# Patient Record
Sex: FEMALE | Race: BLACK OR AFRICAN AMERICAN | NOT HISPANIC OR LATINO | Employment: UNEMPLOYED | URBAN - METROPOLITAN AREA
[De-identification: names, ages, dates, MRNs, and addresses within clinical notes are randomized per-mention and may not be internally consistent; named-entity substitution may affect disease eponyms.]

---

## 2017-01-12 ENCOUNTER — LAB VISIT (OUTPATIENT)
Dept: LAB | Facility: HOSPITAL | Age: 52
End: 2017-01-12
Attending: INTERNAL MEDICINE
Payer: MEDICAID

## 2017-01-12 DIAGNOSIS — E78.5 HYPERLIPIDEMIA, UNSPECIFIED HYPERLIPIDEMIA TYPE: ICD-10-CM

## 2017-01-12 LAB
CHOLEST/HDLC SERPL: 3.4 {RATIO}
HDL/CHOLESTEROL RATIO: 29 %
HDLC SERPL-MCNC: 300 MG/DL
HDLC SERPL-MCNC: 87 MG/DL
LDLC SERPL CALC-MCNC: 189.2 MG/DL
NONHDLC SERPL-MCNC: 213 MG/DL
TRIGL SERPL-MCNC: 119 MG/DL

## 2017-01-12 PROCEDURE — 80061 LIPID PANEL: CPT

## 2017-01-12 PROCEDURE — 36415 COLL VENOUS BLD VENIPUNCTURE: CPT | Mod: PO

## 2017-01-13 ENCOUNTER — OFFICE VISIT (OUTPATIENT)
Dept: CARDIOLOGY | Facility: CLINIC | Age: 52
End: 2017-01-13
Payer: MEDICAID

## 2017-01-13 VITALS
WEIGHT: 188.5 LBS | BODY MASS INDEX: 34.69 KG/M2 | DIASTOLIC BLOOD PRESSURE: 79 MMHG | HEART RATE: 93 BPM | SYSTOLIC BLOOD PRESSURE: 131 MMHG | HEIGHT: 62 IN | OXYGEN SATURATION: 96 %

## 2017-01-13 DIAGNOSIS — E11.9 DIABETES MELLITUS TYPE 2, NONINSULIN DEPENDENT: ICD-10-CM

## 2017-01-13 DIAGNOSIS — E78.5 HYPERLIPIDEMIA, UNSPECIFIED HYPERLIPIDEMIA TYPE: Primary | ICD-10-CM

## 2017-01-13 DIAGNOSIS — I10 ESSENTIAL HYPERTENSION: ICD-10-CM

## 2017-01-13 DIAGNOSIS — E66.9 NON MORBID OBESITY, UNSPECIFIED OBESITY TYPE: ICD-10-CM

## 2017-01-13 PROCEDURE — 93010 ELECTROCARDIOGRAM REPORT: CPT | Mod: ,,, | Performed by: INTERNAL MEDICINE

## 2017-01-13 PROCEDURE — 99214 OFFICE O/P EST MOD 30 MIN: CPT | Mod: S$PBB,,, | Performed by: INTERNAL MEDICINE

## 2017-01-13 PROCEDURE — 99213 OFFICE O/P EST LOW 20 MIN: CPT | Mod: PBBFAC | Performed by: INTERNAL MEDICINE

## 2017-01-13 PROCEDURE — 99999 PR PBB SHADOW E&M-EST. PATIENT-LVL III: CPT | Mod: PBBFAC,,, | Performed by: INTERNAL MEDICINE

## 2017-01-13 PROCEDURE — 93005 ELECTROCARDIOGRAM TRACING: CPT | Mod: PBBFAC,PO | Performed by: INTERNAL MEDICINE

## 2017-01-13 RX ORDER — ATORVASTATIN CALCIUM 40 MG/1
40 TABLET, FILM COATED ORAL NIGHTLY
Qty: 90 TABLET | Refills: 3 | Status: SHIPPED | OUTPATIENT
Start: 2017-01-13 | End: 2017-05-15 | Stop reason: SDUPTHER

## 2017-01-13 NOTE — PROGRESS NOTES
CARDIOVASCULAR PROGRESS NOTE    REASON FOR CONSULT:   Antoine Seals is a 51 y.o. female who presents for CV follow up.    PCP: Sheldon  HISTORY OF PRESENT ILLNESS:   The patient returns for cardiovascular follow-up.  She underwent cervical spine surgery, and unfortunately continues complaining of neck pain.  She plans to follow-up with her providers for this.  In regards to cardiovascular symptoms, she denies angina or dyspnea.  She's had no palpitations, lightheadedness, dizziness, or syncope.  She denies PND, orthopnea, or lower extremity edema.  There's been no melena, hematuria, or claudicant symptoms.    I reviewed the results of her repeat lipid panel which notes that despite dietary intervention, her LDL went up.  Her non-HDL cholesterol is 213.  At this time I suggested we initiate statin therapy given her history of hypertension and diabetes.    CARDIOVASCULAR HISTORY:   none    PAST MEDICAL HISTORY:     Past Medical History   Diagnosis Date    Diabetes mellitus type 2, noninsulin dependent 9/7/2016    Hx of essential hypertension     Hypertension, uncontrolled 7/22/2016       PAST SURGICAL HISTORY:     Past Surgical History   Procedure Laterality Date    Gastric bypass  1995     sandra en y    Btl  1992       ALLERGIES AND MEDICATION:   Review of patient's allergies indicates:  No Known Allergies  Previous Medications    CHOLECALCIFEROL, VITAMIN D3, (VITAMIN D3 ORAL)    Take by mouth.    CYANOCOBALAMIN 500 MCG TABLET    Take 500 mcg by mouth once daily.    FERROUS SULFATE 325 MG (65 MG IRON) TAB TABLET    Take 325 mg by mouth daily with breakfast. Taking 3 times weekly    FLUTICASONE (FLONASE) 50 MCG/ACTUATION NASAL SPRAY    1 spray by Each Nare route 2 (two) times daily.    HYDROCODONE-ACETAMINOPHEN 5-325MG (NORCO) 5-325 MG PER TABLET    TK 1 T PO QID PRN P    LISINOPRIL-HYDROCHLOROTHIAZIDE (PRINZIDE,ZESTORETIC) 20-12.5 MG PER TABLET    Take 1 tablet by mouth once daily.    METFORMIN  (GLUCOPHAGE) 500 MG TABLET    TK 1 T PO  BID WITH MEALS. BEGIN 3 DAYS AFTER SURGERY -  START WITH 1 T D FOR 1 WEEK THEN  1 T BID    POTASSIUM GLUCONATE ORAL    Take 99 mcg by mouth once daily. Monday, Wed, and Friday    VITAMIN A 8000 UNIT CAPSULE    Take 8,000 Units by mouth once daily.       SOCIAL HISTORY:     Social History     Social History    Marital status:      Spouse name: N/A    Number of children: N/A    Years of education: N/A     Occupational History    Not on file.     Social History Main Topics    Smoking status: Never Smoker    Smokeless tobacco: Never Used    Alcohol use No    Drug use: No    Sexual activity: Yes     Partners: Male     Other Topics Concern    Not on file     Social History Narrative       FAMILY HISTORY:     Family History   Problem Relation Age of Onset    Heart disease Mother     Diabetes type II Mother     Heart attack Father 50     Open heart surgery       REVIEW OF SYSTEMS:   Review of Systems   Constitutional: Negative for chills, diaphoresis and fever.   HENT: Negative for nosebleeds.    Eyes: Negative for blurred vision, double vision and photophobia.   Respiratory: Negative for hemoptysis, shortness of breath and wheezing.    Cardiovascular: Negative for chest pain, palpitations, orthopnea, claudication, leg swelling and PND.   Gastrointestinal: Negative for abdominal pain, blood in stool, heartburn, melena, nausea and vomiting.   Genitourinary: Negative for flank pain and hematuria.   Musculoskeletal: Negative for falls, myalgias and neck pain.   Skin: Negative for rash.   Neurological: Negative for dizziness, seizures, loss of consciousness, weakness and headaches.   Endo/Heme/Allergies: Negative for polydipsia. Does not bruise/bleed easily.   Psychiatric/Behavioral: Negative for depression and memory loss. The patient is not nervous/anxious.        PHYSICAL EXAM:     Vitals:    01/13/17 1337   BP: 131/79   Pulse: 93    Body mass index is 34.48  "kg/(m^2).  Weight: 85.5 kg (188 lb 7.9 oz)   Height: 5' 2" (157.5 cm)     Physical Exam   Constitutional: She is oriented to person, place, and time. She appears well-developed and well-nourished. She is cooperative.  Non-toxic appearance. No distress.   HENT:   Head: Normocephalic and atraumatic.   Eyes: Conjunctivae and EOM are normal. Pupils are equal, round, and reactive to light. No scleral icterus.   Neck: Trachea normal. Neck supple. Normal carotid pulses and no JVD present. Carotid bruit is not present. No rigidity. No edema present. No thyromegaly present.   Cardiovascular: Normal rate, regular rhythm, S1 normal and S2 normal.  PMI is not displaced.  Exam reveals no gallop and no friction rub.    No murmur heard.  Pulses:       Carotid pulses are 2+ on the right side, and 2+ on the left side.  Pulmonary/Chest: Effort normal and breath sounds normal. No respiratory distress. She has no wheezes. She has no rales. She exhibits no tenderness.   Abdominal: Soft. Bowel sounds are normal. She exhibits no distension and no mass. There is no hepatosplenomegaly. There is no tenderness.   Musculoskeletal: She exhibits no edema or tenderness.   Feet:   Right Foot:   Skin Integrity: Negative for ulcer.   Left Foot:   Skin Integrity: Negative for ulcer.   Neurological: She is alert and oriented to person, place, and time. No cranial nerve deficit.   Skin: Skin is warm and dry. No rash noted. No erythema.   Psychiatric: She has a normal mood and affect. Her speech is normal and behavior is normal.   Vitals reviewed.      DATA:   EKG: (personally reviewed tracing)  1/13/17 NSR 92, PRWP, no change vs prior tracing    Laboratory:  CBC:    Recent Labs  Lab 09/28/15  1130 07/22/16  0846   WHITE BLOOD CELL COUNT 8.15 6.56   HEMOGLOBIN 11.8 L 11.4 L   HEMATOCRIT 36.2 L 36.2 L   PLATELETS 313 316       CHEMISTRIES:    Recent Labs  Lab 09/28/15  1130 09/02/16  1155 12/28/16  1031   GLUCOSE 135 H 138 H 125 H   SODIUM 138 139 137 "   POTASSIUM 4.2 4.0 4.2   BUN BLD 14 11 16   CREATININE 0.8 0.8 0.7   EGFR IF AFRICAN AMERICAN >60.0 >60.0 >60.0   EGFR IF NON- >60.0 >60.0 >60.0   CALCIUM 9.4 8.8 8.8   MAGNESIUM  --   --  1.7       CARDIAC BIOMARKERS:        COAGS:        LIPIDS/LFTS:    Recent Labs  Lab 09/28/15  1130 09/02/16  1155 01/12/17  0805   CHOLESTEROL 254 H  --  300 H   TRIGLYCERIDES 139  --  119   HDL 80 H  --  87 H   LDL CHOLESTEROL 146.2  --  189.2 H   NON-HDL CHOLESTEROL 174  --  213   AST 15 19  --    ALT 13 17  --        Cardiovascular Testing:  none    ASSESSMENT:   # HTN, controlled  # DM  # HLP, LDL high  # BMI 35 s/p bariatric surgery    PLAN:   Cont med rx  Start atorva 40mg qd  Check lipid panel in 3 months (mid April 2017)  RTC 4 months      Joshua Beebe MD, FACC

## 2017-01-13 NOTE — MR AVS SNAPSHOT
[facility]  [address]  [place] [address]  Phone: [phone]                  Antoine Seals   2017 2:00 PM   Office Visit    Description:  Female : 1965   Provider:  Joshua Beebe MD   Department:  [facility]           Reason for Visit     Follow Up / Labs                To Do List           Future Appointments        Provider Department Dept Phone    2017 2:00 PM Joshua Beebe MD [facility] [phone]      Goals (5 Years of Data)     None      [facility]     [facility] Care [facility] -  Assistance  Registered nurses in the [facility] Call Center provide clinical advisement, health education, appointment booking, and other advisory services.  Call for this free service at [phone].             Medications           Message regarding Medications     Verify the changes and/or additions to your medication regime listed below are the same as discussed with your clinician today.  If any of these changes or additions are incorrect, please notify your healthcare provider.             Verify that the below list of medications is an accurate representation of the medications you are currently taking.  If none reported, the list may be blank. If incorrect, please contact your healthcare provider. Carry this list with you in case of emergency.           Current Medications     CHOLECALCIFEROL, VITAMIN D3, (VITAMIN D3 ORAL) Take by mouth.    cyanocobalamin 500 MCG tablet Take 500 mcg by mouth once daily.    ferrous sulfate 325 mg (65 mg iron) Tab tablet Take 325 mg by mouth daily with breakfast. Taking 3 times weekly    fluticasone (FLONASE) 50 mcg/actuation nasal spray 1 spray by Each Nare route 2 (two) times daily.    hydrocodone-acetaminophen 5-325mg (NORCO) 5-325 mg per tablet TK 1 T PO QID PRN P    lisinopril-hydrochlorothiazide (PRINZIDE,ZESTORETIC) 20-12.5 mg per tablet Take 1 tablet by mouth once daily.     "metformin (GLUCOPHAGE) 500 MG tablet TK 1 T PO  BID WITH MEALS. BEGIN 3 DAYS AFTER SURGERY -  START WITH 1 T D FOR 1 WEEK THEN  1 T BID    POTASSIUM GLUCONATE ORAL Take 99 mcg by mouth once daily. Monday, Wed, and Friday    vitamin A 8000 UNIT capsule Take 8,000 Units by mouth once daily.           Clinical Reference Information           Vital Signs - Last Recorded  Most recent update: 1/13/2017  1:41 PM by Richa Wright MA    BP Pulse Ht Wt LMP SpO2    131/79 (BP Location: Left arm, Patient Position: Sitting, BP Method: Automatic) 93 5' 2" (1.575 m) 85.5 kg (188 lb 7.9 oz) 12/15/2016 96%    BMI                34.48 kg/m2          Blood Pressure          Most Recent Value    Right Arm BP - Sitting  (P) 124/80    Left Arm BP - Sitting  (P) 131/79    BP  131/79      Allergies as of 1/13/2017     No Known Allergies      Immunizations Administered on Date of Encounter - 1/13/2017     None      [facility] Sign-Up     Activating your [facility] account is as easy as 1-2-3!     1) Visit [email or web address], select Sign Up Now, enter this activation code and your date of birth, then select Next.  [email or web address]  Expires: 2/11/2017 10:14 AM      2) Create a username and password to use when you visit [facility] in the future and select a security question in case you lose your password and select Next.    3) Enter your e-mail address and click Sign Up!    Additional Information  If you have questions, please e-mail [email or web address] or call [phone] to talk to our [facility] staff. Remember, [facility] is NOT to be used for urgent needs. For medical emergencies, dial 911.         "

## 2017-03-10 RX ORDER — METFORMIN HYDROCHLORIDE 500 MG/1
TABLET ORAL
Qty: 60 TABLET | Refills: 1 | Status: SHIPPED | OUTPATIENT
Start: 2017-03-10 | End: 2017-04-06

## 2017-04-05 ENCOUNTER — OFFICE VISIT (OUTPATIENT)
Dept: FAMILY MEDICINE | Facility: CLINIC | Age: 52
End: 2017-04-05
Payer: MEDICAID

## 2017-04-05 ENCOUNTER — LAB VISIT (OUTPATIENT)
Dept: LAB | Facility: HOSPITAL | Age: 52
End: 2017-04-05
Attending: FAMILY MEDICINE
Payer: MEDICAID

## 2017-04-05 ENCOUNTER — TELEPHONE (OUTPATIENT)
Dept: FAMILY MEDICINE | Facility: CLINIC | Age: 52
End: 2017-04-05

## 2017-04-05 VITALS
HEART RATE: 78 BPM | RESPIRATION RATE: 16 BRPM | WEIGHT: 179.69 LBS | OXYGEN SATURATION: 96 % | HEIGHT: 62 IN | TEMPERATURE: 98 F | SYSTOLIC BLOOD PRESSURE: 114 MMHG | DIASTOLIC BLOOD PRESSURE: 76 MMHG | BODY MASS INDEX: 33.07 KG/M2

## 2017-04-05 DIAGNOSIS — N95.1 PERI-MENOPAUSE: ICD-10-CM

## 2017-04-05 DIAGNOSIS — F11.90 CHRONIC NARCOTIC USE: ICD-10-CM

## 2017-04-05 DIAGNOSIS — K59.00 CONSTIPATION, UNSPECIFIED CONSTIPATION TYPE: Primary | ICD-10-CM

## 2017-04-05 DIAGNOSIS — E11.9 DIABETES MELLITUS TYPE 2, NONINSULIN DEPENDENT: ICD-10-CM

## 2017-04-05 PROCEDURE — 99999 PR PBB SHADOW E&M-EST. PATIENT-LVL IV: CPT | Mod: PBBFAC,,, | Performed by: FAMILY MEDICINE

## 2017-04-05 PROCEDURE — 83036 HEMOGLOBIN GLYCOSYLATED A1C: CPT

## 2017-04-05 PROCEDURE — 36415 COLL VENOUS BLD VENIPUNCTURE: CPT | Mod: PO

## 2017-04-05 PROCEDURE — 99214 OFFICE O/P EST MOD 30 MIN: CPT | Mod: S$PBB,,, | Performed by: FAMILY MEDICINE

## 2017-04-05 RX ORDER — OXYCODONE AND ACETAMINOPHEN 7.5; 325 MG/1; MG/1
TABLET ORAL
Refills: 0 | COMMUNITY
Start: 2017-03-17 | End: 2017-05-15

## 2017-04-05 NOTE — PROGRESS NOTES
Chief Complaint   Patient presents with    Hot Flashes    Night Sweats    Constipation       HPI  Antoine Seals is a 52 y.o. female with multiple medical diagnoses as listed in the medical history and problem list that presents for evaluation for constipation and hot flashes.     She has been having worsening constipation for the past 3 mos. She has to take magnesium citrate daily to have a bowel movement. She does not have nausea or vomiting. Her energy is low but she had to stop taking her iron pill due to constipation. She has a hx fo gastric bypass is concerned that this is causing her symptoms. She does take medication for pain but not daily. No blood in her stool.     She has not had a period since February and has been having hot flashes and night sweats. She is not sure of what her triggers are. She has not tried anything over the counter for this.     PAST MEDICAL HISTORY:  Past Medical History:   Diagnosis Date    Diabetes mellitus type 2, noninsulin dependent 9/7/2016    Hx of essential hypertension     Hypertension, uncontrolled 7/22/2016       PAST SURGICAL HISTORY:  Past Surgical History:   Procedure Laterality Date    BTL  1992    GASTRIC BYPASS  1995    sandra en y       SOCIAL HISTORY:  Social History     Social History    Marital status:      Spouse name: N/A    Number of children: N/A    Years of education: N/A     Occupational History    Not on file.     Social History Main Topics    Smoking status: Never Smoker    Smokeless tobacco: Never Used    Alcohol use No    Drug use: No    Sexual activity: Yes     Partners: Male     Other Topics Concern    Not on file     Social History Narrative       FAMILY HISTORY:  Family History   Problem Relation Age of Onset    Heart disease Mother     Diabetes type II Mother     Heart attack Father 50     Open heart surgery       ALLERGIES AND MEDICATIONS: updated and reviewed.  Review of patient's allergies indicates:  No Known  "Allergies  Current Outpatient Prescriptions   Medication Sig Dispense Refill    atorvastatin (LIPITOR) 40 MG tablet Take 1 tablet (40 mg total) by mouth every evening. 90 tablet 3    ferrous sulfate 325 mg (65 mg iron) Tab tablet Take 325 mg by mouth daily with breakfast. Taking 3 times weekly      fluticasone (FLONASE) 50 mcg/actuation nasal spray 1 spray by Each Nare route 2 (two) times daily. 1 Bottle 5    lisinopril-hydrochlorothiazide (PRINZIDE,ZESTORETIC) 20-12.5 mg per tablet Take 1 tablet by mouth once daily. 90 tablet 3    metformin (GLUCOPHAGE) 500 MG tablet TAKE 1 TABLET BY MOUTH TWICE DAILY WITH MEALS. BEGIN 3 DAYS AFTER SURGERY- START WITH 1 TABLET DAILY FOR 1 WEEK THEN 1 TABLET TWICE DAILY 60 tablet 1    oxycodone-acetaminophen (PERCOCET) 7.5-325 mg per tablet TK 1 T PO  QID PRN  0     No current facility-administered medications for this visit.        ROS  Review of Systems   Constitutional: Negative for chills, diaphoresis, fatigue, fever and unexpected weight change.   HENT: Negative for rhinorrhea, sinus pressure, sore throat and tinnitus.    Eyes: Negative for photophobia and visual disturbance.   Respiratory: Negative for cough, shortness of breath and wheezing.    Cardiovascular: Negative for chest pain and palpitations.   Gastrointestinal: Positive for abdominal distention and constipation. Negative for abdominal pain, blood in stool, diarrhea, nausea and vomiting.   Genitourinary: Negative for dysuria, flank pain, frequency and vaginal discharge.   Musculoskeletal: Negative for arthralgias and joint swelling.   Skin: Negative for rash.   Neurological: Negative for speech difficulty, weakness, light-headedness and headaches.   Psychiatric/Behavioral: Negative for behavioral problems and dysphoric mood.       Physical Exam  Vitals:    04/05/17 0958   BP: 114/76   Pulse: 78   Resp: 16   Temp: 98 °F (36.7 °C)   TempSrc: Oral   SpO2: 96%   Weight: 81.5 kg (179 lb 10.8 oz)   Height: 5' 2" " "(1.575 m)    Body mass index is 32.86 kg/(m^2).  Weight: 81.5 kg (179 lb 10.8 oz)   Height: 5' 2" (157.5 cm)     Physical Exam   Constitutional: She is oriented to person, place, and time. She appears well-developed and well-nourished.   HENT:   Head: Normocephalic and atraumatic.   Eyes: EOM are normal.   Abdominal: Soft. Bowel sounds are normal. She exhibits no distension and no mass. There is no tenderness. There is no rebound and no guarding. No hernia.   Neurological: She is alert and oriented to person, place, and time.   Skin: Skin is warm and dry. No rash noted. No erythema.   Psychiatric: She has a normal mood and affect. Her behavior is normal.   Nursing note and vitals reviewed.      Health Maintenance       Date Due Completion Date    Eye Exam 1/29/1977 ---    Hemoglobin A1c 6/28/2017 12/28/2016    Foot Exam 12/28/2017 12/28/2016 (Done)    Override on 12/28/2016: Done    Lipid Panel 1/12/2018 1/12/2017    Colonoscopy 5/26/2018 5/26/2008    Pap Smear 9/28/2018 9/28/2015    Mammogram 12/29/2018 12/29/2016    TETANUS VACCINE 9/7/2026 9/7/2016    Pneumococcal PPSV23 (Medium Risk) (2) 1/29/2032 9/7/2016            ASSESSMENT     1. Constipation, unspecified constipation type    2. Diabetes mellitus type 2, noninsulin dependent    3. Chronic narcotic use    4. Angela-menopause        PLAN:     Constipation, unspecified constipation type  -     Ambulatory consult to Gastroenterology    Diabetes mellitus type 2, noninsulin dependent  -     Ambulatory referral to Optometry  -     Hemoglobin A1c; Future; Expected date: 4/5/17    Chronic narcotic use    Angela-menopause    I recommend [place] menopausal complete supplement for hot flashes  If this does not work, we can try SSRI or gabapentin    We will consult GI for her constipation and hx of gastric bypass, she will get the name of her surgeon and we can place a referral to him, though I think this is most likely from her opioid use  Recommend adequate water " intake and an OTC fiber pill    Katharine Chung MD  04/05/2017 10:33 AM        Return in about 6 weeks (around 5/17/2017) for Follow up.

## 2017-04-05 NOTE — MR AVS SNAPSHOT
[facility]  [address] [place] [address]  Phone: [phone]  Fax: [phone]                  Antoine Seals   2017 9:40 AM   Office Visit    Description:  Female : 1965   Provider:  Katharine Chung MD   Department:  [facility]           Reason for Visit     Hot Flashes     Night Sweats     Constipation           Diagnoses this Visit        Comments    Constipation, unspecified constipation type    -  Primary     Diabetes mellitus type 2, noninsulin dependent         Chronic narcotic use         Angela-menopause                To Do List           Future Appointments        Provider Department Dept Phone    2017 8:00 AM LAB, [facility] [phone]    5/15/2017 1:00 PM Joshua Beebe MD [facility] [phone]      Goals (5 Years of Data)     None      Follow-Up and Disposition     Return in about 6 weeks (around 2017) for Follow up.      [facility]     [facility] Care [facility] -  Assistance  Unless otherwise directed by your provider, please contact [facility], our nurse care line that is available for  assistance.     Registered nurses in the [facility] provide: appointment scheduling, clinical advisement, health education, and other advisory services.  Call: [phone] (toll free)               Medications           Message regarding Medications     Verify the changes and/or additions to your medication regime listed below are the same as discussed with your clinician today.  If any of these changes or additions are incorrect, please notify your healthcare provider.        STOP taking these medications     CHOLECALCIFEROL, VITAMIN D3, (VITAMIN D3 ORAL) Take by mouth.    cyanocobalamin 500 MCG tablet Take 500 mcg by mouth once daily.    hydrocodone-acetaminophen 5-325mg (NORCO) 5-325 mg per tablet TK 1 T PO QID PRN P    POTASSIUM GLUCONATE ORAL Take 99 mcg  "by mouth once daily. Monday, Wed, and Friday    vitamin A 8000 UNIT capsule Take 8,000 Units by mouth once daily.           Verify that the below list of medications is an accurate representation of the medications you are currently taking.  If none reported, the list may be blank. If incorrect, please contact your healthcare provider. Carry this list with you in case of emergency.           Current Medications     atorvastatin (LIPITOR) 40 MG tablet Take 1 tablet (40 mg total) by mouth every evening.    ferrous sulfate 325 mg (65 mg iron) Tab tablet Take 325 mg by mouth daily with breakfast. Taking 3 times weekly    fluticasone (FLONASE) 50 mcg/actuation nasal spray 1 spray by Each Nare route 2 (two) times daily.    lisinopril-hydrochlorothiazide (PRINZIDE,ZESTORETIC) 20-12.5 mg per tablet Take 1 tablet by mouth once daily.    metformin (GLUCOPHAGE) 500 MG tablet TAKE 1 TABLET BY MOUTH TWICE DAILY WITH MEALS. BEGIN 3 DAYS AFTER SURGERY- START WITH 1 TABLET DAILY FOR 1 WEEK THEN 1 TABLET TWICE DAILY    oxycodone-acetaminophen (PERCOCET) 7.5-325 mg per tablet TK 1 T PO  QID PRN           Clinical Reference Information           Your Vitals Were     BP Pulse Temp Resp Height Weight    114/76 78 98 °F (36.7 °C) (Oral) 16 5' 2" (1.575 m) 81.5 kg (179 lb 10.8 oz)    Last Period SpO2 BMI          02/23/2017 96% 32.86 kg/m2        Blood Pressure          Most Recent Value    BP  114/76      Allergies as of 4/5/2017     No Known Allergies      Immunizations Administered on Date of Encounter - 4/5/2017     None      Orders Placed During Today's Visit      Normal Orders This Visit    Ambulatory consult to Gastroenterology     Ambulatory referral to Optometry     Future Labs/Procedures Expected by Expires    Hemoglobin A1c  4/5/2017 4/5/2018      [facility] Sign-Up     Activating your [facility] account is as easy as 1-2-3!     1) Visit [email or web address], select Sign Up Now, enter this activation code and your date of birth, then " select Next.  [email or web address]  Expires: 5/20/2017 10:41 AM      2) Create a username and password to use when you visit [facility] in the future and select a security question in case you lose your password and select Next.    3) Enter your e-mail address and click Sign Up!    Additional Information  If you have questions, please e-mail [email or web address] or call [phone] to talk to our [facility] staff. Remember, [facility] is NOT to be used for urgent needs. For medical emergencies, dial 911.         Instructions           Language Assistance Services     ATTENTION: Language assistance services are available, free of charge. Please call [phone].      ATENCIÓN: Si habla bhavincr, tiene a valadez disposición servicios gratuitos de asistencia lingüística. Llame al [phone].     CHÚ Ý: N?u b?n nói Ti?ng Vi?t, có các d?ch v? h? tr? ngôn ng? mi?n phí dành cho b?n. G?i s? [phone].         [facility] complies with applicable Federal civil rights laws and does not discriminate on the basis of race, color, national origin, age, disability, or sex.

## 2017-04-05 NOTE — TELEPHONE ENCOUNTER
Patient has an appointment 4/10/17 3:30pm with Dr. Walters at the [facility], I emailed Amie at [facility] and she will call the patient to schedule her Gastro referral, the patient was notified.

## 2017-04-06 ENCOUNTER — TELEPHONE (OUTPATIENT)
Dept: FAMILY MEDICINE | Facility: CLINIC | Age: 52
End: 2017-04-06

## 2017-04-06 DIAGNOSIS — E11.9 DIABETES MELLITUS TYPE 2, NONINSULIN DEPENDENT: Primary | ICD-10-CM

## 2017-04-06 LAB
ESTIMATED AVG GLUCOSE: 197 MG/DL
HBA1C MFR BLD HPLC: 8.5 %

## 2017-04-06 RX ORDER — METFORMIN HYDROCHLORIDE 1000 MG/1
1000 TABLET ORAL 2 TIMES DAILY WITH MEALS
Qty: 180 TABLET | Refills: 3 | Status: SHIPPED | OUTPATIENT
Start: 2017-04-06 | End: 2017-05-15

## 2017-04-06 NOTE — TELEPHONE ENCOUNTER
Please let her know her diabetes has worsened from 7.9 to 8.5. I recommend we increase her metformin from 500mg to 1000mg twice daily. This may help with her diarrhea. If she just got a prescription, she may double the amt of pills until it runs out but I have sent a new one    Katharine Chung MD

## 2017-04-06 NOTE — TELEPHONE ENCOUNTER
Pt notified to increase Metformin to 1000mg BID. Can use the 500mg tabs until finished, a new rx was sent to pharmacy. Instr to try to maintain a diabetic diet since her A1C has increased.

## 2017-04-10 ENCOUNTER — OFFICE VISIT (OUTPATIENT)
Dept: OPTOMETRY | Facility: CLINIC | Age: 52
End: 2017-04-10
Payer: MEDICAID

## 2017-04-10 DIAGNOSIS — E11.9 TYPE 2 DIABETES MELLITUS WITHOUT OPHTHALMIC MANIFESTATIONS: Primary | ICD-10-CM

## 2017-04-10 DIAGNOSIS — H25.13 NUCLEAR SCLEROSIS, BILATERAL: ICD-10-CM

## 2017-04-10 DIAGNOSIS — I10 ESSENTIAL HYPERTENSION: ICD-10-CM

## 2017-04-10 DIAGNOSIS — H52.7 REFRACTIVE ERROR: ICD-10-CM

## 2017-04-10 PROCEDURE — 92004 COMPRE OPH EXAM NEW PT 1/>: CPT | Mod: S$PBB,,, | Performed by: OPTOMETRIST

## 2017-04-10 PROCEDURE — 92015 DETERMINE REFRACTIVE STATE: CPT | Mod: ,,, | Performed by: OPTOMETRIST

## 2017-04-10 PROCEDURE — 99999 PR PBB SHADOW E&M-EST. PATIENT-LVL II: CPT | Mod: PBBFAC,,, | Performed by: OPTOMETRIST

## 2017-04-10 PROCEDURE — 99212 OFFICE O/P EST SF 10 MIN: CPT | Mod: PBBFAC,PO | Performed by: OPTOMETRIST

## 2017-04-10 RX ORDER — METFORMIN HYDROCHLORIDE 500 MG/1
TABLET ORAL
Refills: 1 | COMMUNITY
Start: 2017-03-10 | End: 2017-05-15

## 2017-04-10 NOTE — PROGRESS NOTES
"Subjective:       Patient ID: Antoine Seals is a 52 y.o. female      Chief Complaint   Patient presents with    Concerns About Ocular Health    Diabetic Eye Exam     NIIDM; LBS 95, A1c 8.5 (04/05/17)    Hypertensive Eye Exam     History of Present Illness  Dls: ? Yrs     Pt here for diabetic and hypertensive eye exam.   Pt c/o blurry vision at distance ou. Pt wears bifocal glasses. Pt states   no tearing no itching no burning no pain no ha's no floaters.     Eye meds:   None    Hemoglobin A1C       Date                     Value               Ref Range           Status                04/05/2017               8.5 (H)             4.5 - 6.2 %         Final                 12/28/2016               7.9 (H)             4.5 - 6.2 %         Final                  07/22/2016               8.3 (H)             4.5 - 6.2 %         Final             ----------    LBS 95 this AM      Assessment/Plan:     1. Type 2 diabetes mellitus without ophthalmic manifestations  No diabetic retinopathy. Educated patient on importance of good blood sugar control, compliance with meds, and follow up care with PCP. Return in 1 year for dilated eye exam, sooner PRN.    2. Nuclear sclerosis, bilateral  Educated patient on the presence of cataracts and effects on vision, including clouded, blurred or dim vision, increasing difficulty with vision at night, sensitivity to light and glare, need for brighter light for reading and other activities, and seeing "halos" around lights. No surgery at this time. Recheck in one year.    3. Essential hypertension  - No hypertensive retinopathy. Continue BP control. RTC 1 year for DFE.    4. Refractive error  Educated patient on refractive error and discussed lens options. Gave pt Rx for specs. RTC in 1 year.    Eyeglass Final Rx     Eyeglass Final Rx      Sphere Cylinder Axis Add   Right -1.25 Sphere  +1.75   Left -2.00 +1.25 035 +1.75       Type:  Bifocal    Expiration Date:  4/11/2018          "       Return in about 1 year (around 4/10/2018) for Diabetic Eye Exam.

## 2017-04-10 NOTE — LETTER
April 10, 2017      Katharine Chung MD  [address]  [place] [address]           [facility]  [address]  [place] [address]  Phone: [phone]  Fax: [phone]          Patient: Antoine Seals   MR Number: [id number]   YOB: 1965   Date of Visit: 4/10/2017       Dear Dr. Katharine Chung:    Thank you for referring Anotine Seals to me for evaluation. Attached you will find relevant portions of my assessment and plan of care.    If you have questions, please do not hesitate to call me. I look forward to following Antoine Seals along with you.    Sincerely,    Zakia Walters, OD    Enclosure  CC:  No Recipients    If you would like to receive this communication electronically, please contact [email or web address] or [phone] to request more information on [email or web address] Link access.    For providers and/or their staff who would like to refer a patient to [facility], please contact us through our one-stop-shop provider referral line, [facility], at [phone].    If you feel you have received this communication in error or would no longer like to receive these types of communications, please e-mail [email or web address]

## 2017-04-10 NOTE — MR AVS SNAPSHOT
[facility]  [address] [facility] [place] [address]  Phone: [phone]  Fax: [phone]                  Antoine Seals   4/10/2017 3:30 PM   Office Visit    Description:  Female : 1965   Provider:  Zakia Walters OD   Department:  [facility]           Reason for Visit     Concerns About Ocular Health     Diabetic Eye Exam     Hypertensive Eye Exam           Diagnoses this Visit        Comments    Type 2 diabetes mellitus without ophthalmic manifestations    -  Primary     Nuclear sclerosis, bilateral         Refractive error                To Do List           Future Appointments        Provider Department Dept Phone    2017 8:00 AM LAB, [facility] [phone]    5/15/2017 1:00 PM Joshua Beebe MD [facility] - Cardiology [phone]      Goals (5 Years of Data)     None      Follow-Up and Disposition     Return in about 1 year (around 4/10/2018) for Diabetic Eye Exam.      [facility]     [facility] Care [facility] -  Assistance  Unless otherwise directed by your provider, please contact [facility], our nurse care line that is available for  assistance.     Registered nurses in the [facility] provide: appointment scheduling, clinical advisement, health education, and other advisory services.  Call: [phone] (toll free)               Medications           Message regarding Medications     Verify the changes and/or additions to your medication regime listed below are the same as discussed with your clinician today.  If any of these changes or additions are incorrect, please notify your healthcare provider.             Verify that the below list of medications is an accurate representation of the medications you are currently taking.  If none reported, the list may be blank. If incorrect, please contact your healthcare provider. Carry this list with you in case of emergency.           Current Medications      atorvastatin (LIPITOR) 40 MG tablet Take 1 tablet (40 mg total) by mouth every evening.    ferrous sulfate 325 mg (65 mg iron) Tab tablet Take 325 mg by mouth daily with breakfast. Taking 3 times weekly    fluticasone (FLONASE) 50 mcg/actuation nasal spray 1 spray by Each Nare route 2 (two) times daily.    lisinopril-hydrochlorothiazide (PRINZIDE,ZESTORETIC) 20-12.5 mg per tablet Take 1 tablet by mouth once daily.    metformin (GLUCOPHAGE) 1000 MG tablet Take 1 tablet (1,000 mg total) by mouth 2 (two) times daily with meals.    metformin (GLUCOPHAGE) 500 MG tablet TK 1 T PO  BID WITH MEALS. BEGIN 3 DAYS AFTER SURGERY -  START WITH 1 T D FOR 1 WEEK THEN  1 T BID    oxycodone-acetaminophen (PERCOCET) 7.5-325 mg per tablet TK 1 T PO  QID PRN           Clinical Reference Information           Your Vitals Were     Last Period                   02/23/2017           Allergies as of 4/10/2017     No Known Allergies      Immunizations Administered on Date of Encounter - 4/10/2017     None      [facility] Sign-Up     Activating your [facility] account is as easy as 1-2-3!     1) Visit [email or web address], select Sign Up Now, enter this activation code and your date of birth, then select Next.  [email or web address]  Expires: 5/20/2017 10:41 AM      2) Create a username and password to use when you visit [facility] in the future and select a security question in case you lose your password and select Next.    3) Enter your e-mail address and click Sign Up!    Additional Information  If you have questions, please e-mail [email or web address] or call [phone] to talk to our [facility] staff. Remember, [facility] is NOT to be used for urgent needs. For medical emergencies, dial 911.         Language Assistance Services     ATTENTION: Language assistance services are available, free of charge. Please call [phone].      ATENCIÓN: Si habla español, tiene a valadez disposición servicios gratuitos de asistencia lingüística. Llame al  [phone].     ANAHI Ý: N?u b?n nói Ti?ng Vi?t, có các d?ch v? h? tr? ngôn ng? mi?n phí dành cho b?n. G?i s? [phone].         [facility] complies with applicable Federal civil rights laws and does not discriminate on the basis of race, color, national origin, age, disability, or sex.

## 2017-04-13 ENCOUNTER — TELEPHONE (OUTPATIENT)
Dept: FAMILY MEDICINE | Facility: CLINIC | Age: 52
End: 2017-04-13

## 2017-04-13 DIAGNOSIS — E11.9 DIABETES MELLITUS TYPE 2, NONINSULIN DEPENDENT: Primary | ICD-10-CM

## 2017-04-13 NOTE — TELEPHONE ENCOUNTER
Then we will have to add a different kind of medication called glipizide, but if she misses meals it can drop her blood sugar too low, she can start by taking it once daily and keep track of her sugars so that at her next visit we can review.    Is she ok with starting this medication? I can send it to her pharmacy    Katharine Chung MD

## 2017-04-13 NOTE — TELEPHONE ENCOUNTER
----- Message from Naida Joe sent at 4/12/2017  9:42 AM CDT -----  Contact: Self  Pt states metformin (GLUCOPHAGE) 1000 MG tablet is too large for her to take. Pt can be reached @ [phone].

## 2017-04-13 NOTE — TELEPHONE ENCOUNTER
Pt states cannot swallow Metformin in any form. Tablet too big even if broken in half and refuses to crush. Please advise

## 2017-04-17 RX ORDER — GLIPIZIDE 5 MG/1
5 TABLET ORAL
Qty: 30 TABLET | Refills: 1 | Status: SHIPPED | OUTPATIENT
Start: 2017-04-17 | End: 2017-08-03 | Stop reason: SDUPTHER

## 2017-05-01 ENCOUNTER — TELEPHONE (OUTPATIENT)
Dept: FAMILY MEDICINE | Facility: CLINIC | Age: 52
End: 2017-05-01

## 2017-05-01 DIAGNOSIS — I10 HYPERTENSION, UNCONTROLLED: ICD-10-CM

## 2017-05-01 RX ORDER — LISINOPRIL AND HYDROCHLOROTHIAZIDE 12.5; 2 MG/1; MG/1
1 TABLET ORAL DAILY
Qty: 90 TABLET | Refills: 3 | OUTPATIENT
Start: 2017-05-01 | End: 2018-05-01

## 2017-05-01 NOTE — TELEPHONE ENCOUNTER
----- Message from Therese Ghotra sent at 5/1/2017  9:31 AM CDT -----  Contact: self  Patient is requesting her blood pressure med to be changed .   [id number] MG

## 2017-05-01 NOTE — TELEPHONE ENCOUNTER
Patient states she had swelling to lip & tongue she thinks may be caused by her Lisinopril. Episode occurred on Friday which was the last day she took medication.I advised patient to come to   Patient states she wants to see Dr. Chung.  Advised her Dr. Chung doesn't have any appointments on Wednesday patient agreed to see Dr. Alexander. DILMA

## 2017-05-01 NOTE — TELEPHONE ENCOUNTER
----- Message from Mary Grace Hudson sent at 5/1/2017  9:29 AM CDT -----  Contact: self  lisinopril-hydrochlorothiazide (PRINZIDE,ZESTORETIC) 20-12.5 mg per tablet    Pt calling to request a refill of the above [facility]. Pt can be reached at [phone].

## 2017-05-03 ENCOUNTER — OFFICE VISIT (OUTPATIENT)
Dept: FAMILY MEDICINE | Facility: CLINIC | Age: 52
End: 2017-05-03
Payer: MEDICAID

## 2017-05-03 VITALS
OXYGEN SATURATION: 97 % | SYSTOLIC BLOOD PRESSURE: 142 MMHG | WEIGHT: 191.38 LBS | TEMPERATURE: 98 F | HEIGHT: 62 IN | HEART RATE: 82 BPM | BODY MASS INDEX: 35.22 KG/M2 | DIASTOLIC BLOOD PRESSURE: 96 MMHG

## 2017-05-03 DIAGNOSIS — I10 ESSENTIAL HYPERTENSION, BENIGN: Primary | ICD-10-CM

## 2017-05-03 PROCEDURE — 99999 PR PBB SHADOW E&M-EST. PATIENT-LVL III: CPT | Mod: PBBFAC,,, | Performed by: FAMILY MEDICINE

## 2017-05-03 PROCEDURE — 99213 OFFICE O/P EST LOW 20 MIN: CPT | Mod: PBBFAC,PO | Performed by: FAMILY MEDICINE

## 2017-05-03 PROCEDURE — 99214 OFFICE O/P EST MOD 30 MIN: CPT | Mod: S$PBB,,, | Performed by: FAMILY MEDICINE

## 2017-05-03 RX ORDER — MELOXICAM 15 MG/1
15 TABLET ORAL DAILY PRN
Refills: 0 | COMMUNITY
Start: 2017-04-10 | End: 2017-06-07 | Stop reason: ALTCHOICE

## 2017-05-03 RX ORDER — AMLODIPINE BESYLATE 10 MG/1
10 TABLET ORAL DAILY
Qty: 90 TABLET | Refills: 3 | Status: SHIPPED | OUTPATIENT
Start: 2017-05-03 | End: 2018-04-23 | Stop reason: SDUPTHER

## 2017-05-03 RX ORDER — OXYCODONE AND ACETAMINOPHEN 5; 325 MG/1; MG/1
1 TABLET ORAL 2 TIMES DAILY
Refills: 0 | COMMUNITY
Start: 2017-04-10 | End: 2017-06-07 | Stop reason: ALTCHOICE

## 2017-05-03 NOTE — PROGRESS NOTES
[facility]  Progress Note    SUBJECTIVE:     Chief Complaint   Patient presents with    Medication Problem     pt wants to change her blood pressure medication    Facial Swelling     possible reaction to BP medication        HPI   Antoine Seals  is a 52 y.o. female here for follow up of facial swelling. This is her 2nd episode of mouth/lip swelling, (first one approx 6 months ago?). She stopped the med and the swelling got better the next day. She did not have any difficulty breathing or swallowing. Also no ER visit.    Review of patient's allergies indicates:   Allergen Reactions    Lisinopril-hydrochlorothiazide Swelling     Facial swelling/ mouth swelling       Past Medical History:   Diagnosis Date    Diabetes mellitus type 2, noninsulin dependent 9/7/2016    Hx of essential hypertension     Hypertension, uncontrolled 7/22/2016    Nuclear sclerosis of both eyes 4/10/2017     Past Surgical History:   Procedure Laterality Date    BTL  1992    GASTRIC BYPASS  1995    sandra en y     Family History   Problem Relation Age of Onset    Heart disease Mother     Diabetes type II Mother     Heart attack Father 50     Open heart surgery    No Known Problems Sister     No Known Problems Brother     No Known Problems Maternal Aunt     No Known Problems Maternal Uncle     No Known Problems Paternal Aunt     No Known Problems Paternal Uncle     No Known Problems Maternal Grandmother     No Known Problems Maternal Grandfather     No Known Problems Paternal Grandmother     No Known Problems Paternal Grandfather     Amblyopia Neg Hx     Blindness Neg Hx     Cancer Neg Hx     Cataracts Neg Hx     Diabetes Neg Hx     Glaucoma Neg Hx     Hypertension Neg Hx     Macular degeneration Neg Hx     Retinal detachment Neg Hx     Strabismus Neg Hx     Stroke Neg Hx     Thyroid disease Neg Hx      Social History   Substance Use Topics    Smoking status: Never Smoker    Smokeless tobacco:  Never Used    Alcohol use No        Review of Systems   Constitutional: Negative for chills, fever and malaise/fatigue.   HENT:        + facial swelling   Respiratory: Negative.  Negative for cough and shortness of breath.    Cardiovascular: Negative.  Negative for chest pain.   Gastrointestinal: Negative.  Negative for abdominal pain, nausea and vomiting.   Genitourinary: Negative.    Neurological: Negative for weakness and headaches.   All other systems reviewed and are negative.    OBJECTIVE:     Vitals:    05/03/17 1031   BP: (!) 142/96   Pulse: 82   Temp: 97.9 °F (36.6 °C)     Body mass index is 35 kg/(m^2).    Physical Exam   Constitutional: She is oriented to person, place, and time and well-developed, well-nourished, and in no distress. No distress.   HENT:   Head: Normocephalic and atraumatic.   No oral, tongue, throat swelling   Eyes: Conjunctivae and EOM are normal.   Cardiovascular: Normal rate, regular rhythm and normal heart sounds.  Exam reveals no gallop and no friction rub.    No murmur heard.  Pulmonary/Chest: Effort normal and breath sounds normal. No respiratory distress. She has no wheezes. She has no rales.   Abdominal: Soft. Bowel sounds are normal. She exhibits no distension. There is no tenderness.   Neurological: She is alert and oriented to person, place, and time.   Skin: Skin is warm. She is not diaphoretic.       Old records were reviewed. Labs and/or images were independently reviewed.    ASSESSMENT     1. Essential hypertension, benign        PLAN:     Essential hypertension, benign  -     Start amlodipine (NORVASC) 10 MG tablet; Take 1 tablet (10 mg total) by mouth once daily.  Dispense: 90 tablet; Refill: 3  -     Educated patient to take medications as prescribed, and to record bp logs daily to bring along to next visit. Instructed patient to decrease salt intake. Also told patient to call if any new signs or symptoms develop.  -     Stop lisinopril/hctz as most likely source of  angioedema. Unsure which one specifically, as she used to be on lisinopril monotherapy, and she may have recalled a similar episode?        RTC PRN, or go to ER if signs or symptoms persist or worsen.       Pj Alexander MD  05/03/2017 11:15 AM

## 2017-05-04 DIAGNOSIS — E11.9 TYPE 2 DIABETES MELLITUS WITHOUT COMPLICATION: ICD-10-CM

## 2017-05-12 ENCOUNTER — OFFICE VISIT (OUTPATIENT)
Dept: OBSTETRICS AND GYNECOLOGY | Facility: CLINIC | Age: 52
End: 2017-05-12
Payer: MEDICAID

## 2017-05-12 ENCOUNTER — LAB VISIT (OUTPATIENT)
Dept: LAB | Facility: HOSPITAL | Age: 52
End: 2017-05-12
Attending: FAMILY MEDICINE
Payer: MEDICAID

## 2017-05-12 VITALS
BODY MASS INDEX: 35.11 KG/M2 | HEIGHT: 62 IN | SYSTOLIC BLOOD PRESSURE: 130 MMHG | WEIGHT: 190.81 LBS | DIASTOLIC BLOOD PRESSURE: 82 MMHG

## 2017-05-12 DIAGNOSIS — R33.9 URINARY RETENTION: Primary | ICD-10-CM

## 2017-05-12 DIAGNOSIS — E78.5 HYPERLIPIDEMIA, UNSPECIFIED HYPERLIPIDEMIA TYPE: ICD-10-CM

## 2017-05-12 LAB
ALBUMIN SERPL BCP-MCNC: 3 G/DL
ALP SERPL-CCNC: 73 U/L
ALT SERPL W/O P-5'-P-CCNC: 19 U/L
AST SERPL-CCNC: 17 U/L
BILIRUB DIRECT SERPL-MCNC: 0.1 MG/DL
BILIRUB SERPL-MCNC: 0.4 MG/DL
CHOLEST/HDLC SERPL: 3.2 {RATIO}
HDL/CHOLESTEROL RATIO: 31.5 %
HDLC SERPL-MCNC: 302 MG/DL
HDLC SERPL-MCNC: 95 MG/DL
LDLC SERPL CALC-MCNC: 168.8 MG/DL
NONHDLC SERPL-MCNC: 207 MG/DL
PROT SERPL-MCNC: 7.4 G/DL
TRIGL SERPL-MCNC: 191 MG/DL

## 2017-05-12 PROCEDURE — 80061 LIPID PANEL: CPT

## 2017-05-12 PROCEDURE — 36415 COLL VENOUS BLD VENIPUNCTURE: CPT

## 2017-05-12 PROCEDURE — 99999 PR PBB SHADOW E&M-EST. PATIENT-LVL II: CPT | Mod: PBBFAC,,, | Performed by: OBSTETRICS & GYNECOLOGY

## 2017-05-12 PROCEDURE — 80076 HEPATIC FUNCTION PANEL: CPT

## 2017-05-12 PROCEDURE — 99213 OFFICE O/P EST LOW 20 MIN: CPT | Mod: S$PBB,,, | Performed by: OBSTETRICS & GYNECOLOGY

## 2017-05-12 RX ORDER — BETHANECHOL CHLORIDE 5 MG/1
10 TABLET ORAL
Qty: 60 TABLET | Refills: 0 | Status: SHIPPED | OUTPATIENT
Start: 2017-05-12 | End: 2017-06-26

## 2017-05-12 NOTE — PROGRESS NOTES
"[facility]  Ambulatory Clinic  Obstetrics & Gynecology    Visit Date:  2017    Chief Complaint:  Incomplete voiding    History of Present Illness:      Antoine Seals is a 52 y.o.  here with c/o incomplete voiding.    Pt reports this problem for past 2-3 wks.    Pt denies any UTI sxs or renal stones.    Pt states she is able to void completely but just takes more time than usual.    Pt denies any vaginal discharge, dysmenorrhea, dyspareunia, pelvic pain, breast mass/skin changes, GI complaints.      Review of Systems:      GENERAL:  No fever, fatigue, excessive weight gain or loss  GENITOURINARY:  See HPI    Physical Exam:     /82 (BP Location: Left arm, Patient Position: Sitting, BP Method: Manual)  Ht 5' 2" (1.575 m)  Wt 86.5 kg (190 lb 12.9 oz)  LMP 2017  BMI 34.9 kg/m2   Pulse 60, Resp rate 16     GENERAL:  No acute distress, well-nourished  LUNGS:  Clear to auscultation  HEART:  Regular rate and rhythm, no murmurs, gallops, or rubs  ABDOMEN:  Soft, non-tender, non-distended, no CVA or suprapubic tenderness   GENITOURINARY:  NFEG no lesion. No vaginal or cervical lesion. No bleeding or discharge. No significant cystocele or rectocele. Uterus and ovaries small, NT.    Chaperone present for exam.    Urine dip negative for nitrite or LE 2017    Assessment:     52 y.o. :    1. Urinary retention, possible neurogenic bladder from diabetes    Plan:    Discussed urinary retention.  Trail of urecholine.  Behavioral modifications, voiding diary, no smoking and dietary advice.  If sxs worsen, will refer to urology.     Encourage healthy lifestyle modifications.    F/u with PCP for health maintenance.     F/u 6 wks or sooner as needed.      Voiced understanding.        Naveed Alexander MD    "

## 2017-05-12 NOTE — MR AVS SNAPSHOT
[facility] [place]  [address] [facility] [address]  [place] [address]  Phone: [phone]                  Antoine Seals   2017 3:00 PM   Office Visit    Description:  Female : 1965   Provider:  Naveed Alexander MD   Department:  [facility] [place] [facility]           Reason for Visit     Urinary Retention                To Do List           Future Appointments        Provider Department Dept Phone    5/15/2017 1:00 PM Joshua Beebe MD [facility] [phone]      Goals (5 Years of Data)     None      [facility]     [facility] On Call Nurse Care Line -  Assistance  Unless otherwise directed by your provider, please contact [facility], our nurse care line that is available for  assistance.     Registered nurses in the [facility] provide: appointment scheduling, clinical advisement, health education, and other advisory services.  Call: [phone] (toll free)               Medications           Message regarding Medications     Verify the changes and/or additions to your medication regime listed below are the same as discussed with your clinician today.  If any of these changes or additions are incorrect, please notify your healthcare provider.             Verify that the below list of medications is an accurate representation of the medications you are currently taking.  If none reported, the list may be blank. If incorrect, please contact your healthcare provider. Carry this list with you in case of emergency.           Current Medications     amlodipine (NORVASC) 10 MG tablet Take 1 tablet (10 mg total) by mouth once daily.    atorvastatin (LIPITOR) 40 MG tablet Take 1 tablet (40 mg total) by mouth every evening.    ferrous sulfate 325 mg (65 mg iron) Tab tablet Take 325 mg by mouth daily with breakfast. Taking 3 times weekly    fluticasone (FLONASE) 50 mcg/actuation nasal spray 1 spray by Each Nare route 2 (two) times daily.    glipiZIDE (GLUCOTROL) 5  "MG tablet Take 1 tablet (5 mg total) by mouth daily with breakfast.    meloxicam (MOBIC) 15 MG tablet Take 15 mg by mouth daily as needed.    metformin (GLUCOPHAGE) 1000 MG tablet Take 1 tablet (1,000 mg total) by mouth 2 (two) times daily with meals.    metformin (GLUCOPHAGE) 500 MG tablet TK 1 T PO  BID WITH MEALS. BEGIN 3 DAYS AFTER SURGERY -  START WITH 1 T D FOR 1 WEEK THEN  1 T BID    oxycodone-acetaminophen (PERCOCET) 5-325 mg per tablet Take 1 tablet by mouth 2 (two) times daily.    oxycodone-acetaminophen (PERCOCET) 7.5-325 mg per tablet TK 1 T PO  QID PRN           Clinical Reference Information           Your Vitals Were     BP Height Weight Last Period BMI    130/82 (BP Location: Left arm, Patient Position: Sitting, BP Method: Manual) 5' 2" (1.575 m) 86.5 kg (190 lb 12.9 oz) 04/23/2017 34.9 kg/m2      Blood Pressure          Most Recent Value    BP  130/82      Allergies as of 5/12/2017     Lisinopril-hydrochlorothiazide      Immunizations Administered on Date of Encounter - 5/12/2017     None      [facility] Sign-Up     Activating your [facility] account is as easy as 1-2-3!     1) Visit [email or web address], select Sign Up Now, enter this activation code and your date of birth, then select Next.  [email or web address]  Expires: 5/20/2017 10:41 AM      2) Create a username and password to use when you visit [facility] in the future and select a security question in case you lose your password and select Next.    3) Enter your e-mail address and click Sign Up!    Additional Information  If you have questions, please e-mail [email or web address] or call [phone] to talk to our [facility] staff. Remember, [facility] is NOT to be used for urgent needs. For medical emergencies, dial 911.         Language Assistance Services     ATTENTION: Language assistance services are available, free of charge. Please call [phone].      ATENCIÓN: Si habla español, tiene a valadez disposición servicios gratuitos de asistencia " lingüística. Driss al [phone].     ANAHI Ý: N?u b?n nói Ti?ng Vi?t, có các d?ch v? h? tr? ngôn ng? mi?n phí dành cho b?n. G?i s? [phone].         [facility] complies with applicable Federal civil rights laws and does not discriminate on the basis of race, color, national origin, age, disability, or sex.

## 2017-05-15 ENCOUNTER — OFFICE VISIT (OUTPATIENT)
Dept: CARDIOLOGY | Facility: CLINIC | Age: 52
End: 2017-05-15
Payer: MEDICAID

## 2017-05-15 VITALS
HEART RATE: 89 BPM | HEIGHT: 62 IN | DIASTOLIC BLOOD PRESSURE: 84 MMHG | OXYGEN SATURATION: 96 % | BODY MASS INDEX: 35.62 KG/M2 | WEIGHT: 193.56 LBS | RESPIRATION RATE: 15 BRPM | SYSTOLIC BLOOD PRESSURE: 130 MMHG

## 2017-05-15 DIAGNOSIS — I10 ESSENTIAL HYPERTENSION: ICD-10-CM

## 2017-05-15 DIAGNOSIS — E66.9 NON MORBID OBESITY, UNSPECIFIED OBESITY TYPE: ICD-10-CM

## 2017-05-15 DIAGNOSIS — R06.09 DOE (DYSPNEA ON EXERTION): Primary | ICD-10-CM

## 2017-05-15 DIAGNOSIS — E78.5 HYPERLIPIDEMIA, UNSPECIFIED HYPERLIPIDEMIA TYPE: ICD-10-CM

## 2017-05-15 DIAGNOSIS — E11.9 DIABETES MELLITUS TYPE 2, NONINSULIN DEPENDENT: ICD-10-CM

## 2017-05-15 PROCEDURE — 99214 OFFICE O/P EST MOD 30 MIN: CPT | Mod: S$PBB,,, | Performed by: INTERNAL MEDICINE

## 2017-05-15 PROCEDURE — 99213 OFFICE O/P EST LOW 20 MIN: CPT | Mod: PBBFAC,PO | Performed by: INTERNAL MEDICINE

## 2017-05-15 PROCEDURE — 99999 PR PBB SHADOW E&M-EST. PATIENT-LVL III: CPT | Mod: PBBFAC,,, | Performed by: INTERNAL MEDICINE

## 2017-05-15 RX ORDER — ATORVASTATIN CALCIUM 80 MG/1
80 TABLET, FILM COATED ORAL NIGHTLY
Qty: 90 TABLET | Refills: 3 | Status: SHIPPED | OUTPATIENT
Start: 2017-05-15 | End: 2017-11-08 | Stop reason: SDUPTHER
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
## 2017-05-15 NOTE — MR AVS SNAPSHOT
[facility]  [address]  [place] [address]  Phone: [phone]                  Antoine Seals   5/15/2017 1:00 PM   Office Visit    Description:  Female : 1965   Provider:  Joshua Beebe MD   Department:  [facility]           Reason for Visit     Hypertension     Follow-up                To Do List           Goals (5 Years of Data)     None      [facility]     [facility] -  Assistance  Unless otherwise directed by your provider, please contact [facility], our nurse care line that is available for  assistance.     Registered nurses in the [facility] provide: appointment scheduling, clinical advisement, health education, and other advisory services.  Call: [phone] (toll free)               Medications           Message regarding Medications     Verify the changes and/or additions to your medication regime listed below are the same as discussed with your clinician today.  If any of these changes or additions are incorrect, please notify your healthcare provider.        STOP taking these medications     oxycodone-acetaminophen (PERCOCET) 7.5-325 mg per tablet TK 1 T PO  QID PRN    metformin (GLUCOPHAGE) 1000 MG tablet Take 1 tablet (1,000 mg total) by mouth 2 (two) times daily with meals.    metformin (GLUCOPHAGE) 500 MG tablet TK 1 T PO  BID WITH MEALS. BEGIN 3 DAYS AFTER SURGERY -  START WITH 1 T D FOR 1 WEEK THEN  1 T BID           Verify that the below list of medications is an accurate representation of the medications you are currently taking.  If none reported, the list may be blank. If incorrect, please contact your healthcare provider. Carry this list with you in case of emergency.           Current Medications     amlodipine (NORVASC) 10 MG tablet Take 1 tablet (10 mg total) by mouth once daily.    atorvastatin (LIPITOR) 40 MG tablet Take 1 tablet (40 mg total) by mouth every evening.     "bethanechol (URECHOLINE) 5 MG Tab Take 2 tablets (10 mg total) by mouth before meals as needed.    ferrous sulfate 325 mg (65 mg iron) Tab tablet Take 325 mg by mouth daily with breakfast. Taking 3 times weekly    fluticasone (FLONASE) 50 mcg/actuation nasal spray 1 spray by Each Nare route 2 (two) times daily.    glipiZIDE (GLUCOTROL) 5 MG tablet Take 1 tablet (5 mg total) by mouth daily with breakfast.    meloxicam (MOBIC) 15 MG tablet Take 15 mg by mouth daily as needed.    oxycodone-acetaminophen (PERCOCET) 5-325 mg per tablet Take 1 tablet by mouth 2 (two) times daily.           Clinical Reference Information           Your Vitals Were     BP Pulse Resp Height Weight Last Period    130/84 89 15 5' 2" (1.575 m) 87.8 kg (193 lb 9 oz) 04/23/2017    SpO2 BMI             96% 35.4 kg/m2         Blood Pressure          Most Recent Value    BP  130/84      Allergies as of 5/15/2017     Lisinopril-hydrochlorothiazide      Immunizations Administered on Date of Encounter - 5/15/2017     None      [facility] Sign-Up     Activating your [facility] account is as easy as 1-2-3!     1) Visit [email or web address], select Sign Up Now, enter this activation code and your date of birth, then select Next.  [email or web address]  Expires: 5/20/2017 10:41 AM      2) Create a username and password to use when you visit [facility] in the future and select a security question in case you lose your password and select Next.    3) Enter your e-mail address and click Sign Up!    Additional Information  If you have questions, please e-mail [email or web address] or call [phone] to talk to our [facility] staff. Remember, [facility] is NOT to be used for urgent needs. For medical emergencies, dial 911.         Language Assistance Services     ATTENTION: Language assistance services are available, free of charge. Please call [phone].      ATENCIÓN: Si habla español, tiene a valadez disposición servicios gratuitos de asistencia lingüística. Llame al " [phone].     ANAHI Ý: N?u b?n nói Ti?ng Vi?t, có các d?ch v? h? tr? ngôn ng? mi?n phí dành cho b?n. G?i s? [phone].         [facility] complies with applicable Federal civil rights laws and does not discriminate on the basis of race, color, national origin, age, disability, or sex.

## 2017-05-24 ENCOUNTER — TELEPHONE (OUTPATIENT)
Dept: OBSTETRICS AND GYNECOLOGY | Facility: CLINIC | Age: 52
End: 2017-05-24

## 2017-05-24 DIAGNOSIS — N31.9 NEUROGENIC BLADDER: Primary | ICD-10-CM

## 2017-05-24 NOTE — TELEPHONE ENCOUNTER
----- Message from Courtney Allan sent at 5/24/2017  9:54 AM CDT -----  Contact: self  Please call pt in regards to referral for nephro. [phone]        Thanks

## 2017-05-24 NOTE — TELEPHONE ENCOUNTER
Spoke with pt. Pts symptoms have not improved and she would like to request a referral to urology. Pt informed that I would route her request to Dr. Alexander.

## 2017-06-07 ENCOUNTER — INITIAL CONSULT (OUTPATIENT)
Dept: BARIATRICS | Facility: CLINIC | Age: 52
End: 2017-06-07
Payer: MEDICAID

## 2017-06-07 ENCOUNTER — LAB VISIT (OUTPATIENT)
Dept: LAB | Facility: HOSPITAL | Age: 52
End: 2017-06-07
Attending: SURGERY
Payer: MEDICAID

## 2017-06-07 VITALS
SYSTOLIC BLOOD PRESSURE: 138 MMHG | HEART RATE: 81 BPM | BODY MASS INDEX: 35.86 KG/M2 | WEIGHT: 194.88 LBS | DIASTOLIC BLOOD PRESSURE: 69 MMHG | HEIGHT: 62 IN

## 2017-06-07 DIAGNOSIS — Z98.84 GASTRIC BYPASS STATUS FOR OBESITY: ICD-10-CM

## 2017-06-07 DIAGNOSIS — E11.69 DIABETES MELLITUS TYPE 2 IN OBESE: ICD-10-CM

## 2017-06-07 DIAGNOSIS — E66.9 DIABETES MELLITUS TYPE 2 IN OBESE: ICD-10-CM

## 2017-06-07 DIAGNOSIS — I10 BENIGN ESSENTIAL HTN: ICD-10-CM

## 2017-06-07 DIAGNOSIS — K59.00 CONSTIPATION, UNSPECIFIED CONSTIPATION TYPE: ICD-10-CM

## 2017-06-07 DIAGNOSIS — R10.84 GENERALIZED ABDOMINAL PAIN: ICD-10-CM

## 2017-06-07 DIAGNOSIS — K59.09 CONSTIPATION, CHRONIC: ICD-10-CM

## 2017-06-07 DIAGNOSIS — I10 ESSENTIAL HYPERTENSION: ICD-10-CM

## 2017-06-07 DIAGNOSIS — R10.84 GENERALIZED ABDOMINAL PAIN: Primary | ICD-10-CM

## 2017-06-07 DIAGNOSIS — E78.5 HYPERLIPIDEMIA, UNSPECIFIED HYPERLIPIDEMIA TYPE: ICD-10-CM

## 2017-06-07 DIAGNOSIS — I10 HTN, AGE 0-18: ICD-10-CM

## 2017-06-07 DIAGNOSIS — E11.9 DIABETES MELLITUS TYPE 2, NONINSULIN DEPENDENT: ICD-10-CM

## 2017-06-07 LAB
25(OH)D3+25(OH)D2 SERPL-MCNC: 21 NG/ML
ALBUMIN SERPL BCP-MCNC: 2.8 G/DL
ALP SERPL-CCNC: 82 U/L
ALT SERPL W/O P-5'-P-CCNC: 18 U/L
ANION GAP SERPL CALC-SCNC: 7 MMOL/L
AST SERPL-CCNC: 16 U/L
BASOPHILS # BLD AUTO: 0.02 K/UL
BASOPHILS NFR BLD: 0.3 %
BILIRUB SERPL-MCNC: 0.2 MG/DL
BUN SERPL-MCNC: 13 MG/DL
CALCIUM SERPL-MCNC: 9.1 MG/DL
CHLORIDE SERPL-SCNC: 100 MMOL/L
CHOLEST/HDLC SERPL: 3 {RATIO}
CO2 SERPL-SCNC: 29 MMOL/L
CREAT SERPL-MCNC: 0.7 MG/DL
DIFFERENTIAL METHOD: ABNORMAL
EOSINOPHIL # BLD AUTO: 0.1 K/UL
EOSINOPHIL NFR BLD: 2 %
ERYTHROCYTE [DISTWIDTH] IN BLOOD BY AUTOMATED COUNT: 15.8 %
EST. GFR  (AFRICAN AMERICAN): >60 ML/MIN/1.73 M^2
EST. GFR  (NON AFRICAN AMERICAN): >60 ML/MIN/1.73 M^2
GLUCOSE SERPL-MCNC: 137 MG/DL
HCT VFR BLD AUTO: 32.9 %
HDL/CHOLESTEROL RATIO: 33.6 %
HDLC SERPL-MCNC: 289 MG/DL
HDLC SERPL-MCNC: 97 MG/DL
HGB BLD-MCNC: 10.7 G/DL
IRON SERPL-MCNC: 49 UG/DL
LDLC SERPL CALC-MCNC: 163.6 MG/DL
LYMPHOCYTES # BLD AUTO: 2.8 K/UL
LYMPHOCYTES NFR BLD: 39.2 %
MCH RBC QN AUTO: 26.1 PG
MCHC RBC AUTO-ENTMCNC: 32.5 %
MCV RBC AUTO: 80 FL
MONOCYTES # BLD AUTO: 0.4 K/UL
MONOCYTES NFR BLD: 5.9 %
NEUTROPHILS # BLD AUTO: 3.8 K/UL
NEUTROPHILS NFR BLD: 52.3 %
NONHDLC SERPL-MCNC: 192 MG/DL
PLATELET # BLD AUTO: 320 K/UL
PMV BLD AUTO: 10.4 FL
POTASSIUM SERPL-SCNC: 4.1 MMOL/L
PROT SERPL-MCNC: 7 G/DL
RBC # BLD AUTO: 4.1 M/UL
SATURATED IRON: 12 %
SODIUM SERPL-SCNC: 136 MMOL/L
TOTAL IRON BINDING CAPACITY: 408 UG/DL
TRANSFERRIN SERPL-MCNC: 276 MG/DL
TRIGL SERPL-MCNC: 142 MG/DL
VIT B12 SERPL-MCNC: 556 PG/ML
WBC # BLD AUTO: 7.15 K/UL

## 2017-06-07 PROCEDURE — 80053 COMPREHEN METABOLIC PANEL: CPT

## 2017-06-07 PROCEDURE — 99213 OFFICE O/P EST LOW 20 MIN: CPT | Mod: PBBFAC | Performed by: PHYSICIAN ASSISTANT

## 2017-06-07 PROCEDURE — 99999 PR PBB SHADOW E&M-EST. PATIENT-LVL III: CPT | Mod: PBBFAC,,, | Performed by: PHYSICIAN ASSISTANT

## 2017-06-07 PROCEDURE — 82306 VITAMIN D 25 HYDROXY: CPT

## 2017-06-07 PROCEDURE — 3045F PR MOST RECENT HEMOGLOBIN A1C LEVEL 7.0-9.0%: CPT | Mod: ,,, | Performed by: PHYSICIAN ASSISTANT

## 2017-06-07 PROCEDURE — 84425 ASSAY OF VITAMIN B-1: CPT

## 2017-06-07 PROCEDURE — 80061 LIPID PANEL: CPT

## 2017-06-07 PROCEDURE — 82607 VITAMIN B-12: CPT

## 2017-06-07 PROCEDURE — 83540 ASSAY OF IRON: CPT

## 2017-06-07 PROCEDURE — 85025 COMPLETE CBC W/AUTO DIFF WBC: CPT

## 2017-06-07 PROCEDURE — 99215 OFFICE O/P EST HI 40 MIN: CPT | Mod: S$PBB,,, | Performed by: PHYSICIAN ASSISTANT

## 2017-06-07 RX ORDER — POLYETHYLENE GLYCOL 3350 17 G/17G
17 POWDER, FOR SOLUTION ORAL DAILY
Qty: 1 BOTTLE | Refills: 3 | Status: SHIPPED | OUTPATIENT
Start: 2017-06-07 | End: 2017-06-26

## 2017-06-07 RX ORDER — SYRING-NEEDL,DISP,INSUL,0.3 ML 29 G X1/2"
296 SYRINGE, EMPTY DISPOSABLE MISCELLANEOUS ONCE
Refills: 0 | COMMUNITY
Start: 2017-06-07 | End: 2017-06-07

## 2017-06-07 NOTE — PATIENT INSTRUCTIONS
- Take Magnesium Citrate once today for constipation.   - Start Glycolax powder daily.  If stools get too lose, then cut dose in half.  - Go to the 4th floor GI department to schedule your Upper GI Endoscopy and Colonoscopy.  - CT scan with contrast.  Stop glipizide 48 hours before procedure.  Fast 4 hours prior to procedure and arrive 2 hours before procedure to start drinking contrast.  - Food records daily until you come back to see Ifrah.  - Labs today.
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
## 2017-06-08 ENCOUNTER — HOSPITAL ENCOUNTER (OUTPATIENT)
Dept: CARDIOLOGY | Facility: HOSPITAL | Age: 52
Discharge: HOME | End: 2017-06-08
Attending: INTERNAL MEDICINE
Payer: MEDICAID

## 2017-06-08 ENCOUNTER — HOSPITAL ENCOUNTER (OUTPATIENT)
Dept: RADIOLOGY | Facility: HOSPITAL | Age: 52
Discharge: HOME | End: 2017-06-08
Attending: INTERNAL MEDICINE
Payer: MEDICAID

## 2017-06-08 DIAGNOSIS — R06.09 DOE (DYSPNEA ON EXERTION): ICD-10-CM

## 2017-06-08 DIAGNOSIS — I10 ESSENTIAL HYPERTENSION: Primary | ICD-10-CM

## 2017-06-08 LAB
DIASTOLIC DYSFUNCTION: NO
ESTIMATED PA SYSTOLIC PRESSURE: 13.89
RETIRED EF AND QEF - SEE NOTES: 55 (ref 55–65)
TRICUSPID VALVE REGURGITATION: NORMAL

## 2017-06-08 PROCEDURE — 78452 HT MUSCLE IMAGE SPECT MULT: CPT | Mod: TC

## 2017-06-08 PROCEDURE — 93017 CV STRESS TEST TRACING ONLY: CPT

## 2017-06-08 PROCEDURE — 93016 CV STRESS TEST SUPVJ ONLY: CPT | Mod: ,,, | Performed by: INTERNAL MEDICINE

## 2017-06-08 PROCEDURE — 78452 HT MUSCLE IMAGE SPECT MULT: CPT | Mod: 26,,, | Performed by: INTERNAL MEDICINE

## 2017-06-08 PROCEDURE — 93306 TTE W/DOPPLER COMPLETE: CPT | Mod: 26,,, | Performed by: INTERNAL MEDICINE

## 2017-06-08 PROCEDURE — 93018 CV STRESS TEST I&R ONLY: CPT | Mod: ,,, | Performed by: INTERNAL MEDICINE

## 2017-06-08 PROCEDURE — 93306 TTE W/DOPPLER COMPLETE: CPT

## 2017-06-09 DIAGNOSIS — Z12.11 SPECIAL SCREENING FOR MALIGNANT NEOPLASMS, COLON: Primary | ICD-10-CM

## 2017-06-09 RX ORDER — POLYETHYLENE GLYCOL 3350, SODIUM SULFATE ANHYDROUS, SODIUM BICARBONATE, SODIUM CHLORIDE, POTASSIUM CHLORIDE 236; 22.74; 6.74; 5.86; 2.97 G/4L; G/4L; G/4L; G/4L; G/4L
4 POWDER, FOR SOLUTION ORAL ONCE
Qty: 4000 ML | Refills: 0 | Status: SHIPPED | OUTPATIENT
Start: 2017-06-09 | End: 2017-06-09

## 2017-06-10 LAB — VIT B1 SERPL-MCNC: 36 UG/L (ref 38–122)

## 2017-06-14 ENCOUNTER — HOSPITAL ENCOUNTER (OUTPATIENT)
Facility: HOSPITAL | Age: 52
Discharge: HOME | End: 2017-06-14
Attending: INTERNAL MEDICINE | Admitting: ANESTHESIOLOGY
Payer: MEDICAID

## 2017-06-14 ENCOUNTER — SURGERY (OUTPATIENT)
Age: 52
End: 2017-06-14

## 2017-06-14 ENCOUNTER — ANESTHESIA (OUTPATIENT)
Dept: ENDOSCOPY | Facility: HOSPITAL | Age: 52
End: 2017-06-14
Payer: MEDICAID

## 2017-06-14 ENCOUNTER — ANESTHESIA EVENT (OUTPATIENT)
Dept: ENDOSCOPY | Facility: HOSPITAL | Age: 52
End: 2017-06-14
Payer: MEDICAID

## 2017-06-14 VITALS
OXYGEN SATURATION: 97 % | WEIGHT: 194 LBS | HEIGHT: 62 IN | SYSTOLIC BLOOD PRESSURE: 116 MMHG | RESPIRATION RATE: 24 BRPM | BODY MASS INDEX: 35.7 KG/M2 | HEART RATE: 87 BPM | TEMPERATURE: 98 F | RESPIRATION RATE: 16 BRPM | DIASTOLIC BLOOD PRESSURE: 78 MMHG

## 2017-06-14 DIAGNOSIS — R10.84 GENERALIZED ABDOMINAL PAIN: Primary | ICD-10-CM

## 2017-06-14 LAB — POCT GLUCOSE: 209 MG/DL (ref 70–110)

## 2017-06-14 PROCEDURE — 82962 GLUCOSE BLOOD TEST: CPT | Performed by: INTERNAL MEDICINE

## 2017-06-14 PROCEDURE — 37000009 HC ANESTHESIA EA ADD 15 MINS: Performed by: INTERNAL MEDICINE

## 2017-06-14 PROCEDURE — 25000003 PHARM REV CODE 250: Performed by: NURSE ANESTHETIST, CERTIFIED REGISTERED

## 2017-06-14 PROCEDURE — 43235 EGD DIAGNOSTIC BRUSH WASH: CPT | Performed by: INTERNAL MEDICINE

## 2017-06-14 PROCEDURE — 63600175 PHARM REV CODE 636 W HCPCS: Performed by: NURSE ANESTHETIST, CERTIFIED REGISTERED

## 2017-06-14 PROCEDURE — 45378 DIAGNOSTIC COLONOSCOPY: CPT | Mod: ,,, | Performed by: INTERNAL MEDICINE

## 2017-06-14 PROCEDURE — 37000008 HC ANESTHESIA 1ST 15 MINUTES: Performed by: INTERNAL MEDICINE

## 2017-06-14 PROCEDURE — D9220A PRA ANESTHESIA: Mod: 33,CRNA,, | Performed by: NURSE ANESTHETIST, CERTIFIED REGISTERED

## 2017-06-14 PROCEDURE — G0121 COLON CA SCRN NOT HI RSK IND: HCPCS | Performed by: INTERNAL MEDICINE

## 2017-06-14 PROCEDURE — 43235 EGD DIAGNOSTIC BRUSH WASH: CPT | Mod: 51,,, | Performed by: INTERNAL MEDICINE

## 2017-06-14 PROCEDURE — D9220A PRA ANESTHESIA: Mod: 33,ANES,, | Performed by: ANESTHESIOLOGY

## 2017-06-14 PROCEDURE — 25000003 PHARM REV CODE 250: Performed by: INTERNAL MEDICINE

## 2017-06-14 RX ORDER — PROPOFOL 10 MG/ML
VIAL (ML) INTRAVENOUS
Status: DISCONTINUED | OUTPATIENT
Start: 2017-06-14 | End: 2017-06-14

## 2017-06-14 RX ORDER — SODIUM CHLORIDE 9 MG/ML
INJECTION, SOLUTION INTRAVENOUS CONTINUOUS
Status: DISCONTINUED | OUTPATIENT
Start: 2017-06-14 | End: 2017-06-14 | Stop reason: HOSPADM

## 2017-06-14 RX ORDER — LIDOCAINE HCL/PF 100 MG/5ML
SYRINGE (ML) INTRAVENOUS
Status: DISCONTINUED | OUTPATIENT
Start: 2017-06-14 | End: 2017-06-14

## 2017-06-14 RX ORDER — PROPOFOL 10 MG/ML
VIAL (ML) INTRAVENOUS CONTINUOUS PRN
Status: DISCONTINUED | OUTPATIENT
Start: 2017-06-14 | End: 2017-06-14

## 2017-06-14 RX ADMIN — LIDOCAINE HYDROCHLORIDE 50 MG: 20 INJECTION, SOLUTION INTRAVENOUS at 02:06

## 2017-06-14 RX ADMIN — SODIUM CHLORIDE: 0.9 INJECTION, SOLUTION INTRAVENOUS at 02:06

## 2017-06-14 RX ADMIN — PROPOFOL 100 MG: 10 INJECTION, EMULSION INTRAVENOUS at 02:06

## 2017-06-14 RX ADMIN — PROPOFOL 250 MCG/KG/MIN: 10 INJECTION, EMULSION INTRAVENOUS at 02:06

## 2017-06-14 RX ADMIN — PROPOFOL 50 MG: 10 INJECTION, EMULSION INTRAVENOUS at 02:06

## 2017-06-14 NOTE — DISCHARGE INSTRUCTIONS
Upper GI Endoscopy     During endoscopy, a long, flexible tube is used to view the inside of your upper GI tract.      Upper GI endoscopy allows your healthcare provider to look directly into the beginning of your gastrointestinal (GI) tract. The esophagus, stomach, and duodenum (the first part of the small intestine) make up the upper GI tract.   Before the exam  Follow these and any other instructions you are given before your endoscopy. If you dont follow the healthcare providers instructions carefully, the test may need to be canceled or done over:  · Don't eat or drink anything after midnight the night before your exam. If your exam is in the afternoon, drink only clear liquids in the morning. Don't eat or drink anything for 8 hours before the exam. In some cases, you may be able to take medicines with sips of water until 2 hours before the procedure. Speak with your healthcare provider about this.   · Bring your X-rays and any other test results you have.  · Because you will be sedated, arrange for an adult to drive you home after the exam.  · Tell your healthcare provider before the exam if you are taking any medicines or have any medical problems.  The procedure  Here is what to expect:  · You will lie on the endoscopy table. Usually patients lie on the left side.  · You will be monitored and given oxygen.  · Your throat may be numbed with a spray or gargle. You are given medicine through an intravenous (IV) line that will help you relax and remain comfortable. You may be awake or asleep during the procedure.  · The healthcare provider will put the endoscope in your mouth and down your esophagus. It is thinner than most pieces of food that you swallow. It will not affect your breathing. The medicine helps keep you from gagging.  · Air is put into your GI tract to expand it. It can make you burp.  · During the procedure, the healthcare provider can take biopsies (tissue samples), remove abnormalities,  such as polyps, or treat abnormalities through a variety of devices placed through the endoscope. You will not feel this.   · The endoscope carries images of your upper GI tract to a video screen. If you are awake, you may be able to look at the images.  · After the procedure is done, you will rest for a time. An adult must drive you home.  When to call your healthcare provider  Contact your healthcare provider if you have:  · Black or tarry stools, or blood in your stool  · Fever  · Pain in your belly that does not go away  · Nausea and vomiting, or vomiting blood   Date Last Reviewed: 7/1/2016 © 2000-2016 [employer]. [address] [place] [address], [place] [address]. All rights reserved. This information is not intended as a substitute for professional medical care. Always follow your healthcare professional's instructions.        Colonoscopy     A camera attached to a flexible tube with a viewing lens is used to take video pictures.     Colonoscopy is a test to view the inside of your lower digestive tract (colon and rectum). Sometimes it can show the last part of the small intestine (ileum). During the test, small pieces of tissue may be removed for testing. This is called a biopsy. Small growths, such as polyps, may also be removed.   Why is colonoscopy done?  The test is done to help look for colon cancer. And it can help find the source of abdominal pain, bleeding, and changes in bowel habits. It may be needed once a year, depending on factors such as your:  · Age  · Health history  · Family health history  · Symptoms  · Results from any prior colonoscopy  Risks and possible complications  These include:  · Bleeding               · A puncture or tear in the colon   · Risks of anesthesia  · A cancer lesion not being seen  Getting ready   To prepare for the test:  · Talk with your healthcare provider about the risks of the test (see below). Also ask your healthcare provider about alternatives to the  test.  · Tell your healthcare provider about any medicines you take. Also tell him or her about any health conditions you may have.  · Make sure your rectum and colon are empty for the test. Follow the diet and bowel prep instructions exactly. If you dont, the test may need to be rescheduled.  · Plan for a friend or family member to drive you home after the test.     Colonoscopy provides an inside view of the entire colon.     You may discuss the results with your doctor right away or at a future visit.  During the test   The test is usually done in the hospital on an outpatient basis. This means you go home the same day. The procedure takes about 30 minutes. During that time:  · You are given relaxing (sedating) medicine through an IV line. You may be drowsy, or fully asleep.  · The healthcare provider will first give you a physical exam to check for anal and rectal problems.  · Then the anus is lubricated and the scope inserted.  · If you are awake, you may have a feeling similar to needing to have a bowel movement. You may also feel pressure as air is pumped into the colon. Its OK to pass gas during the procedure.  · Biopsy, polyp removal, or other treatments may be done during the test.  After the test   You may have gas right after the test. It can help to try to pass it to help prevent later bloating. Your healthcare provider may discuss the results with you right away. Or you may need to schedule a follow-up visit to talk about the results. After the test, you can go back to your normal eating and other activities. You may be tired from the sedation and need to rest for a few hours.  Date Last Reviewed: 11/1/2016 © 2000-2016 [employer]. [address] [place] [address]. All rights reserved. This information is not intended as a substitute for professional medical care. Always follow your healthcare professional's instructions.

## 2017-06-14 NOTE — ANESTHESIA PREPROCEDURE EVALUATION
06/14/2017  Antoine Seals is a 52 y.o., female.  Patient Active Problem List   Diagnosis    Essential hypertension    Diabetes mellitus type 2, noninsulin dependent    Hyperlipidemia    Non morbid obesity    Constipation, chronic    Chronic narcotic use    Nuclear sclerosis of both eyes    Type 2 diabetes mellitus without ophthalmic manifestations    Refractive error    Generalized abdominal pain         Anesthesia Evaluation         Review of Systems      Physical Exam  General:  Well nourished    Airway/Jaw/Neck:  Airway Findings: Mouth Opening: Normal Tongue: Normal  General Airway Assessment: Adult  Mallampati: II  Improves to II with phonation.  TM Distance: Normal, at least 6 cm      Dental:  Dental Findings: In tact   Chest/Lungs:  Chest/Lungs Findings: Clear to auscultation     Heart/Vascular:  Heart Findings: Rate: Normal  Rhythm: Regular Rhythm  Sounds: Normal        Mental Status:  Mental Status Findings:  Cooperative, Alert and Oriented         Anesthesia Plan  Type of Anesthesia, risks & benefits discussed:  Anesthesia Type:  general  Patient's Preference: General  Intra-op Monitoring Plan: standard ASA monitors  Intra-op Monitoring Plan Comments: Standard ASA monitors.   Post Op Pain Control Plan: per primary service following discharge from PACU  Post Op Pain Control Plan Comments: Per primary service.     Induction:   IV  Beta Blocker:  Patient is not currently on a Beta-Blocker (No further documentation required).       Informed Consent: Patient understands risks and agrees with Anesthesia plan.  Questions answered. Anesthesia consent signed with patient.  ASA Score: 2     Day of Surgery Review of History & Physical:    H&P update referred to the surgeon.     Anesthesia Plan Notes: Chart reviewed, patient interviewed and examined.  The plan for general anesthesia was  explained.  Questions were answered and the consent was signed.  Maciej BAHENA         Ready For Surgery From Anesthesia Perspective.

## 2017-06-14 NOTE — H&P
Short Stay Endoscopy History and Physical    PCP - Katharine Chugn MD    Procedure - EGD/Colonoscopy  ASA - per anesthesia  Mallampati - per anesthesia  History of Anesthesia problems - no  Family history Anesthesia problems -  no   Plan of anesthesia - MAC    HPI:  This is a 52 y.o. female here for evaluation of :     EGD - abdominal pain, gastric bypass  Colon - screening, some constipation noted    ROS:  Constitutional: No fevers, chills, No weight loss  CV: No chest pain  Pulm: No cough, No shortness of breath  Ophtho: No vision changes  GI: see HPI  Derm: No rash    Medical History:  has a past medical history of Diabetes mellitus type 2, noninsulin dependent (2016); essential hypertension; Hypertension, uncontrolled (2016); and Nuclear sclerosis of both eyes (4/10/2017).    Surgical History:  has a past surgical history that includes Gastric bypass (); BTL ();  section (); and Neck surgery (2016).    Family History: family history includes Diabetes type II in her mother; Heart attack (age of onset: 50) in her father; Heart disease in her mother; No Known Problems in her brother, maternal aunt, maternal grandfather, maternal grandmother, maternal uncle, paternal aunt, paternal grandfather, paternal grandmother, paternal uncle, and sister.. Otherwise no colon cancer, inflammatory bowel disease, or GI malignancies.    Social History:  reports that she has never smoked. She has never used smokeless tobacco. She reports that she does not drink alcohol or use drugs.    Review of patient's allergies indicates:   Allergen Reactions    Lisinopril-hydrochlorothiazide Swelling     Facial swelling/ mouth swelling       Medications:   Prescriptions Prior to Admission   Medication Sig Dispense Refill Last Dose    amlodipine (NORVASC) 10 MG tablet Take 1 tablet (10 mg total) by mouth once daily. 90 tablet 3 2017 at Unknown time    atorvastatin (LIPITOR) 80 MG tablet Take 1 tablet (80  mg total) by mouth every evening. 90 tablet 3 6/13/2017 at Unknown time    bethanechol (URECHOLINE) 5 MG Tab Take 2 tablets (10 mg total) by mouth before meals as needed. 60 tablet 0 6/13/2017 at Unknown time    ferrous sulfate 325 mg (65 mg iron) Tab tablet Take 325 mg by mouth daily with breakfast. Taking 3 times weekly   6/12/2017 at Unknown time    fluticasone (FLONASE) 50 mcg/actuation nasal spray 1 spray by Each Nare route 2 (two) times daily. 1 Bottle 5 6/13/2017 at Unknown time    glipiZIDE (GLUCOTROL) 5 MG tablet Take 1 tablet (5 mg total) by mouth daily with breakfast. 30 tablet 1 6/13/2017 at Unknown time    polyethylene glycol (GLYCOLAX) 17 gram/dose powder Take 17 g by mouth once daily. 1 Bottle 3        Physical Exam:    Vital Signs:   Vitals:    06/14/17 1425   BP: 128/69   Pulse: 85   Resp: 18   Temp: 98.4 °F (36.9 °C)       General Appearance: Well appearing in no acute distress  Eyes:    No scleral icterus  ENT: Neck supple, Lips, mucosa, and tongue normal; teeth and gums normal  Lungs: CTA anteriorly  Heart:  Regular rate, S1, S2 normal, no murmurs heard.  Abdomen: Soft, non tender, non distended with normal bowel sounds. No hepatosplenomegaly, ascites, or mass.  Extremities: No edema  Skin: No rash    Labs:  Lab Results   Component Value Date    WBC 7.15 06/07/2017    HGB 10.7 (L) 06/07/2017    HCT 32.9 (L) 06/07/2017     06/07/2017    CHOL 289 (H) 06/07/2017    TRIG 142 06/07/2017    HDL 97 (H) 06/07/2017    ALT 18 06/07/2017    AST 16 06/07/2017     06/07/2017    K 4.1 06/07/2017     06/07/2017    CREATININE 0.7 06/07/2017    BUN 13 06/07/2017    CO2 29 06/07/2017    TSH 1.232 09/28/2015    INR 1.0 08/16/2005    HGBA1C 8.5 (H) 04/05/2017       I have explained the risks and benefits of endoscopy procedures to the patient including but not limited to bleeding, perforation, infection, and death.      Oleg Enciso MD

## 2017-06-14 NOTE — PATIENT INSTRUCTIONS
Discharge Summary/Instructions after an Endoscopic Procedure  Patient Name: Antoine Seals  Patient MRN: [id number]  Patient YOB: 1965 Wednesday, June 14, 2017  Oleg Encios MD  RESTRICTIONS ON ACTIVITY:  - DO NOT drive a car, operate machinery, make legal/financial decisions, or   drink alcohol until the day after the procedure.    - The following day: return to full activity including work, except no heavy   lifting, straining or running for 3 days if polyps were removed.  - Diet: Eat and drink normally unless instructed otherwise.  TREATMENT FOR COMMON SIDE EFFECTS:  - Mild abdominal pain, bloating or excessive gas: rest, eat lightly and use   a heating pad.  - Sore Throat - treat with throat lozenges. Gargle with warm salt water.  SYMPTOMS TO WATCH FOR AND REPORT TO YOUR PHYSICIAN:  1. Severe abdominal pain or bloating.  2. Pain in chest.  3. Chills or fever occurring within 24 hours after a procedure.  4. A large amount of rectal bleeding, which would show as bright red,   maroon, or black stools. (A small amount of blood from the rectum is not   serious, especially if hemorrhoids are present.)  5. Because air was used during the procedure, expelling large amounts of air   from your rectum or belching is normal.  6. If a bowel prep was taken, you may not have a bowel movement for 1-3   days.  This is normal.  7. Go directly to the emergency room if you notice any of the following:   Chills and/or fever over 101 F   Persistent vomiting   Severe abdominal pain, other than gas cramps   Severe chest pain   Black, tarry stools   Any bleeding - exceeding one tablespoon  Your doctor recommends these additional instructions:  If any biopsies were performed, my office will call you in 5 to 6 business   days with any results.  You are being discharged to home.   Your physician has recommended a colonoscopy today.   The findings and recommendations were discussed with your designated   responsible adult.  For  questions, problems or results please call your physician - Oleg Enciso MD at Work:  [phone].  [facility], EMERGENCY ROOM PHONE NUMBER: [phone]  IF A COMPLICATION OR EMERGENCY SITUATION ARISES AND YOU ARE UNABLE TO REACH   YOUR PHYSICIAN - GO TO THE EMERGENCY ROOM.  Oleg Enciso MD  6/14/2017 2:53:12 PM  This report has been verified and signed electronically.

## 2017-06-14 NOTE — TRANSFER OF CARE
"Anesthesia Transfer of Care Note    Patient: Antoine Seals    Procedure(s) Performed: Procedure(s) (LRB):  ESOPHAGOGASTRODUODENOSCOPY (EGD) (N/A)  COLONOSCOPY (N/A)    Patient location: [facility]    Anesthesia Type: general    Transport from OR: Transported from OR on room air with adequate spontaneous ventilation    Post pain: adequate analgesia    Post assessment: no apparent anesthetic complications and tolerated procedure well    Post vital signs: stable    Level of consciousness: sedated    Complications: none    Transfer of care protocol was followed      Last vitals:   Visit Vitals  BP (!) 82/43 (BP Location: Left arm, Patient Position: Lying, BP Method: Automatic)   Pulse 88   Temp 36.6 °C (97.8 °F) (Axillary)   Resp 16   Ht 5' 2" (1.575 m)   Wt 88 kg (194 lb)   SpO2 96%   Breastfeeding? No   BMI 35.48 kg/m²     "

## 2017-06-14 NOTE — PATIENT INSTRUCTIONS
Discharge Summary/Instructions after an Endoscopic Procedure  Patient Name: Antoine Seals  Patient MRN: [id number]  Patient YOB: 1965 Wednesday, June 14, 2017  Oleg Enciso MD  RESTRICTIONS ON ACTIVITY:  - DO NOT drive a car, operate machinery, make legal/financial decisions, or   drink alcohol until the day after the procedure.    - The following day: return to full activity including work, except no heavy   lifting, straining or running for 3 days if polyps were removed.  - Diet: Eat and drink normally unless instructed otherwise.  TREATMENT FOR COMMON SIDE EFFECTS:  - Mild abdominal pain, bloating or excessive gas: rest, eat lightly and use   a heating pad.  - Sore Throat - treat with throat lozenges. Gargle with warm salt water.  SYMPTOMS TO WATCH FOR AND REPORT TO YOUR PHYSICIAN:  1. Severe abdominal pain or bloating.  2. Pain in chest.  3. Chills or fever occurring within 24 hours after a procedure.  4. A large amount of rectal bleeding, which would show as bright red,   maroon, or black stools. (A small amount of blood from the rectum is not   serious, especially if hemorrhoids are present.)  5. Because air was used during the procedure, expelling large amounts of air   from your rectum or belching is normal.  6. If a bowel prep was taken, you may not have a bowel movement for 1-3   days.  This is normal.  7. Go directly to the emergency room if you notice any of the following:   Chills and/or fever over 101 F   Persistent vomiting   Severe abdominal pain, other than gas cramps   Severe chest pain   Black, tarry stools   Any bleeding - exceeding one tablespoon  Your doctor recommends these additional instructions:  If any biopsies were performed, my office will call you in 5 to 6 business   days with any results.  You have a contact number available for emergencies.  The signs and symptoms   of potential delayed complications were discussed with you.  You may return   to normal activities tomorrow.   Written discharge instructions were   provided to you.   You are being discharged to home.   Your physician has recommended a repeat colonoscopy in 10 years for   screening purposes.   The findings and recommendations were discussed with your designated   responsible adult.  For questions, problems or results please call your physician - Oleg Enciso MD at Work:  [phone].  [facility], EMERGENCY ROOM PHONE NUMBER: [phone]  IF A COMPLICATION OR EMERGENCY SITUATION ARISES AND YOU ARE UNABLE TO REACH   YOUR PHYSICIAN - GO TO THE EMERGENCY ROOM.  Oleg Enciso MD  6/14/2017 3:13:19 PM  This report has been verified and signed electronically.

## 2017-06-15 ENCOUNTER — TELEPHONE (OUTPATIENT)
Dept: BARIATRICS | Facility: CLINIC | Age: 52
End: 2017-06-15

## 2017-06-15 PROBLEM — Z98.84 GASTRIC BYPASS STATUS FOR OBESITY: Status: ACTIVE | Noted: 2017-06-15

## 2017-06-15 NOTE — TELEPHONE ENCOUNTER
Did peer to peer and CT Abdomen pelvis is approved for with and without contrast with medical director.      # [id number] Good til 30 July         Good Afternoon,   Peer to Peer Discussion is required to process authorization request. Please contact [facility] at [phone], option#3 and reference case#[id number]. If MD is unable to complete discussion, any other MD, PA or NP can do it. As per [facility]'s Financial Clearance Policy, Can you tell me if the CT is medically urgent or not? [facility] is open Monday-Friday from 7am-7pm. Patient is  scheduled for Friday, June 16th at 4pm.      Thanks,   Bright Robertson   [id number]

## 2017-06-15 NOTE — ANESTHESIA POSTPROCEDURE EVALUATION
"Anesthesia Post Evaluation    Patient: Antoine Seals    Procedure(s) Performed: Procedure(s) (LRB):  ESOPHAGOGASTRODUODENOSCOPY (EGD) (N/A)  COLONOSCOPY (N/A)    Final Anesthesia Type: general  Patient location during evaluation: PACU  Patient participation: Yes- Able to Participate  Level of consciousness: awake and alert and oriented  Post-procedure vital signs: reviewed and stable  Pain management: adequate  Airway patency: patent  PONV status at discharge: No PONV  Anesthetic complications: no      Cardiovascular status: stable  Respiratory status: unassisted  Hydration status: euvolemic  Follow-up not needed.        Visit Vitals  /78 (BP Location: Left arm, Patient Position: Lying, BP Method: Automatic)   Pulse 87   Temp 36.6 °C (97.8 °F) (Axillary)   Resp 16   Ht 5' 2" (1.575 m)   Wt 88 kg (194 lb)   SpO2 97%   Breastfeeding? No   BMI 35.48 kg/m²       Pain/Dangelo Score: Pain Assessment Performed: Yes (6/14/2017  3:40 PM)  Presence of Pain: denies (6/14/2017  3:40 PM)  Dangelo Score: 10 (6/14/2017  3:28 PM)      "

## 2017-06-21 ENCOUNTER — TELEPHONE (OUTPATIENT)
Dept: ENDOSCOPY | Facility: HOSPITAL | Age: 52
End: 2017-06-21

## 2017-06-21 ENCOUNTER — TELEPHONE (OUTPATIENT)
Dept: BARIATRICS | Facility: CLINIC | Age: 52
End: 2017-06-21

## 2017-06-21 DIAGNOSIS — E55.9 VITAMIN D DEFICIENCY: ICD-10-CM

## 2017-06-21 DIAGNOSIS — E51.9 THIAMINE DEFICIENCY: Primary | ICD-10-CM

## 2017-06-21 RX ORDER — ERGOCALCIFEROL 1.25 MG/1
50000 CAPSULE ORAL
Qty: 24 CAPSULE | Refills: 0 | Status: SHIPPED | OUTPATIENT
Start: 2017-06-22 | End: 2018-01-11

## 2017-06-21 RX ORDER — FERROUS SULFATE 325(65) MG
325 TABLET ORAL DAILY
Qty: 90 TABLET | Refills: 0 | Status: SHIPPED | OUTPATIENT
Start: 2017-06-21 | End: 2017-06-26

## 2017-06-21 NOTE — TELEPHONE ENCOUNTER
----- Message from Ifrah Meza PA-C sent at 6/21/2017  2:31 PM CDT -----  Vit D 50,000 IU 2x/wk, retest in 12 weeks, Iron daily and 100 mg B1 DAILY

## 2017-06-21 NOTE — TELEPHONE ENCOUNTER
Called patient to review vitamin related labs. Patient notified that Vit D, Vit B-1 and Iron levels were low. Informed patient to  Vit D Rx and Super B Complex-100 OTC. Patient states she prefers not to take an OTC iron tablet secondary to constipation. Patient states she will discuss with VALENTINO at appointment on Monday June 26. Message sent to VALENTINO regarding iron supplementation.

## 2017-06-22 ENCOUNTER — OFFICE VISIT (OUTPATIENT)
Dept: CARDIOLOGY | Facility: CLINIC | Age: 52
End: 2017-06-22
Payer: MEDICAID

## 2017-06-22 ENCOUNTER — HOSPITAL ENCOUNTER (OUTPATIENT)
Dept: RADIOLOGY | Facility: HOSPITAL | Age: 52
Discharge: HOME | End: 2017-06-22
Attending: SURGERY
Payer: MEDICAID

## 2017-06-22 VITALS
RESPIRATION RATE: 15 BRPM | HEART RATE: 78 BPM | SYSTOLIC BLOOD PRESSURE: 137 MMHG | WEIGHT: 195.31 LBS | BODY MASS INDEX: 35.94 KG/M2 | HEIGHT: 62 IN | OXYGEN SATURATION: 99 % | DIASTOLIC BLOOD PRESSURE: 83 MMHG

## 2017-06-22 DIAGNOSIS — E11.9 DIABETES MELLITUS TYPE 2, NONINSULIN DEPENDENT: ICD-10-CM

## 2017-06-22 DIAGNOSIS — E78.5 HYPERLIPIDEMIA, UNSPECIFIED HYPERLIPIDEMIA TYPE: ICD-10-CM

## 2017-06-22 DIAGNOSIS — Z01.818 PRE-OP EXAM: Primary | ICD-10-CM

## 2017-06-22 DIAGNOSIS — E66.9 NON MORBID OBESITY, UNSPECIFIED OBESITY TYPE: ICD-10-CM

## 2017-06-22 DIAGNOSIS — Z98.84 GASTRIC BYPASS STATUS FOR OBESITY: ICD-10-CM

## 2017-06-22 DIAGNOSIS — I10 ESSENTIAL HYPERTENSION: Primary | ICD-10-CM

## 2017-06-22 PROCEDURE — 99213 OFFICE O/P EST LOW 20 MIN: CPT | Mod: PBBFAC,25,PO | Performed by: INTERNAL MEDICINE

## 2017-06-22 PROCEDURE — 99214 OFFICE O/P EST MOD 30 MIN: CPT | Mod: S$PBB,,, | Performed by: INTERNAL MEDICINE

## 2017-06-22 PROCEDURE — 74177 CT ABD & PELVIS W/CONTRAST: CPT | Mod: 26,,, | Performed by: RADIOLOGY

## 2017-06-22 PROCEDURE — 3045F PR MOST RECENT HEMOGLOBIN A1C LEVEL 7.0-9.0%: CPT | Mod: ,,, | Performed by: INTERNAL MEDICINE

## 2017-06-22 PROCEDURE — 99999 PR PBB SHADOW E&M-EST. PATIENT-LVL III: CPT | Mod: PBBFAC,,, | Performed by: INTERNAL MEDICINE

## 2017-06-22 RX ADMIN — IOHEXOL 75 ML: 350 INJECTION, SOLUTION INTRAVENOUS at 10:06

## 2017-06-22 RX ADMIN — IOHEXOL 30 ML: 300 INJECTION, SOLUTION INTRAVENOUS at 10:06

## 2017-06-22 NOTE — PROGRESS NOTES
CARDIOVASCULAR PROGRESS NOTE    REASON FOR CONSULT:   Antoine Seals is a 52 y.o. female who presents for CV follow up.    PCP: Sheldon  HISTORY OF PRESENT ILLNESS:   The patient returns for cardiovascular follow-up.  She denies angina or dyspnea.  She's had no palpitations, lightheadedness, dizziness, or syncope.  She denies PND, orthopnea, or lower extremity edema.  She's had no melena, hematuria, or claudicant symptoms.  She appears to be tolerating her statin therapy without significant side effects.     I reviewed the results of her echocardiogram and nuclear stress test which were both normal.    CARDIOVASCULAR HISTORY:   none    PAST MEDICAL HISTORY:     Past Medical History:   Diagnosis Date    Diabetes mellitus type 2, noninsulin dependent 2016    Hx of essential hypertension     Hypertension, uncontrolled 2016    Nuclear sclerosis of both eyes 4/10/2017       PAST SURGICAL HISTORY:     Past Surgical History:   Procedure Laterality Date    BTL       SECTION      COLONOSCOPY N/A 2017    Procedure: COLONOSCOPY;  Surgeon: Oleg Enciso MD;  Location: [address]);  Service: Endoscopy;  Laterality: N/A;  CHRONIC CONSTIPATION S/P LRNY    GASTRIC BYPASS      sandra en y    NECK SURGERY  2016       ALLERGIES AND MEDICATION:   Review of patient's allergies indicates:  No Known Allergies  Previous Medications    AMLODIPINE (NORVASC) 10 MG TABLET    Take 1 tablet (10 mg total) by mouth once daily.    ATORVASTATIN (LIPITOR) 80 MG TABLET    Take 1 tablet (80 mg total) by mouth every evening.    BETHANECHOL (URECHOLINE) 5 MG TAB    Take 2 tablets (10 mg total) by mouth before meals as needed.    ERGOCALCIFEROL (ERGOCALCIFEROL) 50,000 UNIT CAP    Take 1 capsule (50,000 Units total) by mouth twice a week.    FERROUS SULFATE 325 MG (65 MG IRON) TAB TABLET    Take 1 tablet (325 mg total) by mouth once daily.    FLUTICASONE (FLONASE) 50 MCG/ACTUATION NASAL SPRAY    1  spray by Each Nare route 2 (two) times daily.    GLIPIZIDE (GLUCOTROL) 5 MG TABLET    Take 1 tablet (5 mg total) by mouth daily with breakfast.    POLYETHYLENE GLYCOL (GLYCOLAX) 17 GRAM/DOSE POWDER    Take 17 g by mouth once daily.       SOCIAL HISTORY:     Social History     Social History    Marital status:      Spouse name: N/A    Number of children: N/A    Years of education: N/A     Occupational History    Not on file.     Social History Main Topics    Smoking status: Never Smoker    Smokeless tobacco: Never Used    Alcohol use No    Drug use: No    Sexual activity: Yes     Partners: Male     Other Topics Concern    Not on file     Social History Narrative    No narrative on file       FAMILY HISTORY:     Family History   Problem Relation Age of Onset    Heart disease Mother     Diabetes type II Mother     Heart attack Father 50     Open heart surgery    No Known Problems Sister     No Known Problems Brother     No Known Problems Maternal Aunt     No Known Problems Maternal Uncle     No Known Problems Paternal Aunt     No Known Problems Paternal Uncle     No Known Problems Maternal Grandmother     No Known Problems Maternal Grandfather     No Known Problems Paternal Grandmother     No Known Problems Paternal Grandfather     Amblyopia Neg Hx     Blindness Neg Hx     Cancer Neg Hx     Cataracts Neg Hx     Diabetes Neg Hx     Glaucoma Neg Hx     Hypertension Neg Hx     Macular degeneration Neg Hx     Retinal detachment Neg Hx     Strabismus Neg Hx     Stroke Neg Hx     Thyroid disease Neg Hx        REVIEW OF SYSTEMS:   Review of Systems   Constitutional: Negative for chills, diaphoresis and fever.   HENT: Negative for nosebleeds.    Eyes: Negative for blurred vision, double vision and photophobia.   Respiratory: Negative for hemoptysis, shortness of breath and wheezing.    Cardiovascular: Negative for chest pain, palpitations, orthopnea, claudication, leg swelling and  "PND.   Gastrointestinal: Negative for abdominal pain, blood in stool, heartburn, melena, nausea and vomiting.   Genitourinary: Negative for flank pain and hematuria.   Musculoskeletal: Negative for falls, joint pain, myalgias and neck pain.   Skin: Negative for rash.   Neurological: Negative for dizziness, seizures, loss of consciousness, weakness and headaches.   Endo/Heme/Allergies: Negative for polydipsia. Does not bruise/bleed easily.   Psychiatric/Behavioral: Negative for depression and memory loss. The patient is not nervous/anxious.        PHYSICAL EXAM:     Vitals:    06/22/17 1306   BP: 137/83   Pulse: 78   Resp: 15    Body mass index is 35.73 kg/m².  Weight: 88.6 kg (195 lb 5.2 oz)   Height: 5' 2" (157.5 cm)     Physical Exam   Constitutional: She is oriented to person, place, and time. She appears well-developed and well-nourished. She is cooperative.  Non-toxic appearance. No distress.   HENT:   Head: Normocephalic and atraumatic.   Eyes: Conjunctivae and EOM are normal. Pupils are equal, round, and reactive to light. No scleral icterus.   Neck: Trachea normal and normal range of motion. Neck supple. Normal carotid pulses and no JVD present. Carotid bruit is not present. No neck rigidity. No edema present. No thyromegaly present.   Cardiovascular: Normal rate, regular rhythm, S1 normal and S2 normal.  PMI is not displaced.  Exam reveals no gallop and no friction rub.    No murmur heard.  Pulses:       Carotid pulses are 2+ on the right side, and 2+ on the left side.  Pulmonary/Chest: Effort normal and breath sounds normal. No respiratory distress. She has no wheezes. She has no rales. She exhibits no tenderness.   Abdominal: Soft. Bowel sounds are normal. She exhibits no distension and no mass. There is no hepatosplenomegaly. There is no tenderness.   obese   Musculoskeletal: She exhibits no edema or tenderness.   Feet:   Right Foot:   Skin Integrity: Negative for ulcer.   Left Foot:   Skin Integrity: " Negative for ulcer.   Neurological: She is alert and oriented to person, place, and time. No cranial nerve deficit.   Skin: Skin is warm and dry. No rash noted. No erythema.   Psychiatric: She has a normal mood and affect. Her speech is normal and behavior is normal.   Vitals reviewed.      DATA:   EKG: (personally reviewed tracing)  1/13/17 NSR 92, PRWP, no change vs prior tracing    Laboratory:  CBC:    Recent Labs  Lab 09/28/15  1130 07/22/16  0846 06/07/17  1703   WHITE BLOOD CELL COUNT 8.15 6.56 7.15   HEMOGLOBIN 11.8 L 11.4 L 10.7 L   HEMATOCRIT 36.2 L 36.2 L 32.9 L   PLATELETS 313 316 320       CHEMISTRIES:    Recent Labs  Lab 09/02/16  1155 12/28/16  1031 06/07/17  1703   GLUCOSE 138 H 125 H 137 H   SODIUM 139 137 136   POTASSIUM 4.0 4.2 4.1   BUN BLD 11 16 13   CREATININE 0.8 0.7 0.7   EGFR IF AFRICAN AMERICAN >60.0 >60.0 >60.0   EGFR IF NON- >60.0 >60.0 >60.0   CALCIUM 8.8 8.8 9.1   MAGNESIUM  --  1.7  --        CARDIAC BIOMARKERS:        COAGS:        LIPIDS/LFTS:    Recent Labs  Lab 09/02/16  1155 01/12/17  0805 05/12/17  1542 06/07/17  1703   CHOLESTEROL  --  300 H 302 H 289 H   TRIGLYCERIDES  --  119 191 H 142   HDL  --  87 H 95 H 97 H   LDL CHOLESTEROL  --  189.2 H 168.8 H 163.6 H   NON-HDL CHOLESTEROL  --  213 207 192   AST 19  --  17 16   ALT 17  --  19 18       Cardiovascular Testing:  Ex MPI 6/8/17  The patient exercised for 8.06 minutes on a Osito protocol, corresponding to a functional capacity of 7 estimated METS, achieving a peak heart rate of 148 bpm, which is 91% of the age predicted maximum heart rate. -CP/EKG.  Nuclear Quantitative Functional Analysis:   LVEF: >= 70 %  Impression: NORMAL MYOCARDIAL PERFUSION  1. The perfusion scan is free of evidence for myocardial ischemia or injury.   2. There is mild apical thinning which is a normal variant.   3. Resting wall motion is physiologic.   4. Resting LV function is normal.   5. The ventricular volumes are normal at rest and  stress.   6. The extracardiac distribution of radioactivity is normal.     Echo 6/8/17    1 - Normal left ventricular systolic function (EF 55-60%).     2 - Concentric hypertrophy.     3 - Mild left atrial enlargement.     4 - Trivial tricuspid regurgitation.    ASSESSMENT:   # MARTIN, resolved.  ?anginal equivalent given RF profile.  Echo and MPI 6/2017 normal.  # HTN, controlled  # DM  # HLP, atorva recently inc to 80mg  # BMI 35 s/p bariatric surgery, stable vs last OV    PLAN:   Cont med rx  Check lipid panel in 2 months (mid Aug 2017)  RTC 3 months    Joshua Beebe MD, FACC

## 2017-06-23 ENCOUNTER — TELEPHONE (OUTPATIENT)
Dept: BARIATRICS | Facility: CLINIC | Age: 52
End: 2017-06-23

## 2017-06-23 NOTE — TELEPHONE ENCOUNTER
----- Message from Ifrah Meza PA-C sent at 6/22/2017  3:56 PM CDT -----  Please tell pt that bypass normal on CT and rest abdomen normal.  She needs to see OB and get US pelvis in 6 weeks

## 2017-06-26 ENCOUNTER — OFFICE VISIT (OUTPATIENT)
Dept: BARIATRICS | Facility: CLINIC | Age: 52
End: 2017-06-26
Payer: MEDICAID

## 2017-06-26 VITALS
SYSTOLIC BLOOD PRESSURE: 130 MMHG | HEIGHT: 62 IN | DIASTOLIC BLOOD PRESSURE: 86 MMHG | WEIGHT: 192.88 LBS | HEART RATE: 88 BPM | BODY MASS INDEX: 35.49 KG/M2

## 2017-06-26 DIAGNOSIS — E61.1 IRON DEFICIENCY: Primary | ICD-10-CM

## 2017-06-26 DIAGNOSIS — Z98.84 GASTRIC BYPASS STATUS FOR OBESITY: ICD-10-CM

## 2017-06-26 DIAGNOSIS — F11.90 CHRONIC NARCOTIC USE: ICD-10-CM

## 2017-06-26 DIAGNOSIS — E11.9 TYPE 2 DIABETES MELLITUS WITHOUT OPHTHALMIC MANIFESTATIONS: ICD-10-CM

## 2017-06-26 DIAGNOSIS — E78.5 HYPERLIPIDEMIA, UNSPECIFIED HYPERLIPIDEMIA TYPE: ICD-10-CM

## 2017-06-26 DIAGNOSIS — E11.9 DIABETES MELLITUS TYPE 2, NONINSULIN DEPENDENT: ICD-10-CM

## 2017-06-26 DIAGNOSIS — I10 ESSENTIAL HYPERTENSION: ICD-10-CM

## 2017-06-26 DIAGNOSIS — K59.09 CONSTIPATION, CHRONIC: ICD-10-CM

## 2017-06-26 PROBLEM — R10.84 GENERALIZED ABDOMINAL PAIN: Status: RESOLVED | Noted: 2017-06-07 | Resolved: 2017-06-26

## 2017-06-26 PROCEDURE — 3045F PR MOST RECENT HEMOGLOBIN A1C LEVEL 7.0-9.0%: CPT | Mod: ,,, | Performed by: PHYSICIAN ASSISTANT

## 2017-06-26 PROCEDURE — 99214 OFFICE O/P EST MOD 30 MIN: CPT | Mod: PBBFAC | Performed by: PHYSICIAN ASSISTANT

## 2017-06-26 PROCEDURE — 99999 PR PBB SHADOW E&M-EST. PATIENT-LVL IV: CPT | Mod: PBBFAC,,, | Performed by: PHYSICIAN ASSISTANT

## 2017-06-26 PROCEDURE — 99214 OFFICE O/P EST MOD 30 MIN: CPT | Mod: S$PBB,,, | Performed by: PHYSICIAN ASSISTANT

## 2017-06-26 RX ORDER — MULTIVIT WITH MINERALS/HERBS
1 TABLET ORAL EVERY MORNING
COMMUNITY
End: 2020-01-15 | Stop reason: CLARIF

## 2017-06-26 RX ORDER — BISACODYL 5 MG
5 TABLET, DELAYED RELEASE (ENTERIC COATED) ORAL DAILY PRN
Qty: 30 TABLET | Refills: 0 | Status: SHIPPED | OUTPATIENT
Start: 2017-06-26 | End: 2017-07-26
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
## 2017-06-26 NOTE — PATIENT INSTRUCTIONS
- 1- 2 shakes daily and 3-4 high protein mini-meals  - To lose weight you want to cut out all bread, rice, potatoes, pasta, grits, oatmeal.  - Goal is  grams of protein daily and 800-1000 calories daily.   - Track meals on an danni, example is [employer]    Menu Plan: 800-1000 Calories;  grams of Protein    DAY 1     Breakfast  ½ cup 2% cottage cheese  ¼ cup fruit (no sugar added)    Snack  2% mozzarella string cheese  10 grapes    Lunch  2oz Lean hamburger or turkey harjeet  1 slice low-fat cheese  ¼ cup green beans    Snack  200 calorie low-carb protein drink (4 grams sugar or less)    Dinner  2oz chicken thigh  ¼ cup cooked spinach     Snack  Atkins bar (15g protein)      DAY 2    Breakfast  1 egg with 1oz shredded cheddar cheese and 2T salsa    Snack  200 calorie low-carb protein drink (4 grams sugar or less)    Lunch  Lettuce Wraps: 2oz sliced turkey, 1 slice low-fat Swiss cheese, tomato, and mustard wrapped in a Lavelle lettuce leaf    Snack  ½ cup low-fat cottage cheese  Pear cup (no sugar added)    Dinner  2oz baked fish  ½ cup cooked broccoli    Snack  Sugar-free pudding cup      DAY 3    Breakfast   ½ cup low-fat ricotta cheese w/ Splenda to lexie  ½ scoop Vanilla protein powder   ¼ cup fresh fruit    Snack  2% string cheese  6 unsalted almonds    Lunch  Tuna/Chicken Salad: 2oz canned tuna/chicken, 1 egg white, and 1 tsp light garibay  Pineapple cup (no sugar added)    Snack  200 calorie low-carb protein drink (4 grams sugar or less)    Dinner  ½ baked pork chop   ¼ cup beans      DAY 4    Breakfast  200 calorie low-carb protein drink (4 grams sugar or less)    Snack  Boiled egg    Lunch  ½ cup grilled shrimp  Salad w/ 2 tbsp crumbled fat-free feta  1 tbsp light vinaigrette    Snack  200 calorie low-carb protein drink (4 grams sugar or less)    Dinner  ¾ cup red beans    Snack  Mini Babybell light      DAY 5    Breakfast  [place] pie: 3oz Greek yogurt, 1 tbsp Splenda, ½ individual pack Crystal  Light lemonade. Top with ¼ cup chopped walnuts     Snack  3-4 lean ham or turkey slices, ¼ - ½ cup fruit    Lunch  Fiesta Chicken: 2oz canned chicken, 1oz shredded cheddar cheese, ¼ cup black beans  Top with 2 tbsp salsa and a small dollop light sour cream    Snack  200 calorie low-carb protein drink (4 grams sugar or less)    Dinner  Omelette: ¼ cup Egg Beaters, 4 large (1oz) shrimp, 1oz shredded low-fat cheese. Add bell pepper, onion, mushrooms, green onions, or salsa, optional.      DAY 6    Breakfast  1 joe or 2 links turkey sausage  ½ cup fruit    Snack  200 calorie low-carb protein drink (4 grams sugar or less)    Lunch  Grilled tilapia  Salad of baby spinach leaves with light dressing    Snack  200 calorie low-carb protein drink (4 grams sugar or less)    Dinner  Chicken thigh simmered in 98% fat free cream of mushroom soup  ½ cup cooked green beans    Breakfast options:     - Egg white omelet: 2 egg whites or ½ cup Egg Beaters. (Optional proteins: cheese, shrimp, black beans, chicken, sliced turkey) (Optional veggies: tomatoes, salsa, spinach, mushrooms, onions, green peppers, or small slice avocado)     - Egg and sausage: 1 egg or ¼ cup Egg Beaters (any variety), with 1 joe or 2 links of Turkey sausage or Veggie breakfast sausage ([place] or [place])    - Crust-less breakfast quiche: To make a glass pie dish, mix 4oz part skim Ricotta, 1 cup skim milk, and 2 eggs as your base. Add protein: shredded cheese, sliced lean ham or turkey, turkey castaneda/sausage. Add veggies: tomato, onion, green onion, mushroom, green pepper, spinach, etc.    - Yogurt parfait: Mix 1 - 6oz container Dannon Light N Fit vanilla yogurt, with ¼ cup crushed unsalted nuts    - Cottage cheese and fruit: ½ cup part-skim cottage cheese or ricotta cheese topped with fresh fruit or sugar free preserves     - Gemma Dquue's Vanilla Egg custard* (add 2 Tbsp instant coffee granules to make Cappuccino Custard*)    - Hi-Protein café  latte (skim milk, decaf coffee, 1 scoop protein powder). Optional to add Sugar free syrup or extract flavoring.    - Breakfast Lox: spread fat free cream cheese on slices of smoked salmon. Serve over scrambled or egg over easy (sauteed with nonstick cookspray) OR on a cucumber slice    - Eggwhich: Scramble or cook 1 large egg over easy using nonstick cookspray. Place between 2 slices of [place] castaneda and low fat cheese.     Lunch: (20-30g protein)    - ½ cup Black bean soup (Homemade or Progresso), with ¼ cup shredded low-fat cheese. Top with chopped tomato or fresh salsa.     - Lean deli turkey breast and low-fat sliced cheese, mustard or light garibay to moisten, rolled up together, or wrapped in a Lavelle lettuce leaf    - Chicken salad made from dinner leftovers, moisten with low-fat salad dressing or light garibay. Also try leftover salmon, shrimp, tuna or boiled eggs. Serve ½ cup over dark green salad    - Fat-free canned refried beans, topped with ¼ cup shredded low-fat cheese. Top with chopped tomato or fresh salsa.     - Greek salad: Top mixed greens with 1-2oz grilled chicken, tomatoes, red onions, 2-3 kalamata olives, and sprinkle lightly with feta cheese. Spritz with Balsamic vinegar to taste.     - Crust-less lunch quiche: To make a glass pie dish, mix 4oz part skim Ricotta, 1 cup skim milk, and 2 eggs as your base. Add protein: shredded cheese, sliced lean ham or turkey, shrimp, chicken. Add veggies: tomato, onion, green onion, mushroom, green pepper, spinach, artichoke, broccoli, etc.    - Pizza bake: spread a nabil stephen mushroom with tomato sauce, low-fat shredded mozzarella and turkey pepperoni or [place] castaneda. Add any veggies. Roast for 10-15 minutes, until cheese melted.     - Cucumber crab bites: Spread ¼ cup crab dip (lump crabmeat + light cream cheese and green onions) over sliced cucumber.     - Chicken with light spinach and artichoke dip*: Puree in : 6oz cooked and drained  spinach, 2 cloves garlic, 1 can cannelloni beans, ½ cup chopped green onions, 1 can drained artichoke hearts (not marinated in oil), lemon juice and basil. Mix in 2oz chopped up chicken.    Supper: (20-30g protein)    - Serve grilled fish over dark green salad tossed with low-fat dressing, served with grilled asparagus suarez     - Rotisserie chicken salad: served with sliced strawberries, walnuts, fat-free feta cheese crumbles and 1 tbsp Huynhs Own Light Raspberry [place] Vinaigrette    - Shrimp cocktail: Dip cold boiled shrimp in homemade low-sugar cocktail sauce (1/2 cup Frederick One Carb ketchup, 2 tbsp horseradish, 1/4 tsp hot sauce, 1 tsp Worcestershire sauce, 1 tbsp freshly-squeezed lemon juice). Serve with dark green salad, walnuts, and crumbled blue cheese drizzled with olive oil and Balsamic vinegar    - Tuna Melt: Spread tuna salad onto 2 thick slices of tomato. Top with low-fat cheese and broil until cheese is melted. May also be made with chicken salad of shrimp salad. [place] with different types of cheeses.    - Chicken or beef fajitas (no tortilla, rice, beans, chips). Top meat and veggies w/ fresh salsa, fat free sour cream.     - Homemade low-fat Chili using extra lean ground beef or ground turkey. Top with shredded cheese and salsa as desired. May add dollop fat-free sour cream if desired    - Chicken parmesan: Top chicken breast w/ low sugar marinara sauce, mozzarella cheese and bake until chicken reaches 165*. Serve w/ spaghetti SQUASH or Belgian cut green beans    - Dinner Omelet with shrimp or chicken and onion, green peppers and chives.    - No noodle lasagna: Use sliced zucchini or eggplant in place of noodles. Layer with part skim ricotta cheese and low sugar meat sauce (use very lean ground beef or ground turkey).    - Mexican chicken bake: Bake chunks of chicken breast or thigh with taco seasoning, Pace brand enchilada sauce, green onions and low-fat cheese. Serve with ¼ cup black beans  or fat free refried beans topped with chopped tomatoes or salsa.    - Chucky frozen meatballs, simmered in Classico Marinara sauce. Different flavors of salsa or spaghetti sauce create different dishes! Sprinkle with parmesan cheese. Serve with grilled or steamed veggies, or a dark green salad.    - Simmer boneless skinless chicken thigh chunks in Classico Marinara sauce or roasted salsa until tender with chopped onion, bell pepper, garlic, mushrooms, spinach, etc.     - Hamburger or veggie burger, without the bun, dressed the way you like. Served with grilled or steamed veggies.    - Eggplant parmesan: Bake slices of eggplant at 350 degrees for 15 minutes. Layer tomato sauce, sliced eggplant and low-fat mozzarella cheese in a baking dish and cover with foil. Bake 30-40 more minutes or until bubbly. Uncover and bake at 400 degrees for about 15 more minutes, or until top is slightly crisp.    - Fish tacos: grilled/baked white fish, wrapped in Lavelle lettuce leaf, topped with salsa, shredded low-fat cheese, and light coleslaw.    Snacks: (100-200 calories; >5g protein)    - 1 low-fat cheese stick with 8 cherry tomatoes or 1 serving fresh fruit  - 4 thin slices fat-free turkey breast and 1 slice low-fat cheese  - 4 thin slices fat-free honey ham with wedge of melon  - 6-8 edamame pods (equivalent to about 1/4 cup edamame without pods).   - 1/4 cup unsalted nuts with ½ cup fruit  - 6-oz container Dannon Light n Fit vanilla yogurt, topped with 1oz unsalted nuts   - apple, celery or baby carrots spread with 2 Tbsp PB2  - apple slices with 1 oz slice low-fat cheese  - Apple slices dipped in 2 Tbsp of PB2  - celery, cucumber, bell pepper or baby carrots dipped in ¼ cup hummus bean spread or light spinach and artichoke dip (*recipe in lunch section)  - celery, cucumber, baby carrots dipped in high protein greek yogurt (Mix 16 oz plain greek yogurt + 1 packet of hidden valley ranch dip mix)  - Carlos Links Beef Steak -  14g protein! (similar to beef jerky)  - 2 wedges Laughing Cow - Light Herb & Garlic Cheese with sliced cucumber or green bell pepper  - 1/2 cup low-fat cottage cheese with ¼ cup fruit or ¼ cup salsa  - RTD Protein drinks: Atkins, Low Carb Slim Fast, EAS light, Muscle Milk Light, etc.  - Homemade Protein drinks: GNC Soy95, Isopure, Nectar, UNJURY, Whey Gourmet, etc. Mix 1 scoop powder with 8oz skim/1% milk or light soymilk.  - Protein bars: Atkins, EAS, Pure Protein, Think Thin, Detour, etc. Must have 0-4 grams sugar - Read the label.    Takeout Options: No more than twice/week  Deli - Salads (no pasta or rice), meats, cheeses. Roasted chicken. Lox (salmon)    Mexican - Platters which don't include tortillas, chips, or rice. Go easy on the beans. Example: Fajitas without the tortillas. Ask the  not to bring chips to the table if they are too tempting.    Greek - Meat or fish and vegetable, but no bread or rice. Including hummus, baba ganoush, etc, is OK. Most sit-down Greek restaurants can provide you with cucumber slices for dipping instead of jesus bread.    Fast Food (Avoid as much as possible) - Salads (no croutons and limit salad dressing to 2 tbsp), grilled chicken sandwich without the bun and ask for no garibay. Tarahs low fat chili or Taco Bell pintos and cheese.    BBQ - The meats are fine if you ask for sauces on the side, but most of the traditional side dishes are loaded with carbs. Rick slaw, baked beans and BBQ sauce are typically made with sugar.    Chinese - Nothing deep-fried, no rice or noodles. Many Chinese sauces have starch and sugar in them, so you'll have to use your judgement. If you find that these sauces trigger cravings, or cause Dumping, you can ask for the sauce to be made without sugar or just use soy sauce.

## 2017-07-06 ENCOUNTER — TELEPHONE (OUTPATIENT)
Dept: BARIATRICS | Facility: CLINIC | Age: 52
End: 2017-07-06

## 2017-07-06 ENCOUNTER — TELEPHONE (OUTPATIENT)
Dept: ADMINISTRATIVE | Facility: OTHER | Age: 52
End: 2017-07-06

## 2017-07-06 NOTE — TELEPHONE ENCOUNTER
----- Message from Antoine Shabazz sent at 7/6/2017  9:51 AM CDT -----  Regarding: RE: New Consult  Garcia Green this appointment was not schedule correctly.   ----- Message -----  From: Alda Durham RN  Sent: 7/6/2017   9:40 AM  To: Antoine Shabazz  Subject: RE: New Consult                                  I have no one to schedule him with unfortunately.       ----- Message -----  From: Antoine Shabazz  Sent: 7/6/2017   9:33 AM  To: Alda Durham RN  Subject: RE: New Consult                                  baldo how this was done!   ----- Message -----  From: Alda Durham RN  Sent: 7/6/2017   9:29 AM  To: Antoine Shabazz  Subject: RE: New Consult                                  Good morning Antoine,    I've never seen the fellows on the schedule like that. Is this something new?    Alda ESTEVES    ----- Message -----  From: Antoine Shabazz  Sent: 7/6/2017   8:41 AM  To: Garcia Sigala MA, Sage Rolon MD, #  Subject: RE: New Consult                                  Norma, please see Dr. Rolon note (he is a fellow doctor and will need the appt. To be schedule with a staff doctor ) . Thanks   ----- Message -----  From: Sage Rolon MD  Sent: 7/6/2017   8:31 AM  To: Antoine Shabazz  Subject: New Consult                                      Antoine High.    I am not sure who I am seeing this new consult with tomorrow.  Can you assign me with one of the hematology staff?      Thanks

## 2017-07-06 NOTE — TELEPHONE ENCOUNTER
----- Message from Alda Durham RN sent at 7/6/2017  9:29 AM CDT -----  Regarding: RE: New Consult  Good morning Antoine,    I've never seen the fellows on the schedule like that. Is this something new?    Alda ESTEVES    ----- Message -----  From: Antoine Shabazz  Sent: 7/6/2017   8:41 AM  To: Garcia Sigala MA, Sage Rolon MD, #  Subject: RE: New Consult                                  Norma, please see Dr. Rolon note (he is a fellow doctor and will need the appt. To be schedule with a staff doctor ) . Thanks   ----- Message -----  From: Sage Rolon MD  Sent: 7/6/2017   8:31 AM  To: Antoine Shabazz  Subject: New Consult                                      Antoine High.    I am not sure who I am seeing this new consult with tomorrow.  Can you assign me with one of the hematology staff?      Thanks

## 2017-07-18 ENCOUNTER — TELEPHONE (OUTPATIENT)
Dept: HEMATOLOGY/ONCOLOGY | Facility: CLINIC | Age: 52
End: 2017-07-18

## 2017-07-19 ENCOUNTER — TELEPHONE (OUTPATIENT)
Dept: BARIATRICS | Facility: CLINIC | Age: 52
End: 2017-07-19

## 2017-07-19 DIAGNOSIS — Z98.84 STATUS POST GASTRIC BYPASS FOR OBESITY: Primary | ICD-10-CM

## 2017-07-19 DIAGNOSIS — T81.30XA EXTRUDING SUTURE, INITIAL ENCOUNTER: ICD-10-CM

## 2017-07-19 DIAGNOSIS — R10.9 ABDOMINAL PAIN, UNSPECIFIED LOCATION: ICD-10-CM

## 2017-07-20 ENCOUNTER — LAB VISIT (OUTPATIENT)
Dept: LAB | Facility: HOSPITAL | Age: 52
End: 2017-07-20
Attending: FAMILY MEDICINE
Payer: MEDICAID

## 2017-07-20 ENCOUNTER — OFFICE VISIT (OUTPATIENT)
Dept: FAMILY MEDICINE | Facility: CLINIC | Age: 52
End: 2017-07-20
Payer: MEDICAID

## 2017-07-20 VITALS
DIASTOLIC BLOOD PRESSURE: 84 MMHG | OXYGEN SATURATION: 96 % | TEMPERATURE: 98 F | RESPIRATION RATE: 18 BRPM | WEIGHT: 191.13 LBS | BODY MASS INDEX: 35.17 KG/M2 | SYSTOLIC BLOOD PRESSURE: 128 MMHG | HEIGHT: 62 IN | HEART RATE: 78 BPM

## 2017-07-20 DIAGNOSIS — E11.9 DIABETES MELLITUS TYPE 2, NONINSULIN DEPENDENT: ICD-10-CM

## 2017-07-20 DIAGNOSIS — K59.09 CONSTIPATION, CHRONIC: ICD-10-CM

## 2017-07-20 DIAGNOSIS — I10 ESSENTIAL HYPERTENSION: Primary | ICD-10-CM

## 2017-07-20 DIAGNOSIS — B96.89 ACUTE BACTERIAL SINUSITIS: ICD-10-CM

## 2017-07-20 DIAGNOSIS — J01.90 ACUTE BACTERIAL SINUSITIS: ICD-10-CM

## 2017-07-20 LAB
CREAT UR-MCNC: 199 MG/DL
MICROALBUMIN UR DL<=1MG/L-MCNC: 477 UG/ML
MICROALBUMIN/CREATININE RATIO: 239.7 UG/MG

## 2017-07-20 PROCEDURE — 3045F PR MOST RECENT HEMOGLOBIN A1C LEVEL 7.0-9.0%: CPT | Mod: ,,, | Performed by: FAMILY MEDICINE

## 2017-07-20 PROCEDURE — 99214 OFFICE O/P EST MOD 30 MIN: CPT | Mod: S$PBB,,, | Performed by: FAMILY MEDICINE

## 2017-07-20 PROCEDURE — 82570 ASSAY OF URINE CREATININE: CPT

## 2017-07-20 PROCEDURE — 99999 PR PBB SHADOW E&M-EST. PATIENT-LVL IV: CPT | Mod: PBBFAC,,, | Performed by: FAMILY MEDICINE

## 2017-07-20 RX ORDER — HYDROCHLOROTHIAZIDE 12.5 MG/1
12.5 TABLET ORAL DAILY
Qty: 30 TABLET | Refills: 5 | Status: SHIPPED | OUTPATIENT
Start: 2017-07-20 | End: 2018-01-30 | Stop reason: SDUPTHER

## 2017-07-20 RX ORDER — AMOXICILLIN AND CLAVULANATE POTASSIUM 875; 125 MG/1; MG/1
1 TABLET, FILM COATED ORAL EVERY 12 HOURS
Qty: 20 TABLET | Refills: 0 | Status: SHIPPED | OUTPATIENT
Start: 2017-07-20 | End: 2017-07-30

## 2017-07-20 RX ORDER — LISINOPRIL AND HYDROCHLOROTHIAZIDE 12.5; 2 MG/1; MG/1
TABLET ORAL
Refills: 3 | COMMUNITY
Start: 2017-07-07 | End: 2017-07-20

## 2017-07-20 NOTE — TELEPHONE ENCOUNTER
Called patient to inform her that her procedure is moved to 8/18 and date ok with patient. Dr. GARETT SHARP on 8/11 as originally booked on that date.  Pt. Aware of pre op date to sign consents.  Mailed appt slips and pre op booklet with instructions to patient.

## 2017-07-20 NOTE — PROGRESS NOTES
Chief Complaint   Patient presents with    Hypertension    Headache    Cough       HPI  Antoine Seals is a 52 y.o. female with multiple medical diagnoses as listed in the medical history and problem list that presents for evaluation of a cough that has been present for two weeks. She has a nasal drip and some sinus headache. Her drainage is thick. She has not had fever or chills.     She also is concerned about an abnormal EGD. Per the report a suture was found from her prior gastric surgery 12 years ago. They are going to remove her suture at another EGD. She is concerned that this is the cause of her constipation.     Her blood pressure has also been normal. She has been having some leg swelling.    PAST MEDICAL HISTORY:  Past Medical History:   Diagnosis Date    Diabetes mellitus type 2, noninsulin dependent 2016    Hx of essential hypertension     Hypertension, uncontrolled 2016    Nuclear sclerosis of both eyes 4/10/2017       PAST SURGICAL HISTORY:  Past Surgical History:   Procedure Laterality Date    BTL       SECTION      COLONOSCOPY N/A 2017    Procedure: COLONOSCOPY;  Surgeon: Oleg Enciso MD;  Location: [address];  Service: Endoscopy;  Laterality: N/A;  CHRONIC CONSTIPATION S/P LRNY    GASTRIC BYPASS      sandra en y    NECK SURGERY  2016       SOCIAL HISTORY:  Social History     Social History    Marital status:      Spouse name: N/A    Number of children: N/A    Years of education: N/A     Occupational History    Not on file.     Social History Main Topics    Smoking status: Never Smoker    Smokeless tobacco: Never Used    Alcohol use No    Drug use: No    Sexual activity: Yes     Partners: Male     Other Topics Concern    Not on file     Social History Narrative    No narrative on file       FAMILY HISTORY:  Family History   Problem Relation Age of Onset    Heart disease Mother     Diabetes type II Mother     Heart  attack Father 50     Open heart surgery    No Known Problems Sister     No Known Problems Brother     No Known Problems Maternal Aunt     No Known Problems Maternal Uncle     No Known Problems Paternal Aunt     No Known Problems Paternal Uncle     No Known Problems Maternal Grandmother     No Known Problems Maternal Grandfather     No Known Problems Paternal Grandmother     No Known Problems Paternal Grandfather     Amblyopia Neg Hx     Blindness Neg Hx     Cancer Neg Hx     Cataracts Neg Hx     Diabetes Neg Hx     Glaucoma Neg Hx     Hypertension Neg Hx     Macular degeneration Neg Hx     Retinal detachment Neg Hx     Strabismus Neg Hx     Stroke Neg Hx     Thyroid disease Neg Hx        ALLERGIES AND MEDICATIONS: updated and reviewed.  Review of patient's allergies indicates:   Allergen Reactions    Lisinopril-hydrochlorothiazide Swelling     Facial swelling/ mouth swelling     Current Outpatient Prescriptions   Medication Sig Dispense Refill    amlodipine (NORVASC) 10 MG tablet Take 1 tablet (10 mg total) by mouth once daily. 90 tablet 3    atorvastatin (LIPITOR) 80 MG tablet Take 1 tablet (80 mg total) by mouth every evening. 90 tablet 3    b complex vitamins tablet Take 1 tablet by mouth once daily.      bisacodyl (DULCOLAX) 5 mg EC tablet Take 1 tablet (5 mg total) by mouth daily as needed for Constipation. 30 tablet 0    ergocalciferol (ERGOCALCIFEROL) 50,000 unit Cap Take 1 capsule (50,000 Units total) by mouth twice a week. 24 capsule 0    fluticasone (FLONASE) 50 mcg/actuation nasal spray 1 spray by Each Nare route 2 (two) times daily. 1 Bottle 5    glipiZIDE (GLUCOTROL) 5 MG tablet Take 1 tablet (5 mg total) by mouth daily with breakfast. 30 tablet 1    amoxicillin-clavulanate 875-125mg (AUGMENTIN) 875-125 mg per tablet Take 1 tablet by mouth every 12 (twelve) hours. 20 tablet 0    hydrochlorothiazide (HYDRODIURIL) 12.5 MG Tab Take 1 tablet (12.5 mg total) by mouth once  "daily. 30 tablet 5     No current facility-administered medications for this visit.        ROS  Review of Systems   Constitutional: Negative for chills, diaphoresis, fatigue, fever and unexpected weight change.   HENT: Positive for congestion, postnasal drip, rhinorrhea and sinus pressure. Negative for sore throat and tinnitus.    Eyes: Negative for photophobia and visual disturbance.   Respiratory: Positive for cough. Negative for shortness of breath and wheezing.    Cardiovascular: Negative for chest pain and palpitations.   Gastrointestinal: Positive for constipation. Negative for abdominal pain, blood in stool, diarrhea, nausea and vomiting.   Genitourinary: Negative for dysuria, flank pain, frequency and vaginal discharge.   Musculoskeletal: Negative for arthralgias and joint swelling.   Skin: Negative for rash.   Neurological: Negative for speech difficulty, weakness, light-headedness and headaches.   Psychiatric/Behavioral: Negative for behavioral problems and dysphoric mood.       Physical Exam  Vitals:    07/20/17 1315   BP: 128/84   Pulse: 78   Resp: 18   Temp: 98.4 °F (36.9 °C)   TempSrc: Oral   SpO2: 96%   Weight: 86.7 kg (191 lb 2.2 oz)   Height: 5' 2" (1.575 m)    Body mass index is 34.96 kg/m².  Weight: 86.7 kg (191 lb 2.2 oz)   Height: 5' 2" (157.5 cm)     Physical Exam   Constitutional: She is oriented to person, place, and time. She appears well-developed and well-nourished. No distress.   HENT:   Head: Normocephalic and atraumatic.   TMs WNL    Frontal sinus pain with palpation    Nasal swelling   Eyes: EOM are normal.   Neck: Neck supple.   Cardiovascular: Normal rate and regular rhythm.  Exam reveals no gallop and no friction rub.    No murmur heard.  Pulmonary/Chest: Effort normal and breath sounds normal. No respiratory distress. She has no wheezes. She has no rales.   Lymphadenopathy:     She has no cervical adenopathy.   Neurological: She is alert and oriented to person, place, and time. "   Skin: Skin is warm and dry. No rash noted.   Psychiatric: She has a normal mood and affect. Her behavior is normal.   Nursing note and vitals reviewed.      Health Maintenance       Date Due Completion Date    Urine Microalbumin 03/03/2010 3/3/2009    Influenza Vaccine 08/01/2017 9/2/2016    Override on 9/28/2015: Done    Hemoglobin A1c 10/05/2017 4/5/2017    Foot Exam 12/28/2017 12/28/2016    Override on 12/28/2016: Done    Eye Exam 04/10/2018 4/10/2017 (Done)    Override on 4/10/2017: Done    Lipid Panel 06/07/2018 6/7/2017    Pap Smear with HPV Cotest 09/28/2018 9/28/2015    Mammogram 12/29/2018 12/29/2016    TETANUS VACCINE 09/07/2026 9/7/2016    Colonoscopy 06/14/2027 6/14/2017    Pneumococcal PPSV23 (Medium Risk) (2) 01/29/2032 9/7/2016            ASSESSMENT     1. Essential hypertension    2. Diabetes mellitus type 2, noninsulin dependent    3. Constipation, chronic    4. Acute bacterial sinusitis        PLAN:     Essential hypertension  -     hydrochlorothiazide (HYDRODIURIL) 12.5 MG Tab; Take 1 tablet (12.5 mg total) by mouth once daily.  Dispense: 30 tablet; Refill: 5    Diabetes mellitus type 2, noninsulin dependent  -     Microalbumin/creatinine urine ratio; Future; Expected date: 07/20/2017  -     Hemoglobin A1c; Future; Expected date: 07/20/2017  -     Ambulatory consult to Podiatry    Constipation, chronic    Acute bacterial sinusitis  -     amoxicillin-clavulanate 875-125mg (AUGMENTIN) 875-125 mg per tablet; Take 1 tablet by mouth every 12 (twelve) hours.  Dispense: 20 tablet; Refill: 0      Update lab work, explained that the location of the suture is not likely to cause her symptoms, but she needs to discuss this with her gastro doctor  Begin fluid pill for leg swelling    Katharine Chung MD  07/21/2017 1:43 PM        Return if symptoms worsen or fail to improve.

## 2017-07-21 ENCOUNTER — TELEPHONE (OUTPATIENT)
Dept: FAMILY MEDICINE | Facility: CLINIC | Age: 52
End: 2017-07-21

## 2017-07-21 NOTE — TELEPHONE ENCOUNTER
Please let her know her A1C is 8.6 and this is unchanged from her last visit. Would she like to restart metformin? If not we can increase her glipizide    Katharine Chung MD

## 2017-07-21 NOTE — TELEPHONE ENCOUNTER
Message is a little unclear do you want patient to resume Metformin & increase her glipizide. Patient states she is willing to restart Metformin. Please advise

## 2017-07-24 RX ORDER — METFORMIN HYDROCHLORIDE 750 MG/1
750 TABLET, EXTENDED RELEASE ORAL
Qty: 30 TABLET | Refills: 2 | Status: SHIPPED | OUTPATIENT
Start: 2017-07-24 | End: 2017-08-01 | Stop reason: ALTCHOICE

## 2017-07-24 NOTE — TELEPHONE ENCOUNTER
She can do either, I was unsure if she had previous problems with metformin. If she would like to restart metformin I will send this to her pharmacy

## 2017-07-25 ENCOUNTER — INITIAL CONSULT (OUTPATIENT)
Dept: HEMATOLOGY/ONCOLOGY | Facility: CLINIC | Age: 52
End: 2017-07-25
Payer: MEDICAID

## 2017-07-25 VITALS
DIASTOLIC BLOOD PRESSURE: 81 MMHG | OXYGEN SATURATION: 95 % | WEIGHT: 188.69 LBS | TEMPERATURE: 98 F | BODY MASS INDEX: 34.72 KG/M2 | RESPIRATION RATE: 16 BRPM | SYSTOLIC BLOOD PRESSURE: 145 MMHG | HEART RATE: 87 BPM | HEIGHT: 62 IN

## 2017-07-25 DIAGNOSIS — D50.8 IRON DEFICIENCY ANEMIA FOLLOWING BARIATRIC SURGERY: ICD-10-CM

## 2017-07-25 DIAGNOSIS — K95.89 IRON DEFICIENCY ANEMIA FOLLOWING BARIATRIC SURGERY: ICD-10-CM

## 2017-07-25 DIAGNOSIS — Z87.898 HISTORY OF HEPATOMEGALY: ICD-10-CM

## 2017-07-25 LAB
BACTERIA #/AREA URNS AUTO: ABNORMAL /HPF
BILIRUB UR QL STRIP: NEGATIVE
CAOX CRY UR QL COMP ASSIST: ABNORMAL
CLARITY UR REFRACT.AUTO: ABNORMAL
COLOR UR AUTO: ABNORMAL
GLUCOSE UR QL STRIP: ABNORMAL
HGB UR QL STRIP: ABNORMAL
HYALINE CASTS UR QL AUTO: 4 /LPF
KETONES UR QL STRIP: NEGATIVE
LEUKOCYTE ESTERASE UR QL STRIP: NEGATIVE
MICROSCOPIC COMMENT: ABNORMAL
NITRITE UR QL STRIP: NEGATIVE
PH UR STRIP: 5 [PH] (ref 5–8)
PROT UR QL STRIP: ABNORMAL
RBC #/AREA URNS AUTO: 13 /HPF (ref 0–4)
SP GR UR STRIP: 1.02 (ref 1–1.03)
SQUAMOUS #/AREA URNS AUTO: 3 /HPF
URN SPEC COLLECT METH UR: ABNORMAL
UROBILINOGEN UR STRIP-ACNC: NEGATIVE EU/DL
WBC #/AREA URNS AUTO: 13 /HPF (ref 0–5)

## 2017-07-25 PROCEDURE — 99204 OFFICE O/P NEW MOD 45 MIN: CPT | Mod: S$PBB,,, | Performed by: INTERNAL MEDICINE

## 2017-07-25 PROCEDURE — 99214 OFFICE O/P EST MOD 30 MIN: CPT | Mod: PBBFAC | Performed by: INTERNAL MEDICINE

## 2017-07-25 PROCEDURE — 99999 PR PBB SHADOW E&M-EST. PATIENT-LVL IV: CPT | Mod: PBBFAC,,, | Performed by: INTERNAL MEDICINE

## 2017-07-25 PROCEDURE — 81001 URINALYSIS AUTO W/SCOPE: CPT

## 2017-07-25 RX ORDER — POLYETHYLENE GLYCOL 3350 17 G/17G
POWDER, FOR SOLUTION ORAL DAILY PRN
Refills: 3 | COMMUNITY
Start: 2017-07-20 | End: 2017-10-25 | Stop reason: SDUPTHER

## 2017-07-25 NOTE — Clinical Note
Antoine Fritz.    The patient will need insurance approval for injectafer.  She will need to be set up for an injection as soon as she is approved.  She will need a second injection 7 days following the first one.  She will need a follow up with me in the fellows clinic in 3 months with repeat CBC, iron studies, and ferritin.  Thanks.

## 2017-07-25 NOTE — PROGRESS NOTES
PATIENT: Antoine Seals  MRN: [id number]  DATE: 2017      Diagnosis:   1. Iron deficiency anemia following bariatric surgery    2. History of hepatomegaly        Chief Complaint: Anemia    Subjective:    Initial History: Ms. Seals is a 52 y.o. female with h/o DMII, HTN, gastric bypass surgery in  who presents as a referral from [facility] for anemia.  The patient states she has been anemic since her gastric bypass in .  She states that 6-7 years ago she was getting iron injections for iron deficiency.  She has not been receiving iron injections for some time and was recently placed on oral iron supplementation.  The patient states the oral iron supplements made her constipated.  The patient denies any hematuria, melena, BRBPR.  The patient recently had a colonoscopy and EGD on 17 which showed visible sutures in the stomach and no abnormalities in the colon.  The patient was evaluated by her gynecologist in 2016 with colposcopy for mild cervical dysplasia.  Colposcopy returned normal results.  The patient states some of her female relatives with anemia.  The patient currently complains of fatigue and constipation.  She denies fever, chills, CP, SOB, N/V, diarrhea.    Past Medical History:   Past Medical History:   Diagnosis Date    Diabetes mellitus type 2, noninsulin dependent 2016    Hx of essential hypertension     Hypertension, uncontrolled 2016    Nuclear sclerosis of both eyes 4/10/2017       Past Surgical HIstory:   Past Surgical History:   Procedure Laterality Date    BTL       SECTION      COLONOSCOPY N/A 2017    Procedure: COLONOSCOPY;  Surgeon: Oleg Enciso MD;  Location: [address]);  Service: Endoscopy;  Laterality: N/A;  CHRONIC CONSTIPATION S/P LRNY    GASTRIC BYPASS      sandra en y    NECK SURGERY  2016       Family History:   Family History   Problem Relation Age of Onset    Heart disease Mother     Diabetes  type II Mother     Heart attack Father 50     Open heart surgery    No Known Problems Sister     No Known Problems Brother     No Known Problems Maternal Aunt     No Known Problems Maternal Uncle     No Known Problems Paternal Aunt     No Known Problems Paternal Uncle     No Known Problems Maternal Grandmother     No Known Problems Maternal Grandfather     No Known Problems Paternal Grandmother     No Known Problems Paternal Grandfather     Amblyopia Neg Hx     Blindness Neg Hx     Cancer Neg Hx     Cataracts Neg Hx     Diabetes Neg Hx     Glaucoma Neg Hx     Hypertension Neg Hx     Macular degeneration Neg Hx     Retinal detachment Neg Hx     Strabismus Neg Hx     Stroke Neg Hx     Thyroid disease Neg Hx        Social History:  reports that she has never smoked. She has never used smokeless tobacco. She reports that she drinks alcohol. She reports that she does not use drugs.    Allergies:  Review of patient's allergies indicates:   Allergen Reactions    Lisinopril-hydrochlorothiazide Swelling     Facial swelling/ mouth swelling       Medications:  Current Outpatient Prescriptions   Medication Sig Dispense Refill    amlodipine (NORVASC) 10 MG tablet Take 1 tablet (10 mg total) by mouth once daily. 90 tablet 3    amoxicillin-clavulanate 875-125mg (AUGMENTIN) 875-125 mg per tablet Take 1 tablet by mouth every 12 (twelve) hours. 20 tablet 0    atorvastatin (LIPITOR) 80 MG tablet Take 1 tablet (80 mg total) by mouth every evening. 90 tablet 3    b complex vitamins tablet Take 1 tablet by mouth once daily.      bisacodyl (DULCOLAX) 5 mg EC tablet Take 1 tablet (5 mg total) by mouth daily as needed for Constipation. 30 tablet 0    ergocalciferol (ERGOCALCIFEROL) 50,000 unit Cap Take 1 capsule (50,000 Units total) by mouth twice a week. 24 capsule 0    fluticasone (FLONASE) 50 mcg/actuation nasal spray 1 spray by Each Nare route 2 (two) times daily. 1 Bottle 5    glipiZIDE (GLUCOTROL) 5 MG  "tablet Take 1 tablet (5 mg total) by mouth daily with breakfast. 30 tablet 1    hydrochlorothiazide (HYDRODIURIL) 12.5 MG Tab Take 1 tablet (12.5 mg total) by mouth once daily. 30 tablet 5    metformin (GLUCOPHAGE-XR) 750 MG 24 hr tablet Take 1 tablet (750 mg total) by mouth daily with breakfast. 30 tablet 2    polyethylene glycol (GLYCOLAX) 17 gram/dose powder   3     No current facility-administered medications for this visit.        Review of Systems   Constitutional: Positive for fatigue. Negative for chills and fever.   HENT: Negative for sore throat and trouble swallowing.    Eyes: Negative for photophobia, pain and visual disturbance.   Respiratory: Negative for chest tightness and shortness of breath.    Cardiovascular: Negative for chest pain, palpitations and leg swelling.   Gastrointestinal: Positive for constipation (Last BM was last week.). Negative for abdominal pain, diarrhea, nausea and vomiting.   Genitourinary: Negative for difficulty urinating and dysuria.   Musculoskeletal: Negative for arthralgias and back pain.   Skin: Negative for color change and rash.   Neurological: Negative for weakness, light-headedness, numbness and headaches.       ECOG Performance Status: 0   Objective:      Vitals:   Vitals:    07/25/17 1434   BP: (!) 145/81   BP Location: Right arm   Patient Position: Sitting   BP Method: Automatic   Pulse: 87   Resp: 16   Temp: 98.2 °F (36.8 °C)   TempSrc: Oral   SpO2: 95%   Weight: 85.6 kg (188 lb 11.4 oz)   Height: 5' 2" (1.575 m)     BMI: Body mass index is 34.52 kg/m².    Physical Exam   Constitutional: She is oriented to person, place, and time. She appears well-developed and well-nourished. No distress.   HENT:   Head: Normocephalic and atraumatic.   Mouth/Throat: No oropharyngeal exudate.   Eyes: EOM are normal. Right eye exhibits no discharge. Left eye exhibits no discharge. No scleral icterus.   Cardiovascular: Normal rate, regular rhythm, normal heart sounds and intact " distal pulses.  Exam reveals no gallop and no friction rub.    No murmur heard.  Pulmonary/Chest: Effort normal and breath sounds normal. No respiratory distress. She has no wheezes. She has no rales. She exhibits no tenderness.   Abdominal: Soft. Bowel sounds are normal. She exhibits no distension and no mass. There is no tenderness. There is no rebound and no guarding.   Musculoskeletal: Normal range of motion. She exhibits no edema or tenderness.   Lymphadenopathy:     She has no cervical adenopathy.     She has no axillary adenopathy.        Right: No supraclavicular adenopathy present.        Left: No supraclavicular adenopathy present.   Neurological: She is alert and oriented to person, place, and time.   Skin: No rash noted. She is not diaphoretic. No erythema.   Psychiatric: She has a normal mood and affect. Her behavior is normal.       Laboratory Data:  Lab Visit on 07/20/2017   Component Date Value Ref Range Status    Hemoglobin A1C 07/21/2017 8.6* 4.0 - 5.6 % Final    Comment: According to ADA guidelines, hemoglobin A1c <7.0% represents  optimal control in non-pregnant diabetic patients. Different  metrics may apply to specific patient populations.   Standards of Medical Care in Diabetes-2016.  For the purpose of screening for the presence of diabetes:  <5.7%     Consistent with the absence of diabetes  5.7-6.4%  Consistent with increasing risk for diabetes   (prediabetes)  >or=6.5%  Consistent with diabetes  Currently, no consensus exists for use of hemoglobin A1c  for diagnosis of diabetes for children.  This Hemoglobin A1c assay has significant interference with fetal   hemoglobin   (HbF). The results are invalid for patients with abnormal amounts of   HbF,   including those with known Hereditary Persistence   of Fetal Hemoglobin. Heterozygous hemoglobin variants (HbAS, HbAC,   HbAD, HbAE, HbA2) do not significantly interfere with this assay;   however, presence of multiple variants in a sample may  impact the %   interference.      Estimated Avg Glucose 07/21/2017 200* 68 - 131 mg/dL Final   Lab Visit on 07/20/2017   Component Date Value Ref Range Status    Microalbum.,U,Random 07/20/2017 477.0  ug/mL Final    Creatinine, Random Ur 07/20/2017 199.0  15.0 - 325.0 mg/dL Final    Comment: The random urine reference ranges provided were established   for 24 hour urine collections.  No reference ranges exist for  random urine specimens.  Correlate clinically.      Microalb Creat Ratio 07/20/2017 239.7* 0.0 - 30.0 ug/mg Final     Lab Results   Component Value Date    IRON 49 06/07/2017    TIBC 408 06/07/2017    FERRITIN 28 07/22/2016     Lab Results   Component Value Date    WBC 7.15 06/07/2017    HGB 10.7 (L) 06/07/2017    HCT 32.9 (L) 06/07/2017    MCV 80 (L) 06/07/2017     06/07/2017     Hemoglobin Electrophoresis: 5/16/11: normal    Imaging:Reviewed    Mammogram 12/2016 BIIRADS 1    Assessment:       1. Iron deficiency anemia following bariatric surgery    2. History of hepatomegaly           Plan:     Iron Deficiency Anemia - Pt with EGD and colonoscopy recently showing no evidence of active GI bleeding  -Path from GYN evaluation with colposcopy in August of 2016 showed no evidence of malignancy.  Pt perimenopausal.  - patient likely with iron deficiency anemia from bariatric surgery causing decreased PO absorption  -Will recheck iron studies and ferritin  -Will check Ua to assess for blood loss  -Will schedule the patient for IV iron injections  -Will have pt follow up in 3 months for repeat iron studies.    Sage Rolon MD PGY-IV  Hematology and Oncology  Pager:[phone]        Distress Screening Results: Psychosocial Distress screening score of Distress Score: 1 noted and reviewed. No intervention indicated.

## 2017-07-25 NOTE — LETTER
July 26, 2017      Corona Quijano MD  [address]  [facility] [address]           [facility]  [address]  [facility] [address]  Phone: [phone]          Patient: Antoine Seals   MR Number: [id number]   YOB: 1965   Date of Visit: 7/25/2017       Dear Dr. Corona Quijano:    Thank you for referring Antoine Seals to me for evaluation. Attached you will find relevant portions of my assessment and plan of care.    If you have questions, please do not hesitate to call me. I look forward to following Antoine Seals along with you.    Sincerely,    Rosemarie Bean MD    Enclosure  CC:  No Recipients    If you would like to receive this communication electronically, please contact [email or web address] or [phone] to request more information on [facility] access.    For providers and/or their staff who would like to refer a patient to [facility], please contact us through our one-stop-shop provider referral line, [facility] , at [phone].    If you feel you have received this communication in error or would no longer like to receive these types of communications, please e-mail [email or web address]

## 2017-07-25 NOTE — TELEPHONE ENCOUNTER
Pt advised of physician's recommendations.   Pt verbalized an understanding.  Pt will  metformin from pharmacy.

## 2017-07-27 ENCOUNTER — TELEPHONE (OUTPATIENT)
Dept: FAMILY MEDICINE | Facility: CLINIC | Age: 52
End: 2017-07-27

## 2017-07-27 NOTE — TELEPHONE ENCOUNTER
----- Message from Naida Joe sent at 7/26/2017  4:13 PM CDT -----  Contact: Self  Pt called regarding medication. Pt can be reached @ [phone].

## 2017-07-31 ENCOUNTER — TELEPHONE (OUTPATIENT)
Dept: HEMATOLOGY/ONCOLOGY | Facility: CLINIC | Age: 52
End: 2017-07-31

## 2017-07-31 NOTE — TELEPHONE ENCOUNTER
----- Message from Sage Rolon MD sent at 7/25/2017  4:16 PM CDT -----  Antoine Fritz.      The patient will need insurance approval for injectafer.  She will need to be set up for an injection as soon as she is approved.  She will need a second injection 7 days following the first one.  She will need a follow up with me in the fellows clinic in 3 months with repeat CBC, iron studies, and ferritin.    Thanks.

## 2017-08-01 ENCOUNTER — OFFICE VISIT (OUTPATIENT)
Dept: BARIATRICS | Facility: CLINIC | Age: 52
End: 2017-08-01
Payer: MEDICAID

## 2017-08-01 VITALS
DIASTOLIC BLOOD PRESSURE: 75 MMHG | SYSTOLIC BLOOD PRESSURE: 140 MMHG | BODY MASS INDEX: 35.09 KG/M2 | WEIGHT: 190.69 LBS | HEART RATE: 77 BPM | HEIGHT: 62 IN

## 2017-08-01 DIAGNOSIS — Z98.84 GASTRIC BYPASS STATUS FOR OBESITY: Primary | ICD-10-CM

## 2017-08-01 PROCEDURE — 99999 PR PBB SHADOW E&M-EST. PATIENT-LVL III: CPT | Mod: PBBFAC,,, | Performed by: SURGERY

## 2017-08-01 PROCEDURE — 99213 OFFICE O/P EST LOW 20 MIN: CPT | Mod: S$PBB,,, | Performed by: SURGERY

## 2017-08-01 PROCEDURE — 99213 OFFICE O/P EST LOW 20 MIN: CPT | Mod: PBBFAC | Performed by: SURGERY

## 2017-08-01 PROCEDURE — 3008F BODY MASS INDEX DOCD: CPT | Mod: ,,, | Performed by: SURGERY

## 2017-08-01 RX ORDER — SODIUM CHLORIDE 9 MG/ML
INJECTION, SOLUTION INTRAVENOUS CONTINUOUS
Status: CANCELLED | OUTPATIENT
Start: 2017-08-01

## 2017-08-01 NOTE — LETTER
[facility]  [address]  [facility] [address]  Phone: [phone]  Fax: [phone] August 1, 2017      Katharine Chung MD  [address] [facility] [address]    Patient: Antoine Seals   MR Number: [id number]   YOB: 1965   Date of Visit: 8/1/2017     Dear Dr. Chung:    Thank you for referring Antoine Seals to me for evaluation. Below are the relevant portions of my assessment and plan of care.    Assessment:       1. Gastric bypass status for obesity with PUD due to suture material.       Plan:       EGD with dual lumen scope and removal of intragastric suture material.   If this doesn't improve her symptoms she will need diagnostic laparoscopy.     If you have questions, please do not hesitate to call me. I look forward to following Antoine along with you.    Sincerely,      Corona Quijano MD   Section Head - General, Laparoscopic, Bariatric  Acute Care and Oncologic Surgery   - Surgical Weight Loss Program  [facility]    WSR/curt    CC  Oleg Enciso MD

## 2017-08-01 NOTE — PROGRESS NOTES
Subjective:       Patient ID: Antoine Seals is a 52 y.o. female.    Chief Complaint: Pre-op Exam    HPI S/p gastric bypass with epigastric pain due to sutures in stomach.  She has tightness in her abdomen for years, since the bypass.  Now she has continued abdominal pain, around the navel.  Review of Systems   Constitutional: Negative for fever.   Cardiovascular: Negative for chest pain.   Gastrointestinal: Positive for constipation and diarrhea. Negative for nausea and vomiting.   Genitourinary: Negative for difficulty urinating.   Hematological: Does not bruise/bleed easily.       Objective:      Physical Exam   Constitutional: She appears well-developed and well-nourished.   Cardiovascular: Normal rate, regular rhythm and normal heart sounds.    Pulmonary/Chest: Effort normal and breath sounds normal.   Abdominal: Soft. Bowel sounds are normal. There is no guarding.   Vitals reviewed.      Assessment:       1. Gastric bypass status for obesity      with pud due to suture material  Plan:       EGD with dual lumen scope and removal of intragastric suture material.   If this doesn't improve her symptoms she will need diagnostic laparoscopy.

## 2017-08-02 ENCOUNTER — DOCUMENTATION ONLY (OUTPATIENT)
Dept: BARIATRICS | Facility: CLINIC | Age: 52
End: 2017-08-02

## 2017-08-02 NOTE — PROGRESS NOTES
Pt. Approved for OP EGD in OR for stitch removal. See below:    Jennie Melendez [email or web address]>  Patient cleared.    From: Myra Dalal   Sent: Wednesday, August 02, 2017 9:37 AM  To: Jennie Melendez [email or web address]>  Subject: MRN: [id number]    Good Morning Jennie,  Would you know if the auth was completed for this patient.  I looked in the chart and it seems it may be pending but I cant tell for sure.   We did her pre op yesterday and in the master daily schedule it indicates no auth is listed.  Thanks,  Rain

## 2017-08-03 DIAGNOSIS — E11.9 DIABETES MELLITUS TYPE 2, NONINSULIN DEPENDENT: ICD-10-CM

## 2017-08-03 RX ORDER — GLIPIZIDE 5 MG/1
TABLET ORAL
Qty: 60 TABLET | Refills: 2 | Status: SHIPPED | OUTPATIENT
Start: 2017-08-03 | End: 2017-08-03 | Stop reason: SDUPTHER

## 2017-08-03 RX ORDER — GLIPIZIDE 5 MG/1
TABLET ORAL
Qty: 60 TABLET | Refills: 2 | Status: SHIPPED | OUTPATIENT
Start: 2017-08-03 | End: 2017-11-06 | Stop reason: SDUPTHER

## 2017-08-03 NOTE — TELEPHONE ENCOUNTER
----- Message from Tiffani Jose sent at 8/3/2017  9:15 AM CDT -----  Contact: Lala/[facility]/[phone]  Lala would like to speak to the staff to clarify the directions on patient's prescription:  glipiZIDE (GLUCOTROL) 5 MG tablet. Thank you.

## 2017-08-04 ENCOUNTER — INFUSION (OUTPATIENT)
Dept: INFUSION THERAPY | Facility: HOSPITAL | Age: 52
End: 2017-08-04
Attending: INTERNAL MEDICINE
Payer: MEDICAID

## 2017-08-04 VITALS
OXYGEN SATURATION: 97 % | DIASTOLIC BLOOD PRESSURE: 71 MMHG | SYSTOLIC BLOOD PRESSURE: 128 MMHG | TEMPERATURE: 98 F | RESPIRATION RATE: 18 BRPM | HEART RATE: 85 BPM

## 2017-08-04 DIAGNOSIS — D50.8 IRON DEFICIENCY ANEMIA FOLLOWING BARIATRIC SURGERY: Primary | ICD-10-CM

## 2017-08-04 DIAGNOSIS — K95.89 IRON DEFICIENCY ANEMIA FOLLOWING BARIATRIC SURGERY: Primary | ICD-10-CM

## 2017-08-04 PROCEDURE — 25000003 PHARM REV CODE 250: Performed by: INTERNAL MEDICINE

## 2017-08-04 PROCEDURE — 96365 THER/PROPH/DIAG IV INF INIT: CPT

## 2017-08-04 PROCEDURE — 63600175 PHARM REV CODE 636 W HCPCS: Performed by: INTERNAL MEDICINE

## 2017-08-04 RX ORDER — SODIUM CHLORIDE 0.9 % (FLUSH) 0.9 %
10 SYRINGE (ML) INJECTION
Status: CANCELLED | OUTPATIENT
Start: 2017-08-07

## 2017-08-04 RX ORDER — HEPARIN 100 UNIT/ML
500 SYRINGE INTRAVENOUS
Status: DISCONTINUED | OUTPATIENT
Start: 2017-08-04 | End: 2017-08-04 | Stop reason: HOSPADM

## 2017-08-04 RX ORDER — HEPARIN 100 UNIT/ML
500 SYRINGE INTRAVENOUS
Status: CANCELLED | OUTPATIENT
Start: 2017-08-04

## 2017-08-04 RX ORDER — SODIUM CHLORIDE 0.9 % (FLUSH) 0.9 %
10 SYRINGE (ML) INJECTION
Status: CANCELLED | OUTPATIENT
Start: 2017-08-04

## 2017-08-04 RX ORDER — HEPARIN 100 UNIT/ML
500 SYRINGE INTRAVENOUS
Status: CANCELLED | OUTPATIENT
Start: 2017-08-07

## 2017-08-04 RX ORDER — SODIUM CHLORIDE 0.9 % (FLUSH) 0.9 %
10 SYRINGE (ML) INJECTION
Status: DISCONTINUED | OUTPATIENT
Start: 2017-08-04 | End: 2017-08-04 | Stop reason: HOSPADM

## 2017-08-04 RX ADMIN — SODIUM CHLORIDE: 9 INJECTION, SOLUTION INTRAVENOUS at 04:08

## 2017-08-04 RX ADMIN — FERRIC CARBOXYMALTOSE INJECTION 750 MG: 50 INJECTION, SOLUTION INTRAVENOUS at 04:08

## 2017-08-04 NOTE — PLAN OF CARE
Problem: Patient Care Overview  Goal: Individualization & Mutuality  Outcome: Ongoing (interventions implemented as appropriate)  Patient present in clinic, no complaints. Pending first injectafer infusion.

## 2017-08-04 NOTE — PLAN OF CARE
Problem: Patient Care Overview  Goal: Plan of Care Review  Outcome: Ongoing (interventions implemented as appropriate)  Patient tolerated IV injectafer infusion well,no s/s reaction;because it has been 6 years since last dose of IV iron,patient was monitored for one hour post infusion.Patient given AVS.Patient instructed to call MD with any problems.

## 2017-08-11 ENCOUNTER — INFUSION (OUTPATIENT)
Dept: INFUSION THERAPY | Facility: HOSPITAL | Age: 52
End: 2017-08-11
Attending: INTERNAL MEDICINE
Payer: MEDICAID

## 2017-08-11 VITALS
RESPIRATION RATE: 18 BRPM | HEART RATE: 86 BPM | SYSTOLIC BLOOD PRESSURE: 129 MMHG | TEMPERATURE: 98 F | DIASTOLIC BLOOD PRESSURE: 70 MMHG

## 2017-08-11 DIAGNOSIS — K95.89 IRON DEFICIENCY ANEMIA FOLLOWING BARIATRIC SURGERY: Primary | ICD-10-CM

## 2017-08-11 DIAGNOSIS — D50.8 IRON DEFICIENCY ANEMIA FOLLOWING BARIATRIC SURGERY: Primary | ICD-10-CM

## 2017-08-11 PROCEDURE — 63600175 PHARM REV CODE 636 W HCPCS: Performed by: INTERNAL MEDICINE

## 2017-08-11 PROCEDURE — 96365 THER/PROPH/DIAG IV INF INIT: CPT

## 2017-08-11 RX ORDER — HEPARIN 100 UNIT/ML
500 SYRINGE INTRAVENOUS
Status: DISCONTINUED | OUTPATIENT
Start: 2017-08-11 | End: 2017-08-11 | Stop reason: HOSPADM

## 2017-08-11 RX ORDER — SODIUM CHLORIDE 0.9 % (FLUSH) 0.9 %
10 SYRINGE (ML) INJECTION
Status: DISCONTINUED | OUTPATIENT
Start: 2017-08-11 | End: 2017-08-11 | Stop reason: HOSPADM

## 2017-08-11 RX ADMIN — FERRIC CARBOXYMALTOSE INJECTION 750 MG: 50 INJECTION, SOLUTION INTRAVENOUS at 04:08

## 2017-08-11 NOTE — PLAN OF CARE
Problem: Patient Care Overview  Goal: Plan of Care Review  Outcome: Ongoing (interventions implemented as appropriate)  Pt. Tolerated infusion. VSS. Stayed for 30 minutes observation post infusion. No adverse effects.  Peripheral IV flushed and removed. No questions at this time.

## 2017-08-11 NOTE — PLAN OF CARE
Problem: Anemia (Adult)  Goal: Identify Related Risk Factors and Signs and Symptoms  Related risk factors and signs and symptoms are identified upon initiation of Human [employer] Clinical Practice Guideline (CPG)   Outcome: Ongoing (interventions implemented as appropriate)  Pt. Here today for infusion. VSS. Peripheral IV started, blood return present. Infusing without difficulty. This will be injectafer #2. Pt. Denies issues with 1st infusion. No questions at this time.

## 2017-08-16 ENCOUNTER — TELEPHONE (OUTPATIENT)
Dept: FAMILY MEDICINE | Facility: CLINIC | Age: 52
End: 2017-08-16

## 2017-08-16 ENCOUNTER — LAB VISIT (OUTPATIENT)
Dept: LAB | Facility: HOSPITAL | Age: 52
End: 2017-08-16
Attending: INTERNAL MEDICINE
Payer: MEDICAID

## 2017-08-16 DIAGNOSIS — E78.5 HYPERLIPIDEMIA, UNSPECIFIED HYPERLIPIDEMIA TYPE: ICD-10-CM

## 2017-08-16 LAB
ALBUMIN SERPL BCP-MCNC: 2.9 G/DL
ALP SERPL-CCNC: 75 U/L
ALT SERPL W/O P-5'-P-CCNC: 40 U/L
AST SERPL-CCNC: 33 U/L
BILIRUB DIRECT SERPL-MCNC: 0.1 MG/DL
BILIRUB SERPL-MCNC: 0.4 MG/DL
CHOLEST/HDLC SERPL: 3.1 {RATIO}
HDL/CHOLESTEROL RATIO: 32 %
HDLC SERPL-MCNC: 281 MG/DL
HDLC SERPL-MCNC: 90 MG/DL
LDLC SERPL CALC-MCNC: 167.4 MG/DL
NONHDLC SERPL-MCNC: 191 MG/DL
PROT SERPL-MCNC: 6.9 G/DL
TRIGL SERPL-MCNC: 118 MG/DL

## 2017-08-16 PROCEDURE — 80061 LIPID PANEL: CPT

## 2017-08-16 PROCEDURE — 80076 HEPATIC FUNCTION PANEL: CPT

## 2017-08-16 PROCEDURE — 36415 COLL VENOUS BLD VENIPUNCTURE: CPT | Mod: PO

## 2017-08-16 NOTE — TELEPHONE ENCOUNTER
Patient reports being told by the pharmacy that there was a problem with her glipizide prescription but was unable to explain exactly what the problem was.  Reshma, pharmacist, contacted and states that the patient had active prescriptions for both glipizide and metformin.  Dr. Chung consulted and advised the patient to take glipizide 5 mg BID as directed and not to take the metformin since the patient previously reported problems with being able to swallow the medication.  She states that the patient will be re-evaluated at her next OV.  Both patient and pharmacist notified of above and verbalized understanding.

## 2017-08-17 ENCOUNTER — ANESTHESIA EVENT (OUTPATIENT)
Dept: SURGERY | Facility: HOSPITAL | Age: 52
End: 2017-08-17
Payer: MEDICAID

## 2017-08-17 ENCOUNTER — TELEPHONE (OUTPATIENT)
Dept: SURGERY | Facility: CLINIC | Age: 52
End: 2017-08-17

## 2017-08-17 NOTE — PRE-PROCEDURE INSTRUCTIONS
Spoke with Patient.  NPO, medication, and pre-op instructions reviewed.  Denies previous problems with Anesthesia.  Stated that she is unable to take Metformin because it is too big for her to swallow.  Stated that her last morning glucose reading was 126.  Verbalized understanding of instructions.

## 2017-08-18 ENCOUNTER — ANESTHESIA (OUTPATIENT)
Dept: SURGERY | Facility: HOSPITAL | Age: 52
End: 2017-08-18
Payer: MEDICAID

## 2017-08-18 ENCOUNTER — SURGERY (OUTPATIENT)
Age: 52
End: 2017-08-18

## 2017-08-18 ENCOUNTER — HOSPITAL ENCOUNTER (OUTPATIENT)
Facility: HOSPITAL | Age: 52
Discharge: HOME | End: 2017-08-18
Attending: SURGERY | Admitting: SURGERY
Payer: MEDICAID

## 2017-08-18 VITALS
BODY MASS INDEX: 34.96 KG/M2 | HEART RATE: 85 BPM | RESPIRATION RATE: 17 BRPM | WEIGHT: 190 LBS | SYSTOLIC BLOOD PRESSURE: 127 MMHG | HEIGHT: 62 IN | OXYGEN SATURATION: 96 % | DIASTOLIC BLOOD PRESSURE: 74 MMHG | TEMPERATURE: 99 F

## 2017-08-18 DIAGNOSIS — Z98.84 GASTRIC BYPASS STATUS FOR OBESITY: ICD-10-CM

## 2017-08-18 LAB
B-HCG UR QL: NEGATIVE
CTP QC/QA: YES
POCT GLUCOSE: 186 MG/DL (ref 70–110)
POCT GLUCOSE: 188 MG/DL (ref 70–110)

## 2017-08-18 PROCEDURE — 37000009 HC ANESTHESIA EA ADD 15 MINS: Performed by: SURGERY

## 2017-08-18 PROCEDURE — 25000003 PHARM REV CODE 250: Performed by: SURGERY

## 2017-08-18 PROCEDURE — 88300 SURGICAL PATH GROSS: CPT | Performed by: PATHOLOGY

## 2017-08-18 PROCEDURE — 71000044 HC DOSC ROUTINE RECOVERY FIRST HOUR: Performed by: SURGERY

## 2017-08-18 PROCEDURE — 36000706: Performed by: SURGERY

## 2017-08-18 PROCEDURE — 63600175 PHARM REV CODE 636 W HCPCS: Performed by: STUDENT IN AN ORGANIZED HEALTH CARE EDUCATION/TRAINING PROGRAM

## 2017-08-18 PROCEDURE — 36000707: Performed by: SURGERY

## 2017-08-18 PROCEDURE — 81025 URINE PREGNANCY TEST: CPT | Performed by: SURGERY

## 2017-08-18 PROCEDURE — 71000045 HC DOSC ROUTINE RECOVERY EA ADD'L HR: Performed by: SURGERY

## 2017-08-18 PROCEDURE — 25000003 PHARM REV CODE 250: Performed by: STUDENT IN AN ORGANIZED HEALTH CARE EDUCATION/TRAINING PROGRAM

## 2017-08-18 PROCEDURE — 82962 GLUCOSE BLOOD TEST: CPT | Performed by: SURGERY

## 2017-08-18 PROCEDURE — 88300 SURGICAL PATH GROSS: CPT | Mod: 26,,, | Performed by: PATHOLOGY

## 2017-08-18 PROCEDURE — 37000008 HC ANESTHESIA 1ST 15 MINUTES: Performed by: SURGERY

## 2017-08-18 PROCEDURE — 43215 ESOPHAGOSCOPY FLEX REMOVE FB: CPT | Mod: ,,, | Performed by: SURGERY

## 2017-08-18 PROCEDURE — D9220A PRA ANESTHESIA: Mod: ,,, | Performed by: ANESTHESIOLOGY

## 2017-08-18 RX ORDER — SODIUM CHLORIDE 9 MG/ML
INJECTION, SOLUTION INTRAVENOUS CONTINUOUS PRN
Status: DISCONTINUED | OUTPATIENT
Start: 2017-08-18 | End: 2017-08-18

## 2017-08-18 RX ORDER — PHENYLEPHRINE HYDROCHLORIDE 10 MG/ML
INJECTION INTRAVENOUS
Status: DISCONTINUED | OUTPATIENT
Start: 2017-08-18 | End: 2017-08-18

## 2017-08-18 RX ORDER — SODIUM CHLORIDE 0.9 % (FLUSH) 0.9 %
3 SYRINGE (ML) INJECTION
Status: DISCONTINUED | OUTPATIENT
Start: 2017-08-18 | End: 2017-08-18 | Stop reason: HOSPADM

## 2017-08-18 RX ORDER — ONDANSETRON 2 MG/ML
INJECTION INTRAMUSCULAR; INTRAVENOUS
Status: DISCONTINUED | OUTPATIENT
Start: 2017-08-18 | End: 2017-08-18

## 2017-08-18 RX ORDER — SODIUM CHLORIDE 9 MG/ML
INJECTION, SOLUTION INTRAVENOUS CONTINUOUS
Status: DISCONTINUED | OUTPATIENT
Start: 2017-08-18 | End: 2017-08-18 | Stop reason: HOSPADM

## 2017-08-18 RX ORDER — KETOROLAC TROMETHAMINE 30 MG/ML
INJECTION, SOLUTION INTRAMUSCULAR; INTRAVENOUS
Status: DISCONTINUED | OUTPATIENT
Start: 2017-08-18 | End: 2017-08-18

## 2017-08-18 RX ORDER — MIDAZOLAM HYDROCHLORIDE 1 MG/ML
INJECTION, SOLUTION INTRAMUSCULAR; INTRAVENOUS
Status: DISCONTINUED | OUTPATIENT
Start: 2017-08-18 | End: 2017-08-18

## 2017-08-18 RX ORDER — HYDROMORPHONE HYDROCHLORIDE 1 MG/ML
0.2 INJECTION, SOLUTION INTRAMUSCULAR; INTRAVENOUS; SUBCUTANEOUS EVERY 5 MIN PRN
Status: DISCONTINUED | OUTPATIENT
Start: 2017-08-18 | End: 2017-08-18 | Stop reason: HOSPADM

## 2017-08-18 RX ORDER — ACETAMINOPHEN 10 MG/ML
INJECTION, SOLUTION INTRAVENOUS
Status: DISCONTINUED | OUTPATIENT
Start: 2017-08-18 | End: 2017-08-18

## 2017-08-18 RX ORDER — FENTANYL CITRATE 50 UG/ML
INJECTION, SOLUTION INTRAMUSCULAR; INTRAVENOUS
Status: DISCONTINUED | OUTPATIENT
Start: 2017-08-18 | End: 2017-08-18

## 2017-08-18 RX ORDER — CEFAZOLIN SODIUM 1 G/3ML
INJECTION, POWDER, FOR SOLUTION INTRAMUSCULAR; INTRAVENOUS
Status: DISCONTINUED | OUTPATIENT
Start: 2017-08-18 | End: 2017-08-18

## 2017-08-18 RX ORDER — SUCCINYLCHOLINE CHLORIDE 20 MG/ML
INJECTION INTRAMUSCULAR; INTRAVENOUS
Status: DISCONTINUED | OUTPATIENT
Start: 2017-08-18 | End: 2017-08-18

## 2017-08-18 RX ORDER — DEXAMETHASONE SODIUM PHOSPHATE 4 MG/ML
INJECTION, SOLUTION INTRA-ARTICULAR; INTRALESIONAL; INTRAMUSCULAR; INTRAVENOUS; SOFT TISSUE
Status: DISCONTINUED | OUTPATIENT
Start: 2017-08-18 | End: 2017-08-18

## 2017-08-18 RX ORDER — ROCURONIUM BROMIDE 10 MG/ML
INJECTION, SOLUTION INTRAVENOUS
Status: DISCONTINUED | OUTPATIENT
Start: 2017-08-18 | End: 2017-08-18

## 2017-08-18 RX ORDER — PROPOFOL 10 MG/ML
VIAL (ML) INTRAVENOUS
Status: DISCONTINUED | OUTPATIENT
Start: 2017-08-18 | End: 2017-08-18

## 2017-08-18 RX ORDER — LIDOCAINE HYDROCHLORIDE 10 MG/ML
1 INJECTION, SOLUTION EPIDURAL; INFILTRATION; INTRACAUDAL; PERINEURAL ONCE
Status: COMPLETED | OUTPATIENT
Start: 2017-08-18 | End: 2017-08-18

## 2017-08-18 RX ORDER — LIDOCAINE HCL/PF 100 MG/5ML
SYRINGE (ML) INTRAVENOUS
Status: DISCONTINUED | OUTPATIENT
Start: 2017-08-18 | End: 2017-08-18

## 2017-08-18 RX ADMIN — PHENYLEPHRINE HYDROCHLORIDE 200 MCG: 10 INJECTION INTRAVENOUS at 08:08

## 2017-08-18 RX ADMIN — FENTANYL CITRATE 50 MCG: 50 INJECTION, SOLUTION INTRAMUSCULAR; INTRAVENOUS at 07:08

## 2017-08-18 RX ADMIN — SUCCINYLCHOLINE CHLORIDE 140 MG: 20 INJECTION, SOLUTION INTRAMUSCULAR; INTRAVENOUS at 07:08

## 2017-08-18 RX ADMIN — PHENYLEPHRINE HYDROCHLORIDE 100 MCG: 10 INJECTION INTRAVENOUS at 08:08

## 2017-08-18 RX ADMIN — PROPOFOL 50 MG: 10 INJECTION, EMULSION INTRAVENOUS at 07:08

## 2017-08-18 RX ADMIN — DEXAMETHASONE SODIUM PHOSPHATE 4 MG: 4 INJECTION, SOLUTION INTRAMUSCULAR; INTRAVENOUS at 07:08

## 2017-08-18 RX ADMIN — PROPOFOL 70 MG: 10 INJECTION, EMULSION INTRAVENOUS at 07:08

## 2017-08-18 RX ADMIN — SODIUM CHLORIDE: 0.9 INJECTION, SOLUTION INTRAVENOUS at 07:08

## 2017-08-18 RX ADMIN — ONDANSETRON 4 MG: 2 INJECTION INTRAMUSCULAR; INTRAVENOUS at 08:08

## 2017-08-18 RX ADMIN — LIDOCAINE HYDROCHLORIDE 10 MG: 10 INJECTION, SOLUTION EPIDURAL; INFILTRATION; INTRACAUDAL; PERINEURAL at 06:08

## 2017-08-18 RX ADMIN — ROCURONIUM BROMIDE 5 MG: 10 INJECTION, SOLUTION INTRAVENOUS at 07:08

## 2017-08-18 RX ADMIN — SODIUM CHLORIDE: 0.9 INJECTION, SOLUTION INTRAVENOUS at 06:08

## 2017-08-18 RX ADMIN — CEFAZOLIN 2 G: 1 INJECTION, POWDER, FOR SOLUTION INTRAVENOUS at 07:08

## 2017-08-18 RX ADMIN — LIDOCAINE HYDROCHLORIDE 60 MG: 20 INJECTION, SOLUTION INTRAVENOUS at 07:08

## 2017-08-18 RX ADMIN — PHENYLEPHRINE HYDROCHLORIDE 100 MCG: 10 INJECTION INTRAVENOUS at 07:08

## 2017-08-18 RX ADMIN — SODIUM CHLORIDE, SODIUM GLUCONATE, SODIUM ACETATE, POTASSIUM CHLORIDE, MAGNESIUM CHLORIDE, SODIUM PHOSPHATE, DIBASIC, AND POTASSIUM PHOSPHATE: .53; .5; .37; .037; .03; .012; .00082 INJECTION, SOLUTION INTRAVENOUS at 08:08

## 2017-08-18 RX ADMIN — ACETAMINOPHEN 1000 MG: 10 INJECTION, SOLUTION INTRAVENOUS at 07:08

## 2017-08-18 RX ADMIN — KETOROLAC TROMETHAMINE 30 MG: 30 INJECTION, SOLUTION INTRAMUSCULAR; INTRAVENOUS at 07:08

## 2017-08-18 RX ADMIN — PROPOFOL 150 MG: 10 INJECTION, EMULSION INTRAVENOUS at 07:08

## 2017-08-18 RX ADMIN — MIDAZOLAM HYDROCHLORIDE 2 MG: 1 INJECTION, SOLUTION INTRAMUSCULAR; INTRAVENOUS at 07:08

## 2017-08-18 NOTE — BRIEF OP NOTE
[facility]  Brief Operative Note     SUMMARY     Surgery Date: 8/18/2017     Surgeon(s) and Role:     * Corona Quijano MD - Primary    Assisting Surgeon: None    Pre-op Diagnosis:  Status post gastric bypass for obesity [Z98.84]  Extruding suture, initial encounter [T81.30XA]  Abdominal pain, unspecified location [R10.9]    Post-op Diagnosis:  Post-Op Diagnosis Codes:     * Status post gastric bypass for obesity [Z98.84]     * Extruding suture, initial encounter [T81.30XA]     * Abdominal pain, unspecified location [R10.9]    Procedure(s) (LRB):  ESOPHAGOGASTRODUODENOSCOPY (EGD) - needs dual lumen EGD with graspers and scissors to remove suture at gastrojejunal anastomosis site-pt. 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 (N/A)    Anesthesia: General    Description of the findings of the procedure: EGD and extraction of an extruding suture    Findings/Key Components: Protruding suture    Estimated Blood Loss: * No values recorded between 8/18/2017  7:56 AM and 8/18/2017  8:16 AM *         Specimens:   Specimen (12h ago through future)    Start     Ordered    08/18/17 0809  Specimen to Pathology - Surgery  Once     Comments:  Specimen to Pathology: 1) Suture  - GROSS , no preservatives , to refrigerator      08/18/17 0809          Discharge Note    SUMMARY     Admit Date: 8/18/2017    Discharge Date and Time:  08/18/2017 8:17 AM    Hospital Course (synopsis of major diagnoses, care, treatment, and services provided during the course of the hospital stay): Pt presented to [facility] for an EGD and removal of an extruding suture.  Pt tolerated the procedure well in its entirety without issue.  For more details, please refer to op note.  Post-op, pt was observed in the recovery area.  Pt was started on a regular diet and tolerated it well.  Pain was controlled with PO analgesics.  Pt was able to ambulate and urinate without issue.  Pt stable for discharge.       Final Diagnosis: Post-Op Diagnosis Codes:     * Status post  gastric bypass for obesity [Z98.84]     * Extruding suture, initial encounter [T81.30XA]     * Abdominal pain, unspecified location [R10.9]    Disposition: Home or Self Care    Follow Up/Patient Instructions:     Medications:  Reconciled Home Medications:   Current Discharge Medication List      CONTINUE these medications which have NOT CHANGED    Details   amlodipine (NORVASC) 10 MG tablet Take 1 tablet (10 mg total) by mouth once daily.  Qty: 90 tablet, Refills: 3    Associated Diagnoses: Essential hypertension, benign      atorvastatin (LIPITOR) 80 MG tablet Take 1 tablet (80 mg total) by mouth every evening.  Qty: 90 tablet, Refills: 3      FOLIC ACID/MV,IRON,MIN (CENTRUM ORAL) Take 1 tablet by mouth every morning.      glipiZIDE (GLUCOTROL) 5 MG tablet TAKE 1 TABLET(5 MG) BY MOUTH TWICE A DAY WITH BREAKFAST  Qty: 60 tablet, Refills: 2    Associated Diagnoses: Diabetes mellitus type 2, noninsulin dependent      hydrochlorothiazide (HYDRODIURIL) 12.5 MG Tab Take 1 tablet (12.5 mg total) by mouth once daily.  Qty: 30 tablet, Refills: 5    Associated Diagnoses: Essential hypertension      polyethylene glycol (GLYCOLAX) 17 gram/dose powder Take by mouth daily as needed.   Refills: 3      b complex vitamins tablet Take 1 tablet by mouth every morning.       ergocalciferol (ERGOCALCIFEROL) 50,000 unit Cap Take 1 capsule (50,000 Units total) by mouth twice a week.  Qty: 24 capsule, Refills: 0      fluticasone (FLONASE) 50 mcg/actuation nasal spray 1 spray by Each Nare route 2 (two) times daily.  Qty: 1 Bottle, Refills: 5    Associated Diagnoses: Perennial allergic rhinitis, unspecified allergic rhinitis trigger             Discharge Procedure Orders  Diet general     Activity as tolerated     Call MD for:  temperature >100.4     Call MD for:  persistent nausea and vomiting     Call MD for:  severe uncontrolled pain       Follow-up Information     Corona Quijano MD In 2 weeks.    Specialties:  General Surgery,  Bariatrics  Contact information:  [address]  [facility] [address]  [phone]

## 2017-08-18 NOTE — INTERVAL H&P NOTE
The patient has been examined and the H&P has been reviewed:    I concur with the findings and no changes have occurred since H&P was written.     Past Medical History:   Diagnosis Date    Diabetes mellitus type 2, noninsulin dependent 2016    Hx of essential hypertension     Hyperlipidemia     Hypertension, uncontrolled 2016    Nuclear sclerosis of both eyes 4/10/2017       Past Surgical History:   Procedure Laterality Date    BTL       SECTION      COLONOSCOPY N/A 2017    Procedure: COLONOSCOPY;  Surgeon: Oleg Enciso MD;  Location: [facility] ([address] [place] [address]);  Service: Endoscopy;  Laterality: N/A;  CHRONIC CONSTIPATION S/P LRNY    GASTRIC BYPASS      sandra en y    NECK SURGERY  2016       Family History   Problem Relation Age of Onset    Heart disease Mother     Diabetes type II Mother     Heart attack Father 50     Open heart surgery    No Known Problems Sister     No Known Problems Brother     No Known Problems Maternal Aunt     No Known Problems Maternal Uncle     No Known Problems Paternal Aunt     No Known Problems Paternal Uncle     No Known Problems Maternal Grandmother     No Known Problems Maternal Grandfather     No Known Problems Paternal Grandmother     No Known Problems Paternal Grandfather     Amblyopia Neg Hx     Blindness Neg Hx     Cancer Neg Hx     Cataracts Neg Hx     Diabetes Neg Hx     Glaucoma Neg Hx     Hypertension Neg Hx     Macular degeneration Neg Hx     Retinal detachment Neg Hx     Strabismus Neg Hx     Stroke Neg Hx     Thyroid disease Neg Hx        Social History     Social History    Marital status:      Spouse name: N/A    Number of children: N/A    Years of education: N/A     Social History Main Topics    Smoking status: Never Smoker    Smokeless tobacco: Never Used    Alcohol use 0.0 oz/week      Comment: Socially    Drug use: No    Sexual activity: Yes     Partners: Male     Other Topics Concern     None     Social History Narrative    None       Current Facility-Administered Medications   Medication Dose Route Frequency Provider Last Rate Last Dose    0.9%  NaCl infusion   Intravenous Continuous Corona Quijano MD 70 mL/hr at 08/18/17 0620      CEFAZOLIN 2G/20 ML SYRINGE (PYXIS) IV syringe 2 g 20 mL  2 g Intravenous On Call Procedure Corona Quijano MD           Review of patient's allergies indicates:   Allergen Reactions    Lisinopril-hydrochlorothiazide Swelling     Facial swelling/ mouth swelling         Anesthesia/Surgery risks, benefits and alternative options discussed and understood by patient/family.          Active Hospital Problems    Diagnosis  POA    Gastric bypass status for obesity [Z98.84]  Not Applicable      Resolved Hospital Problems    Diagnosis Date Resolved POA   No resolved problems to display.

## 2017-08-18 NOTE — TRANSFER OF CARE
"Anesthesia Transfer of Care Note    Patient: Antoine Seals    Procedure(s) Performed: Procedure(s) (LRB):  ESOPHAGOGASTRODUODENOSCOPY (EGD) - needs dual lumen EGD with graspers and scissors to remove suture at gastrojejunal anastomosis site-pt. 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 (N/A)    Patient location: PACU    Anesthesia Type: general    Transport from OR: Transported from OR on 6-10 L/min O2 by face mask with adequate spontaneous ventilation    Post pain: adequate analgesia    Post assessment: no apparent anesthetic complications    Post vital signs: stable    Level of consciousness: awake    Nausea/Vomiting: no nausea/vomiting    Complications: none    Transfer of care protocol was followed      Last vitals:   Visit Vitals  /67   Pulse 78   Temp (P) 36.6 °C (97.9 °F) (Temporal)   Resp 17   Ht 5' 2" (1.575 m)   Wt 86.2 kg (190 lb)   LMP 04/23/2017   SpO2 97%   Breastfeeding? No   BMI 34.75 kg/m²     "

## 2017-08-18 NOTE — ANESTHESIA POSTPROCEDURE EVALUATION
"Anesthesia Post Evaluation    Patient: Antoine Seals    Procedure(s) Performed: Procedure(s) (LRB):  ESOPHAGOGASTRODUODENOSCOPY (EGD) - needs dual lumen EGD with graspers and scissors to remove suture at gastrojejunal anastomosis site-pt. 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 (N/A)    Final Anesthesia Type: general  Patient location during evaluation: PACU  Patient participation: Yes- Able to Participate  Level of consciousness: awake and alert and oriented  Post-procedure vital signs: reviewed and stable  Pain management: adequate  Airway patency: patent  PONV status at discharge: No PONV  Anesthetic complications: no      Cardiovascular status: blood pressure returned to baseline and hemodynamically stable  Respiratory status: unassisted, spontaneous ventilation and room air  Hydration status: euvolemic  Follow-up not needed.        Visit Vitals  /77   Pulse 87   Temp 36.6 °C (97.9 °F) (Temporal)   Resp 18   Ht 5' 2" (1.575 m)   Wt 86.2 kg (190 lb)   LMP 04/23/2017   SpO2 97%   Breastfeeding? No   BMI 34.75 kg/m²       Pain/Dangelo Score: Pain Assessment Performed: Yes (8/18/2017  8:35 AM)  Presence of Pain: denies (8/18/2017  6:00 AM)      "

## 2017-08-18 NOTE — BRIEF OP NOTE
Operative Note       Surgery Date: 8/18/2017     Surgeon(s) and Role:     * Corona Quijano MD - Primary    Pre-op Diagnosis:  Status post gastric bypass for obesity [Z98.84]  Extruding suture, initial encounter [T81.30XA]  Abdominal pain, unspecified location [R10.9]    Post-op Diagnosis:  Status post gastric bypass for obesity [Z98.84]  Extruding suture, initial encounter [T81.30XA]  Abdominal pain, unspecified location [R10.9]    Procedure(s) (LRB):  ESOPHAGOGASTRODUODENOSCOPY (EGD) - needs dual lumen EGD with graspers and scissors to remove suture at gastrojejunal anastomosis site-pt. 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 (N/A)    Anesthesia: General    Procedure in Detail/Findings:  Anastomotic suture removed without apparent complication    Estimated Blood Loss: Minimal           Specimens     Start     Ordered    08/18/17 0809  Specimen to Pathology - Surgery  Once      08/18/17 0809        Implants: * No implants in log *           Disposition: PACU - hemodynamically stable.           Condition: Good    Attestation:  I was present and scrubbed for the entire procedure.

## 2017-08-18 NOTE — ANESTHESIA PREPROCEDURE EVALUATION
[facility]  Anesthesia Pre-Operative Evaluation         Patient Name: Antoine Seals  YOB: 1965  MRN: [id number]    SUBJECTIVE:     Pre-operative evaluation for Procedure(s) (LRB):  ESOPHAGOGASTRODUODENOSCOPY (EGD) - needs dual lumen EGD with graspers and scissors to remove suture at gastrojejunal anastomosis site-pt. 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 (N/A)     08/18/2017    Antoine Seals is a 52 y.o. female w/ a significant PMHx of HTN, HLD, T2DM, and laparoscopic gastric RnY who presents for the above procedure due to extruding suture.       LDA:       Peripheral IV - Single Lumen 08/18/17 0619 Right Hand (Active)   Site Assessment Clean;Dry;Intact 8/18/2017  6:19 AM   Line Status Blood return noted;Infusing 8/18/2017  6:19 AM   Dressing Status Clean;Dry;Intact 8/18/2017  6:19 AM   Dressing Intervention New dressing 8/18/2017  6:19 AM   Reason Not Rotated Not due 8/18/2017  6:19 AM   Number of days: 0       Prev airway: None documented.    Drips:   sodium chloride 0.9% 70 mL/hr at 08/18/17 0620       Patient Active Problem List   Diagnosis    Essential hypertension    Diabetes mellitus type 2, noninsulin dependent    Hyperlipidemia    Constipation, chronic    Chronic narcotic use    Nuclear sclerosis of both eyes    Type 2 diabetes mellitus without ophthalmic manifestations    Refractive error    Gastric bypass status for obesity    Obesity, Class II, BMI 35-39.9, with comorbidity    Iron deficiency anemia secondary to inadequate dietary iron intake    Iron deficiency anemia following bariatric surgery       Review of patient's allergies indicates:   Allergen Reactions    Lisinopril-hydrochlorothiazide Swelling     Facial swelling/ mouth swelling     Current Inpatient Medications:       No current facility-administered medications on file prior to encounter.      Current Outpatient Prescriptions on File Prior to Encounter   Medication Sig Dispense Refill     amlodipine (NORVASC) 10 MG tablet Take 1 tablet (10 mg total) by mouth once daily. (Patient taking differently: Take 10 mg by mouth every morning. ) 90 tablet 3    atorvastatin (LIPITOR) 80 MG tablet Take 1 tablet (80 mg total) by mouth every evening. (Patient taking differently: Take 80 mg by mouth every morning. ) 90 tablet 3    b complex vitamins tablet Take 1 tablet by mouth every morning.       ergocalciferol (ERGOCALCIFEROL) 50,000 unit Cap Take 1 capsule (50,000 Units total) by mouth twice a week. 24 capsule 0    fluticasone (FLONASE) 50 mcg/actuation nasal spray 1 spray by Each Nare route 2 (two) times daily. (Patient taking differently: 1 spray by Each Nare route 2 (two) times daily as needed. ) 1 Bottle 5       Past Surgical History:   Procedure Laterality Date    BTL       SECTION      COLONOSCOPY N/A 2017    Procedure: COLONOSCOPY;  Surgeon: Oleg Enciso MD;  Location: [address];  Service: Endoscopy;  Laterality: N/A;  CHRONIC CONSTIPATION S/P LRNY    GASTRIC BYPASS      sandra en y    NECK SURGERY  2016       Social History     Social History    Marital status:      Spouse name: N/A    Number of children: N/A    Years of education: N/A     Occupational History    Not on file.     Social History Main Topics    Smoking status: Never Smoker    Smokeless tobacco: Never Used    Alcohol use 0.0 oz/week      Comment: Socially    Drug use: No    Sexual activity: Yes     Partners: Male     Other Topics Concern    Not on file     Social History Narrative    No narrative on file       OBJECTIVE:     Vital Signs Range (Last 24H):  Temp:  [36.7 °C (98.1 °F)]   Pulse:  [83]   Resp:  [18]   BP: (140)/(91)   SpO2:  [100 %]       CBC:   No results for input(s): WBC, RBC, HGB, HCT, PLT, MCV, MCH, MCHC in the last 72 hours.    CMP:   Recent Labs      17   0938   ALBUMIN  2.9*   PROT  6.9   ALKPHOS  75   ALT  40   AST  33   BILITOT  0.4       INR:  No  results for input(s): INR, PROTIME, APTT in the last 72 hours.    Invalid input(s): PT    Diagnostic Studies: No relevant studies.    EKG: No recent studies available.    2D ECHO:  Results for orders placed or performed during the hospital encounter of 06/08/17   2D Echo w/ Color Flow Doppler   Result Value Ref Range    EF 55 55 - 65    Est. PA Systolic Pressure 13.89     Tricuspid Valve Regurgitation TRIVIAL        ASSESSMENT/PLAN:     Anesthesia Evaluation    I have reviewed the Patient Summary Reports.     I have reviewed the Medications.     Review of Systems  Anesthesia Hx:  No problems with previous Anesthesia  History of prior surgery of interest to airway management or planning: gastric bypass. Previous anesthesia: General Denies Family Hx of Anesthesia complications.   Denies Personal Hx of Anesthesia complications.   Social:  Non-Smoker, Social Alcohol Use    Hematology/Oncology:        Denies Current/Recent Cancer   EENT/Dental:   denies chronic allergic rhinitis   Cardiovascular:   Exercise tolerance: good Hypertension Denies MI.  Denies CAD.    Denies CABG/stent.  Denies Dysrhythmias.  hyperlipidemia    Pulmonary:   Denies Pneumonia Denies Asthma.  Denies Shortness of breath.  Denies Recent URI.    Renal/:   Denies Chronic Renal Disease.     Hepatic/GI:   Denies PUD. Denies Hiatal Hernia.  Denies GERD.    Neurological:   Denies TIA. Denies CVA. Denies Seizures.    Endocrine:   Diabetes, type 2    Psych:   Denies Psychiatric History.          Physical Exam  General:  Well nourished    Airway/Jaw/Neck:  Airway Findings: Mouth Opening: Normal Tongue: Normal  General Airway Assessment: Adult  Mallampati: III  Improves to II with phonation.  TM Distance: Normal, at least 6 cm  Jaw/Neck Findings:  Neck ROM: Normal ROM  Neck Findings:  Girth Increased      Dental:  Dental Findings:    Chest/Lungs:  Chest/Lungs Findings: Clear to auscultation, Normal Respiratory Rate     Heart/Vascular:  Heart Findings: Rate:  Normal  Rhythm: Regular Rhythm  Sounds: Normal     Abdomen:  Abdomen Findings:  Normal, Soft, Nontender     Musculoskeletal:  Musculoskeletal Findings: Normal   Skin:  Skin Findings: Normal    Mental Status:  Mental Status Findings:  Cooperative, Alert and Oriented         Anesthesia Plan  Type of Anesthesia, risks & benefits discussed:  Anesthesia Type:  general, MAC  Patient's Preference:   Intra-op Monitoring Plan: standard ASA monitors  Intra-op Monitoring Plan Comments:   Post Op Pain Control Plan: multimodal analgesia and IV/PO Opioids PRN  Post Op Pain Control Plan Comments:   Induction:   IV  Beta Blocker:  Patient is not currently on a Beta-Blocker (No further documentation required).       Informed Consent: Patient understands risks and agrees with Anesthesia plan.  Questions answered. Anesthesia consent signed with patient.  ASA Score: 2     Day of Surgery Review of History & Physical:    H&P update referred to the surgeon.         Ready For Surgery From Anesthesia Perspective.

## 2017-08-18 NOTE — OP NOTE
DATE OF PROCEDURE:  08/18/2017    PREOPERATIVE DIAGNOSIS:  Epigastric pain.    POSTOPERATIVE DIAGNOSIS:  Epigastric pain.    PROCEDURE:  Endoscopic removal of anastomotic suture material.    SURGEON:  Corona Quijano M.D.    ANESTHESIA:  General.    PROCEDURE:  The patient was placed under general anesthesia.  The dual lumen   endoscope was used.  It was placed gently through the oropharynx and through the   esophagus and into the proximal stomach pouch and into the proximal jejunum   through the anastomosis.  Excess suture material could be seen at the   gastrojejunal anastomosis.  This was grabbed with a grasper and pulled out   through the mouth, it was approximately 8 inches of suture actually removed.  We   then placed the endoscope back down.  There was no significant bleeding at the   anastomosis and it appeared that this went without apparent complications.  The   anastomosis itself was approximately 18 mm.  No ulcers were seen outside of   where the suture material had been before and no other abnormalities were seen.    Air was aspirated through the endoscope and the endoscope was withdrawn.  The   patient tolerated the procedure well and was brought to Recovery Room in stable   condition.  Sponge and needle counts were correct at the end of the case,   although there were no needles or sponges opened.    COMPLICATIONS:  None.    BLOOD LOSS:  Minimal.    PATHOLOGY:  Suture material for gross only.      JOSELIN/TRINI  dd: 08/18/2017 08:22:50 (CDT)  td: 08/18/2017 09:40:09 (CDT)  Doc ID   #[id number]  Job ID #[id number]    CC:

## 2017-08-18 NOTE — PLAN OF CARE
Problem: Patient Care Overview  Goal: Plan of Care Review  Outcome: Outcome(s) achieved Date Met: 08/18/17  Awake and alert. VSS. Denies pain or nausea. Tolerating liquids well.  DC instructions given to patient and family and they verbalize understanding.

## 2017-08-19 PROCEDURE — 93005 ELECTROCARDIOGRAM TRACING: CPT

## 2017-08-19 PROCEDURE — 96374 THER/PROPH/DIAG INJ IV PUSH: CPT

## 2017-08-19 PROCEDURE — 99284 EMERGENCY DEPT VISIT MOD MDM: CPT | Mod: 25

## 2017-08-19 PROCEDURE — 99285 EMERGENCY DEPT VISIT HI MDM: CPT | Mod: ,,, | Performed by: EMERGENCY MEDICINE

## 2017-08-20 ENCOUNTER — HOSPITAL ENCOUNTER (EMERGENCY)
Facility: HOSPITAL | Age: 52
Discharge: HOME | End: 2017-08-20
Attending: EMERGENCY MEDICINE
Payer: MEDICAID

## 2017-08-20 VITALS
OXYGEN SATURATION: 97 % | WEIGHT: 190 LBS | HEIGHT: 62 IN | DIASTOLIC BLOOD PRESSURE: 86 MMHG | HEART RATE: 77 BPM | RESPIRATION RATE: 16 BRPM | BODY MASS INDEX: 34.96 KG/M2 | TEMPERATURE: 99 F | SYSTOLIC BLOOD PRESSURE: 167 MMHG

## 2017-08-20 DIAGNOSIS — R52 PAIN: ICD-10-CM

## 2017-08-20 DIAGNOSIS — R10.13 EPIGASTRIC PAIN: Primary | ICD-10-CM

## 2017-08-20 DIAGNOSIS — I10 ESSENTIAL HYPERTENSION: ICD-10-CM

## 2017-08-20 DIAGNOSIS — Z98.84 GASTRIC BYPASS STATUS FOR OBESITY: ICD-10-CM

## 2017-08-20 DIAGNOSIS — E11.9 TYPE 2 DIABETES MELLITUS WITHOUT OPHTHALMIC MANIFESTATIONS: ICD-10-CM

## 2017-08-20 PROBLEM — R10.9 ABDOMINAL PAIN: Status: ACTIVE | Noted: 2017-08-20

## 2017-08-20 LAB
ALBUMIN SERPL BCP-MCNC: 3.1 G/DL
ALP SERPL-CCNC: 78 U/L
ALT SERPL W/O P-5'-P-CCNC: 66 U/L
ANION GAP SERPL CALC-SCNC: 11 MMOL/L
AST SERPL-CCNC: 48 U/L
BACTERIA #/AREA URNS AUTO: NORMAL /HPF
BASOPHILS # BLD AUTO: 0.02 K/UL
BASOPHILS NFR BLD: 0.2 %
BILIRUB SERPL-MCNC: 0.3 MG/DL
BILIRUB UR QL STRIP: NEGATIVE
BUN SERPL-MCNC: 14 MG/DL
CALCIUM SERPL-MCNC: 9.1 MG/DL
CHLORIDE SERPL-SCNC: 101 MMOL/L
CLARITY UR REFRACT.AUTO: ABNORMAL
CO2 SERPL-SCNC: 28 MMOL/L
COLOR UR AUTO: YELLOW
CREAT SERPL-MCNC: 0.8 MG/DL
DIFFERENTIAL METHOD: ABNORMAL
EOSINOPHIL # BLD AUTO: 0.1 K/UL
EOSINOPHIL NFR BLD: 1.4 %
ERYTHROCYTE [DISTWIDTH] IN BLOOD BY AUTOMATED COUNT: 17.5 %
EST. GFR  (AFRICAN AMERICAN): >60 ML/MIN/1.73 M^2
EST. GFR  (NON AFRICAN AMERICAN): >60 ML/MIN/1.73 M^2
GLUCOSE SERPL-MCNC: 157 MG/DL
GLUCOSE UR QL STRIP: NEGATIVE
HCT VFR BLD AUTO: 36.3 %
HGB BLD-MCNC: 12.2 G/DL
HGB UR QL STRIP: ABNORMAL
HYALINE CASTS UR QL AUTO: 0 /LPF
KETONES UR QL STRIP: NEGATIVE
LEUKOCYTE ESTERASE UR QL STRIP: NEGATIVE
LIPASE SERPL-CCNC: 9 U/L
LYMPHOCYTES # BLD AUTO: 3.8 K/UL
LYMPHOCYTES NFR BLD: 39.1 %
MAGNESIUM SERPL-MCNC: 1.8 MG/DL
MCH RBC QN AUTO: 26.5 PG
MCHC RBC AUTO-ENTMCNC: 33.6 G/DL
MCV RBC AUTO: 79 FL
MICROSCOPIC COMMENT: NORMAL
MONOCYTES # BLD AUTO: 0.5 K/UL
MONOCYTES NFR BLD: 4.9 %
NEUTROPHILS # BLD AUTO: 5.3 K/UL
NEUTROPHILS NFR BLD: 53.9 %
NITRITE UR QL STRIP: NEGATIVE
PH UR STRIP: 5 [PH] (ref 5–8)
PLATELET # BLD AUTO: 317 K/UL
PMV BLD AUTO: 10.9 FL
POTASSIUM SERPL-SCNC: 3.7 MMOL/L
PROT SERPL-MCNC: 7.4 G/DL
PROT UR QL STRIP: ABNORMAL
RBC # BLD AUTO: 4.61 M/UL
RBC #/AREA URNS AUTO: 4 /HPF (ref 0–4)
SODIUM SERPL-SCNC: 140 MMOL/L
SP GR UR STRIP: 1.02 (ref 1–1.03)
SQUAMOUS #/AREA URNS AUTO: 1 /HPF
URN SPEC COLLECT METH UR: ABNORMAL
UROBILINOGEN UR STRIP-ACNC: NEGATIVE EU/DL
WBC # BLD AUTO: 9.76 K/UL
WBC #/AREA URNS AUTO: 3 /HPF (ref 0–5)

## 2017-08-20 PROCEDURE — 83735 ASSAY OF MAGNESIUM: CPT

## 2017-08-20 PROCEDURE — 25000003 PHARM REV CODE 250: Performed by: EMERGENCY MEDICINE

## 2017-08-20 PROCEDURE — 93010 ELECTROCARDIOGRAM REPORT: CPT | Mod: ,,, | Performed by: INTERNAL MEDICINE

## 2017-08-20 PROCEDURE — 81001 URINALYSIS AUTO W/SCOPE: CPT

## 2017-08-20 PROCEDURE — 63600175 PHARM REV CODE 636 W HCPCS: Performed by: EMERGENCY MEDICINE

## 2017-08-20 PROCEDURE — 83690 ASSAY OF LIPASE: CPT

## 2017-08-20 PROCEDURE — 80053 COMPREHEN METABOLIC PANEL: CPT

## 2017-08-20 PROCEDURE — 85025 COMPLETE CBC W/AUTO DIFF WBC: CPT

## 2017-08-20 RX ORDER — MORPHINE SULFATE 2 MG/ML
2 INJECTION, SOLUTION INTRAMUSCULAR; INTRAVENOUS
Status: COMPLETED | OUTPATIENT
Start: 2017-08-20 | End: 2017-08-20

## 2017-08-20 RX ADMIN — MORPHINE SULFATE 2 MG: 2 INJECTION, SOLUTION INTRAMUSCULAR; INTRAVENOUS at 01:08

## 2017-08-20 RX ADMIN — SODIUM CHLORIDE 1000 ML: 0.9 INJECTION, SOLUTION INTRAVENOUS at 01:08

## 2017-08-20 NOTE — ED PROVIDER NOTES
"Encounter Date: 2017       History     Chief Complaint   Patient presents with    Medication Refill     pt c/o abdominal pain after egd . reports not being given an rx when d/c . she reports taking tylenol at home with no relief.      51yo female w/ abd pain.  Pain started last night.  The pain is constant.  Pain is in the epigastrium, nonradiating.  It is "driving" pain.  No cp/sob.  She is not vomiting and has seen any blood int he bm's.  No fevers.  She has a cough w/ scant sputum that is slightly bloody.  No bloating.  Called on-call nurse and referred to ED for further eval.  She has taken aleve and tylenol for pain but it did not help.            Review of patient's allergies indicates:   Allergen Reactions    Lisinopril-hydrochlorothiazide Swelling     Facial swelling/ mouth swelling     Past Medical History:   Diagnosis Date    Diabetes mellitus type 2, noninsulin dependent 2016    Hx of essential hypertension     Hyperlipidemia     Hypertension, uncontrolled 2016    Nuclear sclerosis of both eyes 4/10/2017     Past Surgical History:   Procedure Laterality Date    BTL       SECTION      COLONOSCOPY N/A 2017    Procedure: COLONOSCOPY;  Surgeon: Oleg Enciso MD;  Location: [facility] ([address] [place] [address]);  Service: Endoscopy;  Laterality: N/A;  CHRONIC CONSTIPATION S/P LRNY    GASTRIC BYPASS      sandra en y    NECK SURGERY  2016     Family History   Problem Relation Age of Onset    Heart disease Mother     Diabetes type II Mother     Heart attack Father 50     Open heart surgery    No Known Problems Sister     No Known Problems Brother     No Known Problems Maternal Aunt     No Known Problems Maternal Uncle     No Known Problems Paternal Aunt     No Known Problems Paternal Uncle     No Known Problems Maternal Grandmother     No Known Problems Maternal Grandfather     No Known Problems Paternal Grandmother     No Known Problems Paternal Grandfather     " Amblyopia Neg Hx     Blindness Neg Hx     Cancer Neg Hx     Cataracts Neg Hx     Diabetes Neg Hx     Glaucoma Neg Hx     Hypertension Neg Hx     Macular degeneration Neg Hx     Retinal detachment Neg Hx     Strabismus Neg Hx     Stroke Neg Hx     Thyroid disease Neg Hx      Social History   Substance Use Topics    Smoking status: Never Smoker    Smokeless tobacco: Never Used    Alcohol use 0.0 oz/week      Comment: Socially     Review of Systems   Constitutional: Negative for fever.   HENT: Negative for sore throat.    Respiratory: Positive for cough.    Cardiovascular: Negative for chest pain.   Gastrointestinal: Positive for abdominal pain. Negative for diarrhea and vomiting.   Genitourinary: Negative for dysuria.   Musculoskeletal: Negative for back pain.   Skin: Negative for rash.   Neurological: Negative for syncope.   Hematological: Does not bruise/bleed easily.       Physical Exam     Initial Vitals [08/19/17 2123]   BP Pulse Resp Temp SpO2   (!) 150/76 75 18 98.5 °F (36.9 °C) 97 %      MAP       100.67         Physical Exam    Constitutional: She appears well-developed and well-nourished. She is not diaphoretic. No distress.   HENT:   Head: Normocephalic and atraumatic.   Right Ear: External ear normal.   Left Ear: External ear normal.   Mouth/Throat: Oropharynx is clear and moist.   Eyes: Conjunctivae are normal. Pupils are equal, round, and reactive to light. Right eye exhibits no discharge. Left eye exhibits no discharge. No scleral icterus.   Neck: Normal range of motion. Neck supple.   Cardiovascular: Normal rate, regular rhythm and intact distal pulses.   No murmur heard.  Pulmonary/Chest: Breath sounds normal. No respiratory distress. She has no wheezes. She has no rhonchi. She has no rales.   Abdominal: Soft. Bowel sounds are normal. She exhibits no distension. There is tenderness (mild epigastric). There is no rebound and no guarding.   Musculoskeletal: Normal range of motion. She  exhibits no edema or tenderness.   Neurological: She is alert and oriented to person, place, and time.   Skin: Skin is warm and dry. No rash noted. No erythema.   Psychiatric: She has a normal mood and affect.         ED Course   Procedures  Labs Reviewed   COMPREHENSIVE METABOLIC PANEL - Abnormal; Notable for the following:        Result Value    Glucose 157 (*)     Albumin 3.1 (*)     AST 48 (*)     ALT 66 (*)     All other components within normal limits   CBC W/ AUTO DIFFERENTIAL - Abnormal; Notable for the following:     Hematocrit 36.3 (*)     MCV 79 (*)     MCH 26.5 (*)     RDW 17.5 (*)     All other components within normal limits   URINALYSIS, REFLEX TO URINE CULTURE - Abnormal; Notable for the following:     Appearance, UA Hazy (*)     Protein, UA 2+ (*)     Occult Blood UA 2+ (*)     All other components within normal limits    Narrative:     Preferred Collection Type->Urine, Clean Catch   LIPASE   MAGNESIUM   URINALYSIS MICROSCOPIC    Narrative:     Preferred Collection Type->Urine, Clean Catch     EKG Readings: (Independently Interpreted)   Nsr, nl axis, no acute st elevation, nl intervals.            Medical Decision Making:   Initial Assessment:   Post-procedural pain, egd yesterday w/ anastamotic suture removal, prior gastric bypass.  Well-appearing and w/o peritoneal signs on exam.  Will start w/ screening labs and plain film to assess for free air, consult surgery.    1:15 AM case d/w surgery resident, will eval.      No acute process on screening studies.  sxs resolved w/ meds.  Benign abd exam.  Seen by surg - they recommend d/c w/ escalated therapy for possible gastritis/ulcer as cause of pain and pt to f/u with her surgeon asap.  Pt agreeable to plan.                    ED Course     Clinical Impression:   The primary encounter diagnosis was Epigastric pain. Diagnoses of Pain, Essential hypertension, and Type 2 diabetes mellitus without ophthalmic manifestations were also pertinent to this  visit.                           Joshua Reid MD  08/22/17 1922

## 2017-08-20 NOTE — ED NOTES
Adult Physical Assessment  LOC: Antoine Seals, 52 y.o. female verified via two identifiers.  The patient is awake, alert, oriented and speaking appropriately at this time.  APPEARANCE: Patient resting comfortably and appears to be in no acute distress at this time. Patient is clean and well groomed, patient's clothing is properly fastened.  SKIN:The skin is warm and dry, color consistent with ethnicity, patient has normal skin turgor and moist mucus membranes, skin intact, no breakdown or brusing noted.  MUSCULOSKELETAL: Patient moving all extremities well, no obvious swelling or deformities noted.  RESPIRATORY: Airway is open and patent, respirations are spontaneous, patient has a normal effort and rate, no accessory muscle use noted.  CARDIAC: Patient has a normal rate and rhythm, no periphreal edema noted in any extremity, capillary refill < 3 seconds in all extremities  ABDOMEN: Soft and non tender to palpation, no abdominal distention noted. Bowel sounds present in all four quadrants.  NEUROLOGIC: Eyes open spontaneously, behavior appropriate to situation, follows commands, facial expression symmetrical, bilateral hand grasp equal and even, purposeful motor response noted, normal sensation in all extremities when touched with a finger.

## 2017-08-20 NOTE — HPI
This is a 52 year old female who is s/p gastric bypass about 12 years ago, who was noted to have a retained suture on recent EGD, on Friday she underwent an EGD and suture removal. She comes to Er with abdominal pain. This developed on Friday evening, epigastric and burning in nature, no nausea or emesis, she didn't have much of an appetite but eating did seem to worsen the pain slightly. She tried tylenol for this and it was not successful.

## 2017-08-20 NOTE — ED TRIAGE NOTES
"Pt reports she had a procedure to remove a suture from 12 years ago. The pt reports that she has abd pain following that procedure. Pt reports " the dr didn't send me home with any pain meds"   "

## 2017-08-20 NOTE — SUBJECTIVE & OBJECTIVE
No current facility-administered medications on file prior to encounter.      Current Outpatient Prescriptions on File Prior to Encounter   Medication Sig    amlodipine (NORVASC) 10 MG tablet Take 1 tablet (10 mg total) by mouth once daily. (Patient taking differently: Take 10 mg by mouth every morning. )    atorvastatin (LIPITOR) 80 MG tablet Take 1 tablet (80 mg total) by mouth every evening. (Patient taking differently: Take 80 mg by mouth every morning. )    b complex vitamins tablet Take 1 tablet by mouth every morning.     ergocalciferol (ERGOCALCIFEROL) 50,000 unit Cap Take 1 capsule (50,000 Units total) by mouth twice a week.    fluticasone (FLONASE) 50 mcg/actuation nasal spray 1 spray by Each Nare route 2 (two) times daily. (Patient taking differently: 1 spray by Each Nare route 2 (two) times daily as needed. )    FOLIC ACID/MV,IRON,MIN (CENTRUM ORAL) Take 1 tablet by mouth every morning.    glipiZIDE (GLUCOTROL) 5 MG tablet TAKE 1 TABLET(5 MG) BY MOUTH TWICE A DAY WITH BREAKFAST    hydrochlorothiazide (HYDRODIURIL) 12.5 MG Tab Take 1 tablet (12.5 mg total) by mouth once daily. (Patient taking differently: Take 12.5 mg by mouth every morning. )    polyethylene glycol (GLYCOLAX) 17 gram/dose powder Take by mouth daily as needed.        Review of patient's allergies indicates:   Allergen Reactions    Lisinopril-hydrochlorothiazide Swelling     Facial swelling/ mouth swelling       Past Medical History:   Diagnosis Date    Diabetes mellitus type 2, noninsulin dependent 2016    Hx of essential hypertension     Hyperlipidemia     Hypertension, uncontrolled 2016    Nuclear sclerosis of both eyes 4/10/2017     Past Surgical History:   Procedure Laterality Date    BTL       SECTION      COLONOSCOPY N/A 2017    Procedure: COLONOSCOPY;  Surgeon: Oleg Enciso MD;  Location: [facility] ([address] [place] [address]);  Service: Endoscopy;  Laterality: N/A;  CHRONIC CONSTIPATION S/P LRNY     GASTRIC BYPASS  1995    sandra en y    NECK SURGERY  09/30/2016     Family History     Problem Relation (Age of Onset)    Diabetes type II Mother    Heart attack Father (50)    Heart disease Mother    No Known Problems Sister, Brother, Maternal Aunt, Maternal Uncle, Paternal Aunt, Paternal Uncle, Maternal Grandmother, Maternal Grandfather, Paternal Grandmother, Paternal Grandfather        Social History Main Topics    Smoking status: Never Smoker    Smokeless tobacco: Never Used    Alcohol use 0.0 oz/week      Comment: Socially    Drug use: No    Sexual activity: Yes     Partners: Male     Review of Systems   Constitutional: Negative for activity change, appetite change and fever.   Respiratory: Negative for cough.    Cardiovascular: Negative for chest pain.   Gastrointestinal: Positive for abdominal pain. Negative for constipation, nausea and vomiting.   Genitourinary: Negative for dysuria.   Musculoskeletal: Negative for arthralgias and back pain.     Objective:     Vital Signs (Most Recent):  Temp: 98.5 °F (36.9 °C) (08/19/17 2123)  Pulse: 77 (08/20/17 0107)  Resp: 16 (08/20/17 0107)  BP: (!) 167/86 (08/20/17 0107)  SpO2: 97 % (08/20/17 0107) Vital Signs (24h Range):  Temp:  [98.5 °F (36.9 °C)] 98.5 °F (36.9 °C)  Pulse:  [75-77] 77  Resp:  [16-18] 16  SpO2:  [97 %] 97 %  BP: (150-167)/(76-86) 167/86     Weight: 86.2 kg (190 lb)  Body mass index is 34.75 kg/m².    Physical Exam   Constitutional: She is oriented to person, place, and time. She appears well-developed and well-nourished. No distress.   HENT:   Head: Normocephalic and atraumatic.   Eyes: Conjunctivae are normal. Right eye exhibits no discharge. Left eye exhibits no discharge. No scleral icterus.   Neck: Normal range of motion. Neck supple. No JVD present. No tracheal deviation present.   Cardiovascular: Normal rate and regular rhythm.    Pulmonary/Chest: Effort normal. No respiratory distress.   Abdominal: Soft. She exhibits no distension.    Neurological: She is alert and oriented to person, place, and time.   Skin: Skin is warm and dry. No rash noted. She is not diaphoretic. No erythema.       Significant Labs:  CBC:   Recent Labs  Lab 08/20/17 0107   WBC 9.76   RBC 4.61   HGB 12.2   HCT 36.3*      MCV 79*   MCH 26.5*   MCHC 33.6     BMP:   Recent Labs  Lab 08/20/17 0107   *      K 3.7      CO2 28   BUN 14   CREATININE 0.8   CALCIUM 9.1   MG 1.8       Significant Diagnostics:  I have reviewed all pertinent imaging results/findings within the past 24 hours.

## 2017-08-20 NOTE — CONSULTS
[facility]  General Surgery  Consult Note    Patient Name: Antoine Seals  MRN: [id number]  Code Status: No Order  Admission Date: 8/20/2017  Hospital Length of Stay: 0 days  Attending Physician: Joshua Church MD  Primary Care Provider: Katharine Chung MD    Patient information was obtained from patient, relative(s), past medical records and ER records.     Inpatient consult to General surgery  Consult performed by: JULIA PATE  Consult ordered by: JOSHUA CHURCH        Subjective:     Principal Problem: <principal problem not specified>    History of Present Illness: This is a 52 year old female who is s/p gastric bypass about 12 years ago, who was noted to have a retained suture on recent EGD, on Friday she underwent an EGD and suture removal. She comes to Er with abdominal pain. This developed on Friday evening, epigastric and burning in nature, no nausea or emesis, she didn't have much of an appetite but eating did seem to worsen the pain slightly. She tried tylenol for this and it was not successful.       No current facility-administered medications on file prior to encounter.      Current Outpatient Prescriptions on File Prior to Encounter   Medication Sig    amlodipine (NORVASC) 10 MG tablet Take 1 tablet (10 mg total) by mouth once daily. (Patient taking differently: Take 10 mg by mouth every morning. )    atorvastatin (LIPITOR) 80 MG tablet Take 1 tablet (80 mg total) by mouth every evening. (Patient taking differently: Take 80 mg by mouth every morning. )    b complex vitamins tablet Take 1 tablet by mouth every morning.     ergocalciferol (ERGOCALCIFEROL) 50,000 unit Cap Take 1 capsule (50,000 Units total) by mouth twice a week.    fluticasone (FLONASE) 50 mcg/actuation nasal spray 1 spray by Each Nare route 2 (two) times daily. (Patient taking differently: 1 spray by Each Nare route 2 (two) times daily as needed. )    FOLIC ACID/MV,IRON,MIN (CENTRUM ORAL) Take 1  tablet by mouth every morning.    glipiZIDE (GLUCOTROL) 5 MG tablet TAKE 1 TABLET(5 MG) BY MOUTH TWICE A DAY WITH BREAKFAST    hydrochlorothiazide (HYDRODIURIL) 12.5 MG Tab Take 1 tablet (12.5 mg total) by mouth once daily. (Patient taking differently: Take 12.5 mg by mouth every morning. )    polyethylene glycol (GLYCOLAX) 17 gram/dose powder Take by mouth daily as needed.        Review of patient's allergies indicates:   Allergen Reactions    Lisinopril-hydrochlorothiazide Swelling     Facial swelling/ mouth swelling       Past Medical History:   Diagnosis Date    Diabetes mellitus type 2, noninsulin dependent 2016    Hx of essential hypertension     Hyperlipidemia     Hypertension, uncontrolled 2016    Nuclear sclerosis of both eyes 4/10/2017     Past Surgical History:   Procedure Laterality Date    BTL       SECTION      COLONOSCOPY N/A 2017    Procedure: COLONOSCOPY;  Surgeon: Oleg Enciso MD;  Location: [facility] ([address] [place] [address]);  Service: Endoscopy;  Laterality: N/A;  CHRONIC CONSTIPATION S/P LRNY    GASTRIC BYPASS      sandra en y    NECK SURGERY  2016     Family History     Problem Relation (Age of Onset)    Diabetes type II Mother    Heart attack Father (50)    Heart disease Mother    No Known Problems Sister, Brother, Maternal Aunt, Maternal Uncle, Paternal Aunt, Paternal Uncle, Maternal Grandmother, Maternal Grandfather, Paternal Grandmother, Paternal Grandfather        Social History Main Topics    Smoking status: Never Smoker    Smokeless tobacco: Never Used    Alcohol use 0.0 oz/week      Comment: Socially    Drug use: No    Sexual activity: Yes     Partners: Male     Review of Systems   Constitutional: Negative for activity change, appetite change and fever.   Respiratory: Negative for cough.    Cardiovascular: Negative for chest pain.   Gastrointestinal: Positive for abdominal pain. Negative for constipation, nausea and vomiting.   Genitourinary:  Negative for dysuria.   Musculoskeletal: Negative for arthralgias and back pain.     Objective:     Vital Signs (Most Recent):  Temp: 98.5 °F (36.9 °C) (08/19/17 2123)  Pulse: 77 (08/20/17 0107)  Resp: 16 (08/20/17 0107)  BP: (!) 167/86 (08/20/17 0107)  SpO2: 97 % (08/20/17 0107) Vital Signs (24h Range):  Temp:  [98.5 °F (36.9 °C)] 98.5 °F (36.9 °C)  Pulse:  [75-77] 77  Resp:  [16-18] 16  SpO2:  [97 %] 97 %  BP: (150-167)/(76-86) 167/86     Weight: 86.2 kg (190 lb)  Body mass index is 34.75 kg/m².    Physical Exam   Constitutional: She is oriented to person, place, and time. She appears well-developed and well-nourished. No distress.   HENT:   Head: Normocephalic and atraumatic.   Eyes: Conjunctivae are normal. Right eye exhibits no discharge. Left eye exhibits no discharge. No scleral icterus.   Neck: Normal range of motion. Neck supple. No JVD present. No tracheal deviation present.   Cardiovascular: Normal rate and regular rhythm.    Pulmonary/Chest: Effort normal. No respiratory distress.   Abdominal: Soft. She exhibits no distension.   Neurological: She is alert and oriented to person, place, and time.   Skin: Skin is warm and dry. No rash noted. She is not diaphoretic. No erythema.       Significant Labs:  CBC:   Recent Labs  Lab 08/20/17 0107   WBC 9.76   RBC 4.61   HGB 12.2   HCT 36.3*      MCV 79*   MCH 26.5*   MCHC 33.6     BMP:   Recent Labs  Lab 08/20/17 0107   *      K 3.7      CO2 28   BUN 14   CREATININE 0.8   CALCIUM 9.1   MG 1.8       Significant Diagnostics:  I have reviewed all pertinent imaging results/findings within the past 24 hours.    Assessment/Plan:     Abdominal pain    Pain sounds similar to ulcer pain  This may be related to suture removal creating a mucousal defect  KUB without abnormality, and labs all with in normal limit  Abdominal exam no concerning, stable vital signs  Will attempt trail with acid sup[ression  recommend triple therapy, BID PPI, BID H2  blocker and QID carafate  She will call the clinic again if she is not having any improvement          VTE Risk Mitigation     None          Thank you for your consult. I will sign off. Please contact us if you have any additional questions.    Jefferson Louis MD  General Surgery  [facility]

## 2017-08-20 NOTE — ASSESSMENT & PLAN NOTE
Pain sounds similar to ulcer pain  This may be related to suture removal creating a mucousal defect  KUB without abnormality, and labs all with in normal limit  Abdominal exam no concerning, stable vital signs  Will attempt trail with acid sup[ression  recommend triple therapy, BID PPI, BID H2 blocker and QID carafate  She will call the clinic again if she is not having any improvement

## 2017-08-30 ENCOUNTER — OFFICE VISIT (OUTPATIENT)
Dept: PODIATRY | Facility: CLINIC | Age: 52
End: 2017-08-30
Payer: MEDICAID

## 2017-08-30 VITALS
WEIGHT: 190 LBS | SYSTOLIC BLOOD PRESSURE: 106 MMHG | HEIGHT: 62 IN | BODY MASS INDEX: 34.96 KG/M2 | DIASTOLIC BLOOD PRESSURE: 72 MMHG

## 2017-08-30 DIAGNOSIS — M20.11 HALLUX ABDUCTO VALGUS, RIGHT: ICD-10-CM

## 2017-08-30 DIAGNOSIS — M20.12 HALLUX ABDUCTOVALGUS, LEFT: ICD-10-CM

## 2017-08-30 DIAGNOSIS — M21.41 PES PLANUS OF BOTH FEET: ICD-10-CM

## 2017-08-30 DIAGNOSIS — R20.2 PARESTHESIA OF BOTH FEET: ICD-10-CM

## 2017-08-30 DIAGNOSIS — M20.41 HAMMER TOES OF BOTH FEET: ICD-10-CM

## 2017-08-30 DIAGNOSIS — E11.49 TYPE II DIABETES MELLITUS WITH NEUROLOGICAL MANIFESTATIONS: Primary | ICD-10-CM

## 2017-08-30 DIAGNOSIS — M21.42 PES PLANUS OF BOTH FEET: ICD-10-CM

## 2017-08-30 DIAGNOSIS — M20.42 HAMMER TOES OF BOTH FEET: ICD-10-CM

## 2017-08-30 PROCEDURE — 99203 OFFICE O/P NEW LOW 30 MIN: CPT | Mod: S$PBB,,, | Performed by: PODIATRIST

## 2017-08-30 PROCEDURE — 3078F DIAST BP <80 MM HG: CPT | Mod: ,,, | Performed by: PODIATRIST

## 2017-08-30 PROCEDURE — 3008F BODY MASS INDEX DOCD: CPT | Mod: ,,, | Performed by: PODIATRIST

## 2017-08-30 PROCEDURE — 99999 PR PBB SHADOW E&M-EST. PATIENT-LVL III: CPT | Mod: PBBFAC,,, | Performed by: PODIATRIST

## 2017-08-30 PROCEDURE — 3045F PR MOST RECENT HEMOGLOBIN A1C LEVEL 7.0-9.0%: CPT | Mod: ,,, | Performed by: PODIATRIST

## 2017-08-30 PROCEDURE — 99213 OFFICE O/P EST LOW 20 MIN: CPT | Mod: PBBFAC,PO | Performed by: PODIATRIST

## 2017-08-30 PROCEDURE — 3074F SYST BP LT 130 MM HG: CPT | Mod: ,,, | Performed by: PODIATRIST

## 2017-08-30 NOTE — PATIENT INSTRUCTIONS
Recommend over the counter medicine for nerve stevo:  -  Absorbine Veterinary Liniment Gel Topical Analgesic Sore Muscle and Joint Pain Relief (found at pet store)  - Alpha lipoic acid 600 mg Cap; Take 1 capsule by mouth once daily for neuropathy or a B complex vitamin daily    Recommend lotions: eucerin, aquaphor, A&D ointment, gold bond for diabetics, sween; urea 40 with aloe (found on amazon.com)    Shoe recommendations: (try 6pm.com, zappos.com , nordstromrack[employer], or shoes.com for discounted prices) you can visit [employer] shoes in [place] as well    Asics (GT 2000 or gel foundations), new balance, saucony (stabil c3),  Carbajal (GTS or Beast or transcend), vionic, propet (tennis shoe)    sofft brand, clarks, crocs, aerosoles, naturalizers, SAS, ecco, analisa, marlene real, rockports (dress shoes)    Vionic, burkenstocks, fitflops, propet (sandals)    Nike comfort thong sandals, crocs, propet (house shoes)    Nail Home remedy:  Vicks Vapor rub to nails for easier managability    Occasional soaks for 15-20 mins in luke warm water with 1 cup of listerine and 1 cup of apple cider vinegar are ok You may add several drops of oil of oregano or tea tree oil as well        Diabetes: Inspecting Your Feet  Diabetes increases your chances of developing foot problems. So inspect your feet every day. This helps you find small skin irritations before they become serious infections. If you have trouble seeing the bottoms of your feet, use a mirror or ask a family member or friend to help.     Pressure spots on the bottom of the foot are common areas where problems develop.   How to check your feet  Below are tips to help you look for foot problems. Try to check your feet at the same time each day, such as when you get out of bed in the morning:  · Check the top of each foot. The tops of toes, back of the heel, and outer edge of the foot can get a lot of rubbing from poor-fitting shoes.  · Check the bottom of each foot. Daily wear and  tear often leads to problems at pressure spots.  · Check the toes and nails. Fungal infections often occur between toes. Toenail problems can also be a sign of fungal infections or lead to breaks in the skin.  · Check your shoes, too. Loose objects inside a shoe can injure the foot. Use your hand to feel inside your shoes for things like katalina, loose stitching, or rough areas that could irritate your skin.  Warning signs  Look for any color changes in the foot. Redness with streaks can signal a severe infection, which needs immediate medical attention. Tell your doctor right away if you have any of these problems:  · Swelling, sometimes with color changes, may be a sign of poor blood flow or infection. Symptoms include tenderness and an increase in the size of your foot.  · Warm or hot areas on your feet may be signs of infection. A foot that is cold may not be getting enough blood.  · Sensations such as burning, tingling, or pins and needles can be signs of a problem. Also check for areas that may be numb.  · Hot spots are caused by friction or pressure. Look for hot spots in areas that get a lot of rubbing. Hot spots can turn into blisters, calluses, or sores.  · Cracks and sores are caused by dry or irritated skin. They are a sign that the skin is breaking down, which can lead to infection.  · Toenail problems to watch for include nails growing into the skin (ingrown toenail) and causing redness or pain. Thick, yellow, or discolored nails can signal a fungal infection.  · Drainage and odor can develop from untreated sores and ulcers. Call your doctor right away if you notice white or yellow drainage, bleeding, or unpleasant odor.   © 6438-0635 [employer]. [address] [place] [address], [place] [address]. All rights reserved. This information is not intended as a substitute for professional medical care. Always follow your healthcare professional's instructions.        What is Peripheral  Neuropathy?    Peripheral neuropathy is a disease of the nerves. It most often starts in your feet and may also eventually affect the arms.Sensory, motor, or both functions may be affected.  It may cause pain or make you unable to sense pain. Sometimes, weakness occurs as well. Lack of pain and weakness makes you more likely to injure yourself without knowing it. But you can learn ways to protect your feet from injury.  When nerves are diseased  Nerves in your feet carry signals to your brain. Your brain reads those signals and interprets them as sensations. When nerves in your feet are diseased, signals may be disrupted or changed. The result may be a lack of feeling (numbness) in your feet or other symptoms (tingling or pain) of peripheral neuropathy.    Symptoms mask pain  Symptoms of peripheral neuropathy usually begin in your toes. The symptoms slowly spread up your feet and legs as more nerve is affected. These symptoms may decrease sensation in your feet or mask pain. Without pain, you may not notice a cut or even a bone fracture. Cuts may become infected. Fractures may heal poorly and lead to foot deformity.    Common causes of peripheral neuropathy  Some common causes of peripheral neuropathy include:  · Diabetes or other endocrine disorders  · Toxins (such as alcohol)  · Nutritional deficiencies (such as Vitamin B-12)  · Kidney disease  · Injury  · Repetitive stress (such as carpal tunnel syndrome)  · Autoimmune disease  · Cancer and tumors  · Infection  Diagnosis and treatment  Diagnosis of peripheral neuropathy includes a complete history and physical exam.  Lab tests including blood work and imaging often help determine the cause.  Special nerve tests are often helpful including nerve conduction velocity studies (NCV), and electromyelography (EMG).  Treatment focuses on teating the underlying disorder and treating the symptoms using medications, injections, TENS (transcutaneous electrical nerve  stimulation), acupuncture, massage, and others.  Date Last Reviewed: 10/19/2015  © 7218-0734 [employer]. [address] [place] [address], [place] [address]. All rights reserved. This information is not intended as a substitute for professional medical care. Always follow your healthcare professional's instructions.        Treating Peripheral Neuropathy  Peripheral neuropathy is a disease of the nerves. It most often starts in your feet and may also eventually affect the arms. It may cause pain or may make you unable to sense pain. Sometimes, weakness occurs as well. Lack of pain and weakness makes you more likely to injure yourself without knowing it.  Learn ways to protect your feet. Check your feet daily for wounds you may not have felt. Avoid burns by testing bath water with your elbow before stepping in. Also, always wear shoes to prevent injury.    Regular foot care  If you have foot numbness, you may not notice cutting yourself while trimming your nails. To prevent problems, your doctor may ask you to visit for nail and callus trimming. See your doctor for foot care as often as suggested.  Check your feet daily  Catch problems early by checking your feet every day for changes. Look at the top and bottom of your feet, your heels, and between your toes. It may help to use a mirror. If this is hard, ask someone to check for you. Call your doctor if you notice a wound, ulceration, ingrown nail, or any changes in your feet. This includes increased heat, swelling, and redness.  Wear proper footwear  Always wear shoes and socks, even indoors. Ask your doctor how to choose the right shoe. After buying shoes, bring them to your doctor to be checked for fit. Take new shoes off every hour or so to check for red pressure areas on your feet. Each time you put on your shoes, use your fingers first to feel inside for foreign objects.  Common causes of peripheral neuropathy  Some common causes of peripheral neuropathy  "include:  · Diabetes or other endocrine disorders  · Toxins (such as alcohol)  · Nutritional deficiencies (such as Vitamin B-12)  · Kidney disease  · Injury  · Repetitive stress (such as carpal tunnel syndrome)  · Autoimmune disease  · Cancer and tumors  · Infection  Diagnosis and treatment  Diagnosis of peripheral neuropathy includes a complete history and physical exam.  Lab tests including blood work and imaging often help determine the cause.  Special nerve tests are often helpful including nerve conduction velocity studies (NCV), and electromyelography (EMG).  Treatment focuses on treating the underlying disorder and treating symptoms through the use of medicines, injections, TENS (transcutaneous electrical nerve stimulation), acupuncture, massage, and other methods.  Date Last Reviewed: 10/19/2015  © 3636-2670 [employer]. [address] [place] [address], [place] [address]. All rights reserved. This information is not intended as a substitute for professional medical care. Always follow your healthcare professional's instructions.        Paraesthesias  Paraesthesia is a burning or prickling sensation that is sometimes felt in the hands, arms, legs or feet. It can also occur in other parts of the body. It can also feel like tingling or numbness, skin crawling, or itching. The feeling is not comfortable, but it is not painful. (The "pins and needles" feeling that happens when a foot or hand "falls asleep" is a temporary paraesthesia.)  Paraesthesias that last or come and go may be caused by medical issues that need to be treated. These include stroke, a bulging disk pressing on a nerve, a trapped nerve, vitamin deficiencies, or even certain medicines.  Tests are often done. These tests may include blood tests, X-ray, CT (computerized tomography) scan, or a muscle test (electromyography). Depending on the cause, treatment may include physical therapy.  Home care  · Tell the healthcare provider about all " medicines you take. This includes prescription and over-the-counter medicines, vitamins, and herbs. Ask if any of the medicines may be causing your problems. Do not make any changes to prescription medicines without talking to your healthcare provider first.  · You may be prescribed medicines to help relieve the tingling feeling or for pain. Take all medicines as directed.  · A numb hand or foot may be more prone to injury. To help protect it:  ¨ Always use oven mitts.  ¨ Test water with an unaffected hand or foot.  ¨ Use caution when trimming nails. File sharp areas.  ¨ Wear shoes that fit well to avoid pressure points, blisters, and ulcers.  ¨ Inspect your hands and feet carefully (including the soles of your feet and between your toes) at least once a week. If you see red areas, sores, or other problems, tell your healthcare provider.  Follow-up care  Follow up with your doctor or as advised by our staff. You may need further testing or evaluation.  When to seek medical advice  Call your healthcare provider right away if any of the following occur:  · Numbness or weakness of the face, one arm, or one leg  · Slurred speech, confusion, trouble speaking, walking, or seeing  · Severe headache, fainting spell, dizziness, or seizure  · Chest, arm, neck, or upper back pain  · Loss of bladder or bowel control  · Open wound with redness, swelling, or pus  Date Last Reviewed: 9/25/2015  © 0967-0336 [employer]. [address] [place] [address], [place] [address]. All rights reserved. This information is not intended as a substitute for professional medical care. Always follow your healthcare professional's instructions.

## 2017-08-30 NOTE — LETTER
August 31, 2017      Katharine Chung MD  [address]  [place] [address]           [facility]  [address] [facility]  [place] [address]  Phone: [phone]          Patient: Antoine Seals   MR Number: [id number]   YOB: 1965   Date of Visit: 8/30/2017       Dear Dr. Katharine Chung:    Thank you for referring Antoine Seals to me for evaluation. Attached you will find relevant portions of my assessment and plan of care.    If you have questions, please do not hesitate to call me. I look forward to following Antoine Seals along with you.    Sincerely,    Christine Mann DPM    Enclosure  CC:  No Recipients    If you would like to receive this communication electronically, please contact [email or web address] or [phone] to request more information on [employer] access.    For providers and/or their staff who would like to refer a patient to [facility], please contact us through our one-stop-shop provider referral line, [facility], at [phone].    If you feel you have received this communication in error or would no longer like to receive these types of communications, please e-mail [email or web address]
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
## 2017-08-31 ENCOUNTER — OFFICE VISIT (OUTPATIENT)
Dept: BARIATRICS | Facility: CLINIC | Age: 52
End: 2017-08-31
Payer: MEDICAID

## 2017-08-31 VITALS
BODY MASS INDEX: 34.77 KG/M2 | SYSTOLIC BLOOD PRESSURE: 120 MMHG | DIASTOLIC BLOOD PRESSURE: 80 MMHG | HEIGHT: 62 IN | WEIGHT: 188.94 LBS | HEART RATE: 87 BPM

## 2017-08-31 DIAGNOSIS — K59.00 CONSTIPATION, UNSPECIFIED CONSTIPATION TYPE: ICD-10-CM

## 2017-08-31 DIAGNOSIS — E11.9 DIABETES MELLITUS TYPE 2, NONINSULIN DEPENDENT: ICD-10-CM

## 2017-08-31 DIAGNOSIS — K59.09 CONSTIPATION, CHRONIC: ICD-10-CM

## 2017-08-31 DIAGNOSIS — Z98.84 GASTRIC BYPASS STATUS FOR OBESITY: ICD-10-CM

## 2017-08-31 DIAGNOSIS — E11.9 TYPE 2 DIABETES MELLITUS WITHOUT OPHTHALMIC MANIFESTATIONS: ICD-10-CM

## 2017-08-31 DIAGNOSIS — I10 ESSENTIAL HYPERTENSION: ICD-10-CM

## 2017-08-31 DIAGNOSIS — R10.9 ABDOMINAL PAIN, UNSPECIFIED LOCATION: ICD-10-CM

## 2017-08-31 DIAGNOSIS — E78.5 HYPERLIPIDEMIA, UNSPECIFIED HYPERLIPIDEMIA TYPE: ICD-10-CM

## 2017-08-31 DIAGNOSIS — N83.209 OVARIAN CYST: Primary | ICD-10-CM

## 2017-08-31 PROBLEM — F11.90 CHRONIC NARCOTIC USE: Status: RESOLVED | Noted: 2017-04-05 | Resolved: 2017-08-31

## 2017-08-31 PROCEDURE — 99999 PR PBB SHADOW E&M-EST. PATIENT-LVL V: CPT | Mod: PBBFAC,,, | Performed by: PHYSICIAN ASSISTANT

## 2017-08-31 PROCEDURE — 99214 OFFICE O/P EST MOD 30 MIN: CPT | Mod: S$PBB,,, | Performed by: PHYSICIAN ASSISTANT

## 2017-08-31 PROCEDURE — 3008F BODY MASS INDEX DOCD: CPT | Mod: ,,, | Performed by: PHYSICIAN ASSISTANT

## 2017-08-31 PROCEDURE — 3079F DIAST BP 80-89 MM HG: CPT | Mod: ,,, | Performed by: PHYSICIAN ASSISTANT

## 2017-08-31 PROCEDURE — 99215 OFFICE O/P EST HI 40 MIN: CPT | Mod: PBBFAC | Performed by: PHYSICIAN ASSISTANT

## 2017-08-31 PROCEDURE — 3074F SYST BP LT 130 MM HG: CPT | Mod: ,,, | Performed by: PHYSICIAN ASSISTANT

## 2017-08-31 PROCEDURE — 3045F PR MOST RECENT HEMOGLOBIN A1C LEVEL 7.0-9.0%: CPT | Mod: ,,, | Performed by: PHYSICIAN ASSISTANT

## 2017-08-31 RX ORDER — OMEPRAZOLE 20 MG/1
CAPSULE, DELAYED RELEASE ORAL
Qty: 30 CAPSULE | Refills: 2 | Status: SHIPPED | OUTPATIENT
Start: 2017-08-31 | End: 2018-01-11
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
## 2017-08-31 NOTE — PATIENT INSTRUCTIONS
- GI Consult for constipation.  - Can adjust the daily dose of Miralax/Polyethylene glycol.  - OB for the cysts found on CT scan to reevaluate.  - Follow up with Dr. Quijano for possible Diagnostic Laparoscopy.  - Prilosec daily for the epigastric pain.

## 2017-09-01 DIAGNOSIS — E55.9 VITAMIN D DEFICIENCY: ICD-10-CM

## 2017-09-01 RX ORDER — ERGOCALCIFEROL 1.25 MG/1
CAPSULE ORAL
Qty: 6 CAPSULE | Refills: 0 | OUTPATIENT
Start: 2017-09-01

## 2017-09-01 RX ORDER — METFORMIN HYDROCHLORIDE 500 MG/1
TABLET ORAL
Qty: 60 TABLET | Refills: 0 | OUTPATIENT
Start: 2017-09-01

## 2017-09-01 NOTE — TELEPHONE ENCOUNTER
Patient notified of message below and verbalized understanding.  She states she thought that this office had already notified the pharmacy that she is no longer taking metformin.  Patient also states that she will contact Dr. Quijano's office about obtaining a refill of vitamin D since they ordered it initially.   Mery @ [facility] contacted and the patient's metformin prescription was cancelled.

## 2017-09-01 NOTE — TELEPHONE ENCOUNTER
She needs to contact her pharmacy and let them know she does not need refills of metformin, also needs vitamin D test to determine if this needs to be refilled

## 2017-09-05 ENCOUNTER — OFFICE VISIT (OUTPATIENT)
Dept: OBSTETRICS AND GYNECOLOGY | Facility: CLINIC | Age: 52
End: 2017-09-05
Payer: MEDICAID

## 2017-09-05 VITALS — WEIGHT: 190.25 LBS | BODY MASS INDEX: 34.8 KG/M2 | DIASTOLIC BLOOD PRESSURE: 82 MMHG | SYSTOLIC BLOOD PRESSURE: 144 MMHG

## 2017-09-05 DIAGNOSIS — Z12.4 CERVICAL CANCER SCREENING: ICD-10-CM

## 2017-09-05 DIAGNOSIS — N95.1 PERIMENOPAUSE: ICD-10-CM

## 2017-09-05 DIAGNOSIS — N83.202 BILATERAL OVARIAN CYSTS: Primary | ICD-10-CM

## 2017-09-05 DIAGNOSIS — N83.201 BILATERAL OVARIAN CYSTS: Primary | ICD-10-CM

## 2017-09-05 PROCEDURE — 3008F BODY MASS INDEX DOCD: CPT | Mod: ,,, | Performed by: OBSTETRICS & GYNECOLOGY

## 2017-09-05 PROCEDURE — 99999 PR PBB SHADOW E&M-EST. PATIENT-LVL III: CPT | Mod: PBBFAC,,, | Performed by: OBSTETRICS & GYNECOLOGY

## 2017-09-05 PROCEDURE — 88175 CYTOPATH C/V AUTO FLUID REDO: CPT

## 2017-09-05 PROCEDURE — 99213 OFFICE O/P EST LOW 20 MIN: CPT | Mod: S$PBB,,, | Performed by: OBSTETRICS & GYNECOLOGY

## 2017-09-05 PROCEDURE — 3077F SYST BP >= 140 MM HG: CPT | Mod: ,,, | Performed by: OBSTETRICS & GYNECOLOGY

## 2017-09-05 PROCEDURE — 87624 HPV HI-RISK TYP POOLED RSLT: CPT

## 2017-09-05 PROCEDURE — 3079F DIAST BP 80-89 MM HG: CPT | Mod: ,,, | Performed by: OBSTETRICS & GYNECOLOGY

## 2017-09-05 PROCEDURE — 99213 OFFICE O/P EST LOW 20 MIN: CPT | Mod: PBBFAC | Performed by: OBSTETRICS & GYNECOLOGY

## 2017-09-05 NOTE — LETTER
September 5, 2017      Ifrah Meza PA-C  [facility] [facility] [address]           [facility] - OB/ GYN  [address] [facility] [address]  [facility] [address]  Phone: [phone]          Patient: Antoine Seals   MR Number: [id number]   YOB: 1965   Date of Visit: 9/5/2017       Dear Ifrah Meza:    Thank you for referring Antoine Seals to me for evaluation. Attached you will find relevant portions of my assessment and plan of care.    If you have questions, please do not hesitate to call me. I look forward to following Antoine Seals along with you.    Sincerely,    Naveed Alexander MD    Enclosure  CC:  No Recipients    If you would like to receive this communication electronically, please contact [email or web address] or [phone] to request more information on [employer] access.    For providers and/or their staff who would like to refer a patient to [facility], please contact us through our one-stop-shop provider referral line, [facility], at [phone].    If you feel you have received this communication in error or would no longer like to receive these types of communications, please e-mail [email or web address]

## 2017-09-05 NOTE — PROGRESS NOTES
[facility]  Ambulatory Clinic  Obstetrics & Gynecology    Visit Date:  2017    Chief Complaint:  Ovarian cyst, repeat pap smear    History of Present Illness:      Antoine Seals is a 52 y.o.  with h/o BTL here for repeat pap smear and ovarian cyst.    Pt has h/o abnormal pap s/p negative colpo 2016.    Pt has recent CT scan done for abdominal pain which showed bilateral ovarian cyst.    Pt is perimenopausal, LMP 17, menses are spacing out and getting lighter with mild vasomotor sxs.    Recent mammogram  normal.    Pt denies any abnormal vaginal bleeding, vaginal discharge, dysmenorrhea, dyspareunia, pelvic pain, breast mass/skin changes, GI or urinary complaints.      Review of Systems:      GENERAL:  No fever, fatigue, excessive weight gain or loss  HEENT:  No headaches, hearing changes, visual disturbance  RESPIRATORY:  No cough, shortness of breath  CARDIOVASCULAR:  No chest pain, heart palpitations, leg swelling  BREAST:  No lump, pain, nipple discharge, skin changes  GASTROINTESTINAL:  No nausea, vomiting, constipation, diarrhea, abd pain, rectal bleeding   GENITOURINARY:  See HPI    Physical Exam:     BP (!) 144/82   Wt 86.3 kg (190 lb 4.1 oz)   LMP 2017 (LMP Unknown)   BMI 34.80 kg/m²     GENERAL:  No acute distress, well-nourished  HEENT:  Atraumatic, anicteric, moist mucus membranes, neck supple.  BREAST:  Symmetric, nontender, no obvious masses, adenopathy, skin changes or nipple discharge.  Old breast reduction scar.  LUNGS:  Clear to auscultation  HEART:  Regular rate and rhythm, no murmurs, gallops, or rubs  ABDOMEN:  Soft, non-tender, non-distended, normoactive bowel sounds, no obvious organomegaly  EXT:  Symmetric w/o cramping, claudication, or edema. +2 distal pulses.  SKIN:  No rashes or bruising  PSYCH:  Mood and affect appropriate    GENITOURINARY:    VULVAR:  Female external genitalia w/o obvious lesions. Normal urethral meatus. No gross  lymphadenopathy.   VAGINA:  Pink, moist, well-rugated. Good support. No obvious lesion. No discharge.  CERVIX:  No cervical motion tenderness, discharge, or lesion.  UTERUS:  Small, non-tender, normal contour  ADNEXA:  ~2-3 cm smooth, mobile cysts noted on bilateral ovaries, non-tender    RECTAL:  Declined. No obvious external lesions  WET PREP:  Negative     Chaperone present for exam.    Assessment:     52 y.o.  with BTL:    1. H/o abnormal pap smear  2. Bilateral ovarian cysts  3. Perimenopause    Plan:    Repeat pap smear today due to h.o abnormal pap.  Safe sex.  Pt declined STI testing.     Discussed ovarian cysts.  Order pelvic ultrasound.  Physiology of ovarian cysts discussed.  Pelvic/adnexal precautions.  Importance of follow up advised.     Discussed the physiologic changes associated with menopause including various treatment options.  Pt is poor a candidate for hormonal therapy.  We discussed non-estrogen, alternative therapies, and lifestyle modifications for menopausal symptoms.      Encourage healthy lifestyle modifications, monthly self breast exams, f/u with PCP for health maintenance.    Will schedule follow up pending pelvic ultrasound results, or sooner as needed.      All questions answered, pt voiced understanding.        Naveed Alexander MD

## 2017-09-11 ENCOUNTER — HOSPITAL ENCOUNTER (OUTPATIENT)
Dept: RADIOLOGY | Facility: HOSPITAL | Age: 52
Discharge: HOME | End: 2017-09-11
Attending: OBSTETRICS & GYNECOLOGY
Payer: MEDICAID

## 2017-09-11 DIAGNOSIS — N83.202 BILATERAL OVARIAN CYSTS: ICD-10-CM

## 2017-09-11 DIAGNOSIS — N83.201 BILATERAL OVARIAN CYSTS: ICD-10-CM

## 2017-09-11 LAB
HPV HR 12 DNA CVX QL NAA+PROBE: NEGATIVE
HPV16 DNA SPEC QL NAA+PROBE: NEGATIVE
HPV18 DNA SPEC QL NAA+PROBE: NEGATIVE

## 2017-09-11 PROCEDURE — 76856 US EXAM PELVIC COMPLETE: CPT | Mod: 26,,, | Performed by: RADIOLOGY

## 2017-09-11 PROCEDURE — 76830 TRANSVAGINAL US NON-OB: CPT | Mod: 26,,, | Performed by: RADIOLOGY

## 2017-09-11 PROCEDURE — 76856 US EXAM PELVIC COMPLETE: CPT | Mod: TC

## 2017-09-13 ENCOUNTER — HOSPITAL ENCOUNTER (EMERGENCY)
Facility: HOSPITAL | Age: 52
Discharge: HOME | End: 2017-09-14
Attending: EMERGENCY MEDICINE
Payer: MEDICAID

## 2017-09-13 ENCOUNTER — TELEPHONE (OUTPATIENT)
Dept: BARIATRICS | Facility: CLINIC | Age: 52
End: 2017-09-13

## 2017-09-13 DIAGNOSIS — E11.9 DIABETES MELLITUS TYPE 2, NONINSULIN DEPENDENT: ICD-10-CM

## 2017-09-13 DIAGNOSIS — Z98.84 GASTRIC BYPASS STATUS FOR OBESITY: ICD-10-CM

## 2017-09-13 DIAGNOSIS — R10.9 ABDOMINAL PAIN: Primary | ICD-10-CM

## 2017-09-13 DIAGNOSIS — K59.00 CONSTIPATION, UNSPECIFIED CONSTIPATION TYPE: ICD-10-CM

## 2017-09-13 LAB
ALBUMIN SERPL BCP-MCNC: 2.8 G/DL
ALP SERPL-CCNC: 92 U/L
ALT SERPL W/O P-5'-P-CCNC: 52 U/L
ANION GAP SERPL CALC-SCNC: 8 MMOL/L
AST SERPL-CCNC: 23 U/L
B-HCG UR QL: NEGATIVE
BACTERIA #/AREA URNS AUTO: ABNORMAL /HPF
BASOPHILS # BLD AUTO: 0.02 K/UL
BASOPHILS NFR BLD: 0.3 %
BILIRUB SERPL-MCNC: 0.2 MG/DL
BILIRUB UR QL STRIP: NEGATIVE
BUN SERPL-MCNC: 15 MG/DL
CALCIUM SERPL-MCNC: 9.1 MG/DL
CHLORIDE SERPL-SCNC: 103 MMOL/L
CLARITY UR REFRACT.AUTO: CLEAR
CO2 SERPL-SCNC: 28 MMOL/L
COLOR UR AUTO: YELLOW
CREAT SERPL-MCNC: 0.8 MG/DL
CTP QC/QA: YES
DIFFERENTIAL METHOD: ABNORMAL
EOSINOPHIL # BLD AUTO: 0.1 K/UL
EOSINOPHIL NFR BLD: 1.7 %
ERYTHROCYTE [DISTWIDTH] IN BLOOD BY AUTOMATED COUNT: 17 %
EST. GFR  (AFRICAN AMERICAN): >60 ML/MIN/1.73 M^2
EST. GFR  (NON AFRICAN AMERICAN): >60 ML/MIN/1.73 M^2
GLUCOSE SERPL-MCNC: 333 MG/DL
GLUCOSE UR QL STRIP: ABNORMAL
HCT VFR BLD AUTO: 34.5 %
HGB BLD-MCNC: 11.8 G/DL
HGB UR QL STRIP: ABNORMAL
HYALINE CASTS UR QL AUTO: 0 /LPF
KETONES UR QL STRIP: NEGATIVE
LEUKOCYTE ESTERASE UR QL STRIP: NEGATIVE
LIPASE SERPL-CCNC: 12 U/L
LYMPHOCYTES # BLD AUTO: 2.6 K/UL
LYMPHOCYTES NFR BLD: 36.7 %
MCH RBC QN AUTO: 26.5 PG
MCHC RBC AUTO-ENTMCNC: 34.2 G/DL
MCV RBC AUTO: 77 FL
MICROSCOPIC COMMENT: ABNORMAL
MONOCYTES # BLD AUTO: 0.5 K/UL
MONOCYTES NFR BLD: 6.5 %
NEUTROPHILS # BLD AUTO: 3.9 K/UL
NEUTROPHILS NFR BLD: 54.5 %
NITRITE UR QL STRIP: NEGATIVE
PH UR STRIP: 5 [PH] (ref 5–8)
PLATELET # BLD AUTO: 268 K/UL
PMV BLD AUTO: 11.1 FL
POTASSIUM SERPL-SCNC: 3.6 MMOL/L
PROT SERPL-MCNC: 7 G/DL
PROT UR QL STRIP: ABNORMAL
RBC # BLD AUTO: 4.46 M/UL
RBC #/AREA URNS AUTO: 7 /HPF (ref 0–4)
SODIUM SERPL-SCNC: 139 MMOL/L
SP GR UR STRIP: 1.03 (ref 1–1.03)
SQUAMOUS #/AREA URNS AUTO: 1 /HPF
URN SPEC COLLECT METH UR: ABNORMAL
UROBILINOGEN UR STRIP-ACNC: NEGATIVE EU/DL
WBC # BLD AUTO: 7.08 K/UL
WBC #/AREA URNS AUTO: 2 /HPF (ref 0–5)
YEAST UR QL AUTO: ABNORMAL

## 2017-09-13 PROCEDURE — 80053 COMPREHEN METABOLIC PANEL: CPT

## 2017-09-13 PROCEDURE — 99284 EMERGENCY DEPT VISIT MOD MDM: CPT | Mod: ,,, | Performed by: EMERGENCY MEDICINE

## 2017-09-13 PROCEDURE — 25000003 PHARM REV CODE 250: Performed by: PHYSICIAN ASSISTANT

## 2017-09-13 PROCEDURE — 85025 COMPLETE CBC W/AUTO DIFF WBC: CPT

## 2017-09-13 PROCEDURE — 99284 EMERGENCY DEPT VISIT MOD MDM: CPT | Mod: 25

## 2017-09-13 PROCEDURE — 83690 ASSAY OF LIPASE: CPT

## 2017-09-13 PROCEDURE — 96360 HYDRATION IV INFUSION INIT: CPT

## 2017-09-13 PROCEDURE — 81025 URINE PREGNANCY TEST: CPT | Performed by: PHYSICIAN ASSISTANT

## 2017-09-13 PROCEDURE — 81001 URINALYSIS AUTO W/SCOPE: CPT

## 2017-09-13 PROCEDURE — 25000003 PHARM REV CODE 250: Performed by: EMERGENCY MEDICINE

## 2017-09-13 RX ORDER — POLYETHYLENE GLYCOL 3350 17 G/17G
17 POWDER, FOR SOLUTION ORAL DAILY
Status: DISCONTINUED | OUTPATIENT
Start: 2017-09-14 | End: 2017-09-13

## 2017-09-13 RX ORDER — SYRING-NEEDL,DISP,INSUL,0.3 ML 29 G X1/2"
296 SYRINGE, EMPTY DISPOSABLE MISCELLANEOUS
Status: DISCONTINUED | OUTPATIENT
Start: 2017-09-13 | End: 2017-09-13

## 2017-09-13 RX ORDER — HYOSCYAMINE SULFATE 0.12 MG/1
0.12 TABLET SUBLINGUAL
Status: COMPLETED | OUTPATIENT
Start: 2017-09-13 | End: 2017-09-13

## 2017-09-13 RX ORDER — POLYETHYLENE GLYCOL 3350 17 G/17G
17 POWDER, FOR SOLUTION ORAL DAILY
Status: DISCONTINUED | OUTPATIENT
Start: 2017-09-13 | End: 2017-09-14 | Stop reason: HOSPADM

## 2017-09-13 RX ADMIN — SODIUM CHLORIDE 1000 ML: 0.9 INJECTION, SOLUTION INTRAVENOUS at 07:09

## 2017-09-13 RX ADMIN — IOHEXOL 100 ML: 350 INJECTION, SOLUTION INTRAVENOUS at 11:09

## 2017-09-13 RX ADMIN — POLYETHYLENE GLYCOL 3350 17 G: 17 POWDER, FOR SOLUTION ORAL at 09:09

## 2017-09-13 RX ADMIN — HYOSCYAMINE SULFATE 0.12 MG: 0.12 TABLET ORAL; SUBLINGUAL at 07:09

## 2017-09-13 NOTE — TELEPHONE ENCOUNTER
Pain in abdomen around umbilicus.  Pain 10/10-heaviness in stomach- stomach distended.  last BM watery this morning.  Been constipated but started on Glycolax in July and having loose stools.   Patient reports getting full quicker but denies N/V.  Patient denies feeling of constipation.  Discussed with BRICE Miller, and instructed patient to do 2 fleets enemas 45 minutes apart and call for further assessment and treatment.  Direct callback number given to patient.

## 2017-09-13 NOTE — ED PROVIDER NOTES
"Encounter Date: 2017    SCRIBE #1 NOTE: I, Rachele Arthur, am scribing for, and in the presence of,  Dr. Joe. I have scribed the following portions of the note - the APC attestation.       History     Chief Complaint   Patient presents with    Abdominal Pain     abd pain x years but worse last night. no nausea, vomiting, or diarrhea    Constipation     Patient is a 52-year-old female with a past medical history of gastric bypass in , HTN, DM 2, HLD who presents the ED with generalized abdominal pain.  Patient states this she has been having abdominal pain for "years".  She states last night's episode was the worse that she has ever had.  She states that her naval "is so heavy and my stomach is so hard".  Currently her pain is a 7/10 and generalized.  She states that she feels better than when she had the severe episode last night.  She is unsure what makes the pain worse.  Aleve or Tylenol relieves her pain.  Patient called general surgery office today and was instructed to try enemas.  Dr. Price did her bypass. She had a large BM after her BM.  She ate oatmeal and toast this morning which she was able to tolerate.  She follows up with Dr. Quijano.  She denies any fever, chest pain, cough, SOB, urinary problems.          Review of patient's allergies indicates:   Allergen Reactions    Lisinopril-hydrochlorothiazide Swelling     Facial swelling/ mouth swelling     Past Medical History:   Diagnosis Date    Diabetes mellitus type 2, noninsulin dependent 2016    Hx of essential hypertension     Hyperlipidemia     Hypertension, uncontrolled 2016    Nuclear sclerosis of both eyes 4/10/2017     Past Surgical History:   Procedure Laterality Date    BTL       SECTION      COLONOSCOPY N/A 2017    Procedure: COLONOSCOPY;  Surgeon: Oleg Enciso MD;  Location: [address]);  Service: Endoscopy;  Laterality: N/A;  CHRONIC CONSTIPATION S/P LRNY    GASTRIC BYPASS  " 1995    sandra en y    NECK SURGERY  09/30/2016     Family History   Problem Relation Age of Onset    Heart disease Mother     Diabetes type II Mother     Heart attack Father 50     Open heart surgery    No Known Problems Sister     No Known Problems Brother     No Known Problems Maternal Aunt     No Known Problems Maternal Uncle     No Known Problems Paternal Aunt     No Known Problems Paternal Uncle     No Known Problems Maternal Grandmother     No Known Problems Maternal Grandfather     No Known Problems Paternal Grandmother     No Known Problems Paternal Grandfather     Amblyopia Neg Hx     Blindness Neg Hx     Cancer Neg Hx     Cataracts Neg Hx     Diabetes Neg Hx     Glaucoma Neg Hx     Hypertension Neg Hx     Macular degeneration Neg Hx     Retinal detachment Neg Hx     Strabismus Neg Hx     Stroke Neg Hx     Thyroid disease Neg Hx      Social History   Substance Use Topics    Smoking status: Never Smoker    Smokeless tobacco: Never Used    Alcohol use 0.0 oz/week      Comment: Socially     Review of Systems   Constitutional: Negative for fever.   HENT: Negative for sore throat.    Respiratory: Negative for shortness of breath.    Cardiovascular: Negative for chest pain.   Gastrointestinal: Positive for abdominal pain. Negative for nausea.   Genitourinary: Negative for dysuria, hematuria and urgency.   Musculoskeletal: Negative for back pain.   Skin: Negative for rash.   Neurological: Negative for weakness and numbness.   Hematological: Does not bruise/bleed easily.       Physical Exam     Initial Vitals [09/13/17 1628]   BP Pulse Resp Temp SpO2   (!) 154/78 95 18 98.5 °F (36.9 °C) 98 %      MAP       103.33         Physical Exam    Vitals reviewed.  Constitutional: She appears well-developed and well-nourished. She is not diaphoretic. No distress.   HENT:   Head: Normocephalic and atraumatic.   Nose: Nose normal.   Eyes: Conjunctivae and EOM are normal.   Neck: Normal range of  motion.   Cardiovascular: Normal rate, regular rhythm and normal heart sounds. Exam reveals no friction rub.    No murmur heard.  Pulmonary/Chest: Breath sounds normal. No respiratory distress. She has no wheezes. She has no rales.   Abdominal: Soft. Bowel sounds are normal. She exhibits no distension. There is tenderness in the epigastric area, periumbilical area and suprapubic area. There is no rebound.       Musculoskeletal: Normal range of motion.   Neurological: She is alert and oriented to person, place, and time. She has normal strength. No sensory deficit.   Skin: Skin is warm and dry. No erythema. No pallor.   Psychiatric: She has a normal mood and affect. Her behavior is normal. Judgment and thought content normal.         ED Course   Procedures  Labs Reviewed   CBC W/ AUTO DIFFERENTIAL - Abnormal; Notable for the following:        Result Value    Hemoglobin 11.8 (*)     Hematocrit 34.5 (*)     MCV 77 (*)     MCH 26.5 (*)     RDW 17.0 (*)     All other components within normal limits   COMPREHENSIVE METABOLIC PANEL - Abnormal; Notable for the following:     Glucose 333 (*)     Albumin 2.8 (*)     ALT 52 (*)     All other components within normal limits   URINALYSIS, REFLEX TO URINE CULTURE - Abnormal; Notable for the following:     Protein, UA 2+ (*)     Glucose, UA 3+ (*)     Occult Blood UA 2+ (*)     All other components within normal limits    Narrative:     Preferred Collection Type->Urine, Clean Catch   URINALYSIS MICROSCOPIC - Abnormal; Notable for the following:     RBC, UA 7 (*)     All other components within normal limits    Narrative:     Preferred Collection Type->Urine, Clean Catch   LIPASE   POCT URINE PREGNANCY             Medical Decision Making:   History:   Old Medical Records: I decided to obtain old medical records.  Clinical Tests:   Lab Tests: Ordered and Reviewed  Radiological Study: Ordered and Reviewed    Imaging Results          CT Abdomen Pelvis With Contrast (Final result)   Result time 09/14/17 00:08:45    Final result by Flaca Sargent MD (09/14/17 00:08:45)                 Impression:        Postop changes consistent with prior gastric bypass and Jordyn-en-Y anastomosis without significant change from prior study.     No significant change from prior study June 22, 2017.      Electronically signed by: FLACA SARGENT MD  Date:     09/14/17  Time:    00:08              Narrative:    CT of the abdomen and pelvis with 100 cc of Omnipaque 350 IV and oral contrast:    Comparison:June 22, 2017    Results:    The lung bases are clear.  The visualized heart and pericardium are unremarkable.    The liver, gallbladder, pancreas, spleen, and adrenal glands are without significant abnormality. Postop changes consistent with prior gastric bypass and Jordyn-en-Y anastomosis without significant change from prior study.    The kidneys concentrate and excrete contrast appropriately.  No hydronephrosis is present.    The urinary bladder is unremarkable.  Uterus and bilateral necks appear unchanged from prior..    No ascites or free air.  No lymphadenopathy.    The appendix and terminal ileum appear normal.  The small and large loops of bowel are normal in caliber without focal wall thickening or adjacent fat stranding.      The aorta tapers appropriately in its descent through the abdomen.    The osseous structures are unremarkable.                             X-Ray Abdomen Flat And Erect (Final result)  Result time 09/13/17 19:06:31    Final result by Flor Blackmon MD (09/13/17 19:06:31)                 Impression:         Nonobstructive bowel gas pattern.      Electronically signed by: FLOR BLACKMON MD, MD  Date:     09/13/17  Time:    19:06              Narrative:    Comparison: Abdominal series 8/20/17 and a CT abdomen and pelvis 6/22/17.    Findings: Upright and supine views of the abdomen.  Nonspecific bowel gas pattern without evidence of bowel obstruction.  No small bowel air-fluid levels.  No  free air.   No organomegaly or mass effect.  Pelvic phleboliths noted  Osseous structures appear stable without acute process seen.  Visualized lung bases are clear.                                 APC / Resident Notes:   UA with no signs of infection. Lab work benign.  CT with postop changes consistent with prior gastric bypass and Jordyn-en-Y anastomosis without significant change from prior study.     Patient updated with results. Her symptoms are most likely chronic in nature. Will have her f/u closely with her general surgeon (Farzaneh) for further evaluation and management. Return precaution given. I have reviewed patient's chart and discuss this case with my supervising MD.             Scribe Attestation:   Scribe #1: I performed the above scribed service and the documentation accurately describes the services I performed. I attest to the accuracy of the note.    Attending Attestation:     Physician Attestation Statement for NP/PA:   I have conducted a face to face encounter with this patient in addition to the NP/PA, due to Medical Complexity    Other NP/PA Attestation Additions:      Medical Decision Making: Patient is status post gastric bypass, presents with mid abdominal pain. Will do CT and consult gastric surgery.        Physician Attestation for Scribe:  Physician Attestation Statement for Scribe #1: I, Dr. Joe, reviewed documentation, as scribed by Rachele Gibson in my presence, and it is both accurate and complete.                 ED Course      Clinical Impression:   The primary encounter diagnosis was Abdominal pain. A diagnosis of Constipation, unspecified constipation type was also pertinent to this visit.    Disposition:   Disposition: Discharged  Condition: Stable                        Sharmin Costa PA-C  09/17/17 1346

## 2017-09-13 NOTE — ED TRIAGE NOTES
Presents to ER with mid abdominal pain and constipation.  Took a fleets enema earlier today with good results, however, continues to have the abdominal pain.    GENERAL: The patient is well-developed and well-nourished in no apparent distress. Alert and oriented x4.                                                HEENT: Head is normocephalic and atraumatic. Extraocular muscles are intact. Pupils are equal, round, and reactive to light and accommodation. Nares appeared normal. Mouth is well hydrated and without lesions. Mucous membranes are moist. Posterior pharynx clear of any exudate or lesions.    NECK: Supple. No carotid bruits. No lymphadenopathy or thyromegaly.    LUNGS: Clear to auscultation.    HEART: Regular rate and rhythm without murmur.     ABDOMEN: Soft, nontender, and nondistended. Positive bowel sounds. No hepatosplenomegaly was noted.     EXTREMITIES: Without any cyanosis, clubbing, rash, lesions or edema.     NEUROLOGIC: Cranial nerves II through XII are grossly intact.     PSYCHIATRIC: Flat affect, but denies suicidal or homicidal ideations.    SKIN: No ulceration or induration present.

## 2017-09-13 NOTE — TELEPHONE ENCOUNTER
Patient called back after completing her 2 fleets enemas.  She stated that she had some stool from the 1st enema but the 2 nd enema was clear return.  Patient still complains of pain of 10/10.  Consulted with BRICE Miller, and instructed patient to proceed to ER.  Notified Dr. Quijano and report given to Laureen in ER.

## 2017-09-14 ENCOUNTER — TELEPHONE (OUTPATIENT)
Dept: BARIATRICS | Facility: CLINIC | Age: 52
End: 2017-09-14

## 2017-09-14 VITALS
RESPIRATION RATE: 16 BRPM | SYSTOLIC BLOOD PRESSURE: 145 MMHG | HEIGHT: 61 IN | HEART RATE: 67 BPM | BODY MASS INDEX: 34.93 KG/M2 | TEMPERATURE: 98 F | WEIGHT: 185 LBS | DIASTOLIC BLOOD PRESSURE: 88 MMHG | OXYGEN SATURATION: 97 %

## 2017-09-14 PROCEDURE — 25500020 PHARM REV CODE 255: Performed by: EMERGENCY MEDICINE

## 2017-09-14 NOTE — DISCHARGE INSTRUCTIONS
1. High fiber diet (eat fruits and vegetables, including the skin, and wheat products (bread, rice, pasta)).  2. Consider stool softeners such as Colace.  3. Use Miralax (or other laxatives of your choice) 2 times a day to achieve daily bowel movements.  4. Follow up with Bariatric surgery for further evaluation and management.  5. Return to the emergency department for new or worsening symptoms.    Future Appointments  Date Time Provider Department Center   10/25/2017 2:00 PM Salvador Shipley MD [facility] GASTRO [address]   10/31/2017 1:30 PM Corona Quijano MD [facility] BARIAT [address]   12/11/2017 8:40 AM Hadley Baumann MD [facility] NEURO [place]       Our goal in the emergency department is to always give you outstanding care and exceptional service. You may receive a survey by mail or e-mail in the next week regarding your experience in our ED. We would greatly appreciate your completing and returning the survey. Your feedback provides us with a way to recognize our staff who give very good care and it helps us learn how to improve when your experience was below our aspiration of excellence.

## 2017-09-22 DIAGNOSIS — D50.8 IRON DEFICIENCY ANEMIA FOLLOWING BARIATRIC SURGERY: Primary | ICD-10-CM

## 2017-09-22 DIAGNOSIS — K95.89 IRON DEFICIENCY ANEMIA FOLLOWING BARIATRIC SURGERY: Primary | ICD-10-CM

## 2017-10-03 ENCOUNTER — NURSE TRIAGE (OUTPATIENT)
Dept: ADMINISTRATIVE | Facility: CLINIC | Age: 52
End: 2017-10-03

## 2017-10-25 ENCOUNTER — OFFICE VISIT (OUTPATIENT)
Dept: GASTROENTEROLOGY | Facility: CLINIC | Age: 52
End: 2017-10-25
Payer: MEDICAID

## 2017-10-25 VITALS
BODY MASS INDEX: 34.69 KG/M2 | WEIGHT: 188.5 LBS | HEART RATE: 94 BPM | HEIGHT: 62 IN | SYSTOLIC BLOOD PRESSURE: 117 MMHG | DIASTOLIC BLOOD PRESSURE: 75 MMHG

## 2017-10-25 DIAGNOSIS — K59.09 CONSTIPATION, CHRONIC: Primary | ICD-10-CM

## 2017-10-25 DIAGNOSIS — R10.33 PERIUMBILICAL ABDOMINAL PAIN: ICD-10-CM

## 2017-10-25 PROCEDURE — 99999 PR PBB SHADOW E&M-EST. PATIENT-LVL III: CPT | Mod: PBBFAC,,, | Performed by: INTERNAL MEDICINE

## 2017-10-25 PROCEDURE — 99204 OFFICE O/P NEW MOD 45 MIN: CPT | Mod: S$PBB,,, | Performed by: INTERNAL MEDICINE

## 2017-10-25 PROCEDURE — 99213 OFFICE O/P EST LOW 20 MIN: CPT | Mod: PBBFAC | Performed by: INTERNAL MEDICINE

## 2017-10-25 RX ORDER — POLYETHYLENE GLYCOL 3350 17 G/17G
17 POWDER, FOR SOLUTION ORAL DAILY PRN
Qty: 1700 G | Refills: 3 | Status: SHIPPED | OUTPATIENT
Start: 2017-10-25 | End: 2018-07-12 | Stop reason: SDUPTHER

## 2017-10-25 NOTE — PROGRESS NOTES
Gastroenterology Clinic    Reason for visit: The primary encounter diagnosis was Constipation, chronic. A diagnosis of Periumbilical abdominal pain was also pertinent to this visit.  Referring provider/PCP: Katharine Chung MD    HPI:  Antoine Seals is a 52 y.o. female hx of LRNY with Dr. Quijano 8/16/2005, internal hernia repair and NEW 12/2009 who presents to GI clinic for evaluation of abdominal pain and constipation.    # periumbilical abd pain + constipation  She also recently underwent EGD with Dr. Quijano for suture removal endoscopically. This had no effect on her pain.  Pt has heaviness and fullness with some bloating near the umbilicus. Worse after heavy meals. Does not radiate. No N/V. Previously was only going to bathrooms 1 x /week and pain would get worse until she had BM. Now she is taking daily miralax, 1 cap full and has 1 -2 BM /day and her pain is improved. Still at times gets some fullness but not as severe.  Recently went to ER for constipation and severe abd pain. Took fleets enema before going to ER and had very large BM and great relief of her pain.   She tells me multiple times that her pain improves after each BM. If she does not have a bM, her pain worsens.    Prior hx of constipation while taking iron. Iron used to give her severe constipation.     She recently was taken off lisinopril / hctz and started amlodipine and hctz.   Still taking MVI, which is not new. Recently received IV iron but has not had trouble with this in the past.          Past Endo Hx:  8/18/17 EGD with dr. Quijano with suture removal.    6/2017 EGD  Impression:           - Normal esophagus.                        - Jordyn-en-Y gastrojejunostomy with gastrojejunal                         anastomosis characterized by visible sutures and                         healthy appearing mucosa.    6/2017 colon  Normal to cecum. Repeat 10 years.    2009 EGD  Erosive gastrophathy ; biopsy negative    2008 colon  - normal , repeat 10 years.    Review of Systems:  Constitutional: no fever, chills or change in weight   Eyes: no visual changes; no dry eyes   ENT: no sore throat or dysphagia  Respiratory: no cough or shortness of breath   Cardiovascular: no chest pain or palpitations   Gastrointestinal: as per HPI  Hematologic/Lymphatic: no easy bruising or lymphadenopathy   Musculoskeletal: no arthralgias or myalgias   Neurological: no change in mental status  Behavioral/Psych: no change in mood      Past Medical History:   Diagnosis Date    Diabetes mellitus type 2, noninsulin dependent 2016    Hx of essential hypertension     Hyperlipidemia     Hypertension, uncontrolled 2016    Nuclear sclerosis of both eyes 4/10/2017       Past Surgical History:   Procedure Laterality Date    BTL       SECTION      COLONOSCOPY N/A 2017    Procedure: COLONOSCOPY;  Surgeon: Oleg Enciso MD;  [facility] ([address] [place] [address]);  Service: Endoscopy;  Laterality: N/A;  CHRONIC CONSTIPATION S/P LRNY    GASTRIC BYPASS      sandra en y    NECK SURGERY  2016       Family History   Problem Relation Age of Onset    Heart disease Mother     Diabetes type II Mother     Heart attack Father 50     Open heart surgery    No Known Problems Sister     No Known Problems Brother     No Known Problems Maternal Aunt     No Known Problems Maternal Uncle     No Known Problems Paternal Aunt     No Known Problems Paternal Uncle     No Known Problems Maternal Grandmother     No Known Problems Maternal Grandfather     No Known Problems Paternal Grandmother     No Known Problems Paternal Grandfather     Amblyopia Neg Hx     Blindness Neg Hx     Cancer Neg Hx     Cataracts Neg Hx     Diabetes Neg Hx     Glaucoma Neg Hx     Hypertension Neg Hx     Macular degeneration Neg Hx     Retinal detachment Neg Hx     Strabismus Neg Hx     Stroke Neg Hx     Thyroid disease Neg Hx        Social History     Social  History    Marital status:      Spouse name: N/A    Number of children: N/A    Years of education: N/A     Occupational History    Not on file.     Social History Main Topics    Smoking status: Never Smoker    Smokeless tobacco: Never Used    Alcohol use 0.0 oz/week      Comment: Socially    Drug use: No    Sexual activity: Yes     Partners: Male     Other Topics Concern    Not on file     Social History Narrative    No narrative on file       Current Outpatient Prescriptions on File Prior to Visit   Medication Sig Dispense Refill    amlodipine (NORVASC) 10 MG tablet Take 1 tablet (10 mg total) by mouth once daily. (Patient taking differently: Take 10 mg by mouth every morning. ) 90 tablet 3    atorvastatin (LIPITOR) 80 MG tablet Take 1 tablet (80 mg total) by mouth every evening. (Patient taking differently: Take 80 mg by mouth every morning. ) 90 tablet 3    b complex vitamins tablet Take 1 tablet by mouth every morning.       ergocalciferol (ERGOCALCIFEROL) 50,000 unit Cap Take 1 capsule (50,000 Units total) by mouth twice a week. 24 capsule 0    fluticasone (FLONASE) 50 mcg/actuation nasal spray 1 spray by Each Nare route 2 (two) times daily. (Patient taking differently: 1 spray by Each Nare route 2 (two) times daily as needed. ) 1 Bottle 5    FOLIC ACID/MV,IRON,MIN (CENTRUM ORAL) Take 1 tablet by mouth every morning.      glipiZIDE (GLUCOTROL) 5 MG tablet TAKE 1 TABLET(5 MG) BY MOUTH TWICE A DAY WITH BREAKFAST 60 tablet 2    hydrochlorothiazide (HYDRODIURIL) 12.5 MG Tab Take 1 tablet (12.5 mg total) by mouth once daily. (Patient taking differently: Take 12.5 mg by mouth every morning. ) 30 tablet 5    omeprazole (PRILOSEC) 20 MG capsule Open one capsule and add to yogurt.  Take 30 min before breakfast in am 30 capsule 2    [DISCONTINUED] polyethylene glycol (GLYCOLAX) 17 gram/dose powder Take by mouth daily as needed.   3     No current facility-administered medications on file prior  "to visit.        Review of patient's allergies indicates:   Allergen Reactions    Lisinopril-hydrochlorothiazide Swelling     Facial swelling/ mouth swelling       Physical Exam:  /75   Pulse 94   Ht 5' 2" (1.575 m)   Wt 85.5 kg (188 lb 7.9 oz)   LMP 04/23/2017 (LMP Unknown)   BMI 34.48 kg/m²     Gen: pleasant , nad  HEENT: sclera non-icteric ; normocephalic  Chest: non-labored breathing ; symmetrical expansion  CV: RRR ; pulses intact  Abd: soft,  nontender ; no rebound or guarding ; no masses  Ext: no LE edema   Skin: no rashes,  or lesions   Pscyh: appropriate mood and affect  Neuro: alert, oriented ; no focal deficits noted      Laboratory:  Lab Results   Component Value Date    WBC 7.08 09/13/2017    HGB 11.8 (L) 09/13/2017    HCT 34.5 (L) 09/13/2017    MCV 77 (L) 09/13/2017     09/13/2017     CMP  Sodium   Date Value Ref Range Status   09/13/2017 139 136 - 145 mmol/L Final     Potassium   Date Value Ref Range Status   09/13/2017 3.6 3.5 - 5.1 mmol/L Final     Chloride   Date Value Ref Range Status   09/13/2017 103 95 - 110 mmol/L Final     CO2   Date Value Ref Range Status   09/13/2017 28 23 - 29 mmol/L Final     Glucose   Date Value Ref Range Status   09/13/2017 333 (H) 70 - 110 mg/dL Final     BUN, Bld   Date Value Ref Range Status   09/13/2017 15 6 - 20 mg/dL Final     Creatinine   Date Value Ref Range Status   09/13/2017 0.8 0.5 - 1.4 mg/dL Final     Calcium   Date Value Ref Range Status   09/13/2017 9.1 8.7 - 10.5 mg/dL Final     Total Protein   Date Value Ref Range Status   09/13/2017 7.0 6.0 - 8.4 g/dL Final     Albumin   Date Value Ref Range Status   09/13/2017 2.8 (L) 3.5 - 5.2 g/dL Final     Total Bilirubin   Date Value Ref Range Status   09/13/2017 0.2 0.1 - 1.0 mg/dL Final     Comment:     For infants and newborns, interpretation of results should be based  on gestational age, weight and in agreement with clinical  observations.  Premature Infant recommended reference ranges:  Up " to 24 hours.............<8.0 mg/dL  Up to 48 hours............<12.0 mg/dL  3-5 days..................<15.0 mg/dL  6-29 days.................<15.0 mg/dL       Alkaline Phosphatase   Date Value Ref Range Status   09/13/2017 92 55 - 135 U/L Final     AST   Date Value Ref Range Status   09/13/2017 23 10 - 40 U/L Final     ALT   Date Value Ref Range Status   09/13/2017 52 (H) 10 - 44 U/L Final     Anion Gap   Date Value Ref Range Status   09/13/2017 8 8 - 16 mmol/L Final     eGFR if    Date Value Ref Range Status   09/13/2017 >60.0 >60 mL/min/1.73 m^2 Final     eGFR if non    Date Value Ref Range Status   09/13/2017 >60.0 >60 mL/min/1.73 m^2 Final     Comment:     Calculation used to obtain the estimated glomerular filtration  rate (eGFR) is the CKD-EPI equation. Since race is unknown   in our information system, the eGFR values for   -American and Non--American patients are given   for each creatinine result.           Imaging:  CT with iv contrast 9/13/17:  Postop changes consistent with prior gastric bypass and Jordyn-en-Y anastomosis without significant change from prior study.     Assessment:  Antoine Seals is a 52 y.o. female w hx  LRNY with Dr. Quijano 8/16/2005, internal hernia repair and NEW 12/2009 who presents to GI clinic for evaluation of abdominal pain and constipation.    Her symptoms of  Abdominal pain certainly seem very strongly related to her constipation. Enemas and miralax producing bowel movements greatly improve her symptoms of pain, fullness, and bloating.  Recent CT IV / PO contrast as well as recent EGD + colonoscopy all normal.    Given that she just started amlodipine, I do wonder if this medication could be contributing to her constipation (not as typical side effect as the non-dihydropyridine such as verapamil etc, but more rarely could potentially contribute to constipation). I have asked her to discuss possibly changing amlodipine to  monitor her symptoms and I will message her PCP.    Plan:  # abdominal pain / constipation  Continue miralax daily, titrate to at least 1 - 2 good bowel movement daily  Discuss with PCP possibly switching from amlodopine  Can consider linzess or amitiza if patient worsens on miralax or does not tolerate it.    She will return to Dr. Quijano as she still wishes to discuss further with him, although I believe continuing to have good control over her constipation will significantly improve her abdominal pain.    # iron def anemia  Continue IV iron per hematology team    RTC in 2 months with me.      Salvador Shipley MD  Gastroenterology Fellow (PGY-V)  Pager: [phone]      No orders of the defined types were placed in this encounter.

## 2017-10-26 ENCOUNTER — TELEPHONE (OUTPATIENT)
Dept: FAMILY MEDICINE | Facility: CLINIC | Age: 52
End: 2017-10-26

## 2017-10-26 NOTE — TELEPHONE ENCOUNTER
Spoke with patient. States she is going out of town until next week. She will call back then to schedule appointment.

## 2017-10-30 ENCOUNTER — OFFICE VISIT (OUTPATIENT)
Dept: HEMATOLOGY/ONCOLOGY | Facility: CLINIC | Age: 52
End: 2017-10-30
Payer: MEDICAID

## 2017-10-30 ENCOUNTER — TELEPHONE (OUTPATIENT)
Dept: BARIATRICS | Facility: CLINIC | Age: 52
End: 2017-10-30

## 2017-10-30 ENCOUNTER — LAB VISIT (OUTPATIENT)
Dept: LAB | Facility: HOSPITAL | Age: 52
End: 2017-10-30
Attending: INTERNAL MEDICINE
Payer: MEDICAID

## 2017-10-30 VITALS
BODY MASS INDEX: 35.08 KG/M2 | DIASTOLIC BLOOD PRESSURE: 90 MMHG | RESPIRATION RATE: 16 BRPM | SYSTOLIC BLOOD PRESSURE: 160 MMHG | TEMPERATURE: 99 F | WEIGHT: 191.81 LBS | HEART RATE: 84 BPM

## 2017-10-30 DIAGNOSIS — K95.89 IRON DEFICIENCY ANEMIA FOLLOWING BARIATRIC SURGERY: ICD-10-CM

## 2017-10-30 DIAGNOSIS — D50.8 IRON DEFICIENCY ANEMIA FOLLOWING BARIATRIC SURGERY: ICD-10-CM

## 2017-10-30 DIAGNOSIS — D64.9 ANEMIA, UNSPECIFIED TYPE: ICD-10-CM

## 2017-10-30 LAB
BASOPHILS # BLD AUTO: 0.02 K/UL
BASOPHILS NFR BLD: 0.3 %
DIFFERENTIAL METHOD: ABNORMAL
EOSINOPHIL # BLD AUTO: 0.1 K/UL
EOSINOPHIL NFR BLD: 2 %
ERYTHROCYTE [DISTWIDTH] IN BLOOD BY AUTOMATED COUNT: 14.8 %
FERRITIN SERPL-MCNC: 466 NG/ML
HCT VFR BLD AUTO: 35.6 %
HGB BLD-MCNC: 11.3 G/DL
IMM GRANULOCYTES # BLD AUTO: 0.02 K/UL
IMM GRANULOCYTES NFR BLD AUTO: 0.3 %
IRON SERPL-MCNC: 88 UG/DL
LYMPHOCYTES # BLD AUTO: 2.6 K/UL
LYMPHOCYTES NFR BLD: 37 %
MCH RBC QN AUTO: 25.9 PG
MCHC RBC AUTO-ENTMCNC: 31.7 G/DL
MCV RBC AUTO: 82 FL
MONOCYTES # BLD AUTO: 0.5 K/UL
MONOCYTES NFR BLD: 6.6 %
NEUTROPHILS # BLD AUTO: 3.8 K/UL
NEUTROPHILS NFR BLD: 53.8 %
NRBC BLD-RTO: 0 /100 WBC
PLATELET # BLD AUTO: 295 K/UL
PMV BLD AUTO: 11.1 FL
RBC # BLD AUTO: 4.36 M/UL
SATURATED IRON: 31 %
TOTAL IRON BINDING CAPACITY: 280 UG/DL
TRANSFERRIN SERPL-MCNC: 189 MG/DL
WBC # BLD AUTO: 7.02 K/UL

## 2017-10-30 PROCEDURE — 83540 ASSAY OF IRON: CPT

## 2017-10-30 PROCEDURE — 99213 OFFICE O/P EST LOW 20 MIN: CPT | Mod: PBBFAC | Performed by: INTERNAL MEDICINE

## 2017-10-30 PROCEDURE — 82728 ASSAY OF FERRITIN: CPT

## 2017-10-30 PROCEDURE — 99999 PR PBB SHADOW E&M-EST. PATIENT-LVL III: CPT | Mod: PBBFAC,,,

## 2017-10-30 PROCEDURE — 36415 COLL VENOUS BLD VENIPUNCTURE: CPT

## 2017-10-30 PROCEDURE — 99214 OFFICE O/P EST MOD 30 MIN: CPT | Mod: S$PBB,,, | Performed by: INTERNAL MEDICINE

## 2017-10-30 PROCEDURE — 85025 COMPLETE CBC W/AUTO DIFF WBC: CPT

## 2017-10-30 NOTE — Clinical Note
Antoine Fritz.  The patient needs an appointment with Urology for possible cystoscopy.  The patient will need a follow up with me in a month with repeat CBC, Iron TIBC, ferritin, Soluble transferrin at that time.  Thanks

## 2017-10-30 NOTE — PROGRESS NOTES
PATIENT: Antoine Seals  MRN: [id number]  DATE: 10/31/2017      Diagnosis:   1. Anemia, unspecified type        Chief Complaint: No chief complaint on file.    Subjective:    Initial History: Ms. Seals is a 52 y.o. female with h/o DMII, HTN, gastric bypass surgery in  who presents for follow up after treatment of iron deficiency anemia.  Since the last clinic visit the patient has received two doses of IV injectafer 750mg each on 17 and 17.  The patient states she has been having pain in her stomach recently and has been following up with the GI physicians concerning the problem.  The patient denies N/V, constipation, diarrhea.  She states she is not currently having any pain in the stomach. The patient denies any recent evidence of bleeding inculding hematemesis, hemoptysis, hematuria, melena, BRBPR.  The patient denies any weight loss and has a good appetite.  She is uptodate on her mamogram due this December, pap smear, and colonoscopy.      Past Medical History:   Past Medical History:   Diagnosis Date    Diabetes mellitus type 2, noninsulin dependent 2016    Hx of essential hypertension     Hyperlipidemia     Hypertension, uncontrolled 2016    Nuclear sclerosis of both eyes 4/10/2017       Past Surgical HIstory:   Past Surgical History:   Procedure Laterality Date    BTL       SECTION      COLONOSCOPY N/A 2017    Procedure: COLONOSCOPY;  Surgeon: Oleg Enciso MD;  Location: [address];  Service: Endoscopy;  Laterality: N/A;  CHRONIC CONSTIPATION S/P LRNY    GASTRIC BYPASS      sandra en y    NECK SURGERY  2016       Family History:   Family History   Problem Relation Age of Onset    Heart disease Mother     Diabetes type II Mother     Heart attack Father 50     Open heart surgery    No Known Problems Sister     No Known Problems Brother     No Known Problems Maternal Aunt     No Known Problems Maternal Uncle     No Known Problems  Paternal Aunt     No Known Problems Paternal Uncle     No Known Problems Maternal Grandmother     No Known Problems Maternal Grandfather     No Known Problems Paternal Grandmother     No Known Problems Paternal Grandfather     Amblyopia Neg Hx     Blindness Neg Hx     Cancer Neg Hx     Cataracts Neg Hx     Diabetes Neg Hx     Glaucoma Neg Hx     Hypertension Neg Hx     Macular degeneration Neg Hx     Retinal detachment Neg Hx     Strabismus Neg Hx     Stroke Neg Hx     Thyroid disease Neg Hx        Social History:  reports that she has never smoked. She has never used smokeless tobacco. She reports that she drinks alcohol. She reports that she does not use drugs.    Allergies:  Review of patient's allergies indicates:   Allergen Reactions    Lisinopril-hydrochlorothiazide Swelling     Facial swelling/ mouth swelling       Medications:  Current Outpatient Prescriptions   Medication Sig Dispense Refill    amlodipine (NORVASC) 10 MG tablet Take 1 tablet (10 mg total) by mouth once daily. (Patient taking differently: Take 10 mg by mouth every morning. ) 90 tablet 3    atorvastatin (LIPITOR) 80 MG tablet Take 1 tablet (80 mg total) by mouth every evening. (Patient taking differently: Take 80 mg by mouth every morning. ) 90 tablet 3    b complex vitamins tablet Take 1 tablet by mouth every morning.       ergocalciferol (ERGOCALCIFEROL) 50,000 unit Cap Take 1 capsule (50,000 Units total) by mouth twice a week. 24 capsule 0    fluticasone (FLONASE) 50 mcg/actuation nasal spray 1 spray by Each Nare route 2 (two) times daily. (Patient taking differently: 1 spray by Each Nare route 2 (two) times daily as needed. ) 1 Bottle 5    FOLIC ACID/MV,IRON,MIN (CENTRUM ORAL) Take 1 tablet by mouth every morning.      glipiZIDE (GLUCOTROL) 5 MG tablet TAKE 1 TABLET(5 MG) BY MOUTH TWICE A DAY WITH BREAKFAST 60 tablet 2    hydrochlorothiazide (HYDRODIURIL) 12.5 MG Tab Take 1 tablet (12.5 mg total) by mouth once  daily. (Patient taking differently: Take 12.5 mg by mouth every morning. ) 30 tablet 5    omeprazole (PRILOSEC) 20 MG capsule Open one capsule and add to yogurt.  Take 30 min before breakfast in am 30 capsule 2    polyethylene glycol (GLYCOLAX) 17 gram/dose powder Take 17 g by mouth daily as needed. 1700 g 3     No current facility-administered medications for this visit.        Review of Systems   Constitutional: Negative for chills and fever.   Respiratory: Negative for cough and shortness of breath.    Cardiovascular: Negative for chest pain and palpitations.   Gastrointestinal: Positive for abdominal pain (occasional). Negative for constipation, diarrhea, nausea and vomiting.   Skin: Negative for rash.   Neurological: Negative for headaches.       ECOG Performance Status: 0   Objective:      Vitals:   Vitals:    10/30/17 1556   BP: (!) 160/90   Pulse: 84   Resp: 16   Temp: 99.1 °F (37.3 °C)   Weight: 87 kg (191 lb 12.8 oz)     BMI: Body mass index is 35.08 kg/m².    Physical Exam   Constitutional: She is oriented to person, place, and time. She appears well-developed and well-nourished. No distress.   HENT:   Head: Normocephalic and atraumatic.   Cardiovascular: Normal rate, regular rhythm, normal heart sounds and intact distal pulses.  Exam reveals no gallop and no friction rub.    No murmur heard.  Pulmonary/Chest: Effort normal and breath sounds normal. No respiratory distress. She has no wheezes. She has no rales. She exhibits no tenderness.   Abdominal: Soft. Bowel sounds are normal. She exhibits no distension and no mass. There is no tenderness. There is no rebound and no guarding.   Lymphadenopathy:        Head (right side): No submental and no submandibular adenopathy present.        Head (left side): No submental and no submandibular adenopathy present.     She has no cervical adenopathy.     She has no axillary adenopathy.        Right: No supraclavicular adenopathy present.        Left: No  supraclavicular adenopathy present.   Neurological: She is alert and oriented to person, place, and time.   Skin: She is not diaphoretic.   Psychiatric: She has a normal mood and affect. Her behavior is normal.       Laboratory Data:  Lab Visit on 10/30/2017   Component Date Value Ref Range Status    WBC 10/30/2017 7.02  3.90 - 12.70 K/uL Final    RBC 10/30/2017 4.36  4.00 - 5.40 M/uL Final    Hemoglobin 10/30/2017 11.3* 12.0 - 16.0 g/dL Final    Hematocrit 10/30/2017 35.6* 37.0 - 48.5 % Final    MCV 10/30/2017 82  82 - 98 fL Final    MCH 10/30/2017 25.9* 27.0 - 31.0 pg Final    MCHC 10/30/2017 31.7* 32.0 - 36.0 g/dL Final    RDW 10/30/2017 14.8* 11.5 - 14.5 % Final    Platelets 10/30/2017 295  150 - 350 K/uL Final    MPV 10/30/2017 11.1  9.2 - 12.9 fL Final    Immature Granulocytes 10/30/2017 0.3  0.0 - 0.5 % Final    Gran # 10/30/2017 3.8  1.8 - 7.7 K/uL Final    Immature Grans (Abs) 10/30/2017 0.02  0.00 - 0.04 K/uL Final    Lymph # 10/30/2017 2.6  1.0 - 4.8 K/uL Final    Mono # 10/30/2017 0.5  0.3 - 1.0 K/uL Final    Eos # 10/30/2017 0.1  0.0 - 0.5 K/uL Final    Baso # 10/30/2017 0.02  0.00 - 0.20 K/uL Final    nRBC 10/30/2017 0  0 /100 WBC Final    Gran% 10/30/2017 53.8  38.0 - 73.0 % Final    Lymph% 10/30/2017 37.0  18.0 - 48.0 % Final    Mono% 10/30/2017 6.6  4.0 - 15.0 % Final    Eosinophil% 10/30/2017 2.0  0.0 - 8.0 % Final    Basophil% 10/30/2017 0.3  0.0 - 1.9 % Final    Differential Method 10/30/2017 Automated   Final    Iron 10/30/2017 88  30 - 160 ug/dL Final    Transferrin 10/30/2017 189* 200 - 375 mg/dL Final    TIBC 10/30/2017 280  250 - 450 ug/dL Final    Saturated Iron 10/30/2017 31  20 - 50 % Final    Ferritin 10/30/2017 466* 20.0 - 300.0 ng/mL Final   Lab Visit on 10/30/2017   Component Date Value Ref Range Status    Vitamin B-12 10/30/2017 1199* 210 - 950 pg/mL Final    Folate 10/30/2017 11.2  4.0 - 24.0 ng/mL Final    Homocysteine 10/30/2017 7.3  4.0 - 15.5  umol/L Final     Lab Results   Component Value Date    IRON 88 10/30/2017    TIBC 280 10/30/2017    FERRITIN 466 (H) 10/30/2017     Lab Results   Component Value Date    WBC 7.02 10/30/2017    HGB 11.3 (L) 10/30/2017    HCT 35.6 (L) 10/30/2017    MCV 82 10/30/2017     10/30/2017     Hemoglobin Electrophoresis: 5/16/11: normal    Imaging:Reviewed    Mammogram 12/2016 BIIRADS 1    Assessment:       1. Anemia, unspecified type           Plan:     Anemia - The patient's ferritin on repeat lab work today is elevated.  This may be a sign of inflammation  -On review of the patient's past urinalyses, there appears to be RBC's present in several urine specimens.  -Will have the patient seen by Urology to assess for potential cystoscopy.  -The patient was given three stool cards to complete at home and was instructed on the method for collection.  She was instructed to return them after completing the smears.  -Will have the patient return in one month with repeat labs at that time.  -Will also check Vitamin B12, MMA, folate, and homocysteine to assess for other nutritional deficiencies which can cause anemia.    Discussed with staff.    Sage Rolon MD PGY-IV  Hematology and Oncology  Pager:[phone]      I have reviewed the notes, assessments, and/or procedures performed by the housestaff, as above.  I have personally interviewed and examined the patient at the beside, and rounded with the housestaff. I concur with her/his assessment and plan and the documentation of Antoine Seals.  More than 25 mins were spent during this encounter, greater than 50% was spent in direct counseling and/or coordination of care.     Devin Hernandez M.D., M.S., F.A.C.P.  Hematology/Oncology Attending  [facility]        Distress Screening Results: Psychosocial Distress screening score of Distress Score: 5 noted and reviewed. A referral to   Oncology Social Worker initiated.

## 2017-10-31 ENCOUNTER — OFFICE VISIT (OUTPATIENT)
Dept: BARIATRICS | Facility: CLINIC | Age: 52
End: 2017-10-31
Payer: MEDICAID

## 2017-10-31 VITALS
DIASTOLIC BLOOD PRESSURE: 64 MMHG | WEIGHT: 188.5 LBS | HEIGHT: 62 IN | HEART RATE: 94 BPM | BODY MASS INDEX: 34.69 KG/M2 | SYSTOLIC BLOOD PRESSURE: 119 MMHG

## 2017-10-31 DIAGNOSIS — R10.33 PERIUMBILICAL ABDOMINAL PAIN: ICD-10-CM

## 2017-10-31 PROCEDURE — 99999 PR PBB SHADOW E&M-EST. PATIENT-LVL III: CPT | Mod: PBBFAC,,, | Performed by: SURGERY

## 2017-10-31 PROCEDURE — 99213 OFFICE O/P EST LOW 20 MIN: CPT | Mod: PBBFAC | Performed by: SURGERY

## 2017-10-31 PROCEDURE — 99024 POSTOP FOLLOW-UP VISIT: CPT | Mod: ,,, | Performed by: SURGERY

## 2017-10-31 RX ORDER — SODIUM CHLORIDE 9 MG/ML
INJECTION, SOLUTION INTRAVENOUS CONTINUOUS
Status: CANCELLED | OUTPATIENT
Start: 2017-10-31

## 2017-10-31 RX ORDER — BLOOD SUGAR DIAGNOSTIC
STRIP MISCELLANEOUS
Refills: 0 | COMMUNITY
Start: 2017-10-03 | End: 2018-01-10 | Stop reason: SDUPTHER

## 2017-10-31 RX ORDER — LANCETS 30 GAUGE
EACH MISCELLANEOUS
Refills: 0 | COMMUNITY
Start: 2017-10-03 | End: 2020-01-15 | Stop reason: CLARIF

## 2017-10-31 RX ORDER — METFORMIN HYDROCHLORIDE 500 MG/1
1 TABLET ORAL 2 TIMES DAILY
Refills: 5 | COMMUNITY
Start: 2017-10-03 | End: 2018-04-23 | Stop reason: SDUPTHER

## 2017-10-31 NOTE — LETTER
[facility] - B[facility]ric Surgery  [address]  [facility] [address]  Phone: [phone]  Fax: [phone] October 31, 2017      Katharine Chung MD  [address] [facility] [address]    Patient: Antoine Seals   MR Number: [id number]   YOB: 1965   Date of Visit: 10/31/2017     Dear Dr. Chung:    Thank you for referring Antoine Seals to me for evaluation. Below are the relevant portions of my assessment and plan of care.    ASSESSMENT/PLAN:   Patient presents with abdominal pain of unknown etiolology.     PLAN:  Diagnostic laparoscopy for possible internal hernia.    If you have questions, please do not hesitate to call me. I look forward to following Antoine along with you.    Sincerely,      Corona Quijano MD   Section Head - General, Laparoscopic, Bariatric  Acute Care and Oncologic Surgery   - Surgical Weight Loss Program  [facility]    WSR/curt    CC  Oleg Enciso MD

## 2017-10-31 NOTE — PROGRESS NOTES
"History & Physical    SUBJECTIVE:     History of Present Illness:  Patient is a 52 y.o. female presents with abdominal pain.  LRNY 8/16/2005, internal hernia repair and NEW 12/2009 and endoscopic removal of G-J suture 8/18/17.  She is s/p cscope earlier this year.  Her epigastric pain is resolved but she is having pain around her navel.  It is worsened with constipation and improved with glycolax.  Overall she is improved but she feels like she "is dragging something along" in her stomach when she rolls over.      Chief Complaint   Patient presents with    Post-op Problem    Abdominal Pain     sharp pain in stomach       Review of patient's allergies indicates:   Allergen Reactions    Lisinopril-hydrochlorothiazide Swelling     Facial swelling/ mouth swelling       Current Outpatient Prescriptions   Medication Sig Dispense Refill    amlodipine (NORVASC) 10 MG tablet Take 1 tablet (10 mg total) by mouth once daily. (Patient taking differently: Take 10 mg by mouth every morning. ) 90 tablet 3    atorvastatin (LIPITOR) 80 MG tablet Take 1 tablet (80 mg total) by mouth every evening. (Patient taking differently: Take 80 mg by mouth every morning. ) 90 tablet 3    b complex vitamins tablet Take 1 tablet by mouth every morning.       ergocalciferol (ERGOCALCIFEROL) 50,000 unit Cap Take 1 capsule (50,000 Units total) by mouth twice a week. 24 capsule 0    fluticasone (FLONASE) 50 mcg/actuation nasal spray 1 spray by Each Nare route 2 (two) times daily. (Patient taking differently: 1 spray by Each Nare route 2 (two) times daily as needed. ) 1 Bottle 5    FOLIC ACID/MV,IRON,MIN (CENTRUM ORAL) Take 1 tablet by mouth every morning.      glipiZIDE (GLUCOTROL) 5 MG tablet TAKE 1 TABLET(5 MG) BY MOUTH TWICE A DAY WITH BREAKFAST 60 tablet 2    hydrochlorothiazide (HYDRODIURIL) 12.5 MG Tab Take 1 tablet (12.5 mg total) by mouth once daily. (Patient taking differently: Take 12.5 mg by mouth every morning. ) 30 tablet 5 "    metFORMIN (GLUCOPHAGE) 500 MG tablet Take 1 tablet by mouth 2 (two) times daily.  5    omeprazole (PRILOSEC) 20 MG capsule Open one capsule and add to yogurt.  Take 30 min before breakfast in am 30 capsule 2    ONETOUCH DELICA LANCETS 33 gauge Misc TEST BID  0    ONETOUCH ULTRA TEST Strp TEST BID  0    ONETOUCH ULTRA2 kit TEST BID UTD  0    polyethylene glycol (GLYCOLAX) 17 gram/dose powder Take 17 g by mouth daily as needed. 1700 g 3     No current facility-administered medications for this visit.        Past Medical History:   Diagnosis Date    Diabetes mellitus type 2, noninsulin dependent 2016    Hx of essential hypertension     Hyperlipidemia     Hypertension, uncontrolled 2016    Nuclear sclerosis of both eyes 4/10/2017     Past Surgical History:   Procedure Laterality Date    BTL       SECTION      COLONOSCOPY N/A 2017    Procedure: COLONOSCOPY;  Surgeon: Oleg Enciso MD;  Location: [facility] ([address] [place] [address]);  Service: Endoscopy;  Laterality: N/A;  CHRONIC CONSTIPATION S/P LRNY    GASTRIC BYPASS      sandra en y    NECK SURGERY  2016     Family History   Problem Relation Age of Onset    Heart disease Mother     Diabetes type II Mother     Heart attack Father 50     Open heart surgery    No Known Problems Sister     No Known Problems Brother     No Known Problems Maternal Aunt     No Known Problems Maternal Uncle     No Known Problems Paternal Aunt     No Known Problems Paternal Uncle     No Known Problems Maternal Grandmother     No Known Problems Maternal Grandfather     No Known Problems Paternal Grandmother     No Known Problems Paternal Grandfather     Amblyopia Neg Hx     Blindness Neg Hx     Cancer Neg Hx     Cataracts Neg Hx     Diabetes Neg Hx     Glaucoma Neg Hx     Hypertension Neg Hx     Macular degeneration Neg Hx     Retinal detachment Neg Hx     Strabismus Neg Hx     Stroke Neg Hx     Thyroid disease Neg Hx      Social  "History   Substance Use Topics    Smoking status: Never Smoker    Smokeless tobacco: Never Used    Alcohol use 0.0 oz/week      Comment: Socially        Review of Systems:  Review of Systems   Constitutional: Negative for fever and unexpected weight change.   Respiratory: Negative for chest tightness and shortness of breath.    Cardiovascular: Negative for chest pain.   Gastrointestinal: Negative for diarrhea, nausea and vomiting.   Genitourinary: Negative for difficulty urinating and dysuria.        Has hematuria   Hematological: Does not bruise/bleed easily.       OBJECTIVE:     Vital Signs (Most Recent)  Pulse: 94 (10/31/17 1310)  BP: 119/64 (10/31/17 1310)  5' 2" (1.575 m)  85.5 kg (188 lb 7.9 oz)     Physical Exam:  Physical Exam   Constitutional: She is oriented to person, place, and time. She appears well-developed and well-nourished.   Cardiovascular: Normal rate, regular rhythm and normal heart sounds.    Pulmonary/Chest: Effort normal and breath sounds normal.   Abdominal: Soft. Normal appearance and bowel sounds are normal. There is tenderness in the periumbilical area. There is no rigidity and no guarding. No hernia.       Neurological: She is alert and oriented to person, place, and time.   Skin: Skin is warm and dry.   Psychiatric: She has a normal mood and affect. Her behavior is normal. Judgment and thought content normal.   Vitals reviewed.      Laboratory  CBC: Reviewed  CMP: Reviewed  Vitamins: Reviewed    Diagnostic Results:  CT: Reviewed    ASSESSMENT/PLAN:     Abdominal pain of unknown etiolology    PLAN:Plan     Diagnostic laparoscopy for possible internal hernia       "

## 2017-11-01 ENCOUNTER — TELEPHONE (OUTPATIENT)
Dept: BARIATRICS | Facility: CLINIC | Age: 52
End: 2017-11-01

## 2017-11-01 DIAGNOSIS — R10.9 ABDOMINAL PAIN OF UNKNOWN ETIOLOGY: Primary | ICD-10-CM

## 2017-11-01 DIAGNOSIS — Z98.84 STATUS POST BARIATRIC SURGERY: ICD-10-CM

## 2017-11-03 NOTE — TELEPHONE ENCOUNTER
Asked patient if she'd called Dr. Beebe's office as she needed a clearance. She states she did but was told someone would have to call her to fit  Her in.  I told her that she should call today and if she doesn't get anywhere she should go to the office on Monday and see about getting help setting this up.  She understands.

## 2017-11-06 DIAGNOSIS — E11.9 DIABETES MELLITUS TYPE 2, NONINSULIN DEPENDENT: ICD-10-CM

## 2017-11-06 RX ORDER — GLIPIZIDE 5 MG/1
TABLET ORAL
Qty: 60 TABLET | Refills: 5 | Status: SHIPPED | OUTPATIENT
Start: 2017-11-06 | End: 2018-04-23 | Stop reason: SDUPTHER

## 2017-11-08 ENCOUNTER — OFFICE VISIT (OUTPATIENT)
Dept: CARDIOLOGY | Facility: CLINIC | Age: 52
End: 2017-11-08
Payer: MEDICAID

## 2017-11-08 VITALS
BODY MASS INDEX: 36.39 KG/M2 | SYSTOLIC BLOOD PRESSURE: 130 MMHG | WEIGHT: 197.75 LBS | HEIGHT: 62 IN | OXYGEN SATURATION: 98 % | HEART RATE: 91 BPM | RESPIRATION RATE: 15 BRPM | DIASTOLIC BLOOD PRESSURE: 68 MMHG

## 2017-11-08 DIAGNOSIS — I10 ESSENTIAL HYPERTENSION: ICD-10-CM

## 2017-11-08 DIAGNOSIS — Z01.810 PREOP CARDIOVASCULAR EXAM: Primary | ICD-10-CM

## 2017-11-08 DIAGNOSIS — Z98.84 GASTRIC BYPASS STATUS FOR OBESITY: ICD-10-CM

## 2017-11-08 DIAGNOSIS — E11.9 DIABETES MELLITUS TYPE 2, NONINSULIN DEPENDENT: ICD-10-CM

## 2017-11-08 DIAGNOSIS — E78.5 HYPERLIPIDEMIA, UNSPECIFIED HYPERLIPIDEMIA TYPE: ICD-10-CM

## 2017-11-08 PROCEDURE — 99999 PR PBB SHADOW E&M-EST. PATIENT-LVL III: CPT | Mod: PBBFAC,,, | Performed by: INTERNAL MEDICINE

## 2017-11-08 PROCEDURE — 99214 OFFICE O/P EST MOD 30 MIN: CPT | Mod: S$PBB,,, | Performed by: INTERNAL MEDICINE

## 2017-11-08 PROCEDURE — 99213 OFFICE O/P EST LOW 20 MIN: CPT | Mod: PBBFAC | Performed by: INTERNAL MEDICINE

## 2017-11-08 RX ORDER — ATORVASTATIN CALCIUM 80 MG/1
80 TABLET, FILM COATED ORAL EVERY MORNING
Qty: 90 TABLET | Refills: 3 | Status: SHIPPED | OUTPATIENT
Start: 2017-11-08 | End: 2018-04-23 | Stop reason: SDUPTHER

## 2017-11-08 NOTE — PROGRESS NOTES
CARDIOVASCULAR PROGRESS NOTE    REASON FOR CONSULT:   Antoine Seals is a 52 y.o. female who presents for preop CV eval.    PCP: Sheldon  Surg: Samm  HISTORY OF PRESENT ILLNESS:   The patient comes in today for preoperative cardiovascular evaluation.  She plans to undergo laparoscopy for possible hernia repair by Dr. Quijano.  There is been no chest pain or shortness of breath.  She denies palpitations, lightheadedness, dizziness, or syncope.  She's had no PND, orthopnea, or lower extremity edema.  There's been no melena, hematuria, or claudicant symptoms.  The patient tells me she can climb several flights of stairs without any limitation.    CARDIOVASCULAR HISTORY:   none    PAST MEDICAL HISTORY:     Past Medical History:   Diagnosis Date    Diabetes mellitus type 2, noninsulin dependent 2016    Hx of essential hypertension     Hyperlipidemia     Hypertension, uncontrolled 2016    Nuclear sclerosis of both eyes 4/10/2017       PAST SURGICAL HISTORY:     Past Surgical History:   Procedure Laterality Date    BTL       SECTION      COLONOSCOPY N/A 2017    Procedure: COLONOSCOPY;  Surgeon: Oleg Enciso MD;  Location: [facility] ([address] [place] [address]);  Service: Endoscopy;  Laterality: N/A;  CHRONIC CONSTIPATION S/P LRNY    GASTRIC BYPASS      sandra en y    NECK SURGERY  2016       ALLERGIES AND MEDICATION:   Review of patient's allergies indicates:  No Known Allergies  Previous Medications    AMLODIPINE (NORVASC) 10 MG TABLET    Take 1 tablet (10 mg total) by mouth once daily.    ATORVASTATIN (LIPITOR) 80 MG TABLET    Take 1 tablet (80 mg total) by mouth every evening.    B COMPLEX VITAMINS TABLET    Take 1 tablet by mouth every morning.     ERGOCALCIFEROL (ERGOCALCIFEROL) 50,000 UNIT CAP    Take 1 capsule (50,000 Units total) by mouth twice a week.    FLUTICASONE (FLONASE) 50 MCG/ACTUATION NASAL SPRAY    1 spray by Each Nare route 2 (two) times daily.    FOLIC  ACID/MV,IRON,MIN (CENTRUM ORAL)    Take 1 tablet by mouth every morning.    GLIPIZIDE (GLUCOTROL) 5 MG TABLET    TAKE 1 TABLET(5 MG) BY MOUTH TWICE DAILY WITH BREAKFAST    HYDROCHLOROTHIAZIDE (HYDRODIURIL) 12.5 MG TAB    Take 1 tablet (12.5 mg total) by mouth once daily.    METFORMIN (GLUCOPHAGE) 500 MG TABLET    Take 1 tablet by mouth 2 (two) times daily.    OMEPRAZOLE (PRILOSEC) 20 MG CAPSULE    Open one capsule and add to yogurt.  Take 30 min before breakfast in am    ONETOUCH DELICA LANCETS 33 GAUGE MISC    TEST BID    ONETOUCH ULTRA TEST STRP    TEST BID    ONETOUCH ULTRA2 KIT    TEST BID UTD    POLYETHYLENE GLYCOL (GLYCOLAX) 17 GRAM/DOSE POWDER    Take 17 g by mouth daily as needed.       SOCIAL HISTORY:     Social History     Social History    Marital status:      Spouse name: N/A    Number of children: N/A    Years of education: N/A     Occupational History    Not on file.     Social History Main Topics    Smoking status: Never Smoker    Smokeless tobacco: Never Used    Alcohol use 0.0 oz/week      Comment: Socially    Drug use: No    Sexual activity: Yes     Partners: Male     Other Topics Concern    Not on file     Social History Narrative    No narrative on file       FAMILY HISTORY:     Family History   Problem Relation Age of Onset    Heart disease Mother     Diabetes type II Mother     Heart attack Father 50     Open heart surgery    No Known Problems Sister     No Known Problems Brother     No Known Problems Maternal Aunt     No Known Problems Maternal Uncle     No Known Problems Paternal Aunt     No Known Problems Paternal Uncle     No Known Problems Maternal Grandmother     No Known Problems Maternal Grandfather     No Known Problems Paternal Grandmother     No Known Problems Paternal Grandfather     Amblyopia Neg Hx     Blindness Neg Hx     Cancer Neg Hx     Cataracts Neg Hx     Diabetes Neg Hx     Glaucoma Neg Hx     Hypertension Neg Hx     Macular  "degeneration Neg Hx     Retinal detachment Neg Hx     Strabismus Neg Hx     Stroke Neg Hx     Thyroid disease Neg Hx        REVIEW OF SYSTEMS:   Review of Systems   Constitutional: Negative for chills, diaphoresis and fever.   HENT: Negative for nosebleeds.    Eyes: Negative for blurred vision, double vision and photophobia.   Respiratory: Negative for hemoptysis, shortness of breath and wheezing.    Cardiovascular: Negative for chest pain, palpitations, orthopnea, claudication, leg swelling and PND.   Gastrointestinal: Negative for abdominal pain, blood in stool, heartburn, melena, nausea and vomiting.   Genitourinary: Negative for flank pain and hematuria.   Musculoskeletal: Negative for falls, joint pain, myalgias and neck pain.   Skin: Negative for rash.   Neurological: Negative for dizziness, seizures, loss of consciousness, weakness and headaches.   Endo/Heme/Allergies: Negative for polydipsia. Does not bruise/bleed easily.   Psychiatric/Behavioral: Negative for depression and memory loss. The patient is not nervous/anxious.        PHYSICAL EXAM:     Vitals:    11/08/17 1410   BP: 130/68   Pulse: 91   Resp: 15    Body mass index is 36.17 kg/m².  Weight: 89.7 kg (197 lb 12 oz)   Height: 5' 2" (157.5 cm)     Physical Exam   Constitutional: She is oriented to person, place, and time. She appears well-developed and well-nourished. She is cooperative.  Non-toxic appearance. No distress.   HENT:   Head: Normocephalic and atraumatic.   Eyes: Conjunctivae and EOM are normal. Pupils are equal, round, and reactive to light. No scleral icterus.   Neck: Trachea normal and normal range of motion. Neck supple. Normal carotid pulses and no JVD present. Carotid bruit is not present. No neck rigidity. No edema present. No thyromegaly present.   Cardiovascular: Normal rate, regular rhythm, S1 normal and S2 normal.  PMI is not displaced.  Exam reveals no gallop and no friction rub.    No murmur heard.  Pulses:       Carotid " pulses are 2+ on the right side, and 2+ on the left side.  Pulmonary/Chest: Effort normal and breath sounds normal. No respiratory distress. She has no wheezes. She has no rales. She exhibits no tenderness.   Abdominal: Soft. Bowel sounds are normal. She exhibits no distension and no mass. There is no hepatosplenomegaly. There is no tenderness.   obese   Musculoskeletal: She exhibits no edema or tenderness.   Feet:   Right Foot:   Skin Integrity: Negative for ulcer.   Left Foot:   Skin Integrity: Negative for ulcer.   Neurological: She is alert and oriented to person, place, and time. No cranial nerve deficit.   Skin: Skin is warm and dry. No rash noted. No erythema.   Psychiatric: She has a normal mood and affect. Her speech is normal and behavior is normal.   Vitals reviewed.      DATA:   EKG: (personally reviewed tracing)  11/8/17 SR 92    Laboratory:  CBC:    Recent Labs  Lab 08/20/17  0107 09/13/17  1737 10/30/17  1505   WHITE BLOOD CELL COUNT 9.76 7.08 7.02   HEMOGLOBIN 12.2 11.8 L 11.3 L   HEMATOCRIT 36.3 L 34.5 L 35.6 L   PLATELETS 317 268 295       CHEMISTRIES:    Recent Labs  Lab 12/28/16  1031 06/07/17  1703 08/20/17  0107 09/13/17  1737   GLUCOSE 125 H 137 H 157 H 333 H   SODIUM 137 136 140 139   POTASSIUM 4.2 4.1 3.7 3.6   BUN BLD 16 13 14 15   CREATININE 0.7 0.7 0.8 0.8   EGFR IF AFRICAN AMERICAN >60.0 >60.0 >60.0 >60.0   EGFR IF NON- >60.0 >60.0 >60.0 >60.0   CALCIUM 8.8 9.1 9.1 9.1   MAGNESIUM 1.7  --  1.8  --        CARDIAC BIOMARKERS:        COAGS:        LIPIDS/LFTS:    Recent Labs  Lab 05/12/17  1542 06/07/17  1703 08/16/17  0938 08/20/17  0107 09/13/17  1737   CHOLESTEROL 302 H 289 H 281 H  --   --    TRIGLYCERIDES 191 H 142 118  --   --    HDL 95 H 97 H 90 H  --   --    LDL CHOLESTEROL 168.8 H 163.6 H 167.4 H  --   --    NON-HDL CHOLESTEROL 207 192 191  --   --    AST 17 16 33 48 H 23   ALT 19 18 40 66 H 52 H       Cardiovascular Testing:  Ex MPI 6/8/17  The patient exercised  for 8.06 minutes on a Osito protocol, corresponding to a functional capacity of 7 estimated METS, achieving a peak heart rate of 148 bpm, which is 91% of the age predicted maximum heart rate. -CP/EKG.  Nuclear Quantitative Functional Analysis:   LVEF: >= 70 %  Impression: NORMAL MYOCARDIAL PERFUSION  1. The perfusion scan is free of evidence for myocardial ischemia or injury.   2. There is mild apical thinning which is a normal variant.   3. Resting wall motion is physiologic.   4. Resting LV function is normal.   5. The ventricular volumes are normal at rest and stress.   6. The extracardiac distribution of radioactivity is normal.     Echo 6/8/17    1 - Normal left ventricular systolic function (EF 55-60%).     2 - Concentric hypertrophy.     3 - Mild left atrial enlargement.     4 - Trivial tricuspid regurgitation.    ASSESSMENT:   # Preop CV eval prior to laparoscopy.  The pt has good exercise capacity. Echo and MPI 6/2017 normal.  # HTN, controlled  # DM  # HLP, on atorva 80mg  # BMI 36 s/p bariatric surgery, up 1 unit vs last OV    PLAN:   Cont med rx  Diet/exercise/weight loss  The pt is at low cardiac risk for the planned surgery.  No further Cv testing needed.  RTC 6 months    Joshua Beebe MD, FACC

## 2017-11-10 ENCOUNTER — DOCUMENTATION ONLY (OUTPATIENT)
Dept: BARIATRICS | Facility: CLINIC | Age: 52
End: 2017-11-10

## 2017-11-10 ENCOUNTER — TELEPHONE (OUTPATIENT)
Dept: BARIATRICS | Facility: CLINIC | Age: 52
End: 2017-11-10

## 2017-11-27 ENCOUNTER — TELEPHONE (OUTPATIENT)
Dept: SURGERY | Facility: CLINIC | Age: 52
End: 2017-11-27

## 2017-11-27 ENCOUNTER — TELEPHONE (OUTPATIENT)
Dept: BARIATRICS | Facility: CLINIC | Age: 52
End: 2017-11-27

## 2017-11-27 NOTE — TELEPHONE ENCOUNTER
Called patient after speaking with Fernie. She is good with moving surgery to 1/15/17 and she feels this is enough time for her daughter to get back on her feet to help her. She's having a c section tomorrow and she'll need her help when she has her GS procedure.  Michelle notified and date moved to 1/15.

## 2017-11-27 NOTE — TELEPHONE ENCOUNTER
----- Message from Esme Lopez sent at 11/27/2017 11:00 AM CST -----  Contact: Pt  Pt is calling to r/s procedure and can be reached at [phone].    Thank you

## 2017-11-29 ENCOUNTER — TELEPHONE (OUTPATIENT)
Dept: FAMILY MEDICINE | Facility: CLINIC | Age: 52
End: 2017-11-29

## 2017-11-29 NOTE — TELEPHONE ENCOUNTER
----- Message from Myra Dalal RN sent at 11/29/2017  7:25 AM CST -----  Regarding: PCP Clearance Needed for Surgery  Hi Antoine Schaefer will need a PCP clearance for surgery that she's having with Dr. Quijano.  She's moved her surgery back to 1/15/17 as her daughter recently was having a baby.    Her clearance will need to be within 30 days of her surgery date of 1/15/18 please.      Thanks and the patient is aware,  Rain

## 2017-12-04 ENCOUNTER — OFFICE VISIT (OUTPATIENT)
Dept: HEMATOLOGY/ONCOLOGY | Facility: CLINIC | Age: 52
End: 2017-12-04
Payer: MEDICAID

## 2017-12-04 ENCOUNTER — LAB VISIT (OUTPATIENT)
Dept: LAB | Facility: HOSPITAL | Age: 52
End: 2017-12-04
Attending: INTERNAL MEDICINE
Payer: MEDICAID

## 2017-12-04 VITALS
WEIGHT: 191.56 LBS | BODY MASS INDEX: 35.04 KG/M2 | RESPIRATION RATE: 20 BRPM | SYSTOLIC BLOOD PRESSURE: 158 MMHG | TEMPERATURE: 99 F | DIASTOLIC BLOOD PRESSURE: 81 MMHG | OXYGEN SATURATION: 96 % | HEART RATE: 106 BPM

## 2017-12-04 DIAGNOSIS — D64.9 ANEMIA, UNSPECIFIED TYPE: ICD-10-CM

## 2017-12-04 DIAGNOSIS — D50.8 IRON DEFICIENCY ANEMIA SECONDARY TO INADEQUATE DIETARY IRON INTAKE: Primary | ICD-10-CM

## 2017-12-04 LAB
BASOPHILS # BLD AUTO: 0.03 K/UL
BASOPHILS NFR BLD: 0.4 %
DIFFERENTIAL METHOD: ABNORMAL
EOSINOPHIL # BLD AUTO: 0.1 K/UL
EOSINOPHIL NFR BLD: 1.1 %
ERYTHROCYTE [DISTWIDTH] IN BLOOD BY AUTOMATED COUNT: 14.3 %
FERRITIN SERPL-MCNC: 536 NG/ML
HCT VFR BLD AUTO: 38.5 %
HGB BLD-MCNC: 12.5 G/DL
IMM GRANULOCYTES # BLD AUTO: 0.04 K/UL
IMM GRANULOCYTES NFR BLD AUTO: 0.5 %
IRON SERPL-MCNC: 81 UG/DL
LYMPHOCYTES # BLD AUTO: 2.5 K/UL
LYMPHOCYTES NFR BLD: 33.5 %
MCH RBC QN AUTO: 26.1 PG
MCHC RBC AUTO-ENTMCNC: 32.5 G/DL
MCV RBC AUTO: 80 FL
MONOCYTES # BLD AUTO: 0.5 K/UL
MONOCYTES NFR BLD: 6.8 %
NEUTROPHILS # BLD AUTO: 4.3 K/UL
NEUTROPHILS NFR BLD: 57.7 %
NRBC BLD-RTO: 0 /100 WBC
PLATELET # BLD AUTO: 311 K/UL
PMV BLD AUTO: 10.7 FL
RBC # BLD AUTO: 4.79 M/UL
SATURATED IRON: 24 %
TOTAL IRON BINDING CAPACITY: 334 UG/DL
TRANSFERRIN SERPL-MCNC: 226 MG/DL
WBC # BLD AUTO: 7.4 K/UL

## 2017-12-04 PROCEDURE — 99213 OFFICE O/P EST LOW 20 MIN: CPT | Mod: S$PBB,,, | Performed by: INTERNAL MEDICINE

## 2017-12-04 PROCEDURE — 36415 COLL VENOUS BLD VENIPUNCTURE: CPT

## 2017-12-04 PROCEDURE — 83540 ASSAY OF IRON: CPT

## 2017-12-04 PROCEDURE — 85025 COMPLETE CBC W/AUTO DIFF WBC: CPT

## 2017-12-04 PROCEDURE — 82728 ASSAY OF FERRITIN: CPT

## 2017-12-04 PROCEDURE — 99213 OFFICE O/P EST LOW 20 MIN: CPT | Mod: PBBFAC | Performed by: INTERNAL MEDICINE

## 2017-12-04 PROCEDURE — 84238 ASSAY NONENDOCRINE RECEPTOR: CPT

## 2017-12-04 PROCEDURE — 99999 PR PBB SHADOW E&M-EST. PATIENT-LVL III: CPT | Mod: PBBFAC,,,

## 2017-12-04 NOTE — Clinical Note
Roberta Fritz.  I saw this patient today in the medical oncology clinic for iron deficiency anemia.  The patient will need follow up with me in the hematology clinic in 3 months with Dr Dillon.  The patient will need CBC, ferritin, and TIBC at that clinic visit.  Thanks.

## 2017-12-04 NOTE — PROGRESS NOTES
PATIENT: Antoine Seals  MRN: [id number]  DATE: 2017      Diagnosis:   1. Iron deficiency anemia secondary to inadequate dietary iron intake        Chief Complaint: Follow-up    Subjective:    Initial History: Ms. Seals is a 52 y.o. female with h/o DMII, HTN, gastric bypass surgery in  who presents for follow up for treatment of iron deficiency anemia.  The patient received IV injectafer on 17 and 17.  The patient states she is scheduled for an open laparoscopy on 1/15/18 for evaluation for a possible internal abdominal hernia.  The patient states she feels well.  She denies melena, BRBPR.  She states she has good energy.    Past Medical History:   Past Medical History:   Diagnosis Date    Diabetes mellitus type 2, noninsulin dependent 2016    Hx of essential hypertension     Hyperlipidemia     Hypertension, uncontrolled 2016    Nuclear sclerosis of both eyes 4/10/2017       Past Surgical HIstory:   Past Surgical History:   Procedure Laterality Date    BTL       SECTION      COLONOSCOPY N/A 2017    Procedure: COLONOSCOPY;  Surgeon: Oleg Enciso MD;  Location: [facility] ([address] [place] [address]);  Service: Endoscopy;  Laterality: N/A;  CHRONIC CONSTIPATION S/P LRNY    GASTRIC BYPASS      sandra en y    NECK SURGERY  2016       Family History:   Family History   Problem Relation Age of Onset    Heart disease Mother     Diabetes type II Mother     Heart attack Father 50     Open heart surgery    No Known Problems Sister     No Known Problems Brother     No Known Problems Maternal Aunt     No Known Problems Maternal Uncle     No Known Problems Paternal Aunt     No Known Problems Paternal Uncle     No Known Problems Maternal Grandmother     No Known Problems Maternal Grandfather     No Known Problems Paternal Grandmother     No Known Problems Paternal Grandfather     Amblyopia Neg Hx     Blindness Neg Hx     Cancer Neg Hx     Cataracts Neg Hx      Diabetes Neg Hx     Glaucoma Neg Hx     Hypertension Neg Hx     Macular degeneration Neg Hx     Retinal detachment Neg Hx     Strabismus Neg Hx     Stroke Neg Hx     Thyroid disease Neg Hx        Social History:  reports that she has never smoked. She has never used smokeless tobacco. She reports that she drinks alcohol. She reports that she does not use drugs.    Allergies:  Review of patient's allergies indicates:   Allergen Reactions    Lisinopril-hydrochlorothiazide Swelling     Facial swelling/ mouth swelling       Medications:  Current Outpatient Prescriptions   Medication Sig Dispense Refill    amlodipine (NORVASC) 10 MG tablet Take 1 tablet (10 mg total) by mouth once daily. (Patient taking differently: Take 10 mg by mouth every morning. ) 90 tablet 3    atorvastatin (LIPITOR) 80 MG tablet Take 1 tablet (80 mg total) by mouth every morning. 90 tablet 3    b complex vitamins tablet Take 1 tablet by mouth every morning.       ergocalciferol (ERGOCALCIFEROL) 50,000 unit Cap Take 1 capsule (50,000 Units total) by mouth twice a week. 24 capsule 0    fluticasone (FLONASE) 50 mcg/actuation nasal spray 1 spray by Each Nare route 2 (two) times daily. (Patient taking differently: 1 spray by Each Nare route 2 (two) times daily as needed. ) 1 Bottle 5    FOLIC ACID/MV,IRON,MIN (CENTRUM ORAL) Take 1 tablet by mouth every morning.      glipiZIDE (GLUCOTROL) 5 MG tablet TAKE 1 TABLET(5 MG) BY MOUTH TWICE DAILY WITH BREAKFAST 60 tablet 5    hydrochlorothiazide (HYDRODIURIL) 12.5 MG Tab Take 1 tablet (12.5 mg total) by mouth once daily. (Patient taking differently: Take 12.5 mg by mouth every morning. ) 30 tablet 5    metFORMIN (GLUCOPHAGE) 500 MG tablet Take 1 tablet by mouth 2 (two) times daily.  5    omeprazole (PRILOSEC) 20 MG capsule Open one capsule and add to yogurt.  Take 30 min before breakfast in am 30 capsule 2    ONETOUCH DELICA LANCETS 33 gauge Misc TEST BID  0    ONETOUCH ULTRA TEST Strp  TEST BID  0    ONETOUCH ULTRA2 kit TEST BID UTD  0    polyethylene glycol (GLYCOLAX) 17 gram/dose powder Take 17 g by mouth daily as needed. 1700 g 3     No current facility-administered medications for this visit.        Review of Systems   Constitutional: Negative for chills and fever.   Respiratory: Negative for cough and shortness of breath.    Cardiovascular: Negative for chest pain and palpitations.   Gastrointestinal: Negative for abdominal pain, constipation, diarrhea, nausea and vomiting.   Skin: Negative for rash.   Neurological: Negative for headaches.       ECOG Performance Status: 0   Objective:      Vitals:   Vitals:    12/04/17 1315   BP: (!) 158/81   BP Location: Right arm   Patient Position: Sitting   Pulse: 106   Resp: 20   Temp: 98.5 °F (36.9 °C)   TempSrc: Oral   SpO2: 96%   Weight: 86.9 kg (191 lb 9.3 oz)     BMI: Body mass index is 35.04 kg/m².    Physical Exam   Constitutional: She is oriented to person, place, and time. She appears well-developed and well-nourished. No distress.   HENT:   Head: Normocephalic and atraumatic.   Cardiovascular: Normal rate, regular rhythm, normal heart sounds and intact distal pulses.  Exam reveals no gallop and no friction rub.    No murmur heard.  Pulmonary/Chest: Effort normal and breath sounds normal. No respiratory distress. She has no wheezes. She has no rales. She exhibits no tenderness.   Abdominal: Soft. Bowel sounds are normal. She exhibits no distension and no mass. There is tenderness (mid lower abdomen). There is no rebound and no guarding.   Lymphadenopathy:        Head (right side): No submental and no submandibular adenopathy present.        Head (left side): No submental and no submandibular adenopathy present.     She has no cervical adenopathy.     She has no axillary adenopathy.        Right: No supraclavicular adenopathy present.        Left: No supraclavicular adenopathy present.   Neurological: She is alert and oriented to person, place,  and time.   Skin: She is not diaphoretic.   Psychiatric: She has a normal mood and affect. Her behavior is normal.       Laboratory Data:  Lab Visit on 12/04/2017   Component Date Value Ref Range Status    WBC 12/04/2017 7.40  3.90 - 12.70 K/uL Final    RBC 12/04/2017 4.79  4.00 - 5.40 M/uL Final    Hemoglobin 12/04/2017 12.5  12.0 - 16.0 g/dL Final    Hematocrit 12/04/2017 38.5  37.0 - 48.5 % Final    MCV 12/04/2017 80* 82 - 98 fL Final    MCH 12/04/2017 26.1* 27.0 - 31.0 pg Final    MCHC 12/04/2017 32.5  32.0 - 36.0 g/dL Final    RDW 12/04/2017 14.3  11.5 - 14.5 % Final    Platelets 12/04/2017 311  150 - 350 K/uL Final    MPV 12/04/2017 10.7  9.2 - 12.9 fL Final    Immature Granulocytes 12/04/2017 0.5  0.0 - 0.5 % Final    Gran # 12/04/2017 4.3  1.8 - 7.7 K/uL Final    Immature Grans (Abs) 12/04/2017 0.04  0.00 - 0.04 K/uL Final    Lymph # 12/04/2017 2.5  1.0 - 4.8 K/uL Final    Mono # 12/04/2017 0.5  0.3 - 1.0 K/uL Final    Eos # 12/04/2017 0.1  0.0 - 0.5 K/uL Final    Baso # 12/04/2017 0.03  0.00 - 0.20 K/uL Final    nRBC 12/04/2017 0  0 /100 WBC Final    Gran% 12/04/2017 57.7  38.0 - 73.0 % Final    Lymph% 12/04/2017 33.5  18.0 - 48.0 % Final    Mono% 12/04/2017 6.8  4.0 - 15.0 % Final    Eosinophil% 12/04/2017 1.1  0.0 - 8.0 % Final    Basophil% 12/04/2017 0.4  0.0 - 1.9 % Final    Differential Method 12/04/2017 Automated   Final    Iron 12/04/2017 81  30 - 160 ug/dL Final    Transferrin 12/04/2017 226  200 - 375 mg/dL Final    TIBC 12/04/2017 334  250 - 450 ug/dL Final    Saturated Iron 12/04/2017 24  20 - 50 % Final    Ferritin 12/04/2017 536* 20.0 - 300.0 ng/mL Final    Sol. Transferrin Receptor 12/05/2017 2.7  1.8 - 4.6 mg/L Final    Comment: -------------------ADDITIONAL INFORMATION-------------------  It is reported that    Americans may   have slightly   higher values.  Test Performed by:  [facility]  [address],  [place] [address]       Lab Results   Component Value Date    IRON 81 12/04/2017    TIBC 334 12/04/2017    FERRITIN 536 (H) 12/04/2017     Lab Results   Component Value Date    WBC 7.40 12/04/2017    HGB 12.5 12/04/2017    HCT 38.5 12/04/2017    MCV 80 (L) 12/04/2017     12/04/2017     Hemoglobin Electrophoresis: 5/16/11: normal    Imaging:Reviewed    Mammogram 12/2016 BIIRADS 1    Assessment:       1. Iron deficiency anemia secondary to inadequate dietary iron intake         Plan:     Iron Deficiency Anemia - The patient's hemoglobin has improved with administration of IV iron back in August.  The patient's anemia is likely exacerbated by her h/o gastric bypass and malabsorption of iron  -Will have the patient follow up in 3 months with CBC, ferritin, and iron studies at that time.    Discussed with staff.    Sage Rolon MD PGY-IV  Hematology and Oncology  Pager:[phone]      Distress Screening Results: Psychosocial Distress screening score of Distress Score: 0 noted and reviewed. No intervention indicated.     I have personally taken the history and examined this patient and agree with the fellow's note as stated above.

## 2017-12-05 LAB — STFR SERPL-MCNC: 2.7 MG/L

## 2017-12-11 ENCOUNTER — HOSPITAL ENCOUNTER (OUTPATIENT)
Dept: RADIOLOGY | Facility: HOSPITAL | Age: 52
Discharge: HOME | End: 2017-12-11
Attending: NEUROLOGICAL SURGERY
Payer: MEDICAID

## 2017-12-11 ENCOUNTER — OFFICE VISIT (OUTPATIENT)
Dept: NEUROLOGY | Facility: CLINIC | Age: 52
End: 2017-12-11
Payer: MEDICAID

## 2017-12-11 VITALS
HEART RATE: 79 BPM | SYSTOLIC BLOOD PRESSURE: 148 MMHG | DIASTOLIC BLOOD PRESSURE: 93 MMHG | BODY MASS INDEX: 36.02 KG/M2 | HEIGHT: 62 IN | WEIGHT: 195.75 LBS

## 2017-12-11 DIAGNOSIS — M54.50 LOW BACK PAIN WITHOUT SCIATICA, UNSPECIFIED BACK PAIN LATERALITY, UNSPECIFIED CHRONICITY: Primary | ICD-10-CM

## 2017-12-11 DIAGNOSIS — M54.50 LOW BACK PAIN WITHOUT SCIATICA, UNSPECIFIED BACK PAIN LATERALITY, UNSPECIFIED CHRONICITY: ICD-10-CM

## 2017-12-11 PROCEDURE — 72100 X-RAY EXAM L-S SPINE 2/3 VWS: CPT | Mod: TC,PO

## 2017-12-11 PROCEDURE — 99213 OFFICE O/P EST LOW 20 MIN: CPT | Mod: PBBFAC,25,PO | Performed by: NEUROLOGICAL SURGERY

## 2017-12-11 PROCEDURE — 99204 OFFICE O/P NEW MOD 45 MIN: CPT | Mod: S$PBB,,, | Performed by: NEUROLOGICAL SURGERY

## 2017-12-11 PROCEDURE — 72100 X-RAY EXAM L-S SPINE 2/3 VWS: CPT | Mod: 26,,, | Performed by: RADIOLOGY

## 2017-12-11 PROCEDURE — 99999 PR PBB SHADOW E&M-EST. PATIENT-LVL III: CPT | Mod: PBBFAC,,, | Performed by: NEUROLOGICAL SURGERY

## 2017-12-11 NOTE — LETTER
December 12, 2017      Christine Mann, DPM  [address]  [place] [address]           [facility]  [address]  [place] [address]  Phone: [phone]  Fax: [phone]          Patient: Antoine Seals   MR Number: [id number]   YOB: 1965   Date of Visit: 12/11/2017       Dear Dr. Christine Mann:    Thank you for referring Antoine Seals to me for evaluation. Attached you will find relevant portions of my assessment and plan of care.    If you have questions, please do not hesitate to call me. I look forward to following Antoine Seals along with you.    Sincerely,    Hadley Baumann MD    Enclosure  CC:  No Recipients    If you would like to receive this communication electronically, please contact [email or web address] or [phone] to request more information on [employer] Link access.    For providers and/or their staff who would like to refer a patient to [facility], please contact us through our one-stop-shop provider referral line, [facility], at [phone].    If you feel you have received this communication in error or would no longer like to receive these types of communications, please e-mail [email or web address]

## 2017-12-11 NOTE — PROGRESS NOTES
Chief Complaint   Patient presents with    Numbness        Antoine Seals is a 52 y.o. female with a history of multiple medical diagnoses as listed below that presents for evaluation of pain in the back.  She says that she was initially referred to clinic due to numbness in the lower feet, but says that in the several weeks since that appointment was made she has noticed interval improvement or interval masking in the pain in the feet as she has been having more prominent pain in her lower back.  She says the pain in the lower back appears to radiate to the thighs.  She says that there is no pain in the hips and on the buttock.  The pain feels like an aching sensation that is prominent from the knee up to the level of the upper thigh.  She has not had any burning, numbness, tingling, or weakness.  She denies any bowel or bladder problems.  She denies any aching with the muscles palpated.  She denies any trauma that preceded the pain..     PAST MEDICAL HISTORY:  Past Medical History:   Diagnosis Date    Diabetes mellitus type 2, noninsulin dependent 2016    Hx of essential hypertension     Hyperlipidemia     Hypertension, uncontrolled 2016    Nuclear sclerosis of both eyes 4/10/2017       PAST SURGICAL HISTORY:  Past Surgical History:   Procedure Laterality Date    BTL       SECTION      COLONOSCOPY N/A 2017    Procedure: COLONOSCOPY;  Surgeon: Oleg Enciso MD;  Location: [facility] ([address] [place] [address]);  Service: Endoscopy;  Laterality: N/A;  CHRONIC CONSTIPATION S/P LRNY    GASTRIC BYPASS      sandra en y    NECK SURGERY  2016       SOCIAL HISTORY:  Social History     Social History    Marital status:      Spouse name: N/A    Number of children: N/A    Years of education: N/A     Occupational History    Not on file.     Social History Main Topics    Smoking status: Never Smoker    Smokeless tobacco: Never Used    Alcohol use 0.0 oz/week      Comment:  Socially    Drug use: No    Sexual activity: Yes     Partners: Male     Other Topics Concern    Not on file     Social History Narrative    No narrative on file       FAMILY HISTORY:  Family History   Problem Relation Age of Onset    Heart disease Mother     Diabetes type II Mother     Heart attack Father 50     Open heart surgery    No Known Problems Sister     No Known Problems Brother     No Known Problems Maternal Aunt     No Known Problems Maternal Uncle     No Known Problems Paternal Aunt     No Known Problems Paternal Uncle     No Known Problems Maternal Grandmother     No Known Problems Maternal Grandfather     No Known Problems Paternal Grandmother     No Known Problems Paternal Grandfather     Amblyopia Neg Hx     Blindness Neg Hx     Cancer Neg Hx     Cataracts Neg Hx     Diabetes Neg Hx     Glaucoma Neg Hx     Hypertension Neg Hx     Macular degeneration Neg Hx     Retinal detachment Neg Hx     Strabismus Neg Hx     Stroke Neg Hx     Thyroid disease Neg Hx        ALLERGIES AND MEDICATIONS: updated and reviewed.  Review of patient's allergies indicates:   Allergen Reactions    Lisinopril-hydrochlorothiazide Swelling     Facial swelling/ mouth swelling     Current Outpatient Prescriptions   Medication Sig Dispense Refill    amlodipine (NORVASC) 10 MG tablet Take 1 tablet (10 mg total) by mouth once daily. (Patient taking differently: Take 10 mg by mouth every morning. ) 90 tablet 3    atorvastatin (LIPITOR) 80 MG tablet Take 1 tablet (80 mg total) by mouth every morning. 90 tablet 3    b complex vitamins tablet Take 1 tablet by mouth every morning.       ergocalciferol (ERGOCALCIFEROL) 50,000 unit Cap Take 1 capsule (50,000 Units total) by mouth twice a week. 24 capsule 0    fluticasone (FLONASE) 50 mcg/actuation nasal spray 1 spray by Each Nare route 2 (two) times daily. (Patient taking differently: 1 spray by Each Nare route 2 (two) times daily as needed. ) 1 Bottle 5     FOLIC ACID/MV,IRON,MIN (CENTRUM ORAL) Take 1 tablet by mouth every morning.      glipiZIDE (GLUCOTROL) 5 MG tablet TAKE 1 TABLET(5 MG) BY MOUTH TWICE DAILY WITH BREAKFAST 60 tablet 5    hydrochlorothiazide (HYDRODIURIL) 12.5 MG Tab Take 1 tablet (12.5 mg total) by mouth once daily. (Patient taking differently: Take 12.5 mg by mouth every morning. ) 30 tablet 5    metFORMIN (GLUCOPHAGE) 500 MG tablet Take 1 tablet by mouth 2 (two) times daily.  5    omeprazole (PRILOSEC) 20 MG capsule Open one capsule and add to yogurt.  Take 30 min before breakfast in am 30 capsule 2    ONETOUCH DELICA LANCETS 33 gauge Misc TEST BID  0    ONETOUCH ULTRA TEST Strp TEST BID  0    ONETOUCH ULTRA2 kit TEST BID UTD  0    polyethylene glycol (GLYCOLAX) 17 gram/dose powder Take 17 g by mouth daily as needed. 1700 g 3     No current facility-administered medications for this visit.        Review of Systems   Constitutional: Negative for activity change, appetite change, fever and unexpected weight change.   HENT: Negative for trouble swallowing and voice change.    Eyes: Negative for photophobia and visual disturbance.   Respiratory: Negative for apnea and shortness of breath.    Cardiovascular: Negative for chest pain and leg swelling.   Gastrointestinal: Negative for constipation and nausea.   Genitourinary: Negative for difficulty urinating.   Musculoskeletal: Positive for back pain. Negative for gait problem and neck pain.   Skin: Negative for color change and pallor.   Neurological: Negative for dizziness, seizures, syncope, weakness and numbness.   Hematological: Negative for adenopathy.   Psychiatric/Behavioral: Negative for agitation, confusion and decreased concentration.       Neurologic Exam     Mental Status   Oriented to person, place, and time.   Registration: recalls 3 of 3 objects.   Attention: normal. Concentration: normal.   Speech: speech is normal   Level of consciousness: alert  Knowledge: good.     Cranial  Nerves     CN II   Visual fields full to confrontation.   Right visual field deficit: none  Left visual field deficit: none     CN III, IV, VI   Pupils are equal, round, and reactive to light.  Extraocular motions are normal.   Right pupil: Size: 3 mm. Shape: regular. Accommodation: intact.   Left pupil: Size: 3 mm. Shape: regular. Accommodation: intact.   CN III: no CN III palsy  CN VI: no CN VI palsy  Nystagmus: none   Diplopia: none  Ophthalmoparesis: none  Upgaze: normal  Downgaze: normal  Conjugate gaze: present    CN V   Facial sensation intact.   Right facial sensation deficit: none  Left facial sensation deficit: none    CN VII   Facial expression full, symmetric.   Right facial weakness: none  Left facial weakness: none    CN VIII   CN VIII normal.     CN IX, X   CN IX normal.   CN X normal.   Palate: symmetric    CN XI   CN XI normal.   Right sternocleidomastoid strength: normal  Left sternocleidomastoid strength: normal  Right trapezius strength: normal  Left trapezius strength: normal    CN XII   CN XII normal.   Tongue deviation: none    Motor Exam   Muscle bulk: normal  Overall muscle tone: normal  Right arm tone: normal  Left arm tone: normal  Right leg tone: normal  Left leg tone: normal    Strength   Strength 5/5 throughout.     Sensory Exam   Right arm light touch: normal  Left arm light touch: normal  Right leg light touch: normal  Left leg light touch: normal  Right arm vibration: normal  Left arm vibration: normal  Right leg vibration: normal  Left leg vibration: normal  Right arm proprioception: normal  Left arm proprioception: normal  Right leg proprioception: normal  Left leg proprioception: normal  Right arm pinprick: normal  Left arm pinprick: normal  Right leg pinprick: normal  Left leg pinprick: normal    Gait, Coordination, and Reflexes     Gait  Gait: normal    Coordination   Romberg: negative  Finger to nose coordination: normal  Heel to shin coordination: normal  Tandem walking  "coordination: normal    Tremor   Resting tremor: absent    Reflexes   Right brachioradialis: 2+  Left brachioradialis: 2+  Right biceps: 2+  Left biceps: 2+  Right triceps: 2+  Left triceps: 2+  Right patellar: 2+  Left patellar: 2+  Right achilles: 2+  Left achilles: 2+  Right plantar: normal  Left plantar: normal      Physical Exam   Constitutional: She is oriented to person, place, and time. She appears well-developed and well-nourished.   HENT:   Head: Normocephalic and atraumatic.   Eyes: EOM are normal. Pupils are equal, round, and reactive to light.   Neck: Normal range of motion.   Cardiovascular: Normal rate and intact distal pulses.    Pulmonary/Chest: Effort normal. No apnea. No respiratory distress.   Musculoskeletal: Normal range of motion.   Neurological: She is alert and oriented to person, place, and time. She has normal strength. She has a normal Finger-Nose-Finger Test, a normal Heel to Shin Test, a normal Romberg Test and a normal Tandem Gait Test. Gait normal.   Reflex Scores:       Tricep reflexes are 2+ on the right side and 2+ on the left side.       Bicep reflexes are 2+ on the right side and 2+ on the left side.       Brachioradialis reflexes are 2+ on the right side and 2+ on the left side.       Patellar reflexes are 2+ on the right side and 2+ on the left side.       Achilles reflexes are 2+ on the right side and 2+ on the left side.  Skin: Skin is warm and dry.   Psychiatric: She has a normal mood and affect. Her speech is normal and behavior is normal. Thought content normal.   Vitals reviewed.      Vitals:    12/11/17 0850   BP: (!) 148/93   BP Location: Left arm   Patient Position: Sitting   BP Method: Large (Automatic)   Pulse: 79   Weight: 88.8 kg (195 lb 12.3 oz)   Height: 5' 2" (1.575 m)       Assessment & Plan:    Problem List Items Addressed This Visit     None      Visit Diagnoses     Low back pain without sciatica, unspecified back pain laterality, unspecified chronicity    -  " Primary    Relevant Orders    X-Ray Lumbar Spine Ap And Lateral (Completed)    Ambulatory Referral to Physical/Occupational Therapy          Follow-up: Return in about 2 months (around 2/11/2018).  More than 50% of this 45 minute encounter was spent in counseling and coordinating care.

## 2017-12-20 ENCOUNTER — CLINICAL SUPPORT (OUTPATIENT)
Dept: REHABILITATION | Facility: HOSPITAL | Age: 52
End: 2017-12-20
Attending: NEUROLOGICAL SURGERY
Payer: MEDICAID

## 2017-12-20 DIAGNOSIS — Z74.09 DECREASED MOBILITY AND ENDURANCE: ICD-10-CM

## 2017-12-20 DIAGNOSIS — M25.669 DECREASED ROM OF LOWER EXTREMITY: ICD-10-CM

## 2017-12-20 DIAGNOSIS — M62.81 MUSCLE WEAKNESS: ICD-10-CM

## 2017-12-20 PROCEDURE — 97161 PT EVAL LOW COMPLEX 20 MIN: CPT | Mod: PN

## 2017-12-20 PROCEDURE — G8979 MOBILITY GOAL STATUS: HCPCS | Mod: CJ,PN

## 2017-12-20 PROCEDURE — G8978 MOBILITY CURRENT STATUS: HCPCS | Mod: CK,PN

## 2017-12-20 PROCEDURE — 97110 THERAPEUTIC EXERCISES: CPT | Mod: PN

## 2017-12-20 NOTE — PROGRESS NOTES
See full Physical Therapy Evaluation in POC     Precautions: Standard     Evaluation Date: 12/20/2017  Visit # authorized: 20  Authorization period: 12/31/2017     TREATMENT:  Antoine received therapeutic exercises to develop strength and endurance, flexibility for 15 minutes including:  LTR 2 minutes   Pelvic tilts 20 reps 5 second holds  SLR 2 sets of 10 reps   SL hip abduction 2 sets of 10 reps   HL hip abduction 20 reps   Seated sciatic nerve glides 10 reps ea     HEP provided: Patient was given the above exercises to complete as part of her HEP once a day on the days that she does not come into therapy session.     Prognosis: Good     Anticipated barriers to physical therapy: Previous experience with therapy     Medical necessity is demonstrated by the following IMPAIRMENTS/PROBLEM LIST:   1) Increase in pain level limiting function   2) Decreased hip ROM    3) Muscle weakness    4) Decreased flexibility    5) Lack of HEP    GOALS: Short Term Goals:  4 weeks  1. Patient will be able to report decreased lumbar resting pain  <   / =  3 /10  to increase tolerance for walking and leisure activities.   2. Patient will be able to report an 40% improvement in walking tolerance.   3. Patient will demonstrate an increased MMT in hip IR/ER to 4+/5  to increase tolerance for walking activities.   4. Patient will be able to tolerate HEP to improve ROM and independence with ADL's  5. Patient will be dyan to demonstrate lumbar flexion to extension without the presence of a annie's sign.     Long Term Goals: 8 weeks  1. Patient will be able to report decreased lumbar resting pain  <   / =  3 /10  to increase tolerance for walking and leisure activities.   2. Patient will be able to demonstrate an increase in 5 degrees of hamstring flexibility on L LE to improve mobility.   3. Patient will demonstrate an increased MMT for LE to  5/5  to increase tolerance for ADL and work activities.  4. Patient will report at CJ level (20-40%  impaired) on LEFS  to demonstrate increase in LE function with every day tasks.   5. Patient will be able to demonstrate an increase in hip ROM by 5 degrees in order to improve mobility and function.   6. Patient to be Independent with HEP to improve ROM and independence with ADL's
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
## 2018-01-09 ENCOUNTER — OFFICE VISIT (OUTPATIENT)
Dept: FAMILY MEDICINE | Facility: CLINIC | Age: 53
End: 2018-01-09
Payer: MEDICAID

## 2018-01-09 ENCOUNTER — LAB VISIT (OUTPATIENT)
Dept: LAB | Facility: HOSPITAL | Age: 53
End: 2018-01-09
Attending: FAMILY MEDICINE
Payer: MEDICAID

## 2018-01-09 VITALS
RESPIRATION RATE: 18 BRPM | WEIGHT: 191.81 LBS | TEMPERATURE: 98 F | BODY MASS INDEX: 35.3 KG/M2 | DIASTOLIC BLOOD PRESSURE: 84 MMHG | OXYGEN SATURATION: 97 % | SYSTOLIC BLOOD PRESSURE: 118 MMHG | HEART RATE: 88 BPM | HEIGHT: 62 IN

## 2018-01-09 DIAGNOSIS — Z23 NEED FOR IMMUNIZATION AGAINST INFLUENZA: ICD-10-CM

## 2018-01-09 DIAGNOSIS — Z01.818 PRE-OP EVALUATION: Primary | ICD-10-CM

## 2018-01-09 DIAGNOSIS — E11.9 DIABETES MELLITUS TYPE 2, NONINSULIN DEPENDENT: ICD-10-CM

## 2018-01-09 DIAGNOSIS — Z12.31 ENCOUNTER FOR SCREENING MAMMOGRAM FOR BREAST CANCER: ICD-10-CM

## 2018-01-09 DIAGNOSIS — Z01.818 PRE-OP EVALUATION: ICD-10-CM

## 2018-01-09 DIAGNOSIS — R10.33 PERIUMBILICAL ABDOMINAL PAIN: ICD-10-CM

## 2018-01-09 LAB
ALBUMIN SERPL BCP-MCNC: 2.9 G/DL
ALP SERPL-CCNC: 78 U/L
ALT SERPL W/O P-5'-P-CCNC: 23 U/L
ANION GAP SERPL CALC-SCNC: 8 MMOL/L
AST SERPL-CCNC: 18 U/L
BACTERIA #/AREA URNS AUTO: ABNORMAL /HPF
BASOPHILS # BLD AUTO: 0.04 K/UL
BASOPHILS NFR BLD: 0.5 %
BILIRUB SERPL-MCNC: 0.4 MG/DL
BILIRUB UR QL STRIP: NEGATIVE
BUN SERPL-MCNC: 16 MG/DL
CALCIUM SERPL-MCNC: 9.5 MG/DL
CHLORIDE SERPL-SCNC: 99 MMOL/L
CLARITY UR REFRACT.AUTO: ABNORMAL
CO2 SERPL-SCNC: 30 MMOL/L
COLOR UR AUTO: ABNORMAL
CREAT SERPL-MCNC: 0.8 MG/DL
DIFFERENTIAL METHOD: ABNORMAL
EOSINOPHIL # BLD AUTO: 0.1 K/UL
EOSINOPHIL NFR BLD: 1.6 %
ERYTHROCYTE [DISTWIDTH] IN BLOOD BY AUTOMATED COUNT: 15.1 %
EST. GFR  (AFRICAN AMERICAN): >60 ML/MIN/1.73 M^2
EST. GFR  (NON AFRICAN AMERICAN): >60 ML/MIN/1.73 M^2
ESTIMATED AVG GLUCOSE: 177 MG/DL
GLUCOSE SERPL-MCNC: 211 MG/DL
GLUCOSE UR QL STRIP: NEGATIVE
HBA1C MFR BLD HPLC: 7.8 %
HCT VFR BLD AUTO: 38.4 %
HGB BLD-MCNC: 12.4 G/DL
HGB UR QL STRIP: ABNORMAL
HYALINE CASTS UR QL AUTO: 0 /LPF
IMM GRANULOCYTES # BLD AUTO: 0.03 K/UL
IMM GRANULOCYTES NFR BLD AUTO: 0.4 %
KETONES UR QL STRIP: NEGATIVE
LEUKOCYTE ESTERASE UR QL STRIP: NEGATIVE
LYMPHOCYTES # BLD AUTO: 2.6 K/UL
LYMPHOCYTES NFR BLD: 33.8 %
MCH RBC QN AUTO: 25.8 PG
MCHC RBC AUTO-ENTMCNC: 32.3 G/DL
MCV RBC AUTO: 80 FL
MICROSCOPIC COMMENT: ABNORMAL
MONOCYTES # BLD AUTO: 0.5 K/UL
MONOCYTES NFR BLD: 6.3 %
NEUTROPHILS # BLD AUTO: 4.4 K/UL
NEUTROPHILS NFR BLD: 57.4 %
NITRITE UR QL STRIP: NEGATIVE
NRBC BLD-RTO: 0 /100 WBC
PH UR STRIP: 7 [PH] (ref 5–8)
PLATELET # BLD AUTO: 344 K/UL
PMV BLD AUTO: 11.1 FL
POTASSIUM SERPL-SCNC: 3.7 MMOL/L
PROT SERPL-MCNC: 7.3 G/DL
PROT UR QL STRIP: ABNORMAL
RBC # BLD AUTO: 4.81 M/UL
RBC #/AREA URNS AUTO: 17 /HPF (ref 0–4)
SODIUM SERPL-SCNC: 137 MMOL/L
SP GR UR STRIP: 1.02 (ref 1–1.03)
SQUAMOUS #/AREA URNS AUTO: 1 /HPF
URN SPEC COLLECT METH UR: ABNORMAL
UROBILINOGEN UR STRIP-ACNC: NEGATIVE EU/DL
WBC # BLD AUTO: 7.72 K/UL
WBC #/AREA URNS AUTO: 3 /HPF (ref 0–5)

## 2018-01-09 PROCEDURE — 99213 OFFICE O/P EST LOW 20 MIN: CPT | Mod: PBBFAC,PO,25 | Performed by: FAMILY MEDICINE

## 2018-01-09 PROCEDURE — 81001 URINALYSIS AUTO W/SCOPE: CPT

## 2018-01-09 PROCEDURE — 36415 COLL VENOUS BLD VENIPUNCTURE: CPT | Mod: PO

## 2018-01-09 PROCEDURE — 99214 OFFICE O/P EST MOD 30 MIN: CPT | Mod: S$PBB,,, | Performed by: FAMILY MEDICINE

## 2018-01-09 PROCEDURE — 83036 HEMOGLOBIN GLYCOSYLATED A1C: CPT

## 2018-01-09 PROCEDURE — 99999 PR PBB SHADOW E&M-EST. PATIENT-LVL III: CPT | Mod: PBBFAC,,, | Performed by: FAMILY MEDICINE

## 2018-01-09 PROCEDURE — 90686 IIV4 VACC NO PRSV 0.5 ML IM: CPT | Mod: PBBFAC,PO

## 2018-01-09 PROCEDURE — 85025 COMPLETE CBC W/AUTO DIFF WBC: CPT

## 2018-01-09 PROCEDURE — 80053 COMPREHEN METABOLIC PANEL: CPT

## 2018-01-09 NOTE — PROGRESS NOTES
Chief Complaint   Patient presents with    Pre-op Exam       HPI  Antoine Seals is a 52 y.o. female with multiple medical diagnoses as listed in the medical history and problem list that presents for evaluation for pre op visit for ex lap with possible hernia repair. She has been having some bloating and discomfort along with constipation. She had a stitch removed from a prior gastric bypass and has stopped having pain but is still having bloating and constipation. She has no hx of recent illness. She has no chest pain or shortness of breath, no wheezing, fever, chills or recent illness. She can climb two flights of stairs without difficulty, she did have a cardiac workup last year that was negative.     Her sugars have been elevated as she was not able to tolerate her medication dose increase. She has sugars as high as 270 and as low as 115. She has stopped drinking sodas but is now drinking sweetened tea. She does not follow a diet plan. She eats small meals due to her gastric bypass but often misses meals and then eats unhealthy foods.     PAST MEDICAL HISTORY:  Past Medical History:   Diagnosis Date    Diabetes mellitus type 2, noninsulin dependent 2016    Hx of essential hypertension     Hyperlipidemia     Hypertension, uncontrolled 2016    Nuclear sclerosis of both eyes 4/10/2017       PAST SURGICAL HISTORY:  Past Surgical History:   Procedure Laterality Date    BTL       SECTION      COLONOSCOPY N/A 2017    Procedure: COLONOSCOPY;  Surgeon: Oleg Enciso MD;  Location: [address];  Service: Endoscopy;  Laterality: N/A;  CHRONIC CONSTIPATION S/P LRNY    GASTRIC BYPASS      sandra en y    NECK SURGERY  2016       SOCIAL HISTORY:  Social History     Social History    Marital status:      Spouse name: N/A    Number of children: N/A    Years of education: N/A     Occupational History    Not on file.     Social History Main Topics     Smoking status: Never Smoker    Smokeless tobacco: Never Used    Alcohol use 0.0 oz/week      Comment: Socially    Drug use: No    Sexual activity: Yes     Partners: Male     Other Topics Concern    Not on file     Social History Narrative    No narrative on file       FAMILY HISTORY:  Family History   Problem Relation Age of Onset    Heart disease Mother     Diabetes type II Mother     Heart attack Father 50     Open heart surgery    No Known Problems Sister     No Known Problems Brother     No Known Problems Maternal Aunt     No Known Problems Maternal Uncle     No Known Problems Paternal Aunt     No Known Problems Paternal Uncle     No Known Problems Maternal Grandmother     No Known Problems Maternal Grandfather     No Known Problems Paternal Grandmother     No Known Problems Paternal Grandfather     Amblyopia Neg Hx     Blindness Neg Hx     Cancer Neg Hx     Cataracts Neg Hx     Diabetes Neg Hx     Glaucoma Neg Hx     Hypertension Neg Hx     Macular degeneration Neg Hx     Retinal detachment Neg Hx     Strabismus Neg Hx     Stroke Neg Hx     Thyroid disease Neg Hx        ALLERGIES AND MEDICATIONS: updated and reviewed.  Review of patient's allergies indicates:   Allergen Reactions    Lisinopril-hydrochlorothiazide Swelling     Facial swelling/ mouth swelling     Current Outpatient Prescriptions   Medication Sig Dispense Refill    amlodipine (NORVASC) 10 MG tablet Take 1 tablet (10 mg total) by mouth once daily. (Patient taking differently: Take 10 mg by mouth every morning. ) 90 tablet 3    atorvastatin (LIPITOR) 80 MG tablet Take 1 tablet (80 mg total) by mouth every morning. 90 tablet 3    b complex vitamins tablet Take 1 tablet by mouth every morning.       ergocalciferol (ERGOCALCIFEROL) 50,000 unit Cap Take 1 capsule (50,000 Units total) by mouth twice a week. 24 capsule 0    fluticasone (FLONASE) 50 mcg/actuation nasal spray 1 spray by Each Nare route 2 (two) times  daily. (Patient taking differently: 1 spray by Each Nare route 2 (two) times daily as needed. ) 1 Bottle 5    FOLIC ACID/MV,IRON,MIN (CENTRUM ORAL) Take 1 tablet by mouth every morning.      glipiZIDE (GLUCOTROL) 5 MG tablet TAKE 1 TABLET(5 MG) BY MOUTH TWICE DAILY WITH BREAKFAST 60 tablet 5    hydrochlorothiazide (HYDRODIURIL) 12.5 MG Tab Take 1 tablet (12.5 mg total) by mouth once daily. (Patient taking differently: Take 12.5 mg by mouth every morning. ) 30 tablet 5    metFORMIN (GLUCOPHAGE) 500 MG tablet Take 1 tablet by mouth 2 (two) times daily.  5    omeprazole (PRILOSEC) 20 MG capsule Open one capsule and add to yogurt.  Take 30 min before breakfast in am 30 capsule 2    ONETOUCH DELICA LANCETS 33 gauge Misc TEST BID  0    ONETOUCH ULTRA TEST Strp TEST BID  0    ONETOUCH ULTRA2 kit TEST BID UTD  0    polyethylene glycol (GLYCOLAX) 17 gram/dose powder Take 17 g by mouth daily as needed. 1700 g 3     No current facility-administered medications for this visit.        ROS  Review of Systems   Constitutional: Negative for chills, diaphoresis, fatigue, fever and unexpected weight change.   HENT: Negative for rhinorrhea, sinus pressure, sore throat and tinnitus.    Eyes: Negative for photophobia and visual disturbance.   Respiratory: Negative for cough, shortness of breath and wheezing.    Cardiovascular: Negative for chest pain and palpitations.   Gastrointestinal: Positive for abdominal distention and constipation. Negative for abdominal pain, blood in stool, diarrhea, nausea and vomiting.   Genitourinary: Negative for dysuria, flank pain, frequency and vaginal discharge.   Musculoskeletal: Negative for arthralgias and joint swelling.   Skin: Negative for rash.   Neurological: Negative for speech difficulty, weakness, light-headedness and headaches.   Psychiatric/Behavioral: Negative for behavioral problems and dysphoric mood.       Physical Exam  Vitals:    01/09/18 1005   BP: 118/84   Pulse: 88   Resp:  "18   Temp: 98 °F (36.7 °C)   TempSrc: Oral   SpO2: 97%   Weight: 87 kg (191 lb 12.8 oz)   Height: 5' 2" (1.575 m)    Body mass index is 35.08 kg/m².  Weight: 87 kg (191 lb 12.8 oz)   Height: 5' 2" (157.5 cm)     Physical Exam   Constitutional: She is oriented to person, place, and time. She appears well-developed and well-nourished. No distress.   HENT:   Head: Normocephalic and atraumatic.   Right Ear: Tympanic membrane normal.   Left Ear: Tympanic membrane normal.   Nose: Nose normal.   Mouth/Throat: No oropharyngeal exudate.   Eyes: EOM are normal.   Neck: Neck supple. No thyromegaly present.   Cardiovascular: Normal rate and regular rhythm.  Exam reveals no gallop and no friction rub.    No murmur heard.  Pulmonary/Chest: Effort normal and breath sounds normal. No respiratory distress. She has no wheezes. She has no rales.   Abdominal: Soft. Bowel sounds are normal. She exhibits no distension and no mass. There is no tenderness. There is no rebound and no guarding.   Lymphadenopathy:     She has no cervical adenopathy.   Neurological: She is alert and oriented to person, place, and time.   Skin: Skin is warm and dry. No rash noted.   Psychiatric: She has a normal mood and affect. Her behavior is normal.   Nursing note and vitals reviewed.      Health Maintenance       Date Due Completion Date    Influenza Vaccine 08/01/2017 9/2/2016    Override on 9/28/2015: Done    Hemoglobin A1c 01/20/2018 7/20/2017    Eye Exam 04/10/2018 4/10/2017 (Done)    Override on 4/10/2017: Done    Urine Microalbumin 07/20/2018 7/20/2017    Lipid Panel 08/16/2018 8/16/2017    Foot Exam 08/30/2018 8/30/2017 (Done)    Override on 8/30/2017: Done    Override on 12/28/2016: Done    Mammogram 12/29/2018 12/29/2016    Pap Smear with HPV Cotest 09/05/2020 9/5/2017    TETANUS VACCINE 09/07/2026 9/7/2016    Colonoscopy 06/14/2027 6/14/2017    Pneumococcal PPSV23 (Medium Risk) (2) 01/29/2032 9/7/2016            ASSESSMENT     1. Pre-op evaluation "    2. Diabetes mellitus type 2, noninsulin dependent    3. Periumbilical abdominal pain    4. Need for immunization against influenza    5. Encounter for screening mammogram for breast cancer        PLAN:     Problem List Items Addressed This Visit        Endocrine    Diabetes mellitus type 2, noninsulin dependent  -check A1C, will need to hold medications when NPO   -may consult endocrine pending A1C  -needs to switch to artificial sweetener, avoid sodas, meals every four to six hours, decrease starches    Relevant Orders    Hemoglobin A1c       GI    Abdominal pain  -hopefully this will improve post op if hernia is present      Other Visit Diagnoses     Pre-op evaluation    -  Primary  -she is low risk for non cardiac procedure, stable and optimal at this time, would like better glycemic control as this can affect healing    Relevant Orders    CBC auto differential    Comprehensive metabolic panel    Urinalysis    Need for immunization against influenza      -as ordered       Relevant Orders    Influenza - Quadrivalent (3 years & older) (PF) (Completed)    Encounter for screening mammogram for breast cancer      -as ordered       Relevant Orders    Mammo Digital Screening Bilat with CAD            Katharine Chung MD  01/09/2018 10:55 AM        Return in about 1 month (around 2/9/2018) for Follow up.

## 2018-01-09 NOTE — PROGRESS NOTES
Patient tolerated vaccination well. VIS given to patient. Patient instructed to wait 15 min before leaving. Patient verbalized understanding.

## 2018-01-10 ENCOUNTER — TELEPHONE (OUTPATIENT)
Dept: FAMILY MEDICINE | Facility: CLINIC | Age: 53
End: 2018-01-10

## 2018-01-10 DIAGNOSIS — R31.9 HEMATURIA, UNSPECIFIED TYPE: Primary | ICD-10-CM

## 2018-01-10 DIAGNOSIS — E11.9 TYPE 2 DIABETES MELLITUS WITHOUT OPHTHALMIC MANIFESTATIONS: ICD-10-CM

## 2018-01-10 NOTE — TELEPHONE ENCOUNTER
----- Message from Mary Grace Hudson sent at 1/10/2018  3:52 PM CST -----  Contact: self  Pt returned call. Please call [phone].

## 2018-01-10 NOTE — TELEPHONE ENCOUNTER
She has blood in her urine and this has been present for several urine tests, has she been evaluated for this by a urologist, if not then we will refer her to one

## 2018-01-11 NOTE — PRE-PROCEDURE INSTRUCTIONS
Preop instructions: NPO after midnight or 8 hours prior to procedure time, shower instructions, directions, leave all valuables at home, medication instructions for PM prior & am of procedure explained. Patient stated an understanding.  Patient denies any side effects or issues with anesthesia or sedation.  Patient does not know arrival time. Explained that this information comes from the surgeons office and if they have not heard from them by 3pm tomorrow, to call office. Patient stated an understanding.

## 2018-01-12 ENCOUNTER — TELEPHONE (OUTPATIENT)
Dept: BARIATRICS | Facility: CLINIC | Age: 53
End: 2018-01-12

## 2018-01-12 RX ORDER — BLOOD SUGAR DIAGNOSTIC
STRIP MISCELLANEOUS
Qty: 100 EACH | Refills: 5 | Status: SHIPPED | OUTPATIENT
Start: 2018-01-12 | End: 2019-02-13 | Stop reason: SDUPTHER

## 2018-01-12 NOTE — TELEPHONE ENCOUNTER
Notified patient of arrival time to the [facility] at 0650 with expected surgery start time 0850 on 1/15/18.   Instructed patient regarding pre-op instructions including npo status, showering and preop medications to hold/take per anesthesia/preop.  Instructed patient on the s/s of dehydration and for patient to call at the first sign of dehydration.  Informed patient that someone from bariatrics will be call them 1 week post op to review diet/fluid intake and to ensure adequate hydration.   Pt verbalized understanding. Pt given office phone number for any additional questions/concerns.

## 2018-01-15 ENCOUNTER — TELEPHONE (OUTPATIENT)
Dept: FAMILY MEDICINE | Facility: CLINIC | Age: 53
End: 2018-01-15

## 2018-01-15 ENCOUNTER — ANESTHESIA (OUTPATIENT)
Dept: SURGERY | Facility: HOSPITAL | Age: 53
End: 2018-01-15
Payer: MEDICAID

## 2018-01-15 ENCOUNTER — HOSPITAL ENCOUNTER (OUTPATIENT)
Facility: HOSPITAL | Age: 53
LOS: 1 days | Discharge: HOME | End: 2018-01-15
Attending: SURGERY | Admitting: SURGERY
Payer: MEDICAID

## 2018-01-15 ENCOUNTER — ANESTHESIA EVENT (OUTPATIENT)
Dept: SURGERY | Facility: HOSPITAL | Age: 53
End: 2018-01-15
Payer: MEDICAID

## 2018-01-15 VITALS
RESPIRATION RATE: 18 BRPM | DIASTOLIC BLOOD PRESSURE: 67 MMHG | HEART RATE: 88 BPM | BODY MASS INDEX: 34.96 KG/M2 | WEIGHT: 190 LBS | SYSTOLIC BLOOD PRESSURE: 117 MMHG | HEIGHT: 62 IN | OXYGEN SATURATION: 100 % | TEMPERATURE: 98 F

## 2018-01-15 DIAGNOSIS — R10.33 PERIUMBILICAL ABDOMINAL PAIN: Primary | ICD-10-CM

## 2018-01-15 DIAGNOSIS — E11.9 DIABETES MELLITUS TYPE 2, NONINSULIN DEPENDENT: Primary | ICD-10-CM

## 2018-01-15 DIAGNOSIS — R10.84 GENERALIZED ABDOMINAL PAIN: ICD-10-CM

## 2018-01-15 DIAGNOSIS — Z98.84 GASTRIC BYPASS STATUS FOR OBESITY: ICD-10-CM

## 2018-01-15 LAB
POCT GLUCOSE: 213 MG/DL (ref 70–110)
POCT GLUCOSE: 233 MG/DL (ref 70–110)
POCT GLUCOSE: 234 MG/DL (ref 70–110)

## 2018-01-15 PROCEDURE — 82962 GLUCOSE BLOOD TEST: CPT | Performed by: SURGERY

## 2018-01-15 PROCEDURE — 25000003 PHARM REV CODE 250: Performed by: SURGERY

## 2018-01-15 PROCEDURE — 44238 UNLISTED LAPS PX INTESTINE: CPT | Mod: ,,, | Performed by: SURGERY

## 2018-01-15 PROCEDURE — 63600175 PHARM REV CODE 636 W HCPCS: Performed by: NURSE ANESTHETIST, CERTIFIED REGISTERED

## 2018-01-15 PROCEDURE — 94761 N-INVAS EAR/PLS OXIMETRY MLT: CPT | Mod: 59

## 2018-01-15 PROCEDURE — 63600175 PHARM REV CODE 636 W HCPCS: Performed by: SURGERY

## 2018-01-15 PROCEDURE — 71000016 HC POSTOP RECOV ADDL HR: Performed by: SURGERY

## 2018-01-15 PROCEDURE — 71000039 HC RECOVERY, EACH ADD'L HOUR: Performed by: SURGERY

## 2018-01-15 PROCEDURE — 27000221 HC OXYGEN, UP TO 24 HOURS

## 2018-01-15 PROCEDURE — 71000015 HC POSTOP RECOV 1ST HR: Performed by: SURGERY

## 2018-01-15 PROCEDURE — 63600175 PHARM REV CODE 636 W HCPCS: Performed by: ANESTHESIOLOGY

## 2018-01-15 PROCEDURE — D9220A PRA ANESTHESIA: Mod: CRNA,,, | Performed by: NURSE ANESTHETIST, CERTIFIED REGISTERED

## 2018-01-15 PROCEDURE — 36000708 HC OR TIME LEV III 1ST 15 MIN: Performed by: SURGERY

## 2018-01-15 PROCEDURE — 37000008 HC ANESTHESIA 1ST 15 MINUTES: Performed by: SURGERY

## 2018-01-15 PROCEDURE — 36000709 HC OR TIME LEV III EA ADD 15 MIN: Performed by: SURGERY

## 2018-01-15 PROCEDURE — 71000033 HC RECOVERY, INTIAL HOUR: Performed by: SURGERY

## 2018-01-15 PROCEDURE — 25000003 PHARM REV CODE 250: Performed by: NURSE ANESTHETIST, CERTIFIED REGISTERED

## 2018-01-15 PROCEDURE — 37000009 HC ANESTHESIA EA ADD 15 MINS: Performed by: SURGERY

## 2018-01-15 PROCEDURE — 27201423 OPTIME MED/SURG SUP & DEVICES STERILE SUPPLY: Performed by: SURGERY

## 2018-01-15 PROCEDURE — D9220A PRA ANESTHESIA: Mod: ANES,,, | Performed by: ANESTHESIOLOGY

## 2018-01-15 RX ORDER — SUCCINYLCHOLINE CHLORIDE 20 MG/ML
INJECTION INTRAMUSCULAR; INTRAVENOUS
Status: DISCONTINUED | OUTPATIENT
Start: 2018-01-15 | End: 2018-01-15

## 2018-01-15 RX ORDER — ONDANSETRON 2 MG/ML
INJECTION INTRAMUSCULAR; INTRAVENOUS
Status: DISCONTINUED | OUTPATIENT
Start: 2018-01-15 | End: 2018-01-15

## 2018-01-15 RX ORDER — ACETAMINOPHEN 10 MG/ML
INJECTION, SOLUTION INTRAVENOUS
Status: DISCONTINUED | OUTPATIENT
Start: 2018-01-15 | End: 2018-01-15

## 2018-01-15 RX ORDER — PROPOFOL 10 MG/ML
VIAL (ML) INTRAVENOUS
Status: DISCONTINUED | OUTPATIENT
Start: 2018-01-15 | End: 2018-01-15

## 2018-01-15 RX ORDER — OXYCODONE AND ACETAMINOPHEN 5; 325 MG/1; MG/1
1 TABLET ORAL EVERY 4 HOURS PRN
Status: DISCONTINUED | OUTPATIENT
Start: 2018-01-15 | End: 2018-01-15 | Stop reason: HOSPADM

## 2018-01-15 RX ORDER — NEOSTIGMINE METHYLSULFATE 1 MG/ML
INJECTION, SOLUTION INTRAVENOUS
Status: DISCONTINUED | OUTPATIENT
Start: 2018-01-15 | End: 2018-01-15

## 2018-01-15 RX ORDER — SODIUM CHLORIDE 9 MG/ML
INJECTION, SOLUTION INTRAVENOUS CONTINUOUS
Status: DISCONTINUED | OUTPATIENT
Start: 2018-01-15 | End: 2018-01-15 | Stop reason: HOSPADM

## 2018-01-15 RX ORDER — ROCURONIUM BROMIDE 10 MG/ML
INJECTION, SOLUTION INTRAVENOUS
Status: DISCONTINUED | OUTPATIENT
Start: 2018-01-15 | End: 2018-01-15

## 2018-01-15 RX ORDER — ONDANSETRON 2 MG/ML
4 INJECTION INTRAMUSCULAR; INTRAVENOUS DAILY PRN
Status: DISCONTINUED | OUTPATIENT
Start: 2018-01-15 | End: 2018-01-15 | Stop reason: HOSPADM

## 2018-01-15 RX ORDER — LIDOCAINE HCL/PF 100 MG/5ML
SYRINGE (ML) INTRAVENOUS
Status: DISCONTINUED | OUTPATIENT
Start: 2018-01-15 | End: 2018-01-15

## 2018-01-15 RX ORDER — MIDAZOLAM HYDROCHLORIDE 1 MG/ML
INJECTION, SOLUTION INTRAMUSCULAR; INTRAVENOUS
Status: DISCONTINUED | OUTPATIENT
Start: 2018-01-15 | End: 2018-01-15

## 2018-01-15 RX ORDER — GLYCOPYRROLATE 0.2 MG/ML
INJECTION INTRAMUSCULAR; INTRAVENOUS
Status: DISCONTINUED | OUTPATIENT
Start: 2018-01-15 | End: 2018-01-15

## 2018-01-15 RX ORDER — OXYCODONE AND ACETAMINOPHEN 5; 325 MG/1; MG/1
TABLET ORAL
Status: DISCONTINUED
Start: 2018-01-15 | End: 2018-01-15 | Stop reason: HOSPADM

## 2018-01-15 RX ORDER — CEFAZOLIN SODIUM 1 G/3ML
2 INJECTION, POWDER, FOR SOLUTION INTRAMUSCULAR; INTRAVENOUS
Status: DISCONTINUED | OUTPATIENT
Start: 2018-01-15 | End: 2018-01-15 | Stop reason: HOSPADM

## 2018-01-15 RX ORDER — OXYCODONE AND ACETAMINOPHEN 5; 325 MG/1; MG/1
1 TABLET ORAL EVERY 4 HOURS PRN
Qty: 31 TABLET | Refills: 0 | Status: SHIPPED | OUTPATIENT
Start: 2018-01-15 | End: 2018-03-09 | Stop reason: ALTCHOICE

## 2018-01-15 RX ORDER — LIDOCAINE HYDROCHLORIDE 10 MG/ML
1 INJECTION, SOLUTION EPIDURAL; INFILTRATION; INTRACAUDAL; PERINEURAL ONCE
Status: COMPLETED | OUTPATIENT
Start: 2018-01-15 | End: 2018-01-15

## 2018-01-15 RX ORDER — CHOLECALCIFEROL (VITAMIN D3) 25 MCG
1000 TABLET ORAL DAILY
COMMUNITY
End: 2018-03-14 | Stop reason: CLARIF

## 2018-01-15 RX ORDER — SODIUM CHLORIDE 0.9 % (FLUSH) 0.9 %
3 SYRINGE (ML) INJECTION
Status: DISCONTINUED | OUTPATIENT
Start: 2018-01-15 | End: 2018-01-15 | Stop reason: HOSPADM

## 2018-01-15 RX ORDER — FENTANYL CITRATE 50 UG/ML
INJECTION, SOLUTION INTRAMUSCULAR; INTRAVENOUS
Status: DISCONTINUED | OUTPATIENT
Start: 2018-01-15 | End: 2018-01-15

## 2018-01-15 RX ORDER — FENTANYL CITRATE 50 UG/ML
25 INJECTION, SOLUTION INTRAMUSCULAR; INTRAVENOUS EVERY 5 MIN PRN
Status: DISCONTINUED | OUTPATIENT
Start: 2018-01-15 | End: 2018-01-15 | Stop reason: HOSPADM

## 2018-01-15 RX ORDER — BUPIVACAINE HYDROCHLORIDE 2.5 MG/ML
INJECTION, SOLUTION EPIDURAL; INFILTRATION; INTRACAUDAL
Status: DISCONTINUED | OUTPATIENT
Start: 2018-01-15 | End: 2018-01-15 | Stop reason: HOSPADM

## 2018-01-15 RX ORDER — AMOXICILLIN 250 MG
1 CAPSULE ORAL DAILY
COMMUNITY
Start: 2018-01-15 | End: 2018-03-09 | Stop reason: ALTCHOICE

## 2018-01-15 RX ADMIN — PROPOFOL 150 MG: 10 INJECTION, EMULSION INTRAVENOUS at 09:01

## 2018-01-15 RX ADMIN — FENTANYL CITRATE 25 MCG: 50 INJECTION, SOLUTION INTRAMUSCULAR; INTRAVENOUS at 10:01

## 2018-01-15 RX ADMIN — GLYCOPYRROLATE 0.6 MG: 0.2 INJECTION, SOLUTION INTRAMUSCULAR; INTRAVENOUS at 10:01

## 2018-01-15 RX ADMIN — SODIUM CHLORIDE, SODIUM GLUCONATE, SODIUM ACETATE, POTASSIUM CHLORIDE, MAGNESIUM CHLORIDE, SODIUM PHOSPHATE, DIBASIC, AND POTASSIUM PHOSPHATE: .53; .5; .37; .037; .03; .012; .00082 INJECTION, SOLUTION INTRAVENOUS at 10:01

## 2018-01-15 RX ADMIN — MIDAZOLAM HYDROCHLORIDE 2 MG: 1 INJECTION, SOLUTION INTRAMUSCULAR; INTRAVENOUS at 09:01

## 2018-01-15 RX ADMIN — OXYCODONE HYDROCHLORIDE AND ACETAMINOPHEN 1 TABLET: 5; 325 TABLET ORAL at 11:01

## 2018-01-15 RX ADMIN — ONDANSETRON 4 MG: 2 INJECTION INTRAMUSCULAR; INTRAVENOUS at 10:01

## 2018-01-15 RX ADMIN — FENTANYL CITRATE 50 MCG: 50 INJECTION, SOLUTION INTRAMUSCULAR; INTRAVENOUS at 09:01

## 2018-01-15 RX ADMIN — FENTANYL CITRATE 100 MCG: 50 INJECTION, SOLUTION INTRAMUSCULAR; INTRAVENOUS at 09:01

## 2018-01-15 RX ADMIN — SODIUM CHLORIDE: 0.9 INJECTION, SOLUTION INTRAVENOUS at 08:01

## 2018-01-15 RX ADMIN — ROCURONIUM BROMIDE 35 MG: 10 INJECTION, SOLUTION INTRAVENOUS at 09:01

## 2018-01-15 RX ADMIN — DEXTROSE 2 G: 50 INJECTION, SOLUTION INTRAVENOUS at 09:01

## 2018-01-15 RX ADMIN — SUCCINYLCHOLINE CHLORIDE 120 MG: 20 INJECTION, SOLUTION INTRAMUSCULAR; INTRAVENOUS at 09:01

## 2018-01-15 RX ADMIN — ACETAMINOPHEN 1000 MG: 10 INJECTION, SOLUTION INTRAVENOUS at 09:01

## 2018-01-15 RX ADMIN — NEOSTIGMINE METHYLSULFATE 5 MG: 1 INJECTION INTRAVENOUS at 10:01

## 2018-01-15 RX ADMIN — ROCURONIUM BROMIDE 5 MG: 10 INJECTION, SOLUTION INTRAVENOUS at 09:01

## 2018-01-15 RX ADMIN — LIDOCAINE HYDROCHLORIDE 0.2 MG: 10 INJECTION, SOLUTION EPIDURAL; INFILTRATION; INTRACAUDAL; PERINEURAL at 08:01

## 2018-01-15 RX ADMIN — FENTANYL CITRATE 25 MCG: 50 INJECTION, SOLUTION INTRAMUSCULAR; INTRAVENOUS at 12:01

## 2018-01-15 RX ADMIN — LIDOCAINE HYDROCHLORIDE 100 MG: 20 INJECTION, SOLUTION INTRAVENOUS at 09:01

## 2018-01-15 NOTE — TELEPHONE ENCOUNTER
Patient was advised. Patient was asking about her A1C and also was an referral placed for her eating habits?

## 2018-01-15 NOTE — DISCHARGE INSTRUCTIONS
Remove dressing in 48 hours         When to call your healthcare provider  Call your healthcare provider immediately if you have any of the following:  · Yellowing of your eyes or skin (jaundice)  · Chills  · Fever of 100.4°F (38.0°C) or higher, or as directed by your healthcare provider   · Redness, swelling, increasing pain, pus, or a foul smell at the incision site  · Dark or rust-colored urine  · Stool that is melissa-colored or light in color instead of brown  · Increasing belly pain  · Rectal bleeding  · Leg swelling or shortness of breath

## 2018-01-15 NOTE — ANESTHESIA PREPROCEDURE EVALUATION
01/15/2018  Antoine Seals is a 52 y.o., female.    Anesthesia Evaluation    I have reviewed the Patient Summary Reports.    I have reviewed the Nursing Notes.   I have reviewed the Medications.     Review of Systems  Anesthesia Hx:  No problems with previous Anesthesia  History of prior surgery of interest to airway management or planning: gastric bypass. Previous anesthesia: General Denies Family Hx of Anesthesia complications.   Denies Personal Hx of Anesthesia complications.   Cardiovascular:   Exercise tolerance: good Hypertension    Endocrine:   Diabetes        Physical Exam  General:  Well nourished    Airway/Jaw/Neck:  Airway Findings: Mouth Opening: Normal Tongue: Normal  General Airway Assessment: Adult  Mallampati: II  Improves to II with phonation.  TM Distance: Normal, at least 6 cm  Jaw/Neck Findings:  Neck ROM: Normal ROM      Dental:  Dental Findings: In tact   Chest/Lungs:  Chest/Lungs Findings: Clear to auscultation, Normal Respiratory Rate     Heart/Vascular:  Heart Findings: Rate: Normal  Rhythm: Regular Rhythm  Sounds: Normal        Mental Status:  Mental Status Findings:  Cooperative, Alert and Oriented         Anesthesia Plan  Type of Anesthesia, risks & benefits discussed:  Anesthesia Type:  general  Patient's Preference:   Intra-op Monitoring Plan: standard ASA monitors  Intra-op Monitoring Plan Comments:   Post Op Pain Control Plan: multimodal analgesia  Post Op Pain Control Plan Comments:   Induction:   IV  Beta Blocker:  Patient is not currently on a Beta-Blocker (No further documentation required).       Informed Consent: Patient understands risks and agrees with Anesthesia plan.  Questions answered. Anesthesia consent signed with patient.  ASA Score: 3     Day of Surgery Review of History & Physical:    H&P update referred to the surgeon.         Ready For Surgery From  Anesthesia Perspective.

## 2018-01-15 NOTE — PROGRESS NOTES
Patient states that she feels better after eating crackers and drinking soda, states she feels she's ready to get dressed and discharge. Patient ambulated to restroom with no problems. PIV removed. Patient getting dressed.

## 2018-01-15 NOTE — BRIEF OP NOTE
[facility]  Brief Operative Note     SUMMARY     Surgery Date: 1/15/2018     Surgeon(s) and Role:     * Corona Mcghee MD - Resident - Assisting     * Corona Quijano MD - Primary        Pre-op Diagnosis:  Status post bariatric surgery [Z98.84]  Abdominal pain of unknown etiology [R10.9]    Post-op Diagnosis:  Post-Op Diagnosis Codes:     * Status post bariatric surgery [Z98.84]     * Abdominal pain of unknown etiology [R10.9]    Procedure(s) (LRB):  LAPAROSCOPY-DIAGNOSTIC (N/A)    Anesthesia: General        Findings/Key Components: Small internal hernia at JJ repaired with figure of eight permanent 2-0 suture    Estimated Blood Loss: * No values recorded between 1/15/2018  9:45 AM and 1/15/2018 10:31 AM *         Specimens:   Specimen (12h ago through future)    None          Discharge Note    SUMMARY     Admit Date: 1/15/2018    Discharge Date and Time:  01/15/2018 11:35 AM    Hospital Course (synopsis of major diagnoses, care, treatment, and services provided during the course of the hospital stay): Pt admitted for outpatient procedure, tolerated procedure well, no complications.  Pt discharged home in stable condition.       Final Diagnosis: Post-Op Diagnosis Codes:     * Status post bariatric surgery [Z98.84]     * Abdominal pain of unknown etiology [R10.9]    Disposition: Home or Self Care    Follow Up/Patient Instructions:     Medications:  Reconciled Home Medications:   Current Discharge Medication List      START taking these medications    Details   oxyCODONE-acetaminophen (PERCOCET) 5-325 mg per tablet Take 1 tablet by mouth every 4 (four) hours as needed.  Qty: 31 tablet, Refills: 0      senna-docusate 8.6-50 mg (SENNA WITH DOCUSATE SODIUM) 8.6-50 mg per tablet Take 1 tablet by mouth once daily.         CONTINUE these medications which have NOT CHANGED    Details   amlodipine (NORVASC) 10 MG tablet Take 1 tablet (10 mg total) by mouth once daily.  Qty: 90 tablet, Refills: 3     Associated Diagnoses: Essential hypertension, benign      atorvastatin (LIPITOR) 80 MG tablet Take 1 tablet (80 mg total) by mouth every morning.  Qty: 90 tablet, Refills: 3      b complex vitamins tablet Take 1 tablet by mouth every morning.       FOLIC ACID/MV,IRON,MIN (CENTRUM ORAL) Take 1 tablet by mouth every morning.      glipiZIDE (GLUCOTROL) 5 MG tablet TAKE 1 TABLET(5 MG) BY MOUTH TWICE DAILY WITH BREAKFAST  Qty: 60 tablet, Refills: 5    Associated Diagnoses: Diabetes mellitus type 2, noninsulin dependent      hydrochlorothiazide (HYDRODIURIL) 12.5 MG Tab Take 1 tablet (12.5 mg total) by mouth once daily.  Qty: 30 tablet, Refills: 5    Associated Diagnoses: Essential hypertension      metFORMIN (GLUCOPHAGE) 500 MG tablet Take 1 tablet by mouth 2 (two) times daily.  Refills: 5      polyethylene glycol (GLYCOLAX) 17 gram/dose powder Take 17 g by mouth daily as needed.  Qty: 1700 g, Refills: 3    Associated Diagnoses: Constipation, chronic; Periumbilical abdominal pain      vitamin D 1000 units Tab Take 1,000 Units by mouth once daily.      ONETOUCH DELICA LANCETS 33 gauge Misc TEST BID  Refills: 0      ONETOUCH ULTRA TEST Strp TEST BID  Qty: 100 each, Refills: 5    Associated Diagnoses: Type 2 diabetes mellitus without ophthalmic manifestations      ONETOUCH ULTRA2 kit TEST BID UTD  Refills: 0             Discharge Procedure Orders  Diet Adult Regular     Lifting restrictions   Order Comments: No lifting more than 10 pounds for 2 weeks     Shower on day dressing removed (No bath)     Notify your health care provider if you experience any of the following:  redness, tenderness, or signs of infection (pain, swelling, redness, odor or green/yellow discharge around incision site)     Notify your health care provider if you experience any of the following:  severe uncontrolled pain     Notify your health care provider if you experience any of the following:  persistent nausea and vomiting or diarrhea     Notify  your health care provider if you experience any of the following:  temperature >100.4     Remove dressing in 48 hours       Follow-up Information     Corona Quijano MD In 2 weeks.    Specialties:  General Surgery, Bariatrics  Contact information:  [address]  [facility] [address] [phone]

## 2018-01-15 NOTE — ANESTHESIA POSTPROCEDURE EVALUATION
"Anesthesia Post Evaluation    Patient: Antoine Seals    Procedure(s) Performed: Procedure(s) (LRB):  LAPAROSCOPY-DIAGNOSTIC (N/A)    Final Anesthesia Type: general  Patient location during evaluation: PACU  Patient participation: Yes- Able to Participate  Level of consciousness: awake and alert  Post-procedure vital signs: reviewed and stable  Pain management: adequate  Airway patency: patent  PONV status at discharge: No PONV  Anesthetic complications: no      Cardiovascular status: blood pressure returned to baseline  Respiratory status: unassisted  Hydration status: euvolemic  Follow-up not needed.        Visit Vitals  BP (!) 100/56   Pulse 75   Temp 36.4 °C (97.5 °F) (Axillary)   Resp 16   Ht 5' 2" (1.575 m)   Wt 86.2 kg (190 lb)   LMP 12/20/2017   SpO2 100%   Breastfeeding? No   BMI 34.75 kg/m²       Pain/Dangelo Score: Pain Assessment Performed: Yes (1/15/2018 10:33 AM)  Presence of Pain: non-verbal indicators absent (1/15/2018 10:33 AM)  Dangelo Score: 8 (1/15/2018 10:33 AM)      "

## 2018-01-15 NOTE — PLAN OF CARE
Patient tolerated procedure/anesthesia well, vss, no distress noted or reported, pain managed will with meds, denies nausea, tolerates PO without difficulties. Discharge instructions and scripts reviewed with patient and family, verbalize understanding. Consents with chart.

## 2018-01-15 NOTE — TRANSFER OF CARE
"Anesthesia Transfer of Care Note    Patient: Antoine Seals    Procedure(s) Performed: Procedure(s) (LRB):  LAPAROSCOPY-DIAGNOSTIC (N/A)    Patient location: PACU    Anesthesia Type: general    Transport from OR: Transported from OR on 6-10 L/min O2 by face mask with adequate spontaneous ventilation    Post pain: adequate analgesia    Post assessment: no apparent anesthetic complications and tolerated procedure well    Post vital signs: stable    Level of consciousness: sedated and responds to stimulation    Nausea/Vomiting: no nausea/vomiting    Complications: none    Transfer of care protocol was followed      Last vitals:   Visit Vitals  BP (!) 123/91 (BP Location: Right arm, Patient Position: Lying)   Pulse 101   Temp 36.4 °C (97.5 °F) (Axillary)   Resp 16   Ht 5' 2" (1.575 m)   Wt 86.2 kg (190 lb)   LMP 12/20/2017   SpO2 97%   Breastfeeding? No   BMI 34.75 kg/m²     "

## 2018-01-15 NOTE — BRIEF OP NOTE
Operative Note       Surgery Date: 1/15/2018     Surgeon(s) and Role:     * Corona Mcghee MD - Resident - Assisting     * Corona Quijano MD - Primary    Pre-op Diagnosis:  Status post bariatric surgery [Z98.84]  Abdominal pain of unknown etiology [R10.9]    Post-op Diagnosis:  Status post bariatric surgery [Z98.84]  Abdominal pain of unknown etiology [R10.9]    Procedure(s) (LRB):  LAPAROSCOPY-DIAGNOSTIC (N/A)    Anesthesia: General    Procedure in Detail/Findings:  Large amount of debris in very deep navel removed and cleaned.  Small jj defect repaired.    Estimated Blood Loss: Minimal           Specimens     None        Implants: * No implants in log *           Disposition: PACU - hemodynamically stable.           Condition: Good    Attestation:  I was present and scrubbed for the entire procedure.

## 2018-01-15 NOTE — TELEPHONE ENCOUNTER
----- Message from Deana Brambila sent at 1/15/2018  4:24 PM CST -----  Contact: Self  Pt returning call. [phone]

## 2018-01-15 NOTE — OP NOTE
[facility]  Surgery Department  Operative Note    SUMMARY     Date of Procedure: 1/15/2018     Procedure: Procedure(s) (LRB):  LAPAROSCOPY-DIAGNOSTIC (N/A)     Surgeon(s) and Role:     * Corona Mcghee MD - Resident - Assisting     * Corona Quijano MD - Primary        Pre-Operative Diagnosis: Status post bariatric surgery [Z98.84]  Abdominal pain of unknown etiology [R10.9]    Post-Operative Diagnosis: Post-Op Diagnosis Codes:     * Status post bariatric surgery [Z98.84]     * Abdominal pain of unknown etiology [R10.9]    Anesthesia: General    Technical Procedures Used:   1.  Diagnostic laparoscopic  2.  Repair of internal hernia at jejunojejunostomy    Description of the Findings of the Procedure:   Prior to proceeding to the operating room, the procedure was described in detail to the patient, all questions were answered and appropriate consent was obtained.  The patient was then taken to the operating room and placed in the supine position.  General anesthesia was induced.  The patient was prepped and draped in standard fashion.  A timeout was performed.  An incision was made through the umbilicus with an 11 blade scalpel.  The fascia was grasped with kocher clamps and was incised with straight garibay scissors.  The abdomen was then entered under direct vision with an optiview trocar.  The abdomen was insufflated to 15mmHg.  Two ports were placed in the left abdomen under direct vision, one 12mm and one 5mm.  The gastrojejunostomy was inspected and found to be normal.  The transverse colon was then elevated and the sandra limb was inspected without any abnormalities noted.  The jejunojunostomy was then inspected and found to be patent.  A very small mesenteric defect was noted at the jejunojejunostomy was noted and this was repaired with one 2-0 neurolon suture in figure of eight fashion using the EndoStitch device.  Ports were then removed under direct vision.  The umbilical port site  was closed with one 0 Vicryl suture in figure of eight fashion.  The skin was closed with 4-0 plain gut.  Steri strips and bandaids were applied.  All counts were correct.  The patient tolerated the procedure well. No complications occurred during or immediately after the procedure. The patient was taken to the PACU satisfactory condition.     Complications: No    Estimated Blood Loss (EBL): 10 cc           Implants: * No implants in log *    Specimens:   Specimen (12h ago through future)    None                  Condition: Good    Disposition: PACU - hemodynamically stable.

## 2018-01-15 NOTE — INTERVAL H&P NOTE
The patient has been examined and the H&P has been reviewed:    I concur with the findings and no changes have occurred since H&P was written.    Anesthesia/Surgery risks, benefits and alternative options discussed and understood by patient/family.          Active Hospital Problems    Diagnosis  POA    Generalized abdominal pain [R10.84]  Yes      Resolved Hospital Problems    Diagnosis Date Resolved POA   No resolved problems to display.

## 2018-01-15 NOTE — H&P (VIEW-ONLY)
Chief Complaint   Patient presents with    Pre-op Exam       HPI  Antoine Seals is a 52 y.o. female with multiple medical diagnoses as listed in the medical history and problem list that presents for evaluation for pre op visit for ex lap with possible hernia repair. She has been having some bloating and discomfort along with constipation. She had a stitch removed from a prior gastric bypass and has stopped having pain but is still having bloating and constipation. She has no hx of recent illness. She has no chest pain or shortness of breath, no wheezing, fever, chills or recent illness. She can climb two flights of stairs without difficulty, she did have a cardiac workup last year that was negative.     Her sugars have been elevated as she was not able to tolerate her medication dose increase. She has sugars as high as 270 and as low as 115. She has stopped drinking sodas but is now drinking sweetened tea. She does not follow a diet plan. She eats small meals due to her gastric bypass but often misses meals and then eats unhealthy foods.     PAST MEDICAL HISTORY:  Past Medical History:   Diagnosis Date    Diabetes mellitus type 2, noninsulin dependent 2016    Hx of essential hypertension     Hyperlipidemia     Hypertension, uncontrolled 2016    Nuclear sclerosis of both eyes 4/10/2017       PAST SURGICAL HISTORY:  Past Surgical History:   Procedure Laterality Date    BTL       SECTION      COLONOSCOPY N/A 2017    Procedure: COLONOSCOPY;  Surgeon: Oleg Enciso MD;  Location: [address];  Service: Endoscopy;  Laterality: N/A;  CHRONIC CONSTIPATION S/P LRNY    GASTRIC BYPASS      sandra en y    NECK SURGERY  2016       SOCIAL HISTORY:  Social History     Social History    Marital status:      Spouse name: N/A    Number of children: N/A    Years of education: N/A     Occupational History    Not on file.     Social History Main Topics     Smoking status: Never Smoker    Smokeless tobacco: Never Used    Alcohol use 0.0 oz/week      Comment: Socially    Drug use: No    Sexual activity: Yes     Partners: Male     Other Topics Concern    Not on file     Social History Narrative    No narrative on file       FAMILY HISTORY:  Family History   Problem Relation Age of Onset    Heart disease Mother     Diabetes type II Mother     Heart attack Father 50     Open heart surgery    No Known Problems Sister     No Known Problems Brother     No Known Problems Maternal Aunt     No Known Problems Maternal Uncle     No Known Problems Paternal Aunt     No Known Problems Paternal Uncle     No Known Problems Maternal Grandmother     No Known Problems Maternal Grandfather     No Known Problems Paternal Grandmother     No Known Problems Paternal Grandfather     Amblyopia Neg Hx     Blindness Neg Hx     Cancer Neg Hx     Cataracts Neg Hx     Diabetes Neg Hx     Glaucoma Neg Hx     Hypertension Neg Hx     Macular degeneration Neg Hx     Retinal detachment Neg Hx     Strabismus Neg Hx     Stroke Neg Hx     Thyroid disease Neg Hx        ALLERGIES AND MEDICATIONS: updated and reviewed.  Review of patient's allergies indicates:   Allergen Reactions    Lisinopril-hydrochlorothiazide Swelling     Facial swelling/ mouth swelling     Current Outpatient Prescriptions   Medication Sig Dispense Refill    amlodipine (NORVASC) 10 MG tablet Take 1 tablet (10 mg total) by mouth once daily. (Patient taking differently: Take 10 mg by mouth every morning. ) 90 tablet 3    atorvastatin (LIPITOR) 80 MG tablet Take 1 tablet (80 mg total) by mouth every morning. 90 tablet 3    b complex vitamins tablet Take 1 tablet by mouth every morning.       ergocalciferol (ERGOCALCIFEROL) 50,000 unit Cap Take 1 capsule (50,000 Units total) by mouth twice a week. 24 capsule 0    fluticasone (FLONASE) 50 mcg/actuation nasal spray 1 spray by Each Nare route 2 (two) times  daily. (Patient taking differently: 1 spray by Each Nare route 2 (two) times daily as needed. ) 1 Bottle 5    FOLIC ACID/MV,IRON,MIN (CENTRUM ORAL) Take 1 tablet by mouth every morning.      glipiZIDE (GLUCOTROL) 5 MG tablet TAKE 1 TABLET(5 MG) BY MOUTH TWICE DAILY WITH BREAKFAST 60 tablet 5    hydrochlorothiazide (HYDRODIURIL) 12.5 MG Tab Take 1 tablet (12.5 mg total) by mouth once daily. (Patient taking differently: Take 12.5 mg by mouth every morning. ) 30 tablet 5    metFORMIN (GLUCOPHAGE) 500 MG tablet Take 1 tablet by mouth 2 (two) times daily.  5    omeprazole (PRILOSEC) 20 MG capsule Open one capsule and add to yogurt.  Take 30 min before breakfast in am 30 capsule 2    ONETOUCH DELICA LANCETS 33 gauge Misc TEST BID  0    ONETOUCH ULTRA TEST Strp TEST BID  0    ONETOUCH ULTRA2 kit TEST BID UTD  0    polyethylene glycol (GLYCOLAX) 17 gram/dose powder Take 17 g by mouth daily as needed. 1700 g 3     No current facility-administered medications for this visit.        ROS  Review of Systems   Constitutional: Negative for chills, diaphoresis, fatigue, fever and unexpected weight change.   HENT: Negative for rhinorrhea, sinus pressure, sore throat and tinnitus.    Eyes: Negative for photophobia and visual disturbance.   Respiratory: Negative for cough, shortness of breath and wheezing.    Cardiovascular: Negative for chest pain and palpitations.   Gastrointestinal: Positive for abdominal distention and constipation. Negative for abdominal pain, blood in stool, diarrhea, nausea and vomiting.   Genitourinary: Negative for dysuria, flank pain, frequency and vaginal discharge.   Musculoskeletal: Negative for arthralgias and joint swelling.   Skin: Negative for rash.   Neurological: Negative for speech difficulty, weakness, light-headedness and headaches.   Psychiatric/Behavioral: Negative for behavioral problems and dysphoric mood.       Physical Exam  Vitals:    01/09/18 1005   BP: 118/84   Pulse: 88   Resp:  "18   Temp: 98 °F (36.7 °C)   TempSrc: Oral   SpO2: 97%   Weight: 87 kg (191 lb 12.8 oz)   Height: 5' 2" (1.575 m)    Body mass index is 35.08 kg/m².  Weight: 87 kg (191 lb 12.8 oz)   Height: 5' 2" (157.5 cm)     Physical Exam   Constitutional: She is oriented to person, place, and time. She appears well-developed and well-nourished. No distress.   HENT:   Head: Normocephalic and atraumatic.   Right Ear: Tympanic membrane normal.   Left Ear: Tympanic membrane normal.   Nose: Nose normal.   Mouth/Throat: No oropharyngeal exudate.   Eyes: EOM are normal.   Neck: Neck supple. No thyromegaly present.   Cardiovascular: Normal rate and regular rhythm.  Exam reveals no gallop and no friction rub.    No murmur heard.  Pulmonary/Chest: Effort normal and breath sounds normal. No respiratory distress. She has no wheezes. She has no rales.   Abdominal: Soft. Bowel sounds are normal. She exhibits no distension and no mass. There is no tenderness. There is no rebound and no guarding.   Lymphadenopathy:     She has no cervical adenopathy.   Neurological: She is alert and oriented to person, place, and time.   Skin: Skin is warm and dry. No rash noted.   Psychiatric: She has a normal mood and affect. Her behavior is normal.   Nursing note and vitals reviewed.      Health Maintenance       Date Due Completion Date    Influenza Vaccine 08/01/2017 9/2/2016    Override on 9/28/2015: Done    Hemoglobin A1c 01/20/2018 7/20/2017    Eye Exam 04/10/2018 4/10/2017 (Done)    Override on 4/10/2017: Done    Urine Microalbumin 07/20/2018 7/20/2017    Lipid Panel 08/16/2018 8/16/2017    Foot Exam 08/30/2018 8/30/2017 (Done)    Override on 8/30/2017: Done    Override on 12/28/2016: Done    Mammogram 12/29/2018 12/29/2016    Pap Smear with HPV Cotest 09/05/2020 9/5/2017    TETANUS VACCINE 09/07/2026 9/7/2016    Colonoscopy 06/14/2027 6/14/2017    Pneumococcal PPSV23 (Medium Risk) (2) 01/29/2032 9/7/2016            ASSESSMENT     1. Pre-op evaluation "    2. Diabetes mellitus type 2, noninsulin dependent    3. Periumbilical abdominal pain    4. Need for immunization against influenza    5. Encounter for screening mammogram for breast cancer        PLAN:     Problem List Items Addressed This Visit        Endocrine    Diabetes mellitus type 2, noninsulin dependent  -check A1C, will need to hold medications when NPO   -may consult endocrine pending A1C  -needs to switch to artificial sweetener, avoid sodas, meals every four to six hours, decrease starches    Relevant Orders    Hemoglobin A1c       GI    Abdominal pain  -hopefully this will improve post op if hernia is present      Other Visit Diagnoses     Pre-op evaluation    -  Primary  -she is low risk for non cardiac procedure, stable and optimal at this time, would like better glycemic control as this can affect healing    Relevant Orders    CBC auto differential    Comprehensive metabolic panel    Urinalysis    Need for immunization against influenza      -as ordered       Relevant Orders    Influenza - Quadrivalent (3 years & older) (PF) (Completed)    Encounter for screening mammogram for breast cancer      -as ordered       Relevant Orders    Mammo Digital Screening Bilat with CAD            Katharine Chung MD  01/09/2018 10:55 AM        Return in about 1 month (around 2/9/2018) for Follow up.

## 2018-01-16 NOTE — TELEPHONE ENCOUNTER
Her A1C is 7.8, and regarding her eating habits, is she asking about a referral to diabetic education? Or something to do with the bariatrics department?

## 2018-01-16 NOTE — TELEPHONE ENCOUNTER
Called and spoken to patient. Patient was advised. Patient wasn't for sure which one her and Dr. Chung discussed at last OV but patient states that she is already in bariatrics.

## 2018-01-24 ENCOUNTER — TELEPHONE (OUTPATIENT)
Dept: FAMILY MEDICINE | Facility: CLINIC | Age: 53
End: 2018-01-24

## 2018-01-24 ENCOUNTER — TELEPHONE (OUTPATIENT)
Dept: BARIATRICS | Facility: CLINIC | Age: 53
End: 2018-01-24

## 2018-01-24 NOTE — TELEPHONE ENCOUNTER
Left message for pt to call back regarding her Urology and Endocrinology referral.Letter mailed out.

## 2018-01-24 NOTE — LETTER
January 24, 2018    Antoine Seals  [address] [place] [address]             [facility]  [address]  [place] [address]  Phone: [phone]  Fax: [phone] Dear  Arnel:    Jennifer we were unable to contact you to schedule your Urology and Endocrinology appointment. Please give the referral department a call at [phone].      If you have any questions or concerns, please don't hesitate to call.    Sincerely,        Rl Neil MA

## 2018-01-29 ENCOUNTER — CLINICAL SUPPORT (OUTPATIENT)
Dept: DIABETES | Facility: CLINIC | Age: 53
End: 2018-01-29
Payer: MEDICAID

## 2018-01-29 ENCOUNTER — TELEPHONE (OUTPATIENT)
Dept: FAMILY MEDICINE | Facility: CLINIC | Age: 53
End: 2018-01-29

## 2018-01-29 VITALS — BODY MASS INDEX: 35.28 KG/M2 | WEIGHT: 192.88 LBS

## 2018-01-29 DIAGNOSIS — E11.9 TYPE 2 DIABETES MELLITUS WITHOUT OPHTHALMIC MANIFESTATIONS: Primary | ICD-10-CM

## 2018-01-29 PROCEDURE — G0108 DIAB MANAGE TRN  PER INDIV: HCPCS | Mod: PBBFAC,PN | Performed by: DIETITIAN, REGISTERED

## 2018-01-29 NOTE — TELEPHONE ENCOUNTER
----- Message from Naida Joe sent at 1/23/2018  3:29 PM CST -----  Contact: Self  Pt called to f/u on Urology referral. Pt can be reached @ [phone].
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
## 2018-01-30 ENCOUNTER — OFFICE VISIT (OUTPATIENT)
Dept: SURGERY | Facility: CLINIC | Age: 53
End: 2018-01-30
Payer: MEDICAID

## 2018-01-30 VITALS
TEMPERATURE: 98 F | HEART RATE: 80 BPM | HEIGHT: 62 IN | WEIGHT: 189 LBS | DIASTOLIC BLOOD PRESSURE: 80 MMHG | BODY MASS INDEX: 34.78 KG/M2 | SYSTOLIC BLOOD PRESSURE: 144 MMHG

## 2018-01-30 DIAGNOSIS — Z09 POSTOP CHECK: ICD-10-CM

## 2018-01-30 DIAGNOSIS — I10 ESSENTIAL HYPERTENSION: ICD-10-CM

## 2018-01-30 PROBLEM — Z48.812 POSTOP CAROTID ENDARTERECTOMY SURVEILLANCE, ENCOUNTER FOR: Status: ACTIVE | Noted: 2018-01-30

## 2018-01-30 PROBLEM — R10.9 ABDOMINAL PAIN: Status: RESOLVED | Noted: 2017-08-20 | Resolved: 2018-01-30

## 2018-01-30 PROBLEM — R10.84 GENERALIZED ABDOMINAL PAIN: Status: RESOLVED | Noted: 2018-01-15 | Resolved: 2018-01-30

## 2018-01-30 PROBLEM — R10.33 PERIUMBILICAL ABDOMINAL PAIN: Status: RESOLVED | Noted: 2017-10-31 | Resolved: 2018-01-30

## 2018-01-30 PROCEDURE — 99024 POSTOP FOLLOW-UP VISIT: CPT | Mod: ,,, | Performed by: SURGERY

## 2018-01-30 PROCEDURE — 99213 OFFICE O/P EST LOW 20 MIN: CPT | Mod: PBBFAC | Performed by: SURGERY

## 2018-01-30 PROCEDURE — 99999 PR PBB SHADOW E&M-EST. PATIENT-LVL III: CPT | Mod: PBBFAC,,, | Performed by: SURGERY

## 2018-01-30 RX ORDER — HYDROCHLOROTHIAZIDE 12.5 MG/1
TABLET ORAL
Qty: 30 TABLET | Refills: 0 | Status: SHIPPED | OUTPATIENT
Start: 2018-01-30 | End: 2018-04-23 | Stop reason: SDUPTHER

## 2018-01-30 NOTE — PROGRESS NOTES
"Antoine Seals is a 53 y.o. female patient.   1. Postop check      Past Medical History:   Diagnosis Date    Diabetes mellitus type 2, noninsulin dependent 9/7/2016    Hx of essential hypertension     Hyperlipidemia     Hypertension, uncontrolled 7/22/2016    Nuclear sclerosis of both eyes 4/10/2017       PRN Meds:    Review of patient's allergies indicates:   Allergen Reactions    Lisinopril-hydrochlorothiazide Swelling     Facial swelling/ mouth swelling     There are no hospital problems to display for this patient.    Blood pressure (!) 144/80, pulse 80, temperature 98.3 °F (36.8 °C), height 5' 2" (1.575 m), weight 85.7 kg (189 lb), last menstrual period 12/20/2017.    Subjective   Doing well.  No pain.  Tolerating diet.    Objective   NAD  Abdomen S/NT/ND  Incisions well healed with no evidence of infection     Assessment & Plan   52 yo F 2 weeks s/p diagnostic laparoscopy and repair of internal hernia at jejunojejunostomy    Regular diet and activity  F/u PRN    Corona Mcghee MD  1/30/2018  "

## 2018-01-30 NOTE — LETTER
[facility] - General Surgery  [address]  [facility] [address]  Phone: [phone] January 30, 2018      Katharine Chung MD  [address] [facility] [address]    Patient: Antoine Seals   MR Number: [id number]   YOB: 1965   Date of Visit: 1/30/2018     Dear Dr. Chung:    Thank you for referring Antoine Seals to me for evaluation. Below are the relevant portions of my assessment and plan of care.    Antoine Seals is a 53-year-old female patient two weeks status post diagnostic laparoscopy and repair of internal hernia at jejunojejunostomy  1. Postop check      PLAN:  - Regular diet and activity  - Follow up PRN    If you have questions, please do not hesitate to call me. I look forward to following Antoine along with you.    Sincerely,      Corona Quijano MD   Section Head - General, Laparoscopic, Bariatric  Acute Care and Oncologic Surgery   - Surgical [facility]  [facility]    JOSELIN/curt

## 2018-01-30 NOTE — PROGRESS NOTES
I have seen the patient, reviewed the Resident's history and physical, assessment and plan. I have personally interviewed and examined the patient at bedside and: agree with the findings.     S/p laparoscopic repair internal hernia with resolution of symptoms.  Regular duty and follow up prn.

## 2018-03-09 ENCOUNTER — HOSPITAL ENCOUNTER (OUTPATIENT)
Dept: RADIOLOGY | Facility: HOSPITAL | Age: 53
Discharge: HOME | End: 2018-03-09
Attending: FAMILY MEDICINE
Payer: MEDICAID

## 2018-03-09 ENCOUNTER — OFFICE VISIT (OUTPATIENT)
Dept: UROLOGY | Facility: CLINIC | Age: 53
End: 2018-03-09
Payer: MEDICAID

## 2018-03-09 VITALS
BODY MASS INDEX: 34.78 KG/M2 | HEIGHT: 62 IN | SYSTOLIC BLOOD PRESSURE: 118 MMHG | WEIGHT: 189 LBS | DIASTOLIC BLOOD PRESSURE: 90 MMHG | RESPIRATION RATE: 14 BRPM | HEART RATE: 68 BPM

## 2018-03-09 DIAGNOSIS — R35.1 NOCTURIA MORE THAN TWICE PER NIGHT: ICD-10-CM

## 2018-03-09 DIAGNOSIS — R35.0 URINARY FREQUENCY: ICD-10-CM

## 2018-03-09 DIAGNOSIS — R31.29 MICROSCOPIC HEMATURIA: Primary | ICD-10-CM

## 2018-03-09 DIAGNOSIS — Z12.31 ENCOUNTER FOR SCREENING MAMMOGRAM FOR BREAST CANCER: ICD-10-CM

## 2018-03-09 PROCEDURE — 87186 SC STD MICRODIL/AGAR DIL: CPT | Mod: 59

## 2018-03-09 PROCEDURE — 99204 OFFICE O/P NEW MOD 45 MIN: CPT | Mod: S$PBB,,, | Performed by: UROLOGY

## 2018-03-09 PROCEDURE — 87077 CULTURE AEROBIC IDENTIFY: CPT

## 2018-03-09 PROCEDURE — 81001 URINALYSIS AUTO W/SCOPE: CPT | Mod: PBBFAC | Performed by: UROLOGY

## 2018-03-09 PROCEDURE — 77067 SCR MAMMO BI INCL CAD: CPT | Mod: 26,,, | Performed by: RADIOLOGY

## 2018-03-09 PROCEDURE — 87088 URINE BACTERIA CULTURE: CPT

## 2018-03-09 PROCEDURE — 77063 BREAST TOMOSYNTHESIS BI: CPT | Mod: 26,,, | Performed by: RADIOLOGY

## 2018-03-09 PROCEDURE — 87086 URINE CULTURE/COLONY COUNT: CPT

## 2018-03-09 PROCEDURE — 99999 PR PBB SHADOW E&M-EST. PATIENT-LVL V: CPT | Mod: PBBFAC,,, | Performed by: UROLOGY

## 2018-03-09 PROCEDURE — 99215 OFFICE O/P EST HI 40 MIN: CPT | Mod: PBBFAC | Performed by: UROLOGY

## 2018-03-09 PROCEDURE — 77067 SCR MAMMO BI INCL CAD: CPT | Mod: TC,PO

## 2018-03-09 RX ORDER — CIPROFLOXACIN 2 MG/ML
400 INJECTION, SOLUTION INTRAVENOUS
Status: CANCELLED | OUTPATIENT
Start: 2018-03-09

## 2018-03-09 NOTE — LETTER
March 9, 2018      Katharine Chung MD  [address] [facility] [address]           [facility]  [address] [facility] [address]  [place] [address]  Phone: [phone]  Fax: [phone]          Patient: Antoine Seals   MR Number: [id number]   YOB: 1965   Date of Visit: 3/9/2018       Dear Dr. Katharine Chung:    Thank you for referring Antoine Seals to me for evaluation. Attached you will find relevant portions of my assessment and plan of care.    If you have questions, please do not hesitate to call me. I look forward to following Antoine Seals along with you.    Sincerely,    ELIZABETH Mendoza MD    Enclosure  CC:  No Recipients    If you would like to receive this communication electronically, please contact [email or web address] or [phone] to request more information on [facility] [facility] [facility] access.    For providers and/or their staff who would like to refer a patient to [facility], please contact us through our one-stop-shop provider referral line, [facility], at [phone].    If you feel you have received this communication in error or would no longer like to receive these types of communications, please e-mail [email or web address]

## 2018-03-09 NOTE — PROGRESS NOTES
Subjective:       Patient ID: Antoine Seals is a 53 y.o. female who was referred by Katharine Chung MD    Chief Complaint:   Chief Complaint   Patient presents with    Hematuria     Patient referred for microhematuria       Hematuria  Patient complains of microscopic hematuria. Onset of hematuria was a few weeks ago and was gradual in onset. There is not a history of nephrolithiasis. There is not a history of urologic trauma. Other urologic symptoms include hesitancy, nocturia. Patient admits to history of no risk factors for cancer. Patient denies history of Agent Orange exposure, chronic Gonzales catheter,  surgeries, occupational exposure, sexually transmitted diseases, tobacco use, trauma and urolithiasis. Prior workup has been UA'S.      ACTIVE MEDICAL ISSUES:  Patient Active Problem List   Diagnosis    Essential hypertension    Diabetes mellitus type 2, noninsulin dependent    Hyperlipidemia    Constipation, chronic    Nuclear sclerosis of both eyes    Type 2 diabetes mellitus without ophthalmic manifestations    Refractive error    Gastric bypass status for obesity    Iron deficiency anemia secondary to inadequate dietary iron intake    Iron deficiency anemia following bariatric surgery    Ovarian cyst    Anemia    Muscle weakness    Decreased ROM of lower extremity    Decreased mobility and endurance    Postop check       PAST MEDICAL HISTORY  Past Medical History:   Diagnosis Date    Diabetes mellitus type 2, noninsulin dependent 2016    Hx of essential hypertension     Hyperlipidemia     Hypertension, uncontrolled 2016    Nuclear sclerosis of both eyes 4/10/2017       PAST SURGICAL HISTORY:  Past Surgical History:   Procedure Laterality Date    BTL       SECTION      COLONOSCOPY N/A 2017    Procedure: COLONOSCOPY;  Surgeon: Oleg Enciso MD;  Location: [address]);  Service: Endoscopy;  Laterality: N/A;  CHRONIC CONSTIPATION S/P LRNY     GASTRIC BYPASS  1995    sandra en y    NECK SURGERY  09/30/2016    TOTAL REDUCTION MAMMOPLASTY         SOCIAL HISTORY:  Social History   Substance Use Topics    Smoking status: Never Smoker    Smokeless tobacco: Never Used    Alcohol use No      Comment: Socially       FAMILY HISTORY:  Family History   Problem Relation Age of Onset    Heart disease Mother     Diabetes type II Mother     Heart attack Father 50     Open heart surgery    No Known Problems Sister     No Known Problems Brother     No Known Problems Maternal Aunt     No Known Problems Maternal Uncle     No Known Problems Paternal Aunt     No Known Problems Paternal Uncle     No Known Problems Maternal Grandmother     No Known Problems Maternal Grandfather     No Known Problems Paternal Grandmother     No Known Problems Paternal Grandfather     Amblyopia Neg Hx     Blindness Neg Hx     Cancer Neg Hx     Cataracts Neg Hx     Diabetes Neg Hx     Glaucoma Neg Hx     Hypertension Neg Hx     Macular degeneration Neg Hx     Retinal detachment Neg Hx     Strabismus Neg Hx     Stroke Neg Hx     Thyroid disease Neg Hx        ALLERGIES AND MEDICATIONS: updated and reviewed.  Review of patient's allergies indicates:   Allergen Reactions    Lisinopril-hydrochlorothiazide Swelling     Facial swelling/ mouth swelling     Current Outpatient Prescriptions   Medication Sig    amlodipine (NORVASC) 10 MG tablet Take 1 tablet (10 mg total) by mouth once daily. (Patient taking differently: Take 10 mg by mouth every morning. )    atorvastatin (LIPITOR) 80 MG tablet Take 1 tablet (80 mg total) by mouth every morning.    b complex vitamins tablet Take 1 tablet by mouth every morning.     FOLIC ACID/MV,IRON,MIN (CENTRUM ORAL) Take 1 tablet by mouth every morning.    glipiZIDE (GLUCOTROL) 5 MG tablet TAKE 1 TABLET(5 MG) BY MOUTH TWICE DAILY WITH BREAKFAST    hydroCHLOROthiazide (HYDRODIURIL) 12.5 MG Tab TAKE 1 TABLET(12.5 MG) BY MOUTH EVERY DAY  "   metFORMIN (GLUCOPHAGE) 500 MG tablet Take 1 tablet by mouth 2 (two) times daily.    ONETOUCH DELICA LANCETS 33 gauge Misc TEST BID    ONETOUCH ULTRA TEST Strp TEST BID    ONETOUCH ULTRA2 kit TEST BID UTD    polyethylene glycol (GLYCOLAX) 17 gram/dose powder Take 17 g by mouth daily as needed.    vitamin D 1000 units Tab Take 1,000 Units by mouth once daily.     No current facility-administered medications for this visit.        Review of Systems   Constitutional: Negative for activity change, fatigue, fever and unexpected weight change.   Eyes: Negative for redness and visual disturbance.   Respiratory: Negative for chest tightness and shortness of breath.    Cardiovascular: Negative for chest pain and leg swelling.   Gastrointestinal: Negative for abdominal distention, abdominal pain, constipation, diarrhea, nausea and vomiting.   Genitourinary: Negative for difficulty urinating, dysuria, flank pain, frequency, hematuria, pelvic pain, urgency and vaginal bleeding.   Musculoskeletal: Negative for arthralgias and joint swelling.   Neurological: Negative for dizziness, weakness and headaches.   Psychiatric/Behavioral: Negative for confusion. The patient is not nervous/anxious.    All other systems reviewed and are negative.      Objective:      Vitals:    03/09/18 1535   BP: (!) 118/90   Pulse: 68   Resp: 14   Weight: 85.7 kg (189 lb)   Height: 5' 2" (1.575 m)     Physical Exam   Nursing note and vitals reviewed.  Constitutional: She is oriented to person, place, and time. She appears well-developed.   HENT:   Head: Normocephalic.   Eyes: Conjunctivae are normal.   Neck: Normal range of motion. No tracheal deviation present. No thyromegaly present.   Cardiovascular: Normal rate, normal heart sounds and normal pulses.    Pulmonary/Chest: Effort normal and breath sounds normal. No respiratory distress. She has no wheezes.   Abdominal: Soft. She exhibits no distension and no mass. There is no hepatosplenomegaly. " There is no tenderness. There is no rebound, no guarding and no CVA tenderness. No hernia.   Musculoskeletal: Normal range of motion. She exhibits no edema or tenderness.   Lymphadenopathy:     She has no cervical adenopathy.   Neurological: She is alert and oriented to person, place, and time.   Skin: Skin is warm and dry. No rash noted. No erythema.     Psychiatric: She has a normal mood and affect. Her behavior is normal. Judgment and thought content normal.       Urine dipstick shows positive for RBC's.  Micro exam: 50 RBC's per HPF.    Assessment:       1. Microscopic hematuria    2. Nocturia more than twice per night    3. Urinary frequency          Plan:       1. Microscopic hematuria  Cystoscopy, bilateral retrograde pyelogram on Monday 3/19/2018    - US Retroperitoneal Complete (Kidney and; Future  - POCT urinalysis, dipstick or tablet reag  - Urine culture    2. Nocturia more than twice per night  Limit evening fluids    3. Urinary frequency  cystoscopy            Follow-up in about 6 weeks (around 4/20/2018) for Follow up.

## 2018-03-09 NOTE — H&P
Subjective:       Patient ID: Antoine Seals is a 53 y.o. female who was referred by Katharine Chung MD    Chief Complaint:   Chief Complaint   Patient presents with    Hematuria     Patient referred for microhematuria       Hematuria  Patient complains of microscopic hematuria. Onset of hematuria was a few weeks ago and was gradual in onset. There is not a history of nephrolithiasis. There is not a history of urologic trauma. Other urologic symptoms include hesitancy, nocturia. Patient admits to history of no risk factors for cancer. Patient denies history of Agent Orange exposure, chronic Gonzales catheter,  surgeries, occupational exposure, sexually transmitted diseases, tobacco use, trauma and urolithiasis. Prior workup has been UA'S.      ACTIVE MEDICAL ISSUES:  Patient Active Problem List   Diagnosis    Essential hypertension    Diabetes mellitus type 2, noninsulin dependent    Hyperlipidemia    Constipation, chronic    Nuclear sclerosis of both eyes    Type 2 diabetes mellitus without ophthalmic manifestations    Refractive error    Gastric bypass status for obesity    Iron deficiency anemia secondary to inadequate dietary iron intake    Iron deficiency anemia following bariatric surgery    Ovarian cyst    Anemia    Muscle weakness    Decreased ROM of lower extremity    Decreased mobility and endurance    Postop check       PAST MEDICAL HISTORY  Past Medical History:   Diagnosis Date    Diabetes mellitus type 2, noninsulin dependent 2016    Hx of essential hypertension     Hyperlipidemia     Hypertension, uncontrolled 2016    Nuclear sclerosis of both eyes 4/10/2017       PAST SURGICAL HISTORY:  Past Surgical History:   Procedure Laterality Date    BTL       SECTION      COLONOSCOPY N/A 2017    Procedure: COLONOSCOPY;  Surgeon: Oleg Enciso MD;  Location: [address]);  Service: Endoscopy;  Laterality: N/A;  CHRONIC CONSTIPATION S/P LRNY     GASTRIC BYPASS  1995    sandra en y    NECK SURGERY  09/30/2016    TOTAL REDUCTION MAMMOPLASTY         SOCIAL HISTORY:  Social History   Substance Use Topics    Smoking status: Never Smoker    Smokeless tobacco: Never Used    Alcohol use No      Comment: Socially       FAMILY HISTORY:  Family History   Problem Relation Age of Onset    Heart disease Mother     Diabetes type II Mother     Heart attack Father 50     Open heart surgery    No Known Problems Sister     No Known Problems Brother     No Known Problems Maternal Aunt     No Known Problems Maternal Uncle     No Known Problems Paternal Aunt     No Known Problems Paternal Uncle     No Known Problems Maternal Grandmother     No Known Problems Maternal Grandfather     No Known Problems Paternal Grandmother     No Known Problems Paternal Grandfather     Amblyopia Neg Hx     Blindness Neg Hx     Cancer Neg Hx     Cataracts Neg Hx     Diabetes Neg Hx     Glaucoma Neg Hx     Hypertension Neg Hx     Macular degeneration Neg Hx     Retinal detachment Neg Hx     Strabismus Neg Hx     Stroke Neg Hx     Thyroid disease Neg Hx        ALLERGIES AND MEDICATIONS: updated and reviewed.  Review of patient's allergies indicates:   Allergen Reactions    Lisinopril-hydrochlorothiazide Swelling     Facial swelling/ mouth swelling     Current Outpatient Prescriptions   Medication Sig    amlodipine (NORVASC) 10 MG tablet Take 1 tablet (10 mg total) by mouth once daily. (Patient taking differently: Take 10 mg by mouth every morning. )    atorvastatin (LIPITOR) 80 MG tablet Take 1 tablet (80 mg total) by mouth every morning.    b complex vitamins tablet Take 1 tablet by mouth every morning.     FOLIC ACID/MV,IRON,MIN (CENTRUM ORAL) Take 1 tablet by mouth every morning.    glipiZIDE (GLUCOTROL) 5 MG tablet TAKE 1 TABLET(5 MG) BY MOUTH TWICE DAILY WITH BREAKFAST    hydroCHLOROthiazide (HYDRODIURIL) 12.5 MG Tab TAKE 1 TABLET(12.5 MG) BY MOUTH EVERY DAY  "   metFORMIN (GLUCOPHAGE) 500 MG tablet Take 1 tablet by mouth 2 (two) times daily.    ONETOUCH DELICA LANCETS 33 gauge Misc TEST BID    ONETOUCH ULTRA TEST Strp TEST BID    ONETOUCH ULTRA2 kit TEST BID UTD    polyethylene glycol (GLYCOLAX) 17 gram/dose powder Take 17 g by mouth daily as needed.    vitamin D 1000 units Tab Take 1,000 Units by mouth once daily.     No current facility-administered medications for this visit.        Review of Systems   Constitutional: Negative for activity change, fatigue, fever and unexpected weight change.   Eyes: Negative for redness and visual disturbance.   Respiratory: Negative for chest tightness and shortness of breath.    Cardiovascular: Negative for chest pain and leg swelling.   Gastrointestinal: Negative for abdominal distention, abdominal pain, constipation, diarrhea, nausea and vomiting.   Genitourinary: Negative for difficulty urinating, dysuria, flank pain, frequency, hematuria, pelvic pain, urgency and vaginal bleeding.   Musculoskeletal: Negative for arthralgias and joint swelling.   Neurological: Negative for dizziness, weakness and headaches.   Psychiatric/Behavioral: Negative for confusion. The patient is not nervous/anxious.    All other systems reviewed and are negative.      Objective:      Vitals:    03/09/18 1535   BP: (!) 118/90   Pulse: 68   Resp: 14   Weight: 85.7 kg (189 lb)   Height: 5' 2" (1.575 m)     Physical Exam   Nursing note and vitals reviewed.  Constitutional: She is oriented to person, place, and time. She appears well-developed.   HENT:   Head: Normocephalic.   Eyes: Conjunctivae are normal.   Neck: Normal range of motion. No tracheal deviation present. No thyromegaly present.   Cardiovascular: Normal rate, normal heart sounds and normal pulses.    Pulmonary/Chest: Effort normal and breath sounds normal. No respiratory distress. She has no wheezes.   Abdominal: Soft. She exhibits no distension and no mass. There is no hepatosplenomegaly. " There is no tenderness. There is no rebound, no guarding and no CVA tenderness. No hernia.   Musculoskeletal: Normal range of motion. She exhibits no edema or tenderness.   Lymphadenopathy:     She has no cervical adenopathy.   Neurological: She is alert and oriented to person, place, and time.   Skin: Skin is warm and dry. No rash noted. No erythema.     Psychiatric: She has a normal mood and affect. Her behavior is normal. Judgment and thought content normal.       Urine dipstick shows positive for RBC's.  Micro exam: 50 RBC's per HPF.    Assessment:       1. Microscopic hematuria    2. Nocturia more than twice per night    3. Urinary frequency          Plan:       1. Microscopic hematuria  Cystoscopy, bilateral retrograde pyelogram on Monday 3/19/2018    - US Retroperitoneal Complete (Kidney and; Future  - POCT urinalysis, dipstick or tablet reag  - Urine culture    2. Nocturia more than twice per night  Limit evening fluids    3. Urinary frequency  cystoscopy            Follow-up in about 6 weeks (around 4/20/2018) for Follow up.

## 2018-03-12 ENCOUNTER — TELEPHONE (OUTPATIENT)
Dept: UROLOGY | Facility: CLINIC | Age: 53
End: 2018-03-12

## 2018-03-12 DIAGNOSIS — R30.0 DYSURIA: Primary | ICD-10-CM

## 2018-03-12 LAB
BACTERIA UR CULT: NORMAL

## 2018-03-12 RX ORDER — SULFAMETHOXAZOLE AND TRIMETHOPRIM 800; 160 MG/1; MG/1
1 TABLET ORAL 2 TIMES DAILY
Qty: 14 TABLET | Refills: 0 | Status: SHIPPED | OUTPATIENT
Start: 2018-03-12 | End: 2018-03-15

## 2018-03-14 ENCOUNTER — HOSPITAL ENCOUNTER (OUTPATIENT)
Dept: PREADMISSION TESTING | Facility: HOSPITAL | Age: 53
Discharge: HOME | End: 2018-03-14
Attending: UROLOGY
Payer: MEDICAID

## 2018-03-14 VITALS
TEMPERATURE: 97 F | BODY MASS INDEX: 35.76 KG/M2 | OXYGEN SATURATION: 96 % | HEIGHT: 62 IN | DIASTOLIC BLOOD PRESSURE: 80 MMHG | WEIGHT: 194.31 LBS | RESPIRATION RATE: 20 BRPM | SYSTOLIC BLOOD PRESSURE: 117 MMHG | HEART RATE: 89 BPM

## 2018-03-14 DIAGNOSIS — R31.29 MICROSCOPIC HEMATURIA: ICD-10-CM

## 2018-03-14 LAB
ANION GAP SERPL CALC-SCNC: 9 MMOL/L
BASOPHILS # BLD AUTO: 0.01 K/UL
BASOPHILS NFR BLD: 0.1 %
BUN SERPL-MCNC: 16 MG/DL
CALCIUM SERPL-MCNC: 9.2 MG/DL
CHLORIDE SERPL-SCNC: 102 MMOL/L
CO2 SERPL-SCNC: 26 MMOL/L
CREAT SERPL-MCNC: 0.9 MG/DL
DIFFERENTIAL METHOD: ABNORMAL
EOSINOPHIL # BLD AUTO: 0.1 K/UL
EOSINOPHIL NFR BLD: 1.9 %
ERYTHROCYTE [DISTWIDTH] IN BLOOD BY AUTOMATED COUNT: 15.1 %
EST. GFR  (AFRICAN AMERICAN): >60 ML/MIN/1.73 M^2
EST. GFR  (NON AFRICAN AMERICAN): >60 ML/MIN/1.73 M^2
GLUCOSE SERPL-MCNC: 228 MG/DL
HCT VFR BLD AUTO: 37.5 %
HGB BLD-MCNC: 12.6 G/DL
LYMPHOCYTES # BLD AUTO: 2.4 K/UL
LYMPHOCYTES NFR BLD: 34.1 %
MCH RBC QN AUTO: 26.3 PG
MCHC RBC AUTO-ENTMCNC: 33.6 G/DL
MCV RBC AUTO: 78 FL
MONOCYTES # BLD AUTO: 0.5 K/UL
MONOCYTES NFR BLD: 6.7 %
NEUTROPHILS # BLD AUTO: 4 K/UL
NEUTROPHILS NFR BLD: 56.9 %
PLATELET # BLD AUTO: 320 K/UL
PMV BLD AUTO: 11.1 FL
POTASSIUM SERPL-SCNC: 3.9 MMOL/L
RBC # BLD AUTO: 4.79 M/UL
SODIUM SERPL-SCNC: 137 MMOL/L
WBC # BLD AUTO: 7 K/UL

## 2018-03-14 PROCEDURE — 80048 BASIC METABOLIC PNL TOTAL CA: CPT

## 2018-03-14 PROCEDURE — 85025 COMPLETE CBC W/AUTO DIFF WBC: CPT

## 2018-03-14 PROCEDURE — 36415 COLL VENOUS BLD VENIPUNCTURE: CPT

## 2018-03-14 NOTE — PLAN OF CARE
Pre-operative instructions, medication directives and pain scales reviewed with Ms Seals. All questions the patient had  were answered. Re-assurance about surgical procedure and day of surgery routine given as needed. Ms Seals verbalized understanding of the pre-op instructions.

## 2018-03-14 NOTE — DISCHARGE INSTRUCTIONS
Your surgery is scheduled for__MONDAY 3/19_______________.    Call [phone] between 2 pm and 5 pm __FRIDAY 3/16__________ to find out your arrival time for the day of surgery.    Report to SAME DAY SURGERY UNIT at _______am on the 2nd floor of the hospital.  Use the front entrance of the hospital before 530 AM    Important instructions:   Do not eat or drink after 12 midnight, including water.  It is okay to brush your teeth.  Do not have gum, candy or mints.     Take only these medications with a small swallow of water on the morning of your surgery.__AMLODIPINE___________    Do not take any diabetic medication on the morning of surgery    Stop taking Aspirin, Ibuprofen, Motrin and Aleve , Fish oil, and Vitamin E for at least 7 days before your surgery. You may use Tylenol unless otherwise instructed by your doctor.                 Prep instructions:    SHOWER   OTHER_____________     Please shower the night before and the morning of your surger.     No shaving of procedural area at least 4-5 days before surgery due to increased risk of skin irritation and/or possible infection.                Do not wear make- up, including mascara.     You may wear deodorant only.      Do not wear powder, body lotion or cologne.     Do not wear any jewelry or have any metal on your body.          If you are going home on the same day of surgery, you must have arrangements for a ride home.  You will not be able to drive home if you were given anesthesia or sedation.          Wear loose fitting clothes     Please leave money and valuables home.       You may bring your cell phone.     Call the doctor if fever or illness should occur before your surgery.    Call [phone] to contact us here at [facility] if needed.

## 2018-03-19 ENCOUNTER — HOSPITAL ENCOUNTER (OUTPATIENT)
Facility: HOSPITAL | Age: 53
Discharge: HOME | End: 2018-03-19
Attending: UROLOGY | Admitting: UROLOGY
Payer: MEDICAID

## 2018-03-19 ENCOUNTER — ANESTHESIA EVENT (OUTPATIENT)
Dept: SURGERY | Facility: HOSPITAL | Age: 53
End: 2018-03-19
Payer: MEDICAID

## 2018-03-19 ENCOUNTER — ANESTHESIA (OUTPATIENT)
Dept: SURGERY | Facility: HOSPITAL | Age: 53
End: 2018-03-19
Payer: MEDICAID

## 2018-03-19 ENCOUNTER — SURGERY (OUTPATIENT)
Age: 53
End: 2018-03-19

## 2018-03-19 VITALS
HEART RATE: 81 BPM | RESPIRATION RATE: 18 BRPM | TEMPERATURE: 98 F | OXYGEN SATURATION: 96 % | BODY MASS INDEX: 35.76 KG/M2 | HEIGHT: 62 IN | DIASTOLIC BLOOD PRESSURE: 88 MMHG | SYSTOLIC BLOOD PRESSURE: 147 MMHG | WEIGHT: 194.31 LBS

## 2018-03-19 DIAGNOSIS — R31.9 HEMATURIA, UNSPECIFIED TYPE: ICD-10-CM

## 2018-03-19 DIAGNOSIS — R31.29 MICROSCOPIC HEMATURIA: Primary | ICD-10-CM

## 2018-03-19 LAB
POCT GLUCOSE: 184 MG/DL (ref 70–110)
POCT GLUCOSE: 188 MG/DL (ref 70–110)

## 2018-03-19 PROCEDURE — 37000008 HC ANESTHESIA 1ST 15 MINUTES: Performed by: UROLOGY

## 2018-03-19 PROCEDURE — 74420 UROGRAPHY RTRGR +-KUB: CPT | Mod: 26,,, | Performed by: UROLOGY

## 2018-03-19 PROCEDURE — 52005 CYSTO W/URTRL CATHJ: CPT | Mod: ,,, | Performed by: UROLOGY

## 2018-03-19 PROCEDURE — 71000016 HC POSTOP RECOV ADDL HR: Performed by: UROLOGY

## 2018-03-19 PROCEDURE — C1758 CATHETER, URETERAL: HCPCS | Performed by: UROLOGY

## 2018-03-19 PROCEDURE — 36000706: Performed by: UROLOGY

## 2018-03-19 PROCEDURE — 25500020 PHARM REV CODE 255: Performed by: UROLOGY

## 2018-03-19 PROCEDURE — D9220A PRA ANESTHESIA: Mod: ,,, | Performed by: ANESTHESIOLOGY

## 2018-03-19 PROCEDURE — 00910 ANES TRANSURETHRAL PX NOS: CPT | Performed by: UROLOGY

## 2018-03-19 PROCEDURE — 63600175 PHARM REV CODE 636 W HCPCS: Performed by: NURSE ANESTHETIST, CERTIFIED REGISTERED

## 2018-03-19 PROCEDURE — 25000003 PHARM REV CODE 250: Performed by: ANESTHESIOLOGY

## 2018-03-19 PROCEDURE — 27200651 HC AIRWAY, LMA: Performed by: NURSE ANESTHETIST, CERTIFIED REGISTERED

## 2018-03-19 PROCEDURE — 63600175 PHARM REV CODE 636 W HCPCS: Performed by: ANESTHESIOLOGY

## 2018-03-19 PROCEDURE — 36000707: Performed by: UROLOGY

## 2018-03-19 PROCEDURE — 82962 GLUCOSE BLOOD TEST: CPT | Performed by: UROLOGY

## 2018-03-19 PROCEDURE — 37000009 HC ANESTHESIA EA ADD 15 MINS: Performed by: UROLOGY

## 2018-03-19 PROCEDURE — 63600175 PHARM REV CODE 636 W HCPCS: Performed by: UROLOGY

## 2018-03-19 PROCEDURE — 71000033 HC RECOVERY, INTIAL HOUR: Performed by: UROLOGY

## 2018-03-19 PROCEDURE — 71000015 HC POSTOP RECOV 1ST HR: Performed by: UROLOGY

## 2018-03-19 PROCEDURE — 25000003 PHARM REV CODE 250: Performed by: UROLOGY

## 2018-03-19 RX ORDER — SODIUM CHLORIDE, SODIUM LACTATE, POTASSIUM CHLORIDE, CALCIUM CHLORIDE 600; 310; 30; 20 MG/100ML; MG/100ML; MG/100ML; MG/100ML
INJECTION, SOLUTION INTRAVENOUS CONTINUOUS
Status: DISCONTINUED | OUTPATIENT
Start: 2018-03-19 | End: 2018-03-19 | Stop reason: HOSPADM

## 2018-03-19 RX ORDER — HYDROCODONE BITARTRATE AND ACETAMINOPHEN 5; 325 MG/1; MG/1
1 TABLET ORAL EVERY 6 HOURS PRN
Status: DISCONTINUED | OUTPATIENT
Start: 2018-03-19 | End: 2018-03-19 | Stop reason: HOSPADM

## 2018-03-19 RX ORDER — OXYCODONE HYDROCHLORIDE 5 MG/1
5 TABLET ORAL ONCE
Status: DISCONTINUED | OUTPATIENT
Start: 2018-03-19 | End: 2018-03-19 | Stop reason: HOSPADM

## 2018-03-19 RX ORDER — LIDOCAINE HCL/PF 100 MG/5ML
SYRINGE (ML) INTRAVENOUS
Status: DISCONTINUED | OUTPATIENT
Start: 2018-03-19 | End: 2018-03-19

## 2018-03-19 RX ORDER — PHENAZOPYRIDINE HYDROCHLORIDE 100 MG/1
200 TABLET, FILM COATED ORAL ONCE
Status: COMPLETED | OUTPATIENT
Start: 2018-03-19 | End: 2018-03-19

## 2018-03-19 RX ORDER — HYDROMORPHONE HYDROCHLORIDE 2 MG/ML
0.2 INJECTION, SOLUTION INTRAMUSCULAR; INTRAVENOUS; SUBCUTANEOUS EVERY 5 MIN PRN
Status: DISCONTINUED | OUTPATIENT
Start: 2018-03-19 | End: 2018-03-19

## 2018-03-19 RX ORDER — PHENAZOPYRIDINE HYDROCHLORIDE 200 MG/1
200 TABLET, FILM COATED ORAL 3 TIMES DAILY PRN
Qty: 21 TABLET | Refills: 0 | Status: SHIPPED | OUTPATIENT
Start: 2018-03-19 | End: 2018-04-23

## 2018-03-19 RX ORDER — ONDANSETRON 2 MG/ML
INJECTION INTRAMUSCULAR; INTRAVENOUS
Status: DISCONTINUED | OUTPATIENT
Start: 2018-03-19 | End: 2018-03-19

## 2018-03-19 RX ORDER — FENTANYL CITRATE 50 UG/ML
INJECTION, SOLUTION INTRAMUSCULAR; INTRAVENOUS
Status: DISCONTINUED | OUTPATIENT
Start: 2018-03-19 | End: 2018-03-19

## 2018-03-19 RX ORDER — ACETAMINOPHEN 10 MG/ML
1000 INJECTION, SOLUTION INTRAVENOUS ONCE
Status: COMPLETED | OUTPATIENT
Start: 2018-03-19 | End: 2018-03-19

## 2018-03-19 RX ORDER — MIDAZOLAM HYDROCHLORIDE 1 MG/ML
INJECTION, SOLUTION INTRAMUSCULAR; INTRAVENOUS
Status: DISCONTINUED | OUTPATIENT
Start: 2018-03-19 | End: 2018-03-19

## 2018-03-19 RX ORDER — LIDOCAINE HYDROCHLORIDE 10 MG/ML
1 INJECTION, SOLUTION EPIDURAL; INFILTRATION; INTRACAUDAL; PERINEURAL ONCE
Status: DISCONTINUED | OUTPATIENT
Start: 2018-03-19 | End: 2018-03-19 | Stop reason: HOSPADM

## 2018-03-19 RX ORDER — SODIUM CHLORIDE 0.9 % (FLUSH) 0.9 %
3 SYRINGE (ML) INJECTION
Status: DISCONTINUED | OUTPATIENT
Start: 2018-03-19 | End: 2018-03-19 | Stop reason: HOSPADM

## 2018-03-19 RX ORDER — CIPROFLOXACIN 2 MG/ML
400 INJECTION, SOLUTION INTRAVENOUS
Status: COMPLETED | OUTPATIENT
Start: 2018-03-19 | End: 2018-03-19

## 2018-03-19 RX ORDER — FENTANYL CITRATE 50 UG/ML
25 INJECTION, SOLUTION INTRAMUSCULAR; INTRAVENOUS EVERY 5 MIN PRN
Status: DISCONTINUED | OUTPATIENT
Start: 2018-03-19 | End: 2018-03-19 | Stop reason: HOSPADM

## 2018-03-19 RX ORDER — PROPOFOL 10 MG/ML
VIAL (ML) INTRAVENOUS
Status: DISCONTINUED | OUTPATIENT
Start: 2018-03-19 | End: 2018-03-19

## 2018-03-19 RX ADMIN — IOHEXOL 100 ML: 300 INJECTION, SOLUTION INTRAVENOUS at 08:03

## 2018-03-19 RX ADMIN — PHENAZOPYRIDINE HYDROCHLORIDE 200 MG: 100 TABLET ORAL at 09:03

## 2018-03-19 RX ADMIN — SODIUM CHLORIDE, SODIUM LACTATE, POTASSIUM CHLORIDE, AND CALCIUM CHLORIDE 10 ML/HR: .6; .31; .03; .02 INJECTION, SOLUTION INTRAVENOUS at 07:03

## 2018-03-19 RX ADMIN — ONDANSETRON 4 MG: 2 INJECTION, SOLUTION INTRAMUSCULAR; INTRAVENOUS at 08:03

## 2018-03-19 RX ADMIN — FENTANYL CITRATE 25 MCG: 50 INJECTION INTRAMUSCULAR; INTRAVENOUS at 08:03

## 2018-03-19 RX ADMIN — FENTANYL CITRATE 50 MCG: 50 INJECTION INTRAMUSCULAR; INTRAVENOUS at 08:03

## 2018-03-19 RX ADMIN — ACETAMINOPHEN 1000 MG: 10 INJECTION, SOLUTION INTRAVENOUS at 09:03

## 2018-03-19 RX ADMIN — PROPOFOL 150 MG: 10 INJECTION, EMULSION INTRAVENOUS at 08:03

## 2018-03-19 RX ADMIN — MIDAZOLAM HYDROCHLORIDE 2 MG: 1 INJECTION, SOLUTION INTRAMUSCULAR; INTRAVENOUS at 08:03

## 2018-03-19 RX ADMIN — LIDOCAINE HYDROCHLORIDE 100 MG: 20 INJECTION, SOLUTION INTRAVENOUS at 08:03

## 2018-03-19 RX ADMIN — CIPROFLOXACIN 400 MG: 2 INJECTION, SOLUTION INTRAVENOUS at 08:03

## 2018-03-19 NOTE — ANESTHESIA PREPROCEDURE EVALUATION
Pre-operative evaluation for Procedure(s) (LRB):  CYSTOSCOPY WITH RETROGRADE PYELOGRAM (Bilateral)    Antoine Seals is a 53 y.o. female     LDA:     Prev airway:     Drips:     Patient Active Problem List   Diagnosis    Essential hypertension    Diabetes mellitus type 2, noninsulin dependent    Hyperlipidemia    Constipation, chronic    Nuclear sclerosis of both eyes    Type 2 diabetes mellitus without ophthalmic manifestations    Refractive error    Gastric bypass status for obesity    Iron deficiency anemia secondary to inadequate dietary iron intake    Iron deficiency anemia following bariatric surgery    Ovarian cyst    Anemia    Muscle weakness    Decreased ROM of lower extremity    Decreased mobility and endurance    Postop check    Microscopic hematuria       Review of patient's allergies indicates:   Allergen Reactions    Lisinopril-hydrochlorothiazide Swelling     Facial swelling/ mouth swelling        No current facility-administered medications on file prior to encounter.      Current Outpatient Prescriptions on File Prior to Encounter   Medication Sig Dispense Refill    amlodipine (NORVASC) 10 MG tablet Take 1 tablet (10 mg total) by mouth once daily. (Patient taking differently: Take 10 mg by mouth every morning. ) 90 tablet 3    atorvastatin (LIPITOR) 80 MG tablet Take 1 tablet (80 mg total) by mouth every morning. 90 tablet 3    b complex vitamins tablet Take 1 tablet by mouth every morning.       glipiZIDE (GLUCOTROL) 5 MG tablet TAKE 1 TABLET(5 MG) BY MOUTH TWICE DAILY WITH BREAKFAST 60 tablet 5    hydroCHLOROthiazide (HYDRODIURIL) 12.5 MG Tab TAKE 1 TABLET(12.5 MG) BY MOUTH EVERY DAY 30 tablet 0    metFORMIN (GLUCOPHAGE) 500 MG tablet Take 1 tablet by mouth 2 (two) times daily.  5    ONETOUCH DELICA LANCETS 33 gauge Misc TEST BID  0    ONETOUCH ULTRA TEST Strp TEST  each 5    ONETOUCH ULTRA2 kit TEST BID UTD  0    polyethylene glycol (GLYCOLAX) 17  gram/dose powder Take 17 g by mouth daily as needed. 1700 g 3       Past Surgical History:   Procedure Laterality Date    BTL       SECTION      COLONOSCOPY N/A 2017    Procedure: COLONOSCOPY;  Surgeon: Oleg Enciso MD;  Location: [facility] ([address] [place] [address];  Service: Endoscopy;  Laterality: N/A;  CHRONIC CONSTIPATION S/P LRNY    GASTRIC BYPASS      sandra en y    NECK SURGERY  2016    TOTAL REDUCTION MAMMOPLASTY         Social History     Social History    Marital status:      Spouse name: N/A    Number of children: N/A    Years of education: N/A     Occupational History    Not on file.     Social History Main Topics    Smoking status: Never Smoker    Smokeless tobacco: Never Used    Alcohol use No      Comment: Socially    Drug use: No    Sexual activity: Yes     Partners: Male     Other Topics Concern    Not on file     Social History Narrative    No narrative on file         Vital Signs Range (Last 24H):  Temp:  [36.9 °C (98.5 °F)]   Pulse:  [80]   Resp:  [16]   BP: (137)/(87)   SpO2:  [97 %]       CBC:  Lab Results   Component Value Date    WBC 7.00 2018    HGB 12.6 2018    HCT 37.5 2018    MCV 78 (L) 2018     2018       CMP:  CMP  Sodium   Date Value Ref Range Status   2018 137 136 - 145 mmol/L Final     Potassium   Date Value Ref Range Status   2018 3.9 3.5 - 5.1 mmol/L Final     Chloride   Date Value Ref Range Status   2018 102 95 - 110 mmol/L Final     CO2   Date Value Ref Range Status   2018 26 23 - 29 mmol/L Final     Glucose   Date Value Ref Range Status   2018 228 (H) 70 - 110 mg/dL Final     BUN, Bld   Date Value Ref Range Status   2018 16 6 - 20 mg/dL Final     Creatinine   Date Value Ref Range Status   2018 0.9 0.5 - 1.4 mg/dL Final     Calcium   Date Value Ref Range Status   2018 9.2 8.7 - 10.5 mg/dL Final     Total Protein   Date Value Ref Range Status   2018 7.3  6.0 - 8.4 g/dL Final     Albumin   Date Value Ref Range Status   01/09/2018 2.9 (L) 3.5 - 5.2 g/dL Final     Total Bilirubin   Date Value Ref Range Status   01/09/2018 0.4 0.1 - 1.0 mg/dL Final     Comment:     For infants and newborns, interpretation of results should be based  on gestational age, weight and in agreement with clinical  observations.  Premature Infant recommended reference ranges:  Up to 24 hours.............<8.0 mg/dL  Up to 48 hours............<12.0 mg/dL  3-5 days..................<15.0 mg/dL  6-29 days.................<15.0 mg/dL       Alkaline Phosphatase   Date Value Ref Range Status   01/09/2018 78 55 - 135 U/L Final     AST   Date Value Ref Range Status   01/09/2018 18 10 - 40 U/L Final     ALT   Date Value Ref Range Status   01/09/2018 23 10 - 44 U/L Final     Anion Gap   Date Value Ref Range Status   03/14/2018 9 8 - 16 mmol/L Final     eGFR if    Date Value Ref Range Status   03/14/2018 >60 >60 mL/min/1.73 m^2 Final     eGFR if non    Date Value Ref Range Status   03/14/2018 >60 >60 mL/min/1.73 m^2 Final     Comment:     Calculation used to obtain the estimated glomerular filtration  rate (eGFR) is the CKD-EPI equation.          INR  No results for input(s): PT, INR, PROTIME, APTT in the last 72 hours.          Anesthesia Evaluation    I have reviewed the Patient Summary Reports.    I have reviewed the Nursing Notes.   I have reviewed the Medications.     Review of Systems  Anesthesia Hx:  History of prior surgery of interest to airway management or planning: Denies Family Hx of Anesthesia complications.   Denies Personal Hx of Anesthesia complications.   Cardiovascular:   Hypertension    Pulmonary:  Pulmonary Normal    Renal/:   Hematuria    Hepatic/GI:   Hx of gastric bypass surgery   Neurological:  Neurology Normal    Endocrine:   Diabetes        Physical Exam  General:  Well nourished    Airway/Jaw/Neck:  Airway Findings: Mouth Opening: Normal Tongue:  Normal  General Airway Assessment: Adult  Mallampati: II  TM Distance: Normal, at least 6 cm  Jaw/Neck Findings:  Neck ROM: Normal ROM      Dental:  Dental Findings:         Mental Status:  Mental Status Findings:  Cooperative, Alert and Oriented         Anesthesia Plan  Type of Anesthesia, risks & benefits discussed:  Anesthesia Type:  general  Patient's Preference:   Intra-op Monitoring Plan:   Intra-op Monitoring Plan Comments:   Post Op Pain Control Plan:   Post Op Pain Control Plan Comments:   Induction:   IV  Beta Blocker:  Patient is not currently on a Beta-Blocker (No further documentation required).       Informed Consent: Patient understands risks and agrees with Anesthesia plan.  Questions answered. Anesthesia consent signed with patient.  ASA Score: 2     Day of Surgery Review of History & Physical:            Ready For Surgery From Anesthesia Perspective.

## 2018-03-19 NOTE — DISCHARGE SUMMARY
[facility]  Discharge Note  Short Stay    Admit Date: 3/19/2018    Discharge Date and Time: 03/19/2018 8:58 AM      Attending Physician: ELIZABETH Mendoza MD     Discharge Provider: ISABELL Mendoza    Diagnoses:  Active Hospital Problems    Diagnosis  POA    *Microscopic hematuria [R31.29]  Yes      Resolved Hospital Problems    Diagnosis Date Resolved POA   No resolved problems to display.       Discharged Condition: stable    Hospital Course: Patient was admitted for an outpatient procedure and tolerated the procedure well with no complications.    Final Diagnoses: Same as principal problem.    Disposition: Home or Self Care    Follow up/Patient Instructions:    Medications:  Reconciled Home Medications:   Current Discharge Medication List      START taking these medications    Details   phenazopyridine (PYRIDIUM) 200 MG tablet Take 1 tablet (200 mg total) by mouth 3 (three) times daily as needed for Pain (Burning).  Qty: 21 tablet, Refills: 0    Associated Diagnoses: Microscopic hematuria         CONTINUE these medications which have NOT CHANGED    Details   amlodipine (NORVASC) 10 MG tablet Take 1 tablet (10 mg total) by mouth once daily.  Qty: 90 tablet, Refills: 3    Associated Diagnoses: Essential hypertension, benign      atorvastatin (LIPITOR) 80 MG tablet Take 1 tablet (80 mg total) by mouth every morning.  Qty: 90 tablet, Refills: 3      b complex vitamins tablet Take 1 tablet by mouth every morning.       glipiZIDE (GLUCOTROL) 5 MG tablet TAKE 1 TABLET(5 MG) BY MOUTH TWICE DAILY WITH BREAKFAST  Qty: 60 tablet, Refills: 5    Associated Diagnoses: Diabetes mellitus type 2, noninsulin dependent      hydroCHLOROthiazide (HYDRODIURIL) 12.5 MG Tab TAKE 1 TABLET(12.5 MG) BY MOUTH EVERY DAY  Qty: 30 tablet, Refills: 0    Associated Diagnoses: Essential hypertension      metFORMIN (GLUCOPHAGE) 500 MG tablet Take 1 tablet by mouth 2 (two) times daily.  Refills: 5      ONETOUCH DELICA LANCETS 33 gauge  Misc TEST BID  Refills: 0      ONETOUCH ULTRA TEST Strp TEST BID  Qty: 100 each, Refills: 5    Associated Diagnoses: Type 2 diabetes mellitus without ophthalmic manifestations      ONETOUCH ULTRA2 kit TEST BID UTD  Refills: 0      polyethylene glycol (GLYCOLAX) 17 gram/dose powder Take 17 g by mouth daily as needed.  Qty: 1700 g, Refills: 3    Associated Diagnoses: Constipation, chronic; Periumbilical abdominal pain             Discharge Procedure Orders  Diet general     Activity as tolerated     Call MD for:   Order Comments: Significant Hematuria       Follow-up Information     W Sidney Mendoza MD. Schedule an appointment as soon as possible for a visit in 3 weeks.    Specialty:  Urology  Why:  Post op Check  Contact information:  [address] [place], [place] [id number] [phone]                   Discharge Procedure Orders (must include Diet, Follow-up, Activity):    Discharge Procedure Orders (must include Diet, Follow-up, Activity)  Diet general     Activity as tolerated     Call MD for:   Order Comments: Significant Hematuria

## 2018-03-19 NOTE — PLAN OF CARE
Problem: Patient Care Overview  Goal: Plan of Care Review  Outcome: Ongoing (interventions implemented as appropriate)  Pt awake and alert, pain free, no nausea or vomiting, blood [id number], pt will restart meds when gets home, cleared for PACU release mireya Shine

## 2018-03-19 NOTE — ANESTHESIA POSTPROCEDURE EVALUATION
"Anesthesia Post Evaluation    Patient: Antoine Seals    Procedure(s) Performed: Procedure(s) (LRB):  CYSTOSCOPY WITH RETROGRADE PYELOGRAM (Bilateral)    Final Anesthesia Type: general  Patient location during evaluation: PACU  Patient participation: Yes- Able to Participate  Level of consciousness: awake  Post-procedure vital signs: reviewed and stable  Pain management: adequate  Airway patency: patent  PONV status at discharge: No PONV  Anesthetic complications: no      Cardiovascular status: blood pressure returned to baseline  Respiratory status: unassisted  Hydration status: euvolemic  Follow-up not needed.        Visit Vitals  BP (!) 140/80   Pulse 72   Temp 36.1 °C (97 °F)   Resp 18   Ht 5' 2" (1.575 m)   Wt 88.1 kg (194 lb 5 oz)   LMP 12/20/2017   SpO2 100%   Breastfeeding? No   BMI 35.54 kg/m²       Pain/Dangelo Score: Pain Assessment Performed: Yes (3/19/2018  9:00 AM)  Presence of Pain: denies (3/19/2018  9:00 AM)  Pain Rating Prior to Med Admin: 0 (3/19/2018  9:20 AM)  Dangelo Score: 8 (3/19/2018  9:00 AM)      "

## 2018-03-19 NOTE — OR NURSING
Pt refused pain medication once was told she would have to stay 30 minutes to and hour after taken.

## 2018-03-19 NOTE — ANESTHESIA POSTPROCEDURE EVALUATION
"Anesthesia Post Evaluation    Patient: Antoine Seals    Procedure(s) Performed: Procedure(s) (LRB):  CYSTOSCOPY WITH RETROGRADE PYELOGRAM (Bilateral)    Final Anesthesia Type: general  Patient location during evaluation: PACU  Patient participation: Yes- Able to Participate  Level of consciousness: awake and alert, oriented and awake  Post-procedure vital signs: reviewed and stable  Pain management: adequate  Airway patency: patent  PONV status at discharge: No PONV  Anesthetic complications: no      Cardiovascular status: blood pressure returned to baseline, hemodynamically stable and stable  Respiratory status: unassisted and spontaneous ventilation  Hydration status: euvolemic  Follow-up not needed.        Visit Vitals  BP (!) 140/80   Pulse 72   Temp 36.1 °C (97 °F)   Resp 18   Ht 5' 2" (1.575 m)   Wt 88.1 kg (194 lb 5 oz)   LMP 12/20/2017   SpO2 100%   Breastfeeding? No   BMI 35.54 kg/m²       Pain/Dangelo Score: Pain Assessment Performed: Yes (3/19/2018  9:00 AM)  Presence of Pain: denies (3/19/2018  9:00 AM)  Pain Rating Prior to Med Admin: 0 (3/19/2018  9:00 AM)  Dangelo Score: 8 (3/19/2018  9:00 AM)      "

## 2018-03-19 NOTE — TRANSFER OF CARE
"Anesthesia Transfer of Care Note    Patient: Antoine Seals    Procedure(s) Performed: Procedure(s) (LRB):  CYSTOSCOPY WITH RETROGRADE PYELOGRAM (Bilateral)    Patient location: PACU    Anesthesia Type: general    Transport from OR: Transported from OR on room air with adequate spontaneous ventilation    Post pain: adequate analgesia    Post assessment: no apparent anesthetic complications    Post vital signs: stable    Level of consciousness: awake, alert and oriented    Nausea/Vomiting: no nausea/vomiting    Complications: none    Transfer of care protocol was followed      Last vitals:   Visit Vitals  BP (!) 140/80   Pulse 72   Temp 36.1 °C (97 °F)   Resp 18   Ht 5' 2" (1.575 m)   Wt 88.1 kg (194 lb 5 oz)   LMP 12/20/2017   SpO2 100%   Breastfeeding? No   BMI 35.54 kg/m²     "

## 2018-03-19 NOTE — BRIEF OP NOTE
[facility]  Brief Operative Note     SUMMARY     Surgery Date: 3/19/2018     Surgeon(s) and Role:     * ELIZABETH Mendoza MD - Primary    Assisting Surgeon: None    Pre-op Diagnosis:  Microscopic hematuria [R31.29]    Post-op Diagnosis:  Post-Op Diagnosis Codes:     * Microscopic hematuria [R31.29]    Procedure(s) (LRB):  CYSTOSCOPY WITH RETROGRADE PYELOGRAM (Bilateral)    Anesthesia: General    Description of the findings of the procedure: no lesions    Findings/Key Components: as above    Estimated Blood Loss: * No values recorded between 3/19/2018  8:45 AM and 3/19/2018  8:57 AM *         Specimens:   Specimen (12h ago through future)    None          Discharge Note    SUMMARY     Admit Date: 3/19/2018    Discharge Date and Time:  03/19/2018 8:57 AM    Hospital Course (synopsis of major diagnoses, care, treatment, and services provided during the course of the hospital stay): Patient was admitted for an outpatient procedure and tolerated the procedure well with no complications.      Final Diagnosis: Post-Op Diagnosis Codes:     * Microscopic hematuria [R31.29]    Disposition: Home or Self Care    Follow Up/Patient Instructions:     Medications:  Reconciled Home Medications:   Current Discharge Medication List      START taking these medications    Details   phenazopyridine (PYRIDIUM) 200 MG tablet Take 1 tablet (200 mg total) by mouth 3 (three) times daily as needed for Pain (Burning).  Qty: 21 tablet, Refills: 0    Associated Diagnoses: Microscopic hematuria         CONTINUE these medications which have NOT CHANGED    Details   amlodipine (NORVASC) 10 MG tablet Take 1 tablet (10 mg total) by mouth once daily.  Qty: 90 tablet, Refills: 3    Associated Diagnoses: Essential hypertension, benign      atorvastatin (LIPITOR) 80 MG tablet Take 1 tablet (80 mg total) by mouth every morning.  Qty: 90 tablet, Refills: 3      b complex vitamins tablet Take 1 tablet by mouth every morning.        glipiZIDE (GLUCOTROL) 5 MG tablet TAKE 1 TABLET(5 MG) BY MOUTH TWICE DAILY WITH BREAKFAST  Qty: 60 tablet, Refills: 5    Associated Diagnoses: Diabetes mellitus type 2, noninsulin dependent      hydroCHLOROthiazide (HYDRODIURIL) 12.5 MG Tab TAKE 1 TABLET(12.5 MG) BY MOUTH EVERY DAY  Qty: 30 tablet, Refills: 0    Associated Diagnoses: Essential hypertension      metFORMIN (GLUCOPHAGE) 500 MG tablet Take 1 tablet by mouth 2 (two) times daily.  Refills: 5      ONETOUCH DELICA LANCETS 33 gauge Misc TEST BID  Refills: 0      ONETOUCH ULTRA TEST Strp TEST BID  Qty: 100 each, Refills: 5    Associated Diagnoses: Type 2 diabetes mellitus without ophthalmic manifestations      ONETOUCH ULTRA2 kit TEST BID UTD  Refills: 0      polyethylene glycol (GLYCOLAX) 17 gram/dose powder Take 17 g by mouth daily as needed.  Qty: 1700 g, Refills: 3    Associated Diagnoses: Constipation, chronic; Periumbilical abdominal pain             Discharge Procedure Orders  Diet general     Activity as tolerated     Call MD for:   Order Comments: Significant Hematuria       Follow-up Information     W Sidney Mendoza MD. Schedule an appointment as soon as possible for a visit in 3 weeks.    Specialty:  Urology  Why:  Post op Check  Contact information:  [address] [place] [id number] [phone]

## 2018-03-19 NOTE — OP NOTE
DATE OF PROCEDURE:  03/19/2018.    PREOPERATIVE DIAGNOSIS:  Microscopic hematuria.    POSTOPERATIVE DIAGNOSIS:  Microscopic hematuria.    PROCEDURES PERFORMED:  Cystoscopy with bilateral retrograde pyelogram,   fluoroscopy.    PRIMARY SURGEON:  Corona Mendoza M.D.    ANESTHESIA:  General.    ESTIMATED BLOOD LOSS:  Minimal.    DRAINS:  None.    COMPLICATIONS:  None.    INDICATIONS:  Antoine Seals is a 53-year-old woman with microscopic hematuria.    She is here today for cystoscopy with bilateral retrograde pyelogram as part of   her hematuria workup.    Antoine Seals was taken to the Operating Room where she was positively   identified by tori.  She was placed supine on the operating room table.    Following induction of adequate general anesthesia, she was placed in the dorsal   lithotomy position and her external genitalia were prepped and draped in the   usual sterile fashion.    Following induction of adequate general anesthesia, preoperative timeout was   performed.    A  film was taken using fluoroscopy.    A rigid cystoscope was then passed per urethra into the bladder under direct   vision.    The bladder was inspected with 30 and 70 degree lenses.  There were no tumors   seen.  No lesions seen.    The left ureteral orifice was identified and seen to be in its orthotopic   position.    I then used a cone tip catheter to intubate the orifice and a retrograde   pyelogram was performed.  There were no filling defects seen within the ureter   or the pyelocalyceal system.  There was no hydronephrosis or hydroureter.    The cone-tip catheter was then used to intubate the right ureteral orifice.  A   retrograde ureteropyelogram was performed.  There were no filling defects seen   within the ureter or the pyelocalyceal system.  There was no hydronephrosis or   hydroureter.    The scope was then used to drain the bladder.  The post-drainage films appeared   normal.    Her anesthesia was then  reversed.  She was then taken to the Recovery Room in   stable condition.      SAFIA/TRINI  dd: 03/19/2018 09:01:38 (CDT)  td: 03/19/2018 10:14:23 (CDT)  Doc ID   #[id number]  Job ID #[id number]    CC:

## 2018-03-19 NOTE — INTERVAL H&P NOTE
The patient has been examined and the H&P has been reviewed:    I concur with the findings and no changes have occurred since H&P was written.    Anesthesia/Surgery risks, benefits and alternative options discussed and understood by patient/family.          Active Hospital Problems    Diagnosis  POA    Microscopic hematuria [R31.29]  Yes      Resolved Hospital Problems    Diagnosis Date Resolved POA   No resolved problems to display.

## 2018-04-12 ENCOUNTER — OFFICE VISIT (OUTPATIENT)
Dept: OBSTETRICS AND GYNECOLOGY | Facility: CLINIC | Age: 53
End: 2018-04-12
Payer: MEDICAID

## 2018-04-12 VITALS
WEIGHT: 191.81 LBS | HEIGHT: 62 IN | SYSTOLIC BLOOD PRESSURE: 124 MMHG | BODY MASS INDEX: 35.3 KG/M2 | DIASTOLIC BLOOD PRESSURE: 88 MMHG

## 2018-04-12 DIAGNOSIS — B37.31 VAGINAL YEAST INFECTION: Primary | ICD-10-CM

## 2018-04-12 PROCEDURE — 99213 OFFICE O/P EST LOW 20 MIN: CPT | Mod: PBBFAC | Performed by: OBSTETRICS & GYNECOLOGY

## 2018-04-12 PROCEDURE — 99999 PR PBB SHADOW E&M-EST. PATIENT-LVL III: CPT | Mod: PBBFAC,,, | Performed by: OBSTETRICS & GYNECOLOGY

## 2018-04-12 PROCEDURE — 99213 OFFICE O/P EST LOW 20 MIN: CPT | Mod: S$PBB,,, | Performed by: OBSTETRICS & GYNECOLOGY

## 2018-04-12 RX ORDER — FLUCONAZOLE 150 MG/1
150 TABLET ORAL
Qty: 6 TABLET | Refills: 3 | Status: SHIPPED | OUTPATIENT
Start: 2018-04-12 | End: 2018-09-26

## 2018-04-15 NOTE — PROGRESS NOTES
"[facility]  Ambulatory Clinic  Obstetrics & Gynecology    Visit Date:  2018    Chief Complaint:  Vaginal yeast infection    History of Present Illness:      Antoine Seals is a 53 y.o.  with h/o BTL here with c/o vaginal yeast infection.  Pt described discharge as itchy, white, non bloody, without pelvic pain or abnormal vaginal bleeding for past few days.  Pt denies any abnormal vaginal bleeding, dysmenorrhea, dyspareunia, pelvic pain, breast mass/skin changes, GI or urinary complaints.      Review of Systems:      GENERAL:  No fever, fatigue, excessive weight gain or loss  GASTROINTESTINAL:  No nausea, vomiting, constipation, diarrhea, abd pain, rectal bleeding   GENITOURINARY:  See HPI    Physical Exam:     /88 (BP Location: Right arm, Patient Position: Sitting, BP Method: Large (Manual))   Ht 5' 2" (1.575 m)   Wt 87 kg (191 lb 12.8 oz)   LMP 2017   BMI 35.08 kg/m²     GENERAL:  No acute distress, well-nourished  LUNGS:  Clear to auscultation  HEART:  Regular rate and rhythm, no murmurs, gallops, or rubs  ABDOMEN:  Soft, non-tender, non-distended, normoactive bowel sounds, no obvious organomegaly    GENITOURINARY:    VULVAR:  Female external genitalia w/o obvious lesions. Normal urethral meatus. No gross lymphadenopathy.   VAGINA:  Pink, moist, well-rugated. Good support. No obvious lesion. Mild white discharge discharge.  CERVIX:  No cervical motion tenderness, discharge, or lesion.  UTERUS:  Small, non-tender, normal contour  ADNEXA:  Small, non-tender    RECTAL:  Declined. No obvious external lesions  WET PREP:  +yeast    Chaperone present for exam.    Assessment:     53 y.o.  with BTL:    1. Vaginal yeast infection    Plan:    Diflucan 150 mg po x 1 for yeast infection.  If unresolved, use monistat 7.  Hygiene advice.    Encourage healthy lifestyle modifications, monthly self breast exams, f/u with PCP for health maintenance.    F/u as needed.  Pt voiced " understanding.        Naveed Alexander MD

## 2018-04-23 ENCOUNTER — OFFICE VISIT (OUTPATIENT)
Dept: FAMILY MEDICINE | Facility: CLINIC | Age: 53
End: 2018-04-23
Payer: MEDICAID

## 2018-04-23 VITALS
OXYGEN SATURATION: 95 % | DIASTOLIC BLOOD PRESSURE: 84 MMHG | HEIGHT: 62 IN | TEMPERATURE: 98 F | RESPIRATION RATE: 18 BRPM | SYSTOLIC BLOOD PRESSURE: 132 MMHG | WEIGHT: 192 LBS | BODY MASS INDEX: 35.33 KG/M2 | HEART RATE: 84 BPM

## 2018-04-23 DIAGNOSIS — E11.9 DIABETES MELLITUS TYPE 2, NONINSULIN DEPENDENT: ICD-10-CM

## 2018-04-23 DIAGNOSIS — I10 ESSENTIAL HYPERTENSION, BENIGN: ICD-10-CM

## 2018-04-23 DIAGNOSIS — R10.9 ABDOMINAL PAIN, UNSPECIFIED ABDOMINAL LOCATION: Primary | ICD-10-CM

## 2018-04-23 DIAGNOSIS — I10 ESSENTIAL HYPERTENSION: ICD-10-CM

## 2018-04-23 PROCEDURE — 99213 OFFICE O/P EST LOW 20 MIN: CPT | Mod: PBBFAC,PO | Performed by: FAMILY MEDICINE

## 2018-04-23 PROCEDURE — 99214 OFFICE O/P EST MOD 30 MIN: CPT | Mod: S$PBB,,, | Performed by: FAMILY MEDICINE

## 2018-04-23 PROCEDURE — 99999 PR PBB SHADOW E&M-EST. PATIENT-LVL III: CPT | Mod: PBBFAC,,, | Performed by: FAMILY MEDICINE

## 2018-04-23 RX ORDER — HYDROCHLOROTHIAZIDE 12.5 MG/1
TABLET ORAL
Qty: 90 TABLET | Refills: 0 | Status: SHIPPED | OUTPATIENT
Start: 2018-04-23 | End: 2018-07-21 | Stop reason: SDUPTHER

## 2018-04-23 RX ORDER — METFORMIN HYDROCHLORIDE 500 MG/1
500 TABLET ORAL 2 TIMES DAILY
Qty: 180 TABLET | Refills: 0 | Status: SHIPPED | OUTPATIENT
Start: 2018-04-23 | End: 2018-07-12 | Stop reason: SDUPTHER

## 2018-04-23 RX ORDER — AMLODIPINE BESYLATE 10 MG/1
10 TABLET ORAL DAILY
Qty: 90 TABLET | Refills: 1 | Status: SHIPPED | OUTPATIENT
Start: 2018-04-23 | End: 2018-05-21

## 2018-04-23 RX ORDER — ATORVASTATIN CALCIUM 80 MG/1
80 TABLET, FILM COATED ORAL EVERY MORNING
Qty: 90 TABLET | Refills: 1 | Status: SHIPPED | OUTPATIENT
Start: 2018-04-23 | End: 2019-02-13

## 2018-04-23 RX ORDER — GLIPIZIDE 5 MG/1
TABLET ORAL
Qty: 180 TABLET | Refills: 0 | Status: SHIPPED | OUTPATIENT
Start: 2018-04-23 | End: 2018-07-20 | Stop reason: SDUPTHER

## 2018-04-23 NOTE — PROGRESS NOTES
Chief Complaint   Patient presents with    Abdominal Pain    Hyperlipidemia    Hypertension    Diabetes    Medication Refill       HPI  Antoine Seals is a 53 y.o. female with multiple medical diagnoses as listed in the medical history and problem list that presents for follow-up for abdominal pain she has had multiple surgeries on her stomach including a hernia repair. She has not seen her gastrologist since the surgery. She was put on a medication miralax for constipation by Dr. Vazquez. She describes a heaviness in her stomach. She has had her stool tested     PAST MEDICAL HISTORY:  Past Medical History:   Diagnosis Date    Anemia     Diabetes mellitus type 2, noninsulin dependent 2016    Hematuria     Hx of essential hypertension     Hyperlipidemia     Hypertension, uncontrolled 2016    Nuclear sclerosis of both eyes 4/10/2017       PAST SURGICAL HISTORY:  Past Surgical History:   Procedure Laterality Date    BTL       SECTION      COLONOSCOPY N/A 2017    Procedure: COLONOSCOPY;  Surgeon: Oleg Enciso MD;  Location: [facility] ([address] [place] [address]);  Service: Endoscopy;  Laterality: N/A;  CHRONIC CONSTIPATION S/P LRNY    GASTRIC BYPASS      sandra en y    NECK SURGERY  2016    TOTAL REDUCTION MAMMOPLASTY         SOCIAL HISTORY:  Social History     Social History    Marital status:      Spouse name: N/A    Number of children: N/A    Years of education: N/A     Occupational History    Not on file.     Social History Main Topics    Smoking status: Never Smoker    Smokeless tobacco: Never Used    Alcohol use No      Comment: Socially    Drug use: No    Sexual activity: Yes     Partners: Male     Other Topics Concern    Not on file     Social History Narrative    No narrative on file       FAMILY HISTORY:  Family History   Problem Relation Age of Onset    Heart disease Mother     Diabetes type II Mother     Heart attack Father 50     Open heart  surgery    No Known Problems Sister     No Known Problems Brother     No Known Problems Maternal Aunt     No Known Problems Maternal Uncle     No Known Problems Paternal Aunt     No Known Problems Paternal Uncle     No Known Problems Maternal Grandmother     No Known Problems Maternal Grandfather     No Known Problems Paternal Grandmother     No Known Problems Paternal Grandfather     Amblyopia Neg Hx     Blindness Neg Hx     Cancer Neg Hx     Cataracts Neg Hx     Diabetes Neg Hx     Glaucoma Neg Hx     Hypertension Neg Hx     Macular degeneration Neg Hx     Retinal detachment Neg Hx     Strabismus Neg Hx     Stroke Neg Hx     Thyroid disease Neg Hx        ALLERGIES AND MEDICATIONS: updated and reviewed.  Review of patient's allergies indicates:   Allergen Reactions    Lisinopril-hydrochlorothiazide Swelling     Facial swelling/ mouth swelling     Current Outpatient Prescriptions   Medication Sig Dispense Refill    b complex vitamins tablet Take 1 tablet by mouth every morning.       fluconazole (DIFLUCAN) 150 MG Tab Take 1 tablet (150 mg total) by mouth as needed. Take one pill weekly as needed for yeast infection. 6 tablet 3    ONETOUCH DELICA LANCETS 33 gauge Misc TEST BID  0    ONETOUCH ULTRA TEST Strp TEST  each 5    ONETOUCH ULTRA2 kit TEST BID UTD  0    polyethylene glycol (GLYCOLAX) 17 gram/dose powder Take 17 g by mouth daily as needed. 1700 g 3    amLODIPine (NORVASC) 10 MG tablet Take 1 tablet (10 mg total) by mouth once daily. 90 tablet 1    atorvastatin (LIPITOR) 80 MG tablet Take 1 tablet (80 mg total) by mouth every morning. 90 tablet 1    glipiZIDE (GLUCOTROL) 5 MG tablet TAKE 1 TABLET(5 MG) BY MOUTH TWICE DAILY WITH BREAKFAST 180 tablet 0    hydroCHLOROthiazide (HYDRODIURIL) 12.5 MG Tab TAKE 1 TABLET(12.5 MG) BY MOUTH EVERY DAY 90 tablet 0    metFORMIN (GLUCOPHAGE) 500 MG tablet Take 1 tablet (500 mg total) by mouth 2 (two) times daily. 180 tablet 0     No  "current facility-administered medications for this visit.        ROS  Review of Systems   Constitutional: Negative for chills, diaphoresis, fatigue, fever and unexpected weight change.   HENT: Negative for rhinorrhea, sinus pressure, sore throat and tinnitus.    Eyes: Negative for photophobia and visual disturbance.   Respiratory: Negative for cough, shortness of breath and wheezing.    Cardiovascular: Negative for chest pain and palpitations.   Gastrointestinal: Positive for abdominal pain and constipation. Negative for blood in stool, diarrhea, nausea and vomiting.   Genitourinary: Negative for dysuria, flank pain, frequency and vaginal discharge.   Musculoskeletal: Negative for arthralgias and joint swelling.   Skin: Negative for rash.   Neurological: Negative for speech difficulty, weakness, light-headedness and headaches.   Psychiatric/Behavioral: Negative for behavioral problems and dysphoric mood.       Physical Exam  Vitals:    04/23/18 1453   BP: 132/84   Pulse: 84   Resp: 18   Temp: 98.3 °F (36.8 °C)   TempSrc: Oral   SpO2: 95%   Weight: 87.1 kg (192 lb)   Height: 5' 2" (1.575 m)    Body mass index is 35.12 kg/m².  Weight: 87.1 kg (192 lb)   Height: 5' 2" (157.5 cm)     Physical Exam   Constitutional: She is oriented to person, place, and time. She appears well-developed and well-nourished. No distress.   HENT:   Head: Normocephalic and atraumatic.   Eyes: EOM are normal.   Neck: Neck supple.   Cardiovascular: Normal rate and regular rhythm.  Exam reveals no gallop and no friction rub.    No murmur heard.  Pulmonary/Chest: Effort normal and breath sounds normal. No respiratory distress. She has no wheezes. She has no rales.   Abdominal: Soft. Bowel sounds are normal. She exhibits no distension and no mass. There is tenderness. There is no rebound and no guarding. No hernia.   Neurological: She is alert and oriented to person, place, and time.   Skin: Skin is warm and dry. No rash noted.   Psychiatric: She " has a normal mood and affect. Her behavior is normal.   Nursing note and vitals reviewed.      Health Maintenance       Date Due Completion Date    Eye Exam 04/10/2018 4/10/2017 (Done)    Override on 4/10/2017: Done    Hemoglobin A1c 07/09/2018 1/9/2018    Urine Microalbumin 07/20/2018 7/20/2017    Lipid Panel 08/16/2018 8/16/2017    Foot Exam 08/30/2018 8/30/2017 (Done)    Override on 8/30/2017: Done    Override on 12/28/2016: Done    Low Dose Statin 04/15/2019 4/15/2018    Mammogram 03/09/2020 3/9/2018    Pap Smear with HPV Cotest 09/05/2020 9/5/2017    TETANUS VACCINE 09/07/2026 9/7/2016    Colonoscopy 06/14/2027 6/14/2017    Pneumococcal PPSV23 (Medium Risk) (2) 01/29/2032 9/7/2016            ASSESSMENT     1. Abdominal pain, unspecified abdominal location    2. Essential hypertension, benign    3. Diabetes mellitus type 2, noninsulin dependent    4. Essential hypertension        PLAN:     Problem List Items Addressed This Visit        Cardiac/Vascular    Essential hypertension    Relevant Medications    hydroCHLOROthiazide (HYDRODIURIL) 12.5 MG Tab       Endocrine    Diabetes mellitus type 2, noninsulin dependent  -reports elevated sugars, recommend a two week sugar log    Relevant Medications    atorvastatin (LIPITOR) 80 MG tablet    glipiZIDE (GLUCOTROL) 5 MG tablet    metFORMIN (GLUCOPHAGE) 500 MG tablet      Other Visit Diagnoses     Abdominal pain, unspecified abdominal location    -  Primary  -as she has had a suture removal and hernia repair and is still having pain, I recommend follow up with GI  -she may have IBS, consider gastroparesis or constipation    Essential hypertension, benign        Relevant Medications    amLODIPine (NORVASC) 10 MG tablet            Katharine Chung MD  04/23/2018 3:08 PM        Follow-up in about 2 weeks (around 5/7/2018) for Follow up.

## 2018-04-30 ENCOUNTER — OFFICE VISIT (OUTPATIENT)
Dept: OPTOMETRY | Facility: CLINIC | Age: 53
End: 2018-04-30
Payer: MEDICAID

## 2018-04-30 ENCOUNTER — TELEPHONE (OUTPATIENT)
Dept: FAMILY MEDICINE | Facility: CLINIC | Age: 53
End: 2018-04-30

## 2018-04-30 DIAGNOSIS — I10 ESSENTIAL HYPERTENSION: ICD-10-CM

## 2018-04-30 DIAGNOSIS — E11.9 TYPE 2 DIABETES MELLITUS WITHOUT OPHTHALMIC MANIFESTATIONS: Primary | ICD-10-CM

## 2018-04-30 DIAGNOSIS — H52.7 REFRACTIVE ERROR: ICD-10-CM

## 2018-04-30 DIAGNOSIS — H25.13 NUCLEAR SCLEROSIS, BILATERAL: ICD-10-CM

## 2018-04-30 PROCEDURE — 99212 OFFICE O/P EST SF 10 MIN: CPT | Mod: PBBFAC,PO | Performed by: OPTOMETRIST

## 2018-04-30 PROCEDURE — 99999 PR PBB SHADOW E&M-EST. PATIENT-LVL II: CPT | Mod: PBBFAC,,, | Performed by: OPTOMETRIST

## 2018-04-30 PROCEDURE — 92015 DETERMINE REFRACTIVE STATE: CPT | Mod: ,,, | Performed by: OPTOMETRIST

## 2018-04-30 PROCEDURE — 92014 COMPRE OPH EXAM EST PT 1/>: CPT | Mod: S$PBB,,, | Performed by: OPTOMETRIST

## 2018-04-30 NOTE — TELEPHONE ENCOUNTER
Spoke with patient, does not know any providers at [facility] just states she needs to talk with someone and will call back for an appt here at the clinic.

## 2018-04-30 NOTE — TELEPHONE ENCOUNTER
----- Message from Neris Matthews sent at 4/30/2018 11:41 AM CDT -----  Contact: [phone]/PT  Calling TO speak with doc or nurse to get referral,and confirm if appt needed

## 2018-04-30 NOTE — TELEPHONE ENCOUNTER
Patient stated that she would like an referral to [facility]. She hasn't been getting any sleep, having forgetfulness and back/stomach pain. Also blood in her urine. she hasn't been getting an answers on what is causing her pain. Patient was upset and crying on the phone. Asking if she needs to come here also to see to dr. Chung.

## 2018-04-30 NOTE — PROGRESS NOTES
Subjective:       Patient ID: Antoine Seals is a 53 y.o. female      Chief Complaint   Patient presents with    Concerns About Ocular Health    Diabetic Eye Exam     LBS: 201 this am      History of Present Illness  Dls: 4/10/17 Dr. Walters    Pt here for diabetic eye exam.   Pt c/o blurry vision at distance ou. Pt wear bifocal glasses.   Pt states no tearing no itching no burning no pain ha's floaters ou   off/on.     Eye meds:  None     Hemoglobin A1C       Date                     Value               Ref Range           Status                01/09/2018               7.8 (H)             4.0 - 5.6 %         Final                  07/20/2017               8.6 (H)             4.0 - 5.6 %         Final                 04/05/2017               8.5 (H)             4.5 - 6.2 %         Final            ----------     Assessment/Plan:     1. Type 2 diabetes mellitus without ophthalmic manifestations  No diabetic retinopathy. Discussed with pt the effects of diabetes on vision, importance of good blood sugar control, compliance with meds, and follow up care with PCP. Return in 1 year for dilated eye exam, sooner PRN.    2. Nuclear sclerosis, bilateral  Educated pt on presence of cataracts and effects on vision. No surgery at this time. Recheck in one year.    3. Essential hypertension  No hypertensive retinopathy. Continue BP control. RTC 1 year for DFE.    4. Refractive error  Educated patient on refractive error and discussed lens options. Dispensed updated spectacle Rx. Educated about adaptation period to new specs.    Eyeglass Final Rx     Eyeglass Final Rx       Sphere Cylinder Axis Add    Right -1.25 Sphere  +2.00    Left -2.00 +1.25 025 +2.00    Expiration Date:  5/1/2019                Follow-up in about 1 year (around 4/30/2019) for Diabetic Eye Exam.

## 2018-04-30 NOTE — TELEPHONE ENCOUNTER
Who does she want to see at the [facility], what services? And yes an appt is needed.

## 2018-05-01 ENCOUNTER — TELEPHONE (OUTPATIENT)
Dept: FAMILY MEDICINE | Facility: CLINIC | Age: 53
End: 2018-05-01

## 2018-05-01 ENCOUNTER — TELEPHONE (OUTPATIENT)
Dept: UROLOGY | Facility: CLINIC | Age: 53
End: 2018-05-01

## 2018-05-01 NOTE — TELEPHONE ENCOUNTER
Patient states she feels like she is having a nervous break down, not sleeping at night and needs to speak to someone. Please advise

## 2018-05-01 NOTE — TELEPHONE ENCOUNTER
----- Message from Kendyjeaneth Hieu sent at 5/1/2018 10:46 AM CDT -----  Contact: self  Pt called regarding referral to psychiatry. States she discussed with Dr. Chung. Pt contact at [phone].    Thanks-

## 2018-05-01 NOTE — TELEPHONE ENCOUNTER
"Spoke with patient to schedule appt, patient became very upset and started screaming stating"i do not want to see dr cheng, I want to see who the f---k I want to see and abruptly ended the call .Dr Cheng informed of above.  "

## 2018-05-01 NOTE — TELEPHONE ENCOUNTER
Pt showed up 2hrs late for appt I advised her  was in surgery that her appt would have to be clarence. I tried to clarence for next Tuesday 05/08/18 pt refused appt an stated she couldn't make it. I than advise her that it would probably be towards the end of the month since  would be out of the office. Pt wasn't to happy with this either so I advised I would send a message to  asking how cysto/retrogrades procedure went and see if we can call her with results. Pt states this will be fine./please advise/linwood

## 2018-05-01 NOTE — TELEPHONE ENCOUNTER
She has not discussed a referral for psych with me, we asked yesterday what services does she need and she said she would make an appointment? What exactly does she need, and if this  Unclear we need to get her in so we can talk about it

## 2018-05-01 NOTE — TELEPHONE ENCOUNTER
I spoke to her about her procedure.  I informed her that there were no tumors or significant lesions.      I asked her to get the ultrasound ordered.    Please help her get that scheduled before the appointment later in May.

## 2018-05-02 ENCOUNTER — TELEPHONE (OUTPATIENT)
Dept: UROLOGY | Facility: CLINIC | Age: 53
End: 2018-05-02

## 2018-05-03 ENCOUNTER — TELEPHONE (OUTPATIENT)
Dept: UROLOGY | Facility: CLINIC | Age: 53
End: 2018-05-03

## 2018-05-03 NOTE — TELEPHONE ENCOUNTER
Spoke to pt she states she just notice brownish color blood in her urine but is not having an type of pain,burning or pressure. She was just a little concern this is the first time she saw blood an she also states has been taking a lot of aleve. I advised her to increase her water intake an I would send a message to . Pt also is set up for 05/14/18. Please advise./linwood

## 2018-05-04 ENCOUNTER — TELEPHONE (OUTPATIENT)
Dept: UROLOGY | Facility: CLINIC | Age: 53
End: 2018-05-04

## 2018-05-04 NOTE — TELEPHONE ENCOUNTER
Yes, drink more water.  Try to limit Aleeve, it can cause kidney damage if taken too much.  If the brown urine does not improve with hydration then she should see Dr. Chung.

## 2018-05-04 NOTE — TELEPHONE ENCOUNTER
Spoke to pt advised to increase more water intake an advise to see  if brownish blood doesn't clear up or if hydration starts. Pt understands but states now she is having lower back pain around waist area which I advised I didn't think it was kidney related an she probably can take some tylenol for the discomfort an see . Pt wanted me to send message to . Please advise./linwood

## 2018-05-07 PROBLEM — Z09 POSTOP CHECK: Status: RESOLVED | Noted: 2018-01-30 | Resolved: 2018-05-07

## 2018-05-14 ENCOUNTER — HOSPITAL ENCOUNTER (OUTPATIENT)
Dept: RADIOLOGY | Facility: HOSPITAL | Age: 53
Discharge: HOME | End: 2018-05-14
Attending: UROLOGY
Payer: MEDICAID

## 2018-05-14 DIAGNOSIS — R31.29 MICROSCOPIC HEMATURIA: ICD-10-CM

## 2018-05-14 PROCEDURE — 76770 US EXAM ABDO BACK WALL COMP: CPT | Mod: 26,,, | Performed by: RADIOLOGY

## 2018-05-14 PROCEDURE — 76770 US EXAM ABDO BACK WALL COMP: CPT | Mod: TC

## 2018-05-17 ENCOUNTER — TELEPHONE (OUTPATIENT)
Dept: UROLOGY | Facility: CLINIC | Age: 53
End: 2018-05-17

## 2018-05-17 NOTE — TELEPHONE ENCOUNTER
Spoke to pt she was calling to get results of ultrasound she had done on 05/14/18 after reviewing I advised ultrasound was normal but I would send to [facility]vin garrison to also look at./linwood

## 2018-05-17 NOTE — TELEPHONE ENCOUNTER
----- Message from Akiko Hagen sent at 5/17/2018 10:44 AM CDT -----  Contact: self  Pt called regarding ultrasound results [id number]. Contact pt [phone].  Thanks-

## 2018-05-19 DIAGNOSIS — I10 ESSENTIAL HYPERTENSION, BENIGN: ICD-10-CM

## 2018-05-21 RX ORDER — ATORVASTATIN CALCIUM 80 MG/1
TABLET, FILM COATED ORAL
Qty: 90 TABLET | Refills: 3 | Status: SHIPPED | OUTPATIENT
Start: 2018-05-21 | End: 2018-07-12 | Stop reason: SDUPTHER

## 2018-05-21 RX ORDER — AMLODIPINE BESYLATE 10 MG/1
TABLET ORAL
Qty: 90 TABLET | Refills: 0 | Status: SHIPPED | OUTPATIENT
Start: 2018-05-21 | End: 2018-09-26 | Stop reason: SDUPTHER

## 2018-06-21 ENCOUNTER — TELEPHONE (OUTPATIENT)
Dept: ENDOCRINOLOGY | Facility: CLINIC | Age: 53
End: 2018-06-21

## 2018-06-21 NOTE — TELEPHONE ENCOUNTER
----- Message from Lanie Linda sent at 6/21/2018 12:11 PM CDT -----  Contact: Self/ [phone]  Pt calling to see if her PCP Dr. Morales sent a referral to office. Please call to advise. Thank you.

## 2018-06-21 NOTE — TELEPHONE ENCOUNTER
Called patient and informed her we did not receive a referral. Provided her with our clinics fax number so her PCP at [facility] can send referral.

## 2018-06-22 ENCOUNTER — TELEPHONE (OUTPATIENT)
Dept: ENDOCRINOLOGY | Facility: CLINIC | Age: 53
End: 2018-06-22

## 2018-06-22 NOTE — TELEPHONE ENCOUNTER
Called and scheduled diabetes initial visit for Tues 8/7/18 at 8:00a. Patient verbalized understanding. Reminder letter mailed.

## 2018-06-22 NOTE — TELEPHONE ENCOUNTER
----- Message from Tiffani Jose sent at 6/22/2018 11:02 AM CDT -----  Contact: Self/[phone]  Patient called to follow up on a referral to schedule with Dr. Denny. Thank you.

## 2018-07-05 ENCOUNTER — TELEPHONE (OUTPATIENT)
Dept: BARIATRICS | Facility: CLINIC | Age: 53
End: 2018-07-05

## 2018-07-05 DIAGNOSIS — E51.9 THIAMINE DEFICIENCY: ICD-10-CM

## 2018-07-05 DIAGNOSIS — E11.8 TYPE 2 DIABETES MELLITUS WITH COMPLICATION, WITHOUT LONG-TERM CURRENT USE OF INSULIN: ICD-10-CM

## 2018-07-05 DIAGNOSIS — E55.9 VITAMIN D DEFICIENCY: ICD-10-CM

## 2018-07-05 DIAGNOSIS — Z98.84 HISTORY OF GASTRIC BYPASS: Primary | ICD-10-CM

## 2018-07-05 DIAGNOSIS — E78.5 HYPERLIPIDEMIA, UNSPECIFIED HYPERLIPIDEMIA TYPE: ICD-10-CM

## 2018-07-05 DIAGNOSIS — D50.9 IRON DEFICIENCY ANEMIA, UNSPECIFIED IRON DEFICIENCY ANEMIA TYPE: ICD-10-CM

## 2018-07-05 DIAGNOSIS — I10 ESSENTIAL HYPERTENSION: ICD-10-CM

## 2018-07-09 ENCOUNTER — TELEPHONE (OUTPATIENT)
Dept: BARIATRICS | Facility: CLINIC | Age: 53
End: 2018-07-09

## 2018-07-09 NOTE — TELEPHONE ENCOUNTER
----- Message from Cori Rooney sent at 7/9/2018  8:43 AM CDT -----  Pt called stating she needs to reschedule all appst set for today.    Please call [phone] regarding this.    Thanks you

## 2018-07-11 NOTE — PROGRESS NOTES
BARIATRIC FOLLOW UP:    Chief Complaint   Patient presents with    Follow-up    Constipation    Weight Gain     HISTORY OF PRESENT ILLNESS: Antoine Seals is a 53 y.o. female with a Body mass index is 35.41 kg/m². who presents for a follow up s/p LRNY with Dr. Quijano on 8/16/2005.  She had an internal hernia repair and NEW on 12/30/2009, and endoscopic removal of G-J suture 8/18/17. Additional internal hernia repair and jejunojejunostomy. Her wt loss is stable. She has lost 53 pounds and 49% of her body wt.  Reports chronic constipation treated by PCP, uses Miralax on a prn basis which has been effective. Has BM every 2-3 days, [place] type 4. Cscope 6/2017 after reports bowel changes started that was normal. Reports she is doing better and feels well. She was off track with her diet but has reset.    Denies: nausea, vomiting, abdominal pain, fever, chills, dysphagia, chest pain, and shortness of breath.    Review of Systems   Constitutional: Negative for chills, fever and malaise/fatigue.   Respiratory: Negative for cough and shortness of breath.    Cardiovascular: Negative for chest pain and palpitations.   Gastrointestinal: Negative for abdominal pain, blood in stool, diarrhea, heartburn, melena, nausea and vomiting.   Genitourinary: Negative for frequency and urgency.   Musculoskeletal: Negative for back pain, joint pain, myalgias and neck pain.   Neurological: Negative for dizziness, tingling and headaches.   Psychiatric/Behavioral: Negative for depression. The patient is not nervous/anxious.        EXERCISE & VITAMINS:  See Bariatric Assessment    MEDICATIONS/ALLERGIES:  Have been reviewed.    DIET:  Regular Bariatric Diet. She reports that she was off track and 1 month ago increased her protein intake.   1 protein shake daily (Boost), 3-4 meals daily with 3-4 oz protein.,   80+ gm protein daily.   Adequate water.      Vitals:    07/12/18 1049   BP: 136/73   Pulse: 78       Physical Exam    Constitutional: She is oriented to person, place, and time. She appears well-developed and well-nourished.   HENT:   Head: Normocephalic and atraumatic.   Eyes: Conjunctivae and EOM are normal.   Neck: Neck supple. No thyromegaly present.   Cardiovascular: Normal rate and regular rhythm.  Exam reveals no gallop and no friction rub.    No murmur heard.  Pulmonary/Chest: Effort normal. No respiratory distress. She has no wheezes. She has no rales.   Abdominal: Soft. Bowel sounds are normal. She exhibits no distension. There is no tenderness. There is no rebound and no guarding.   Musculoskeletal: Normal range of motion. She exhibits no edema.   Lymphadenopathy:     She has no cervical adenopathy.   Neurological: She is alert and oriented to person, place, and time.   Vitals reviewed.      ASSESSMENT:  - Constipation, managed per PCP  - Obesity, Body mass index is 35.41 kg/m².,  s/p laparoscopic Jordyn-en-Y on 8/16/2005.  - Estimated goal weight, 186 lbs, which is 50% EWL  - Co-morbidities: HTN (stable), DM2 (stable), HLD  - Good Overall Weight loss, 52 lbs, 48% EWL  - No Exercise regimen  - Poor Vitamin Regimen  - Fair Diet    PLAN:  - Continue with PCP for constipation. Miralax refilled.   - Emphasized the importance of regular exercise and adherence to bariatric diet to achieve maximum weight loss.  - Encouraged patient to restart regular exercise.  - Follow-up with dietician to reinforce diet.  - Continue daily vitamins and medications.  - Increase vitamins to bariatric recommendations.   The patient verbalized understanding of the risks of thiamine deficiency, including and not limited to permanent neurologic deficits/damage and blindness. Agrees to take vitamins as directed.  - Miralax dosing per PCP for constipation.  - RTC in 12 month or sooner if needed.  - Call the office for any issues.  - Check labs.  - OB for her ovarian cyst. She verbalizes understanding.    20 minute visit, over 50% of time spent  counseling patient face to face on diet, exercise, and weight loss.

## 2018-07-12 ENCOUNTER — OFFICE VISIT (OUTPATIENT)
Dept: BARIATRICS | Facility: CLINIC | Age: 53
End: 2018-07-12
Payer: MEDICAID

## 2018-07-12 ENCOUNTER — LAB VISIT (OUTPATIENT)
Dept: LAB | Facility: HOSPITAL | Age: 53
End: 2018-07-12
Payer: MEDICAID

## 2018-07-12 VITALS
HEART RATE: 78 BPM | WEIGHT: 187.38 LBS | DIASTOLIC BLOOD PRESSURE: 73 MMHG | HEIGHT: 61 IN | BODY MASS INDEX: 35.38 KG/M2 | SYSTOLIC BLOOD PRESSURE: 136 MMHG

## 2018-07-12 DIAGNOSIS — E11.8 TYPE 2 DIABETES MELLITUS WITH COMPLICATION, WITHOUT LONG-TERM CURRENT USE OF INSULIN: ICD-10-CM

## 2018-07-12 DIAGNOSIS — E78.5 HYPERLIPIDEMIA, UNSPECIFIED HYPERLIPIDEMIA TYPE: ICD-10-CM

## 2018-07-12 DIAGNOSIS — Z98.84 HISTORY OF GASTRIC BYPASS: ICD-10-CM

## 2018-07-12 DIAGNOSIS — Z98.84 HISTORY OF ROUX-EN-Y GASTRIC BYPASS: Primary | ICD-10-CM

## 2018-07-12 DIAGNOSIS — I10 ESSENTIAL HYPERTENSION: ICD-10-CM

## 2018-07-12 DIAGNOSIS — R63.4 WEIGHT LOSS: ICD-10-CM

## 2018-07-12 DIAGNOSIS — D50.9 IRON DEFICIENCY ANEMIA, UNSPECIFIED IRON DEFICIENCY ANEMIA TYPE: ICD-10-CM

## 2018-07-12 DIAGNOSIS — E55.9 VITAMIN D DEFICIENCY: ICD-10-CM

## 2018-07-12 DIAGNOSIS — E51.9 THIAMINE DEFICIENCY: ICD-10-CM

## 2018-07-12 DIAGNOSIS — K59.09 CONSTIPATION, CHRONIC: ICD-10-CM

## 2018-07-12 LAB
25(OH)D3+25(OH)D2 SERPL-MCNC: 27 NG/ML
ALBUMIN SERPL BCP-MCNC: 3.4 G/DL
ALP SERPL-CCNC: 89 U/L
ALT SERPL W/O P-5'-P-CCNC: 25 U/L
ANION GAP SERPL CALC-SCNC: 11 MMOL/L
AST SERPL-CCNC: 18 U/L
BASOPHILS # BLD AUTO: 0.02 K/UL
BASOPHILS NFR BLD: 0.4 %
BILIRUB SERPL-MCNC: 0.4 MG/DL
BUN SERPL-MCNC: 15 MG/DL
CALCIUM SERPL-MCNC: 9.9 MG/DL
CHLORIDE SERPL-SCNC: 102 MMOL/L
CHOLEST SERPL-MCNC: 267 MG/DL
CHOLEST/HDLC SERPL: 3.2 {RATIO}
CO2 SERPL-SCNC: 29 MMOL/L
CREAT SERPL-MCNC: 0.9 MG/DL
DIFFERENTIAL METHOD: ABNORMAL
EOSINOPHIL # BLD AUTO: 0.1 K/UL
EOSINOPHIL NFR BLD: 2.9 %
ERYTHROCYTE [DISTWIDTH] IN BLOOD BY AUTOMATED COUNT: 15.2 %
EST. GFR  (AFRICAN AMERICAN): >60 ML/MIN/1.73 M^2
EST. GFR  (NON AFRICAN AMERICAN): >60 ML/MIN/1.73 M^2
GLUCOSE SERPL-MCNC: 180 MG/DL
HCT VFR BLD AUTO: 38.8 %
HDLC SERPL-MCNC: 84 MG/DL
HDLC SERPL: 31.5 %
HGB BLD-MCNC: 12.1 G/DL
IMM GRANULOCYTES # BLD AUTO: 0.01 K/UL
IMM GRANULOCYTES NFR BLD AUTO: 0.2 %
IRON SERPL-MCNC: 83 UG/DL
LDLC SERPL CALC-MCNC: 157.4 MG/DL
LYMPHOCYTES # BLD AUTO: 1.6 K/UL
LYMPHOCYTES NFR BLD: 35.8 %
MCH RBC QN AUTO: 25.4 PG
MCHC RBC AUTO-ENTMCNC: 31.2 G/DL
MCV RBC AUTO: 81 FL
MONOCYTES # BLD AUTO: 0.4 K/UL
MONOCYTES NFR BLD: 8.1 %
NEUTROPHILS # BLD AUTO: 2.4 K/UL
NEUTROPHILS NFR BLD: 52.6 %
NONHDLC SERPL-MCNC: 183 MG/DL
NRBC BLD-RTO: 0 /100 WBC
PLATELET # BLD AUTO: 300 K/UL
PMV BLD AUTO: 10.8 FL
POTASSIUM SERPL-SCNC: 4 MMOL/L
PROT SERPL-MCNC: 7.3 G/DL
RBC # BLD AUTO: 4.77 M/UL
SATURATED IRON: 24 %
SODIUM SERPL-SCNC: 142 MMOL/L
TOTAL IRON BINDING CAPACITY: 342 UG/DL
TRANSFERRIN SERPL-MCNC: 231 MG/DL
TRIGL SERPL-MCNC: 128 MG/DL
VIT B12 SERPL-MCNC: 557 PG/ML
WBC # BLD AUTO: 4.55 K/UL

## 2018-07-12 PROCEDURE — 80053 COMPREHEN METABOLIC PANEL: CPT

## 2018-07-12 PROCEDURE — 99999 PR PBB SHADOW E&M-EST. PATIENT-LVL IV: CPT | Mod: PBBFAC,,, | Performed by: NURSE PRACTITIONER

## 2018-07-12 PROCEDURE — 82607 VITAMIN B-12: CPT

## 2018-07-12 PROCEDURE — 83540 ASSAY OF IRON: CPT

## 2018-07-12 PROCEDURE — 80061 LIPID PANEL: CPT

## 2018-07-12 PROCEDURE — 36415 COLL VENOUS BLD VENIPUNCTURE: CPT

## 2018-07-12 PROCEDURE — 85025 COMPLETE CBC W/AUTO DIFF WBC: CPT

## 2018-07-12 PROCEDURE — 82306 VITAMIN D 25 HYDROXY: CPT

## 2018-07-12 PROCEDURE — 99214 OFFICE O/P EST MOD 30 MIN: CPT | Mod: PBBFAC | Performed by: NURSE PRACTITIONER

## 2018-07-12 PROCEDURE — 84425 ASSAY OF VITAMIN B-1: CPT

## 2018-07-12 PROCEDURE — 99213 OFFICE O/P EST LOW 20 MIN: CPT | Mod: S$PBB,,, | Performed by: NURSE PRACTITIONER

## 2018-07-12 RX ORDER — LANOLIN ALCOHOL/MO/W.PET/CERES
1 CREAM (GRAM) TOPICAL DAILY
COMMUNITY
End: 2020-01-15 | Stop reason: CLARIF

## 2018-07-12 RX ORDER — BUPROPION HYDROCHLORIDE 300 MG/1
300 TABLET ORAL DAILY
Refills: 1 | COMMUNITY
Start: 2018-06-15 | End: 2022-08-29 | Stop reason: CLARIF

## 2018-07-12 RX ORDER — POLYETHYLENE GLYCOL 3350 17 G/17G
17 POWDER, FOR SOLUTION ORAL DAILY PRN
Qty: 1700 G | Refills: 3 | Status: SHIPPED | OUTPATIENT
Start: 2018-07-12 | End: 2020-01-15 | Stop reason: CLARIF

## 2018-07-12 RX ORDER — BUPROPION HYDROCHLORIDE 150 MG/1
TABLET ORAL
Refills: 0 | COMMUNITY
Start: 2018-06-15 | End: 2018-07-12 | Stop reason: SDUPTHER

## 2018-07-12 RX ORDER — TRAZODONE HYDROCHLORIDE 50 MG/1
TABLET ORAL
Refills: 1 | COMMUNITY
Start: 2018-06-15 | End: 2018-11-13

## 2018-07-12 RX ORDER — METFORMIN HYDROCHLORIDE 500 MG/1
1000 TABLET, EXTENDED RELEASE ORAL 2 TIMES DAILY
Refills: 3 | COMMUNITY
Start: 2018-06-09 | End: 2018-09-12 | Stop reason: ALTCHOICE

## 2018-07-12 NOTE — PATIENT INSTRUCTIONS
Meal and Vitamin Regimen: set timers or use phone danni to remind you to eat every 3-4 hours.  Each meal should contain 10-25 gm protein    - BF: MVI (with iron ) and SL Vitamin B12 500 mcg and B complex with at least 25mg thiamine  - snack: 2 calcium citrate tablets  - CHESTER: 2 calcium citrate tablets  - snack: 2 calcium citrate tablets  - DI: MVI (with iron)    Menu Plan: 800-1000 Calories;  grams of Protein    DAY 1     Breakfast  ½ cup 2% cottage cheese  ¼ cup fruit (no sugar added)    Snack  2% mozzarella string cheese  10 grapes    Lunch  2oz Lean hamburger or turkey harjeet  1 slice low-fat cheese  ¼ cup green beans    Snack  200 calorie low-carb protein drink (4 grams sugar or less)    Dinner  2oz chicken thigh  ¼ cup cooked spinach     Snack  Atkins bar (15g protein)      DAY 2    Breakfast  1 egg with 1oz shredded cheddar cheese and 2T salsa    Snack  200 calorie low-carb protein drink (4 grams sugar or less)    Lunch  Lettuce Wraps: 2oz sliced turkey, 1 slice low-fat Swiss cheese, tomato, and mustard wrapped in a Lavelle lettuce leaf    Snack  ½ cup low-fat cottage cheese  Pear cup (no sugar added)    Dinner  2oz baked fish  ½ cup cooked broccoli    Snack  Sugar-free pudding cup      DAY 3    Breakfast   ½ cup low-fat ricotta cheese w/ Splenda to lexie  ½ scoop Vanilla protein powder   ¼ cup fresh fruit    Snack  2% string cheese  6 unsalted almonds    Lunch  Tuna/Chicken Salad: 2oz canned tuna/chicken, 1 egg white, and 1 tsp light garibay  Pineapple cup (no sugar added)    Snack  200 calorie low-carb protein drink (4 grams sugar or less)    Dinner  ½ baked pork chop   ¼ cup beans      DAY 4    Breakfast  200 calorie low-carb protein drink (4 grams sugar or less)    Snack  Boiled egg    Lunch  ½ cup grilled shrimp  Salad w/ 2 tbsp crumbled fat-free feta  1 tbsp light vinaigrette    Snack  200 calorie low-carb protein drink (4 grams sugar or less)    Dinner  ¾ cup red beans    Snack  Mini Babybell  light      DAY 5    Breakfast  [place] pie: 3oz Greek yogurt, 1 tbsp Splenda, ½ individual pack Crystal Light lemonade. Top with ¼ cup chopped walnuts     Snack  3-4 lean ham or turkey slices, ¼ - ½ cup fruit    Lunch  Fiesta Chicken: 2oz canned chicken, 1oz shredded cheddar cheese, ¼ cup black beans  Top with 2 tbsp salsa and a small dollop light sour cream    Snack  200 calorie low-carb protein drink (4 grams sugar or less)    Dinner  Omelette: ¼ cup Egg Beaters, 4 large (1oz) shrimp, 1oz shredded low-fat cheese. Add bell pepper, onion, mushrooms, green onions, or salsa, optional.      DAY 6    Breakfast  1 joe or 2 links turkey sausage  ½ cup fruit    Snack  200 calorie low-carb protein drink (4 grams sugar or less)    Lunch  Grilled tilapia  Salad of baby spinach leaves with light dressing    Snack  200 calorie low-carb protein drink (4 grams sugar or less)    Dinner  Chicken thigh simmered in 98% fat free cream of mushroom soup  ½ cup cooked green beans    Meal Ideas for Regular Bariatric Diet  *Recipes and products available at [email or web address].com      Breakfast: (15-20g protein)    - Egg white omelet: 2 egg whites or ½ cup Egg Beaters. (Optional proteins: cheese, shrimp, black beans, chicken, sliced turkey) (Optional veggies: tomatoes, salsa, spinach, mushrooms, onions, green peppers, or small slice avocado)     - Egg and sausage: 1 egg or ¼ cup Egg Beaters (any variety), with 1 joe or 2 links of Turkey sausage or Veggie breakfast sausage ([employer] or [employer]X Micro)    - Crust-less breakfast quiche: To make a glass pie dish, mix 4oz part skim Ricotta, 1 cup skim milk, and 2 eggs as your base. Add protein: shredded cheese, sliced lean ham or turkey, turkey castaneda/sausage. Add veggies: tomato, onion, green onion, mushroom, green pepper, spinach, etc.    - Yogurt parfait: Mix 1 - 6oz container Dannon Light N Fit vanilla yogurt, with ¼ cup crushed unsalted nuts    - Cottage cheese and fruit: ½ cup  part-skim cottage cheese or ricotta cheese topped with fresh fruit or sugar free preserves     - Gemma Duque's Vanilla Egg custard* (add 2 Tbsp instant coffee granules to make Cappuccino Custard*)    - Hi-Protein café latte (skim milk, decaf coffee, 1 scoop protein powder). Optional to add Sugar free syrup or extract flavoring.    - Breakfast Lox: spread fat free cream cheese on slices of smoked salmon. Serve over scrambled or egg over easy (sauteed with nonstick cookspray) OR on a cucumber slice    - Eggwhich: Scramble or cook 1 large egg over easy using nonstick cookspray. Place between 2 slices of Salvadorean castaneda and low fat cheese.     Lunch: (20-30g protein)    - ½ cup Black bean soup (Homemade or Progresso), with ¼ cup shredded low-fat cheese. Top with chopped tomato or fresh salsa.     - Lean deli turkey breast and low-fat sliced cheese, mustard or light garibay to moisten, rolled up together, or wrapped in a Lavelle lettuce leaf    - Chicken salad made from dinner leftovers, moisten with low-fat salad dressing or light garibay. Also try leftover salmon, shrimp, tuna or boiled eggs. Serve ½ cup over dark green salad    - Fat-free canned refried beans, topped with ¼ cup shredded low-fat cheese. Top with chopped tomato or fresh salsa.     - Greek salad: Top mixed greens with 1-2oz grilled chicken, tomatoes, red onions, 2-3 kalamata olives, and sprinkle lightly with feta cheese. Spritz with Balsamic vinegar to taste.     - Crust-less lunch quiche: To make a glass pie dish, mix 4oz part skim Ricotta, 1 cup skim milk, and 2 eggs as your base. Add protein: shredded cheese, sliced lean ham or turkey, shrimp, chicken. Add veggies: tomato, onion, green onion, mushroom, green pepper, spinach, artichoke, broccoli, etc.    - Pizza bake: spread a  nabil stephen mushroom with tomato sauce, low-fat shredded mozzarella and turkey pepperoni or Homer castaneda. Add any veggies. Roast for 10-15 minutes, until cheese melted.     -  Cucumber crab bites: Spread ¼ cup crab dip (lump crabmeat + light cream cheese and green onions) over sliced cucumber.     - Chicken with light spinach and artichoke dip*: Puree in : 6oz cooked and drained spinach, 2 cloves garlic, 1 can cannelloni beans, ½ cup chopped green onions, 1 can drained artichoke hearts (not marinated in oil), lemon juice and basil. Mix in 2oz chopped up chicken.    Supper: (20-30g protein)    - Serve grilled fish over dark green salad tossed with low-fat dressing, served with grilled asparagus suarez     - Rotisserie chicken salad: served with sliced strawberries, walnuts, fat-free feta cheese crumbles and 1 tbsp Huynhs Own Light Raspberry Enterprise Vinaigrette    - Shrimp cocktail: Dip cold boiled shrimp in homemade low-sugar cocktail sauce (1/2 cup Frederick One Carb ketchup, 2 tbsp horseradish, 1/4 tsp hot sauce, 1 tsp Worcestershire sauce, 1 tbsp freshly-squeezed lemon juice). Serve with dark green salad, walnuts, and crumbled blue cheese drizzled with olive oil and Balsamic vinegar    - Tuna Melt: Spread tuna salad onto 2 thick slices of tomato. Top with low-fat cheese and broil until cheese is melted. May also be made with chicken salad of shrimp salad. [place] with different types of cheeses.    - Chicken or beef fajitas (no tortilla, rice, beans, chips). Top meat and veggies w/ fresh salsa, fat free sour cream.     - Homemade low-fat Chili using extra lean ground beef or ground turkey. Top with shredded cheese and salsa as desired. May add dollop fat-free sour cream if desired    - Chicken parmesan: Top chicken breast w/ low sugar marinara sauce, mozzarella cheese and bake until chicken reaches 165*.  Serve w/ spaghetti SQUASH or Tajik cut green beans    - Dinner Omelet with shrimp or chicken and onion, green peppers and chives.    - No noodle lasagna: Use sliced zucchini or eggplant in place of noodles.  Layer with part skim ricotta cheese and low sugar meat sauce  (use very lean ground beef or ground turkey).    - Mexican chicken bake: Bake chunks of chicken breast or thigh with taco seasoning, Pace brand enchilada sauce, green onions and low-fat cheese. Serve with ¼ cup black beans or fat free refried beans topped with chopped tomatoes or salsa.    - Chucky frozen meatballs, simmered in Classico Marinara sauce. Different flavors of salsa or spaghetti sauce create different dishes! Sprinkle with parmesan cheese. Serve with grilled or steamed veggies, or a dark green salad.    - Simmer boneless skinless chicken thigh chunks in Classico Marinara sauce or roasted salsa until tender with chopped onion, bell pepper, garlic, mushrooms, spinach, etc.     - Hamburger or veggie burger, without the bun, dressed the way you like. Served with grilled or steamed veggies.    - Eggplant parmesan: Bake slices of eggplant at 350 degrees for 15 minutes. Layer tomato sauce, sliced eggplant and low-fat mozzarella cheese in a baking dish and cover with foil. Bake 30-40 more minutes or until bubbly. Uncover and bake at 400 degrees for about 15 more minutes, or until top is slightly crisp.    - Fish tacos: grilled/baked white fish, wrapped in Lavelel lettuce leaf, topped with salsa, shredded low-fat cheese, and light coleslaw.    - Chicken david: Sprinkle chicken w/ 1 tsp of hidden valley ranch dip mix. Then grill chicken and top with black beans, salsa and 1 tsp fat free sour cream.     - Cauliflower pizza crust: Use cauliflower as crust (see recipe on taye, no flour!). Top w/ low fat cheese, turkey pepperoni and veggies and bake again    - chicken or turkey crust pizza: use ground chicken or turkey instead of cauliflower, spread in [place] and bake at 350 for about 20-30 minutes(may want to add garlic, black pepper, oregano and other herbs to ground meat mixture).  Remove and top w/ low fat cheese, turkey pepperoni and veggies and bake again for another 10 minutes or until cheese is  browned.     Snacks: (100-200 calories; >5g protein)    - 1 low-fat cheese stick with 8 cherry tomatoes or 1 serving fresh fruit  - 4 thin slices fat-free turkey breast and 1 slice low-fat cheese  - 4 thin slices fat-free honey ham with wedge of melon  - 6-8 edamame pods (equivalent to about 1/4 cup edamame without pods).   - 1/4 cup unsalted nuts with ½ cup fruit  - 6-oz container Dannon Light n Fit vanilla yogurt, topped with 1oz unsalted nuts         - apple, celery or baby carrots spread with 2 Tbsp PB2  - apple slices with 1 oz slice low-fat cheese  - Apple slices dipped in 2 Tbsp of PB2  - celery, cucumber, bell pepper or baby carrots dipped in ¼ cup hummus bean spread or light spinach and artichoke dip (*recipe in lunch section)  - celery, cucumber, baby carrots dipped in high protein greek yogurt (Mix 16 oz plain greek yogurt + 1 packet of hidden valley ranch dip mix)  - Carlos Links Beef Steak - 14g protein! (similar to beef jerky)  - 2 wedges Laughing Cow - Light Herb & Garlic Cheese with sliced cucumber or green bell pepper  - 1/2 cup low-fat cottage cheese with ¼ cup fruit or ¼ cup salsa  - RTD Protein drinks: Atkins, Low Carb Slim Fast, EAS light, Muscle Milk Light, etc.  - Homemade Protein drinks: GNC Soy95, Isopure, Nectar, UNJURY, Whey Gourmet, etc. Mix 1 scoop powder with 8oz skim/1% milk or light soymilk.  - Protein bars: Atkins, EAS, Pure Protein, Think Thin, Detour, etc. Must have 0-4 grams sugar - Read the label.    Takeout Options: No more than twice/week  Deli - Salads (no pasta or rice), meats, cheeses. Roasted chicken. Lox (salmon)    Mexican - Platters which don't include tortillas, chips, or rice. Go easy on the beans. Example: Fajitas without the tortillas. Ask the  not to bring chips to the table if they are too tempting.    Greek - Meat or fish and vegetable, but no bread or rice. Including hummus, baba ganoush, etc, is OK. Most sit-down Greek restaurants can provide you with  cucumber slices for dipping instead of jesus bread.    Fast Food (Avoid as much as possible) - Salads (no croutons and limit salad dressing to 2 tbsp), grilled chicken sandwich without the bun and ask for no garibay. Tarahs low fat chili or Taco Bell pintos and cheese.    BBQ - The meats are fine if you ask for sauces on the side, but most of the traditional side dishes are loaded with carbs. Rick slaw, baked beans and BBQ sauce are typically made with sugar.    Chinese - Nothing deep-fried, no rice or noodles. Many Chinese sauces have starch and sugar in them, so you'll have to use your judgement. If you find that these sauces trigger cravings, or cause Dumping, you can ask for the sauce to be made without sugar or just use soy sauce.

## 2018-07-16 ENCOUNTER — HOSPITAL ENCOUNTER (EMERGENCY)
Facility: HOSPITAL | Age: 53
Discharge: HOME | End: 2018-07-16
Attending: EMERGENCY MEDICINE
Payer: MEDICAID

## 2018-07-16 VITALS
TEMPERATURE: 98 F | BODY MASS INDEX: 34.96 KG/M2 | DIASTOLIC BLOOD PRESSURE: 70 MMHG | WEIGHT: 185 LBS | OXYGEN SATURATION: 99 % | SYSTOLIC BLOOD PRESSURE: 142 MMHG | RESPIRATION RATE: 16 BRPM | HEART RATE: 84 BPM

## 2018-07-16 DIAGNOSIS — L50.9 URTICARIA: Primary | ICD-10-CM

## 2018-07-16 LAB — VIT B1 SERPL-MCNC: 37 UG/L (ref 38–122)

## 2018-07-16 PROCEDURE — 99283 EMERGENCY DEPT VISIT LOW MDM: CPT

## 2018-07-16 RX ORDER — PREDNISONE 10 MG/1
10 TABLET ORAL DAILY
Qty: 5 TABLET | Refills: 0 | Status: SHIPPED | OUTPATIENT
Start: 2018-07-16 | End: 2018-07-21

## 2018-07-17 ENCOUNTER — TELEPHONE (OUTPATIENT)
Dept: BARIATRICS | Facility: CLINIC | Age: 53
End: 2018-07-17

## 2018-07-17 DIAGNOSIS — E51.9 THIAMINE DEFICIENCY: Primary | ICD-10-CM

## 2018-07-17 NOTE — ED PROVIDER NOTES
"Encounter Date: 2018    SCRIBE #1 NOTE: I, Destin Sanchez, am scribing for, and in the presence of,  Dr. Alcocer . I have scribed the entire note.       History     Chief Complaint   Patient presents with    Itching     Pt states," I have been itching since Saturday."     Time patient was seen by the provider: 7:16 PM      The patient is a 53 y.o. female with co-morbidities including: HTN, HLD, and DM who presents to the ED with a complaint of a rash to her face and hands that began 3 days ago. Patient notes that she has recently started a new medication and that it may be causing the rash.         The history is provided by the patient.     Review of patient's allergies indicates:   Allergen Reactions    Lisinopril-hydrochlorothiazide Swelling     Facial swelling/ mouth swelling     Past Medical History:   Diagnosis Date    Anemia     Diabetes mellitus type 2, noninsulin dependent 2016    Hematuria     Hx of essential hypertension     Hyperlipidemia     Hypertension, uncontrolled 2016    Nuclear sclerosis of both eyes 4/10/2017     Past Surgical History:   Procedure Laterality Date    BTL       SECTION      COLONOSCOPY N/A 2017    Procedure: COLONOSCOPY;  Surgeon: Oleg Enciso MD;  Location: [facility] ([address] [place] [address]);  Service: Endoscopy;  Laterality: N/A;  CHRONIC CONSTIPATION S/P LRNY    GASTRIC BYPASS      sandra en y    NECK SURGERY  2016    TOTAL REDUCTION MAMMOPLASTY       Family History   Problem Relation Age of Onset    Heart disease Mother     Diabetes type II Mother     Heart attack Father 50        Open heart surgery    No Known Problems Sister     No Known Problems Brother     No Known Problems Maternal Aunt     No Known Problems Maternal Uncle     No Known Problems Paternal Aunt     No Known Problems Paternal Uncle     No Known Problems Maternal Grandmother     No Known Problems Maternal Grandfather     No Known Problems Paternal " Grandmother     No Known Problems Paternal Grandfather     Amblyopia Neg Hx     Blindness Neg Hx     Cancer Neg Hx     Cataracts Neg Hx     Diabetes Neg Hx     Glaucoma Neg Hx     Hypertension Neg Hx     Macular degeneration Neg Hx     Retinal detachment Neg Hx     Strabismus Neg Hx     Stroke Neg Hx     Thyroid disease Neg Hx      Social History   Substance Use Topics    Smoking status: Never Smoker    Smokeless tobacco: Never Used    Alcohol use No      Comment: Socially     Review of Systems   Constitutional: Negative.  Negative for fever.   HENT: Negative.  Negative for sore throat.    Eyes: Negative.  Negative for visual disturbance.   Respiratory: Negative.    Cardiovascular: Negative.  Negative for chest pain.   Gastrointestinal: Negative.  Negative for abdominal pain and vomiting.   Endocrine: Negative.    Genitourinary: Negative.  Negative for dysuria.   Musculoskeletal: Negative.  Negative for myalgias.   Skin: Negative.  Negative for rash.   Allergic/Immunologic: Negative.    Neurological: Negative.  Negative for headaches.   Hematological: Negative.  Negative for adenopathy.   Psychiatric/Behavioral: Negative.  Negative for behavioral problems.   All other systems reviewed and are negative.      Physical Exam     Initial Vitals [07/16/18 1828]   BP Pulse Resp Temp SpO2   (!) 142/70 84 16 97.9 °F (36.6 °C) 99 %      MAP       --         Physical Exam    Nursing note and vitals reviewed.  Constitutional: Vital signs are normal. She appears well-developed and well-nourished.   HENT:   Head: Normocephalic and atraumatic.   Nose: Nose normal.   Eyes: Conjunctivae and EOM are normal. Pupils are equal, round, and reactive to light.   Neck: Trachea normal and normal range of motion. Neck supple. No JVD present.   Cardiovascular: Normal rate, regular rhythm, normal heart sounds and intact distal pulses. Exam reveals no gallop and no friction rub.    No murmur heard.  Pulmonary/Chest: Breath sounds  normal. No respiratory distress. She has no wheezes. She has no rhonchi. She has no rales. She exhibits no tenderness.   Abdominal: Soft. Bowel sounds are normal. She exhibits no distension.   Musculoskeletal: Normal range of motion.   Neurological: She is alert and oriented to person, place, and time. She has normal strength.   Skin: Skin is warm and dry. Capillary refill takes less than 2 seconds. Rash noted.        Psychiatric: She has a normal mood and affect. Her speech is normal and behavior is normal.         ED Course   Procedures  Labs Reviewed - No data to display       Imaging Results    None         This document was produced by a scribe under my direction and in my presence. I agree with the content of the note and have made any necessary edits.     Joshua Alcocer MD    07/16/2018 7:40 PM             Scribe Attestation:   Scribe #1: I performed the above scribed service and the documentation accurately describes the services I performed. I attest to the accuracy of the note.        This document was produced by a scribe under my direction and in my presence. I agree with the content of the note and have made any necessary edits.     Joshua Alcocer MD    07/16/2018 7:40 PM        Clinical Impression:     1. Urticaria                                   Joshua Alcocer MD  07/16/18 1940

## 2018-07-17 NOTE — TELEPHONE ENCOUNTER
Please call pt.   I have reviewed her labs, which reflect a lapse in her vitamins as we discussed at her last visit. Please ensure that she has started her bariatric regimen.   Her thiamine is 37. Please ensure that she has started taking her B complex vitamin daily, preferably a super B, and we will recheck her level in 2 weeks. Please follow thiamine protocol if she is symptomatic

## 2018-07-18 ENCOUNTER — OFFICE VISIT (OUTPATIENT)
Dept: OBSTETRICS AND GYNECOLOGY | Facility: CLINIC | Age: 53
End: 2018-07-18
Payer: MEDICAID

## 2018-07-18 VITALS — DIASTOLIC BLOOD PRESSURE: 72 MMHG | WEIGHT: 187 LBS | BODY MASS INDEX: 35.33 KG/M2 | SYSTOLIC BLOOD PRESSURE: 128 MMHG

## 2018-07-18 DIAGNOSIS — N95.0 PMB (POSTMENOPAUSAL BLEEDING): ICD-10-CM

## 2018-07-18 DIAGNOSIS — Z01.419 WELL WOMAN EXAM WITH ROUTINE GYNECOLOGICAL EXAM: Primary | ICD-10-CM

## 2018-07-18 PROCEDURE — 99396 PREV VISIT EST AGE 40-64: CPT | Mod: S$PBB,,, | Performed by: OBSTETRICS & GYNECOLOGY

## 2018-07-18 PROCEDURE — 99999 PR PBB SHADOW E&M-EST. PATIENT-LVL III: CPT | Mod: PBBFAC,,, | Performed by: OBSTETRICS & GYNECOLOGY

## 2018-07-18 PROCEDURE — 99213 OFFICE O/P EST LOW 20 MIN: CPT | Mod: PBBFAC | Performed by: OBSTETRICS & GYNECOLOGY

## 2018-07-18 NOTE — PROGRESS NOTES
[facility]  Ambulatory Clinic  Obstetrics & Gynecology    Visit Date:  2018    Chief Complaint:  Annual GYN exam, vaginal bleeding    History of Present Illness:      Antoine Seals is a 53 y.o. , h/o BTL, here for annual GYN exam with c/o vaginal bleeding.  Pt reports menopause 2017.  Pt reports reports irregular light spotting/bleeding for past month.  Pt reports an uneventful transition into menopause with mild vasomotor sxs and not on hormone replacement therapy.  Pt denies h/o abnormal pap, last pap 2017 negative.  Pt has remote h/o syphilis that is treated.  Recent mammogram 3/2018 normal.  Pt performs monthly self breast examination, non-smoker, uses seat belts, and denies abuse.   Pt denies any vaginal discharge, dysmenorrhea, dyspareunia, pelvic pain, breast mass/skin changes, GI or urinary complaints.    Otherwise, the pt is in her usual state of health and has good follow-up with her PCP.    Review of Systems:      GENERAL:  No fever, fatigue, excessive weight gain or loss  HEENT:  No headaches, hearing changes, visual disturbance  RESPIRATORY:  No cough, shortness of breath  CARDIOVASCULAR:  No chest pain, heart palpitations, leg swelling  BREAST:  No lump, pain, nipple discharge, skin changes  GASTROINTESTINAL:  No nausea, vomiting, constipation, diarrhea, abd pain, rectal bleeding   GENITOURINARY:  See HPI    Physical Exam:     /72   Wt 84.8 kg (187 lb)   LMP 2017   BMI 35.33 kg/m²      GENERAL:  No acute distress, well-nourished  HEENT:  Atraumatic, anicteric, moist mucus membranes, neck supple.  BREAST:  Symmetric, nontender, no obvious masses, adenopathy, skin changes or nipple discharge.  Old breast reduction scar.  LUNGS:  Clear to auscultation  HEART:  Regular rate and rhythm, no murmurs, gallops, or rubs  ABDOMEN:  Soft, non-tender, non-distended, normoactive bowel sounds, no obvious organomegaly  EXT:  Symmetric w/o cramping, claudication, or  edema. +2 distal pulses.  SKIN:  No rashes or bruising  PSYCH:  Mood and affect appropriate  GENITOURINARY:  NFEG no lesion. No vaginal or cervical lesion. No bleeding or discharge. No CMT. Uterus and ovaries small, NT. Wet prep negative. Declined rectal exam. No obvious external lesions.    Chaperone present for exam.    Assessment:     53 y.o.  with h/o BTL:    1. Well woman gynecologic exam  2. Postmenopausal bleeding    Plan:    A gynecologic health assessment was performed with age appropriate counseling.    Pap up to date.    STI screening - pt requested declined.      Mammogram up to date.    We discussed her PMB.  Order pelvic u/s to evaluate endometrial stripe.  Endometrial biopsy next visit.  Pt was advised that PMB may be an early sign of endometrial hyperplasia and/or uterine cancer.  Importance of f/u stressed.  Bleeding, pelvic precautions.      Encourage healthy lifestyle modifications, monthly self breast exams, Ca/Vit D.    F/u with PCP for health maintenance.    F/u in 1 wk for EMB, or sooner as needed.      All questions answered, pt voiced understanding.        Naveed Alexander MD

## 2018-07-20 DIAGNOSIS — E11.9 DIABETES MELLITUS TYPE 2, NONINSULIN DEPENDENT: ICD-10-CM

## 2018-07-20 RX ORDER — GLIPIZIDE 5 MG/1
TABLET ORAL
Qty: 180 TABLET | Refills: 0 | Status: SHIPPED | OUTPATIENT
Start: 2018-07-20 | End: 2018-11-13 | Stop reason: ALTCHOICE

## 2018-07-21 DIAGNOSIS — I10 ESSENTIAL HYPERTENSION: ICD-10-CM

## 2018-07-23 RX ORDER — HYDROCHLOROTHIAZIDE 12.5 MG/1
TABLET ORAL
Qty: 90 TABLET | Refills: 0 | Status: SHIPPED | OUTPATIENT
Start: 2018-07-23 | End: 2018-09-26

## 2018-07-23 NOTE — TELEPHONE ENCOUNTER
S/w pt asymptomatic, pt has started super b complex and we wll recheck 8/1/18. Also a little low on vit d advised to take a 2,000 vit d daily. Also elevated cholesterol, f/up with pcp.

## 2018-07-30 ENCOUNTER — HOSPITAL ENCOUNTER (OUTPATIENT)
Dept: RADIOLOGY | Facility: HOSPITAL | Age: 53
Discharge: HOME | End: 2018-07-30
Attending: OBSTETRICS & GYNECOLOGY
Payer: MEDICAID

## 2018-07-30 DIAGNOSIS — N95.0 PMB (POSTMENOPAUSAL BLEEDING): ICD-10-CM

## 2018-07-30 PROCEDURE — 76830 TRANSVAGINAL US NON-OB: CPT | Mod: 26,,, | Performed by: RADIOLOGY

## 2018-07-30 PROCEDURE — 76856 US EXAM PELVIC COMPLETE: CPT | Mod: TC

## 2018-07-30 PROCEDURE — 76830 TRANSVAGINAL US NON-OB: CPT | Mod: TC

## 2018-07-30 PROCEDURE — 76856 US EXAM PELVIC COMPLETE: CPT | Mod: 26,,, | Performed by: RADIOLOGY

## 2018-08-01 ENCOUNTER — LAB VISIT (OUTPATIENT)
Dept: LAB | Facility: HOSPITAL | Age: 53
End: 2018-08-01
Attending: NURSE PRACTITIONER
Payer: MEDICAID

## 2018-08-01 DIAGNOSIS — E55.9 VITAMIN D DEFICIENCY: ICD-10-CM

## 2018-08-01 DIAGNOSIS — E51.9 THIAMINE DEFICIENCY: ICD-10-CM

## 2018-08-01 DIAGNOSIS — Z98.84 HISTORY OF GASTRIC BYPASS: ICD-10-CM

## 2018-08-01 DIAGNOSIS — D50.9 IRON DEFICIENCY ANEMIA, UNSPECIFIED IRON DEFICIENCY ANEMIA TYPE: ICD-10-CM

## 2018-08-01 DIAGNOSIS — I10 ESSENTIAL HYPERTENSION: ICD-10-CM

## 2018-08-01 DIAGNOSIS — E11.8 TYPE 2 DIABETES MELLITUS WITH COMPLICATION, WITHOUT LONG-TERM CURRENT USE OF INSULIN: ICD-10-CM

## 2018-08-01 DIAGNOSIS — E78.5 HYPERLIPIDEMIA, UNSPECIFIED HYPERLIPIDEMIA TYPE: ICD-10-CM

## 2018-08-01 PROCEDURE — 36415 COLL VENOUS BLD VENIPUNCTURE: CPT | Mod: PO

## 2018-08-01 PROCEDURE — 84425 ASSAY OF VITAMIN B-1: CPT

## 2018-08-03 ENCOUNTER — PROCEDURE VISIT (OUTPATIENT)
Dept: OBSTETRICS AND GYNECOLOGY | Facility: CLINIC | Age: 53
End: 2018-08-03
Payer: MEDICAID

## 2018-08-03 VITALS — DIASTOLIC BLOOD PRESSURE: 78 MMHG | SYSTOLIC BLOOD PRESSURE: 140 MMHG

## 2018-08-03 DIAGNOSIS — N95.0 PMB (POSTMENOPAUSAL BLEEDING): Primary | ICD-10-CM

## 2018-08-03 PROCEDURE — 88305 TISSUE EXAM BY PATHOLOGIST: CPT | Mod: 26,,, | Performed by: PATHOLOGY

## 2018-08-03 PROCEDURE — 58301 REMOVE INTRAUTERINE DEVICE: CPT | Mod: S$PBB,,, | Performed by: OBSTETRICS & GYNECOLOGY

## 2018-08-03 PROCEDURE — 58301 REMOVE INTRAUTERINE DEVICE: CPT | Mod: PBBFAC | Performed by: OBSTETRICS & GYNECOLOGY

## 2018-08-03 PROCEDURE — 88305 TISSUE EXAM BY PATHOLOGIST: CPT | Performed by: PATHOLOGY

## 2018-08-03 NOTE — PROCEDURES
[facility]  Ambulatory Clinic   Obstetrics & Gynecology    Date:  8/3/2018    Procedure:  Endometrial Biopsy (CPT 07572)    UPT:  Negative    Indication:  Metrorrhagia    History:  Antoine Seals is a 53 y.o.  here for EMB for further evaluation of postmenopausal bleeding.  Pt reports menopause 2017.  Pt reports reports irregular light spotting/bleeding for past month.  A had recent pelvic ultrasound which showed endometrial stripe of 6 mm.    Consent:  We discussed the risks, benefits, alternatives, and possible complications to endometrial biopsy and all indicated procedures in detail.  All of her questions were answered to her satisfaction.  She voiced understanding and informed consent was obtained/signed in chart.    Vitals:  BP (!) 140/78     Physical Exam:  Abdomen soft, non-tender, no masses. Normal female external genitalia. No gross vaginal lesion. No bleeding or discharge. Bimanual exam reveals a small, non-tender, mid-plain uterus, without adnexal mass or tenderness.     Procedure Details:  A time out was performed to confirmed the correct patient and procedure.  A speculum was placed and the cervix was prepped with betadine.  A single tooth tenaculum was not applied to the cervix for stabilization.   Uterus sounded to 8 cm.  A pipelle was used to sample the endometrium.  A small/scant amount of tissue was obtained as pt is unable to tolerate much of procedure.  Sample was sent for pathologic examination.  Sterile technique maintained.  Hemostasis noted.  VSSAF, pain scale 2/10 at end of procedure.    Pelvic U/S:  2018  FINDINGS:  Uterus:    Size: 7.5 x 3.5 x 5.2 cm    Masses: No masses.  Small calcifications identified level the cervix, nonspecific.    Endometrium: Borderline abnormal in this post menopausal patient, measuring 6 mm.    Right ovary:    Size: 4.9 x 2.0 x 3.7 cm    Appearance: 1.3 x 0.9 x 1.4 cm follicle.  Small calcification.    Vascular flow:  Normal.    Left ovary:    Size: 3.0 x 2.2 x 4.1 cm    Appearance: Normal    Vascular Flow: Normal.    Free Fluid:    None.      Impression       Nonspecific prominence of the endometrium in postmenopausal patient up to 6 mm.  It is not well visualized and therefore, this measurement cannot be stated with certainty.  Correlation advised.     Impression:  Postmenopausal bleeding    Plan:      Endometrial sample sent for pathology.      Clinical findings and expectations discussed.      Usual post procedure warnings and aftercare instructions reviewed.  Pt was advised to call for any fever or for prolonged or severe pain or bleeding.  She was advised to use OTC analgesics as needed for mild to moderate pain.      Due to borderline abnormal endometrial stripe of 6 mm, continued postmenopausal bleeding/spotting and scant EMB sample, I d/w pt hysteroscopy dilation and curettage for further evaluation, pt is agreeable.  Risks, benefits, and alternatives to hysteroscopy dilation and curettage reviewed.    Due to pt chronic medical problems, pt will need medical clearance for surgery with her PCP at [facility] before we can schedule her surgery.  Timely follow up advised.    Pt was instructed to call office in 1 to 2 weeks for results and schedule follow-up as needed.  All of her questions were answered to her satisfaction, pt voiced understanding.       Naveed Alexander MD

## 2018-08-06 ENCOUNTER — TELEPHONE (OUTPATIENT)
Dept: OBSTETRICS AND GYNECOLOGY | Facility: CLINIC | Age: 53
End: 2018-08-06

## 2018-08-06 LAB — VIT B1 SERPL-MCNC: 54 UG/L (ref 38–122)

## 2018-08-06 NOTE — TELEPHONE ENCOUNTER
Received call from Laurie @ [facility]. Said that lab received epic order form for a specimen, but no specimen. Informed her that specimen was not here. Said that she was going to inform her supervisor. CW

## 2018-08-06 NOTE — TELEPHONE ENCOUNTER
----- Message from Chantell Pisano sent at 8/6/2018 12:17 PM CDT -----  Contact: Lab Supervisor- Abby Mora is calling to speak with staff regarding a specimen for the pt. She states she needs some clarification. Please call Abby at [phone]

## 2018-08-06 NOTE — TELEPHONE ENCOUNTER
Received incoming call from Jessica -[facility]. Asked if we kept a log of specimens collected.Informed her that we do not have any logs. Asked her if there were 2 signatures on the form. Said that their lab did not receive a form or specimen. It's just an epic order. CW

## 2018-08-07 ENCOUNTER — LAB VISIT (OUTPATIENT)
Dept: LAB | Facility: HOSPITAL | Age: 53
End: 2018-08-07
Attending: NURSE PRACTITIONER
Payer: MEDICAID

## 2018-08-07 ENCOUNTER — OFFICE VISIT (OUTPATIENT)
Dept: ENDOCRINOLOGY | Facility: CLINIC | Age: 53
End: 2018-08-07
Payer: MEDICAID

## 2018-08-07 VITALS
WEIGHT: 187.38 LBS | HEIGHT: 61 IN | HEART RATE: 75 BPM | SYSTOLIC BLOOD PRESSURE: 134 MMHG | BODY MASS INDEX: 35.38 KG/M2 | DIASTOLIC BLOOD PRESSURE: 90 MMHG | RESPIRATION RATE: 18 BRPM

## 2018-08-07 DIAGNOSIS — E66.01 SEVERE OBESITY (BMI 35.0-39.9) WITH COMORBIDITY: ICD-10-CM

## 2018-08-07 DIAGNOSIS — E78.5 HYPERLIPIDEMIA LDL GOAL <70: ICD-10-CM

## 2018-08-07 DIAGNOSIS — I10 ESSENTIAL HYPERTENSION: ICD-10-CM

## 2018-08-07 LAB
ESTIMATED AVG GLUCOSE: 220 MG/DL
HBA1C MFR BLD HPLC: 9.3 %

## 2018-08-07 PROCEDURE — 83036 HEMOGLOBIN GLYCOSYLATED A1C: CPT

## 2018-08-07 PROCEDURE — 99999 PR PBB SHADOW E&M-EST. PATIENT-LVL IV: CPT | Mod: PBBFAC,,, | Performed by: NURSE PRACTITIONER

## 2018-08-07 PROCEDURE — 99214 OFFICE O/P EST MOD 30 MIN: CPT | Mod: S$PBB,,, | Performed by: NURSE PRACTITIONER

## 2018-08-07 PROCEDURE — 36415 COLL VENOUS BLD VENIPUNCTURE: CPT | Mod: PN

## 2018-08-07 PROCEDURE — 99214 OFFICE O/P EST MOD 30 MIN: CPT | Mod: PBBFAC,PN | Performed by: NURSE PRACTITIONER

## 2018-08-07 RX ORDER — EZETIMIBE 10 MG/1
10 TABLET ORAL DAILY
Qty: 30 TABLET | Refills: 3 | Status: SHIPPED | OUTPATIENT
Start: 2018-08-07 | End: 2019-02-13 | Stop reason: SDUPTHER

## 2018-08-07 RX ORDER — ASPIRIN 81 MG/1
81 TABLET ORAL DAILY
Qty: 90 TABLET | Refills: 3 | Status: SHIPPED | OUTPATIENT
Start: 2018-08-07 | End: 2021-02-19

## 2018-08-07 NOTE — PATIENT INSTRUCTIONS
A1C goal: <7%  Fasting/premeal blood glucose goal:   2 hour post-meal blood glucose goal: less than 180      Avoid beverages with sugar.   Increase metformin to 1000 mg twice daily with meals.   Continue glipizide 5 mg twice daily.   Labs today. Will call you with results.   Test blood sugar 2x/day - fasting and again either 2 hours after a meal or before dinner.   Return to clinic in a 4-6 weeks with written glucose logs.
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
## 2018-08-08 ENCOUNTER — TELEPHONE (OUTPATIENT)
Dept: ENDOCRINOLOGY | Facility: CLINIC | Age: 53
End: 2018-08-08

## 2018-08-08 NOTE — TELEPHONE ENCOUNTER
Results given.      ----- Message from Skyla Alexander NP sent at 8/8/2018  7:15 AM CDT -----  a1c is high as expected at 9.3%. This should improve with continuing healthy eating habits and higher dose of metformin as we discussed.

## 2018-08-10 ENCOUNTER — TELEPHONE (OUTPATIENT)
Dept: ENDOCRINOLOGY | Facility: CLINIC | Age: 53
End: 2018-08-10

## 2018-08-10 NOTE — TELEPHONE ENCOUNTER
Called and spoke with pt to inform her that her rx for ezetimibe has been approved by her insurance company and to contact her pharmacy to see if it's been approved.

## 2018-08-29 ENCOUNTER — TELEPHONE (OUTPATIENT)
Dept: OBSTETRICS AND GYNECOLOGY | Facility: CLINIC | Age: 53
End: 2018-08-29

## 2018-08-29 NOTE — TELEPHONE ENCOUNTER
----- Message from Chantelltrevon Pisano sent at 8/29/2018  8:56 AM CDT -----  Contact: Self  Pt is calling to speak with staff regarding a clearance for surgery. Please call pt at [phone].     Patient is aware that she has future appt with Dr Naveed Alexander for surgery consult. [place]

## 2018-09-04 ENCOUNTER — OFFICE VISIT (OUTPATIENT)
Dept: CARDIOLOGY | Facility: CLINIC | Age: 53
End: 2018-09-04
Payer: MEDICAID

## 2018-09-04 VITALS
DIASTOLIC BLOOD PRESSURE: 72 MMHG | SYSTOLIC BLOOD PRESSURE: 108 MMHG | HEART RATE: 82 BPM | WEIGHT: 184.5 LBS | HEIGHT: 61 IN | BODY MASS INDEX: 34.83 KG/M2 | RESPIRATION RATE: 15 BRPM | OXYGEN SATURATION: 98 %

## 2018-09-04 DIAGNOSIS — E11.9 DIABETES MELLITUS TYPE 2, NONINSULIN DEPENDENT: ICD-10-CM

## 2018-09-04 DIAGNOSIS — I10 ESSENTIAL HYPERTENSION: ICD-10-CM

## 2018-09-04 DIAGNOSIS — Z01.810 PREOP CARDIOVASCULAR EXAM: Primary | ICD-10-CM

## 2018-09-04 DIAGNOSIS — Z98.84 GASTRIC BYPASS STATUS FOR OBESITY: ICD-10-CM

## 2018-09-04 DIAGNOSIS — E78.2 MIXED HYPERLIPIDEMIA: ICD-10-CM

## 2018-09-04 PROCEDURE — 93010 ELECTROCARDIOGRAM REPORT: CPT | Mod: ,,, | Performed by: INTERNAL MEDICINE

## 2018-09-04 PROCEDURE — 99214 OFFICE O/P EST MOD 30 MIN: CPT | Mod: S$PBB,,, | Performed by: INTERNAL MEDICINE

## 2018-09-04 PROCEDURE — 99999 PR PBB SHADOW E&M-EST. PATIENT-LVL III: CPT | Mod: PBBFAC,,, | Performed by: INTERNAL MEDICINE

## 2018-09-04 PROCEDURE — 99213 OFFICE O/P EST LOW 20 MIN: CPT | Mod: PBBFAC | Performed by: INTERNAL MEDICINE

## 2018-09-04 NOTE — PROGRESS NOTES
CARDIOVASCULAR PROGRESS NOTE    REASON FOR CONSULT:   Antoine Seals is a 53 y.o. female who presents for preop CV eval.    PCP: Andrew  Gyn: Catherine  Endo: Catherine  HISTORY OF PRESENT ILLNESS:   The patient comes in today for preoperative cardiovascular evaluation.  She plans to undergo hysteroscopy/D&C by Dr. Alexander for DUB.  She denies chest pain, shortness of breath, palpitations, lightheadedness, dizziness, or syncope.  She's had no PND, orthopnea, or lower extremity edema.  There's been no melena, hematuria, or claudicant symptoms.     I took the liberty of exercise in the patient in the office today.  She was able to climb up a flight of stairs without any symptoms or limitation.    CARDIOVASCULAR HISTORY:   none    PAST MEDICAL HISTORY:     Past Medical History:   Diagnosis Date    Anemia     Diabetes mellitus type 2, noninsulin dependent 2016    Hematuria     Hx of essential hypertension     Hyperlipidemia     Hypertension, uncontrolled 2016    Nuclear sclerosis of both eyes 4/10/2017       PAST SURGICAL HISTORY:     Past Surgical History:   Procedure Laterality Date    BTL       SECTION      GASTRIC BYPASS      sandra en y    NECK SURGERY  2016    TOTAL REDUCTION MAMMOPLASTY         ALLERGIES AND MEDICATION:   Review of patient's allergies indicates:  No Known Allergies  Previous Medications    AMLODIPINE (NORVASC) 10 MG TABLET    TAKE 1 TABLET(10 MG) BY MOUTH EVERY DAY    ASPIRIN (ECOTRIN) 81 MG EC TABLET    Take 1 tablet (81 mg total) by mouth once daily.    ATORVASTATIN (LIPITOR) 80 MG TABLET    Take 1 tablet (80 mg total) by mouth every morning.    B COMPLEX VITAMINS TABLET    Take 1 tablet by mouth every morning.     BUPROPION (WELLBUTRIN XL) 300 MG 24 HR TABLET        BUPROPION HCL (WELLBUTRIN XL ORAL)    Take 1 tablet by mouth once daily.    CALCIUM CITRATE-VITAMIN D3 315-200 MG (CITRACAL+D) 315-200 MG-UNIT PER TABLET    Take 1 tablet by mouth  once daily.    EZETIMIBE (ZETIA) 10 MG TABLET    Take 1 tablet (10 mg total) by mouth once daily.    FLUCONAZOLE (DIFLUCAN) 150 MG TAB    Take 1 tablet (150 mg total) by mouth as needed. Take one pill weekly as needed for yeast infection.    GLIPIZIDE (GLUCOTROL) 5 MG TABLET    TAKE 1 TABLET(5 MG) BY MOUTH TWICE DAILY WITH BREAKFAST    HYDROCHLOROTHIAZIDE (HYDRODIURIL) 12.5 MG TAB    TAKE 1 TABLET(12.5 MG) BY MOUTH EVERY DAY    METFORMIN (GLUCOPHAGE-XR) 500 MG 24 HR TABLET    Take 1,000 mg by mouth 2 (two) times daily.    MULTIVITAMIN CAPSULE    Take 1 capsule by mouth once daily.    ONETOUCH DELICA LANCETS 33 GAUGE MISC    TEST BID    ONETOUCH ULTRA TEST STRP    TEST BID    ONETOUCH ULTRA2 KIT    TEST BID UTD    POLYETHYLENE GLYCOL (GLYCOLAX) 17 GRAM/DOSE POWDER    Take 17 g by mouth daily as needed.    TRAZODONE (DESYREL) 50 MG TABLET    TK 1 TO 2 TS PO QHS PRF INSOMNIA       SOCIAL HISTORY:     Social History     Socioeconomic History    Marital status:      Spouse name: Not on file    Number of children: Not on file    Years of education: Not on file    Highest education level: Not on file   Social Needs    Financial resource strain: Not on file    Food insecurity - worry: Not on file    Food insecurity - inability: Not on file    Transportation needs - medical: Not on file    Transportation needs - non-medical: Not on file   Occupational History    Not on file   Tobacco Use    Smoking status: Never Smoker    Smokeless tobacco: Never Used   Substance and Sexual Activity    Alcohol use: No     Alcohol/week: 0.0 oz     Comment: Socially    Drug use: No    Sexual activity: Yes     Partners: Male   Other Topics Concern    Not on file   Social History Narrative    Not on file       FAMILY HISTORY:     Family History   Problem Relation Age of Onset    Heart disease Mother     Diabetes type II Mother     Heart attack Father 50        Open heart surgery    No Known Problems Sister     No  "Known Problems Brother     No Known Problems Maternal Aunt     No Known Problems Maternal Uncle     No Known Problems Paternal Aunt     No Known Problems Paternal Uncle     No Known Problems Maternal Grandmother     No Known Problems Maternal Grandfather     No Known Problems Paternal Grandmother     No Known Problems Paternal Grandfather     Amblyopia Neg Hx     Blindness Neg Hx     Cancer Neg Hx     Cataracts Neg Hx     Diabetes Neg Hx     Glaucoma Neg Hx     Hypertension Neg Hx     Macular degeneration Neg Hx     Retinal detachment Neg Hx     Strabismus Neg Hx     Stroke Neg Hx     Thyroid disease Neg Hx        REVIEW OF SYSTEMS:   Review of Systems   Constitutional: Negative for chills, diaphoresis and fever.   HENT: Negative for nosebleeds.    Eyes: Negative for blurred vision, double vision and photophobia.   Respiratory: Negative for hemoptysis, shortness of breath and wheezing.    Cardiovascular: Negative for chest pain, palpitations, orthopnea, claudication, leg swelling and PND.   Gastrointestinal: Negative for abdominal pain, blood in stool, heartburn, melena, nausea and vomiting.   Genitourinary: Negative for flank pain and hematuria.   Musculoskeletal: Negative for falls, joint pain, myalgias and neck pain.   Skin: Negative for rash.   Neurological: Negative for dizziness, seizures, loss of consciousness, weakness and headaches.   Endo/Heme/Allergies: Negative for polydipsia. Does not bruise/bleed easily.   Psychiatric/Behavioral: Negative for depression and memory loss. The patient is not nervous/anxious.        PHYSICAL EXAM:     Vitals:    09/04/18 1026   BP: 108/72   Pulse: 82   Resp: 15    Body mass index is 34.87 kg/m².  Weight: 83.7 kg (184 lb 8.4 oz)   Height: 5' 1" (154.9 cm)     Physical Exam   Constitutional: She is oriented to person, place, and time. She appears well-developed and well-nourished. She is cooperative.  Non-toxic appearance. No distress.   HENT:   Head: " Normocephalic and atraumatic.   Eyes: Conjunctivae and EOM are normal. Pupils are equal, round, and reactive to light. No scleral icterus.   Neck: Trachea normal and normal range of motion. Neck supple. Normal carotid pulses and no JVD present. Carotid bruit is not present. No neck rigidity. No tracheal deviation and no edema present. No thyromegaly present.   Cardiovascular: Normal rate, regular rhythm, S1 normal and S2 normal. PMI is not displaced. Exam reveals no gallop and no friction rub.   No murmur heard.  Pulses:       Carotid pulses are 2+ on the right side, and 2+ on the left side.  Pulmonary/Chest: Effort normal and breath sounds normal. No stridor. No respiratory distress. She has no wheezes. She has no rales. She exhibits no tenderness.   Abdominal: Soft. She exhibits no distension. There is no hepatosplenomegaly.   obese   Musculoskeletal: She exhibits no edema or tenderness.   Feet:   Right Foot:   Skin Integrity: Negative for ulcer.   Left Foot:   Skin Integrity: Negative for ulcer.   Neurological: She is alert and oriented to person, place, and time. No cranial nerve deficit.   Skin: Skin is warm and dry. No rash noted. No erythema.   Psychiatric: She has a normal mood and affect. Her speech is normal and behavior is normal.   Vitals reviewed.      DATA:   EKG: (personally reviewed tracing)  9/4/18 SR 82, no change vs 11/8/17    Laboratory:  CBC:  Recent Labs   Lab  01/09/18   1132  03/14/18   1045  07/12/18   1014   WHITE BLOOD CELL COUNT  7.72  7.00  4.55   HEMOGLOBIN  12.4  12.6  12.1   HEMATOCRIT  38.4  37.5  38.8   PLATELETS  344  320  300       CHEMISTRIES:  Recent Labs   Lab  12/28/16   1031   08/20/17   0107   01/09/18   1132  03/14/18   1045  07/12/18   1014   GLUCOSE  125 H   < >  157 H   < >  211 H  228 H  180 H   SODIUM  137   < >  140   < >  137  137  142   POTASSIUM  4.2   < >  3.7   < >  3.7  3.9  4.0   BUN BLD  16   < >  14   < >  16  16  15   CREATININE  0.7   < >  0.8   < >  0.8   0.9  0.9   EGFR IF AFRICAN AMERICAN  >60.0   < >  >60.0   < >  >60.0  >60  >60.0   EGFR IF NON-  >60.0   < >  >60.0   < >  >60.0  >60  >60.0   CALCIUM  8.8   < >  9.1   < >  9.5  9.2  9.9   MAGNESIUM  1.7   --   1.8   --    --    --    --     < > = values in this interval not displayed.       CARDIAC BIOMARKERS:        COAGS:        LIPIDS/LFTS:  Recent Labs   Lab  06/07/17   1703  08/16/17   0938   09/13/17   1737  01/09/18   1132  07/12/18   1014   CHOLESTEROL  289 H  281 H   --    --    --   267 H   TRIGLYCERIDES  142  118   --    --    --   128   HDL  97 H  90 H   --    --    --   84 H   LDL CHOLESTEROL  163.6 H  167.4 H   --    --    --   157.4   NON-HDL CHOLESTEROL  192  191   --    --    --   183   AST  16  33   < >  23  18  18   ALT  18  40   < >  52 H  23  25    < > = values in this interval not displayed.       Cardiovascular Testing:  Ex MPI 6/8/17  The patient exercised for 8.06 minutes on a Osito protocol, corresponding to a functional capacity of 7 estimated METS, achieving a peak heart rate of 148 bpm, which is 91% of the age predicted maximum heart rate. -CP/EKG.  Nuclear Quantitative Functional Analysis:   LVEF: >= 70 %  Impression: NORMAL MYOCARDIAL PERFUSION  1. The perfusion scan is free of evidence for myocardial ischemia or injury.   2. There is mild apical thinning which is a normal variant.   3. Resting wall motion is physiologic.   4. Resting LV function is normal.   5. The ventricular volumes are normal at rest and stress.   6. The extracardiac distribution of radioactivity is normal.     Echo 6/8/17    1 - Normal left ventricular systolic function (EF 55-60%).     2 - Concentric hypertrophy.     3 - Mild left atrial enlargement.     4 - Trivial tricuspid regurgitation.    ASSESSMENT:   # Preop CV eval prior to hysteroscopy/D&C.  The pt has good exercise capacity. Echo and MPI 6/2017 normal.  # HTN, controlled  # DM  # HLP, on atorva 80mg/Zetia 10mg  # BMI 35 s/p bariatric  surgery, down 1 unit vs last OV    PLAN:   Cont med rx  Diet/exercise/weight loss  The pt is at low cardiac risk for the planned surgery.  No further CV testing needed.  RTC prn    Joshua Beebe MD, FACC

## 2018-09-04 NOTE — LETTER
September 4, 2018    Antoine Caloskevin Seals  [address] [place] [address]             [facility] [place] Cardiology  [address]  [place] [address]  Phone: [phone]   Antoine Seals was seen by me on 9/4/2018 and is cleared atlow cardiac risk for the planned surgery and anesthesia as deemed necessary.     If you have any questions or concerns, please don't hesitate to call.    Sincerely,      Joshua Beebe M.D., FACC

## 2018-09-10 ENCOUNTER — OFFICE VISIT (OUTPATIENT)
Dept: OBSTETRICS AND GYNECOLOGY | Facility: CLINIC | Age: 53
End: 2018-09-10
Payer: MEDICAID

## 2018-09-10 VITALS
BODY MASS INDEX: 35.21 KG/M2 | WEIGHT: 186.5 LBS | SYSTOLIC BLOOD PRESSURE: 142 MMHG | DIASTOLIC BLOOD PRESSURE: 90 MMHG | HEIGHT: 61 IN

## 2018-09-10 DIAGNOSIS — N95.0 PMB (POSTMENOPAUSAL BLEEDING): Primary | ICD-10-CM

## 2018-09-10 PROCEDURE — 99999 PR PBB SHADOW E&M-EST. PATIENT-LVL IV: CPT | Mod: PBBFAC,,, | Performed by: OBSTETRICS & GYNECOLOGY

## 2018-09-10 PROCEDURE — 99499 UNLISTED E&M SERVICE: CPT | Mod: S$PBB,,, | Performed by: OBSTETRICS & GYNECOLOGY

## 2018-09-10 PROCEDURE — 99214 OFFICE O/P EST MOD 30 MIN: CPT | Mod: PBBFAC | Performed by: OBSTETRICS & GYNECOLOGY

## 2018-09-12 ENCOUNTER — OFFICE VISIT (OUTPATIENT)
Dept: ENDOCRINOLOGY | Facility: CLINIC | Age: 53
End: 2018-09-12
Payer: MEDICAID

## 2018-09-12 VITALS
HEIGHT: 61 IN | WEIGHT: 184.94 LBS | SYSTOLIC BLOOD PRESSURE: 156 MMHG | RESPIRATION RATE: 18 BRPM | BODY MASS INDEX: 34.92 KG/M2 | DIASTOLIC BLOOD PRESSURE: 92 MMHG | HEART RATE: 92 BPM

## 2018-09-12 DIAGNOSIS — E66.01 SEVERE OBESITY (BMI 35.0-39.9) WITH COMORBIDITY: ICD-10-CM

## 2018-09-12 DIAGNOSIS — I10 ESSENTIAL HYPERTENSION: ICD-10-CM

## 2018-09-12 DIAGNOSIS — E78.5 HYPERLIPIDEMIA LDL GOAL <70: ICD-10-CM

## 2018-09-12 PROCEDURE — 99999 PR PBB SHADOW E&M-EST. PATIENT-LVL IV: CPT | Mod: PBBFAC,,, | Performed by: NURSE PRACTITIONER

## 2018-09-12 PROCEDURE — 99214 OFFICE O/P EST MOD 30 MIN: CPT | Mod: PBBFAC,PN | Performed by: NURSE PRACTITIONER

## 2018-09-12 PROCEDURE — 99214 OFFICE O/P EST MOD 30 MIN: CPT | Mod: S$PBB,,, | Performed by: NURSE PRACTITIONER

## 2018-09-12 RX ORDER — METFORMIN HYDROCHLORIDE 500 MG/1
TABLET ORAL
Qty: 270 TABLET | Refills: 1 | Status: SHIPPED | OUTPATIENT
Start: 2018-09-12 | End: 2018-11-13 | Stop reason: SDUPTHER
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
## 2018-09-12 NOTE — PROGRESS NOTES
A user error has taken place: encounter opened in error, closed for administrative reasons.  Pt not here today for visit.  Pt has been cleared by cardiology for surgery and is requesting a surgery date for hysteroscopy dilation and curettage for postmenopausal bleeding.  Will contact pt with surgery date pending OR scheduling for possibly later this month.

## 2018-09-25 ENCOUNTER — OFFICE VISIT (OUTPATIENT)
Dept: OBSTETRICS AND GYNECOLOGY | Facility: CLINIC | Age: 53
End: 2018-09-25
Payer: MEDICAID

## 2018-09-25 VITALS
DIASTOLIC BLOOD PRESSURE: 86 MMHG | BODY MASS INDEX: 34.53 KG/M2 | HEIGHT: 61 IN | SYSTOLIC BLOOD PRESSURE: 130 MMHG | WEIGHT: 182.88 LBS

## 2018-09-25 DIAGNOSIS — N95.0 PMB (POSTMENOPAUSAL BLEEDING): Primary | ICD-10-CM

## 2018-09-25 PROCEDURE — 99999 PR PBB SHADOW E&M-EST. PATIENT-LVL II: CPT | Mod: PBBFAC,,, | Performed by: OBSTETRICS & GYNECOLOGY

## 2018-09-25 PROCEDURE — 99212 OFFICE O/P EST SF 10 MIN: CPT | Mod: PBBFAC | Performed by: OBSTETRICS & GYNECOLOGY

## 2018-09-25 PROCEDURE — 99499 UNLISTED E&M SERVICE: CPT | Mod: S$PBB,,, | Performed by: OBSTETRICS & GYNECOLOGY

## 2018-09-25 NOTE — H&P
[facility]  History & Physical  Obstetrics & Gynecology    Visit Date:  2018    Chief Complaint:  Pre-op for hysteroscopy dilation and curettage    History of Present Illness:     Antoine Seals is a 53 y.o.  here for pre-op for a hysteroscopy dilation and curettage secondary to postmenopausal bleeding.  Pt reports menopause 2017.  Pt reports reports irregular light spotting/bleeding for past month.  Office EMB on 8/3/18 was benign, however it was a limited specimen.  Pt is not on hormone replacement therapy.   Due to continued vaginal bleeding/spotting and scanty sample on endometrial biopsy, pt is requesting further evaluation with hysteroscopy D&C.     Past Medical History:      Diagnosis Date    Anemia     Diabetes mellitus type 2, noninsulin dependent 2016    Hematuria     Hx of essential hypertension     Hyperlipidemia     Hypertension, uncontrolled 2016    Nuclear sclerosis of both eyes 4/10/2017     Past Surgical History:      Procedure Laterality Date    BTL       SECTION      COLONOSCOPY N/A 2017    Procedure: COLONOSCOPY;  Surgeon: Oleg Enciso MD;  [facility]  Service: Endoscopy;  Laterality: N/A;  CHRONIC CONSTIPATION S/P LRNY    COLONOSCOPY N/A 2017    [facility] at [facility] ([facility])    CYSTOSCOPY WITH RETROGRADE PYELOGRAM Bilateral 3/19/2018    Performed by [facility] at [facility] OR    ESOPHAGOGASTRODUODENOSCOPY (EGD) N/A 2017    Performed by [facility] at [facility] ([facility])    ESOPHAGOGASTRODUODENOSCOPY (EGD) - needs dual lumen EGD with graspers and scissors to remove suture at gastrojejunal anastomosis site-pt. 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 N/A 2017    Performed by Corona Quijano MD at [facility] OR 2ND FLR    GASTRIC BYPASS      sandra en y    LAPAROSCOPY-DIAGNOSTIC N/A 1/15/2018    Performed by Corona [facility]ano MD at [facility] OR 2ND FLR    NECK SURGERY   09/30/2016    REPAIR-HERNIA-LAPAROSCOPIC  1/15/2018    Performed by [facility] at [facility] OR [address] [place] [address]    TOTAL REDUCTION MAMMOPLASTY       Medications:     Current Outpatient Medications:     amLODIPine (NORVASC) 10 MG tablet, TAKE 1 TABLET(10 MG) BY MOUTH EVERY DAY, Disp: 90 tablet, Rfl: 0    aspirin (ECOTRIN) 81 MG EC tablet, Take 1 tablet (81 mg total) by mouth once daily., Disp: 90 tablet, Rfl: 3    atorvastatin (LIPITOR) 80 MG tablet, Take 1 tablet (80 mg total) by mouth every morning., Disp: 90 tablet, Rfl: 1    b complex vitamins tablet, Take 1 tablet by mouth every morning. , Disp: , Rfl:     buPROPion (WELLBUTRIN XL) 300 MG 24 hr tablet, , Disp: , Rfl: 1    bupropion HCl (WELLBUTRIN XL ORAL), Take 1 tablet by mouth once daily., Disp: , Rfl:     calcium citrate-vitamin D3 315-200 mg (CITRACAL+D) 315-200 mg-unit per tablet, Take 1 tablet by mouth once daily., Disp: , Rfl:     ertugliflozin (STEGLATRO) 5 mg Tab, Take 5 mg by mouth once daily., Disp: 30 tablet, Rfl: 3    ezetimibe (ZETIA) 10 mg tablet, Take 1 tablet (10 mg total) by mouth once daily., Disp: 30 tablet, Rfl: 3    fluconazole (DIFLUCAN) 150 MG Tab, Take 1 tablet (150 mg total) by mouth as needed. Take one pill weekly as needed for yeast infection., Disp: 6 tablet, Rfl: 3    glipiZIDE (GLUCOTROL) 5 MG tablet, TAKE 1 TABLET(5 MG) BY MOUTH TWICE DAILY WITH BREAKFAST, Disp: 180 tablet, Rfl: 0    hydroCHLOROthiazide (HYDRODIURIL) 12.5 MG Tab, TAKE 1 TABLET(12.5 MG) BY MOUTH EVERY DAY, Disp: 90 tablet, Rfl: 0    metFORMIN (GLUCOPHAGE) 500 MG tablet, Take 2 tablets with breakfast and 1 tablet with dinner, Disp: 270 tablet, Rfl: 1    multivitamin capsule, Take 1 capsule by mouth once daily., Disp: , Rfl:     ONETOUCH DELICA LANCETS 33 gauge Misc, TEST BID, Disp: , Rfl: 0    ONETOUCH ULTRA TEST Strp, TEST BID, Disp: 100 each, Rfl: 5    ONETOUCH ULTRA2 kit, TEST BID UTD, Disp: , Rfl: 0    polyethylene glycol (GLYCOLAX) 17  "gram/dose powder, Take 17 g by mouth daily as needed., Disp: 1700 g, Rfl: 3    traZODone (DESYREL) 50 MG tablet, TK 1 TO 2 TS PO QHS PRF INSOMNIA, Disp: , Rfl: 1    Allergies:     Allergen Reactions    Lisinopril-hydrochlorothiazide Swelling     Facial swelling/ mouth swelling     Social History:      Denies tobacco, alcohol abuse or illicit drug use  Denies domestic abuse     Family History:       Problem Relation Age of Onset    Heart disease Mother     Diabetes type II Mother     Heart attack Father 50        Open heart surgery    No Known Problems Sister     No Known Problems Brother     No Known Problems Maternal Aunt     No Known Problems Maternal Uncle     No Known Problems Paternal Aunt     No Known Problems Paternal Uncle     No Known Problems Maternal Grandmother     No Known Problems Maternal Grandfather     No Known Problems Paternal Grandmother     No Known Problems Paternal Grandfather     Amblyopia Neg Hx     Blindness Neg Hx     Cancer Neg Hx     Cataracts Neg Hx     Diabetes Neg Hx     Glaucoma Neg Hx     Hypertension Neg Hx     Macular degeneration Neg Hx     Retinal detachment Neg Hx     Strabismus Neg Hx     Stroke Neg Hx     Thyroid disease Neg Hx      Review of Systems:      GENERAL:  No fever, fatigue, excessive weight gain or loss  HEENT:  No head injury, headaches, hearing changes, visual disturbance  RESPIRATORY:  No cough, shortness of breath  CARDIOVASCULAR:  No chest pain, heart palpitations, leg swelling  GASTROINTESTINAL:  No nausea, vomiting, constipation, diarrhea, abd pain, rectal bleeding   GENITOURINARY:  See HPI.  HEMATOLOGIC:  No easy bruisability or bleeding   LYMPHATICS:  No enlarged nodes  SKIN:  No rash, lesions, jaundice  NEUROLOGIC:  No dizziness, weakness, syncope     Physical Exam:     /86   Ht 5' 1" (1.549 m)   Wt 83 kg (182 lb 14 oz)   LMP 12/20/2017   BMI 34.55 kg/m²    Pulse 64, Resp rate 18     GENERAL: NAD, " well-nourished  HEENT: NCAT, moist mucus membranes, anicteric, neck supple  LUNGS: Clear  HEART: RRR with no m/g/r  ABDOMEN: Soft, non-tender. Normoactive BS. No obvious organomegaly.  EXT: Symmetric w/o cramping, claudication, or edema. +2 distal pulses.  SKIN: No rashes or bruising  NEURO: Grossly intact bilaterally  PSYCH: Mood and affect appropriate  GENITOURINARY:  NFEG no lesion. No vaginal or cervical lesion. No bleeding or discharge. No CMT. Uterus and ovaries small, NT. Declined rectal exam. No obvious external lesions.    Labs/studies:    Lab Results   Component Value Date    WBC 4.55 2018    HGB 12.1 2018    HCT 38.8 2018    MCV 81 (L) 2018     2018     Pelvic U/S:  2018    FINDINGS:  Uterus:    Size: 7.5 x 3.5 x 5.2 cm    Masses: No masses.  Small calcifications identified level the cervix, nonspecific.    Endometrium: Borderline abnormal in this post menopausal patient, measuring 6 mm.    Right ovary:    Size: 4.9 x 2.0 x 3.7 cm    Appearance: 1.3 x 0.9 x 1.4 cm follicle.  Small calcification.    Vascular flow: Normal.    Left ovary:    Size: 3.0 x 2.2 x 4.1 cm    Appearance: Normal    Vascular Flow: Normal.    Free Fluid:    None.      Impression       Nonspecific prominence of the endometrium in postmenopausal patient up to 6 mm.  It is not well visualized and therefore, this measurement cannot be stated with certainty.  Correlation advised.     Assessment:     53 y.o.  with:    1. Postmenopausal bleeding    Plan:    We discussed her condition in great detail. The proposed procedure will be a hysteroscopy dilation and curettage.    The patient was informed of the risks, benefits, alternatives and possible complications to hysteroscopy D&C and blood transfusion including pre-admission, admission, and post-admission procedures and expectations. Risks of hysteroscopy D&C included but were not limited to bleeding, infection, injury to the vulva, vagina, or  cervix, and uterine perforation. The patient was counseled on the potential need for laparoscopy or laparotomy and possible hysterectomy +/- bilateral salpingo-oophorectomy (BS&O) if uterine perforation/injury or uncontrollable hemorrhage were to occur. The patient was counseled extensively on the inability to become pregnant following a hysterectomy and in the event of a BS&O will result in surgical menopause. All of her questions were answered to her satisfaction. She expressed understanding of the risks involved and informed consent was obtained for the procedure and blood transfusion.     Surgery tentatively scheduled for 9/28/2018.    Pre-op instructions reviewed.      Pt cleared by cardiology for surgery.  All questions answered, pt voiced understanding.        Naveed Alexander MD

## 2018-09-25 NOTE — PROGRESS NOTES
Pt here for pre-op for hysteroscopy dilation and curettage for postmenopausal bleeding.  Please see pre-op H&P for complete details.    Naveed Alexander MD

## 2018-09-25 NOTE — H&P (VIEW-ONLY)
[facility]  History & Physical  Obstetrics & Gynecology    Visit Date:  2018    Chief Complaint:  Pre-op for hysteroscopy dilation and curettage    History of Present Illness:     Antoine Seals is a 53 y.o.  here for pre-op for a hysteroscopy dilation and curettage secondary to postmenopausal bleeding.  Pt reports menopause 2017.  Pt reports reports irregular light spotting/bleeding for past month.  Office EMB on 8/3/18 was benign, however it was a limited specimen.  Pt is not on hormone replacement therapy.   Due to continued vaginal bleeding/spotting and scanty sample on endometrial biopsy, pt is requesting further evaluation with hysteroscopy D&C.     Past Medical History:      Diagnosis Date    Anemia     Diabetes mellitus type 2, noninsulin dependent 2016    Hematuria     Hx of essential hypertension     Hyperlipidemia     Hypertension, uncontrolled 2016    Nuclear sclerosis of both eyes 4/10/2017     Past Surgical History:      Procedure Laterality Date    BTL       SECTION      COLONOSCOPY N/A 2017    Procedure: COLONOSCOPY;  Surgeon: Oleg Enciso MD;  [facility]  Service: Endoscopy;  Laterality: N/A;  CHRONIC CONSTIPATION S/P LRNY    COLONOSCOPY N/A 2017    Performed by Oleg Enciso MD at [facility] (4TH FLR)    CYSTOSCOPY WITH RETROGRADE PYELOGRAM Bilateral 3/19/2018    Performed by ELIZABETH Mendoza MD at [facility] OR    ESOPHAGOGASTRODUODENOSCOPY (EGD) N/A 2017    Performed by Oleg Enciso MD at [facility] (4TH FLR)    ESOPHAGOGASTRODUODENOSCOPY (EGD) - needs dual lumen EGD with graspers and scissors to remove suture at gastrojejunal anastomosis site-pt. 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 N/A 2017    Performed by Corona Quijano MD at [facility] OR 2ND FLR    GASTRIC BYPASS      sandra en y    LAPAROSCOPY-DIAGNOSTIC N/A 1/15/2018    Performed by Corona Quijano MD at [facility] OR 2ND FLR    NECK SURGERY   09/30/2016    REPAIR-HERNIA-LAPAROSCOPIC  1/15/2018    Performed by Corona Quijano MD at [facility] OR [address] [place] [address]    TOTAL REDUCTION MAMMOPLASTY       Medications:     Current Outpatient Medications:     amLODIPine (NORVASC) 10 MG tablet, TAKE 1 TABLET(10 MG) BY MOUTH EVERY DAY, Disp: 90 tablet, Rfl: 0    aspirin (ECOTRIN) 81 MG EC tablet, Take 1 tablet (81 mg total) by mouth once daily., Disp: 90 tablet, Rfl: 3    atorvastatin (LIPITOR) 80 MG tablet, Take 1 tablet (80 mg total) by mouth every morning., Disp: 90 tablet, Rfl: 1    b complex vitamins tablet, Take 1 tablet by mouth every morning. , Disp: , Rfl:     buPROPion (WELLBUTRIN XL) 300 MG 24 hr tablet, , Disp: , Rfl: 1    bupropion HCl (WELLBUTRIN XL ORAL), Take 1 tablet by mouth once daily., Disp: , Rfl:     calcium citrate-vitamin D3 315-200 mg (CITRACAL+D) 315-200 mg-unit per tablet, Take 1 tablet by mouth once daily., Disp: , Rfl:     ertugliflozin (STEGLATRO) 5 mg Tab, Take 5 mg by mouth once daily., Disp: 30 tablet, Rfl: 3    ezetimibe (ZETIA) 10 mg tablet, Take 1 tablet (10 mg total) by mouth once daily., Disp: 30 tablet, Rfl: 3    fluconazole (DIFLUCAN) 150 MG Tab, Take 1 tablet (150 mg total) by mouth as needed. Take one pill weekly as needed for yeast infection., Disp: 6 tablet, Rfl: 3    glipiZIDE (GLUCOTROL) 5 MG tablet, TAKE 1 TABLET(5 MG) BY MOUTH TWICE DAILY WITH BREAKFAST, Disp: 180 tablet, Rfl: 0    hydroCHLOROthiazide (HYDRODIURIL) 12.5 MG Tab, TAKE 1 TABLET(12.5 MG) BY MOUTH EVERY DAY, Disp: 90 tablet, Rfl: 0    metFORMIN (GLUCOPHAGE) 500 MG tablet, Take 2 tablets with breakfast and 1 tablet with dinner, Disp: 270 tablet, Rfl: 1    multivitamin capsule, Take 1 capsule by mouth once daily., Disp: , Rfl:     ONETOUCH DELICA LANCETS 33 gauge Misc, TEST BID, Disp: , Rfl: 0    ONETOUCH ULTRA TEST Strp, TEST BID, Disp: 100 each, Rfl: 5    ONETOUCH ULTRA2 kit, TEST BID UTD, Disp: , Rfl: 0    polyethylene glycol (GLYCOLAX) 17  "gram/dose powder, Take 17 g by mouth daily as needed., Disp: 1700 g, Rfl: 3    traZODone (DESYREL) 50 MG tablet, TK 1 TO 2 TS PO QHS PRF INSOMNIA, Disp: , Rfl: 1    Allergies:     Allergen Reactions    Lisinopril-hydrochlorothiazide Swelling     Facial swelling/ mouth swelling     Social History:      Denies tobacco, alcohol abuse or illicit drug use  Denies domestic abuse     Family History:       Problem Relation Age of Onset    Heart disease Mother     Diabetes type II Mother     Heart attack Father 50        Open heart surgery    No Known Problems Sister     No Known Problems Brother     No Known Problems Maternal Aunt     No Known Problems Maternal Uncle     No Known Problems Paternal Aunt     No Known Problems Paternal Uncle     No Known Problems Maternal Grandmother     No Known Problems Maternal Grandfather     No Known Problems Paternal Grandmother     No Known Problems Paternal Grandfather     Amblyopia Neg Hx     Blindness Neg Hx     Cancer Neg Hx     Cataracts Neg Hx     Diabetes Neg Hx     Glaucoma Neg Hx     Hypertension Neg Hx     Macular degeneration Neg Hx     Retinal detachment Neg Hx     Strabismus Neg Hx     Stroke Neg Hx     Thyroid disease Neg Hx      Review of Systems:      GENERAL:  No fever, fatigue, excessive weight gain or loss  HEENT:  No head injury, headaches, hearing changes, visual disturbance  RESPIRATORY:  No cough, shortness of breath  CARDIOVASCULAR:  No chest pain, heart palpitations, leg swelling  GASTROINTESTINAL:  No nausea, vomiting, constipation, diarrhea, abd pain, rectal bleeding   GENITOURINARY:  See HPI.  HEMATOLOGIC:  No easy bruisability or bleeding   LYMPHATICS:  No enlarged nodes  SKIN:  No rash, lesions, jaundice  NEUROLOGIC:  No dizziness, weakness, syncope     Physical Exam:     /86   Ht 5' 1" (1.549 m)   Wt 83 kg (182 lb 14 oz)   LMP 12/20/2017   BMI 34.55 kg/m²    Pulse 64, Resp rate 18     GENERAL: NAD, " well-nourished  HEENT: NCAT, moist mucus membranes, anicteric, neck supple  LUNGS: Clear  HEART: RRR with no m/g/r  ABDOMEN: Soft, non-tender. Normoactive BS. No obvious organomegaly.  EXT: Symmetric w/o cramping, claudication, or edema. +2 distal pulses.  SKIN: No rashes or bruising  NEURO: Grossly intact bilaterally  PSYCH: Mood and affect appropriate  GENITOURINARY:  NFEG no lesion. No vaginal or cervical lesion. No bleeding or discharge. No CMT. Uterus and ovaries small, NT. Declined rectal exam. No obvious external lesions.    Labs/studies:    Lab Results   Component Value Date    WBC 4.55 2018    HGB 12.1 2018    HCT 38.8 2018    MCV 81 (L) 2018     2018     Pelvic U/S:  2018    FINDINGS:  Uterus:    Size: 7.5 x 3.5 x 5.2 cm    Masses: No masses.  Small calcifications identified level the cervix, nonspecific.    Endometrium: Borderline abnormal in this post menopausal patient, measuring 6 mm.    Right ovary:    Size: 4.9 x 2.0 x 3.7 cm    Appearance: 1.3 x 0.9 x 1.4 cm follicle.  Small calcification.    Vascular flow: Normal.    Left ovary:    Size: 3.0 x 2.2 x 4.1 cm    Appearance: Normal    Vascular Flow: Normal.    Free Fluid:    None.      Impression       Nonspecific prominence of the endometrium in postmenopausal patient up to 6 mm.  It is not well visualized and therefore, this measurement cannot be stated with certainty.  Correlation advised.     Assessment:     53 y.o.  with:    1. Postmenopausal bleeding    Plan:    We discussed her condition in great detail. The proposed procedure will be a hysteroscopy dilation and curettage.    The patient was informed of the risks, benefits, alternatives and possible complications to hysteroscopy D&C and blood transfusion including pre-admission, admission, and post-admission procedures and expectations. Risks of hysteroscopy D&C included but were not limited to bleeding, infection, injury to the vulva, vagina, or  cervix, and uterine perforation. The patient was counseled on the potential need for laparoscopy or laparotomy and possible hysterectomy +/- bilateral salpingo-oophorectomy (BS&O) if uterine perforation/injury or uncontrollable hemorrhage were to occur. The patient was counseled extensively on the inability to become pregnant following a hysterectomy and in the event of a BS&O will result in surgical menopause. All of her questions were answered to her satisfaction. She expressed understanding of the risks involved and informed consent was obtained for the procedure and blood transfusion.     Surgery tentatively scheduled for 9/28/2018.    Pre-op instructions reviewed.      Pt cleared by cardiology for surgery.  All questions answered, pt voiced understanding.        Naveed Alxeander MD

## 2018-09-26 ENCOUNTER — TELEPHONE (OUTPATIENT)
Dept: BARIATRICS | Facility: CLINIC | Age: 53
End: 2018-09-26

## 2018-09-26 ENCOUNTER — HOSPITAL ENCOUNTER (OUTPATIENT)
Dept: PREADMISSION TESTING | Facility: HOSPITAL | Age: 53
Discharge: HOME | End: 2018-09-26
Attending: OBSTETRICS & GYNECOLOGY
Payer: MEDICAID

## 2018-09-26 VITALS
WEIGHT: 182 LBS | DIASTOLIC BLOOD PRESSURE: 81 MMHG | HEIGHT: 61 IN | RESPIRATION RATE: 18 BRPM | BODY MASS INDEX: 34.36 KG/M2 | SYSTOLIC BLOOD PRESSURE: 131 MMHG | TEMPERATURE: 97 F | OXYGEN SATURATION: 98 % | HEART RATE: 79 BPM

## 2018-09-26 DIAGNOSIS — N95.0 PMB (POSTMENOPAUSAL BLEEDING): ICD-10-CM

## 2018-09-26 DIAGNOSIS — Z01.818 PRE-OP TESTING: Primary | ICD-10-CM

## 2018-09-26 DIAGNOSIS — I10 ESSENTIAL HYPERTENSION, BENIGN: ICD-10-CM

## 2018-09-26 LAB
ANION GAP SERPL CALC-SCNC: 9 MMOL/L
BASOPHILS # BLD AUTO: 0.01 K/UL
BASOPHILS NFR BLD: 0.2 %
BUN SERPL-MCNC: 13 MG/DL
CALCIUM SERPL-MCNC: 9.8 MG/DL
CHLORIDE SERPL-SCNC: 104 MMOL/L
CO2 SERPL-SCNC: 25 MMOL/L
CREAT SERPL-MCNC: 0.9 MG/DL
DIFFERENTIAL METHOD: ABNORMAL
EOSINOPHIL # BLD AUTO: 0.1 K/UL
EOSINOPHIL NFR BLD: 2.5 %
ERYTHROCYTE [DISTWIDTH] IN BLOOD BY AUTOMATED COUNT: 15.9 %
EST. GFR  (AFRICAN AMERICAN): >60 ML/MIN/1.73 M^2
EST. GFR  (NON AFRICAN AMERICAN): >60 ML/MIN/1.73 M^2
GLUCOSE SERPL-MCNC: 183 MG/DL
HCT VFR BLD AUTO: 37.8 %
HGB BLD-MCNC: 12.4 G/DL
LYMPHOCYTES # BLD AUTO: 1.8 K/UL
LYMPHOCYTES NFR BLD: 33.3 %
MCH RBC QN AUTO: 25.4 PG
MCHC RBC AUTO-ENTMCNC: 32.8 G/DL
MCV RBC AUTO: 78 FL
MONOCYTES # BLD AUTO: 0.4 K/UL
MONOCYTES NFR BLD: 7.6 %
NEUTROPHILS # BLD AUTO: 3.1 K/UL
NEUTROPHILS NFR BLD: 56.4 %
PLATELET # BLD AUTO: 312 K/UL
PMV BLD AUTO: 10.4 FL
POTASSIUM SERPL-SCNC: 3.7 MMOL/L
RBC # BLD AUTO: 4.88 M/UL
SODIUM SERPL-SCNC: 138 MMOL/L
WBC # BLD AUTO: 5.5 K/UL

## 2018-09-26 PROCEDURE — 80048 BASIC METABOLIC PNL TOTAL CA: CPT

## 2018-09-26 PROCEDURE — 85025 COMPLETE CBC W/AUTO DIFF WBC: CPT

## 2018-09-26 PROCEDURE — 36415 COLL VENOUS BLD VENIPUNCTURE: CPT

## 2018-09-26 NOTE — DISCHARGE INSTRUCTIONS
Your surgery is scheduled for___9/28/2018______________.    Call [phone] between 2 pm and 5 pm ___9/27/2018_________ to find out your arrival time for the day of surgery.    Report to SAME DAY SURGERY UNIT at _______am on the 2nd floor of the hospital.  Use the front entrance of the hospital.  The front doors of the hospital open promptly at 5:30 am.    If you need wheelchair assistance, call [phone] from your cell phone,  or call 0 from the courtesy phone in the hospital lobby.    Important instructions:   Do not eat or drink after 12 midnight, including water.  It is okay to brush your teeth.  Do not have gum, candy or mints.     Take only these medications with a small swallow of water on the morning of your surgery.__amlodipine, bupropion___________    Do not take any diabetic medication on the morning of surgery unless instructed to do so by your doctor or pre op nurse.    Stop taking Aspirin, Ibuprofen, Motrin and Aleve , Fish oil, and Vitamin E for at least 7 days before your surgery. You may use Tylenol unless otherwise instructed by your doctor.          Return to the hospital lab on ___9/27/2018_______for additional blood test.         Please shower the night before and the morning of your surgery.       No shaving of procedural area at least 4-5 days before surgery due to increased risk of skin irritation and/or possible infection.      Do not wear make- up, including mascara.     You may wear deodorant only.      Do not wear powder, body lotion or cologne.     Do not wear any jewelry or have any metal on your body.     Please bring any documents given to you by your doctor.     If you are going home on the same day of surgery, you must arrange for a family member or a friend to drive you home.  Public transportation is prohibited.    You will not be able to drive home if you were given anesthesia or sedation.     Wear loose fitting clothes allowing for bandages.     Please leave money  and valuables home.       You may bring your cell phone.     Call the doctor if fever or illness should occur before your surgery.    Call [phone] to contact us here at [facility] if needed.

## 2018-09-26 NOTE — TELEPHONE ENCOUNTER
Called patient to inquired about her refill request for Omeprazole. Patient states it is not needed.

## 2018-09-27 ENCOUNTER — LAB VISIT (OUTPATIENT)
Dept: LAB | Facility: HOSPITAL | Age: 53
End: 2018-09-27
Attending: OBSTETRICS & GYNECOLOGY
Payer: MEDICAID

## 2018-09-27 DIAGNOSIS — Z01.818 PRE-OP TESTING: ICD-10-CM

## 2018-09-27 LAB
ABO + RH BLD: NORMAL
BLD GP AB SCN CELLS X3 SERPL QL: NORMAL

## 2018-09-27 PROCEDURE — 36415 COLL VENOUS BLD VENIPUNCTURE: CPT

## 2018-09-27 PROCEDURE — 86901 BLOOD TYPING SEROLOGIC RH(D): CPT

## 2018-09-27 RX ORDER — AMLODIPINE BESYLATE 10 MG/1
TABLET ORAL
Qty: 90 TABLET | Refills: 0 | Status: SHIPPED | OUTPATIENT
Start: 2018-09-27 | End: 2019-02-13 | Stop reason: SDUPTHER

## 2018-09-28 ENCOUNTER — ANESTHESIA EVENT (OUTPATIENT)
Dept: SURGERY | Facility: HOSPITAL | Age: 53
End: 2018-09-28
Payer: MEDICAID

## 2018-09-28 ENCOUNTER — HOSPITAL ENCOUNTER (OUTPATIENT)
Facility: HOSPITAL | Age: 53
Discharge: HOME | End: 2018-09-28
Attending: OBSTETRICS & GYNECOLOGY | Admitting: OBSTETRICS & GYNECOLOGY
Payer: MEDICAID

## 2018-09-28 ENCOUNTER — ANESTHESIA (OUTPATIENT)
Dept: SURGERY | Facility: HOSPITAL | Age: 53
End: 2018-09-28
Payer: MEDICAID

## 2018-09-28 VITALS
OXYGEN SATURATION: 98 % | HEART RATE: 85 BPM | DIASTOLIC BLOOD PRESSURE: 66 MMHG | HEIGHT: 61 IN | TEMPERATURE: 98 F | RESPIRATION RATE: 18 BRPM | BODY MASS INDEX: 34.36 KG/M2 | WEIGHT: 182 LBS | SYSTOLIC BLOOD PRESSURE: 121 MMHG

## 2018-09-28 DIAGNOSIS — Z98.890 STATUS POST HYSTEROSCOPY: Primary | ICD-10-CM

## 2018-09-28 DIAGNOSIS — N95.0 PMB (POSTMENOPAUSAL BLEEDING): ICD-10-CM

## 2018-09-28 PROCEDURE — 71000015 HC POSTOP RECOV 1ST HR: Performed by: OBSTETRICS & GYNECOLOGY

## 2018-09-28 PROCEDURE — 63600175 PHARM REV CODE 636 W HCPCS: Performed by: NURSE ANESTHETIST, CERTIFIED REGISTERED

## 2018-09-28 PROCEDURE — 88305 TISSUE EXAM BY PATHOLOGIST: CPT | Mod: 26,,, | Performed by: PATHOLOGY

## 2018-09-28 PROCEDURE — D9220A PRA ANESTHESIA: Mod: CRNA,,, | Performed by: NURSE ANESTHETIST, CERTIFIED REGISTERED

## 2018-09-28 PROCEDURE — 63600175 PHARM REV CODE 636 W HCPCS: Performed by: ANESTHESIOLOGY

## 2018-09-28 PROCEDURE — 58558 HYSTEROSCOPY BIOPSY: CPT | Mod: ,,, | Performed by: OBSTETRICS & GYNECOLOGY

## 2018-09-28 PROCEDURE — 71000033 HC RECOVERY, INTIAL HOUR: Performed by: OBSTETRICS & GYNECOLOGY

## 2018-09-28 PROCEDURE — 00952 ANES VAG PX HYSTSC&/HSG: CPT | Performed by: OBSTETRICS & GYNECOLOGY

## 2018-09-28 PROCEDURE — 25000003 PHARM REV CODE 250: Performed by: ANESTHESIOLOGY

## 2018-09-28 PROCEDURE — 37000009 HC ANESTHESIA EA ADD 15 MINS: Performed by: OBSTETRICS & GYNECOLOGY

## 2018-09-28 PROCEDURE — 36000707: Performed by: OBSTETRICS & GYNECOLOGY

## 2018-09-28 PROCEDURE — 25000003 PHARM REV CODE 250: Performed by: NURSE ANESTHETIST, CERTIFIED REGISTERED

## 2018-09-28 PROCEDURE — 37000008 HC ANESTHESIA 1ST 15 MINUTES: Performed by: OBSTETRICS & GYNECOLOGY

## 2018-09-28 PROCEDURE — 36000706: Performed by: OBSTETRICS & GYNECOLOGY

## 2018-09-28 PROCEDURE — D9220A PRA ANESTHESIA: Mod: ANES,,, | Performed by: ANESTHESIOLOGY

## 2018-09-28 PROCEDURE — 88305 TISSUE EXAM BY PATHOLOGIST: CPT | Performed by: PATHOLOGY

## 2018-09-28 RX ORDER — ACETAMINOPHEN 10 MG/ML
1000 INJECTION, SOLUTION INTRAVENOUS ONCE
Status: COMPLETED | OUTPATIENT
Start: 2018-09-28 | End: 2018-09-28

## 2018-09-28 RX ORDER — IBUPROFEN 600 MG/1
600 TABLET ORAL EVERY 6 HOURS PRN
Qty: 30 TABLET | Refills: 0 | Status: SHIPPED | OUTPATIENT
Start: 2018-09-28 | End: 2019-09-28

## 2018-09-28 RX ORDER — ONDANSETRON 2 MG/ML
4 INJECTION INTRAMUSCULAR; INTRAVENOUS ONCE AS NEEDED
Status: DISCONTINUED | OUTPATIENT
Start: 2018-09-28 | End: 2018-09-28 | Stop reason: HOSPADM

## 2018-09-28 RX ORDER — ONDANSETRON 4 MG/1
8 TABLET, ORALLY DISINTEGRATING ORAL EVERY 8 HOURS PRN
Status: DISCONTINUED | OUTPATIENT
Start: 2018-09-28 | End: 2018-09-28 | Stop reason: HOSPADM

## 2018-09-28 RX ORDER — DIPHENHYDRAMINE HYDROCHLORIDE 50 MG/ML
25 INJECTION INTRAMUSCULAR; INTRAVENOUS EVERY 4 HOURS PRN
Status: DISCONTINUED | OUTPATIENT
Start: 2018-09-28 | End: 2018-09-28 | Stop reason: HOSPADM

## 2018-09-28 RX ORDER — KETOROLAC TROMETHAMINE 30 MG/ML
30 INJECTION, SOLUTION INTRAMUSCULAR; INTRAVENOUS ONCE
Status: COMPLETED | OUTPATIENT
Start: 2018-09-28 | End: 2018-09-28

## 2018-09-28 RX ORDER — ONDANSETRON 2 MG/ML
INJECTION INTRAMUSCULAR; INTRAVENOUS
Status: DISCONTINUED | OUTPATIENT
Start: 2018-09-28 | End: 2018-09-28

## 2018-09-28 RX ORDER — HYDROCODONE BITARTRATE AND ACETAMINOPHEN 5; 325 MG/1; MG/1
1 TABLET ORAL EVERY 4 HOURS PRN
Status: DISCONTINUED | OUTPATIENT
Start: 2018-09-28 | End: 2018-09-28 | Stop reason: HOSPADM

## 2018-09-28 RX ORDER — FENTANYL CITRATE 50 UG/ML
INJECTION, SOLUTION INTRAMUSCULAR; INTRAVENOUS
Status: DISCONTINUED | OUTPATIENT
Start: 2018-09-28 | End: 2018-09-28

## 2018-09-28 RX ORDER — DIPHENHYDRAMINE HCL 25 MG
25 CAPSULE ORAL EVERY 4 HOURS PRN
Status: DISCONTINUED | OUTPATIENT
Start: 2018-09-28 | End: 2018-09-28 | Stop reason: HOSPADM

## 2018-09-28 RX ORDER — HYDROMORPHONE HYDROCHLORIDE 2 MG/ML
0.2 INJECTION, SOLUTION INTRAMUSCULAR; INTRAVENOUS; SUBCUTANEOUS EVERY 5 MIN PRN
Status: DISCONTINUED | OUTPATIENT
Start: 2018-09-28 | End: 2018-09-28 | Stop reason: HOSPADM

## 2018-09-28 RX ORDER — GLYCOPYRROLATE 0.2 MG/ML
INJECTION INTRAMUSCULAR; INTRAVENOUS
Status: DISCONTINUED | OUTPATIENT
Start: 2018-09-28 | End: 2018-09-28

## 2018-09-28 RX ORDER — LIDOCAINE HCL/PF 100 MG/5ML
SYRINGE (ML) INTRAVENOUS
Status: DISCONTINUED | OUTPATIENT
Start: 2018-09-28 | End: 2018-09-28

## 2018-09-28 RX ORDER — SODIUM CHLORIDE 0.9 % (FLUSH) 0.9 %
3 SYRINGE (ML) INJECTION
Status: DISCONTINUED | OUTPATIENT
Start: 2018-09-28 | End: 2018-09-28 | Stop reason: HOSPADM

## 2018-09-28 RX ORDER — MIDAZOLAM HYDROCHLORIDE 1 MG/ML
INJECTION, SOLUTION INTRAMUSCULAR; INTRAVENOUS
Status: DISCONTINUED | OUTPATIENT
Start: 2018-09-28 | End: 2018-09-28

## 2018-09-28 RX ORDER — PROPOFOL 10 MG/ML
VIAL (ML) INTRAVENOUS
Status: DISCONTINUED | OUTPATIENT
Start: 2018-09-28 | End: 2018-09-28

## 2018-09-28 RX ORDER — SODIUM CHLORIDE, SODIUM LACTATE, POTASSIUM CHLORIDE, CALCIUM CHLORIDE 600; 310; 30; 20 MG/100ML; MG/100ML; MG/100ML; MG/100ML
INJECTION, SOLUTION INTRAVENOUS CONTINUOUS
Status: DISCONTINUED | OUTPATIENT
Start: 2018-09-28 | End: 2018-09-28 | Stop reason: HOSPADM

## 2018-09-28 RX ADMIN — SODIUM CHLORIDE, SODIUM LACTATE, POTASSIUM CHLORIDE, AND CALCIUM CHLORIDE: .6; .31; .03; .02 INJECTION, SOLUTION INTRAVENOUS at 11:09

## 2018-09-28 RX ADMIN — ACETAMINOPHEN 1000 MG: 10 INJECTION, SOLUTION INTRAVENOUS at 01:09

## 2018-09-28 RX ADMIN — GLYCOPYRROLATE 0.2 MG: 0.2 INJECTION, SOLUTION INTRAMUSCULAR; INTRAVENOUS at 12:09

## 2018-09-28 RX ADMIN — KETOROLAC TROMETHAMINE 30 MG: 30 INJECTION, SOLUTION INTRAMUSCULAR; INTRAVENOUS at 01:09

## 2018-09-28 RX ADMIN — MIDAZOLAM HYDROCHLORIDE 2 MG: 1 INJECTION, SOLUTION INTRAMUSCULAR; INTRAVENOUS at 12:09

## 2018-09-28 RX ADMIN — FENTANYL CITRATE 50 MCG: 50 INJECTION INTRAMUSCULAR; INTRAVENOUS at 12:09

## 2018-09-28 RX ADMIN — PROPOFOL 140 MG: 10 INJECTION, EMULSION INTRAVENOUS at 12:09

## 2018-09-28 RX ADMIN — LIDOCAINE HYDROCHLORIDE 100 MG: 20 INJECTION, SOLUTION INTRAVENOUS at 12:09

## 2018-09-28 RX ADMIN — PROPOFOL 60 MG: 10 INJECTION, EMULSION INTRAVENOUS at 12:09

## 2018-09-28 RX ADMIN — ONDANSETRON 4 MG: 2 INJECTION, SOLUTION INTRAMUSCULAR; INTRAVENOUS at 12:09

## 2018-09-28 NOTE — INTERVAL H&P NOTE
The patient has been examined and the H&P has been reviewed:    I concur with the findings and no changes have occurred since H&P was written.    Anesthesia/Surgery risks, benefits and alternative options discussed and understood by patient/family.          Active Hospital Problems    Diagnosis  POA    PMB (postmenopausal bleeding) [N95.0]  Yes      Resolved Hospital Problems   No resolved problems to display.

## 2018-09-28 NOTE — DISCHARGE INSTRUCTIONS
Hysteroscopy    Hysteroscopy is a procedure that is done to see inside your uterus. It can help find the cause of problems in the uterus. This helps your health care provider decide on the best treatment. In some cases, it can be used to perform treatment. Hysteroscopy may be done in your health care provider's office or in the hospital.  Why might I need hysteroscopy?  Hysteroscopy may be done based on the results of other tests. It can help find the cause of problems. These can include:  · Unusually heavy or long menstrual periods  · Bleeding between periods  · Postmenopausal bleeding  · Trouble becoming pregnant (infertility) or carrying a pregnancy to term  · To locate an intrauterine device (IUD)  · To perform sterilization  What are the risks and complications of hysteroscopy?  Problems with the procedure are rare. But all procedures have risks. Risks of hysteroscopy include:  · Infection  · Bleeding  · Tearing of the uterine wall  · Damage to internal organs  · Scarring of the uterus  · Fluid overload  · Problems with anesthesia (the medication that prevents pain during the procedure)  How do I get ready for hysteroscopy?  · Tell your health care provider if you have any health problems. These include diabetes, heart disease, or bleeding problems.  · Tell your health care provider about all the medicines you take. This includes any over-the-counter medications, herbs, or supplements.  · You may be told not to use vaginal creams or medication. And you may be told not to have sex or douche.  · You may be told not to eat or drink the night before the procedure.  · You may be tested for pregnancy and infection.  · You may be asked to sign a consent form.  · You may be given a pain reliever to take an hour before the procedure. This helps relieve cramping that may occur.  What happens during a hysteroscopy?  · Youll lie on an exam table with your feet in stirrups.  · You may be given general anesthesia or  medicines to help you relax or sleep. In some cases, an IV line will be put into a vein in your arm or hand. This line is then used to give fluids and medicines.  · A tool called a speculum is inserted into the vagina to hold it open. A tool called a dilator may be used to widen the cervix.  · Numbing medicine may be applied to the cervix.  · The hysteroscope (a long, thin lighted tube) is inserted through the vagina and into the uterus. It is used to see inside the uterus. Images of the uterus are viewed on a monitor.  · A gas or fluid may be injected into the uterus to expand it.  · Other tools may be put through the hysteroscope. These are used to take tissue samples, remove growths, or place implants for the purpose of sterilization.  What happens after hysteroscopy?  · You may have cramps and bleeding for 24 hours after the procedure. This is normal. Use pads instead of tampons.  · Do not douche or use tampons until your health care provider says its OK.  · Do not use any vaginal medicines until you are told its OK.  · Ask your health care provider when its OK to have sex again.  When should I call my health care provider?  Call your health care provider if you have:  · Heavy bleeding (more than 1 pad an hour for 2 or more hours)  · A fever over 100.4°F (38.0°C)  · Increasing abdominal pain or tenderness  · Foul-smelling discharge   Follow-up care  Schedule a follow-up visit with your health care provider. Based on the results of your test, you may need more treatment. Be sure to follow instructions and keep your appointments.  Date Last Reviewed: 5/12/2015 © 2000-2017 [employer]. [address] [place] [address]. All rights reserved. This information is not intended as a substitute for professional medical care. Always follow your healthcare professional's instructions.          ACTIVITY LEVEL:  If you have received sedation or an anesthetic, you may feel sleepy for several hours.  Rest until you are more awake. Gradually resume your normal activities. No driving or alcoholic beverages for 24 hours.  ? Pelvic rest- no sex, tampons or douching until follow up or instructed by doctor.  ? No driving, alcoholic beverages or signing legal documents for next 24 hours or while taking pain medication  DIET:  You may resume your home diet. If nausea is present, increase your diet gradually with fluids and bland foods.  Medications:  Pain medication should be taken only if needed and as directed. If antibiotics are prescribed, the medication should be taken until completed. You will be given an updated list of you medications.    CALL THE DOCTOR:          Shortness of breath, Coughing up Bloody Sputum or Pains or Swelling in your Calves.  Persistent pain or nausea not relieved by medication.  If vaginal bleeding is in excess of a normal period.  Problems urinating  Fever over 101.    If any unusual problems or difficulties occur contact your doctor. If you cannot contact your doctor but feel your signs and symptoms warrant a physicians attention return to the emergency room.      Fall Prevention  Millions of people fall every year and injure themselves. You may have had anesthesia or sedation which may increase your risk of falling. You may have health issues that put you at an increased risk of falling.     Here are ways to reduce your risk of falling.  ·   · Make your home safe by keeping walkways clear of objects you may trip over.  · Use non-slip pads under rugs. Do not use area rugs or small throw rugs.  · Use non-slip mats in bathtubs and showers.  · Install handrails and lights on staircases.  · Do not walk in poorly lit areas.  · Do not stand on chairs or wobbly ladders.  · Use caution when reaching overhead or looking upward. This position can cause a loss of balance.  · Be sure your shoes fit properly, have non-slip bottoms and are in good condition.   · Wear shoes both inside and out. Avoid  going barefoot or wearing slippers.  · Be cautious when going up and down stairs, curbs, and when walking on uneven sidewalks.  · If your balance is poor, consider using a cane or walker.  · If your fall was related to alcohol use, stop or limit alcohol intake.   · If your fall was related to use of sleeping medicines, talk to your doctor about this. You may need to reduce your dosage at bedtime if you awaken during the night to go to the bathroom.    · To reduce the need for nighttime bathroom trips:  ¨ Avoid drinking fluids for several hours before going to bed  ¨ Empty your bladder before going to bed  ¨ Men can keep a urinal at the bedside  · Stay as active as you can. Balance, flexibility, strength, and endurance all come from exercise. They all play a role in preventing falls. Ask your healthcare provider which types of activity are right for you.  · Get your vision checked on a regular basis.  · If you have pets, know where they are before you stand up or walk so you don't trip over them.  · Use night lights.

## 2018-09-28 NOTE — TRANSFER OF CARE
"Anesthesia Transfer of Care Note    Patient: Antoine Seals    Procedure(s) Performed: Procedure(s) (LRB):  HYSTEROSCOPY, WITH DILATION AND CURETTAGE OF UTERUS (N/A)    Patient location: PACU    Anesthesia Type: general    Transport from OR: Transported from OR on room air with adequate spontaneous ventilation    Post pain: adequate analgesia    Post assessment: no apparent anesthetic complications    Post vital signs: stable    Level of consciousness: awake and responds to stimulation    Nausea/Vomiting: no nausea/vomiting    Complications: none    Transfer of care protocol was followed      Last vitals:   Visit Vitals  /60 (BP Location: Right arm, Patient Position: Lying)   Pulse 99   Temp 36.6 °C (97.9 °F) (Oral)   Resp 12   Ht 5' 1" (1.549 m)   Wt 82.6 kg (182 lb)   LMP 12/20/2017   SpO2 97%   BMI 34.39 kg/m²     "

## 2018-09-28 NOTE — OP NOTE
[facility]   Operative Report   Obstetrics & Gynecology     Date of Surgery:  2018     Surgeon:  Naveed Alexander M.D.     Preoperative diagnosis:  Post-menopausal bleeding     Postoperative diagnosis:  Post-menopausal bleeding    Procedure performed:  Hysteroscopy dilation and curettage, CPT 80148     Anesthesia:  General       IV Fluids:  850 mL of LR     Hysteroscopy fluids:  200 mL of normal saline, majority of fluid drained through exit port    Urine Output:  250 mL of clear urine prior to procedure      Estimated Blood Loss:  <5 mL     Specimens Removed:  Endometrial curetting    Complications:  No immediate complications    Findings:  Inspection of the uterine cavity reveals a homogeneous, smooth, atrophic appearing endometrium without increased or disorganized vascularization, hemorrhagic areas, friability, cystic changes, or necrotic zones.  The ostia were visualized.  Images of uterine cavity were taken before the procedure and sent with the patient's chart.     Indications for the Procedure:      See History & Physical for complete details.  In brief, Antoine Seals is a 53 y.o.  with post menopausal bleeding for hysteroscopy D&C.      The patient was informed of the risks, benefits, alternatives and possible complications to hysteroscopy D&C and blood transfusion including pre-admission, admission, and post-admission procedures and expectations.  Risks of hysteroscopy D&C included but were not limited to bleeding, infection, injury to the vulva, vagina, or cervix, and uterine perforation.  The patient was counseled on the potential need for laparoscopy or laparotomy and possible hysterectomy +/- bilateral salpingo-oophorectomy (BS&O) if uterine perforation/injury or uncontrollable hemorrhage were to occur.  The patient was counseled extensively on the inability to become pregnant following a hysterectomy and in the event of a BS&O will result in surgical menopause.   All of her questions were answered to her satisfaction.  She expressed understanding of the risks involved and informed consent was obtained for the procedure and blood transfusion.    Description of the Procedure:      The patient was taken to the operating room where a time out was performed to confirm the correct patient and correct procedure.  General anesthesia was obtained without difficulty.  The patient was placed in the dorsolithotomy position and prepped and draped in the normal sterile fashion.  All pressure points were padded and legs where supported using Will stirrups with careful attention to proper positioning.  A bimanual exam was performed and the uterus was found to be approximately 6 weeks in size, anteverted.  No cervical lesions or gross palpable adnexal masses noted.  A straight catheter was inserted into the bladder and clear yellow urine was obtained.  A weighted spectrum was placed in a posterior vagina and a right angle retractor was used to retract the anterior vagina.  The cervix was visualized and grasped with a single tooth tenaculum for retraction.  The cevix was then gently, serially dilated using Ayala dilators for the introduction of the hysteroscope.  A 5 mm hysteroscope was introduced under direct visualization using normal saline as distending media.  The hysteroscope was advanced to the fundus and the entire uterine cavity was inspected as described above. The hysteroscope was then removed and a sharp curetting was performed.  This was done in a systematic fashion by starting at the 12 o'clock position and rotating a total of 360 degrees in order to cover all surfaces.  Endometrial tissue was obtained and was sent to pathology.  Following the curetting, good hemostasis was noted.  The single-tooth tenaculum was removed from the anterior lip of the cervix and hemostasis was also noted at the tenaculum puncture sites.  The weighted speculum was then removed from the vagina.  At  the end of the procedure, all needle, sponge, and instrument counts were noted to be correct x 2.  The patient tolerated procedure well and transferred to the recovery room in stable condition.  Surgical findings, expectations, and post-op care were discussed with the patient.  All of her questions were answered to her satisfaction and she voiced understanding.      Naveed Alexander MD

## 2018-09-28 NOTE — ANESTHESIA POSTPROCEDURE EVALUATION
"Anesthesia Post Evaluation    Patient: Antoine Seals    Procedure(s) Performed: Procedure(s) (LRB):  HYSTEROSCOPY, WITH DILATION AND CURETTAGE OF UTERUS (N/A)    Final Anesthesia Type: general  Patient location during evaluation: PACU  Patient participation: Yes- Able to Participate  Level of consciousness: awake and alert and oriented  Post-procedure vital signs: reviewed and stable  Pain management: adequate  Airway patency: patent  PONV status at discharge: No PONV  Anesthetic complications: no      Cardiovascular status: blood pressure returned to baseline and hemodynamically stable  Respiratory status: unassisted, spontaneous ventilation and room air  Hydration status: euvolemic  Follow-up not needed.        Visit Vitals  /67   Pulse 94   Temp 36.6 °C (97.9 °F) (Oral)   Resp 16   Ht 5' 1" (1.549 m)   Wt 82.6 kg (182 lb)   LMP 12/20/2017   SpO2 (!) 92%   BMI 34.39 kg/m²       Pain/Dangelo Score: Pain Assessment Performed: Yes (9/28/2018  1:19 PM)  Presence of Pain: complains of pain/discomfort (9/28/2018  1:25 PM)  Pain Rating Prior to Med Admin: 4 (9/28/2018  1:25 PM)  Dangelo Score: 8 (9/28/2018  1:19 PM)        "

## 2018-09-28 NOTE — ANESTHESIA PREPROCEDURE EVALUATION
Pre-operative evaluation for Procedure(s) (LRB):  CYSTOSCOPY WITH RETROGRADE PYELOGRAM (Bilateral)    Antoine Seals is a 53 y.o. female     Patient Active Problem List   Diagnosis    Essential hypertension    Diabetes mellitus type 2, noninsulin dependent    Hyperlipidemia    Constipation, chronic    Nuclear sclerosis of both eyes    Type 2 diabetes mellitus without ophthalmic manifestations    Refractive error    Gastric bypass status for obesity    Iron deficiency anemia secondary to inadequate dietary iron intake    Iron deficiency anemia following bariatric surgery    Ovarian cyst    Anemia    Muscle weakness    Decreased ROM of lower extremity    Decreased mobility and endurance    Microscopic hematuria    PMB (postmenopausal bleeding)       Review of patient's allergies indicates:   Allergen Reactions    Lisinopril-hydrochlorothiazide Swelling     Facial swelling/ mouth swelling        Current Facility-Administered Medications on File Prior to Visit   Medication Dose Route Frequency Provider Last Rate Last Dose    lactated ringers infusion   Intravenous Continuous Fidel Currie  mL/hr at 09/28/18 1111       Current Outpatient Medications on File Prior to Visit   Medication Sig Dispense Refill    amLODIPine (NORVASC) 10 MG tablet TAKE 1 TABLET(10 MG) BY MOUTH EVERY DAY 90 tablet 0    aspirin (ECOTRIN) 81 MG EC tablet Take 1 tablet (81 mg total) by mouth once daily. 90 tablet 3    atorvastatin (LIPITOR) 80 MG tablet Take 1 tablet (80 mg total) by mouth every morning. 90 tablet 1    b complex vitamins tablet Take 1 tablet by mouth every morning.       buPROPion (WELLBUTRIN XL) 300 MG 24 hr tablet   1    calcium citrate-vitamin D3 315-200 mg (CITRACAL+D) 315-200 mg-unit per tablet Take 1 tablet by mouth once daily.      ertugliflozin (STEGLATRO) 5 mg Tab Take 5 mg by mouth once daily. 30 tablet 3    ezetimibe (ZETIA) 10 mg tablet Take 1 tablet (10 mg total) by  mouth once daily. 30 tablet 3    glipiZIDE (GLUCOTROL) 5 MG tablet TAKE 1 TABLET(5 MG) BY MOUTH TWICE DAILY WITH BREAKFAST 180 tablet 0    metFORMIN (GLUCOPHAGE) 500 MG tablet Take 2 tablets with breakfast and 1 tablet with dinner 270 tablet 1    multivitamin capsule Take 1 capsule by mouth once daily.      ONETOUCH DELICA LANCETS 33 gauge Misc TEST BID  0    ONETOUCH ULTRA TEST Strp TEST  each 5    ONETOUCH ULTRA2 kit TEST BID UTD  0    polyethylene glycol (GLYCOLAX) 17 gram/dose powder Take 17 g by mouth daily as needed. 1700 g 3    traZODone (DESYREL) 50 MG tablet TK 1 TO 2 TS PO QHS PRF INSOMNIA  1       Past Surgical History:   Procedure Laterality Date    BTL       SECTION      COLONOSCOPY N/A 2017    Procedure: COLONOSCOPY;  Surgeon: Oleg Enciso MD;  [facility]  Service: Endoscopy;  Laterality: N/A;  CHRONIC CONSTIPATION S/P LRNY    COLONOSCOPY N/A 2017    Performed by Oleg Enciso MD at [facility] (4TH FLR)    CYSTOSCOPY WITH RETROGRADE PYELOGRAM Bilateral 3/19/2018    Performed by ELIZABETH Mendoza MD at [facility] OR    ESOPHAGOGASTRODUODENOSCOPY (EGD) N/A 2017    Performed by Oleg Enciso MD at [facility] ([facility])    ESOPHAGOGASTRODUODENOSCOPY (EGD) - needs dual lumen EGD with graspers and scissors to remove suture at gastrojejunal anastomosis site-pt. 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 N/A 2017    Performed by Corona Quijano MD at [facility] OR 2ND FLR    GASTRIC BYPASS      sandra en y    LAPAROSCOPY-DIAGNOSTIC N/A 1/15/2018    Performed by Corona Quijano MD at [facility] OR 2ND FLR    NECK SURGERY  2016    REPAIR-HERNIA-LAPAROSCOPIC  1/15/2018    [facility]    TOTAL REDUCTION MAMMOPLASTY         Social History     Socioeconomic History    Marital status:      Spouse name: Not on file    Number of children: Not on file    Years of education: Not on file    Highest education level:  Not on file   Social Needs    Financial resource strain: Not on file    Food insecurity - worry: Not on file    Food insecurity - inability: Not on file    Transportation needs - medical: Not on file    Transportation needs - non-medical: Not on file   Occupational History    Not on file   Tobacco Use    Smoking status: Never Smoker    Smokeless tobacco: Never Used   Substance and Sexual Activity    Alcohol use: No     Alcohol/week: 0.0 oz     Comment: Socially    Drug use: No    Sexual activity: Yes     Partners: Male   Other Topics Concern    Not on file   Social History Narrative    Not on file         Vital Signs Range (Last 24H):  Temp:  [37 °C (98.6 °F)]   Pulse:  [82]   Resp:  [17]   BP: (129)/(77)   SpO2:  [96 %]       CBC:  Lab Results   Component Value Date    WBC 5.50 09/26/2018    HGB 12.4 09/26/2018    HCT 37.8 09/26/2018    MCV 78 (L) 09/26/2018     09/26/2018       CMP:  CMP  Sodium   Date Value Ref Range Status   09/26/2018 138 136 - 145 mmol/L Final     Potassium   Date Value Ref Range Status   09/26/2018 3.7 3.5 - 5.1 mmol/L Final     Chloride   Date Value Ref Range Status   09/26/2018 104 95 - 110 mmol/L Final     CO2   Date Value Ref Range Status   09/26/2018 25 23 - 29 mmol/L Final     Glucose   Date Value Ref Range Status   09/26/2018 183 (H) 70 - 110 mg/dL Final     BUN, Bld   Date Value Ref Range Status   09/26/2018 13 6 - 20 mg/dL Final     Creatinine   Date Value Ref Range Status   09/26/2018 0.9 0.5 - 1.4 mg/dL Final     Calcium   Date Value Ref Range Status   09/26/2018 9.8 8.7 - 10.5 mg/dL Final     Total Protein   Date Value Ref Range Status   07/12/2018 7.3 6.0 - 8.4 g/dL Final     Albumin   Date Value Ref Range Status   07/12/2018 3.4 (L) 3.5 - 5.2 g/dL Final     Total Bilirubin   Date Value Ref Range Status   07/12/2018 0.4 0.1 - 1.0 mg/dL Final     Comment:     For infants and newborns, interpretation of results should be based  on gestational age, weight and in  agreement with clinical  observations.  Premature Infant recommended reference ranges:  Up to 24 hours.............<8.0 mg/dL  Up to 48 hours............<12.0 mg/dL  3-5 days..................<15.0 mg/dL  6-29 days.................<15.0 mg/dL       Alkaline Phosphatase   Date Value Ref Range Status   07/12/2018 89 55 - 135 U/L Final     AST   Date Value Ref Range Status   07/12/2018 18 10 - 40 U/L Final     ALT   Date Value Ref Range Status   07/12/2018 25 10 - 44 U/L Final     Anion Gap   Date Value Ref Range Status   09/26/2018 9 8 - 16 mmol/L Final     eGFR if    Date Value Ref Range Status   09/26/2018 >60 >60 mL/min/1.73 m^2 Final     eGFR if non    Date Value Ref Range Status   09/26/2018 >60 >60 mL/min/1.73 m^2 Final     Comment:     Calculation used to obtain the estimated glomerular filtration  rate (eGFR) is the CKD-EPI equation.        Pre-op Assessment    I have reviewed the Patient Summary Reports.      I have reviewed the Medications.     Review of Systems  Anesthesia Hx:  History of prior surgery of interest to airway management or planning: Denies Family Hx of Anesthesia complications.   Denies Personal Hx of Anesthesia complications.   Cardiovascular:   Hypertension    Pulmonary:  Pulmonary Normal    Renal/:   Hematuria    Hepatic/GI:   Hx of gastric bypass surgery   Neurological:  Neurology Normal    Endocrine:   Diabetes, type 2    Psych:   Psychiatric History depression          Physical Exam  General:  Well nourished    Airway/Jaw/Neck:  Airway Findings: Mouth Opening: Normal Tongue: Normal  General Airway Assessment: Adult  Mallampati: II  TM Distance: Normal, at least 6 cm  Jaw/Neck Findings:  Neck ROM: Normal ROM      Dental:  Dental Findings:         Mental Status:  Mental Status Findings:  Cooperative, Alert and Oriented         Anesthesia Plan  Type of Anesthesia, risks & benefits discussed:  Anesthesia Type:  general  Patient's Preference:   Intra-op  Monitoring Plan: standard ASA monitors  Intra-op Monitoring Plan Comments:   Post Op Pain Control Plan: multimodal analgesia and IV/PO Opioids PRN  Post Op Pain Control Plan Comments:   Induction:   IV  Beta Blocker:  Patient is not currently on a Beta-Blocker (No further documentation required).       Informed Consent: Patient understands risks and agrees with Anesthesia plan.  Questions answered. Anesthesia consent signed with patient.  ASA Score: 2     Day of Surgery Review of History & Physical:            Ready For Surgery From Anesthesia Perspective.

## 2018-09-29 NOTE — DISCHARGE SUMMARY
"[facility]    Obstetrics & Gynecology  Discharge Summary      Patient Name:  Antoine Seals  MRN:  [id number]  Admission Date:  2018  Discharge Date:  2018  Hospital Length of Stay:  0  Attending Physician:  Naveed Alexander MD  Discharging Physician:  Naveed Alexander MD  Primary Care Provider:  Arnel Morales MD  Date of Surgery:  2018    Admitting Diagnosis:  Postmenopausal bleeding     Discharge Diagnosis:  Postmenopausal bleeding    Procedure performed:  S/p hysteroscopy dilation and curettage    Brief Hospital Course:      See History & Physical for complete details.  In brief, Antoine Seals is a 53 y.o.  with post menopausal bleeding for hysteroscopy D&C.  Pt tolerated the surgery well without complication.  Please see operative report for further details.  Following the surgery, pt was transferred to the PACU in stable condition.  Pt had an uncomplicated post-operative course.  Prior to discharge, pt was without major complaints.  Her pain was well-controlled.  Her vital signs where physiologic and stable.  Physical exam showed no acute findings.  Pt had no active vaginal bleeding.  Surgical incisions was hemostatic.  Pt was ambulating, tolerating oral intake, and voiding without difficulty.  Pt had satisfied routine post-op discharge criteria and expressed the desire to go home.        Pertinent Labs/Studies:      Recent Results (from the past 336 hour(s))   CBC auto differential    Collection Time: 18  9:45 AM   Result Value Ref Range    WBC 5.50 3.90 - 12.70 K/uL    Hemoglobin 12.4 12.0 - 16.0 g/dL    Hematocrit 37.8 37.0 - 48.5 %    Platelets 312 150 - 350 K/uL     Discharge Exam:      Temp:  [97.9 °F (36.6 °C)-98.6 °F (37 °C)] 98 °F (36.7 °C)  Pulse:  [] 85  Resp:  [12-18] 18  SpO2:  [92 %-99 %] 98 %  BP: (108-129)/(60-77) 121/66    /66   Pulse 85   Temp 98 °F (36.7 °C)   Resp 18   Ht 5' 1" (1.549 m)   Wt 82.6 kg (182 lb)  "  LMP 12/20/2017   SpO2 98%   BMI 34.39 kg/m²     GENERAL:  No acute distress. Alert and oriented x 3.   NECK:  Supple, FROM.  LUNGS:  Clear to auscultation bilaterally.   HEART:  Regular rate and rhythm with physiologic heart sounds.   ABDOMEN:  Soft, non-tender, no guarding, rigidity, or rebound.   EXT:  No cramping, claudication or edema.  Good skin turgor.  +2 distal pulses, symmetric.  Full range of motion.   NEURO:  Grossly intact bilaterally.   PSYCH:  Mood and affect appropriate.   PERINEUM:  Intact with no bleeding    Discharge Condition:  Stable      Discharge Disposition:  Home       Discharge Medications:     amLODIPine (NORVASC) 10 MG tablet  TAKE 1 TABLET(10 MG) BY MOUTH EVERY DAY     aspirin (ECOTRIN) 81 MG EC tablet  Take 1 tablet (81 mg total) by mouth once daily.     atorvastatin (LIPITOR) 80 MG tablet  Take 1 tablet (80 mg total) by mouth every morning.     b complex vitamins tablet  Take 1 tablet by mouth every morning.      buPROPion (WELLBUTRIN XL) 300 MG 24 hr tablet     calcium citrate-vitamin D3 315-200 mg (CITRACAL+D) 315-200 mg-unit per tablet  Take 1 tablet by mouth once daily.     ertugliflozin (STEGLATRO) 5 mg Tab  Take 5 mg by mouth once daily.     ezetimibe (ZETIA) 10 mg tablet  Take 1 tablet (10 mg total) by mouth once daily.     glipiZIDE (GLUCOTROL) 5 MG tablet  TAKE 1 TABLET(5 MG) BY MOUTH TWICE DAILY WITH BREAKFAST     ibuprofen (ADVIL,MOTRIN) 600 MG tablet  Take 1 tablet (600 mg total) by mouth every 6 (six) hours as needed for Pain.     metFORMIN (GLUCOPHAGE) 500 MG tablet  Take 2 tablets with breakfast and 1 tablet with dinner     multivitamin capsule  Take 1 capsule by mouth once daily.     ONETOUCH DELICA LANCETS 33 gauge Misc  TEST BID     ONETOUCH ULTRA TEST Strp  TEST BID     ONETOUCH ULTRA2 kit  TEST BID UTD     polyethylene glycol (GLYCOLAX) 17 gram/dose powder  Take 17 g by mouth daily as needed.     traZODone (DESYREL) 50 MG tablet  TK 1 TO 2 TS PO QHS PRF  INSOMNIA       Discharge Activites:      Activities as tolerated with adequate rest  Pelvic rest for 4 weeks   No heavy lifting greater 10 lbs or vigorous activities   Showers only  Avoid driving and operating machinery while taking narcotics or other sedative medications.  Patient voiced understanding.    Discharge Diet:  Regular diet    Discharge Instructions:      Post-op care instructions and precautions, clinical/sugical findings, and hospital course reviewed with patient prior to discharge.  All of her questions were answered to her satisfaction.  Pt voiced understanding.  Pt was instructed to contact the clinic or emergency department for any concerns including but not limited to an increase in her vaginal bleeding, abdominal pain, foul vaginal discharge, weakness, decrease activity level, decrease appetite, difficulty with voiding or having bowel movements, wound breakdown, perineal pain or breakdown, fever >100.4, shortness of breath, chest pain, dizziness or feeling faint, pain or swelling in her extremities, headaches not relieved with rest and Tylenol, abnormal bleeding/bruising, or any other health concerns.      Follow-up Visit:  4 weeks for post-op visit, or sooner if any problems.       Naveed Alexander MD

## 2018-10-01 LAB — POCT GLUCOSE: 137 MG/DL (ref 70–110)

## 2018-10-05 ENCOUNTER — TELEPHONE (OUTPATIENT)
Dept: OBSTETRICS AND GYNECOLOGY | Facility: CLINIC | Age: 53
End: 2018-10-05

## 2018-10-05 NOTE — TELEPHONE ENCOUNTER
10/5/18 @ 0903 (FREDY)  SPOKE WITH MS DEL TORO SHE IS REQUESTING THE RESULTS FROM HER D&C PROCEDURE ON 9/28.  MESSAGE SENT TO DR LADD TO READ AND RELEASE SO I CAN GIVE PT HER RESULTS FOR HIM.      ---- Message from Eliot Gupta sent at 10/5/2018  8:16 AM CDT -----  Contact: Self/[phone]  The patient requesting to speak to the staff in regards to her test results.      Thank you

## 2018-11-07 ENCOUNTER — LAB VISIT (OUTPATIENT)
Dept: LAB | Facility: HOSPITAL | Age: 53
End: 2018-11-07
Attending: NURSE PRACTITIONER
Payer: MEDICAID

## 2018-11-07 DIAGNOSIS — E78.5 HYPERLIPIDEMIA LDL GOAL <70: ICD-10-CM

## 2018-11-07 LAB
CHOLEST SERPL-MCNC: 240 MG/DL
CHOLEST/HDLC SERPL: 3 {RATIO}
ESTIMATED AVG GLUCOSE: 206 MG/DL
HBA1C MFR BLD HPLC: 8.8 %
HDLC SERPL-MCNC: 79 MG/DL
HDLC SERPL: 32.9 %
LDLC SERPL CALC-MCNC: 137.2 MG/DL
NONHDLC SERPL-MCNC: 161 MG/DL
TRIGL SERPL-MCNC: 119 MG/DL

## 2018-11-07 PROCEDURE — 83036 HEMOGLOBIN GLYCOSYLATED A1C: CPT

## 2018-11-07 PROCEDURE — 80061 LIPID PANEL: CPT

## 2018-11-07 PROCEDURE — 36415 COLL VENOUS BLD VENIPUNCTURE: CPT | Mod: PN

## 2018-11-13 ENCOUNTER — OFFICE VISIT (OUTPATIENT)
Dept: ENDOCRINOLOGY | Facility: CLINIC | Age: 53
End: 2018-11-13
Payer: MEDICAID

## 2018-11-13 VITALS
BODY MASS INDEX: 34.04 KG/M2 | HEART RATE: 83 BPM | WEIGHT: 180.31 LBS | HEIGHT: 61 IN | DIASTOLIC BLOOD PRESSURE: 86 MMHG | SYSTOLIC BLOOD PRESSURE: 137 MMHG

## 2018-11-13 DIAGNOSIS — E66.9 NON MORBID OBESITY, UNSPECIFIED OBESITY TYPE: ICD-10-CM

## 2018-11-13 DIAGNOSIS — E78.5 HYPERLIPIDEMIA LDL GOAL <100: ICD-10-CM

## 2018-11-13 PROCEDURE — 99999 PR PBB SHADOW E&M-EST. PATIENT-LVL IV: CPT | Mod: PBBFAC,,, | Performed by: NURSE PRACTITIONER

## 2018-11-13 PROCEDURE — 90471 IMMUNIZATION ADMIN: CPT | Mod: PBBFAC,PN

## 2018-11-13 PROCEDURE — 99214 OFFICE O/P EST MOD 30 MIN: CPT | Mod: PBBFAC,PN | Performed by: NURSE PRACTITIONER

## 2018-11-13 PROCEDURE — 99214 OFFICE O/P EST MOD 30 MIN: CPT | Mod: S$PBB,,, | Performed by: NURSE PRACTITIONER

## 2018-11-13 RX ORDER — METFORMIN HYDROCHLORIDE 500 MG/1
TABLET ORAL
Qty: 360 TABLET | Refills: 1 | Status: SHIPPED | OUTPATIENT
Start: 2018-11-13 | End: 2019-02-13 | Stop reason: SDUPTHER

## 2018-11-13 RX ORDER — ROSUVASTATIN CALCIUM 40 MG/1
40 TABLET, COATED ORAL NIGHTLY
Qty: 90 TABLET | Refills: 0 | Status: SHIPPED | OUTPATIENT
Start: 2018-11-13 | End: 2019-02-13 | Stop reason: SDUPTHER
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
## 2018-11-13 NOTE — PATIENT INSTRUCTIONS
Start Trulicity 0.75 mg once weekly for 4 weeks. Then increase to Trulicity 1.5 mg once weekly.   Stop glipizide.     Continue to take metformin and Steglatro.    Test blood sugar 2x/day.     Return to clinic in 3 months with labs prior.     Please continue to take Zetia for cholesterol. Stop atorvastatin and switch to rosuvastatin 40 mg once daily.       trulicity - This medication works by lowering glucoses whenever you eat carbohydrates. Therefore, it has a very low chance of lowering your glucose too much (i.e. Low risk of hypoglycemia). It also reduces appetite and decreases the rate of digestion, which may lead to some weight loss. The side effects include nausea and upset stomach. Risks include pancreatitis. Go to ER if having severe abdominal pain, nausea or vomiting.    In rodent studies, there was an increased risk for thyroid c-cell tumors; Risk in humans for medullary thyroid cancer. Do not take if you have personal or family history of thyroid medullary cancer or MEN2.

## 2018-11-30 ENCOUNTER — TELEPHONE (OUTPATIENT)
Dept: FAMILY MEDICINE | Facility: CLINIC | Age: 53
End: 2018-11-30

## 2018-12-05 RX ORDER — DULAGLUTIDE 0.75 MG/.5ML
INJECTION, SOLUTION SUBCUTANEOUS
Qty: 2 ML | Refills: 0 | OUTPATIENT
Start: 2018-12-05

## 2018-12-06 NOTE — TELEPHONE ENCOUNTER
----- Message from Lauryn Arce sent at 12/6/2018  1:54 PM CST -----  Contact: Self/  [phone]  Refill:  dulaglutide (TRULICITY) 1.5 mg/0.5 mL PnIj  Patient asked is it still only 4 syringes?  She was told she would be getting 3 month supply.  [facility] [id number] [place] [address], [place] - [address] AT [facility]  Patient also need pin needles as well    Thank you.

## 2019-02-06 ENCOUNTER — LAB VISIT (OUTPATIENT)
Dept: LAB | Facility: HOSPITAL | Age: 54
End: 2019-02-06
Payer: MEDICAID

## 2019-02-06 DIAGNOSIS — E78.5 HYPERLIPIDEMIA LDL GOAL <100: ICD-10-CM

## 2019-02-06 LAB
ALBUMIN SERPL BCP-MCNC: 3.3 G/DL
ALP SERPL-CCNC: 66 U/L
ALT SERPL W/O P-5'-P-CCNC: 26 U/L
AST SERPL-CCNC: 19 U/L
BILIRUB DIRECT SERPL-MCNC: 0.2 MG/DL
BILIRUB SERPL-MCNC: 0.4 MG/DL
CHOLEST SERPL-MCNC: 209 MG/DL
CHOLEST/HDLC SERPL: 2.5 {RATIO}
ESTIMATED AVG GLUCOSE: 183 MG/DL
HBA1C MFR BLD HPLC: 8 %
HDLC SERPL-MCNC: 83 MG/DL
HDLC SERPL: 39.7 %
LDLC SERPL CALC-MCNC: 106 MG/DL
NONHDLC SERPL-MCNC: 126 MG/DL
PROT SERPL-MCNC: 6.9 G/DL
TRIGL SERPL-MCNC: 100 MG/DL

## 2019-02-06 PROCEDURE — 80076 HEPATIC FUNCTION PANEL: CPT

## 2019-02-06 PROCEDURE — 83036 HEMOGLOBIN GLYCOSYLATED A1C: CPT

## 2019-02-06 PROCEDURE — 80061 LIPID PANEL: CPT

## 2019-02-06 PROCEDURE — 36415 COLL VENOUS BLD VENIPUNCTURE: CPT | Mod: PN

## 2019-02-11 ENCOUNTER — OFFICE VISIT (OUTPATIENT)
Dept: OBSTETRICS AND GYNECOLOGY | Facility: CLINIC | Age: 54
End: 2019-02-11
Payer: MEDICAID

## 2019-02-11 VITALS
DIASTOLIC BLOOD PRESSURE: 90 MMHG | BODY MASS INDEX: 33.38 KG/M2 | HEIGHT: 61 IN | WEIGHT: 176.81 LBS | SYSTOLIC BLOOD PRESSURE: 140 MMHG

## 2019-02-11 DIAGNOSIS — N30.90 CYSTITIS: Primary | ICD-10-CM

## 2019-02-11 DIAGNOSIS — Z12.39 BREAST CANCER SCREENING: ICD-10-CM

## 2019-02-11 PROCEDURE — 99213 OFFICE O/P EST LOW 20 MIN: CPT | Mod: PBBFAC | Performed by: OBSTETRICS & GYNECOLOGY

## 2019-02-11 PROCEDURE — 99213 OFFICE O/P EST LOW 20 MIN: CPT | Mod: S$PBB,,, | Performed by: OBSTETRICS & GYNECOLOGY

## 2019-02-11 PROCEDURE — 99999 PR PBB SHADOW E&M-EST. PATIENT-LVL III: ICD-10-PCS | Mod: PBBFAC,,, | Performed by: OBSTETRICS & GYNECOLOGY

## 2019-02-11 PROCEDURE — 99213 PR OFFICE/OUTPT VISIT, EST, LEVL III, 20-29 MIN: ICD-10-PCS | Mod: S$PBB,,, | Performed by: OBSTETRICS & GYNECOLOGY

## 2019-02-11 PROCEDURE — 99999 PR PBB SHADOW E&M-EST. PATIENT-LVL III: CPT | Mod: PBBFAC,,, | Performed by: OBSTETRICS & GYNECOLOGY

## 2019-02-11 RX ORDER — SULFAMETHOXAZOLE AND TRIMETHOPRIM 800; 160 MG/1; MG/1
1 TABLET ORAL 2 TIMES DAILY
Qty: 6 TABLET | Refills: 0 | Status: SHIPPED | OUTPATIENT
Start: 2019-02-11 | End: 2019-02-14

## 2019-02-11 RX ORDER — NAPROXEN 500 MG/1
500 TABLET ORAL 2 TIMES DAILY PRN
Refills: 0 | COMMUNITY
Start: 2019-01-24 | End: 2020-01-15 | Stop reason: CLARIF

## 2019-02-11 RX ORDER — OXYCODONE AND ACETAMINOPHEN 10; 325 MG/1; MG/1
1 TABLET ORAL 3 TIMES DAILY
Refills: 0 | COMMUNITY
Start: 2019-01-24 | End: 2022-08-29 | Stop reason: CLARIF

## 2019-02-11 NOTE — PROGRESS NOTES
"[facility]  Ambulatory Clinic  Obstetrics & Gynecology    Visit Date:  2019    Chief Complaint:  Bladder infection    History of Present Illness:      Antoine Seals is a 54 y.o.  with h/o BTL here with c/o bladder infection for past few days.    Pt reports mild dysuria and "pinkish" urine.  Denies fever or pyuria.  Pt has seen urology, , in the past for microscopic hematuria 3/2018.      Pt had hysteroscopy dilation and curettage 2018 for postmenopausal bleeding. Pt did not follow up since her D&C.  Pathology showed questionable glandular atypia versus reactive findings.  Hysteroscopy showed atrophic endometrial lining.  Pt reports no bleeding since her surgery.      Pt denies any abnormal vaginal bleeding, dysmenorrhea, dyspareunia, pelvic pain, breast mass/skin changes, GI or urinary complaints.      Review of Systems:      GENERAL:  No fever, fatigue, excessive weight gain or loss  GASTROINTESTINAL:  No nausea, vomiting, constipation, diarrhea, abd pain, rectal bleeding   GENITOURINARY:  See HPI    Physical Exam:     BP (!) 140/90 (BP Location: Right arm, Patient Position: Sitting)   Ht 5' 1" (1.549 m)   Wt 80.2 kg (176 lb 12.9 oz)   LMP 2017   BMI 33.41 kg/m²     GENERAL:  No acute distress, well-nourished  LUNGS:  Clear to auscultation  HEART:  Regular rate and rhythm, no murmurs, gallops, or rubs  ABDOMEN:  Soft, non-tender, non-distended, normoactive bowel sounds, no obvious organomegaly  GENITOURINARY:  NFEG no lesion. No vaginal or cervical lesion. No bleeding or discharge. No CMT. Uterus and ovaries small, NT. Declined rectal exam. No obvious external lesions.    Chaperone present for exam.    Hysteroscopy dilation and curettage 18 for postmenopausal bleeding:    FINAL PATHOLOGIC DIAGNOSIS  Specimen submitted as endometrial curettage:  -Fragments of distorted, degenerated stromal tissue within which are occasional glands exhibiting " focal  cytologic atypia. Due to distortion and scant volume of glandular tissue, further evaluation is precluded.  -Fragments of detached stratified squamous epithelium exhibiting slight nuclear atypia that may possibly be  reactive  -Detached strips of histologically benign endocervical epithelium  -Clinical correlation is necessary for full interpretation. If clinically indicated, consider submission of  additional specimens.    Urine dip:  2019  Trace LE, trace nitrite, trace RBC    Assessment:     54 y.o.  with BTL:    1. Cystitis, uncomplicated  2. H/o microscopic hematuria  3. H/o postmenopausal bleeding    Plan:    Due to h/o postmenopausal bleeding and questionable atypia on D&C sample, will repeat EMB.  Importance of follow up advised.    Empiric antibx therapy with Bactrim DS bid x 3-5 days for UTI.  Increase fluids.  Phenazopyridine (Azo OTC) for bladder discomfort.  UTI precautions and hygiene advice.  If hematuria persists, pt advised to follow up with urology for further evaluation.      Encourage healthy lifestyle modifications.    F/u with PCP for health maintenance.    F/u 1 wk for EMB, or sooner prn.  Pt voiced understanding.        Naveed Alexander MD

## 2019-02-13 ENCOUNTER — OFFICE VISIT (OUTPATIENT)
Dept: ENDOCRINOLOGY | Facility: CLINIC | Age: 54
End: 2019-02-13
Payer: MEDICAID

## 2019-02-13 VITALS
BODY MASS INDEX: 33.34 KG/M2 | HEART RATE: 82 BPM | WEIGHT: 176.56 LBS | HEIGHT: 61 IN | SYSTOLIC BLOOD PRESSURE: 118 MMHG | DIASTOLIC BLOOD PRESSURE: 74 MMHG

## 2019-02-13 DIAGNOSIS — E78.5 HYPERLIPIDEMIA LDL GOAL <100: ICD-10-CM

## 2019-02-13 DIAGNOSIS — I10 ESSENTIAL HYPERTENSION, BENIGN: ICD-10-CM

## 2019-02-13 PROCEDURE — 99999 PR PBB SHADOW E&M-EST. PATIENT-LVL IV: CPT | Mod: PBBFAC,,, | Performed by: NURSE PRACTITIONER

## 2019-02-13 PROCEDURE — 99999 PR PBB SHADOW E&M-EST. PATIENT-LVL IV: ICD-10-PCS | Mod: PBBFAC,,, | Performed by: NURSE PRACTITIONER

## 2019-02-13 PROCEDURE — 99214 OFFICE O/P EST MOD 30 MIN: CPT | Mod: S$PBB,,, | Performed by: NURSE PRACTITIONER

## 2019-02-13 PROCEDURE — 99214 PR OFFICE/OUTPT VISIT, EST, LEVL IV, 30-39 MIN: ICD-10-PCS | Mod: S$PBB,,, | Performed by: NURSE PRACTITIONER

## 2019-02-13 PROCEDURE — 99214 OFFICE O/P EST MOD 30 MIN: CPT | Mod: PBBFAC,PN | Performed by: NURSE PRACTITIONER

## 2019-02-13 RX ORDER — EZETIMIBE 10 MG/1
10 TABLET ORAL DAILY
Qty: 90 TABLET | Refills: 2 | Status: SHIPPED | OUTPATIENT
Start: 2019-02-13 | End: 2021-02-19

## 2019-02-13 RX ORDER — METFORMIN HYDROCHLORIDE 500 MG/1
TABLET ORAL
Qty: 270 TABLET | Refills: 1 | Status: SHIPPED | OUTPATIENT
Start: 2019-02-13 | End: 2022-08-29 | Stop reason: CLARIF

## 2019-02-13 RX ORDER — LANCETS 33 GAUGE
EACH MISCELLANEOUS
Qty: 200 EACH | Refills: 3 | Status: SHIPPED | OUTPATIENT
Start: 2019-02-13 | End: 2020-01-15 | Stop reason: CLARIF

## 2019-02-13 RX ORDER — AMLODIPINE BESYLATE 10 MG/1
TABLET ORAL
Qty: 90 TABLET | Refills: 2 | Status: SHIPPED | OUTPATIENT
Start: 2019-02-13

## 2019-02-13 RX ORDER — ROSUVASTATIN CALCIUM 40 MG/1
40 TABLET, COATED ORAL NIGHTLY
Qty: 90 TABLET | Refills: 2 | Status: SHIPPED | OUTPATIENT
Start: 2019-02-13 | End: 2023-06-14

## 2019-02-13 NOTE — PATIENT INSTRUCTIONS
Continue Trulicity 1.5 mg once weekly and ertugliflozin.   Increase metformin to 1 tablet with breakfast and 2 tablets with supper. Will try to get you slowly adjusted to lower blood sugars in order to attain A1c goal of less than 7%.     Test blood sugar 2x/day.     Maintain healthy diet.     Return to clinic in 5 months with labs prior.
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
## 2019-02-15 ENCOUNTER — PROCEDURE VISIT (OUTPATIENT)
Dept: OBSTETRICS AND GYNECOLOGY | Facility: CLINIC | Age: 54
End: 2019-02-15
Payer: MEDICAID

## 2019-02-15 VITALS
WEIGHT: 176.94 LBS | SYSTOLIC BLOOD PRESSURE: 128 MMHG | HEIGHT: 61 IN | BODY MASS INDEX: 33.41 KG/M2 | DIASTOLIC BLOOD PRESSURE: 86 MMHG

## 2019-02-15 DIAGNOSIS — Z87.42 HISTORY OF POSTMENOPAUSAL BLEEDING: Primary | ICD-10-CM

## 2019-02-15 PROCEDURE — 58100 BIOPSY OF UTERUS LINING: CPT | Mod: S$PBB,,, | Performed by: OBSTETRICS & GYNECOLOGY

## 2019-02-15 PROCEDURE — 58100 BIOPSY OF UTERUS LINING: CPT | Mod: PBBFAC | Performed by: OBSTETRICS & GYNECOLOGY

## 2019-02-15 PROCEDURE — 58100 PR BIOPSY OF UTERUS LINING: ICD-10-PCS | Mod: S$PBB,,, | Performed by: OBSTETRICS & GYNECOLOGY

## 2019-02-15 NOTE — PROCEDURES
[facility]  Ambulatory Clinic   Obstetrics & Gynecology    Date:  2/15/2019    Procedure:  Endometrial Biopsy (CPT 95608)    UPT:  Negative    Indication:  H/o postmenopausal bleeding    History:      Antoine Seals is a 54 y.o.  here for EMB for f/u h/o postmenopausal bleeding.  Pt reports menopause 2017.    Pt had hysteroscopy dilation and curettage 2018 for postmenopausal bleeding.   Pt has not follow up since her D&C.    Pathology showed questionable glandular atypia versus reactive findings.    Hysteroscopy showed atrophic endometrial lining.    Pt reports no bleeding since her surgery.      Consent:  We discussed the risks, benefits, alternatives, and possible complications to endometrial biopsy and all indicated procedures in detail.  All of her questions were answered to her satisfaction.  She voiced understanding and informed consent was obtained/signed in chart.    Vitals:  BP (!) 140/78 Pulse 60, Resp rate 16    Physical Exam:  Abdomen soft, non-tender, no masses. Normal female external genitalia. No gross vaginal lesion. No bleeding or discharge. Bimanual exam reveals a small, non-tender, mid-plain uterus, without adnexal mass or tenderness.     Procedure Details:      A time out was performed to confirmed the correct patient and procedure.    A speculum was placed and the cervix was prepped with betadine.    A single tooth tenaculum was not applied to the cervix for stabilization.     Pt could not tolerated attempts at introducing the EMB pipelle into cervix.  EMB was cancelled per pt request due to discomfort.    Plan:      Discussed her hysteroscopy dilation and curettage pathology from 2018.  Order pelvic ultrasound to characterized endometrial stripe.  Pt does not want to re-attempt office EMB or hysteroscopy dilation and curettage at this time.  Order pelvic ultrasound.  Pt desires expectant mgt at this time pending pelvic u/s results.  Pt was instructed to call  office after pelvic US and schedule follow-up as needed.    All of her questions answered, pt voiced understanding.       Naveed Alexander MD

## 2019-02-28 ENCOUNTER — HOSPITAL ENCOUNTER (OUTPATIENT)
Dept: RADIOLOGY | Facility: HOSPITAL | Age: 54
Discharge: HOME | End: 2019-02-28
Attending: OBSTETRICS & GYNECOLOGY
Payer: MEDICAID

## 2019-02-28 DIAGNOSIS — Z87.42 HISTORY OF POSTMENOPAUSAL BLEEDING: ICD-10-CM

## 2019-02-28 PROCEDURE — 76830 TRANSVAGINAL US NON-OB: CPT | Mod: 26,,, | Performed by: RADIOLOGY

## 2019-02-28 PROCEDURE — 76856 US PELVIS COMP WITH TRANSVAG NON-OB (XPD): ICD-10-PCS | Mod: 26,,, | Performed by: RADIOLOGY

## 2019-02-28 PROCEDURE — 76856 US EXAM PELVIC COMPLETE: CPT | Mod: 26,,, | Performed by: RADIOLOGY

## 2019-02-28 PROCEDURE — 76830 US PELVIS COMP WITH TRANSVAG NON-OB (XPD): ICD-10-PCS | Mod: 26,,, | Performed by: RADIOLOGY

## 2019-02-28 PROCEDURE — 76830 TRANSVAGINAL US NON-OB: CPT | Mod: TC

## 2019-03-22 ENCOUNTER — OFFICE VISIT (OUTPATIENT)
Dept: UROLOGY | Facility: CLINIC | Age: 54
End: 2019-03-22
Payer: MEDICARE

## 2019-03-22 VITALS — BODY MASS INDEX: 32.82 KG/M2 | HEIGHT: 62 IN | WEIGHT: 178.38 LBS

## 2019-03-22 DIAGNOSIS — R31.0 GROSS HEMATURIA: Primary | ICD-10-CM

## 2019-03-22 DIAGNOSIS — R35.1 NOCTURIA MORE THAN TWICE PER NIGHT: ICD-10-CM

## 2019-03-22 DIAGNOSIS — R33.9 INCOMPLETE EMPTYING OF BLADDER: ICD-10-CM

## 2019-03-22 PROCEDURE — 99999 PR PBB SHADOW E&M-EST. PATIENT-LVL III: ICD-10-PCS | Mod: PBBFAC,,, | Performed by: UROLOGY

## 2019-03-22 PROCEDURE — 87147 CULTURE TYPE IMMUNOLOGIC: CPT

## 2019-03-22 PROCEDURE — 99214 OFFICE O/P EST MOD 30 MIN: CPT | Mod: S$PBB,,, | Performed by: UROLOGY

## 2019-03-22 PROCEDURE — 99999 PR PBB SHADOW E&M-EST. PATIENT-LVL III: CPT | Mod: PBBFAC,,, | Performed by: UROLOGY

## 2019-03-22 PROCEDURE — 88112 CYTOPATH CELL ENHANCE TECH: CPT | Performed by: PATHOLOGY

## 2019-03-22 PROCEDURE — 99214 PR OFFICE/OUTPT VISIT, EST, LEVL IV, 30-39 MIN: ICD-10-PCS | Mod: S$PBB,,, | Performed by: UROLOGY

## 2019-03-22 PROCEDURE — 99213 OFFICE O/P EST LOW 20 MIN: CPT | Mod: PBBFAC | Performed by: UROLOGY

## 2019-03-22 PROCEDURE — 87086 URINE CULTURE/COLONY COUNT: CPT

## 2019-03-22 PROCEDURE — 87088 URINE BACTERIA CULTURE: CPT

## 2019-03-22 PROCEDURE — 88112 CYTOPATH CELL ENHANCE TECH: CPT | Mod: 26,,, | Performed by: PATHOLOGY

## 2019-03-22 PROCEDURE — 88112 CYTOLOGY SPECIMEN-URINE: ICD-10-PCS | Mod: 26,,, | Performed by: PATHOLOGY

## 2019-03-22 RX ORDER — CIPROFLOXACIN 2 MG/ML
400 INJECTION, SOLUTION INTRAVENOUS
Status: CANCELLED | OUTPATIENT
Start: 2019-03-22

## 2019-03-22 NOTE — H&P (VIEW-ONLY)
Subjective:       Patient ID: Antoine Seals is a 54 y.o. female who was referred by No ref. provider found    Chief Complaint:   Chief Complaint   Patient presents with    Hematuria     pt states coming in for hematuria        Hematuria  Patient complains of gross hematuria. Onset of hematuria was 1 month ago and was gradual in onset. There is not a history of nephrolithiasis. There is not a history of urologic trauma. Other urologic symptoms include nocturia. Patient admits to history of no risk factors for cancer. Patient denies history of Agent Orange exposure, chronic Gonzales catheter, occupational exposure, sexually transmitted diseases, tobacco use, trauma and urolithiasis. Prior workup has been UA'S.    She had a negative hematuria work up in 2018 for microscopic hematuria.  It is now gross painless without clots.      ACTIVE MEDICAL ISSUES:  Patient Active Problem List   Diagnosis    Essential hypertension    Diabetes mellitus type 2, noninsulin dependent    Hyperlipidemia    Constipation, chronic    Nuclear sclerosis of both eyes    Type 2 diabetes mellitus without ophthalmic manifestations    Refractive error    Gastric bypass status for obesity    Iron deficiency anemia secondary to inadequate dietary iron intake    Iron deficiency anemia following bariatric surgery    Ovarian cyst    Anemia    Muscle weakness    Decreased ROM of lower extremity    Decreased mobility and endurance    Microscopic hematuria    PMB (postmenopausal bleeding)    Status post hysteroscopy       PAST MEDICAL HISTORY  Past Medical History:   Diagnosis Date    Anemia     Depression     Diabetes mellitus type 2, noninsulin dependent 2016    Hematuria     Hx of essential hypertension     Hyperlipidemia     Hypertension, uncontrolled 2016    Nuclear sclerosis of both eyes 4/10/2017       PAST SURGICAL HISTORY:  Past Surgical History:   Procedure Laterality Date    BTL        SECTION  1985    COLONOSCOPY N/A 6/14/2017    Performed by [facility]iso [facility] at [facility] (4TH FLR)    CYSTOSCOPY WITH RETROGRADE PYELOGRAM Bilateral 3/19/2018    Per[facility] at [facility] OR    ESOPHAGOGASTRODUODENOSCOPY (EGD) N/A 6/14/2017    Performed by Oleg [facility]iso MD at [facility] ENDO (4TH FLR)    ESOPHAGOGASTRODUODENOSCOPY (EGD) - needs dual lumen EGD with graspers and scissors to remove suture at gastrojejunal anastomosis site-pt. 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 N/A 8/18/2017    Performed by Corona Quijano MD at [facility] OR 2ND FLR    GASTRIC BYPASS  1995    sandra en y    HYSTEROSCOPY, WITH DILATION AND CURETTAGE OF UTERUS N/A 9/28/2018    Per[facility] by [facility] at [facility] OR    LAPAROSCOPY-DIAGNOSTIC N/A 1/15/2018    Performed by [facility] at [facility] OR 2ND FLR    NECK SURGERY  09/30/2016    REPAIR-HERNIA-LAPAROSCOPIC  1/15/2018    Per[facility] at [facility] OR 2ND FLR    TOTAL REDUCTION MAMMOPLASTY         SOCIAL HISTORY:  Social History     Tobacco Use    Smoking status: Never Smoker    Smokeless tobacco: Never Used   Substance Use Topics    Alcohol use: No     Alcohol/week: 0.0 oz     Comment: Socially    Drug use: No       FAMILY HISTORY:  Family History   Problem Relation Age of Onset    Heart disease Mother     Diabetes type II Mother     Heart attack Father 50        Open heart surgery    No Known Problems Sister     No Known Problems Brother     No Known Problems Maternal Aunt     No Known Problems Maternal Uncle     No Known Problems Paternal Aunt     No Known Problems Paternal Uncle     No Known Problems Maternal Grandmother     No Known Problems Maternal Grandfather     No Known Problems Paternal Grandmother     No Known Problems Paternal Grandfather     Amblyopia Neg Hx     Blindness Neg Hx     Cancer Neg Hx     Cataracts Neg Hx     Diabetes Neg Hx     Glaucoma Neg Hx     Hypertension Neg Hx     Macular degeneration Neg Hx      Retinal detachment Neg Hx     Strabismus Neg Hx     Stroke Neg Hx     Thyroid disease Neg Hx        ALLERGIES AND MEDICATIONS: updated and reviewed.  Review of patient's allergies indicates:   Allergen Reactions    Lisinopril-hydrochlorothiazide Swelling     Facial swelling/ mouth swelling     Current Outpatient Medications   Medication Sig    amLODIPine (NORVASC) 10 MG tablet TAKE 1 TABLET(10 MG) BY MOUTH EVERY DAY    aspirin (ECOTRIN) 81 MG EC tablet Take 1 tablet (81 mg total) by mouth once daily.    b complex vitamins tablet Take 1 tablet by mouth every morning.     blood sugar diagnostic (ONETOUCH ULTRA TEST) Strp TEST 2x/day    buPROPion (WELLBUTRIN XL) 300 MG 24 hr tablet     calcium citrate-vitamin D3 315-200 mg (CITRACAL+D) 315-200 mg-unit per tablet Take 1 tablet by mouth once daily.    dulaglutide (TRULICITY) 1.5 mg/0.5 mL PnIj Inject 1.5 mg into the skin every 7 days.    ENDOCET  mg per tablet Take 1 tablet by mouth 3 (three) times daily.    ertugliflozin (STEGLATRO) 5 mg Tab Take 5 mg by mouth once daily.    ezetimibe (ZETIA) 10 mg tablet Take 1 tablet (10 mg total) by mouth once daily.    ibuprofen (ADVIL,MOTRIN) 600 MG tablet Take 1 tablet (600 mg total) by mouth every 6 (six) hours as needed for Pain.    lancets (ONETOUCH DELICA LANCETS) 33 gauge Misc TEST TWICE DAILY    metFORMIN (GLUCOPHAGE) 500 MG tablet Take 2 tablets with breakfast and 1 tablets with dinner    multivitamin capsule Take 1 capsule by mouth once daily.    naproxen (NAPROSYN) 500 MG tablet Take 500 mg by mouth 2 (two) times daily as needed.    ONETOUCH ULTRA2 kit TEST BID UTD    polyethylene glycol (GLYCOLAX) 17 gram/dose powder Take 17 g by mouth daily as needed.    rosuvastatin (CRESTOR) 40 MG Tab Take 1 tablet (40 mg total) by mouth every evening.     No current facility-administered medications for this visit.        Review of Systems   Constitutional: Negative for activity change, fatigue, fever and  "unexpected weight change.   Eyes: Negative for redness and visual disturbance.   Respiratory: Negative for chest tightness and shortness of breath.    Cardiovascular: Negative for chest pain and leg swelling.   Gastrointestinal: Negative for abdominal distention, abdominal pain, constipation, diarrhea, nausea and vomiting.   Genitourinary: Negative for difficulty urinating, dysuria, flank pain, frequency, hematuria, pelvic pain, urgency and vaginal bleeding.   Musculoskeletal: Negative for arthralgias and joint swelling.   Neurological: Negative for dizziness, weakness and headaches.   Psychiatric/Behavioral: Negative for confusion. The patient is not nervous/anxious.    All other systems reviewed and are negative.      Objective:      Vitals:    03/22/19 1437   Weight: 80.9 kg (178 lb 5.6 oz)   Height: 5' 2" (1.575 m)     Physical Exam   Nursing note and vitals reviewed.  Constitutional: She is oriented to person, place, and time. She appears well-developed.   HENT:   Head: Normocephalic.   Eyes: Conjunctivae are normal.   Neck: Normal range of motion. No tracheal deviation present. No thyromegaly present.   Cardiovascular: Normal rate, normal heart sounds and normal pulses.    Pulmonary/Chest: Effort normal and breath sounds normal. No respiratory distress. She has no wheezes.   Abdominal: Soft. She exhibits no distension and no mass. There is no hepatosplenomegaly. There is no tenderness. There is no rebound, no guarding and no CVA tenderness. No hernia.   Musculoskeletal: Normal range of motion. She exhibits no edema or tenderness.   Lymphadenopathy:     She has no cervical adenopathy.   Neurological: She is alert and oriented to person, place, and time.   Skin: Skin is warm and dry. No rash noted. No erythema.     Psychiatric: She has a normal mood and affect. Her behavior is normal. Judgment and thought content normal.         Assessment:       1. Gross hematuria    2. Nocturia more than twice per night    3. " Incomplete emptying of bladder          Plan:       1. Gross hematuria  Cystoscopy with possible biopsy on Monday 4/8/2019  Stop ASA one week before    - Urine culture  - CT Urogram Abd Pelvis W WO; Future  - Cytology, urine  - Basic metabolic panel; Future    2. Nocturia more than twice per night  Limit evening fluids    3. Incomplete emptying of bladder  stable            Follow-up in about 6 weeks (around 5/3/2019) for Follow up.

## 2019-03-22 NOTE — H&P
Subjective:       Patient ID: Antoine Seals is a 54 y.o. female who was referred by No ref. provider found    Chief Complaint:   Chief Complaint   Patient presents with    Hematuria     pt states coming in for hematuria        Hematuria  Patient complains of gross hematuria. Onset of hematuria was 1 month ago and was gradual in onset. There is not a history of nephrolithiasis. There is not a history of urologic trauma. Other urologic symptoms include nocturia. Patient admits to history of no risk factors for cancer. Patient denies history of Agent Orange exposure, chronic Gonzales catheter, occupational exposure, sexually transmitted diseases, tobacco use, trauma and urolithiasis. Prior workup has been UA'S.    She had a negative hematuria work up in 2018 for microscopic hematuria.  It is now gross painless without clots.      ACTIVE MEDICAL ISSUES:  Patient Active Problem List   Diagnosis    Essential hypertension    Diabetes mellitus type 2, noninsulin dependent    Hyperlipidemia    Constipation, chronic    Nuclear sclerosis of both eyes    Type 2 diabetes mellitus without ophthalmic manifestations    Refractive error    Gastric bypass status for obesity    Iron deficiency anemia secondary to inadequate dietary iron intake    Iron deficiency anemia following bariatric surgery    Ovarian cyst    Anemia    Muscle weakness    Decreased ROM of lower extremity    Decreased mobility and endurance    Microscopic hematuria    PMB (postmenopausal bleeding)    Status post hysteroscopy       PAST MEDICAL HISTORY  Past Medical History:   Diagnosis Date    Anemia     Depression     Diabetes mellitus type 2, noninsulin dependent 2016    Hematuria     Hx of essential hypertension     Hyperlipidemia     Hypertension, uncontrolled 2016    Nuclear sclerosis of both eyes 4/10/2017       PAST SURGICAL HISTORY:  Past Surgical History:   Procedure Laterality Date    BTL        SECTION  1985    COLONOSCOPY N/A 6/14/2017    Performed by [facility]leg [facility]iso [facility] at [facility] ENDO (4TH FLR)    CYSTOSCOPY WITH RETROGRADE PYELOGRAM Bilateral 3/19/2018    Per[facility] by [facility] at [facility] OR    ESOPHAGOGASTRODUODENOSCOPY (EGD) N/A 6/14/2017    Performed by Oleg [facility]iso MD at [facility] ENDO (4TH FLR)    ESOPHAGOGASTRODUODENOSCOPY (EGD) - needs dual lumen EGD with graspers and scissors to remove suture at gastrojejunal anastomosis site-pt. 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 N/A 8/18/2017    Performed by Corona Quijano MD at [facility] OR 2ND FLR    GASTRIC BYPASS  1995    sandra en y    HYSTEROSCOPY, WITH DILATION AND CURETTAGE OF UTERUS N/A 9/28/2018    Performed by [facility]ed Alexander [facility] at [facility] OR    LAPAROSCOPY-DIAGNOSTIC N/A 1/15/2018    Performed by Cor[facility]ano [facility] at [facility] OR 2ND FLR    NECK SURGERY  09/30/2016    REPAIR-HERNIA-LAPAROSCOPIC  1/15/2018    Performed by [facility] at [facility] OR 2ND FLR    TOTAL REDUCTION MAMMOPLASTY         SOCIAL HISTORY:  Social History     Tobacco Use    Smoking status: Never Smoker    Smokeless tobacco: Never Used   Substance Use Topics    Alcohol use: No     Alcohol/week: 0.0 oz     Comment: Socially    Drug use: No       FAMILY HISTORY:  Family History   Problem Relation Age of Onset    Heart disease Mother     Diabetes type II Mother     Heart attack Father 50        Open heart surgery    No Known Problems Sister     No Known Problems Brother     No Known Problems Maternal Aunt     No Known Problems Maternal Uncle     No Known Problems Paternal Aunt     No Known Problems Paternal Uncle     No Known Problems Maternal Grandmother     No Known Problems Maternal Grandfather     No Known Problems Paternal Grandmother     No Known Problems Paternal Grandfather     Amblyopia Neg Hx     Blindness Neg Hx     Cancer Neg Hx     Cataracts Neg Hx     Diabetes Neg Hx     Glaucoma Neg Hx     Hypertension Neg Hx     Macular degeneration Neg Hx      Retinal detachment Neg Hx     Strabismus Neg Hx     Stroke Neg Hx     Thyroid disease Neg Hx        ALLERGIES AND MEDICATIONS: updated and reviewed.  Review of patient's allergies indicates:   Allergen Reactions    Lisinopril-hydrochlorothiazide Swelling     Facial swelling/ mouth swelling     Current Outpatient Medications   Medication Sig    amLODIPine (NORVASC) 10 MG tablet TAKE 1 TABLET(10 MG) BY MOUTH EVERY DAY    aspirin (ECOTRIN) 81 MG EC tablet Take 1 tablet (81 mg total) by mouth once daily.    b complex vitamins tablet Take 1 tablet by mouth every morning.     blood sugar diagnostic (ONETOUCH ULTRA TEST) Strp TEST 2x/day    buPROPion (WELLBUTRIN XL) 300 MG 24 hr tablet     calcium citrate-vitamin D3 315-200 mg (CITRACAL+D) 315-200 mg-unit per tablet Take 1 tablet by mouth once daily.    dulaglutide (TRULICITY) 1.5 mg/0.5 mL PnIj Inject 1.5 mg into the skin every 7 days.    ENDOCET  mg per tablet Take 1 tablet by mouth 3 (three) times daily.    ertugliflozin (STEGLATRO) 5 mg Tab Take 5 mg by mouth once daily.    ezetimibe (ZETIA) 10 mg tablet Take 1 tablet (10 mg total) by mouth once daily.    ibuprofen (ADVIL,MOTRIN) 600 MG tablet Take 1 tablet (600 mg total) by mouth every 6 (six) hours as needed for Pain.    lancets (ONETOUCH DELICA LANCETS) 33 gauge Misc TEST TWICE DAILY    metFORMIN (GLUCOPHAGE) 500 MG tablet Take 2 tablets with breakfast and 1 tablets with dinner    multivitamin capsule Take 1 capsule by mouth once daily.    naproxen (NAPROSYN) 500 MG tablet Take 500 mg by mouth 2 (two) times daily as needed.    ONETOUCH ULTRA2 kit TEST BID UTD    polyethylene glycol (GLYCOLAX) 17 gram/dose powder Take 17 g by mouth daily as needed.    rosuvastatin (CRESTOR) 40 MG Tab Take 1 tablet (40 mg total) by mouth every evening.     No current facility-administered medications for this visit.        Review of Systems   Constitutional: Negative for activity change, fatigue, fever and  "unexpected weight change.   Eyes: Negative for redness and visual disturbance.   Respiratory: Negative for chest tightness and shortness of breath.    Cardiovascular: Negative for chest pain and leg swelling.   Gastrointestinal: Negative for abdominal distention, abdominal pain, constipation, diarrhea, nausea and vomiting.   Genitourinary: Negative for difficulty urinating, dysuria, flank pain, frequency, hematuria, pelvic pain, urgency and vaginal bleeding.   Musculoskeletal: Negative for arthralgias and joint swelling.   Neurological: Negative for dizziness, weakness and headaches.   Psychiatric/Behavioral: Negative for confusion. The patient is not nervous/anxious.    All other systems reviewed and are negative.      Objective:      Vitals:    03/22/19 1437   Weight: 80.9 kg (178 lb 5.6 oz)   Height: 5' 2" (1.575 m)     Physical Exam   Nursing note and vitals reviewed.  Constitutional: She is oriented to person, place, and time. She appears well-developed.   HENT:   Head: Normocephalic.   Eyes: Conjunctivae are normal.   Neck: Normal range of motion. No tracheal deviation present. No thyromegaly present.   Cardiovascular: Normal rate, normal heart sounds and normal pulses.    Pulmonary/Chest: Effort normal and breath sounds normal. No respiratory distress. She has no wheezes.   Abdominal: Soft. She exhibits no distension and no mass. There is no hepatosplenomegaly. There is no tenderness. There is no rebound, no guarding and no CVA tenderness. No hernia.   Musculoskeletal: Normal range of motion. She exhibits no edema or tenderness.   Lymphadenopathy:     She has no cervical adenopathy.   Neurological: She is alert and oriented to person, place, and time.   Skin: Skin is warm and dry. No rash noted. No erythema.     Psychiatric: She has a normal mood and affect. Her behavior is normal. Judgment and thought content normal.         Assessment:       1. Gross hematuria    2. Nocturia more than twice per night    3. " Incomplete emptying of bladder          Plan:       1. Gross hematuria  Cystoscopy with possible biopsy on Monday 4/8/2019  Stop ASA one week before    - Urine culture  - CT Urogram Abd Pelvis W WO; Future  - Cytology, urine  - Basic metabolic panel; Future    2. Nocturia more than twice per night  Limit evening fluids    3. Incomplete emptying of bladder  stable            Follow-up in about 6 weeks (around 5/3/2019) for Follow up.

## 2019-03-22 NOTE — PROGRESS NOTES
Subjective:       Patient ID: Antoine Seals is a 54 y.o. female who was referred by No ref. provider found    Chief Complaint:   Chief Complaint   Patient presents with    Hematuria     pt states coming in for hematuria        Hematuria  Patient complains of gross hematuria. Onset of hematuria was 1 month ago and was gradual in onset. There is not a history of nephrolithiasis. There is not a history of urologic trauma. Other urologic symptoms include nocturia. Patient admits to history of no risk factors for cancer. Patient denies history of Agent Orange exposure, chronic Gonzales catheter, occupational exposure, sexually transmitted diseases, tobacco use, trauma and urolithiasis. Prior workup has been UA'S.    She had a negative hematuria work up in 2018 for microscopic hematuria.  It is now gross painless without clots.      ACTIVE MEDICAL ISSUES:  Patient Active Problem List   Diagnosis    Essential hypertension    Diabetes mellitus type 2, noninsulin dependent    Hyperlipidemia    Constipation, chronic    Nuclear sclerosis of both eyes    Type 2 diabetes mellitus without ophthalmic manifestations    Refractive error    Gastric bypass status for obesity    Iron deficiency anemia secondary to inadequate dietary iron intake    Iron deficiency anemia following bariatric surgery    Ovarian cyst    Anemia    Muscle weakness    Decreased ROM of lower extremity    Decreased mobility and endurance    Microscopic hematuria    PMB (postmenopausal bleeding)    Status post hysteroscopy       PAST MEDICAL HISTORY  Past Medical History:   Diagnosis Date    Anemia     Depression     Diabetes mellitus type 2, noninsulin dependent 2016    Hematuria     Hx of essential hypertension     Hyperlipidemia     Hypertension, uncontrolled 2016    Nuclear sclerosis of both eyes 4/10/2017       PAST SURGICAL HISTORY:  Past Surgical History:   Procedure Laterality Date    BTL        SECTION  1985    COLONOSCOPY N/A 6/14/2017    Performed by [facility] at [facility] ENDO (4TH FLR)    CYSTOSCOPY WITH RETROGRADE PYELOGRAM Bilateral 3/19/2018    Performed [facility] at [facility] OR    ESOPHAGOGASTRODUODENOSCOPY (EGD) N/A 6/14/2017    Performed by [facility] at [facility] ENDO (4TH FLR)    ESOPHAGOGASTRODUODENOSCOPY (EGD) - needs dual lumen EGD with graspers and scissors to remove suture at gastrojejunal anastomosis site-pt. 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 N/A 8/18/2017    Performed by Corona [facility]ijano MD at [facility] OR 2ND FLR    GASTRIC BYPASS  1995    sandra en y    HYSTEROSCOPY, WITH DILATION AND CURETTAGE OF UTERUS N/A 9/28/2018    Performed [facility]ed Alexander [facility] at [facility] OR    LAPAROSCOPY-DIAGNOSTIC N/A 1/15/2018    Performed by [facility] at [facility] OR 2ND FLR    NECK SURGERY  09/30/2016    REPAIR-HERNIA-LAPAROSCOPIC  1/15/2018    Performed by [facility] at [facility] OR 2ND FLR    TOTAL REDUCTION MAMMOPLASTY         SOCIAL HISTORY:  Social History     Tobacco Use    Smoking status: Never Smoker    Smokeless tobacco: Never Used   Substance Use Topics    Alcohol use: No     Alcohol/week: 0.0 oz     Comment: Socially    Drug use: No       FAMILY HISTORY:  Family History   Problem Relation Age of Onset    Heart disease Mother     Diabetes type II Mother     Heart attack Father 50        Open heart surgery    No Known Problems Sister     No Known Problems Brother     No Known Problems Maternal Aunt     No Known Problems Maternal Uncle     No Known Problems Paternal Aunt     No Known Problems Paternal Uncle     No Known Problems Maternal Grandmother     No Known Problems Maternal Grandfather     No Known Problems Paternal Grandmother     No Known Problems Paternal Grandfather     Amblyopia Neg Hx     Blindness Neg Hx     Cancer Neg Hx     Cataracts Neg Hx     Diabetes Neg Hx     Glaucoma Neg Hx     Hypertension Neg Hx     Macular degeneration Neg Hx      Retinal detachment Neg Hx     Strabismus Neg Hx     Stroke Neg Hx     Thyroid disease Neg Hx        ALLERGIES AND MEDICATIONS: updated and reviewed.  Review of patient's allergies indicates:   Allergen Reactions    Lisinopril-hydrochlorothiazide Swelling     Facial swelling/ mouth swelling     Current Outpatient Medications   Medication Sig    amLODIPine (NORVASC) 10 MG tablet TAKE 1 TABLET(10 MG) BY MOUTH EVERY DAY    aspirin (ECOTRIN) 81 MG EC tablet Take 1 tablet (81 mg total) by mouth once daily.    b complex vitamins tablet Take 1 tablet by mouth every morning.     blood sugar diagnostic (ONETOUCH ULTRA TEST) Strp TEST 2x/day    buPROPion (WELLBUTRIN XL) 300 MG 24 hr tablet     calcium citrate-vitamin D3 315-200 mg (CITRACAL+D) 315-200 mg-unit per tablet Take 1 tablet by mouth once daily.    dulaglutide (TRULICITY) 1.5 mg/0.5 mL PnIj Inject 1.5 mg into the skin every 7 days.    ENDOCET  mg per tablet Take 1 tablet by mouth 3 (three) times daily.    ertugliflozin (STEGLATRO) 5 mg Tab Take 5 mg by mouth once daily.    ezetimibe (ZETIA) 10 mg tablet Take 1 tablet (10 mg total) by mouth once daily.    ibuprofen (ADVIL,MOTRIN) 600 MG tablet Take 1 tablet (600 mg total) by mouth every 6 (six) hours as needed for Pain.    lancets (ONETOUCH DELICA LANCETS) 33 gauge Misc TEST TWICE DAILY    metFORMIN (GLUCOPHAGE) 500 MG tablet Take 2 tablets with breakfast and 1 tablets with dinner    multivitamin capsule Take 1 capsule by mouth once daily.    naproxen (NAPROSYN) 500 MG tablet Take 500 mg by mouth 2 (two) times daily as needed.    ONETOUCH ULTRA2 kit TEST BID UTD    polyethylene glycol (GLYCOLAX) 17 gram/dose powder Take 17 g by mouth daily as needed.    rosuvastatin (CRESTOR) 40 MG Tab Take 1 tablet (40 mg total) by mouth every evening.     No current facility-administered medications for this visit.        Review of Systems   Constitutional: Negative for activity change, fatigue, fever and  "unexpected weight change.   Eyes: Negative for redness and visual disturbance.   Respiratory: Negative for chest tightness and shortness of breath.    Cardiovascular: Negative for chest pain and leg swelling.   Gastrointestinal: Negative for abdominal distention, abdominal pain, constipation, diarrhea, nausea and vomiting.   Genitourinary: Negative for difficulty urinating, dysuria, flank pain, frequency, hematuria, pelvic pain, urgency and vaginal bleeding.   Musculoskeletal: Negative for arthralgias and joint swelling.   Neurological: Negative for dizziness, weakness and headaches.   Psychiatric/Behavioral: Negative for confusion. The patient is not nervous/anxious.    All other systems reviewed and are negative.      Objective:      Vitals:    03/22/19 1437   Weight: 80.9 kg (178 lb 5.6 oz)   Height: 5' 2" (1.575 m)     Physical Exam   Nursing note and vitals reviewed.  Constitutional: She is oriented to person, place, and time. She appears well-developed.   HENT:   Head: Normocephalic.   Eyes: Conjunctivae are normal.   Neck: Normal range of motion. No tracheal deviation present. No thyromegaly present.   Cardiovascular: Normal rate, normal heart sounds and normal pulses.    Pulmonary/Chest: Effort normal and breath sounds normal. No respiratory distress. She has no wheezes.   Abdominal: Soft. She exhibits no distension and no mass. There is no hepatosplenomegaly. There is no tenderness. There is no rebound, no guarding and no CVA tenderness. No hernia.   Musculoskeletal: Normal range of motion. She exhibits no edema or tenderness.   Lymphadenopathy:     She has no cervical adenopathy.   Neurological: She is alert and oriented to person, place, and time.   Skin: Skin is warm and dry. No rash noted. No erythema.     Psychiatric: She has a normal mood and affect. Her behavior is normal. Judgment and thought content normal.         Assessment:       1. Gross hematuria    2. Nocturia more than twice per night    3. " Incomplete emptying of bladder          Plan:       1. Gross hematuria  Cystoscopy with possible biopsy on Monday 4/8/2019  Stop ASA one week before    - Urine culture  - CT Urogram Abd Pelvis W WO; Future  - Cytology, urine  - Basic metabolic panel; Future    2. Nocturia more than twice per night  Limit evening fluids    3. Incomplete emptying of bladder  stable            Follow-up in about 6 weeks (around 5/3/2019) for Follow up.

## 2019-03-23 LAB — BACTERIA UR CULT: NORMAL

## 2019-03-28 ENCOUNTER — TELEPHONE (OUTPATIENT)
Dept: UROLOGY | Facility: CLINIC | Age: 54
End: 2019-03-28

## 2019-03-28 NOTE — TELEPHONE ENCOUNTER
----- Message from Almita Friend sent at 3/28/2019  2:41 PM CDT -----  Contact: self [phone]  .Type:  Test Results    Who Called: self    Name of Test (Lab/Mammo/Etc): urine test    Date of Test: 03/22    Ordering Provider: daily    Would the patient rather a call back or a response via [facility]? Call back    Best Call Back Number: [phone]

## 2019-03-29 ENCOUNTER — HOSPITAL ENCOUNTER (OUTPATIENT)
Dept: PREADMISSION TESTING | Facility: HOSPITAL | Age: 54
Discharge: HOME | End: 2019-03-29
Attending: UROLOGY
Payer: MEDICARE

## 2019-03-29 ENCOUNTER — TELEPHONE (OUTPATIENT)
Dept: UROLOGY | Facility: CLINIC | Age: 54
End: 2019-03-29

## 2019-03-29 VITALS
BODY MASS INDEX: 32.7 KG/M2 | HEIGHT: 62 IN | SYSTOLIC BLOOD PRESSURE: 129 MMHG | OXYGEN SATURATION: 9 % | RESPIRATION RATE: 17 BRPM | WEIGHT: 177.69 LBS | HEART RATE: 81 BPM | TEMPERATURE: 99 F | DIASTOLIC BLOOD PRESSURE: 85 MMHG

## 2019-03-29 DIAGNOSIS — R31.0 GROSS HEMATURIA: ICD-10-CM

## 2019-03-29 LAB
ANION GAP SERPL CALC-SCNC: 5 MMOL/L (ref 8–16)
BASOPHILS # BLD AUTO: 0.02 K/UL (ref 0–0.2)
BASOPHILS NFR BLD: 0.4 % (ref 0–1.9)
BUN SERPL-MCNC: 17 MG/DL (ref 6–20)
CALCIUM SERPL-MCNC: 10 MG/DL (ref 8.7–10.5)
CHLORIDE SERPL-SCNC: 103 MMOL/L (ref 95–110)
CO2 SERPL-SCNC: 33 MMOL/L (ref 23–29)
CREAT SERPL-MCNC: 0.8 MG/DL (ref 0.5–1.4)
DIFFERENTIAL METHOD: ABNORMAL
EOSINOPHIL # BLD AUTO: 0.1 K/UL (ref 0–0.5)
EOSINOPHIL NFR BLD: 2.1 % (ref 0–8)
ERYTHROCYTE [DISTWIDTH] IN BLOOD BY AUTOMATED COUNT: 15.8 % (ref 11.5–14.5)
EST. GFR  (AFRICAN AMERICAN): >60 ML/MIN/1.73 M^2
EST. GFR  (NON AFRICAN AMERICAN): >60 ML/MIN/1.73 M^2
GLUCOSE SERPL-MCNC: 94 MG/DL (ref 70–110)
HCT VFR BLD AUTO: 39.5 % (ref 37–48.5)
HGB BLD-MCNC: 12.5 G/DL (ref 12–16)
LYMPHOCYTES # BLD AUTO: 2.4 K/UL (ref 1–4.8)
LYMPHOCYTES NFR BLD: 46.8 % (ref 18–48)
MCH RBC QN AUTO: 25.2 PG (ref 27–31)
MCHC RBC AUTO-ENTMCNC: 31.6 G/DL (ref 32–36)
MCV RBC AUTO: 80 FL (ref 82–98)
MONOCYTES # BLD AUTO: 0.4 K/UL (ref 0.3–1)
MONOCYTES NFR BLD: 8.3 % (ref 4–15)
NEUTROPHILS # BLD AUTO: 2.2 K/UL (ref 1.8–7.7)
NEUTROPHILS NFR BLD: 42.4 % (ref 38–73)
PLATELET # BLD AUTO: 324 K/UL (ref 150–350)
PMV BLD AUTO: 10 FL (ref 9.2–12.9)
POTASSIUM SERPL-SCNC: 4.7 MMOL/L (ref 3.5–5.1)
RBC # BLD AUTO: 4.96 M/UL (ref 4–5.4)
SODIUM SERPL-SCNC: 141 MMOL/L (ref 136–145)
WBC # BLD AUTO: 5.15 K/UL (ref 3.9–12.7)

## 2019-03-29 PROCEDURE — 93010 ELECTROCARDIOGRAM REPORT: CPT | Mod: ,,, | Performed by: INTERNAL MEDICINE

## 2019-03-29 PROCEDURE — 93005 ELECTROCARDIOGRAM TRACING: CPT

## 2019-03-29 PROCEDURE — 93010 EKG 12-LEAD: ICD-10-PCS | Mod: ,,, | Performed by: INTERNAL MEDICINE

## 2019-03-29 PROCEDURE — 80048 BASIC METABOLIC PNL TOTAL CA: CPT

## 2019-03-29 PROCEDURE — 85025 COMPLETE CBC W/AUTO DIFF WBC: CPT

## 2019-03-29 NOTE — DISCHARGE INSTRUCTIONS
Your procedure  is scheduled for _Monday April 8, 2019_________.    Call [phone] between 2pm and 5pm on _Friday April 5, 2019______to find out your arrival time for the day of surgery.    Report to Same Day Surgery Unit at ____ AM on the 2nd floor of the hospital.  Use the front entrance of the hospital.  The front doors of the hospital open promptly at 5:30am.     Important instructions:   Do not eat or drink after 12 midnight, including water.  It is okay to brush your teeth.                                                                                                                                                                                                                                       Do not have gum, candy or mints.    TAKE  AMLODIPINE  AND  WELLBUTRIN  WITH A SIP OF WATER THE AM OF SURGERY    Do not take any diabetic medication on the morning of surgery unless instructed to do so by your doctor or pre op nurse.    Stop taking Aspirin, Ibuprofen, Motrin and Aleve , Fish oil, and Vitamin E for at least 7 days before your surgery.                                                                                                                                                                                                      You may use Tylenol unless otherwise instructed by your doctor.         Prep instructions:     SHOWER   OTHER_____________     Please shower the night before  OR  the morning of your surgery.       No shaving of procedural area at least 4-5 days before surgery due to increased risk of skin irritation and/or possible infection   .   Do not wear make- up, including mascara.     You may wear deodorant only.      Do not wear powder, body lotion or perfume/cologne.     Do not wear any jewelry or have any metal on your body.     If you are going home on the same day of surgery, you must arrange for a family member or a friend to drive you home.  Public transportation is  prohibited.  You will not be able to drive home if you were given anesthesia or sedation.     Wear loose fitting clothes allowing for bandages.     Please leave money and valuables home.       You may bring your cell phone.     Call the doctor if fever or illness should occur before your surgery.    Call [phone] to contact us here if needed.

## 2019-04-05 ENCOUNTER — HOSPITAL ENCOUNTER (OUTPATIENT)
Dept: RADIOLOGY | Facility: HOSPITAL | Age: 54
Discharge: HOME | End: 2019-04-05
Attending: UROLOGY
Payer: MEDICARE

## 2019-04-05 ENCOUNTER — TELEPHONE (OUTPATIENT)
Dept: UROLOGY | Facility: CLINIC | Age: 54
End: 2019-04-05

## 2019-04-05 ENCOUNTER — ANESTHESIA EVENT (OUTPATIENT)
Dept: SURGERY | Facility: HOSPITAL | Age: 54
End: 2019-04-05
Payer: MEDICARE

## 2019-04-05 DIAGNOSIS — R31.0 GROSS HEMATURIA: ICD-10-CM

## 2019-04-05 PROCEDURE — 74178 CT UROGRAM ABD PELVIS W WO: ICD-10-PCS | Mod: 26,,, | Performed by: RADIOLOGY

## 2019-04-05 PROCEDURE — 74178 CT ABD&PLV WO CNTR FLWD CNTR: CPT | Mod: TC

## 2019-04-05 PROCEDURE — 74178 CT ABD&PLV WO CNTR FLWD CNTR: CPT | Mod: 26,,, | Performed by: RADIOLOGY

## 2019-04-05 PROCEDURE — 25500020 PHARM REV CODE 255: Performed by: UROLOGY

## 2019-04-05 RX ADMIN — IOHEXOL 125 ML: 350 INJECTION, SOLUTION INTRAVENOUS at 02:04

## 2019-04-08 ENCOUNTER — ANESTHESIA (OUTPATIENT)
Dept: SURGERY | Facility: HOSPITAL | Age: 54
End: 2019-04-08
Payer: MEDICARE

## 2019-04-08 ENCOUNTER — HOSPITAL ENCOUNTER (OUTPATIENT)
Facility: HOSPITAL | Age: 54
Discharge: HOME | End: 2019-04-08
Attending: UROLOGY | Admitting: UROLOGY
Payer: MEDICARE

## 2019-04-08 VITALS
SYSTOLIC BLOOD PRESSURE: 121 MMHG | DIASTOLIC BLOOD PRESSURE: 77 MMHG | OXYGEN SATURATION: 96 % | HEART RATE: 79 BPM | BODY MASS INDEX: 32.7 KG/M2 | RESPIRATION RATE: 18 BRPM | TEMPERATURE: 99 F | HEIGHT: 62 IN | WEIGHT: 177.69 LBS

## 2019-04-08 DIAGNOSIS — R31.0 GROSS HEMATURIA: Primary | ICD-10-CM

## 2019-04-08 LAB — POCT GLUCOSE: 112 MG/DL (ref 70–110)

## 2019-04-08 PROCEDURE — 71000016 HC POSTOP RECOV ADDL HR: Performed by: UROLOGY

## 2019-04-08 PROCEDURE — D9220A PRA ANESTHESIA: ICD-10-PCS | Mod: ANES,,, | Performed by: ANESTHESIOLOGY

## 2019-04-08 PROCEDURE — 36000707: Performed by: UROLOGY

## 2019-04-08 PROCEDURE — D9220A PRA ANESTHESIA: Mod: CRNA,,, | Performed by: NURSE ANESTHETIST, CERTIFIED REGISTERED

## 2019-04-08 PROCEDURE — 37000008 HC ANESTHESIA 1ST 15 MINUTES: Performed by: UROLOGY

## 2019-04-08 PROCEDURE — 25000003 PHARM REV CODE 250: Performed by: UROLOGY

## 2019-04-08 PROCEDURE — 37000009 HC ANESTHESIA EA ADD 15 MINS: Performed by: UROLOGY

## 2019-04-08 PROCEDURE — 71000033 HC RECOVERY, INTIAL HOUR: Performed by: UROLOGY

## 2019-04-08 PROCEDURE — 63600175 PHARM REV CODE 636 W HCPCS: Performed by: NURSE ANESTHETIST, CERTIFIED REGISTERED

## 2019-04-08 PROCEDURE — 52000 CYSTOURETHROSCOPY: CPT | Mod: ,,, | Performed by: UROLOGY

## 2019-04-08 PROCEDURE — D9220A PRA ANESTHESIA: Mod: ANES,,, | Performed by: ANESTHESIOLOGY

## 2019-04-08 PROCEDURE — 71000015 HC POSTOP RECOV 1ST HR: Performed by: UROLOGY

## 2019-04-08 PROCEDURE — 25000003 PHARM REV CODE 250: Performed by: NURSE ANESTHETIST, CERTIFIED REGISTERED

## 2019-04-08 PROCEDURE — 25000003 PHARM REV CODE 250: Performed by: ANESTHESIOLOGY

## 2019-04-08 PROCEDURE — 27200651 HC AIRWAY, LMA: Performed by: NURSE ANESTHETIST, CERTIFIED REGISTERED

## 2019-04-08 PROCEDURE — D9220A PRA ANESTHESIA: ICD-10-PCS | Mod: CRNA,,, | Performed by: NURSE ANESTHETIST, CERTIFIED REGISTERED

## 2019-04-08 PROCEDURE — 36000706: Performed by: UROLOGY

## 2019-04-08 PROCEDURE — 71000039 HC RECOVERY, EACH ADD'L HOUR: Performed by: UROLOGY

## 2019-04-08 PROCEDURE — 52000 PR CYSTOURETHROSCOPY: ICD-10-PCS | Mod: ,,, | Performed by: UROLOGY

## 2019-04-08 PROCEDURE — 63600175 PHARM REV CODE 636 W HCPCS: Performed by: UROLOGY

## 2019-04-08 RX ORDER — PHENAZOPYRIDINE HYDROCHLORIDE 100 MG/1
200 TABLET, FILM COATED ORAL ONCE
Status: COMPLETED | OUTPATIENT
Start: 2019-04-08 | End: 2019-04-08

## 2019-04-08 RX ORDER — SODIUM CHLORIDE 0.9 % (FLUSH) 0.9 %
10 SYRINGE (ML) INJECTION
Status: DISCONTINUED | OUTPATIENT
Start: 2019-04-08 | End: 2019-04-08 | Stop reason: HOSPADM

## 2019-04-08 RX ORDER — HYDROMORPHONE HYDROCHLORIDE 2 MG/ML
0.2 INJECTION, SOLUTION INTRAMUSCULAR; INTRAVENOUS; SUBCUTANEOUS EVERY 5 MIN PRN
Status: DISCONTINUED | OUTPATIENT
Start: 2019-04-08 | End: 2019-04-08 | Stop reason: HOSPADM

## 2019-04-08 RX ORDER — LIDOCAINE HCL/PF 100 MG/5ML
SYRINGE (ML) INTRAVENOUS
Status: DISCONTINUED | OUTPATIENT
Start: 2019-04-08 | End: 2019-04-08

## 2019-04-08 RX ORDER — GLYCOPYRROLATE 0.2 MG/ML
INJECTION INTRAMUSCULAR; INTRAVENOUS
Status: DISCONTINUED | OUTPATIENT
Start: 2019-04-08 | End: 2019-04-08

## 2019-04-08 RX ORDER — SODIUM CHLORIDE, SODIUM LACTATE, POTASSIUM CHLORIDE, CALCIUM CHLORIDE 600; 310; 30; 20 MG/100ML; MG/100ML; MG/100ML; MG/100ML
INJECTION, SOLUTION INTRAVENOUS CONTINUOUS
Status: DISCONTINUED | OUTPATIENT
Start: 2019-04-08 | End: 2019-04-08 | Stop reason: HOSPADM

## 2019-04-08 RX ORDER — PHENAZOPYRIDINE HYDROCHLORIDE 200 MG/1
200 TABLET, FILM COATED ORAL 3 TIMES DAILY PRN
Qty: 21 TABLET | Refills: 0 | Status: SHIPPED | OUTPATIENT
Start: 2019-04-08 | End: 2020-01-15 | Stop reason: CLARIF

## 2019-04-08 RX ORDER — MIDAZOLAM HYDROCHLORIDE 1 MG/ML
INJECTION, SOLUTION INTRAMUSCULAR; INTRAVENOUS
Status: DISCONTINUED | OUTPATIENT
Start: 2019-04-08 | End: 2019-04-08

## 2019-04-08 RX ORDER — FENTANYL CITRATE 50 UG/ML
INJECTION, SOLUTION INTRAMUSCULAR; INTRAVENOUS
Status: DISCONTINUED | OUTPATIENT
Start: 2019-04-08 | End: 2019-04-08

## 2019-04-08 RX ORDER — LIDOCAINE HYDROCHLORIDE 10 MG/ML
1 INJECTION, SOLUTION EPIDURAL; INFILTRATION; INTRACAUDAL; PERINEURAL ONCE
Status: DISCONTINUED | OUTPATIENT
Start: 2019-04-08 | End: 2019-04-08 | Stop reason: HOSPADM

## 2019-04-08 RX ORDER — CIPROFLOXACIN 2 MG/ML
400 INJECTION, SOLUTION INTRAVENOUS
Status: COMPLETED | OUTPATIENT
Start: 2019-04-08 | End: 2019-04-08

## 2019-04-08 RX ORDER — METOCLOPRAMIDE HYDROCHLORIDE 5 MG/ML
INJECTION INTRAMUSCULAR; INTRAVENOUS
Status: DISCONTINUED | OUTPATIENT
Start: 2019-04-08 | End: 2019-04-08

## 2019-04-08 RX ORDER — ONDANSETRON 2 MG/ML
4 INJECTION INTRAMUSCULAR; INTRAVENOUS DAILY PRN
Status: DISCONTINUED | OUTPATIENT
Start: 2019-04-08 | End: 2019-04-08 | Stop reason: HOSPADM

## 2019-04-08 RX ORDER — PROPOFOL 10 MG/ML
VIAL (ML) INTRAVENOUS
Status: DISCONTINUED | OUTPATIENT
Start: 2019-04-08 | End: 2019-04-08

## 2019-04-08 RX ADMIN — SODIUM CHLORIDE, SODIUM LACTATE, POTASSIUM CHLORIDE, AND CALCIUM CHLORIDE: .6; .31; .03; .02 INJECTION, SOLUTION INTRAVENOUS at 09:04

## 2019-04-08 RX ADMIN — METOCLOPRAMIDE 10 MG: 5 INJECTION, SOLUTION INTRAMUSCULAR; INTRAVENOUS at 10:04

## 2019-04-08 RX ADMIN — MIDAZOLAM HYDROCHLORIDE 2 MG: 1 INJECTION, SOLUTION INTRAMUSCULAR; INTRAVENOUS at 10:04

## 2019-04-08 RX ADMIN — FENTANYL CITRATE 50 MCG: 50 INJECTION INTRAMUSCULAR; INTRAVENOUS at 10:04

## 2019-04-08 RX ADMIN — CIPROFLOXACIN 400 MG: 2 INJECTION, SOLUTION INTRAVENOUS at 10:04

## 2019-04-08 RX ADMIN — PHENAZOPYRIDINE HYDROCHLORIDE 200 MG: 100 TABLET ORAL at 10:04

## 2019-04-08 RX ADMIN — LIDOCAINE HYDROCHLORIDE 75 MG: 20 INJECTION, SOLUTION INTRAVENOUS at 10:04

## 2019-04-08 RX ADMIN — PROPOFOL 140 MG: 10 INJECTION, EMULSION INTRAVENOUS at 10:04

## 2019-04-08 RX ADMIN — GLYCOPYRROLATE 0.2 MG: 0.2 INJECTION, SOLUTION INTRAMUSCULAR; INTRAVENOUS at 10:04

## 2019-04-08 NOTE — DISCHARGE INSTRUCTIONS
ACTIVITY LEVEL: If you have received sedation or an anesthetic, you may feel sleepy for several hours. Rest until you are more awake. Gradually resume your normal activities.       DIET: You may resume your home diet. If nausea is present, increase your diet gradually with fluids and bland foods.      Medications: Pain medication should be taken only if needed and as directed. If antibiotics are prescribed, the medication should be taken until completed. You will be given an updated list of you medications.  ? No driving, alcoholic beverages or signing legal documents for next 24 hours or while taking pain medication        CALL THE DOCTOR:       · Fever over 101°F  · Severe pain that doesnt go away with medication.  · Upset stomach and vomiting that is persistent.  · Problems urinating-unable to urinate or heavy bleeding (with or without clots)

## 2019-04-08 NOTE — PLAN OF CARE
Problem: Adult Inpatient Plan of Care  Goal: Plan of Care Review  Outcome: Ongoing (interventions implemented as appropriate)     04/08/19 1101   Plan of Care Review   Plan of Care Reviewed With patient     Dangelo 10/10. AAOx4. Denies pain. VSS per flow sheet. Due to void. See chart for full assessment.     Problem: Bleeding (Surgery Nonspecified)  Goal: Absence of Bleeding  Outcome: Ongoing (interventions implemented as appropriate)  Intervention: Monitor and Manage Bleeding     04/08/19 1101   Prevent and Manage Risk of Hemorrhage   Bleeding Management other (see comments)  (no bleeding noted)         Problem: Ongoing Anesthesia Effects (Surgery Nonspecified)  Goal: Anesthesia/Sedation Recovery  Outcome: Ongoing (interventions implemented as appropriate)  Intervention: Optimize Anesthesia Recovery     04/08/19 1101   Optimize Anesthesia Recovery   Anesthesia/Sedation Recovery criteria met for transfer;recovered to baseline   Optimize Balance and Safe Activity   Safety Promotion/Fall Prevention pulse ox;medications reviewed;side rails raised x 2         Problem: Pain (Surgery Nonspecified)  Goal: Acceptable Pain Control  Outcome: Ongoing (interventions implemented as appropriate)  Intervention: Prevent or Manage Pain     04/08/19 1101   Prevent or Manage Pain   Pain Management Interventions pain management plan reviewed with patient/caregiver;care clustered         Problem: Postoperative Urinary Retention (Surgery Nonspecified)  Goal: Effective Urinary Elimination  Outcome: Ongoing (interventions implemented as appropriate)  Intervention: Monitor and Manage Urinary Retention     04/08/19 1101   Promote Bladder Elimination   Urinary Elimination Promotion other (see comments);toileting offered  (due to void)

## 2019-04-08 NOTE — DISCHARGE SUMMARY
[facility]  Discharge Note  Short Stay    Admit Date: 4/8/2019    Discharge Date and Time: 04/08/2019 10:27 AM      Attending Physician: ELIZABETH Mendoza MD     Discharge Provider: ISABELL Mendoza    Diagnoses:  Active Hospital Problems    Diagnosis  POA    *Gross hematuria [R31.0]  Yes      Resolved Hospital Problems   No resolved problems to display.       Discharged Condition: stable    Hospital Course: Patient was admitted for an outpatient procedure and tolerated the procedure well with no complications.    Final Diagnoses: Same as principal problem.    Disposition: Home or Self Care    Follow up/Patient Instructions:    Medications:  Reconciled Home Medications:      Medication List      START taking these medications    phenazopyridine 200 MG tablet  Commonly known as:  PYRIDIUM  Take 1 tablet (200 mg total) by mouth 3 (three) times daily as needed for Pain (Burning).        CONTINUE taking these medications    amLODIPine 10 MG tablet  Commonly known as:  NORVASC  TAKE 1 TABLET(10 MG) BY MOUTH EVERY DAY     aspirin 81 MG EC tablet  Commonly known as:  ECOTRIN  Take 1 tablet (81 mg total) by mouth once daily.     b complex vitamins tablet  Take 1 tablet by mouth every morning.     blood sugar diagnostic Strp  Commonly known as:  ONETOUCH ULTRA TEST  TEST 2x/day     buPROPion 300 MG 24 hr tablet  Commonly known as:  WELLBUTRIN XL  Take 300 mg by mouth once daily.     calcium citrate-vitamin D3 315-200 mg 315-200 mg-unit per tablet  Commonly known as:  CITRACAL+D  Take 1 tablet by mouth once daily.     dulaglutide 1.5 mg/0.5 mL Pnij  Commonly known as:  TRULICITY  Inject 1.5 mg into the skin every 7 days.     ENDOCET  mg per tablet  Generic drug:  oxyCODONE-acetaminophen  Take 1 tablet by mouth 3 (three) times daily.     ertugliflozin 5 mg Tab  Commonly known as:  STEGLATRO  Take 5 mg by mouth once daily.     ezetimibe 10 mg tablet  Commonly known as:  ZETIA  Take 1 tablet (10 mg total) by  mouth once daily.     ibuprofen 600 MG tablet  Commonly known as:  ADVIL,MOTRIN  Take 1 tablet (600 mg total) by mouth every 6 (six) hours as needed for Pain.     lancets 33 gauge Misc  Commonly known as:  ONETOUCH DELICA LANCETS  TEST TWICE DAILY     metFORMIN 500 MG tablet  Commonly known as:  GLUCOPHAGE  Take 2 tablets with breakfast and 1 tablets with dinner     multivitamin capsule  Take 1 capsule by mouth once daily.     naproxen 500 MG tablet  Commonly known as:  NAPROSYN  Take 500 mg by mouth 2 (two) times daily as needed.     ONETOUCH ULTRA2 kit  Generic drug:  blood-glucose meter  TEST BID UTD     polyethylene glycol 17 gram/dose powder  Commonly known as:  GLYCOLAX  Take 17 g by mouth daily as needed.     rosuvastatin 40 MG Tab  Commonly known as:  CRESTOR  Take 1 tablet (40 mg total) by mouth every evening.          Discharge Procedure Orders   Diet general     Call MD for:   Order Comments: Significant Hematuria         Discharge Procedure Orders (must include Diet, Follow-up, Activity):   Discharge Procedure Orders (must include Diet, Follow-up, Activity)   Diet general     Call MD for:   Order Comments: Significant Hematuria

## 2019-04-08 NOTE — ANESTHESIA PREPROCEDURE EVALUATION
04/08/2019  Antoine Seals is a 54 y.o., female.    Anesthesia Evaluation    I have reviewed the Patient Summary Reports.    I have reviewed the Nursing Notes.      Review of Systems  Anesthesia Hx:  No problems with previous Anesthesia  Denies Family Hx of Anesthesia complications.   Denies Personal Hx of Anesthesia complications.   Social:  Non-Smoker, No Alcohol Use    Hematology/Oncology:         -- Anemia:   Cardiovascular:   Exercise tolerance: good Hypertension Denies MI.   Denies Dysrhythmias.   Denies Angina. ECG has been reviewed. 2017 stress: negative for ischemia; EF 55%   Pulmonary:  Pulmonary Normal    Hepatic/GI:  Hepatic/GI Normal    OB/GYN/PEDS:  Post-menopausal for >2 years   Neurological:  Neurology Normal    Endocrine:   Diabetes    Psych:   depression          Physical Exam  General:  Well nourished    Airway/Jaw/Neck:  Airway Findings: Mouth Opening: Normal Tongue: Normal  Mallampati: II  TM Distance: 4 - 6 cm  Jaw/Neck Findings:  Neck ROM: Normal ROM      Dental:  Dental Findings:Multiple missing; none loose   Chest/Lungs:  Chest/Lungs Findings: Clear to auscultation, Normal Respiratory Rate     Heart/Vascular:  Heart Findings: Rate: Normal  Rhythm: Regular Rhythm        Mental Status:  Mental Status Findings:  Cooperative, Alert and Oriented       Wt Readings from Last 3 Encounters:   04/04/19 80.6 kg (177 lb 11.1 oz)   03/29/19 80.6 kg (177 lb 11.1 oz)   03/22/19 80.9 kg (178 lb 5.6 oz)     Temp Readings from Last 3 Encounters:   04/08/19 36.7 °C (98.1 °F) (Oral)   03/29/19 36.9 °C (98.5 °F) (Oral)   09/28/18 36.7 °C (98 °F)     BP Readings from Last 3 Encounters:   04/08/19 (!) 148/81   03/29/19 129/85   02/15/19 128/86     Pulse Readings from Last 3 Encounters:   04/08/19 76   03/29/19 81   02/13/19 82     Lab Results   Component Value Date    WBC 5.15 03/29/2019    HGB 12.5  03/29/2019    HCT 39.5 03/29/2019    MCV 80 (L) 03/29/2019     03/29/2019     CMP  Sodium   Date Value Ref Range Status   04/05/2019 145 136 - 145 mmol/L Final     Potassium   Date Value Ref Range Status   04/05/2019 3.9 3.5 - 5.1 mmol/L Final     Chloride   Date Value Ref Range Status   04/05/2019 106 95 - 110 mmol/L Final     CO2   Date Value Ref Range Status   04/05/2019 32 (H) 23 - 29 mmol/L Final     Glucose   Date Value Ref Range Status   04/05/2019 72 70 - 110 mg/dL Final     BUN, Bld   Date Value Ref Range Status   04/05/2019 14 6 - 20 mg/dL Final     Creatinine   Date Value Ref Range Status   04/05/2019 0.8 0.5 - 1.4 mg/dL Final     Calcium   Date Value Ref Range Status   04/05/2019 9.9 8.7 - 10.5 mg/dL Final     Total Protein   Date Value Ref Range Status   02/06/2019 6.9 6.0 - 8.4 g/dL Final     Albumin   Date Value Ref Range Status   02/06/2019 3.3 (L) 3.5 - 5.2 g/dL Final     Total Bilirubin   Date Value Ref Range Status   02/06/2019 0.4 0.1 - 1.0 mg/dL Final     Comment:     For infants and newborns, interpretation of results should be based  on gestational age, weight and in agreement with clinical  observations.  Premature Infant recommended reference ranges:  Up to 24 hours.............<8.0 mg/dL  Up to 48 hours............<12.0 mg/dL  3-5 days..................<15.0 mg/dL  6-29 days.................<15.0 mg/dL       Alkaline Phosphatase   Date Value Ref Range Status   02/06/2019 66 55 - 135 U/L Final     AST   Date Value Ref Range Status   02/06/2019 19 10 - 40 U/L Final     ALT   Date Value Ref Range Status   02/06/2019 26 10 - 44 U/L Final     Anion Gap   Date Value Ref Range Status   04/05/2019 7 (L) 8 - 16 mmol/L Final     eGFR if    Date Value Ref Range Status   04/05/2019 >60 >60 mL/min/1.73 m^2 Final     eGFR if non    Date Value Ref Range Status   04/05/2019 >60 >60 mL/min/1.73 m^2 Final     Comment:     Calculation used to obtain the estimated  glomerular filtration  rate (eGFR) is the CKD-EPI equation.            Anesthesia Plan  Type of Anesthesia, risks & benefits discussed:  Anesthesia Type:  general  Patient's Preference:   Intra-op Monitoring Plan: standard ASA monitors  Intra-op Monitoring Plan Comments:   Post Op Pain Control Plan: multimodal analgesia and per primary service following discharge from PACU  Post Op Pain Control Plan Comments:   Induction:   IV  Beta Blocker:  Patient is not currently on a Beta-Blocker (No further documentation required).       Informed Consent: Patient understands risks and agrees with Anesthesia plan.  Questions answered. Anesthesia consent signed with patient.  ASA Score: 2     Day of Surgery Review of History & Physical: I have interviewed and examined the patient. I have reviewed the patient's H&P dated:            Ready For Surgery From Anesthesia Perspective.

## 2019-04-08 NOTE — OP NOTE
DATE OF PROCEDURE:   04/08/2019     PREOPERATIVE DIAGNOSIS:  Gross hematuria     POSTOPERATIVE DIAGNOSIS:  Gross Hematuria     PROCEDURE PERFORMED:  Cystoscopy.     PRIMARY SURGEON:  Corona Mendoza M.D.     ANESTHESIA:  General.     ESTIMATED BLOOD LOSS:  Minimal.     DRAINS:  None.     COMPLICATIONS:  None.      INDICATIONS:  Antoine Seals  is a 54 y.o. woman with history gross hematuria.  She is here today for Cystoscopy with possible biopsy    PROCEDURE:   Antoine Seals  was taken to the Operating Room where she was positively   identified by tori.  She was placed supine on the operating room table.    Following induction of adequate general anesthesia, she was placed in the dorsal   lithotomy position and her external genitalia were prepped and draped in the   usual sterile fashion.     A preoperative timeout was performed as well as confirmation of preoperative   antibiotics.     A 22-St Helenian rigid cystoscope was then passed per urethra into the bladder under   direct vision.  There were no urethral lesions seen. The bladder was inspected with 30 and 70 degree lenses.   No bladder lesions seen.  Both ureteral orifices   were identified.  They were seen to be in orthotopic position.           The scope was then used to drain the bladder.  The scope was then withdrawn.     Her anesthesia was reversed.  She was taken to the Recovery Room in stable   condition.

## 2019-04-08 NOTE — TRANSFER OF CARE
"Anesthesia Transfer of Care Note    Patient: Antoine Seals    Procedure(s) Performed: Procedure(s):  CYSTOSCOPY    Patient location: PACU    Anesthesia Type: general    Transport from OR: Transported from OR on room air with adequate spontaneous ventilation    Post pain: adequate analgesia    Post assessment: no apparent anesthetic complications and tolerated procedure well    Post vital signs: stable    Level of consciousness: awake and responds to stimulation    Nausea/Vomiting: no nausea/vomiting    Complications: none    Transfer of care protocol was followed      Last vitals:   Visit Vitals  /71 (BP Location: Left arm, Patient Position: Lying)   Pulse (!) 111   Temp 37 °C (98.6 °F) (Oral)   Resp 10   Ht 5' 2" (1.575 m)   Wt 80.6 kg (177 lb 11.1 oz)   LMP 12/20/2017   SpO2 100%   Breastfeeding? No   BMI 32.50 kg/m²     "

## 2019-04-08 NOTE — BRIEF OP NOTE
[facility]  Brief Operative Note     SUMMARY     Surgery Date: 4/8/2019     Surgeon(s) and Role:     * ELIZABETH Mendoza MD - Primary    Assisting Surgeon: None    Pre-op Diagnosis:  Gross hematuria [R31.0]    Post-op Diagnosis:  Post-Op Diagnosis Codes:     * Gross hematuria [R31.0]    Procedure(s):  CYSTOSCOPY    Anesthesia: General    Description of the findings of the procedure: no tumors    Findings/Key Components: as above    Estimated Blood Loss: * No values recorded between 4/8/2019 10:19 AM and 4/8/2019 10:26 AM *         Specimens:   Specimen (12h ago, onward)    None          Discharge Note    SUMMARY     Admit Date: 4/8/2019    Discharge Date and Time:  04/08/2019 10:26 AM    Hospital Course (synopsis of major diagnoses, care, treatment, and services provided during the course of the hospital stay): Patient was admitted for an outpatient procedure and tolerated the procedure well with no complications.      Final Diagnosis: Post-Op Diagnosis Codes:     * Gross hematuria [R31.0]    Disposition: Home or Self Care    Follow Up/Patient Instructions:     Medications:  Reconciled Home Medications:      Medication List      START taking these medications    phenazopyridine 200 MG tablet  Commonly known as:  PYRIDIUM  Take 1 tablet (200 mg total) by mouth 3 (three) times daily as needed for Pain (Burning).        CONTINUE taking these medications    amLODIPine 10 MG tablet  Commonly known as:  NORVASC  TAKE 1 TABLET(10 MG) BY MOUTH EVERY DAY     aspirin 81 MG EC tablet  Commonly known as:  ECOTRIN  Take 1 tablet (81 mg total) by mouth once daily.     b complex vitamins tablet  Take 1 tablet by mouth every morning.     blood sugar diagnostic Strp  Commonly known as:  ONETOUCH ULTRA TEST  TEST 2x/day     buPROPion 300 MG 24 hr tablet  Commonly known as:  WELLBUTRIN XL  Take 300 mg by mouth once daily.     calcium citrate-vitamin D3 315-200 mg 315-200 mg-unit per tablet  Commonly known as:   CITRACAL+D  Take 1 tablet by mouth once daily.     dulaglutide 1.5 mg/0.5 mL Pnij  Commonly known as:  TRULICITY  Inject 1.5 mg into the skin every 7 days.     ENDOCET  mg per tablet  Generic drug:  oxyCODONE-acetaminophen  Take 1 tablet by mouth 3 (three) times daily.     ertugliflozin 5 mg Tab  Commonly known as:  STEGLATRO  Take 5 mg by mouth once daily.     ezetimibe 10 mg tablet  Commonly known as:  ZETIA  Take 1 tablet (10 mg total) by mouth once daily.     ibuprofen 600 MG tablet  Commonly known as:  ADVIL,MOTRIN  Take 1 tablet (600 mg total) by mouth every 6 (six) hours as needed for Pain.     lancets 33 gauge Misc  Commonly known as:  ONETOUCH DELICA LANCETS  TEST TWICE DAILY     metFORMIN 500 MG tablet  Commonly known as:  GLUCOPHAGE  Take 2 tablets with breakfast and 1 tablets with dinner     multivitamin capsule  Take 1 capsule by mouth once daily.     naproxen 500 MG tablet  Commonly known as:  NAPROSYN  Take 500 mg by mouth 2 (two) times daily as needed.     ONETOUCH ULTRA2 kit  Generic drug:  blood-glucose meter  TEST BID UTD     polyethylene glycol 17 gram/dose powder  Commonly known as:  GLYCOLAX  Take 17 g by mouth daily as needed.     rosuvastatin 40 MG Tab  Commonly known as:  CRESTOR  Take 1 tablet (40 mg total) by mouth every evening.          Discharge Procedure Orders   Diet general     Call MD for:   Order Comments: Significant Hematuria

## 2019-04-09 NOTE — ANESTHESIA POSTPROCEDURE EVALUATION
Anesthesia Post Evaluation    Patient: Antoine Seals    Procedure(s) Performed: Procedure(s):  CYSTOSCOPY    Final Anesthesia Type: general  Patient location during evaluation: PACU  Patient participation: Yes- Able to Participate  Level of consciousness: awake and alert  Post-procedure vital signs: reviewed and stable  Pain management: adequate  Airway patency: patent  PONV status at discharge: No PONV  Anesthetic complications: no      Cardiovascular status: hemodynamically stable  Respiratory status: unassisted and spontaneous ventilation  Hydration status: euvolemic  Follow-up not needed.          Vitals Value Taken Time   /77 4/8/2019  1:29 PM   Temp 37 °C (98.6 °F) 4/8/2019  1:29 PM   Pulse 79 4/8/2019  1:29 PM   Resp 18 4/8/2019  1:29 PM   SpO2 96 % 4/8/2019  1:29 PM         Event Time     Out of Recovery 12:00:00          Pain/Dangelo Score: Dangelo Score: 10 (4/8/2019 12:05 PM)

## 2019-05-02 ENCOUNTER — TELEPHONE (OUTPATIENT)
Dept: NEPHROLOGY | Facility: CLINIC | Age: 54
End: 2019-05-02

## 2019-05-02 NOTE — TELEPHONE ENCOUNTER
----- Message from Jacque Denis sent at 5/2/2019  8:03 AM CDT -----  Contact: Self [phone]  PT is requesting to see Dr. Schaffer today. She has had blood in her urine for two years and has a lot of foam in her urine.    No complaints

## 2019-05-03 ENCOUNTER — TELEPHONE (OUTPATIENT)
Dept: NEPHROLOGY | Facility: CLINIC | Age: 54
End: 2019-05-03

## 2019-05-03 NOTE — TELEPHONE ENCOUNTER
----- Message from Julienne De Jesus sent at 5/3/2019 10:20 AM CDT -----  Contact: Pt   Pt was calling to see if she can get a sooner appt.    Pt would like a call back at [phone].    Thank You.    Pt is on waitlist

## 2019-05-06 ENCOUNTER — TELEPHONE (OUTPATIENT)
Dept: NEPHROLOGY | Facility: CLINIC | Age: 54
End: 2019-05-06

## 2019-05-06 NOTE — TELEPHONE ENCOUNTER
----- Message from Pam Pardo sent at 5/6/2019  8:15 AM CDT -----  Contact: pt   NEEDS URGENT WORK IN     PT CALLING       IS BLEEDING WHEN URINATING    HAS BEEN TO UROLOGY AND OB     SHE ALSO HAS  BUBBLES IN URINE    LOOKS LIKE BEER       Urology did a procedure   Didn't find anything      Said she needs to see Nephrology      Please call with a work in [phone]      Call with status today on getting an  appt     Thanks

## 2019-05-07 ENCOUNTER — TELEPHONE (OUTPATIENT)
Dept: NEPHROLOGY | Facility: CLINIC | Age: 54
End: 2019-05-07

## 2019-05-07 ENCOUNTER — TELEPHONE (OUTPATIENT)
Dept: UROLOGY | Facility: CLINIC | Age: 54
End: 2019-05-07

## 2019-05-07 NOTE — TELEPHONE ENCOUNTER
----- Message from Lauryn Arce sent at 5/7/2019  3:16 PM CDT -----  Contact: Self/   [phone]  Type: Patient Call Back    Who called:  Patient    What is the request in detail:  Patient would like staff to give her a call.  She wanting a referral to see a kidney doctor.  Thank you.    Would the patient rather a call back or a response via [facility]?  Call back    Best call back number:  [phone]

## 2019-05-07 NOTE — TELEPHONE ENCOUNTER
Spoke with patient informed that 06/11/19 was the soonest appointment we could get with Nephrology on the [facility] or [facility]. Notified patient she is on the wait list in case there is a cancellation. Also informed patient she could follow up with her Primary care if she feels she is having trouble with her kidney function.

## 2019-05-07 NOTE — TELEPHONE ENCOUNTER
----- Message from Jacque Denis sent at 5/7/2019  9:53 AM CDT -----  Contact: Self [phone]  PT called to check status of sooner request for an appointment. She can be reached at [phone].     Called pt informed her that I don't have anything sooner and that she is ion the waitlist

## 2019-07-24 DIAGNOSIS — Z12.39 SCREENING FOR BREAST CANCER: Primary | ICD-10-CM

## 2019-08-05 ENCOUNTER — HOSPITAL ENCOUNTER (OUTPATIENT)
Dept: RADIOLOGY | Facility: HOSPITAL | Age: 54
Discharge: HOME | End: 2019-08-05
Attending: INTERNAL MEDICINE
Payer: MEDICARE

## 2019-08-05 VITALS — BODY MASS INDEX: 32.57 KG/M2 | WEIGHT: 177 LBS | HEIGHT: 62 IN

## 2019-08-05 DIAGNOSIS — Z12.39 SCREENING FOR BREAST CANCER: ICD-10-CM

## 2019-08-05 PROCEDURE — 77067 SCR MAMMO BI INCL CAD: CPT | Mod: 26,,, | Performed by: RADIOLOGY

## 2019-08-05 PROCEDURE — 77067 SCR MAMMO BI INCL CAD: CPT | Mod: TC

## 2019-08-05 PROCEDURE — 77067 MAMMO DIGITAL SCREENING BILAT WITH CAD: ICD-10-PCS | Mod: 26,,, | Performed by: RADIOLOGY

## 2019-11-23 ENCOUNTER — HOSPITAL ENCOUNTER (EMERGENCY)
Facility: HOSPITAL | Age: 54
Discharge: HOME | End: 2019-11-23
Attending: EMERGENCY MEDICINE
Payer: MEDICARE

## 2019-11-23 VITALS
OXYGEN SATURATION: 100 % | HEART RATE: 97 BPM | DIASTOLIC BLOOD PRESSURE: 85 MMHG | TEMPERATURE: 98 F | HEIGHT: 62 IN | BODY MASS INDEX: 32.39 KG/M2 | SYSTOLIC BLOOD PRESSURE: 157 MMHG | WEIGHT: 176 LBS | RESPIRATION RATE: 18 BRPM

## 2019-11-23 DIAGNOSIS — R31.0 GROSS HEMATURIA: Primary | ICD-10-CM

## 2019-11-23 LAB
ALBUMIN SERPL BCP-MCNC: 3.6 G/DL (ref 3.5–5.2)
ALP SERPL-CCNC: 88 U/L (ref 55–135)
ALT SERPL W/O P-5'-P-CCNC: 37 U/L (ref 10–44)
AMORPH CRY UR QL COMP ASSIST: ABNORMAL
ANION GAP SERPL CALC-SCNC: 10 MMOL/L (ref 8–16)
AST SERPL-CCNC: 24 U/L (ref 10–40)
BACTERIA #/AREA URNS AUTO: ABNORMAL /HPF
BASOPHILS # BLD AUTO: 0.03 K/UL (ref 0–0.2)
BASOPHILS NFR BLD: 0.5 % (ref 0–1.9)
BILIRUB SERPL-MCNC: 0.5 MG/DL (ref 0.1–1)
BILIRUB UR QL STRIP: NEGATIVE
BUN SERPL-MCNC: 11 MG/DL (ref 6–20)
CALCIUM SERPL-MCNC: 9.4 MG/DL (ref 8.7–10.5)
CHLORIDE SERPL-SCNC: 104 MMOL/L (ref 95–110)
CLARITY UR REFRACT.AUTO: ABNORMAL
CO2 SERPL-SCNC: 27 MMOL/L (ref 23–29)
COLOR UR AUTO: ABNORMAL
CREAT SERPL-MCNC: 0.9 MG/DL (ref 0.5–1.4)
DIFFERENTIAL METHOD: ABNORMAL
EOSINOPHIL # BLD AUTO: 0.1 K/UL (ref 0–0.5)
EOSINOPHIL NFR BLD: 1.6 % (ref 0–8)
ERYTHROCYTE [DISTWIDTH] IN BLOOD BY AUTOMATED COUNT: 14.6 % (ref 11.5–14.5)
EST. GFR  (AFRICAN AMERICAN): >60 ML/MIN/1.73 M^2
EST. GFR  (NON AFRICAN AMERICAN): >60 ML/MIN/1.73 M^2
GLUCOSE SERPL-MCNC: 121 MG/DL (ref 70–110)
GLUCOSE UR QL STRIP: NEGATIVE
HCT VFR BLD AUTO: 40.1 % (ref 37–48.5)
HGB BLD-MCNC: 12.5 G/DL (ref 12–16)
HGB UR QL STRIP: ABNORMAL
HYALINE CASTS UR QL AUTO: 0 /LPF
IMM GRANULOCYTES # BLD AUTO: 0.01 K/UL (ref 0–0.04)
IMM GRANULOCYTES NFR BLD AUTO: 0.2 % (ref 0–0.5)
KETONES UR QL STRIP: NEGATIVE
LEUKOCYTE ESTERASE UR QL STRIP: ABNORMAL
LYMPHOCYTES # BLD AUTO: 2.3 K/UL (ref 1–4.8)
LYMPHOCYTES NFR BLD: 36.6 % (ref 18–48)
MCH RBC QN AUTO: 25.3 PG (ref 27–31)
MCHC RBC AUTO-ENTMCNC: 31.2 G/DL (ref 32–36)
MCV RBC AUTO: 81 FL (ref 82–98)
MICROSCOPIC COMMENT: ABNORMAL
MONOCYTES # BLD AUTO: 0.3 K/UL (ref 0.3–1)
MONOCYTES NFR BLD: 5.1 % (ref 4–15)
NEUTROPHILS # BLD AUTO: 3.6 K/UL (ref 1.8–7.7)
NEUTROPHILS NFR BLD: 56 % (ref 38–73)
NITRITE UR QL STRIP: NEGATIVE
NRBC BLD-RTO: 0 /100 WBC
PH UR STRIP: 6 [PH] (ref 5–8)
PLATELET # BLD AUTO: 308 K/UL (ref 150–350)
PMV BLD AUTO: 10.5 FL (ref 9.2–12.9)
POTASSIUM SERPL-SCNC: 3.7 MMOL/L (ref 3.5–5.1)
PROT SERPL-MCNC: 7.6 G/DL (ref 6–8.4)
PROT UR QL STRIP: ABNORMAL
RBC # BLD AUTO: 4.94 M/UL (ref 4–5.4)
RBC #/AREA URNS AUTO: >100 /HPF (ref 0–4)
SODIUM SERPL-SCNC: 141 MMOL/L (ref 136–145)
SP GR UR STRIP: 1.02 (ref 1–1.03)
URN SPEC COLLECT METH UR: ABNORMAL
WBC # BLD AUTO: 6.32 K/UL (ref 3.9–12.7)
WBC #/AREA URNS AUTO: 19 /HPF (ref 0–5)

## 2019-11-23 PROCEDURE — 85025 COMPLETE CBC W/AUTO DIFF WBC: CPT

## 2019-11-23 PROCEDURE — 99285 PR EMERGENCY DEPT VISIT,LEVEL V: ICD-10-PCS | Mod: ,,, | Performed by: EMERGENCY MEDICINE

## 2019-11-23 PROCEDURE — 99284 EMERGENCY DEPT VISIT MOD MDM: CPT | Mod: 25

## 2019-11-23 PROCEDURE — 81001 URINALYSIS AUTO W/SCOPE: CPT

## 2019-11-23 PROCEDURE — 80053 COMPREHEN METABOLIC PANEL: CPT

## 2019-11-23 PROCEDURE — 99285 EMERGENCY DEPT VISIT HI MDM: CPT | Mod: ,,, | Performed by: EMERGENCY MEDICINE

## 2019-11-23 PROCEDURE — 87086 URINE CULTURE/COLONY COUNT: CPT

## 2019-11-23 RX ORDER — LINAGLIPTIN 5 MG/1
5 TABLET, FILM COATED ORAL DAILY
COMMUNITY
End: 2022-08-29 | Stop reason: CLARIF

## 2019-11-23 NOTE — ED TRIAGE NOTES
Patient states blood in urine x 1 year, states last night was the worst it has ever been, states urine the darkest it has ever been, states she has been to numerous doctors for same,unable to find out what is wrong.  Abdominal pain near umbilicus x 1 year.Denies fevers,

## 2019-11-23 NOTE — ED NOTES
"Patient identifiers verified and correct for Ms Seals  C/C: Blood in urine, SEE NN  APPEARANCE: awake and alert in NAD.  SKIN: warm, dry and intact. No breakdown or bruising.  MUSCULOSKELETAL: Patient moving all extremities spontaneously, no obvious swelling or deformities noted. Ambulates independently.  RESPIRATORY: Denies shortness of breath.Respirations unlabored. Denies fevers, denies back pain   CARDIAC: Denies CP, 2+ distal pulses; no peripheral edema  ABDOMEN: Abdominal pain near umbilicus "constant" x 1 year, Denies n/v.  : voids spontaneously, denies difficulty  Neurologic: AAO x 4; follows commands equal strength in all extremities; denies numbness/tingling. Denies dizziness Denies weakness    "

## 2019-11-23 NOTE — ED PROVIDER NOTES
Encounter Date: 2019       History     Chief Complaint   Patient presents with    Hematuria     54-year-old female presents to the ED for evaluation of gross hematuria.  Patient has had issues with hematuria over the past year.  Patient presents today because she noticed that there was more blood in her urine than usual over the past couple of days.  She denies dysuria, fever, flank pain.  She does report a periumbilical abdominal pain, but this has been ongoing for the past year.  Patient states that she was seen by Nephrology in October ([facility] in [place], no evidence of this and chart).          Review of patient's allergies indicates:   Allergen Reactions    Lisinopril-hydrochlorothiazide Swelling     Facial swelling/ mouth swelling     Past Medical History:   Diagnosis Date    Anemia     Depression     Diabetes mellitus type 2, noninsulin dependent 2016    Hematuria     Hx of essential hypertension     Hyperlipidemia     Hypertension, uncontrolled 2016    Nuclear sclerosis of both eyes 4/10/2017     Past Surgical History:   Procedure Laterality Date    BTL       SECTION      COLONOSCOPY N/A 2017    Procedure: COLONOSCOPY;  Surgeon: Oleg Enciso MD;  Location: [address]);  Service: Endoscopy;  Laterality: N/A;  CHRONIC CONSTIPATION S/P LRNY    CYSTOSCOPY  2019    Procedure: CYSTOSCOPY;  Surgeon: ELIZABETH Mendoza MD;  Location: [facility] OR;  Service: Urology;;  PRE-OP BY RN 3-    GASTRIC BYPASS      sandra en y    HYSTEROSCOPY WITH DILATION AND CURETTAGE OF UTERUS N/A 2018    Procedure: HYSTEROSCOPY, WITH DILATION AND CURETTAGE OF UTERUS;  Surgeon: Naveed Alexander MD;  Location: [facility] OR;  Service: OB/GYN;  Laterality: N/A;  RN PREOP 2018    NECK SURGERY  2016    TOTAL REDUCTION MAMMOPLASTY       Family History   Problem Relation Age of Onset    Heart disease Mother     Diabetes type II Mother     Heart attack Father 50        Open  heart surgery    No Known Problems Sister     No Known Problems Brother     No Known Problems Maternal Aunt     No Known Problems Maternal Uncle     No Known Problems Paternal Aunt     No Known Problems Paternal Uncle     No Known Problems Maternal Grandmother     No Known Problems Maternal Grandfather     No Known Problems Paternal Grandmother     No Known Problems Paternal Grandfather     Amblyopia Neg Hx     Blindness Neg Hx     Cancer Neg Hx     Cataracts Neg Hx     Diabetes Neg Hx     Glaucoma Neg Hx     Hypertension Neg Hx     Macular degeneration Neg Hx     Retinal detachment Neg Hx     Strabismus Neg Hx     Stroke Neg Hx     Thyroid disease Neg Hx      Social History     Tobacco Use    Smoking status: Never Smoker    Smokeless tobacco: Never Used   Substance Use Topics    Alcohol use: No     Alcohol/week: 0.0 standard drinks     Comment: Socially    Drug use: No     Review of Systems   Constitutional: Negative for fever.   HENT: Negative for sore throat.    Respiratory: Negative for shortness of breath.    Cardiovascular: Negative for chest pain.   Gastrointestinal: Negative for nausea.   Genitourinary: Positive for hematuria. Negative for dysuria and flank pain.   Musculoskeletal: Negative for back pain.   Skin: Negative for rash.   Neurological: Negative for weakness.   Hematological: Does not bruise/bleed easily.       Physical Exam     Initial Vitals [11/23/19 1101]   BP Pulse Resp Temp SpO2   (!) 163/78 100 16 98.4 °F (36.9 °C) 98 %      MAP       --         Physical Exam    Nursing note and vitals reviewed.  Constitutional: She appears well-developed and well-nourished. She is not diaphoretic.  Non-toxic appearance. She does not appear ill. No distress.   HENT:   Head: Normocephalic and atraumatic.   Neck: Neck supple.   Cardiovascular: Normal rate and regular rhythm. Exam reveals no gallop and no friction rub.    No murmur heard.  Pulmonary/Chest: Effort normal and breath  sounds normal. No accessory muscle usage. No tachypnea. No respiratory distress. She has no decreased breath sounds. She has no wheezes. She has no rhonchi. She has no rales.   Abdominal: She exhibits no distension and no mass. There is generalized tenderness. There is no guarding.   Mild generalized ttp    Neurological: She is alert.   Skin: No rash noted.   Psychiatric: She has a normal mood and affect. Her behavior is normal.         ED Course   Procedures  Labs Reviewed   URINALYSIS, REFLEX TO URINE CULTURE - Abnormal; Notable for the following components:       Result Value    Color, UA Red (*)     Appearance, UA Cloudy (*)     Protein, UA 2+ (*)     Occult Blood UA 3+ (*)     Leukocytes, UA Trace (*)     All other components within normal limits    Narrative:     Preferred Collection Type->Urine, Clean Catch   CBC W/ AUTO DIFFERENTIAL - Abnormal; Notable for the following components:    Mean Corpuscular Volume 81 (*)     Mean Corpuscular Hemoglobin 25.3 (*)     Mean Corpuscular Hemoglobin Conc 31.2 (*)     RDW 14.6 (*)     All other components within normal limits   COMPREHENSIVE METABOLIC PANEL - Abnormal; Notable for the following components:    Glucose 121 (*)     All other components within normal limits   URINALYSIS MICROSCOPIC - Abnormal; Notable for the following components:    RBC, UA >100 (*)     WBC, UA 19 (*)     All other components within normal limits    Narrative:     Preferred Collection Type->Urine, Clean Catch   CULTURE, URINE          Imaging Results          US Retroperitoneal Complete (Final result)  Result time 11/23/19 15:45:49    Final result by Dereje Castro DO (11/23/19 15:45:49)                 Impression:      0.3 cm calcification in the upper pole of right kidney, possibly representing a nonobstructing stone.    Electronically signed by resident: Diana Carter  Date:    11/23/2019  Time:    14:18    Electronically signed by: Dereje Castro  Date:    11/23/2019  Time:    15:45              Narrative:    EXAMINATION:  US RETROPERITONEAL COMPLETE    CLINICAL HISTORY:  gross hematuria;    TECHNIQUE:  Ultrasound of the kidneys and urinary bladder was performed including color flow and Doppler evaluation of the kidneys.    COMPARISON:  Ultrasound retroperitoneum 05/14/2018.    FINDINGS:  Right kidney: Normal in size, measuring 11.3 cm. No cortical thinning. No loss of corticomedullary distinction. Resistive index measures 0.63.  No mass. 0.3 cm calcification in the upper pole.  No hydronephrosis.    Left kidney: Normal in size, measuring 12.2 cm. No cortical thinning. No loss of corticomedullary distinction. Resistive index measures 0.63.  No mass. No renal stone. No hydronephrosis.    The bladder is partially distended at the time of scanning and has an unremarkable appearance.                                 Medical Decision Making:   History:   Old Medical Records: I decided to obtain old medical records.  Differential Diagnosis:   54-year-old female with a history of gross hematuria presents with worsening gross hematuria for the past couple of days.  Vitals stable. Patient afebrile.    UA with greater than 100 RBCs, 19 WBCs and only occasional bacteria.  Patient denies urinary symptoms. I will wait for culture before treating.  Blood work revealed normal renal function.  No concerning abnormalities.  Retroperitoneal ultrasound reveals a likely 0.3cm nonobstructing stone.  No other acute abnormalities.    I will discharge patient in stable condition. Advised her to follow up with Urology for further management.  Patient is comfortable with this plan.    I have reviewed the patient's records and discussed this case with my supervising physician.    Clinical Tests:   Lab Tests: Ordered  Radiological Study: Ordered                                 Clinical Impression:       ICD-10-CM ICD-9-CM   1. Gross hematuria R31.0 599.71         Disposition:   Disposition: Discharged  Condition:  Stable                   Abigail Bravo PA-C  11/23/19 4012

## 2019-11-24 LAB
BACTERIA UR CULT: NORMAL
BACTERIA UR CULT: NORMAL

## 2019-12-10 ENCOUNTER — OFFICE VISIT (OUTPATIENT)
Dept: UROLOGY | Facility: CLINIC | Age: 54
End: 2019-12-10
Payer: MEDICARE

## 2019-12-10 VITALS
HEIGHT: 62 IN | BODY MASS INDEX: 34.53 KG/M2 | SYSTOLIC BLOOD PRESSURE: 132 MMHG | WEIGHT: 187.63 LBS | DIASTOLIC BLOOD PRESSURE: 82 MMHG

## 2019-12-10 DIAGNOSIS — R35.1 NOCTURIA MORE THAN TWICE PER NIGHT: ICD-10-CM

## 2019-12-10 DIAGNOSIS — R31.0 GROSS HEMATURIA: Primary | ICD-10-CM

## 2019-12-10 DIAGNOSIS — N20.0 KIDNEY STONE: ICD-10-CM

## 2019-12-10 LAB
BILIRUB SERPL-MCNC: ABNORMAL MG/DL
BLOOD URINE, POC: POSITIVE
COLOR, POC UA: ABNORMAL
GLUCOSE UR QL STRIP: ABNORMAL
KETONES UR QL STRIP: ABNORMAL
LEUKOCYTE ESTERASE URINE, POC: POSITIVE
NITRITE, POC UA: POSITIVE
PH, POC UA: 7
PROTEIN, POC: ABNORMAL
SPECIFIC GRAVITY, POC UA: 1000
UROBILINOGEN, POC UA: ABNORMAL

## 2019-12-10 PROCEDURE — 81001 PR  URINALYSIS, AUTO, W/SCOPE: ICD-10-PCS | Mod: S$GLB,,, | Performed by: UROLOGY

## 2019-12-10 PROCEDURE — 87086 URINE CULTURE/COLONY COUNT: CPT

## 2019-12-10 PROCEDURE — 99214 PR OFFICE/OUTPT VISIT, EST, LEVL IV, 30-39 MIN: ICD-10-PCS | Mod: 25,S$GLB,, | Performed by: UROLOGY

## 2019-12-10 PROCEDURE — 99999 PR PBB SHADOW E&M-EST. PATIENT-LVL III: ICD-10-PCS | Mod: PBBFAC,,, | Performed by: UROLOGY

## 2019-12-10 PROCEDURE — 99999 PR PBB SHADOW E&M-EST. PATIENT-LVL III: CPT | Mod: PBBFAC,,, | Performed by: UROLOGY

## 2019-12-10 PROCEDURE — 3008F BODY MASS INDEX DOCD: CPT | Mod: CPTII,S$GLB,, | Performed by: UROLOGY

## 2019-12-10 PROCEDURE — 99214 OFFICE O/P EST MOD 30 MIN: CPT | Mod: 25,S$GLB,, | Performed by: UROLOGY

## 2019-12-10 PROCEDURE — 81001 URINALYSIS AUTO W/SCOPE: CPT | Mod: S$GLB,,, | Performed by: UROLOGY

## 2019-12-10 PROCEDURE — 3075F PR MOST RECENT SYSTOLIC BLOOD PRESS GE 130-139MM HG: ICD-10-PCS | Mod: CPTII,S$GLB,, | Performed by: UROLOGY

## 2019-12-10 PROCEDURE — 3075F SYST BP GE 130 - 139MM HG: CPT | Mod: CPTII,S$GLB,, | Performed by: UROLOGY

## 2019-12-10 PROCEDURE — 3008F PR BODY MASS INDEX (BMI) DOCUMENTED: ICD-10-PCS | Mod: CPTII,S$GLB,, | Performed by: UROLOGY

## 2019-12-10 PROCEDURE — 3079F DIAST BP 80-89 MM HG: CPT | Mod: CPTII,S$GLB,, | Performed by: UROLOGY

## 2019-12-10 PROCEDURE — 3079F PR MOST RECENT DIASTOLIC BLOOD PRESSURE 80-89 MM HG: ICD-10-PCS | Mod: CPTII,S$GLB,, | Performed by: UROLOGY

## 2019-12-10 RX ORDER — CIPROFLOXACIN 2 MG/ML
400 INJECTION, SOLUTION INTRAVENOUS
Status: CANCELLED | OUTPATIENT
Start: 2019-12-10

## 2019-12-10 NOTE — H&P
Subjective:       Patient ID: Antoine Seals is a 54 y.o. female who was referred by No ref. provider found    Chief Complaint:   Chief Complaint   Patient presents with    Hematuria     pt here today for gross hematuria pt states has been going on for a while       Hematuria  Patient complains of gross hematuria. Onset of hematuria was 1 month ago and was gradual in onset. There is not a history of nephrolithiasis. There is not a history of urologic trauma. Other urologic symptoms include nocturia. Patient admits to history of no risk factors for cancer. Patient denies history of Agent Orange exposure, chronic Gonzales catheter, occupational exposure, sexually transmitted diseases, tobacco use, trauma and urolithiasis. Prior workup has been UA'S.    She had a negative hematuria work up in 2018 for microscopic hematuria.    She then developed gross hematuria in the spring 2019: CT Urogram, Cx, Cystoscopy did not show anything alarming.   The did have a left non-obstructing stone.    She is back with recurrent painless gross hematuria.  She is confident it is in the urine and not vaginal.    ACTIVE MEDICAL ISSUES:  Patient Active Problem List   Diagnosis    Essential hypertension    Diabetes mellitus type 2, noninsulin dependent    Hyperlipidemia    Constipation, chronic    Nuclear sclerosis of both eyes    Type 2 diabetes mellitus without ophthalmic manifestations    Refractive error    Gastric bypass status for obesity    Iron deficiency anemia secondary to inadequate dietary iron intake    Iron deficiency anemia following bariatric surgery    Ovarian cyst    Anemia    Muscle weakness    Decreased ROM of lower extremity    Decreased mobility and endurance    Microscopic hematuria    PMB (postmenopausal bleeding)    Status post hysteroscopy    Gross hematuria       PAST MEDICAL HISTORY  Past Medical History:   Diagnosis Date    Anemia     Depression     Diabetes mellitus type 2,  noninsulin dependent 2016    Hematuria     Hx of essential hypertension     Hyperlipidemia     Hypertension, uncontrolled 2016    Nuclear sclerosis of both eyes 4/10/2017       PAST SURGICAL HISTORY:  Past Surgical History:   Procedure Laterality Date    BTL       SECTION      COLONOSCOPY N/A 2017    Procedure: COLONOSCOPY;  Surgeon: Oleg Enciso MD;  Location: [facility] ENDO (4TH FLR);  Service: Endoscopy;  Laterality: N/A;  CHRONIC CONSTIPATION S/P LRNY    CYSTOSCOPY  2019    Procedure: CYSTOSCOPY;  Surgeon: ELIZABETH Mendoza MD;  Location: [facility];  Service: Urology;;  PRE-OP BY RN 3-    GASTRIC BYPASS      sandra en y    HYSTEROSCOPY WITH DILATION AND CURETTAGE OF UTERUS N/A 2018    Procedure: HYSTEROSCOPY, WITH DILATION AND CURETTAGE OF UTERUS;  Surgeon: Naveed Alexander MD;  Location: [facility];  Service: OB/GYN;  Laterality: N/A;  RN PREOP 2018    NECK SURGERY  2016    TOTAL REDUCTION MAMMOPLASTY         SOCIAL HISTORY:  Social History     Tobacco Use    Smoking status: Never Smoker    Smokeless tobacco: Never Used   Substance Use Topics    Alcohol use: No     Alcohol/week: 0.0 standard drinks     Comment: Socially    Drug use: No       FAMILY HISTORY:  Family History   Problem Relation Age of Onset    Heart disease Mother     Diabetes type II Mother     Heart attack Father 50        Open heart surgery    No Known Problems Sister     No Known Problems Brother     No Known Problems Maternal Aunt     No Known Problems Maternal Uncle     No Known Problems Paternal Aunt     No Known Problems Paternal Uncle     No Known Problems Maternal Grandmother     No Known Problems Maternal Grandfather     No Known Problems Paternal Grandmother     No Known Problems Paternal Grandfather     Amblyopia Neg Hx     Blindness Neg Hx     Cancer Neg Hx     Cataracts Neg Hx     Diabetes Neg Hx     Glaucoma Neg Hx     Hypertension Neg Hx     Macular  degeneration Neg Hx     Retinal detachment Neg Hx     Strabismus Neg Hx     Stroke Neg Hx     Thyroid disease Neg Hx        ALLERGIES AND MEDICATIONS: updated and reviewed.  Review of patient's allergies indicates:   Allergen Reactions    Lisinopril-hydrochlorothiazide Swelling     Facial swelling/ mouth swelling     Current Outpatient Medications   Medication Sig    amLODIPine (NORVASC) 10 MG tablet TAKE 1 TABLET(10 MG) BY MOUTH EVERY DAY    b complex vitamins tablet Take 1 tablet by mouth every morning.     blood sugar diagnostic (ONETOUCH ULTRA TEST) Strp TEST 2x/day    buPROPion (WELLBUTRIN XL) 300 MG 24 hr tablet Take 300 mg by mouth once daily.     calcium citrate-vitamin D3 315-200 mg (CITRACAL+D) 315-200 mg-unit per tablet Take 1 tablet by mouth once daily.    dulaglutide (TRULICITY) 1.5 mg/0.5 mL PnIj Inject 1.5 mg into the skin every 7 days.    ENDOCET  mg per tablet Take 1 tablet by mouth 3 (three) times daily.    ertugliflozin (STEGLATRO) 5 mg Tab Take 5 mg by mouth once daily.    ezetimibe (ZETIA) 10 mg tablet Take 1 tablet (10 mg total) by mouth once daily.    lancets (ONETOUCH DELICA LANCETS) 33 gauge Misc TEST TWICE DAILY    linaGLIPtin (TRADJENTA) 5 mg Tab tablet Take 5 mg by mouth once daily.    metFORMIN (GLUCOPHAGE) 500 MG tablet Take 2 tablets with breakfast and 1 tablets with dinner    multivitamin capsule Take 1 capsule by mouth once daily.    naproxen (NAPROSYN) 500 MG tablet Take 500 mg by mouth 2 (two) times daily as needed.    polyethylene glycol (GLYCOLAX) 17 gram/dose powder Take 17 g by mouth daily as needed.    rosuvastatin (CRESTOR) 40 MG Tab Take 1 tablet (40 mg total) by mouth every evening.    aspirin (ECOTRIN) 81 MG EC tablet Take 1 tablet (81 mg total) by mouth once daily.    ONETOUCH ULTRA2 kit TEST BID UTD    phenazopyridine (PYRIDIUM) 200 MG tablet Take 1 tablet (200 mg total) by mouth 3 (three) times daily as needed for Pain (Burning). (Patient  "not taking: Reported on 12/10/2019)     No current facility-administered medications for this visit.        Review of Systems   Constitutional: Negative for activity change, fatigue, fever and unexpected weight change.   Eyes: Negative for redness and visual disturbance.   Respiratory: Negative for chest tightness and shortness of breath.    Cardiovascular: Negative for chest pain and leg swelling.   Gastrointestinal: Negative for abdominal distention, abdominal pain, constipation, diarrhea, nausea and vomiting.   Genitourinary: Positive for hematuria. Negative for difficulty urinating, dysuria, flank pain, frequency, pelvic pain, urgency and vaginal bleeding.   Musculoskeletal: Negative for arthralgias and joint swelling.   Neurological: Negative for dizziness, weakness and headaches.   Psychiatric/Behavioral: Negative for confusion. The patient is not nervous/anxious.    All other systems reviewed and are negative.      Objective:      Vitals:    12/10/19 1025   BP: 132/82   BP Location: Right arm   Patient Position: Sitting   BP Method: Large (Manual)   Weight: 85.1 kg (187 lb 9.8 oz)   Height: 5' 2" (1.575 m)     Physical Exam   Nursing note and vitals reviewed.  Constitutional: She is oriented to person, place, and time. She appears well-developed.   HENT:   Head: Normocephalic.   Eyes: Conjunctivae are normal.   Neck: Normal range of motion. No tracheal deviation present. No thyromegaly present.   Cardiovascular: Normal rate, normal heart sounds and normal pulses.    Pulmonary/Chest: Effort normal and breath sounds normal. No respiratory distress. She has no wheezes.   Abdominal: Soft. She exhibits no distension and no mass. There is no hepatosplenomegaly. There is no tenderness. There is no rebound, no guarding and no CVA tenderness. No hernia.   Musculoskeletal: Normal range of motion. She exhibits no edema or tenderness.   Lymphadenopathy:     She has no cervical adenopathy.   Neurological: She is alert and " oriented to person, place, and time.   Skin: Skin is warm and dry. No rash noted. No erythema.     Psychiatric: She has a normal mood and affect. Her behavior is normal. Judgment and thought content normal.       Urine dipstick shows positive for WBC's and positive for RBC's.  Micro exam: 10 WBC's per HPF and 250 RBC's per HPF.    CT Urogram Abd Pelvis W WO   Order: [id number]   Status:  Final result   Visible to patient:  No (Not Released)   Next appt:  None   Dx:  Gross hematuria   Details     Reading Physician Reading Date Result Priority   Vladimir Townsend MD 4/5/2019       Narrative     EXAMINATION:  CT UROGRAM ABD PELVIS W WO    CLINICAL HISTORY:  Hematuria;Gross hematuria    TECHNIQUE:  Low dose axial, sagittal and coronal reformations were obtained from the lung bases to the pubic symphysis before and following the IV administration of 125 mL of Omnipaque 350.  Timing was optimized for nephrogram and excretory renal phases.    COMPARISON:  Ultrasound 05/14/2018, CT 09/13/2017    FINDINGS:  Visualized heart is within normal limits.  Visualized lung bases show no concerning masses or nodules.    There is a 7 mm left lower pole intrarenal calculus.  No hydronephrosis.  No solid masses.  The bilateral kidneys excrete contrast appropriately.  The ureters are normal in course and caliber.  The bladder is without significant wall thickening.    The liver is normal in size without enhancing mass.  Gallbladder is unremarkable.  No biliary ductal dilatation.    Spleen, adrenal glands, and pancreas show no concerning masses.    Gastrointestinal tract shows no evidence of wall thickening or obstruction.  There has been prior gastric bypass with Jordyn-en-Y anatomy.  No free fluid or free gas.  No lymphadenopathy.    Uterus appears grossly unremarkable.  Cystic lesions of the bilateral adnexa as seen on prior ultrasound.    Vascular structures show no aneurysm or significant atherosclerotic calcification.  There are no  acute fractures or destructive osseous lesions.  There are degenerative changes of the lumbar spine.  No concerning subcutaneous abnormalities.  Fat containing umbilical hernia.      Impression       Nonobstructing 7 mm left lower pole calculus.      Electronically signed by: Vladimir Townsend MD  Date: 04/05/2019  Time: 15:39            Last Resulted: 04/05/19 15:39           US Retroperitoneal Complete   Order: [id number]   Status:  Final result   Visible to patient:  No (Not Released)   Next appt:  None   Details     Reading Physician Reading Date Result Priority   Dereje Castro, DO 11/23/2019 STAT      Narrative     EXAMINATION:  US RETROPERITONEAL COMPLETE    CLINICAL HISTORY:  gross hematuria;    TECHNIQUE:  Ultrasound of the kidneys and urinary bladder was performed including color flow and Doppler evaluation of the kidneys.    COMPARISON:  Ultrasound retroperitoneum 05/14/2018.    FINDINGS:  Right kidney: Normal in size, measuring 11.3 cm. No cortical thinning. No loss of corticomedullary distinction. Resistive index measures 0.63.  No mass. 0.3 cm calcification in the upper pole.  No hydronephrosis.    Left kidney: Normal in size, measuring 12.2 cm. No cortical thinning. No loss of corticomedullary distinction. Resistive index measures 0.63.  No mass. No renal stone. No hydronephrosis.    The bladder is partially distended at the time of scanning and has an unremarkable appearance.      Impression       0.3 cm calcification in the upper pole of right kidney, possibly representing a nonobstructing stone.    Electronically signed by resident: Diana Carter  Date: 11/23/2019  Time: 14:18    Electronically signed by: Dereje Castro  Date: 11/23/2019  Time: 15:45            Last Resulted: 11/23/19 15:45               Assessment:       1. Gross hematuria    2. Nocturia more than twice per night    3. Kidney stone          Plan:       1. Gross hematuria  Will repeat cystoscopy, bilateral retrograde pyelogram    -  POCT urinalysis, dipstick or tablet reag  - Urine culture    2. Nocturia more than twice per night  Limit evening fluids    3. Kidney stone  Cystoscopy, left ureteroscopy, laser lithotripsy with stent placement on Monday 12/30/2019            No follow-ups on file.

## 2019-12-10 NOTE — PROGRESS NOTES
Subjective:       Patient ID: Antoine Seals is a 54 y.o. female who was referred by No ref. provider found    Chief Complaint:   Chief Complaint   Patient presents with    Hematuria     pt here today for gross hematuria pt states has been going on for a while       Hematuria  Patient complains of gross hematuria. Onset of hematuria was 1 month ago and was gradual in onset. There is not a history of nephrolithiasis. There is not a history of urologic trauma. Other urologic symptoms include nocturia. Patient admits to history of no risk factors for cancer. Patient denies history of Agent Orange exposure, chronic Gonzales catheter, occupational exposure, sexually transmitted diseases, tobacco use, trauma and urolithiasis. Prior workup has been UA'S.    She had a negative hematuria work up in 2018 for microscopic hematuria.    She then developed gross hematuria in the spring 2019: CT Urogram, Cx, Cystoscopy did not show anything alarming.   The did have a left non-obstructing stone.    She is back with recurrent painless gross hematuria.  She is confident it is in the urine and not vaginal.    ACTIVE MEDICAL ISSUES:  Patient Active Problem List   Diagnosis    Essential hypertension    Diabetes mellitus type 2, noninsulin dependent    Hyperlipidemia    Constipation, chronic    Nuclear sclerosis of both eyes    Type 2 diabetes mellitus without ophthalmic manifestations    Refractive error    Gastric bypass status for obesity    Iron deficiency anemia secondary to inadequate dietary iron intake    Iron deficiency anemia following bariatric surgery    Ovarian cyst    Anemia    Muscle weakness    Decreased ROM of lower extremity    Decreased mobility and endurance    Microscopic hematuria    PMB (postmenopausal bleeding)    Status post hysteroscopy    Gross hematuria       PAST MEDICAL HISTORY  Past Medical History:   Diagnosis Date    Anemia     Depression     Diabetes mellitus type 2,  noninsulin dependent 2016    Hematuria     Hx of essential hypertension     Hyperlipidemia     Hypertension, uncontrolled 2016    Nuclear sclerosis of both eyes 4/10/2017       PAST SURGICAL HISTORY:  Past Surgical History:   Procedure Laterality Date    BTL       SECTION      COLONOSCOPY N/A 2017    Procedure: COLONOSCOPY;  Surgeon: Oleg Enciso MD;  Location: [facility] ENDO (4TH FLR);  Service: Endoscopy;  Laterality: N/A;  CHRONIC CONSTIPATION S/P LRNY    CYSTOSCOPY  2019    Procedure: CYSTOSCOPY;  Surgeon: ELIZABETH Mendoza MD;  Location: [facility];  Service: Urology;;  PRE-OP BY RN 3-    GASTRIC BYPASS      sandra en y    HYSTEROSCOPY WITH DILATION AND CURETTAGE OF UTERUS N/A 2018    Procedure: HYSTEROSCOPY, WITH DILATION AND CURETTAGE OF UTERUS;  Surgeon: Naveed Alexander MD;  Location: [facility];  Service: OB/GYN;  Laterality: N/A;  RN PREOP 2018    NECK SURGERY  2016    TOTAL REDUCTION MAMMOPLASTY         SOCIAL HISTORY:  Social History     Tobacco Use    Smoking status: Never Smoker    Smokeless tobacco: Never Used   Substance Use Topics    Alcohol use: No     Alcohol/week: 0.0 standard drinks     Comment: Socially    Drug use: No       FAMILY HISTORY:  Family History   Problem Relation Age of Onset    Heart disease Mother     Diabetes type II Mother     Heart attack Father 50        Open heart surgery    No Known Problems Sister     No Known Problems Brother     No Known Problems Maternal Aunt     No Known Problems Maternal Uncle     No Known Problems Paternal Aunt     No Known Problems Paternal Uncle     No Known Problems Maternal Grandmother     No Known Problems Maternal Grandfather     No Known Problems Paternal Grandmother     No Known Problems Paternal Grandfather     Amblyopia Neg Hx     Blindness Neg Hx     Cancer Neg Hx     Cataracts Neg Hx     Diabetes Neg Hx     Glaucoma Neg Hx     Hypertension Neg Hx     Macular  degeneration Neg Hx     Retinal detachment Neg Hx     Strabismus Neg Hx     Stroke Neg Hx     Thyroid disease Neg Hx        ALLERGIES AND MEDICATIONS: updated and reviewed.  Review of patient's allergies indicates:   Allergen Reactions    Lisinopril-hydrochlorothiazide Swelling     Facial swelling/ mouth swelling     Current Outpatient Medications   Medication Sig    amLODIPine (NORVASC) 10 MG tablet TAKE 1 TABLET(10 MG) BY MOUTH EVERY DAY    b complex vitamins tablet Take 1 tablet by mouth every morning.     blood sugar diagnostic (ONETOUCH ULTRA TEST) Strp TEST 2x/day    buPROPion (WELLBUTRIN XL) 300 MG 24 hr tablet Take 300 mg by mouth once daily.     calcium citrate-vitamin D3 315-200 mg (CITRACAL+D) 315-200 mg-unit per tablet Take 1 tablet by mouth once daily.    dulaglutide (TRULICITY) 1.5 mg/0.5 mL PnIj Inject 1.5 mg into the skin every 7 days.    ENDOCET  mg per tablet Take 1 tablet by mouth 3 (three) times daily.    ertugliflozin (STEGLATRO) 5 mg Tab Take 5 mg by mouth once daily.    ezetimibe (ZETIA) 10 mg tablet Take 1 tablet (10 mg total) by mouth once daily.    lancets (ONETOUCH DELICA LANCETS) 33 gauge Misc TEST TWICE DAILY    linaGLIPtin (TRADJENTA) 5 mg Tab tablet Take 5 mg by mouth once daily.    metFORMIN (GLUCOPHAGE) 500 MG tablet Take 2 tablets with breakfast and 1 tablets with dinner    multivitamin capsule Take 1 capsule by mouth once daily.    naproxen (NAPROSYN) 500 MG tablet Take 500 mg by mouth 2 (two) times daily as needed.    polyethylene glycol (GLYCOLAX) 17 gram/dose powder Take 17 g by mouth daily as needed.    rosuvastatin (CRESTOR) 40 MG Tab Take 1 tablet (40 mg total) by mouth every evening.    aspirin (ECOTRIN) 81 MG EC tablet Take 1 tablet (81 mg total) by mouth once daily.    ONETOUCH ULTRA2 kit TEST BID UTD    phenazopyridine (PYRIDIUM) 200 MG tablet Take 1 tablet (200 mg total) by mouth 3 (three) times daily as needed for Pain (Burning). (Patient  "not taking: Reported on 12/10/2019)     No current facility-administered medications for this visit.        Review of Systems   Constitutional: Negative for activity change, fatigue, fever and unexpected weight change.   Eyes: Negative for redness and visual disturbance.   Respiratory: Negative for chest tightness and shortness of breath.    Cardiovascular: Negative for chest pain and leg swelling.   Gastrointestinal: Negative for abdominal distention, abdominal pain, constipation, diarrhea, nausea and vomiting.   Genitourinary: Positive for hematuria. Negative for difficulty urinating, dysuria, flank pain, frequency, pelvic pain, urgency and vaginal bleeding.   Musculoskeletal: Negative for arthralgias and joint swelling.   Neurological: Negative for dizziness, weakness and headaches.   Psychiatric/Behavioral: Negative for confusion. The patient is not nervous/anxious.    All other systems reviewed and are negative.      Objective:      Vitals:    12/10/19 1025   BP: 132/82   BP Location: Right arm   Patient Position: Sitting   BP Method: Large (Manual)   Weight: 85.1 kg (187 lb 9.8 oz)   Height: 5' 2" (1.575 m)     Physical Exam   Nursing note and vitals reviewed.  Constitutional: She is oriented to person, place, and time. She appears well-developed.   HENT:   Head: Normocephalic.   Eyes: Conjunctivae are normal.   Neck: Normal range of motion. No tracheal deviation present. No thyromegaly present.   Cardiovascular: Normal rate, normal heart sounds and normal pulses.    Pulmonary/Chest: Effort normal and breath sounds normal. No respiratory distress. She has no wheezes.   Abdominal: Soft. She exhibits no distension and no mass. There is no hepatosplenomegaly. There is no tenderness. There is no rebound, no guarding and no CVA tenderness. No hernia.   Musculoskeletal: Normal range of motion. She exhibits no edema or tenderness.   Lymphadenopathy:     She has no cervical adenopathy.   Neurological: She is alert and " oriented to person, place, and time.   Skin: Skin is warm and dry. No rash noted. No erythema.     Psychiatric: She has a normal mood and affect. Her behavior is normal. Judgment and thought content normal.       Urine dipstick shows positive for WBC's and positive for RBC's.  Micro exam: 10 WBC's per HPF and 250 RBC's per HPF.    CT Urogram Abd Pelvis W WO   Order: [id number]   Status:  Final result   Visible to patient:  No (Not Released)   Next appt:  None   Dx:  Gross hematuria   Details     Reading Physician Reading Date Result Priority   Vladimir Townsend MD 4/5/2019       Narrative     EXAMINATION:  CT UROGRAM ABD PELVIS W WO    CLINICAL HISTORY:  Hematuria;Gross hematuria    TECHNIQUE:  Low dose axial, sagittal and coronal reformations were obtained from the lung bases to the pubic symphysis before and following the IV administration of 125 mL of Omnipaque 350.  Timing was optimized for nephrogram and excretory renal phases.    COMPARISON:  Ultrasound 05/14/2018, CT 09/13/2017    FINDINGS:  Visualized heart is within normal limits.  Visualized lung bases show no concerning masses or nodules.    There is a 7 mm left lower pole intrarenal calculus.  No hydronephrosis.  No solid masses.  The bilateral kidneys excrete contrast appropriately.  The ureters are normal in course and caliber.  The bladder is without significant wall thickening.    The liver is normal in size without enhancing mass.  Gallbladder is unremarkable.  No biliary ductal dilatation.    Spleen, adrenal glands, and pancreas show no concerning masses.    Gastrointestinal tract shows no evidence of wall thickening or obstruction.  There has been prior gastric bypass with Jordyn-en-Y anatomy.  No free fluid or free gas.  No lymphadenopathy.    Uterus appears grossly unremarkable.  Cystic lesions of the bilateral adnexa as seen on prior ultrasound.    Vascular structures show no aneurysm or significant atherosclerotic calcification.  There are no  acute fractures or destructive osseous lesions.  There are degenerative changes of the lumbar spine.  No concerning subcutaneous abnormalities.  Fat containing umbilical hernia.      Impression       Nonobstructing 7 mm left lower pole calculus.      Electronically signed by: Vladimir Townsend MD  Date: 04/05/2019  Time: 15:39            Last Resulted: 04/05/19 15:39           US Retroperitoneal Complete   Order: [id number]   Status:  Final result   Visible to patient:  No (Not Released)   Next appt:  None   Details     Reading Physician Reading Date Result Priority   Dereje Castro, DO 11/23/2019 STAT      Narrative     EXAMINATION:  US RETROPERITONEAL COMPLETE    CLINICAL HISTORY:  gross hematuria;    TECHNIQUE:  Ultrasound of the kidneys and urinary bladder was performed including color flow and Doppler evaluation of the kidneys.    COMPARISON:  Ultrasound retroperitoneum 05/14/2018.    FINDINGS:  Right kidney: Normal in size, measuring 11.3 cm. No cortical thinning. No loss of corticomedullary distinction. Resistive index measures 0.63.  No mass. 0.3 cm calcification in the upper pole.  No hydronephrosis.    Left kidney: Normal in size, measuring 12.2 cm. No cortical thinning. No loss of corticomedullary distinction. Resistive index measures 0.63.  No mass. No renal stone. No hydronephrosis.    The bladder is partially distended at the time of scanning and has an unremarkable appearance.      Impression       0.3 cm calcification in the upper pole of right kidney, possibly representing a nonobstructing stone.    Electronically signed by resident: Diana Carter  Date: 11/23/2019  Time: 14:18    Electronically signed by: Dereje Castro  Date: 11/23/2019  Time: 15:45            Last Resulted: 11/23/19 15:45               Assessment:       1. Gross hematuria    2. Nocturia more than twice per night    3. Kidney stone          Plan:       1. Gross hematuria  Will repeat cystoscopy, bilateral retrograde pyelogram    -  POCT urinalysis, dipstick or tablet reag  - Urine culture    2. Nocturia more than twice per night  Limit evening fluids    3. Kidney stone  Cystoscopy, left ureteroscopy, laser lithotripsy with stent placement on Monday 12/30/2019            No follow-ups on file.

## 2019-12-12 LAB — BACTERIA UR CULT: NORMAL

## 2019-12-23 ENCOUNTER — TELEPHONE (OUTPATIENT)
Dept: UROLOGY | Facility: CLINIC | Age: 54
End: 2019-12-23

## 2019-12-23 NOTE — TELEPHONE ENCOUNTER
----- Message from Tanya Harrell sent at 12/23/2019 12:03 PM CST -----  Contact: Antoine [phone]  Type: Patient Call Back    Who called:Antoine     What is the request in detail: The patient is requesting a call back from the staff. She would like to cancel her upcoming surgery on 12/30    Can the clinic reply by [facility]?no    Would the patient rather a call back or a response via [facility]? Call back     Best call back number:[phone]

## 2020-01-15 ENCOUNTER — HOSPITAL ENCOUNTER (EMERGENCY)
Facility: HOSPITAL | Age: 55
Discharge: HOME | End: 2020-01-15
Attending: EMERGENCY MEDICINE
Payer: MEDICARE

## 2020-01-15 DIAGNOSIS — R22.0 TONGUE SWELLING: Primary | ICD-10-CM

## 2020-01-15 LAB — POCT GLUCOSE: 94 MG/DL (ref 70–110)

## 2020-01-15 PROCEDURE — 63600175 PHARM REV CODE 636 W HCPCS: Mod: ER | Performed by: EMERGENCY MEDICINE

## 2020-01-15 PROCEDURE — 82962 GLUCOSE BLOOD TEST: CPT | Mod: 59,ER

## 2020-01-15 PROCEDURE — 25000003 PHARM REV CODE 250: Mod: ER | Performed by: EMERGENCY MEDICINE

## 2020-01-15 PROCEDURE — 99283 EMERGENCY DEPT VISIT LOW MDM: CPT | Mod: 25,ER

## 2020-01-15 RX ORDER — PREDNISONE 20 MG/1
40 TABLET ORAL
Status: COMPLETED | OUTPATIENT
Start: 2020-01-15 | End: 2020-01-15

## 2020-01-15 RX ORDER — PREDNISONE 10 MG/1
10 TABLET ORAL DAILY
Qty: 5 TABLET | Refills: 0 | Status: SHIPPED | OUTPATIENT
Start: 2020-01-15 | End: 2020-01-20

## 2020-01-15 RX ORDER — DIPHENHYDRAMINE HCL 25 MG
25 CAPSULE ORAL
Status: COMPLETED | OUTPATIENT
Start: 2020-01-15 | End: 2020-01-15

## 2020-01-15 RX ORDER — FAMOTIDINE 20 MG/1
20 TABLET, FILM COATED ORAL
Status: COMPLETED | OUTPATIENT
Start: 2020-01-15 | End: 2020-01-15

## 2020-01-15 RX ADMIN — PREDNISONE 40 MG: 20 TABLET ORAL at 10:01

## 2020-01-15 RX ADMIN — DIPHENHYDRAMINE HYDROCHLORIDE 25 MG: 25 CAPSULE ORAL at 10:01

## 2020-01-15 RX ADMIN — FAMOTIDINE 20 MG: 20 TABLET ORAL at 10:01

## 2020-01-16 VITALS
TEMPERATURE: 98 F | RESPIRATION RATE: 18 BRPM | SYSTOLIC BLOOD PRESSURE: 163 MMHG | WEIGHT: 186 LBS | OXYGEN SATURATION: 99 % | HEART RATE: 74 BPM | BODY MASS INDEX: 34.23 KG/M2 | HEIGHT: 62 IN | DIASTOLIC BLOOD PRESSURE: 89 MMHG

## 2020-01-16 NOTE — ED PROVIDER NOTES
Encounter Date: 1/15/2020    SCRIBE #1 NOTE: I, Rosalina Tidwell, am scribing for, and in the presence of,  Dr. Joshua Alcocer. I have scribed the following portions of the note - Other sections scribed: HPI, ROS, PE.       History     Chief Complaint   Patient presents with    Oral Swelling     pt presents to ER with c/o swelling to tongue that started tonight.  she states she had swelling to lips on Monday that resolved and then the tongue swelling started tonight.  She did not take Benadryl tonight.       Antoine Seals is a 54 y.o. Female with HTN who presents to the ED complaining of tongue swelling beginning tonight. Patient notes she had swollen lips (resolved s/p Bendaryl) x 2 days ago. Denies rash, fever, dental problems, CP and SOB. Denies any changes in diet. Denies Benadryl intake today. Currently on Norvasc for HTN. Allergies to Lisinopril.       The history is provided by the patient. No  was used.     Review of patient's allergies indicates:   Allergen Reactions    Lisinopril-hydrochlorothiazide Swelling     Facial swelling/ mouth swelling     Past Medical History:   Diagnosis Date    Anemia     Depression     Diabetes mellitus type 2, noninsulin dependent 2016    Hematuria     Hx of essential hypertension     Hyperlipidemia     Hypertension, uncontrolled 2016    Nuclear sclerosis of both eyes 4/10/2017     Past Surgical History:   Procedure Laterality Date    BTL       SECTION      COLONOSCOPY N/A 2017    Procedure: COLONOSCOPY;  Surgeon: Oleg Enciso MD;  Location: [address]);  Service: Endoscopy;  Laterality: N/A;  CHRONIC CONSTIPATION S/P LRNY    CYSTOSCOPY  2019    Procedure: CYSTOSCOPY;  Surgeon: ELIZABETH Mendoza MD;  Location: [facility] OR;  Service: Urology;;  PRE-OP BY RN 3-    GASTRIC BYPASS      sandra en y    HYSTEROSCOPY WITH DILATION AND CURETTAGE OF UTERUS N/A 2018    Procedure: HYSTEROSCOPY,  WITH DILATION AND CURETTAGE OF UTERUS;  Surgeon: Naveed Alexander MD;  Location: [facility];  Service: OB/GYN;  Laterality: N/A;  RN PREOP 9/26/2018    NECK SURGERY  09/30/2016    TOTAL REDUCTION MAMMOPLASTY       Family History   Problem Relation Age of Onset    Heart disease Mother     Diabetes type II Mother     Heart attack Father 50        Open heart surgery    No Known Problems Sister     No Known Problems Brother     No Known Problems Maternal Aunt     No Known Problems Maternal Uncle     No Known Problems Paternal Aunt     No Known Problems Paternal Uncle     No Known Problems Maternal Grandmother     No Known Problems Maternal Grandfather     No Known Problems Paternal Grandmother     No Known Problems Paternal Grandfather     Amblyopia Neg Hx     Blindness Neg Hx     Cancer Neg Hx     Cataracts Neg Hx     Diabetes Neg Hx     Glaucoma Neg Hx     Hypertension Neg Hx     Macular degeneration Neg Hx     Retinal detachment Neg Hx     Strabismus Neg Hx     Stroke Neg Hx     Thyroid disease Neg Hx      Social History     Tobacco Use    Smoking status: Never Smoker    Smokeless tobacco: Never Used   Substance Use Topics    Alcohol use: No     Alcohol/week: 0.0 standard drinks     Comment: Socially    Drug use: No     Review of Systems   Constitutional: Negative.  Negative for fever.   HENT: Negative.  Negative for dental problem and sore throat.         + swollen tongue   Eyes: Negative.    Respiratory: Negative.  Negative for shortness of breath.    Cardiovascular: Negative.  Negative for chest pain.   Gastrointestinal: Negative.  Negative for nausea and vomiting.   Endocrine: Negative.    Genitourinary: Negative.  Negative for dysuria.   Musculoskeletal: Negative.  Negative for myalgias.   Skin: Negative.  Negative for rash.   Allergic/Immunologic: Negative.    Neurological: Negative.  Negative for headaches.   Hematological: Negative.  Negative for adenopathy.   Psychiatric/Behavioral:  Negative.  Negative for behavioral problems.   All other systems reviewed and are negative.      Physical Exam     Initial Vitals [01/15/20 2233]   BP Pulse Resp Temp SpO2   (!) 180/83 74 18 98.6 °F (37 °C) 99 %      MAP       --         Physical Exam    Nursing note and vitals reviewed.  Constitutional: She appears well-developed and well-nourished.   HENT:   Head: Normocephalic and atraumatic.   Right Ear: External ear normal.   Left Ear: External ear normal.   Nose: Nose normal.   Mouth/Throat: Uvula is midline, oropharynx is clear and moist and mucous membranes are normal. She does not have dentures. No oral lesions. No trismus in the jaw. Normal dentition. No dental abscesses, uvula swelling, lacerations or dental caries.   + minimal swelling of tongue   Eyes: Conjunctivae are normal.   Neck: Normal range of motion. Neck supple.   Cardiovascular: Normal rate and intact distal pulses.   Pulmonary/Chest: Effort normal. No respiratory distress.   Abdominal: Soft. There is no tenderness.   Musculoskeletal: Normal range of motion.   Neurological: She is alert and oriented to person, place, and time.   Skin: Skin is warm and dry. Capillary refill takes less than 2 seconds.   Psychiatric: She has a normal mood and affect. Her behavior is normal.         ED Course   Procedures  Labs Reviewed   POCT GLUCOSE MONITORING CONTINUOUS   POCT GLUCOSE          Imaging Results    None          Medical Decision Making:   History:   Old Medical Records: I decided to obtain old medical records.  Clinical Tests:   Lab Tests: Ordered and Reviewed            Scribe Attestation:   Scribe #1: I performed the above scribed service and the documentation accurately describes the services I performed. I attest to the accuracy of the note.               This document was produced by a scribe under my direction and in my presence. I agree with the content of the note and have made any necessary edits.     Joshua Alcocer MD    01/16/2020  2:25 AM           Clinical Impression:     1. Tongue swelling                                Joshua Alcocer MD  01/16/20 0225

## 2020-01-30 ENCOUNTER — OFFICE VISIT (OUTPATIENT)
Dept: ALLERGY | Facility: CLINIC | Age: 55
End: 2020-01-30
Payer: MEDICARE

## 2020-01-30 VITALS — RESPIRATION RATE: 16 BRPM | BODY MASS INDEX: 34.89 KG/M2 | WEIGHT: 189.63 LBS | HEIGHT: 62 IN

## 2020-01-30 DIAGNOSIS — L29.9 ITCHING: ICD-10-CM

## 2020-01-30 DIAGNOSIS — R22.0 LIP SWELLING: ICD-10-CM

## 2020-01-30 DIAGNOSIS — T78.3XXD ANGIOEDEMA, SUBSEQUENT ENCOUNTER: Primary | ICD-10-CM

## 2020-01-30 DIAGNOSIS — E11.9 TYPE 2 DIABETES MELLITUS WITHOUT OPHTHALMIC MANIFESTATIONS: ICD-10-CM

## 2020-01-30 DIAGNOSIS — R22.0 TONGUE SWELLING: ICD-10-CM

## 2020-01-30 PROCEDURE — 99204 PR OFFICE/OUTPT VISIT, NEW, LEVL IV, 45-59 MIN: ICD-10-PCS | Mod: S$GLB,,, | Performed by: STUDENT IN AN ORGANIZED HEALTH CARE EDUCATION/TRAINING PROGRAM

## 2020-01-30 PROCEDURE — 99204 OFFICE O/P NEW MOD 45 MIN: CPT | Mod: S$GLB,,, | Performed by: STUDENT IN AN ORGANIZED HEALTH CARE EDUCATION/TRAINING PROGRAM

## 2020-01-30 PROCEDURE — 3008F BODY MASS INDEX DOCD: CPT | Mod: CPTII,S$GLB,, | Performed by: STUDENT IN AN ORGANIZED HEALTH CARE EDUCATION/TRAINING PROGRAM

## 2020-01-30 PROCEDURE — 99999 PR PBB SHADOW E&M-EST. PATIENT-LVL III: CPT | Mod: PBBFAC,,, | Performed by: STUDENT IN AN ORGANIZED HEALTH CARE EDUCATION/TRAINING PROGRAM

## 2020-01-30 PROCEDURE — 3008F PR BODY MASS INDEX (BMI) DOCUMENTED: ICD-10-PCS | Mod: CPTII,S$GLB,, | Performed by: STUDENT IN AN ORGANIZED HEALTH CARE EDUCATION/TRAINING PROGRAM

## 2020-01-30 PROCEDURE — 99999 PR PBB SHADOW E&M-EST. PATIENT-LVL III: ICD-10-PCS | Mod: PBBFAC,,, | Performed by: STUDENT IN AN ORGANIZED HEALTH CARE EDUCATION/TRAINING PROGRAM

## 2020-01-30 RX ORDER — CETIRIZINE HYDROCHLORIDE 10 MG/1
TABLET ORAL
Qty: 100 TABLET | Refills: 1 | Status: SHIPPED | OUTPATIENT
Start: 2020-01-30 | End: 2023-06-14

## 2020-01-30 RX ORDER — PLECANATIDE 3 MG/1
TABLET ORAL
COMMUNITY
Start: 2020-01-03 | End: 2021-01-21

## 2020-01-30 RX ORDER — ARIPIPRAZOLE 2 MG/1
TABLET ORAL
COMMUNITY
Start: 2020-01-03 | End: 2022-08-29 | Stop reason: CLARIF

## 2020-01-30 RX ORDER — DONEPEZIL HYDROCHLORIDE 5 MG/1
10 TABLET, FILM COATED ORAL DAILY
COMMUNITY
Start: 2019-12-10 | End: 2022-08-29 | Stop reason: CLARIF

## 2020-01-30 NOTE — PROGRESS NOTES
Allergy Clinic Note  [facility]    Subjective:      Patient ID: Antoine Seals is a 55 y.o. female.    Chief Complaint: Angioedema and Itching      Referring Provider: Jb Fernandez    History of Present Illness:  55-year-old female referred from Internal Medicine following an episode of tongue swelling.  She is here alone, and the history is difficult.    She reports 4 episodes of swelling of various parts of her body since December 2019.  She also complains of new onset diffuse itching but this is not temporally related to the swelling episodes.  The 1st episode consisted of feet swelling in burning.  The 2nd was lip swelling.  The 3rd was tongue swelling for which she went to the emergency room.  There she was treated with prednisone and H1 and H2 blockers.  The 4th episode was arm swelling as well as arm itching.  Besides the itching there are no associated symptoms.  She has had no episodes of throat swelling or other throat symptoms.  Episodes usually last than to less than 24 hr.   Five years ago she had some swelling while taking lisinopril but none after lisinopril was discontinued until December 2019.    Review of systems is notable for gross hematuria for the last 2-3 years.  She has been undergoing workup both a urology in gyn.  She has a CT scan scheduled in the near future.  She is very concerned about cancer.    Her routine cancer screening is up-to-date.  In 2019 she had both a mammogram and a GYN exam under anesthesia.  She has colonoscopies every 2-3 years since her gastric bypass.  Her last was approximately 2 years ago.    She has no other allergic syndromes.    Since onset of swelling episodes, client...  Denies fever, shakes, or chills  Admits decrease in appetite  Denies change in weight, appetite, or energy level  Admits that her hairs breaking and that her nails are brittle  Denies change in the texture of her skin  Denies change in the appearance of urine or  stool  Denies change in her chronic gross hematuria  Denies change in chronic constipation  Denies vomiting, diarrhea, constipation, or abdominal pain  Denies abnormal bleeding  Denies change in chronic back pain radiating down the right leg  Denies any new aches or pains, specifically myalgias or arthralgia,   Denies any unusual moles    Additional History:   Past medical history is significant for hypertension and diabetes.  She does not use Ace inhibitors or beta-blockers.  No Hx of ENT surgery.  Family history is negative for swelling or asthma.  Client  reports that she has never smoked. She has never used smokeless tobacco.  Exposures are notable for passive smoke.  No exposure to pets, mold, or unusual substances.     Patient Active Problem List   Diagnosis    Essential hypertension    Diabetes mellitus type 2, noninsulin dependent    Hyperlipidemia    Constipation, chronic    Nuclear sclerosis of both eyes    Type 2 diabetes mellitus without ophthalmic manifestations    Refractive error    Gastric bypass status for obesity    Iron deficiency anemia secondary to inadequate dietary iron intake    Iron deficiency anemia following bariatric surgery    Ovarian cyst    Anemia    Muscle weakness    Decreased ROM of lower extremity    Decreased mobility and endurance    Microscopic hematuria    PMB (postmenopausal bleeding)    Status post hysteroscopy    Gross hematuria     Current Outpatient Medications on File Prior to Visit   Medication Sig Dispense Refill    amLODIPine (NORVASC) 10 MG tablet TAKE 1 TABLET(10 MG) BY MOUTH EVERY DAY 90 tablet 2    ARIPiprazole (ABILIFY) 2 MG Tab       buPROPion (WELLBUTRIN XL) 300 MG 24 hr tablet Take 300 mg by mouth once daily.   1    dulaglutide (TRULICITY) 1.5 mg/0.5 mL PnIj Inject 1.5 mg into the skin every 7 days. 12 Syringe 2    ENDOCET  mg per tablet Take 1 tablet by mouth 3 (three) times daily.  0    ertugliflozin (STEGLATRO) 5 mg Tab Take 5  "mg by mouth once daily. 90 tablet 2    ezetimibe (ZETIA) 10 mg tablet Take 1 tablet (10 mg total) by mouth once daily. 90 tablet 2    linaGLIPtin (TRADJENTA) 5 mg Tab tablet Take 5 mg by mouth once daily.      rosuvastatin (CRESTOR) 40 MG Tab Take 1 tablet (40 mg total) by mouth every evening. 90 tablet 2    TRULANCE 3 mg Tab       aspirin (ECOTRIN) 81 MG EC tablet Take 1 tablet (81 mg total) by mouth once daily. (Patient not taking: Reported on 1/30/2020) 90 tablet 3    donepezil (ARICEPT) 5 MG tablet       metFORMIN (GLUCOPHAGE) 500 MG tablet Take 2 tablets with breakfast and 1 tablets with dinner (Patient not taking: Reported on 1/30/2020) 270 tablet 1     No current facility-administered medications on file prior to visit.          Review of Systems   Constitutional: Negative for chills and fever.   HENT: Negative for ear discharge and nosebleeds.    Eyes: Negative for discharge and redness.   Respiratory: Negative for hemoptysis, sputum production, shortness of breath, wheezing and stridor.    Cardiovascular: Negative for chest pain and palpitations.   Gastrointestinal: Positive for constipation. Negative for abdominal pain, blood in stool, diarrhea, melena and vomiting.   Genitourinary: Positive for hematuria. Negative for dysuria, flank pain, frequency and urgency.   Skin: Positive for itching. Negative for rash.   Neurological: Negative for seizures and loss of consciousness.   Endo/Heme/Allergies: Negative for polydipsia. Does not bruise/bleed easily.   Psychiatric/Behavioral: Positive for depression.       Objective:   Resp 16   Ht 5' 2" (1.575 m)   Wt 86 kg (189 lb 9.5 oz)   LMP 12/20/2017   BMI 34.68 kg/m²       Physical Exam   Constitutional: She is well-developed, well-nourished, and in no distress.   HENT:   Head: Normocephalic and atraumatic.   Nose: Nose normal.   Nares pink with no significant turbinates swelling.  Oropharynx benign without exudate.  Tongue is not coated.   Eyes: " Conjunctivae are normal. Right eye exhibits no discharge. Left eye exhibits no discharge.   Neck: Neck supple. No thyromegaly present.   Cardiovascular: Normal rate, regular rhythm, normal heart sounds and intact distal pulses.   Pulmonary/Chest: Effort normal and breath sounds normal. No stridor. No respiratory distress. She has no wheezes.   Abdominal: Soft. She exhibits no distension and no mass. There is tenderness. There is no rebound and no guarding.   Diffuse mild abdominal tenderness without guarding or rebound   Musculoskeletal: She exhibits no edema or deformity.   Lymphadenopathy:     She has no cervical adenopathy.   Neurological: She is alert. GCS score is 15.   Skin: No rash noted. No erythema.   Psychiatric: Affect normal.   Speech fluent and logical       Data:   Reviewed ED note of 01/15/2020.  She presented complaining of tongue swelling for 1 day.  Notably she had had swollen lips 2 days prior.  There were no other symptoms.  Physical exam notable for elevated blood pressure, pulse 74, respirations 18, sat 99%.  Tongue is described as minimally swollen.  She was treated with Benadryl, Pepcid, and prednisone.  She was discharged on prednisone 10 mg daily.    Labs (11/23/2019)  CBC notable for microcytic indices without flor anemia.  Hemoglobin 12.5  Eo count 100  CMP unremarkable.      Assessment:     1. Angioedema, subsequent encounter    2. Tongue swelling    3. Lip swelling    4. Type 2 diabetes mellitus without ophthalmic manifestations    5. Itching        Plan:     Medical decision making:  Patient is presenting with recurrent episodes of angioedema.  There is no evidence of anaphylaxis and she has had no laryngeal edema.  No etiology is evident from initial history and physical.  I agree with CT scan to continue evaluation of her gross hematuria, although the duration makes of renal malignancy unlikely.  On the basis of the duration of her symptoms I recommend evaluating for allergies as  well as for internal diseases that can present as angioedema, including C1 esterase deficiency.        Based on the history I obtained today, it appears that the itching is temporally unrelated to the swelling episodes.         Since her swelling episodes are fairly rare, I recommend using antihistamines on a p.r.n. basis.  I am recommending Zyrtec in place of Benadryl due to the lower incidence of drowsiness.  She may also use the Zyrtec as needed for itching.    Antoine was seen today for angioedema and itching.    Diagnoses and all orders for this visit:    Angioedema, subsequent encounter  -     IgE; Future  -     Dermatophagoides [place]; Future  -     Dermatophagoides Pteronyssinus; Future  -     [place]; Future  -     [place]; Future  -     [place]; Future  -     English Plantain; Future  -     Oak Pecan; Future  -     Pecan; Future  -     M[place]; Future  -     Ragweed; Future  -     Alternaria; Future  -     Aspergillus; Future  -     Cat; Future  -     Cockroach; Future  -     Dog; Future  -     Milk IgE; Future  -     Soybean IgE; Future  -     Egg, white IgE; Future  -     Corn IgE; Future  -     Sesame seed IgE; Future  -     Rast Allergen-Latex; Future  -     Anti-thyroglobulin Antibody Level; Future  -     Antimicrosomal Antibody Level; Future  -     Anti-IgE Receptor Antibody Level; Future  -     Serum Tryptase; Future  -     Serum Protein Electrophoresis; Future  -     CBC; Future  -     CMP; Future  -     C1 esterase inhibitor, functional; Future  -     C4 complement; Future  -     cetirizine (ZYRTEC) 10 MG tablet; 1-2 tablets daily as needed for itching or swelling    Tongue swelling    Lip swelling    Type 2 diabetes mellitus without ophthalmic manifestations  -     TSH; Future    Itching  -     cetirizine (ZYRTEC) 10 MG tablet; 1-2 tablets daily as needed for itching or swelling        Patient Instructions   Testing  Blood work for allergies and internal diseases that cause swelling today        [facility] or call [phone]       Contact me with questions or concerns       I will contact you if anything needs immediate attention.        Treatment    Zyrtec 1-2 tablets daily as needed for itching or swelling  (takes the place of Benadryl)      Return about two weeks.      Follow up in about 2 weeks (around 2/13/2020).    Pam He MD

## 2020-01-30 NOTE — PATIENT INSTRUCTIONS
Testing  Blood work for allergies and internal diseases that cause swelling today       Check [facility] in one week for results or call [phone]       Contact me with questions or concerns       I will contact you if anything needs immediate attention.        Treatment    Zyrtec 1-2 tablets daily as needed for itching or swelling  (takes the place of Benadryl)      Return about two weeks.

## 2020-02-05 ENCOUNTER — TELEPHONE (OUTPATIENT)
Dept: ALLERGY | Facility: CLINIC | Age: 55
End: 2020-02-05

## 2020-02-05 NOTE — TELEPHONE ENCOUNTER
----- Message from Pam He MD sent at 2/5/2020  8:33 AM CST -----  Regarding: Results  Please relay:  Labs show no evidence of allergies to foods or to things in the air.      Some of the labs looking for internal diseases are still pending.

## 2020-02-21 NOTE — PROGRESS NOTES
Allergy Clinic Note  [facility]    Subjective:      Patient ID: Antoine Seals is a 55 y.o. female.    Chief Complaint: Angioedema      Referring Provider:      History of Present Illness:  55-year-old female referred from Internal Medicine following an episode of tongue swelling.  She is here alone, and the history is difficult.    Since last visit she reports 1 additional episode of right ankle swelling.  From her photograph it is difficult to tell if this is dependent edema or angioedema, but it is clearly unilateral.  She has not had any interval lip swelling or tongue swelling.    Interval history is significant for new development of bubbles in her urine.  This is in addition to her history of gross hematuria which was initially attributed to renal stones.    At her initial visit she reported 4 episodes of swelling of various parts of her body since December 2019.  She also complains of new onset diffuse itching but this is not temporally related to the swelling episodes.  The 1st episode consisted of feet swelling in burning.  The 2nd was lip swelling.  The 3rd was tongue swelling for which she went to the emergency room.  There she was treated with prednisone and H1 and H2 blockers.  The 4th episode was arm swelling as well as arm itching.  Besides the itching there are no associated symptoms.  She has had no episodes of throat swelling or other throat symptoms.  Episodes usually last than to less than 24 hr.   Five years ago she had some swelling while taking lisinopril but none after lisinopril was discontinued until December 2019.    Review of systems is notable for gross hematuria for the last 2-3 years.  She has been undergoing workup both a urology in gyn.  She has a CT scan scheduled in the near future.  She is very concerned about cancer.    Her routine cancer screening is up-to-date.  In 2019 she had both a mammogram and a GYN exam under anesthesia.  She has colonoscopies  every 2-3 years since her gastric bypass.  Her last was approximately 2 years ago.    She has no other allergic syndromes.    Since onset of swelling episodes, client...  Denies fever, shakes, or chills  Admits decrease in appetite  Denies change in weight, appetite, or energy level  Admits that her hairs breaking and that her nails are brittle  Denies change in the texture of her skin  Denies change in the appearance of urine or stool  Denies change in her chronic gross hematuria  Denies change in chronic constipation  Denies vomiting, diarrhea, constipation, or abdominal pain  Denies abnormal bleeding  Denies change in chronic back pain radiating down the right leg  Denies any new aches or pains, specifically myalgias or arthralgia,   Denies any unusual moles    Additional History:   Interval history is significant for new bubbles in urine as described above Past medical history is significant for hypertension and diabetes.  She does not use Ace inhibitors or beta-blockers.  No Hx of ENT surgery.  Family history is negative for swelling or asthma.  Client  reports that she has never smoked. She has never used smokeless tobacco.  Exposures are notable for passive smoke.  No exposure to pets, mold, or unusual substances.  Past medical, social, and family histories are unchanged    Patient Active Problem List   Diagnosis    Essential hypertension    Diabetes mellitus type 2, noninsulin dependent    Hyperlipidemia    Constipation, chronic    Nuclear sclerosis of both eyes    Type 2 diabetes mellitus without ophthalmic manifestations    Refractive error    Gastric bypass status for obesity    Iron deficiency anemia secondary to inadequate dietary iron intake    Iron deficiency anemia following bariatric surgery    Ovarian cyst    Anemia    Muscle weakness    Decreased ROM of lower extremity    Decreased mobility and endurance    Microscopic hematuria    PMB (postmenopausal bleeding)    Status post  hysteroscopy    Gross hematuria     Current Outpatient Medications on File Prior to Visit   Medication Sig Dispense Refill    amLODIPine (NORVASC) 10 MG tablet TAKE 1 TABLET(10 MG) BY MOUTH EVERY DAY 90 tablet 2    ARIPiprazole (ABILIFY) 2 MG Tab       buPROPion (WELLBUTRIN XL) 150 MG TB24 tablet Take 150 mg by mouth once daily.      buPROPion (WELLBUTRIN XL) 300 MG 24 hr tablet Take 300 mg by mouth once daily.   1    cetirizine (ZYRTEC) 10 MG tablet 1-2 tablets daily as needed for itching or swelling 100 tablet 1    donepezil (ARICEPT) 5 MG tablet Take 10 mg by mouth once daily.       dulaglutide (TRULICITY) 1.5 mg/0.5 mL PnIj Inject 1.5 mg into the skin every 7 days. 12 Syringe 2    ertugliflozin (STEGLATRO) 5 mg Tab Take 5 mg by mouth once daily. 90 tablet 2    ezetimibe (ZETIA) 10 mg tablet Take 1 tablet (10 mg total) by mouth once daily. 90 tablet 2    gabapentin (NEURONTIN) 100 MG capsule Take 100 mg by mouth once daily.      linaGLIPtin (TRADJENTA) 5 mg Tab tablet Take 5 mg by mouth once daily.      metFORMIN (GLUCOPHAGE) 500 MG tablet Take 2 tablets with breakfast and 1 tablets with dinner 270 tablet 1    rosuvastatin (CRESTOR) 40 MG Tab Take 1 tablet (40 mg total) by mouth every evening. 90 tablet 2    TRULANCE 3 mg Tab       aspirin (ECOTRIN) 81 MG EC tablet Take 1 tablet (81 mg total) by mouth once daily. (Patient not taking: Reported on 1/30/2020) 90 tablet 3    ENDOCET  mg per tablet Take 1 tablet by mouth 3 (three) times daily.  0     No current facility-administered medications on file prior to visit.          Review of Systems   Constitutional: Negative for chills and fever.   HENT: Negative for ear discharge and nosebleeds.    Eyes: Negative for discharge and redness.   Respiratory: Negative for hemoptysis, sputum production, shortness of breath, wheezing and stridor.    Cardiovascular: Negative for chest pain and palpitations.   Gastrointestinal: Positive for constipation.  "Negative for abdominal pain, blood in stool, diarrhea, melena and vomiting.   Genitourinary: Positive for hematuria. Negative for dysuria, flank pain, frequency and urgency.   Skin: Positive for itching. Negative for rash.   Neurological: Negative for seizures and loss of consciousness.   Endo/Heme/Allergies: Negative for polydipsia. Does not bruise/bleed easily.   Psychiatric/Behavioral: Positive for depression.       Objective:   Resp 16   Ht 5' 2" (1.575 m)   Wt 85.1 kg (187 lb 9.8 oz)   LMP 12/20/2017   BMI 34.31 kg/m²       Physical Exam   Constitutional: She is well-developed, well-nourished, and in no distress.   HENT:   Head: Normocephalic and atraumatic.   Nose: Nose normal.   Nares pink with no significant turbinates swelling.  Oropharynx benign without exudate.  Tongue is not coated.   Eyes: Conjunctivae are normal. Right eye exhibits no discharge. Left eye exhibits no discharge.   Neck: Neck supple. No thyromegaly present.   Cardiovascular: Normal rate, regular rhythm, normal heart sounds and intact distal pulses.   Pulmonary/Chest: Effort normal and breath sounds normal. No stridor. No respiratory distress. She has no wheezes.   Abdominal: Soft. She exhibits no distension and no mass. There is tenderness. There is no rebound and no guarding.   Diffuse mild abdominal tenderness without guarding or rebound   Musculoskeletal: She exhibits no edema or deformity.   Lymphadenopathy:     She has no cervical adenopathy.   Neurological: She is alert. GCS score is 15.   Skin: No rash noted. No erythema.   Psychiatric: Affect normal.   Speech fluent and logical       Data:   Reviewed ED note of 01/15/2020.  She presented complaining of tongue swelling for 1 day.  Notably she had had swollen lips 2 days prior.  There were no other symptoms.  Physical exam notable for elevated blood pressure, pulse 74, respirations 18, sat 99%.  Tongue is described as minimally swollen.  She was treated with Benadryl, Pepcid, " and prednisone.  She was discharged on prednisone 10 mg daily.    Labs (11/23/2019)  CBC notable for microcytic indices without flor anemia.  Hemoglobin 12.5  Eo count 100  CMP unremarkable.      Assessment:     1. Air in urine    2. Gross hematuria    3. Angioedema, subsequent encounter        Plan:     Medical decision making:  Patient is presenting with recurrent episodes of angioedema.  There is no evidence of anaphylaxis and she has had no laryngeal edema.  No etiology is evident from initial history and physical.  I agree with CT scan to continue evaluation of her gross hematuria, although the duration makes of renal malignancy unlikely.  On the basis of the duration of her symptoms I recommend evaluating for allergies as well as for internal diseases that can present as angioedema, including C1 esterase deficiency.        Based on the history I obtained today, it appears that the itching is temporally unrelated to the swelling episodes.         Since her swelling episodes are fairly rare, I recommend using antihistamines on a p.r.n. basis.  I am recommending Zyrtec in place of Benadryl due to the lower incidence of drowsiness.  She may also use the Zyrtec as needed for itching.    Antoine was seen today for angioedema.    Diagnoses and all orders for this visit:    Air in urine  -     Ambulatory referral/consult to Urology; Future    Gross hematuria    Angioedema, subsequent encounter        Patient Instructions         Dr Brooke Sampson  Urine specialist at [facility]      Follow up in about 6 months (around 8/26/2020).    Pam He MD    Allergy Clinic Note  [facility]    Subjective:      Patient ID: Antoine Seals is a 55 y.o. female.    Chief Complaint: Angioedema      Allergy Problem List:     Angioedema  Itching  Gross hematuria  Elevated basophil activation    History of Present Illness:  55-year-old female referred from Internal Medicine  following an episode of tongue swelling.  She is here alone, and the history is difficult.    Related medications  Zyrtec 1-2 tablets as needed    She reports 4 episodes of swelling of various parts of her body since December 2019.  She also complains of new onset diffuse itching but this is not temporally related to the swelling episodes.  The 1st episode consisted of feet swelling in burning.  The 2nd was lip swelling.  The 3rd was tongue swelling for which she went to the emergency room.  There she was treated with prednisone and H1 and H2 blockers.  The 4th episode was arm swelling as well as arm itching.  Besides the itching there are no associated symptoms.  She has had no episodes of throat swelling or other throat symptoms.  Episodes usually last than to less than 24 hr.   Five years ago she had some swelling while taking lisinopril but none after lisinopril was discontinued until December 2019.    Review of systems is notable for gross hematuria for the last 2-3 years.  She has been undergoing workup both a urology in gyn.  She has a CT scan scheduled in the near future.  She is very concerned about cancer.    Her routine cancer screening is up-to-date.  In 2019 she had both a mammogram and a GYN exam under anesthesia.  She has colonoscopies every 2-3 years since her gastric bypass.  Her last was approximately 2 years ago.    She has no other allergic syndromes.    Since onset of swelling episodes, client...  Denies fever, shakes, or chills  Admits decrease in appetite  Denies change in weight, appetite, or energy level  Admits that her hairs breaking and that her nails are brittle  Denies change in the texture of her skin  Denies change in the appearance of urine or stool  Denies change in her chronic gross hematuria  Denies change in chronic constipation  Denies vomiting, diarrhea, constipation, or abdominal pain  Denies abnormal bleeding  Denies change in chronic back pain radiating down the right  leg  Denies any new aches or pains, specifically myalgias or arthralgia,   Denies any unusual moles    Additional History:   Past medical history is significant for hypertension and diabetes.  She does not use Ace inhibitors or beta-blockers.  No Hx of ENT surgery.  Family history is negative for swelling or asthma.  Client  reports that she has never smoked. She has never used smokeless tobacco.  Exposures are notable for passive smoke.  No exposure to pets, mold, or unusual substances.     Patient Active Problem List   Diagnosis    Essential hypertension    Diabetes mellitus type 2, noninsulin dependent    Hyperlipidemia    Constipation, chronic    Nuclear sclerosis of both eyes    Type 2 diabetes mellitus without ophthalmic manifestations    Refractive error    Gastric bypass status for obesity    Iron deficiency anemia secondary to inadequate dietary iron intake    Iron deficiency anemia following bariatric surgery    Ovarian cyst    Anemia    Muscle weakness    Decreased ROM of lower extremity    Decreased mobility and endurance    Microscopic hematuria    PMB (postmenopausal bleeding)    Status post hysteroscopy    Gross hematuria     Current Outpatient Medications on File Prior to Visit   Medication Sig Dispense Refill    amLODIPine (NORVASC) 10 MG tablet TAKE 1 TABLET(10 MG) BY MOUTH EVERY DAY 90 tablet 2    ARIPiprazole (ABILIFY) 2 MG Tab       buPROPion (WELLBUTRIN XL) 150 MG TB24 tablet Take 150 mg by mouth once daily.      buPROPion (WELLBUTRIN XL) 300 MG 24 hr tablet Take 300 mg by mouth once daily.   1    cetirizine (ZYRTEC) 10 MG tablet 1-2 tablets daily as needed for itching or swelling 100 tablet 1    donepezil (ARICEPT) 5 MG tablet Take 10 mg by mouth once daily.       dulaglutide (TRULICITY) 1.5 mg/0.5 mL PnIj Inject 1.5 mg into the skin every 7 days. 12 Syringe 2    ertugliflozin (STEGLATRO) 5 mg Tab Take 5 mg by mouth once daily. 90 tablet 2    ezetimibe (ZETIA) 10 mg  "tablet Take 1 tablet (10 mg total) by mouth once daily. 90 tablet 2    gabapentin (NEURONTIN) 100 MG capsule Take 100 mg by mouth once daily.      linaGLIPtin (TRADJENTA) 5 mg Tab tablet Take 5 mg by mouth once daily.      metFORMIN (GLUCOPHAGE) 500 MG tablet Take 2 tablets with breakfast and 1 tablets with dinner 270 tablet 1    rosuvastatin (CRESTOR) 40 MG Tab Take 1 tablet (40 mg total) by mouth every evening. 90 tablet 2    TRULANCE 3 mg Tab       aspirin (ECOTRIN) 81 MG EC tablet Take 1 tablet (81 mg total) by mouth once daily. (Patient not taking: Reported on 1/30/2020) 90 tablet 3    ENDOCET  mg per tablet Take 1 tablet by mouth 3 (three) times daily.  0     No current facility-administered medications on file prior to visit.          Review of Systems   Constitutional: Negative for chills and fever.        Decreased appetite and intake with no change in weight   HENT: Negative for ear discharge and nosebleeds.    Eyes: Negative for discharge and redness.   Respiratory: Negative for hemoptysis, sputum production, shortness of breath, wheezing and stridor.    Cardiovascular: Negative for chest pain and palpitations.   Gastrointestinal: Positive for constipation. Negative for blood in stool, melena and vomiting.        Bloating sensation   Genitourinary: Positive for hematuria.   Skin: Negative for itching and rash.   Neurological: Negative for seizures and loss of consciousness.   Endo/Heme/Allergies: Negative for environmental allergies. Does not bruise/bleed easily.       Objective:   Resp 16   Ht 5' 2" (1.575 m)   Wt 85.1 kg (187 lb 9.8 oz)   LMP 12/20/2017   BMI 34.31 kg/m²       Physical Exam   Constitutional: She is well-developed, well-nourished, and in no distress.   HENT:   Head: Normocephalic and atraumatic.   Nose: Nose normal.   Eyes: Conjunctivae are normal. Right eye exhibits no discharge. Left eye exhibits no discharge.   Neck: Neck supple.   Cardiovascular: Normal rate, regular " rhythm and intact distal pulses.   Pulmonary/Chest: Effort normal. No stridor. No respiratory distress.   Abdominal: She exhibits no distension.   Musculoskeletal: She exhibits no edema or deformity.   Neurological: She is alert. GCS score is 15.   Skin: No rash noted. No erythema.   Psychiatric: Memory and affect normal.       Data:   Negative allergy testing by the Immunocap method ( 01/30/2020)  Aeroallergens negative to a standard panel   Foods negative to the following:   Milk, soy bean, egg, corn, and sesame seed.    Latex testing also negative   Total serum IgE < 35.    Labs ( 01/30/2020)  unremarkable CMP  CBC with microcytic indices without anemia  Elevated basophil activation 29.5 ( normal 0-12%)  Normal TSH with negative anti thyroid antibodies  Normal tryptase 2.8   Normal C4 a normal C1 esterase inhibitor function  Normal SPEP    Labs (11/23/2019)  CBC notable for microcytic indices without flor anemia.    Eo count 100        Assessment:     1. Air in urine    2. Gross hematuria    3. Angioedema, subsequent encounter        Plan:     Medical decision making:  Patient is presenting with recurrent episodes of angioedema.  There is no evidence of anaphylaxis and she has had no laryngeal edema.   The elevated basophil activation suggest this may be angioedema of the autoimmune subtype.  Based on initial history, physical, and labs, there are no clues as to specific I would etiology.   She should continue her diagnostic evaluation of gross hematuria, especially now that this is also associated with reported bubbles in her urine          Based on the history, it appears that the itching is temporally unrelated to the swelling episodes.           Antoine was seen today for angioedema.    Diagnoses and all orders for this visit:    Air in urine  -     Ambulatory referral/consult to Urology; Future    Gross hematuria    Angioedema, subsequent encounter        Patient Instructions         Dr Cox  Adrián  Urine specialist at [facility]      Follow up in about 6 months (around 8/26/2020).    Pam He MD

## 2020-02-26 ENCOUNTER — OFFICE VISIT (OUTPATIENT)
Dept: ALLERGY | Facility: CLINIC | Age: 55
End: 2020-02-26
Payer: MEDICARE

## 2020-02-26 VITALS — HEIGHT: 62 IN | BODY MASS INDEX: 34.53 KG/M2 | WEIGHT: 187.63 LBS | RESPIRATION RATE: 16 BRPM

## 2020-02-26 DIAGNOSIS — R39.89 AIR IN URINE: Primary | ICD-10-CM

## 2020-02-26 DIAGNOSIS — T78.3XXD ANGIOEDEMA, SUBSEQUENT ENCOUNTER: ICD-10-CM

## 2020-02-26 DIAGNOSIS — R31.0 GROSS HEMATURIA: ICD-10-CM

## 2020-02-26 PROCEDURE — 99214 OFFICE O/P EST MOD 30 MIN: CPT | Mod: S$GLB,,, | Performed by: STUDENT IN AN ORGANIZED HEALTH CARE EDUCATION/TRAINING PROGRAM

## 2020-02-26 PROCEDURE — 99214 PR OFFICE/OUTPT VISIT, EST, LEVL IV, 30-39 MIN: ICD-10-PCS | Mod: S$GLB,,, | Performed by: STUDENT IN AN ORGANIZED HEALTH CARE EDUCATION/TRAINING PROGRAM

## 2020-02-26 PROCEDURE — 3008F PR BODY MASS INDEX (BMI) DOCUMENTED: ICD-10-PCS | Mod: CPTII,S$GLB,, | Performed by: STUDENT IN AN ORGANIZED HEALTH CARE EDUCATION/TRAINING PROGRAM

## 2020-02-26 PROCEDURE — 99999 PR PBB SHADOW E&M-EST. PATIENT-LVL III: ICD-10-PCS | Mod: PBBFAC,,, | Performed by: STUDENT IN AN ORGANIZED HEALTH CARE EDUCATION/TRAINING PROGRAM

## 2020-02-26 PROCEDURE — 3008F BODY MASS INDEX DOCD: CPT | Mod: CPTII,S$GLB,, | Performed by: STUDENT IN AN ORGANIZED HEALTH CARE EDUCATION/TRAINING PROGRAM

## 2020-02-26 PROCEDURE — 99999 PR PBB SHADOW E&M-EST. PATIENT-LVL III: CPT | Mod: PBBFAC,,, | Performed by: STUDENT IN AN ORGANIZED HEALTH CARE EDUCATION/TRAINING PROGRAM

## 2020-02-26 RX ORDER — GABAPENTIN 100 MG/1
100 CAPSULE ORAL DAILY
COMMUNITY
Start: 2020-02-10 | End: 2022-08-05

## 2020-02-26 RX ORDER — BUPROPION HYDROCHLORIDE 150 MG/1
150 TABLET ORAL DAILY
COMMUNITY
Start: 2020-02-14 | End: 2022-08-29 | Stop reason: CLARIF

## 2020-03-10 NOTE — PROGRESS NOTES
"  Subjective:       Antoine Seals is a 55 y.o. female who is an established patient with Dr Mendoza, though new to me was referred by Dr He for evaluation of "air in urine".      She is a pt of Dr Mendoza, last seen 12/19 for gross hematuria. She was recommended to have cysto/RPG again in 12/19 but she cancelled this procedure and did not reschedule. She has had cysto/RPG in 3/18 also with Dr Mendoza that was normal.      She reports continued gross hematuria x 2 years. Nonsmoker. No family history of  malignancy. Hematuria is happening at least weekly - no clots. Also reports foamy urine, denies pneumaturia. Denies dysuria.     CT uro 4/19 - 7mm LLP stone, otherwise negative  JEANNA 11/19 - 3mm RUP calcification      The following portions of the patient's history were reviewed and updated as appropriate: allergies, current medications, past family history, past medical history, past social history, past surgical history and problem list.    Review of Systems  Constitutional: no fever or chills  ENT: no nasal congestion or sore throat  Respiratory: no cough or shortness of breath  Cardiovascular: no chest pain or palpitations  Gastrointestinal: no nausea or vomiting, tolerating diet  Genitourinary: as per HPI  Hematologic/Lymphatic: no easy bruising or lymphadenopathy  Musculoskeletal: no arthralgias or myalgias  Skin: no rashes or lesions  Neurological: no seizures or tremors  Behavioral/Psych: no auditory or visual hallucinations        Objective:    Vitals: BP (!) 155/87 (BP Location: Right arm, Patient Position: Sitting, BP Method: X-Large (Automatic))   Pulse 85   Ht 5' 2" (1.575 m)   Wt 84.8 kg (187 lb)   LMP 12/20/2017   BMI 34.20 kg/m²     Physical Exam   General: well developed, well nourished in no acute distress  Head: normocephalic, atraumatic  Neck: supple, trachea midline, no obvious enlargement of thyroid  HEENT: EOMI, mucus membranes moist, sclera anicteric, no hearing " impairment  Lungs: symmetric expansion, non-labored breathing  Cardiovascular: regular rate and rhythm, normal pulses  Abdomen: soft, non tender, non distended, no palpable masses, no hepatosplenomegaly, no hernias, no CVA tenderness  Musculoskeletal: no peripheral edema, normal ROM in bilateral upper and lower extremities  Lymphatics: no cervical or inguinal lymphadenopathy  Skin: no rashes or lesions  Neuro: alert and oriented x 3, no gross deficits  Psych: normal judgment and insight, normal mood/affect and non-anxious  Genitourinary:   patient declined exam      Lab Review   Urine analysis today in clinic shows - no urine     Lab Results   Component Value Date    WBC 5.71 01/30/2020    HGB 12.1 01/30/2020    HCT 37.0 01/30/2020    MCV 80 (L) 01/30/2020     01/30/2020     Lab Results   Component Value Date    CREATININE 0.9 01/30/2020    BUN 13 01/30/2020       Imaging  Images and reports were personally reviewed by me and discussed with patient  CT uro, JEANNA reviewed        Assessment/Plan:      1. Gross hematuria    - Discussed etiology and workup of hematuria   - UCx - no urine given   - Cytology - not indicated   - CT urogram - done 3/19, 7mm L renal stone   - Office cystoscopy     2. Nephrolithiasis     - 7mm L renal stone, appears radio-opaque on  from CT   - KUB now   - Likely candidate for ESWL.    - Could be reason for gross hematuria though uncertain         Follow up in 2 weeks for cysto

## 2020-03-11 ENCOUNTER — OFFICE VISIT (OUTPATIENT)
Dept: UROLOGY | Facility: CLINIC | Age: 55
End: 2020-03-11
Attending: UROLOGY
Payer: MEDICARE

## 2020-03-11 VITALS
SYSTOLIC BLOOD PRESSURE: 155 MMHG | DIASTOLIC BLOOD PRESSURE: 87 MMHG | HEIGHT: 62 IN | HEART RATE: 85 BPM | WEIGHT: 187 LBS | BODY MASS INDEX: 34.41 KG/M2

## 2020-03-11 DIAGNOSIS — R31.0 GROSS HEMATURIA: Primary | ICD-10-CM

## 2020-03-11 DIAGNOSIS — N20.0 NEPHROLITHIASIS: ICD-10-CM

## 2020-03-11 LAB — POC RESIDUAL URINE VOLUME: 30 ML (ref 0–100)

## 2020-03-11 PROCEDURE — 3077F PR MOST RECENT SYSTOLIC BLOOD PRESSURE >= 140 MM HG: ICD-10-PCS | Mod: CPTII,S$GLB,, | Performed by: UROLOGY

## 2020-03-11 PROCEDURE — 99214 PR OFFICE/OUTPT VISIT, EST, LEVL IV, 30-39 MIN: ICD-10-PCS | Mod: S$GLB,,, | Performed by: UROLOGY

## 2020-03-11 PROCEDURE — 3008F BODY MASS INDEX DOCD: CPT | Mod: CPTII,S$GLB,, | Performed by: UROLOGY

## 2020-03-11 PROCEDURE — 51798 US URINE CAPACITY MEASURE: CPT | Mod: S$GLB,,, | Performed by: UROLOGY

## 2020-03-11 PROCEDURE — 99214 OFFICE O/P EST MOD 30 MIN: CPT | Mod: S$GLB,,, | Performed by: UROLOGY

## 2020-03-11 PROCEDURE — 3077F SYST BP >= 140 MM HG: CPT | Mod: CPTII,S$GLB,, | Performed by: UROLOGY

## 2020-03-11 PROCEDURE — 51798 POCT BLADDER SCAN: ICD-10-PCS | Mod: S$GLB,,, | Performed by: UROLOGY

## 2020-03-11 PROCEDURE — 3079F PR MOST RECENT DIASTOLIC BLOOD PRESSURE 80-89 MM HG: ICD-10-PCS | Mod: CPTII,S$GLB,, | Performed by: UROLOGY

## 2020-03-11 PROCEDURE — 3079F DIAST BP 80-89 MM HG: CPT | Mod: CPTII,S$GLB,, | Performed by: UROLOGY

## 2020-03-11 PROCEDURE — 3008F PR BODY MASS INDEX (BMI) DOCUMENTED: ICD-10-PCS | Mod: CPTII,S$GLB,, | Performed by: UROLOGY

## 2020-03-11 NOTE — LETTER
March 11, 2020      Pam He MD  [address]  [place] [address]           [facility] [address]  Phone: [phone]  Fax: [phone]          Patient: Antoine Seals   MR Number: [id number]   YOB: 1965   Date of Visit: 3/11/2020       Dear Dr. Pam He:    Thank you for referring Antoine Seals to me for evaluation. Attached you will find relevant portions of my assessment and plan of care.    If you have questions, please do not hesitate to call me. I look forward to following Antoine Seals along with you.    Sincerely,    Brooke Aguilar MD    Enclosure  CC:  No Recipients    If you would like to receive this communication electronically, please contact [email or web address] or [phone] to request more information on [employer] access.    For providers and/or their staff who would like to refer a patient to [facility], please contact us through our one-stop-shop provider referral line, [facility], at [phone].    If you feel you have received this communication in error or would no longer like to receive these types of communications, please e-mail [email or web address]

## 2020-03-12 ENCOUNTER — APPOINTMENT (OUTPATIENT)
Dept: RADIOLOGY | Facility: HOSPITAL | Age: 55
End: 2020-03-12
Attending: UROLOGY
Payer: MEDICARE

## 2020-03-12 DIAGNOSIS — N20.0 NEPHROLITHIASIS: ICD-10-CM

## 2020-03-12 DIAGNOSIS — R31.0 GROSS HEMATURIA: ICD-10-CM

## 2020-03-12 PROCEDURE — 74018 XR KUB: ICD-10-PCS | Mod: 26,,, | Performed by: RADIOLOGY

## 2020-03-12 PROCEDURE — 74018 RADEX ABDOMEN 1 VIEW: CPT | Mod: TC,FY,PN

## 2020-03-12 PROCEDURE — 74018 RADEX ABDOMEN 1 VIEW: CPT | Mod: 26,,, | Performed by: RADIOLOGY

## 2020-03-19 ENCOUNTER — TELEPHONE (OUTPATIENT)
Dept: UROLOGY | Facility: CLINIC | Age: 55
End: 2020-03-19

## 2020-03-19 NOTE — TELEPHONE ENCOUNTER
----- Message from Jaymie Crespo, Patient Care Assistant sent at 3/19/2020  1:48 PM CDT -----  Contact: TICO DEL TORO [[id number]]  Type:  Patient Returning Call    Who Called: TICO DEL TORO [[id number]]    Who Left Message for Patient: Patient not sure    Does the patient know what this is regarding?:Yes    Best Call Back Number:[phone]    Additional Information:   None

## 2020-03-19 NOTE — TELEPHONE ENCOUNTER
Spoke to the pt. Who states someone called her and canceled an appt. On the 25th which is Wednesday.. When looking at the future appt.s they are still present .. I advised the pt. To wait for someone at [facility] to call her ..

## 2020-04-01 ENCOUNTER — TELEPHONE (OUTPATIENT)
Dept: UROLOGY | Facility: CLINIC | Age: 55
End: 2020-04-01

## 2020-04-01 NOTE — TELEPHONE ENCOUNTER
Rescheduled upcoming visit on 4/22/2020 to 4/15 due to current COVID scheduling guidelines. Patient expressed understanding.

## 2020-04-14 ENCOUNTER — TELEPHONE (OUTPATIENT)
Dept: UROLOGY | Facility: CLINIC | Age: 55
End: 2020-04-14

## 2020-04-14 NOTE — TELEPHONE ENCOUNTER
Spoke to pt advised her new appt time according to the schedule is for 1:20pm. Pt states she understands.-[place]wood

## 2020-04-14 NOTE — TELEPHONE ENCOUNTER
----- Message from Salvatore Amaya sent at 4/14/2020  4:03 PM CDT -----  Contact: TICO DEL TORO [[id number]]  Name of Who is Calling: TICO DEL TORO [[id number]]      What is the request in detail: Would like to speak with staff in regards to appointment time. States she was suppose to come in for 11 but schedule says something different. Please advise      Can the clinic reply by [facility]: no      What Number to Call Back if not in [facility]: [phone]

## 2020-04-15 ENCOUNTER — PROCEDURE VISIT (OUTPATIENT)
Dept: UROLOGY | Facility: CLINIC | Age: 55
End: 2020-04-15
Attending: UROLOGY
Payer: MEDICARE

## 2020-04-15 VITALS
SYSTOLIC BLOOD PRESSURE: 132 MMHG | HEART RATE: 86 BPM | HEIGHT: 62 IN | WEIGHT: 187 LBS | BODY MASS INDEX: 34.41 KG/M2 | DIASTOLIC BLOOD PRESSURE: 79 MMHG

## 2020-04-15 DIAGNOSIS — N20.0 NEPHROLITHIASIS: ICD-10-CM

## 2020-04-15 DIAGNOSIS — R31.0 GROSS HEMATURIA: Primary | ICD-10-CM

## 2020-04-15 LAB
BILIRUB SERPL-MCNC: ABNORMAL MG/DL
BLOOD URINE, POC: 250
COLOR, POC UA: ABNORMAL
GLUCOSE UR QL STRIP: NORMAL
KETONES UR QL STRIP: ABNORMAL
LEUKOCYTE ESTERASE URINE, POC: ABNORMAL
NITRITE, POC UA: ABNORMAL
PH, POC UA: 5
PROTEIN, POC: ABNORMAL
SPECIFIC GRAVITY, POC UA: 1.02
UROBILINOGEN, POC UA: NORMAL

## 2020-04-15 PROCEDURE — 52000 CYSTOSCOPY: ICD-10-PCS | Mod: S$GLB,,, | Performed by: UROLOGY

## 2020-04-15 PROCEDURE — 81002 URINALYSIS NONAUTO W/O SCOPE: CPT | Mod: S$GLB,,, | Performed by: UROLOGY

## 2020-04-15 PROCEDURE — 52000 CYSTOURETHROSCOPY: CPT | Mod: S$GLB,,, | Performed by: UROLOGY

## 2020-04-15 PROCEDURE — 81002 POCT URINE DIPSTICK WITHOUT MICROSCOPE: ICD-10-PCS | Mod: S$GLB,,, | Performed by: UROLOGY

## 2020-04-15 RX ORDER — CIPROFLOXACIN 500 MG/1
500 TABLET ORAL
Status: COMPLETED | OUTPATIENT
Start: 2020-04-15 | End: 2020-04-15

## 2020-04-15 RX ORDER — LIDOCAINE HYDROCHLORIDE 20 MG/ML
JELLY TOPICAL
Status: COMPLETED | OUTPATIENT
Start: 2020-04-15 | End: 2020-04-15

## 2020-04-15 RX ADMIN — CIPROFLOXACIN 500 MG: 500 TABLET ORAL at 02:04

## 2020-04-15 RX ADMIN — LIDOCAINE HYDROCHLORIDE 5 ML: 20 JELLY TOPICAL at 02:04

## 2020-04-15 NOTE — PROCEDURES
"Cystoscopy  Date/Time: 4/15/2020 1:20 PM  Performed by: Brooke Aguilar MD  Authorized by: Brooke Aguilar MD     Consent Done?:  Yes (Written)  Time out: Immediately prior to procedure a "time out" was called to verify the correct patient, procedure, equipment, support staff and site/side marked as required.    Indications: hematuria    Position:  Dorsal lithotomy  Anesthesia:  Lidocaine jelly  Patient sedated?: No    Preparation: Patient was prepped and draped in usual sterile fashion      Scope type:  Flexible cystoscope  Stent inserted: No    Stent removed: No    Digital exam performed: Yes    Urethra normal: Yes  Bladder neck normal: Bladder neck normal   Bladder normal: Yes      Patient tolerance:  Patient tolerated the procedure well with no immediate complications     KUB 3/20 - 9mm LLP stone. ESWL candidate.  Cystoscopy - normal    KUB 6mths      "

## 2020-05-22 ENCOUNTER — TELEPHONE (OUTPATIENT)
Dept: BARIATRICS | Facility: CLINIC | Age: 55
End: 2020-05-22

## 2020-05-22 NOTE — TELEPHONE ENCOUNTER
----- Message from Fred Mercado sent at 5/22/2020  8:58 AM CDT -----  Contact: Pt  Patient called stating she's experiencing severe abdominal pains, believes its related to her gastric bypass procedure she had in 2005, requesting callback to discuss scheduling an appt    Callback: [phone] (home)

## 2020-05-22 NOTE — TELEPHONE ENCOUNTER
Phoned patient and discussed her symptoms.  She stated that she is very bloated and really does not eat much at all.  She as a BM every 2-3 days.  Labs from January reviewed.  KUB reviewed from 3/2020.  She feels like she has a lot of fluid in her abdomen. Appointment made with Dr Quijano for Tuesday, 5/26/2020.

## 2020-05-26 ENCOUNTER — OFFICE VISIT (OUTPATIENT)
Dept: BARIATRICS | Facility: CLINIC | Age: 55
End: 2020-05-26
Payer: MEDICARE

## 2020-05-26 VITALS
WEIGHT: 187.38 LBS | DIASTOLIC BLOOD PRESSURE: 75 MMHG | SYSTOLIC BLOOD PRESSURE: 154 MMHG | HEART RATE: 89 BPM | HEIGHT: 62 IN | BODY MASS INDEX: 34.48 KG/M2

## 2020-05-26 DIAGNOSIS — R10.84 GENERALIZED ABDOMINAL PAIN: Primary | ICD-10-CM

## 2020-05-26 PROCEDURE — 3008F PR BODY MASS INDEX (BMI) DOCUMENTED: ICD-10-PCS | Mod: CPTII,S$GLB,, | Performed by: SURGERY

## 2020-05-26 PROCEDURE — 3077F PR MOST RECENT SYSTOLIC BLOOD PRESSURE >= 140 MM HG: ICD-10-PCS | Mod: CPTII,S$GLB,, | Performed by: SURGERY

## 2020-05-26 PROCEDURE — 3078F DIAST BP <80 MM HG: CPT | Mod: CPTII,S$GLB,, | Performed by: SURGERY

## 2020-05-26 PROCEDURE — 3008F BODY MASS INDEX DOCD: CPT | Mod: CPTII,S$GLB,, | Performed by: SURGERY

## 2020-05-26 PROCEDURE — 3077F SYST BP >= 140 MM HG: CPT | Mod: CPTII,S$GLB,, | Performed by: SURGERY

## 2020-05-26 PROCEDURE — 99999 PR PBB SHADOW E&M-EST. PATIENT-LVL III: ICD-10-PCS | Mod: PBBFAC,,, | Performed by: SURGERY

## 2020-05-26 PROCEDURE — 3078F PR MOST RECENT DIASTOLIC BLOOD PRESSURE < 80 MM HG: ICD-10-PCS | Mod: CPTII,S$GLB,, | Performed by: SURGERY

## 2020-05-26 PROCEDURE — 99999 PR PBB SHADOW E&M-EST. PATIENT-LVL III: CPT | Mod: PBBFAC,,, | Performed by: SURGERY

## 2020-05-26 PROCEDURE — 99213 PR OFFICE/OUTPT VISIT, EST, LEVL III, 20-29 MIN: ICD-10-PCS | Mod: S$GLB,,, | Performed by: SURGERY

## 2020-05-26 PROCEDURE — 99213 OFFICE O/P EST LOW 20 MIN: CPT | Mod: S$GLB,,, | Performed by: SURGERY

## 2020-05-26 RX ORDER — B-COMPLEX WITH VITAMIN C
1 TABLET ORAL DAILY
COMMUNITY
End: 2023-06-14

## 2020-05-26 NOTE — PROGRESS NOTES
Subjective:       Patient ID: Antoine Seals is a 55 y.o. female.    Chief Complaint: Follow-up    HPI follow up s/p LRNY with Dr. Quijano on 8/16/2005.  BMI is stable at 34.27.  She had an internal hernia repair and NEW on 12/30/2009, and endoscopic removal of G-J suture 8/18/17.  She has been having abdominal pain for 2 years.  The pain is in the mid abdomen and is associated bloating.  The pain is worsened with constipation and eating.  The pain is improved with trulance (stopped due to not paid for by insurance).  She still gets full fast when she eats.  She runs 2 miles 3 days a week.  Review of Systems   Constitutional: Negative for fever and unexpected weight change.   HENT: Negative for postnasal drip and sinus pain.    Respiratory: Negative for apnea, cough and shortness of breath.    Cardiovascular: Negative for chest pain and palpitations.   Gastrointestinal: Negative for nausea and vomiting.   Genitourinary: Negative for difficulty urinating and dysuria.   Musculoskeletal: Positive for back pain and neck pain.        Has occasional, minor back and neck pain   Skin: Negative for rash and wound.   Neurological: Negative for seizures and headaches.   Hematological: Does not bruise/bleed easily.       Objective:      Physical Exam   Constitutional: She is oriented to person, place, and time. She appears well-developed and well-nourished.   Neurological: She is alert and oriented to person, place, and time.   Skin: Skin is warm and dry.   Psychiatric: She has a normal mood and affect. Her behavior is normal. Judgment and thought content normal.   Vitals reviewed.      CT urogram last year with renal stone.  EGD 2017 with no ulcers and short blind limb.  Cscope 2017 ok.    Assessment:       1. Generalized abdominal pain      Pain on eating and constipation  Plan:      Obtain u/s and ct with contrast to check for internal hernia.  See GI for constipation.  Consider egd.

## 2020-05-26 NOTE — LETTER
[facility]  [address]  [facility] [address]  Phone: [phone]  Fax: [phone] May 26, 2020      Arnel Morales MD  [address] [place] [address] [address]  [facility] [address]    Patient: Antoine Seals   MR Number: [id number]   YOB: 1965   Date of Visit: 5/26/2020     Dear Dr. Morales:    Thank you for referring Antoine Seals to me for evaluation. Attached you will find relevant portions of my assessment and plan of care.    Ms. Seals presents with generalized abdominal pain, pain on eating and constipation.    We will obtain U/S and CT with contrast to check for internal hernia.  She is to see GI for constipation.    If you have questions, please do not hesitate to call me. I look forward to following Antoine Seals along with you.    Sincerely,    Corona Quijano M.D.  Professor, [employer]  Section Head, General Surgery  [facility]    WSR/sang

## 2020-06-02 ENCOUNTER — HOSPITAL ENCOUNTER (OUTPATIENT)
Dept: RADIOLOGY | Facility: HOSPITAL | Age: 55
Discharge: HOME | End: 2020-06-02
Attending: SURGERY
Payer: MEDICARE

## 2020-06-02 DIAGNOSIS — R10.84 GENERALIZED ABDOMINAL PAIN: ICD-10-CM

## 2020-06-02 PROCEDURE — 76700 US EXAM ABDOM COMPLETE: CPT | Mod: TC

## 2020-06-02 PROCEDURE — 76700 US ABDOMEN COMPLETE: ICD-10-PCS | Mod: 26,,, | Performed by: RADIOLOGY

## 2020-06-02 PROCEDURE — 76700 US EXAM ABDOM COMPLETE: CPT | Mod: 26,,, | Performed by: RADIOLOGY

## 2020-06-03 ENCOUNTER — HOSPITAL ENCOUNTER (OUTPATIENT)
Dept: RADIOLOGY | Facility: HOSPITAL | Age: 55
Discharge: HOME | End: 2020-06-03
Attending: SURGERY
Payer: MEDICARE

## 2020-06-03 DIAGNOSIS — R10.84 GENERALIZED ABDOMINAL PAIN: ICD-10-CM

## 2020-06-03 PROCEDURE — 74177 CT ABD & PELVIS W/CONTRAST: CPT | Mod: 26,,, | Performed by: RADIOLOGY

## 2020-06-03 PROCEDURE — 25500020 PHARM REV CODE 255: Performed by: SURGERY

## 2020-06-03 PROCEDURE — 74177 CT ABD & PELVIS W/CONTRAST: CPT | Mod: TC

## 2020-06-03 PROCEDURE — 74177 CT ABDOMEN PELVIS WITH CONTRAST: ICD-10-PCS | Mod: 26,,, | Performed by: RADIOLOGY

## 2020-06-03 RX ADMIN — IOHEXOL 75 ML: 350 INJECTION, SOLUTION INTRAVENOUS at 02:06

## 2020-06-03 RX ADMIN — IOHEXOL 15 ML: 350 INJECTION, SOLUTION INTRAVENOUS at 12:06

## 2020-06-04 LAB
CREAT SERPL-MCNC: 0.8 MG/DL (ref 0.5–1.4)
SAMPLE: NORMAL

## 2020-06-05 DIAGNOSIS — R10.84 GENERALIZED ABDOMINAL PAIN: Primary | ICD-10-CM

## 2020-06-05 DIAGNOSIS — R10.11 RUQ PAIN: ICD-10-CM

## 2020-06-05 NOTE — PROGRESS NOTES
Spoke with pt, reviewed results of Ct scan, message sent to Dr. Alexander (OB) re:cyst and Dr. Aguilar (Urology) re: stone. Order for cckhida done and message left for Peggy Seals [id number] to schedule. Patient would prefer a M, W, or Thur.  Patient aware of the above and will reach out to contact her as soon as cckhida is scheduled. Patient verbalized understanding.

## 2020-06-08 ENCOUNTER — TELEPHONE (OUTPATIENT)
Dept: UROLOGY | Facility: CLINIC | Age: 55
End: 2020-06-08

## 2020-06-08 NOTE — TELEPHONE ENCOUNTER
This is an established patient of Dr Mendoza who recently had a CT scan done due to abdominal pain. She has an obstructing stone. Please assist patient with scheduling an office appointment for evaluation/possible treatment of stone. Thanks

## 2020-06-08 NOTE — TELEPHONE ENCOUNTER
----- Message from Ji Agudelo RN sent at 6/5/2020  3:07 PM CDT -----  Good afternoon Dr. Aguilar,  Please see CT, Dr. Quijano asked if you could possibly consult regarding stone?  Thank you,  Ji

## 2020-06-09 ENCOUNTER — TELEPHONE (OUTPATIENT)
Dept: UROLOGY | Facility: CLINIC | Age: 55
End: 2020-06-09

## 2020-06-09 NOTE — TELEPHONE ENCOUNTER
Called patient to offer her a appointment with Dr. Mendoza to discuss stone removal. Patient refused appointment do to a previous experience. Explained to the  Patient that Dr. Aguilar would be out of the office until August 2020. Patient stated that she would find another provider to treat her for the stones.

## 2020-06-09 NOTE — PROGRESS NOTES
cckhida scheduled for 6/17/2020, instructed pt to check in 2nd fl radiology, no pain meds and npo 6 hours before.  Pt aware and verbalized understanding.

## 2020-06-13 DIAGNOSIS — Z87.42 HISTORY OF POSTMENOPAUSAL BLEEDING: ICD-10-CM

## 2020-06-13 DIAGNOSIS — N83.202 LEFT OVARIAN CYST: Primary | ICD-10-CM

## 2020-06-17 ENCOUNTER — HOSPITAL ENCOUNTER (OUTPATIENT)
Dept: RADIOLOGY | Facility: HOSPITAL | Age: 55
Discharge: HOME | End: 2020-06-17
Attending: SURGERY
Payer: MEDICARE

## 2020-06-17 DIAGNOSIS — R10.84 GENERALIZED ABDOMINAL PAIN: ICD-10-CM

## 2020-06-17 DIAGNOSIS — R10.11 RUQ PAIN: ICD-10-CM

## 2020-06-17 PROCEDURE — 78227 NM HEPATOBILIARY(HIDA) WITH PHARM AND EF: ICD-10-PCS | Mod: 26,,, | Performed by: RADIOLOGY

## 2020-06-17 PROCEDURE — A9537 TC99M MEBROFENIN: HCPCS

## 2020-06-17 PROCEDURE — 78227 HEPATOBIL SYST IMAGE W/DRUG: CPT | Mod: 26,,, | Performed by: RADIOLOGY

## 2020-06-19 ENCOUNTER — TELEPHONE (OUTPATIENT)
Dept: BARIATRICS | Facility: CLINIC | Age: 55
End: 2020-06-19

## 2020-06-19 DIAGNOSIS — Z98.84 HISTORY OF ROUX-EN-Y GASTRIC BYPASS: ICD-10-CM

## 2020-06-19 DIAGNOSIS — R10.84 GENERALIZED ABDOMINAL PAIN: Primary | ICD-10-CM

## 2020-06-19 NOTE — TELEPHONE ENCOUNTER
Phoned patient and notified her that per Dr. Quijano, HIDA scan did not identify a reason for her pain. Pt c/o pain when eating greasy food only relieved by aleve. Advised against aleve due to bariatric surgery and risk for ulcers. Number given to schedule EGD. Patient states she recently had a procedure done with Dr. Aguilar regarding kidney stone.

## 2020-06-19 NOTE — TELEPHONE ENCOUNTER
----- Message from Corona Quijano MD sent at 6/17/2020  4:58 PM CDT -----  There continues to be no reason identified for her pain.  I suggest egd.  Is she seeing someone for her kidney stone?

## 2020-06-22 ENCOUNTER — HOSPITAL ENCOUNTER (OUTPATIENT)
Dept: RADIOLOGY | Facility: HOSPITAL | Age: 55
Discharge: HOME | End: 2020-06-22
Attending: OBSTETRICS & GYNECOLOGY
Payer: MEDICARE

## 2020-06-22 DIAGNOSIS — N83.202 LEFT OVARIAN CYST: ICD-10-CM

## 2020-06-22 DIAGNOSIS — Z87.42 HISTORY OF POSTMENOPAUSAL BLEEDING: ICD-10-CM

## 2020-06-22 PROCEDURE — 76830 TRANSVAGINAL US NON-OB: CPT | Mod: 26,,, | Performed by: RADIOLOGY

## 2020-06-22 PROCEDURE — 76830 TRANSVAGINAL US NON-OB: CPT | Mod: TC

## 2020-06-22 PROCEDURE — 76856 US EXAM PELVIC COMPLETE: CPT | Mod: 26,,, | Performed by: RADIOLOGY

## 2020-06-22 PROCEDURE — 76856 US PELVIS COMP WITH TRANSVAG NON-OB (XPD): ICD-10-PCS | Mod: 26,,, | Performed by: RADIOLOGY

## 2020-06-22 PROCEDURE — 76830 US PELVIS COMP WITH TRANSVAG NON-OB (XPD): ICD-10-PCS | Mod: 26,,, | Performed by: RADIOLOGY

## 2020-06-25 ENCOUNTER — OFFICE VISIT (OUTPATIENT)
Dept: OBSTETRICS AND GYNECOLOGY | Facility: CLINIC | Age: 55
End: 2020-06-25
Payer: MEDICARE

## 2020-06-25 VITALS — BODY MASS INDEX: 34.6 KG/M2 | WEIGHT: 189.13 LBS | DIASTOLIC BLOOD PRESSURE: 88 MMHG | SYSTOLIC BLOOD PRESSURE: 128 MMHG

## 2020-06-25 DIAGNOSIS — N83.201 CYST OF RIGHT OVARY: ICD-10-CM

## 2020-06-25 DIAGNOSIS — Z91.89 GYN EXAM FOR HIGH-RISK MEDICARE PATIENT: Primary | ICD-10-CM

## 2020-06-25 DIAGNOSIS — Z12.31 SCREENING MAMMOGRAM FOR HIGH-RISK PATIENT: ICD-10-CM

## 2020-06-25 DIAGNOSIS — Z12.4 CERVICAL CANCER SCREENING: ICD-10-CM

## 2020-06-25 PROCEDURE — 87624 HPV HI-RISK TYP POOLED RSLT: CPT

## 2020-06-25 PROCEDURE — G0101 PR CA SCREEN;PELVIC/BREAST EXAM: ICD-10-PCS | Mod: S$GLB,,, | Performed by: OBSTETRICS & GYNECOLOGY

## 2020-06-25 PROCEDURE — 99999 PR PBB SHADOW E&M-EST. PATIENT-LVL III: CPT | Mod: PBBFAC,,, | Performed by: OBSTETRICS & GYNECOLOGY

## 2020-06-25 PROCEDURE — 99999 PR PBB SHADOW E&M-EST. PATIENT-LVL III: ICD-10-PCS | Mod: PBBFAC,,, | Performed by: OBSTETRICS & GYNECOLOGY

## 2020-06-25 PROCEDURE — 88175 CYTOPATH C/V AUTO FLUID REDO: CPT

## 2020-06-25 PROCEDURE — G0101 CA SCREEN;PELVIC/BREAST EXAM: HCPCS | Mod: S$GLB,,, | Performed by: OBSTETRICS & GYNECOLOGY

## 2020-06-26 ENCOUNTER — LAB VISIT (OUTPATIENT)
Dept: FAMILY MEDICINE | Facility: CLINIC | Age: 55
End: 2020-06-26
Payer: MEDICARE

## 2020-06-26 PROCEDURE — U0003 INFECTIOUS AGENT DETECTION BY NUCLEIC ACID (DNA OR RNA); SEVERE ACUTE RESPIRATORY SYNDROME CORONAVIRUS 2 (SARS-COV-2) (CORONAVIRUS DISEASE [COVID-19]), AMPLIFIED PROBE TECHNIQUE, MAKING USE OF HIGH THROUGHPUT TECHNOLOGIES AS DESCRIBED BY CMS-2020-01-R: HCPCS

## 2020-06-28 LAB — SARS-COV-2 RNA RESP QL NAA+PROBE: NOT DETECTED

## 2020-06-29 ENCOUNTER — ANESTHESIA (OUTPATIENT)
Dept: ENDOSCOPY | Facility: HOSPITAL | Age: 55
End: 2020-06-29
Payer: MEDICARE

## 2020-06-29 ENCOUNTER — ANESTHESIA EVENT (OUTPATIENT)
Dept: ENDOSCOPY | Facility: HOSPITAL | Age: 55
End: 2020-06-29
Payer: MEDICARE

## 2020-06-29 ENCOUNTER — HOSPITAL ENCOUNTER (OUTPATIENT)
Facility: HOSPITAL | Age: 55
Discharge: HOME | End: 2020-06-29
Attending: INTERNAL MEDICINE | Admitting: INTERNAL MEDICINE
Payer: MEDICARE

## 2020-06-29 VITALS
RESPIRATION RATE: 16 BRPM | OXYGEN SATURATION: 97 % | HEART RATE: 92 BPM | TEMPERATURE: 98 F | WEIGHT: 185 LBS | HEIGHT: 62 IN | DIASTOLIC BLOOD PRESSURE: 78 MMHG | SYSTOLIC BLOOD PRESSURE: 132 MMHG | BODY MASS INDEX: 34.04 KG/M2

## 2020-06-29 DIAGNOSIS — R10.9 ABDOMINAL PAIN: ICD-10-CM

## 2020-06-29 LAB — POCT GLUCOSE: 94 MG/DL (ref 70–110)

## 2020-06-29 PROCEDURE — 43235 EGD DIAGNOSTIC BRUSH WASH: CPT | Mod: ,,, | Performed by: INTERNAL MEDICINE

## 2020-06-29 PROCEDURE — 63600175 PHARM REV CODE 636 W HCPCS: Performed by: NURSE ANESTHETIST, CERTIFIED REGISTERED

## 2020-06-29 PROCEDURE — 43235 PR EGD, FLEX, DIAGNOSTIC: ICD-10-PCS | Mod: ,,, | Performed by: INTERNAL MEDICINE

## 2020-06-29 PROCEDURE — 37000009 HC ANESTHESIA EA ADD 15 MINS: Performed by: INTERNAL MEDICINE

## 2020-06-29 PROCEDURE — E9220 PRA ENDO ANESTHESIA: HCPCS | Mod: ,,, | Performed by: NURSE ANESTHETIST, CERTIFIED REGISTERED

## 2020-06-29 PROCEDURE — 43235 EGD DIAGNOSTIC BRUSH WASH: CPT | Performed by: INTERNAL MEDICINE

## 2020-06-29 PROCEDURE — 25000003 PHARM REV CODE 250: Performed by: INTERNAL MEDICINE

## 2020-06-29 PROCEDURE — 37000008 HC ANESTHESIA 1ST 15 MINUTES: Performed by: INTERNAL MEDICINE

## 2020-06-29 PROCEDURE — E9220 PRA ENDO ANESTHESIA: ICD-10-PCS | Mod: ,,, | Performed by: NURSE ANESTHETIST, CERTIFIED REGISTERED

## 2020-06-29 RX ORDER — SODIUM CHLORIDE 9 MG/ML
INJECTION, SOLUTION INTRAVENOUS CONTINUOUS
Status: DISCONTINUED | OUTPATIENT
Start: 2020-06-29 | End: 2020-06-29 | Stop reason: HOSPADM

## 2020-06-29 RX ORDER — PROPOFOL 10 MG/ML
VIAL (ML) INTRAVENOUS
Status: DISCONTINUED | OUTPATIENT
Start: 2020-06-29 | End: 2020-06-29

## 2020-06-29 RX ADMIN — PROPOFOL 40 MG: 10 INJECTION, EMULSION INTRAVENOUS at 03:06

## 2020-06-29 RX ADMIN — SODIUM CHLORIDE: 0.9 INJECTION, SOLUTION INTRAVENOUS at 02:06

## 2020-06-29 NOTE — ANESTHESIA POSTPROCEDURE EVALUATION
Anesthesia Post Evaluation    Patient: Antoine Seals    Procedure(s) Performed: Procedure(s) (LRB):  EGD (ESOPHAGOGASTRODUODENOSCOPY) (N/A)    Final Anesthesia Type: general    Patient location during evaluation: PACU  Patient participation: Yes- Able to Participate  Level of consciousness: awake and alert and oriented  Pain management: adequate  Airway patency: patent    PONV status at discharge: No PONV  Anesthetic complications: no      Cardiovascular status: blood pressure returned to baseline and hemodynamically stable  Respiratory status: unassisted  Hydration status: euvolemic  Follow-up not needed.          Vitals Value Taken Time   /78 06/29/20 1531   Temp 36.7 °C (98 °F) 06/29/20 1516   Pulse 92 06/29/20 1531   Resp 16 06/29/20 1531   SpO2 97 % 06/29/20 1531         No case tracking events are documented in the log.      Pain/Dangelo Score: Dangelo Score: 10 (6/29/2020  3:31 PM)

## 2020-06-29 NOTE — DISCHARGE INSTRUCTIONS

## 2020-06-29 NOTE — H&P (VIEW-ONLY)
"[facility]  Ambulatory Clinic  Obstetrics & Gynecology    Visit Date:  2020    Chief Complaint:  Annual GYN exam    History of Present Illness:      Antoine Seals is a 55 y.o. , h/o BTL, here for annual GYN exam.    Pt has no major complaints today.    Pt was evaluated for postmenopausal bleeding in the past.  EMB benign.      Pt reports one episode of very faint light pink discharge after intercourse "due to dryness" recently, denies flor vaginal bleeding.    Her endometrial stripe on recent US is normal.    Pt had recent pelvic US showed small stable right ovarian cyst.    Pt denies h/o abnormal pap, last pap 2017 negative.    Pt has remote h/o syphilis that is treated.    Recent mammogram 2019 normal.    Pt performs monthly self breast examination, non-smoker, uses seat belts, and denies abuse.     Pt denies vaginal bleeding, vaginal discharge, dysmenorrhea, dyspareunia, pelvic pain, bloating, early satiety, unintentional weight loss, breast mass/skin changes, incontinence, GI or urinary complaints.      Otherwise, the pt is in her usual state of health.    Review of Systems:    GENERAL:  No fever, fatigue, excessive weight gain or loss  HEENT:  No head injury, headaches, hearing changes, visual disturbance  RESPIRATORY:  No cough, shortness of breath  CARDIOVASCULAR:  No chest pain, heart palpitations, leg swelling  BREAST:  No lump, pain, nipple discharge, skin changes  GASTROINTESTINAL:  No nausea, vomiting, constipation, diarrhea, abd pain, rectal bleeding   URINARY:  No dysuria, frequency, hematuria  GENITOURINARY:  See HPI  ENDOCRINE:  No heat or cold intolerance  HEMATOLOGIC:  No easy bruisability or bleeding   LYMPHATICS:  No enlarged nodes  MUSCULOSKELETAL:  No acute joint pain or swelling  SKIN:  No rash, lesions, jaundice  NEUROLOGIC:  No dizziness, weakness, syncope  PSYCHIATRIC:  No significant mood changes, suicidal or homicidal ideations    Physical Exam: "     /88   Wt 85.8 kg (189 lb 2.5 oz)   LMP 12/20/2017   BMI 34.60 kg/m²      GENERAL:  No acute distress, well-nourished  HEENT:  Atraumatic, anicteric, moist mucus membranes, neck supple.  BREAST:  Symmetric, non-tender, no obvious masses, no skin changes, no nipple discharge. Old breast reduction scar.  LUNGS:  Clear to auscultation  HEART:  Regular rate and rhythm, no murmurs, gallops, or rubs  ABDOMEN:  Soft, non-tender, non-distended, normoactive bowel sounds, no obvious organomegaly  EXT:  Symmetric w/o cramping, claudication, or edema. +2 distal pulses.  SKIN:  No rashes or bruising  PSYCH:  Mood and affect appropriate    GENITOURINARY:  VULVAR:  Female external genitalia w/o any obvious lesions. Female hair distribution. Adequate perineal body. Normal urethral meatus. No gross lymphadenopathy.  VAGINA:  Mild, age appropriate vulvovaginal atrophy. No significant cystocele or rectocele. No obvious lesion. No discharge or bleeding.  CERVIX:  No cervical motion tenderness, discharge, or obvious lesions.   UTERUS:  Small, non-tender, normal contour  ADNEXA:  No masses, non-tender    RECTAL:  Declined. No obvious external lesions    Chaperone present for exam.    Pelvic US:  6/22/2020    FINDINGS:  Uterus:     Size: 7 x 3 x 4 cm     Masses: There is a 1.3 x 1.2 x 1 cm heterogeneously hypoechoic mass within the anterior fundus, similar when compared to the previous exam and likely a fibroid.     Endometrium: Normal in this post menopausal patient, measuring 3 mm.     Right ovary:     Size: 5 x 2 x 4 cm     Appearance: There is a 1.3 x 1.1 x 0.7 cm anechoic lesion with mural calcification, presumably related to a complex cyst and unchanged when compared to the previous CT and ultrasound.     Vascular flow: Normal.     Left ovary:     Size: 4 x 2 x 3 cm     Appearance: Normal     Vascular Flow: Normal.     Free Fluid:     None.     Impression:     1. Slight interval decreased size of the anterior fundal  fibroid.  2. Slight decreased size of the partially calcified right adnexal cyst.    Assessment:     55 y.o.  with h/o BTL:    1. Well woman gynecologic exam  2. Right ovarian cyst, stable, asymptomatic  3. H/o postmenopausal bleeding, endometrial stripe 3 mm on 20    Plan:    A gynecologic health assessment was performed with age appropriate counseling.    Cervical cancer screening - pap obtained.    STI screening - pt declined.    Screening mammogram ordered, pt advised to call to schedule.    Discussed physiology of ovarian cyst including benign and malignant etiologies and various mgt options.  After a lengthy discussion, pt desires expectant mgt for now as cyst is small and stable compared to previous exams.  Risks, benefits, and alternatives to expectant management discussed.  Repeat ultrasound in 1 year for interval follow up or sooner as clinically indicated/symptomatic.  Low risk of ovarian malignancy discussed.  Pelvic/adnexal/torsion precautions.     Discussed h/o postmenopausal bleeding.  Pt advised to call office for follow up if she develops postmenopausal bleeding.    Discussed supportive care for vaginal dryness.  KY jelly.  Pt declined premarin cream.      Encourage healthy lifestyle modifications, monthly self breast exams, Ca/Vit, yearly mammogram, and colonoscopy.    F/u with PCP for health maintenance.    Return 1 year for gynecologic exam, or sooner as needed, pt advised to call and schedule.    All questions answered, pt voiced understanding.        Nvaeed Alexander MD

## 2020-06-29 NOTE — PROGRESS NOTES
"[facility]  Ambulatory Clinic  Obstetrics & Gynecology    Visit Date:  2020    Chief Complaint:  Annual GYN exam    History of Present Illness:      Antoine Seals is a 55 y.o. , h/o BTL, here for annual GYN exam.    Pt has no major complaints today.    Pt was evaluated for postmenopausal bleeding in the past.  EMB benign.      Pt reports one episode of very faint light pink discharge after intercourse "due to dryness" recently, denies flor vaginal bleeding.    Her endometrial stripe on recent US is normal.    Pt had recent pelvic US showed small stable right ovarian cyst.    Pt denies h/o abnormal pap, last pap 2017 negative.    Pt has remote h/o syphilis that is treated.    Recent mammogram 2019 normal.    Pt performs monthly self breast examination, non-smoker, uses seat belts, and denies abuse.     Pt denies vaginal bleeding, vaginal discharge, dysmenorrhea, dyspareunia, pelvic pain, bloating, early satiety, unintentional weight loss, breast mass/skin changes, incontinence, GI or urinary complaints.      Otherwise, the pt is in her usual state of health.    Review of Systems:    GENERAL:  No fever, fatigue, excessive weight gain or loss  HEENT:  No head injury, headaches, hearing changes, visual disturbance  RESPIRATORY:  No cough, shortness of breath  CARDIOVASCULAR:  No chest pain, heart palpitations, leg swelling  BREAST:  No lump, pain, nipple discharge, skin changes  GASTROINTESTINAL:  No nausea, vomiting, constipation, diarrhea, abd pain, rectal bleeding   URINARY:  No dysuria, frequency, hematuria  GENITOURINARY:  See HPI  ENDOCRINE:  No heat or cold intolerance  HEMATOLOGIC:  No easy bruisability or bleeding   LYMPHATICS:  No enlarged nodes  MUSCULOSKELETAL:  No acute joint pain or swelling  SKIN:  No rash, lesions, jaundice  NEUROLOGIC:  No dizziness, weakness, syncope  PSYCHIATRIC:  No significant mood changes, suicidal or homicidal ideations    Physical Exam: "     /88   Wt 85.8 kg (189 lb 2.5 oz)   LMP 12/20/2017   BMI 34.60 kg/m²      GENERAL:  No acute distress, well-nourished  HEENT:  Atraumatic, anicteric, moist mucus membranes, neck supple.  BREAST:  Symmetric, non-tender, no obvious masses, no skin changes, no nipple discharge. Old breast reduction scar.  LUNGS:  Clear to auscultation  HEART:  Regular rate and rhythm, no murmurs, gallops, or rubs  ABDOMEN:  Soft, non-tender, non-distended, normoactive bowel sounds, no obvious organomegaly  EXT:  Symmetric w/o cramping, claudication, or edema. +2 distal pulses.  SKIN:  No rashes or bruising  PSYCH:  Mood and affect appropriate    GENITOURINARY:  VULVAR:  Female external genitalia w/o any obvious lesions. Female hair distribution. Adequate perineal body. Normal urethral meatus. No gross lymphadenopathy.  VAGINA:  Mild, age appropriate vulvovaginal atrophy. No significant cystocele or rectocele. No obvious lesion. No discharge or bleeding.  CERVIX:  No cervical motion tenderness, discharge, or obvious lesions.   UTERUS:  Small, non-tender, normal contour  ADNEXA:  No masses, non-tender    RECTAL:  Declined. No obvious external lesions    Chaperone present for exam.    Pelvic US:  6/22/2020    FINDINGS:  Uterus:     Size: 7 x 3 x 4 cm     Masses: There is a 1.3 x 1.2 x 1 cm heterogeneously hypoechoic mass within the anterior fundus, similar when compared to the previous exam and likely a fibroid.     Endometrium: Normal in this post menopausal patient, measuring 3 mm.     Right ovary:     Size: 5 x 2 x 4 cm     Appearance: There is a 1.3 x 1.1 x 0.7 cm anechoic lesion with mural calcification, presumably related to a complex cyst and unchanged when compared to the previous CT and ultrasound.     Vascular flow: Normal.     Left ovary:     Size: 4 x 2 x 3 cm     Appearance: Normal     Vascular Flow: Normal.     Free Fluid:     None.     Impression:     1. Slight interval decreased size of the anterior fundal  fibroid.  2. Slight decreased size of the partially calcified right adnexal cyst.    Assessment:     55 y.o.  with h/o BTL:    1. Well woman gynecologic exam  2. Right ovarian cyst, stable, asymptomatic  3. H/o postmenopausal bleeding, endometrial stripe 3 mm on 20    Plan:    A gynecologic health assessment was performed with age appropriate counseling.    Cervical cancer screening - pap obtained.    STI screening - pt declined.    Screening mammogram ordered, pt advised to call to schedule.    Discussed physiology of ovarian cyst including benign and malignant etiologies and various mgt options.  After a lengthy discussion, pt desires expectant mgt for now as cyst is small and stable compared to previous exams.  Risks, benefits, and alternatives to expectant management discussed.  Repeat ultrasound in 1 year for interval follow up or sooner as clinically indicated/symptomatic.  Low risk of ovarian malignancy discussed.  Pelvic/adnexal/torsion precautions.     Discussed h/o postmenopausal bleeding.  Pt advised to call office for follow up if she develops postmenopausal bleeding.    Discussed supportive care for vaginal dryness.  KY jelly.  Pt declined premarin cream.      Encourage healthy lifestyle modifications, monthly self breast exams, Ca/Vit, yearly mammogram, and colonoscopy.    F/u with PCP for health maintenance.    Return 1 year for gynecologic exam, or sooner as needed, pt advised to call and schedule.    All questions answered, pt voiced understanding.        Naveed Alexander MD

## 2020-06-29 NOTE — TRANSFER OF CARE
"Anesthesia Transfer of Care Note    Patient: Antoine Seals    Procedure(s) Performed: Procedure(s) (LRB):  EGD (ESOPHAGOGASTRODUODENOSCOPY) (N/A)    Patient location: PACU    Anesthesia Type: general    Transport from OR: Transported from OR on room air with adequate spontaneous ventilation    Post pain: adequate analgesia    Post assessment: no apparent anesthetic complications    Post vital signs: stable    Level of consciousness: awake    Nausea/Vomiting: no nausea/vomiting    Complications: none    Transfer of care protocol was followed      Last vitals:   Visit Vitals  BP (!) 143/78 (BP Location: Left arm, Patient Position: Sitting)   Pulse 85   Temp 36.9 °C (98.4 °F) (Temporal)   Resp 16   Ht 5' 2" (1.575 m)   Wt 83.9 kg (185 lb)   LMP 12/20/2017   SpO2 97%   Breastfeeding No   BMI 33.84 kg/m²     "

## 2020-06-29 NOTE — ANESTHESIA PREPROCEDURE EVALUATION
2020  Antoine Seals is a 55 y.o., female.  Past Medical History:   Diagnosis Date    Anemia     Angio-edema     Depression     Diabetes mellitus type 2, noninsulin dependent 2016    Hematuria     Hx of essential hypertension     Hyperlipidemia     Hypertension, uncontrolled 2016    Nuclear sclerosis of both eyes 4/10/2017     Past Surgical History:   Procedure Laterality Date    BTL       SECTION      COLONOSCOPY N/A 2017    Procedure: COLONOSCOPY;  Surgeon: Oleg Enciso MD;  Location: [address]);  Service: Endoscopy;  Laterality: N/A;  CHRONIC CONSTIPATION S/P LRNY    CYSTOSCOPY  2019    Procedure: CYSTOSCOPY;  Surgeon: ELIZABETH Mendoza MD;  Location: [facility];  Service: Urology;;  PRE-OP BY RN 3-    GASTRIC BYPASS      sandra en y    HYSTEROSCOPY WITH DILATION AND CURETTAGE OF UTERUS N/A 2018    Procedure: HYSTEROSCOPY, WITH DILATION AND CURETTAGE OF UTERUS;  Surgeon: Naveed Alexander MD;  Location: [facility] OR;  Service: OB/GYN;  Laterality: N/A;  RN PREOP 2018    NECK SURGERY  2016    TOTAL REDUCTION MAMMOPLASTY           Anesthesia Evaluation    I have reviewed the Patient Summary Reports.      I have reviewed the Medications.     Review of Systems  Anesthesia Hx:  No problems with previous Anesthesia Denies Hx of Anesthetic complications  Denies Family Hx of Anesthesia complications.   Denies Personal Hx of Anesthesia complications.   Hematology/Oncology:  Hematology Normal   Oncology Normal     EENT/Dental:EENT/Dental Normal   Cardiovascular:  Cardiovascular Normal     Pulmonary:  Pulmonary Normal    Renal/:  Renal/ Normal     Hepatic/GI:  Hepatic/GI Normal    Musculoskeletal:  Musculoskeletal Normal    Neurological:  Neurology Normal    Endocrine:  Endocrine Normal    Dermatological:  Skin Normal     Psych:  Psychiatric Normal           Physical Exam  General:  Well nourished    Airway/Jaw/Neck:  Airway Findings: Mouth Opening: Normal Tongue: Normal  General Airway Assessment: Adult  Mallampati: III  Improves to II with phonation.  TM Distance: Normal, at least 6 cm  Jaw/Neck Findings:  Neck ROM: Normal ROM  Neck Findings: Normal    Eyes/Ears/Nose:  EYES/EARS/NOSE FINDINGS: Normal   Dental:  Dental Findings: In tact   Chest/Lungs:  Chest/Lungs Findings: Clear to auscultation, Normal Respiratory Rate     Heart/Vascular:  Heart Findings: Rate: Normal     Abdomen:  Abdomen Findings: Normal    Musculoskeletal:  Musculoskeletal Findings: Normal   Skin:  Skin Findings: Normal    Mental Status:  Mental Status Findings:  Alert and Oriented, Cooperative         Anesthesia Plan  Type of Anesthesia, risks & benefits discussed:  Anesthesia Type:  general  Patient's Preference: General  Intra-op Monitoring Plan: standard ASA monitors  Intra-op Monitoring Plan Comments:   Post Op Pain Control Plan:   Post Op Pain Control Plan Comments:   Induction:   IV  Beta Blocker:  Patient is not currently on a Beta-Blocker (No further documentation required).       Informed Consent: Patient understands risks and agrees with Anesthesia plan.  Questions answered. Anesthesia consent signed with patient.  ASA Score: 3     Day of Surgery Review of History & Physical:    H&P update referred to the provider.         Ready For Surgery From Anesthesia Perspective.

## 2020-06-29 NOTE — [employer]OVATION PATIENT INSTRUCTIONS
Discharge Summary/Instructions after an Endoscopic Procedure  Patient Name: Antoine Seals  Patient MRN: [id number]  Patient YOB: 1965 Monday, June 29, 2020  Elias Harper MD  RESTRICTIONS:  During your procedure today, you received medications for sedation.  These   medications may affect your judgment, balance and coordination.  Therefore,   for 24 hours, you have the following restrictions:   - DO NOT drive a car, operate machinery, make legal/financial decisions,   sign important papers or drink alcohol.    ACTIVITY:  Today: no heavy lifting, straining or running due to procedural   sedation/anesthesia.  The following day: return to full activity including work.  DIET:  Eat and drink normally unless instructed otherwise.     TREATMENT FOR COMMON SIDE EFFECTS:  - Mild abdominal pain, nausea, belching, bloating or excessive gas:  rest,   eat lightly and use a heating pad.  - Sore Throat: treat with throat lozenges and/or gargle with warm salt   water.  - Because air was used during the procedure, expelling large amounts of air   from your rectum or belching is normal.  - If a bowel prep was taken, you may not have a bowel movement for 1-3 days.    This is normal.  SYMPTOMS TO WATCH FOR AND REPORT TO YOUR PHYSICIAN:  1. Abdominal pain or bloating, other than gas cramps.  2. Chest pain.  3. Back pain.  4. Signs of infection such as: chills or fever occurring within 24 hours   after the procedure.  5. Rectal bleeding, which would show as bright red, maroon, or black stools.   (A tablespoon of blood from the rectum is not serious, especially if   hemorrhoids are present.)  6. Vomiting.  7. Weakness or dizziness.  GO DIRECTLY TO THE NEAREST EMERGENCY ROOM IF YOU HAVE ANY OF THE FOLLOWING:      Difficulty breathing              Chills and/or fever over 101 F   Persistent vomiting and/or vomiting blood   Severe abdominal pain   Severe chest pain   Black, tarry stools   Bleeding- more than one tablespoon   Any  other symptom or condition that you feel may need urgent attention  Your doctor recommends these additional instructions:  If any biopsies were taken, your doctors clinic will contact you in 1 to 2   weeks with any results.  - Patient has a contact number available for emergencies.  The signs and   symptoms of potential delayed complications were discussed with the   patient.  Return to normal activities tomorrow.  Written discharge   instructions were provided to the patient.   - Discharge patient to home (ambulatory).   - Resume previous diet.   - Continue present medications.   - Return to GI clinic at appointment to be scheduled.  For questions, problems or results please call your physician - Elias Harper MD at Work:  [phone].  [facility], EMERGENCY ROOM PHONE NUMBER: [phone]  IF A COMPLICATION OR EMERGENCY SITUATION ARISES AND YOU ARE UNABLE TO REACH   YOUR PHYSICIAN - GO DIRECTLY TO THE EMERGENCY ROOM.  Elias Harper MD  6/29/2020 3:12:39 PM  This report has been verified and signed electronically.  [employer]

## 2020-06-29 NOTE — PROGRESS NOTES
I spoke to the patient after her endoscopy.  The endoscopy was negative for any cause of abdominal pain.  She has been having this abdominal pain for several years.  She has actually been seeing a gastroenterologist on the [facility], but right now does not remember the name.  She knows that she tried trulance for this pain already.  She is not aware of any other trials.  It sounds like this is a generalized abdominal pain associated with bloating.  She had a recent episode after eating a corn dog.  I offered her chance to come in and talk to anyone the GI department here.  In the meantime I suggested trying FD guard.  If she does come in it might be reasonable to get records from the prior GI doctor

## 2020-07-01 LAB
FINAL PATHOLOGIC DIAGNOSIS: NORMAL
Lab: NORMAL

## 2020-07-07 NOTE — PROGRESS NOTES
Called pt, notified of EGD results. She is completing RX regimen given from Dr. Elias Harper and verbalized she will follow up with him once completed.

## 2020-07-08 LAB
HPV HR 12 DNA SPEC QL NAA+PROBE: NEGATIVE
HPV16 AG SPEC QL: NEGATIVE
HPV18 DNA SPEC QL NAA+PROBE: NEGATIVE

## 2020-07-10 ENCOUNTER — TELEPHONE (OUTPATIENT)
Dept: GASTROENTEROLOGY | Facility: CLINIC | Age: 55
End: 2020-07-10

## 2020-07-10 NOTE — TELEPHONE ENCOUNTER
----- Message from Jane Reyna sent at 7/10/2020  2:07 PM CDT -----  Contact: Pt  Pt called to schedule an appt and asked for a call back.

## 2020-07-10 NOTE — TELEPHONE ENCOUNTER
Patient notified that Leroy and his MA are out of clinic today. Will have Ms. Susan contact patient upon their return on Monday. Patient verbalized understanding.

## 2020-07-12 ENCOUNTER — NURSE TRIAGE (OUTPATIENT)
Dept: ADMINISTRATIVE | Facility: CLINIC | Age: 55
End: 2020-07-12

## 2020-07-12 NOTE — TELEPHONE ENCOUNTER
Pt denies any fever, cough, or difficulty breathing since procedure. Advised pt if these symptoms do arise to contact [facility] or PCP. No follow up needed. Contacted pt on behalf of Post Procedural Symptom Tracker. Pt verified by first and last name and .    Reason for Disposition   General information question, no triage required and triager able to answer question    Protocols used: INFORMATION ONLY CALL - NO TRIAGE-A-

## 2020-07-14 ENCOUNTER — TELEPHONE (OUTPATIENT)
Dept: GASTROENTEROLOGY | Facility: CLINIC | Age: 55
End: 2020-07-14

## 2020-07-14 NOTE — TELEPHONE ENCOUNTER
Spoke with patient.  Continues with abdominal pain and bloating.  Requesting an appointment.  Scheduled to see /Dr.James Harper on 7/15/2020 for 4:00.  Susan

## 2020-07-15 ENCOUNTER — OFFICE VISIT (OUTPATIENT)
Dept: GASTROENTEROLOGY | Facility: CLINIC | Age: 55
End: 2020-07-15
Payer: MEDICARE

## 2020-07-15 VITALS
WEIGHT: 194 LBS | DIASTOLIC BLOOD PRESSURE: 98 MMHG | HEIGHT: 62 IN | SYSTOLIC BLOOD PRESSURE: 155 MMHG | BODY MASS INDEX: 35.7 KG/M2 | HEART RATE: 78 BPM

## 2020-07-15 DIAGNOSIS — R10.84 GENERALIZED ABDOMINAL PAIN: ICD-10-CM

## 2020-07-15 DIAGNOSIS — K59.09 CONSTIPATION, CHRONIC: Primary | ICD-10-CM

## 2020-07-15 DIAGNOSIS — R14.0 BLOATING SYMPTOM: ICD-10-CM

## 2020-07-15 DIAGNOSIS — N20.0 NEPHROLITHIASIS: ICD-10-CM

## 2020-07-15 PROCEDURE — 99999 PR PBB SHADOW E&M-EST. PATIENT-LVL IV: CPT | Mod: PBBFAC,GC,, | Performed by: STUDENT IN AN ORGANIZED HEALTH CARE EDUCATION/TRAINING PROGRAM

## 2020-07-15 PROCEDURE — 99999 PR PBB SHADOW E&M-EST. PATIENT-LVL IV: ICD-10-PCS | Mod: PBBFAC,GC,, | Performed by: STUDENT IN AN ORGANIZED HEALTH CARE EDUCATION/TRAINING PROGRAM

## 2020-07-15 PROCEDURE — 99213 PR OFFICE/OUTPT VISIT, EST, LEVL III, 20-29 MIN: ICD-10-PCS | Mod: GC,S$GLB,, | Performed by: STUDENT IN AN ORGANIZED HEALTH CARE EDUCATION/TRAINING PROGRAM

## 2020-07-15 PROCEDURE — 3080F PR MOST RECENT DIASTOLIC BLOOD PRESSURE >= 90 MM HG: ICD-10-PCS | Mod: CPTII,GC,S$GLB, | Performed by: STUDENT IN AN ORGANIZED HEALTH CARE EDUCATION/TRAINING PROGRAM

## 2020-07-15 PROCEDURE — 3008F PR BODY MASS INDEX (BMI) DOCUMENTED: ICD-10-PCS | Mod: CPTII,GC,S$GLB, | Performed by: STUDENT IN AN ORGANIZED HEALTH CARE EDUCATION/TRAINING PROGRAM

## 2020-07-15 PROCEDURE — 99213 OFFICE O/P EST LOW 20 MIN: CPT | Mod: GC,S$GLB,, | Performed by: STUDENT IN AN ORGANIZED HEALTH CARE EDUCATION/TRAINING PROGRAM

## 2020-07-15 PROCEDURE — 3008F BODY MASS INDEX DOCD: CPT | Mod: CPTII,GC,S$GLB, | Performed by: STUDENT IN AN ORGANIZED HEALTH CARE EDUCATION/TRAINING PROGRAM

## 2020-07-15 PROCEDURE — 3080F DIAST BP >= 90 MM HG: CPT | Mod: CPTII,GC,S$GLB, | Performed by: STUDENT IN AN ORGANIZED HEALTH CARE EDUCATION/TRAINING PROGRAM

## 2020-07-15 PROCEDURE — 3077F SYST BP >= 140 MM HG: CPT | Mod: CPTII,GC,S$GLB, | Performed by: STUDENT IN AN ORGANIZED HEALTH CARE EDUCATION/TRAINING PROGRAM

## 2020-07-15 PROCEDURE — 3077F PR MOST RECENT SYSTOLIC BLOOD PRESSURE >= 140 MM HG: ICD-10-PCS | Mod: CPTII,GC,S$GLB, | Performed by: STUDENT IN AN ORGANIZED HEALTH CARE EDUCATION/TRAINING PROGRAM

## 2020-07-15 RX ORDER — DICYCLOMINE HYDROCHLORIDE 10 MG/1
10 CAPSULE ORAL 3 TIMES DAILY
Qty: 90 CAPSULE | Refills: 3 | Status: SHIPPED | OUTPATIENT
Start: 2020-07-15 | End: 2020-08-14

## 2020-07-16 NOTE — PROGRESS NOTES
[facility]    Reason for visit: The primary encounter diagnosis was Constipation, chronic. Diagnoses of Generalized abdominal pain, Bloating symptom, and Nephrolithiasis were also pertinent to this visit.  Referring Provider/PCP: Arnel Morales MD    History of Present Illness:  Antoine Seals is a 55 y.o. female with a history of T2DM, chronic constipation who is presenting for follow-up evaluation of chronic constipation and abdominal pain.    Patient reports a history of constipation over the past 2 years along with bloating. He has a history of LRNY in 2005 c/b internal hernia and adhesions requiring repair and NEW in 2009. She underwent an EGD and colonoscopy in 2017 for the same pain and constipation with no revealing causes.  She was seen by Dr. Fleming and Dr. Shipley in 2017. At that time she was describing symptoms of pain and bloating related to constipation and resolving after having a bowel movement. At that time Miralax was started and amlodipine was considered as a possible etiology for her constipation.    Patient reports that following that she started seeing GI at [facility]. She recalls being started on Linzess at some point which did not help. Then she was started to use Trulance, which did help her with having a regular bowel habit and improved the bloating. However, she reports that she had some periumbilical pain that did not resolve with having bowel movements. She has tried multiple medications to help with the pain but did not work (unclear what medications). About a year ago, she stopped getting Trulance due to issues related to insurance.   She reports that over the past year she started having issues with pain, bloating and constipation. Over the past few months she had issues with left flank pain that was thought to be related to nephrolithiasis that she had lithotripsy for back in May. She has been taking Miralax recently for constipation which helps her  fairly with her constipation and bloating, but does not help with the sabina-umbilical pain. Over the past month she has tried Aleve daily which helped with her pain. Of note she does not take a PPI. She underwent an EGD on 6/29 which was essentially normal, however she had Left flank pain since then. Her last CT scan of the abdomen on 6/3 showed left nephrolithiasis with mild inflammation. She is planned to see urology in August.   Most recently the patient tried a dose of bentyl, and she reports having significant relief from both her sabina-umbilical and left flank pain    PEndoHx:  EGD. (6/2020). Dr. Harper. Indication:Abdominal pain.  Impression:           - Normal esophagus.                         - Gastric bypass with a normal-sized pouch and                         intact staple line. Gastrojejunal anastomosis                         characterized by healthy appearing mucosa.                         - Normal examined jejunum.                         - No specimens collected.     Colonoscopy. (2017) Provider: Dr. Enciso. Indication: CRC Screening. Extent: Cecum. Preparation:Excellent.  Impression:           - The entire examined colon is normal on direct and                         retroflexion views.                         - No specimens collected.     EGD. (2017). Dr. Enciso. Indication:Abdominal pain.  Impression:           - Normal esophagus.                         - Jordyn-en-Y gastrojejunostomy with gastrojejunal                         anastomosis characterized by visible sutures and                         healthy appearing mucosa.                         - No specimens collected.         Review of Systems:   Constitutional: no fever, chills or change in weight   Eyes: no visual changes   ENT: no sore throat or dysphagia  Respiratory: no cough or shortness of breath   Cardiovascular: no chest pain or palpitations   Gastrointestinal: as per HPI  Hematologic/Lymphatic: no easy bruising or lymphadenopathy    Musculoskeletal: no arthralgias or myalgias   Neurological: no change in mental status  Behavioral/Psych: no change in mood    Medical History:  Past Medical History:   Diagnosis Date    Anemia     Angio-edema     Depression     Diabetes mellitus type 2, noninsulin dependent 2016    Hematuria     Hx of essential hypertension     Hyperlipidemia     Hypertension, uncontrolled 2016    Nuclear sclerosis of both eyes 4/10/2017       Past Surgical History:   Procedure Laterality Date    BTL       SECTION      COLONOSCOPY N/A 2017    Procedure: COLONOSCOPY;  Surgeon: Oleg Enciso MD;  [facility]  Service: Endoscopy;  Laterality: N/A;  CHRONIC CONSTIPATION S/P LRNY    CYSTOSCOPY  2019    Procedure: CYSTOSCOPY;  Surgeon: ELIZABETH Mendoza MD;  Location: [facility] OR;  Service: Urology;;  PRE-OP BY RN 3-    ESOPHAGOGASTRODUODENOSCOPY N/A 2020    Procedure: EGD (ESOPHAGOGASTRODUODENOSCOPY);  Surgeon: Elias Harper MD;  Location: [facility] ([address] [place] [address]);  Service: Endoscopy;  Laterality: N/A;  prep ins. emailed - ERW  COVID screening on 20 - [facility]W    GASTRIC BYPASS      sandra en y    HYSTEROSCOPY WITH DILATION AND CURETTAGE OF UTERUS N/A 2018    Procedure: HYSTEROSCOPY, WITH DILATION AND CURETTAGE OF UTERUS;  Surgeon: Naveed Alexander MD;  Location: [facility] OR;  Service: OB/GYN;  Laterality: N/A;  RN PREOP 2018    NECK SURGERY  2016    TOTAL REDUCTION MAMMOPLASTY         Family History   Problem Relation Age of Onset    Heart disease Mother     Diabetes type II Mother     Heart attack Father 50        Open heart surgery    Allergies Sister     No Known Problems Brother     No Known Problems Maternal Aunt     No Known Problems Maternal Uncle     No Known Problems Paternal Aunt     No Known Problems Paternal Uncle     No Known Problems Maternal Grandmother     No Known Problems Maternal Grandfather     No Known Problems  Paternal Grandmother     No Known Problems Paternal Grandfather     Amblyopia Neg Hx     Blindness Neg Hx     Cancer Neg Hx     Cataracts Neg Hx     Diabetes Neg Hx     Glaucoma Neg Hx     Hypertension Neg Hx     Macular degeneration Neg Hx     Retinal detachment Neg Hx     Strabismus Neg Hx     Stroke Neg Hx     Thyroid disease Neg Hx        Social History     Socioeconomic History    Marital status: Single     Spouse name: Not on file    Number of children: Not on file    Years of education: Not on file    Highest education level: Not on file   Occupational History    Not on file   Social Needs    Financial resource strain: Not on file    Food insecurity     Worry: Not on file     Inability: Not on file    Transportation needs     Medical: Not on file     Non-medical: Not on file   Tobacco Use    Smoking status: Never Smoker    Smokeless tobacco: Never Used   Substance and Sexual Activity    Alcohol use: No     Alcohol/week: 0.0 standard drinks     Comment: Socially    Drug use: No    Sexual activity: Yes     Partners: Male   Lifestyle    Physical activity     Days per week: Not on file     Minutes per session: Not on file    Stress: Not on file   Relationships    Social connections     Talks on phone: Not on file     Gets together: Not on file     Attends Christianity service: Not on file     Active member of club or organization: Not on file     Attends meetings of clubs or organizations: Not on file     Relationship status: Not on file   Other Topics Concern    Not on file   Social History Narrative    Not on file       Current Outpatient Medications on File Prior to Visit   Medication Sig Dispense Refill    amLODIPine (NORVASC) 10 MG tablet TAKE 1 TABLET(10 MG) BY MOUTH EVERY DAY 90 tablet 2    ARIPiprazole (ABILIFY) 2 MG Tab       B-complex with vitamin C (Z-BEC OR EQUIV) tablet Take 1 tablet by mouth once daily.      buPROPion (WELLBUTRIN XL) 300 MG 24 hr tablet Take 300 mg by  mouth once daily.   1    cetirizine (ZYRTEC) 10 MG tablet 1-2 tablets daily as needed for itching or swelling 100 tablet 1    donepezil (ARICEPT) 5 MG tablet Take 10 mg by mouth once daily.       dulaglutide (TRULICITY) 1.5 mg/0.5 mL PnIj Inject 1.5 mg into the skin every 7 days. 12 Syringe 2    ENDOCET  mg per tablet Take 1 tablet by mouth 3 (three) times daily.  0    ertugliflozin (STEGLATRO) 5 mg Tab Take 5 mg by mouth once daily. 90 tablet 2    gabapentin (NEURONTIN) 100 MG capsule Take 100 mg by mouth once daily.      linaGLIPtin (TRADJENTA) 5 mg Tab tablet Take 5 mg by mouth once daily.       metFORMIN (GLUCOPHAGE) 500 MG tablet Take 2 tablets with breakfast and 1 tablets with dinner 270 tablet 1    TRULANCE 3 mg Tab       aspirin (ECOTRIN) 81 MG EC tablet Take 1 tablet (81 mg total) by mouth once daily. (Patient not taking: Reported on 1/30/2020) 90 tablet 3    buPROPion (WELLBUTRIN XL) 150 MG TB24 tablet Take 150 mg by mouth once daily.      dulaglutide (TRULICITY) 1.5 mg/0.5 mL PnIj Inject 1.5 mg into the skin every 7 days.      ezetimibe (ZETIA) 10 mg tablet Take 1 tablet (10 mg total) by mouth once daily. (Patient not taking: Reported on 5/26/2020) 90 tablet 2    rosuvastatin (CRESTOR) 40 MG Tab Take 1 tablet (40 mg total) by mouth every evening. 90 tablet 2     No current facility-administered medications on file prior to visit.        Review of patient's allergies indicates:   Allergen Reactions    Lisinopril-hydrochlorothiazide Swelling     Facial swelling/ mouth swelling       Physical Exam:  General: Alert and Oriented x3, no distress   Vitals:    07/15/20 1558   BP: (!) 155/98   Pulse: 78     HEENT: Normocephalic, Atraumatic. No scleral icterus.  Lymph: No cervical lymphadenopathy  Resp: Good air entry bilaterally, no adventitious sounds.  Cardiac: S1 and S2 normal.  Abdomen: Normoactive bowel sounds. Non-distended. Normal tympany. Soft. Tender to palpation point tenderness  sabina-umbilical, mostly superior and to the left of the umbilicus.. No peritoneal signs.  Extremities: No peripheral edema. Normal bilateral pedal and radial pulses.  Neurologic: No gross neurological Deficits  Psych: Calm, cooperative. Normal mood and affect.    Laboratory:  Lab Results   Component Value Date     01/30/2020    K 4.1 01/30/2020     01/30/2020    CO2 26 01/30/2020    BUN 13 01/30/2020    CREATININE 0.9 01/30/2020    CALCIUM 9.7 01/30/2020    ANIONGAP 10 01/30/2020    ESTGFRAFRICA >60.0 01/30/2020    EGFRNONAA >60.0 01/30/2020       Lab Results   Component Value Date    ALT 23 01/30/2020    AST 18 01/30/2020    ALKPHOS 81 01/30/2020    BILITOT 0.2 01/30/2020       Lab Results   Component Value Date    WBC 5.71 01/30/2020    HGB 12.1 01/30/2020    HCT 37.0 01/30/2020    MCV 80 (L) 01/30/2020     01/30/2020       Microbiology:  No Pertinent Microbiology    Imaging:  CT abdomen 6/3      0.6 cm stone in the left ureteropelvic junction with associated renal pelviectasis and mild inflammatory change.  Recommend correlation with urinalysis consideration of urologic evaluation.     4.5 cm right adnexal hypodensity with calcified component.     Stable postoperative changes of previous Jordyn-en-Y gastric bypass.     No evidence of internal hernia as clinically questioned.    Assessment:  Antoine Seals is a 55 y.o. female who is presenting for follow-up evaluation of bloating, abdominal pain and constipation    Patient's constipation and bloating seem to be related and most consistent with functional constipation. She needs better control of her constipation. Her CT, EGD and colonoscopy from 2017 are reassuring. No further work up is indicated. We recommended that she takes daily Miralax for her constipation. If this fails, we will consider trying to re-prescribe Trulance for her (since she failed Linzess in the past.    For her sabina-umbilical pain, etiology seems to be musculoskeletal  (desription, exam findings, and improvement with NSAIDs). It is unusual however that pain improved with bentyl. We educated the patient on side effects related to NSAIDs including PUD in her remnant stomach. She prefers to stop the medication. We will start Bentyl along with Miralax (given risk of worsening complications) and we will monitor. Another possibility would be NSAIDs with daily PPI, but would prefer to avoid given significant side effects    Left flank pain seems more related to nephrololithiasis, will have follow up with urology.      Plan:  1. Miralax 1-2 capfuls daily.   2. Bentyl 10mg TID prn  3. Stop Aleve.  4. Follow up with urology.  5. CRC [id number]  Follow up in about 3 months (around 10/15/2020).    Tushar Lund MD  Gastroenterology Fellow  Phone: [phone]    No orders of the defined types were placed in this encounter.

## 2020-07-30 ENCOUNTER — OFFICE VISIT (OUTPATIENT)
Dept: UROLOGY | Facility: CLINIC | Age: 55
End: 2020-07-30
Payer: MEDICARE

## 2020-07-30 VITALS — HEIGHT: 62 IN | WEIGHT: 195 LBS | BODY MASS INDEX: 35.88 KG/M2

## 2020-07-30 DIAGNOSIS — R31.0 GROSS HEMATURIA: Primary | ICD-10-CM

## 2020-07-30 DIAGNOSIS — N20.0 NEPHROLITHIASIS: ICD-10-CM

## 2020-07-30 PROCEDURE — 99999 PR PBB SHADOW E&M-EST. PATIENT-LVL IV: CPT | Mod: PBBFAC,,, | Performed by: UROLOGY

## 2020-07-30 PROCEDURE — 99999 PR PBB SHADOW E&M-EST. PATIENT-LVL IV: ICD-10-PCS | Mod: PBBFAC,,, | Performed by: UROLOGY

## 2020-07-30 PROCEDURE — 3008F BODY MASS INDEX DOCD: CPT | Mod: CPTII,S$GLB,, | Performed by: UROLOGY

## 2020-07-30 PROCEDURE — 87088 URINE BACTERIA CULTURE: CPT

## 2020-07-30 PROCEDURE — 99214 PR OFFICE/OUTPT VISIT, EST, LEVL IV, 30-39 MIN: ICD-10-PCS | Mod: 25,S$GLB,, | Performed by: UROLOGY

## 2020-07-30 PROCEDURE — 81002 URINALYSIS NONAUTO W/O SCOPE: CPT | Mod: S$GLB,,, | Performed by: UROLOGY

## 2020-07-30 PROCEDURE — 87186 SC STD MICRODIL/AGAR DIL: CPT

## 2020-07-30 PROCEDURE — 81002 POCT URINE DIPSTICK WITHOUT MICROSCOPE: ICD-10-PCS | Mod: S$GLB,,, | Performed by: UROLOGY

## 2020-07-30 PROCEDURE — 3008F PR BODY MASS INDEX (BMI) DOCUMENTED: ICD-10-PCS | Mod: CPTII,S$GLB,, | Performed by: UROLOGY

## 2020-07-30 PROCEDURE — 87077 CULTURE AEROBIC IDENTIFY: CPT

## 2020-07-30 PROCEDURE — 87086 URINE CULTURE/COLONY COUNT: CPT

## 2020-07-30 PROCEDURE — 99214 OFFICE O/P EST MOD 30 MIN: CPT | Mod: 25,S$GLB,, | Performed by: UROLOGY

## 2020-07-30 NOTE — H&P
"  Subjective:       Antoine Seals is a 55 y.o. female who is an established patient with Dr Mendoza, though new to me was referred by Dr He for evaluation of "air in urine".      She is a pt of Dr Mendoza, last seen 12/19 for gross hematuria. She was recommended to have cysto/RPG again in 12/19 but she cancelled this procedure and did not reschedule. She has had cysto/RPG in 3/18 also with Dr Mendoza that was normal.    She reports continued gross hematuria x 2 years. Nonsmoker. No family history of  malignancy. Hematuria is happening at least weekly - no clots. Also reports foamy urine, denies pneumaturia. Denies dysuria.     CT uro 4/19 - 7mm LLP stone, otherwise negative  JEANNA 11/19 - 3mm RUP calcification    KUB 4/20 - 9mm LLP stone  Cysto 4/20 - normal    7/30/2020  Since last appt, stone had moved to L UPJ (CT 6/3/20 - read as 6mm L UPJ stone, measures much larger as 1.3cm x 0.9cm). She continues to have intermittent L flank pain - pain last this AM. Aleve helps with pain. Also with pain related to PO intake.       The following portions of the patient's history were reviewed and updated as appropriate: allergies, current medications, past family history, past medical history, past social history, past surgical history and problem list.    Review of Systems  Constitutional: no fever or chills  ENT: no nasal congestion or sore throat  Respiratory: no cough or shortness of breath  Cardiovascular: no chest pain or palpitations  Gastrointestinal: no nausea or vomiting, tolerating diet  Genitourinary: as per HPI  Hematologic/Lymphatic: no easy bruising or lymphadenopathy  Musculoskeletal: no arthralgias or myalgias  Skin: no rashes or lesions  Neurological: no seizures or tremors  Behavioral/Psych: no auditory or visual hallucinations        Objective:    Vitals: Ht 5' 2" (1.575 m)   Wt 88.5 kg (195 lb)   LMP 12/20/2017   BMI 35.67 kg/m²     Physical Exam   General: well developed, well nourished " in no acute distress  Head: normocephalic, atraumatic  Neck: supple, trachea midline, no obvious enlargement of thyroid  HEENT: EOMI, mucus membranes moist, sclera anicteric, no hearing impairment  Lungs: symmetric expansion, non-labored breathing  Cardiovascular: regular rate and rhythm, normal pulses  Abdomen: soft, non tender, non distended, no palpable masses, no hepatosplenomegaly, no hernias, no CVA tenderness  Musculoskeletal: no peripheral edema, normal ROM in bilateral upper and lower extremities  Lymphatics: no cervical or inguinal lymphadenopathy  Skin: no rashes or lesions  Neuro: alert and oriented x 3, no gross deficits  Psych: normal judgment and insight, normal mood/affect and non-anxious  Genitourinary:   patient declined exam      Lab Review   Urine analysis today in clinic shows - 250 RBCs    Lab Results   Component Value Date    WBC 5.71 01/30/2020    HGB 12.1 01/30/2020    HCT 37.0 01/30/2020    MCV 80 (L) 01/30/2020     01/30/2020     Lab Results   Component Value Date    CREATININE 0.9 01/30/2020    BUN 13 01/30/2020       Imaging  Images and reports were personally reviewed by me and discussed with patient  CT uro, JEANNA reviewed        Assessment/Plan:      1. Gross hematuria    - Discussed etiology and workup of hematuria   - UCx - no urine given   - Cytology - not indicated   - CT urogram - done 3/19, 7mm L renal stone   - Office cystoscopy 4/20 - clear      2. Nephrolithiasis     - 7mm L renal stone, appears radio-opaque on  from CT 4/19   - KUB - radio-opaque   - Could be reason for gross hematuria though uncertain   - Stone now at L UPJ with obstruction (6/20). Stone is enlarging quickly, now 1.3cm.    - Recommend treatment of stone - discussed PCNL, URS, ESWL.    - Will proceed with URS/LL/stent. May require more than one procedure due to large size and proximal location. Discussed stent irritation.   - OR 8/11/20   - UCx pre-op   - Stone treatment may not resolve all  ailments as not classic for renal colic         Follow up in 2 weeks post-op

## 2020-08-01 ENCOUNTER — TELEPHONE (OUTPATIENT)
Dept: UROLOGY | Facility: HOSPITAL | Age: 55
End: 2020-08-01

## 2020-08-01 RX ORDER — SULFAMETHOXAZOLE AND TRIMETHOPRIM 800; 160 MG/1; MG/1
1 TABLET ORAL 2 TIMES DAILY
Qty: 14 TABLET | Refills: 0 | Status: SHIPPED | OUTPATIENT
Start: 2020-08-01 | End: 2020-08-08

## 2020-08-02 LAB — BACTERIA UR CULT: ABNORMAL

## 2020-08-03 ENCOUNTER — TELEPHONE (OUTPATIENT)
Dept: UROLOGY | Facility: CLINIC | Age: 55
End: 2020-08-03

## 2020-08-03 LAB
BILIRUB SERPL-MCNC: ABNORMAL MG/DL
BLOOD URINE, POC: ABNORMAL
CLARITY, POC UA: ABNORMAL
COLOR, POC UA: ABNORMAL
GLUCOSE UR QL STRIP: 50
KETONES UR QL STRIP: ABNORMAL
LEUKOCYTE ESTERASE URINE, POC: ABNORMAL
NITRITE, POC UA: ABNORMAL
PH, POC UA: 6
PROTEIN, POC: ABNORMAL
SPECIFIC GRAVITY, POC UA: 1020
UROBILINOGEN, POC UA: ABNORMAL

## 2020-08-05 ENCOUNTER — TELEPHONE (OUTPATIENT)
Dept: UROLOGY | Facility: CLINIC | Age: 55
End: 2020-08-05

## 2020-08-05 NOTE — TELEPHONE ENCOUNTER
Spoke to patient and she is requesting to Cx surgery scheduled for 8/11. Surgery scheduling notified.

## 2020-10-01 ENCOUNTER — HOSPITAL ENCOUNTER (EMERGENCY)
Facility: HOSPITAL | Age: 55
Discharge: HOME | End: 2020-10-01
Attending: EMERGENCY MEDICINE
Payer: MEDICARE

## 2020-10-01 VITALS
BODY MASS INDEX: 34.96 KG/M2 | HEIGHT: 62 IN | TEMPERATURE: 98 F | RESPIRATION RATE: 18 BRPM | HEART RATE: 76 BPM | SYSTOLIC BLOOD PRESSURE: 164 MMHG | DIASTOLIC BLOOD PRESSURE: 89 MMHG | OXYGEN SATURATION: 98 % | WEIGHT: 190 LBS

## 2020-10-01 DIAGNOSIS — I10 HYPERTENSION, UNSPECIFIED TYPE: Primary | ICD-10-CM

## 2020-10-01 DIAGNOSIS — N20.0 NEPHROLITHIASIS: ICD-10-CM

## 2020-10-01 DIAGNOSIS — G43.909 MIGRAINE WITHOUT STATUS MIGRAINOSUS, NOT INTRACTABLE, UNSPECIFIED MIGRAINE TYPE: ICD-10-CM

## 2020-10-01 LAB
ALBUMIN SERPL-MCNC: 3.6 G/DL (ref 3.3–5.5)
ALP SERPL-CCNC: 91 U/L (ref 42–141)
BILIRUB SERPL-MCNC: 0.7 MG/DL (ref 0.2–1.6)
BILIRUBIN, POC UA: NEGATIVE
BLOOD, POC UA: NEGATIVE
BUN SERPL-MCNC: 14 MG/DL (ref 7–22)
CALCIUM SERPL-MCNC: 8.9 MG/DL (ref 8–10.3)
CHLORIDE SERPL-SCNC: 104 MMOL/L (ref 98–108)
CLARITY, POC UA: CLEAR
COLOR, POC UA: YELLOW
CREAT SERPL-MCNC: 0.8 MG/DL (ref 0.6–1.2)
CRP SERPL-MCNC: 3.9 MG/L (ref 0–8.2)
ERYTHROCYTE [SEDIMENTATION RATE] IN BLOOD BY WESTERGREN METHOD: 35 MM/HR (ref 0–20)
GLUCOSE SERPL-MCNC: 134 MG/DL (ref 73–118)
GLUCOSE, POC UA: NEGATIVE
KETONES, POC UA: NEGATIVE
LEUKOCYTE EST, POC UA: NEGATIVE
NITRITE, POC UA: NEGATIVE
PH UR STRIP: 6.5 [PH]
POC ALT (SGPT): 25 U/L (ref 10–47)
POC AST (SGOT): 31 U/L (ref 11–38)
POC TCO2: 30 MMOL/L (ref 18–33)
POTASSIUM BLD-SCNC: 3.7 MMOL/L (ref 3.6–5.1)
PROTEIN, POC UA: NEGATIVE
PROTEIN, POC: 7.2 G/DL (ref 6.4–8.1)
SODIUM BLD-SCNC: 144 MMOL/L (ref 128–145)
SPECIFIC GRAVITY, POC UA: 1.02
UROBILINOGEN, POC UA: 1 E.U./DL

## 2020-10-01 PROCEDURE — 85652 RBC SED RATE AUTOMATED: CPT

## 2020-10-01 PROCEDURE — 85025 COMPLETE CBC W/AUTO DIFF WBC: CPT | Mod: ER

## 2020-10-01 PROCEDURE — 63600175 PHARM REV CODE 636 W HCPCS: Mod: ER | Performed by: EMERGENCY MEDICINE

## 2020-10-01 PROCEDURE — 99284 EMERGENCY DEPT VISIT MOD MDM: CPT | Mod: 25,ER

## 2020-10-01 PROCEDURE — 99285 EMERGENCY DEPT VISIT HI MDM: CPT | Mod: 25,ER

## 2020-10-01 PROCEDURE — 86140 C-REACTIVE PROTEIN: CPT

## 2020-10-01 PROCEDURE — 25000003 PHARM REV CODE 250: Mod: ER | Performed by: EMERGENCY MEDICINE

## 2020-10-01 PROCEDURE — 80053 COMPREHEN METABOLIC PANEL: CPT | Mod: ER

## 2020-10-01 PROCEDURE — 81003 URINALYSIS AUTO W/O SCOPE: CPT | Mod: ER

## 2020-10-01 PROCEDURE — 96374 THER/PROPH/DIAG INJ IV PUSH: CPT | Mod: ER

## 2020-10-01 RX ORDER — HYDRALAZINE HYDROCHLORIDE 10 MG/1
10 TABLET, FILM COATED ORAL 3 TIMES DAILY
Qty: 90 TABLET | Refills: 2 | Status: SHIPPED | OUTPATIENT
Start: 2020-10-01 | End: 2022-08-29 | Stop reason: CLARIF

## 2020-10-01 RX ORDER — BUTALBITAL, ACETAMINOPHEN AND CAFFEINE 50; 325; 40 MG/1; MG/1; MG/1
1 TABLET ORAL EVERY 4 HOURS PRN
Qty: 15 TABLET | Refills: 0 | Status: SHIPPED | OUTPATIENT
Start: 2020-10-01 | End: 2020-10-31

## 2020-10-01 RX ORDER — KETOROLAC TROMETHAMINE 30 MG/ML
15 INJECTION, SOLUTION INTRAMUSCULAR; INTRAVENOUS
Status: COMPLETED | OUTPATIENT
Start: 2020-10-01 | End: 2020-10-01

## 2020-10-01 RX ORDER — ONDANSETRON 4 MG/1
4 TABLET, ORALLY DISINTEGRATING ORAL EVERY 6 HOURS PRN
Qty: 30 TABLET | Refills: 0 | Status: SHIPPED | OUTPATIENT
Start: 2020-10-01 | End: 2022-08-29 | Stop reason: CLARIF

## 2020-10-01 RX ORDER — HYDRALAZINE HYDROCHLORIDE 10 MG/1
10 TABLET, FILM COATED ORAL
Status: DISCONTINUED | OUTPATIENT
Start: 2020-10-01 | End: 2020-10-01

## 2020-10-01 RX ORDER — HYDRALAZINE HYDROCHLORIDE 25 MG/1
25 TABLET, FILM COATED ORAL
Status: COMPLETED | OUTPATIENT
Start: 2020-10-01 | End: 2020-10-01

## 2020-10-01 RX ADMIN — HYDRALAZINE HYDROCHLORIDE 25 MG: 25 TABLET, FILM COATED ORAL at 10:10

## 2020-10-01 RX ADMIN — KETOROLAC TROMETHAMINE 15 MG: 30 INJECTION, SOLUTION INTRAMUSCULAR; INTRAVENOUS at 01:10

## 2020-10-01 NOTE — DISCHARGE INSTRUCTIONS
Thank you for coming to our Emergency Department today. It is important to remember that some problems are difficult to diagnose and may not be found during your first visit. Be sure to follow up with your primary care doctor and review any labs/imaging that was performed with them. If you do not have a primary care doctor, you may contact the one listed on your discharge paperwork or you may also call the [facility] Appointment Desk at [phone] to schedule an appointment with one.     All medications may potentially have side effects and it is impossible to predict which medications may give you side effects. If you feel that you are having a negative effect of any medication you should immediately stop taking them and seek medical attention.    Return to the ER with any questions/concerns, new/concerning symptoms, worsening or failure to improve. Do not drive or make any important decisions for 24 hours if you have received any pain medications, sedatives or mood altering drugs during your ER visit.

## 2020-10-01 NOTE — ED PROVIDER NOTES
Encounter Date: 10/1/2020       History     Chief Complaint   Patient presents with    Headache     Pt to ER with c/o headache x 1 week. Pt reports pain worse when waking up. Pt with hx of HTN. recent medication change last wednesday.     55 y.o. female Past Medical History:  No date: Anemia  No date: Angio-edema  No date: Depression  2016: Diabetes mellitus type 2, noninsulin dependent  No date: Hematuria  No date: Hx of essential hypertension  No date: Hyperlipidemia  2016: Hypertension, uncontrolled  4/10/2017: Nuclear sclerosis of both eyes     Endorses headache on/off x 1 week, notes tightness in b/l trapezius/neck, states she saw her MD recently which changed her from norvasc to losartan however notes bp is still elevated. Endorses no sinus congestion, n/v, d, denies vision change        Review of patient's allergies indicates:   Allergen Reactions    Lisinopril-hydrochlorothiazide Swelling     Facial swelling/ mouth swelling     Past Medical History:   Diagnosis Date    Anemia     Angio-edema     Depression     Diabetes mellitus type 2, noninsulin dependent 2016    Hematuria     Hx of essential hypertension     Hyperlipidemia     Hypertension, uncontrolled 2016    Nuclear sclerosis of both eyes 4/10/2017     Past Surgical History:   Procedure Laterality Date    BTL       SECTION      COLONOSCOPY N/A 2017    Procedure: COLONOSCOPY;  Surgeon: Oleg Enciso MD;  Location: [facility] ([address] [place] [address]);  Service: Endoscopy;  Laterality: N/A;  CHRONIC CONSTIPATION S/P LRNY    CYSTOSCOPY  2019    Procedure: CYSTOSCOPY;  Surgeon: ELIZABETH Mendoza MD;  Location: [facility] OR;  Service: Urology;;  PRE-OP BY RN 3-    ESOPHAGOGASTRODUODENOSCOPY N/A 2020    Procedure: EGD (ESOPHAGOGASTRODUODENOSCOPY);  Surgeon: Elias Harper MD;  Location: [facility] ([address] [place] [address]);  Service: Endoscopy;  Laterality: N/A;  prep ins. emailed - ERW  COVID screening on 20 - ERW     GASTRIC BYPASS  1995    sandra en y    HYSTEROSCOPY WITH DILATION AND CURETTAGE OF UTERUS N/A 9/28/2018    Procedure: HYSTEROSCOPY, WITH DILATION AND CURETTAGE OF UTERUS;  Surgeon: Naveed Alexander MD;  Location: [facility] OR;  Service: OB/GYN;  Laterality: N/A;  RN PREOP 9/26/2018    NECK SURGERY  09/30/2016    TOTAL REDUCTION MAMMOPLASTY       Family History   Problem Relation Age of Onset    Heart disease Mother     Diabetes type II Mother     Heart attack Father 50        Open heart surgery    Allergies Sister     No Known Problems Brother     No Known Problems Maternal Aunt     No Known Problems Maternal Uncle     No Known Problems Paternal Aunt     No Known Problems Paternal Uncle     No Known Problems Maternal Grandmother     No Known Problems Maternal Grandfather     No Known Problems Paternal Grandmother     No Known Problems Paternal Grandfather     Amblyopia Neg Hx     Blindness Neg Hx     Cancer Neg Hx     Cataracts Neg Hx     Diabetes Neg Hx     Glaucoma Neg Hx     Hypertension Neg Hx     Macular degeneration Neg Hx     Retinal detachment Neg Hx     Strabismus Neg Hx     Stroke Neg Hx     Thyroid disease Neg Hx      Social History     Tobacco Use    Smoking status: Never Smoker    Smokeless tobacco: Never Used   Substance Use Topics    Alcohol use: No     Alcohol/week: 0.0 standard drinks     Comment: Socially    Drug use: No     Review of Systems   Constitutional: Negative for fever.   HENT: Negative for sore throat.    Respiratory: Negative for shortness of breath.    Cardiovascular: Negative for chest pain.   Gastrointestinal: Negative for nausea.   Genitourinary: Negative for dysuria.   Musculoskeletal: Negative for back pain.   Skin: Negative for rash.   Neurological: Negative for weakness.   Hematological: Does not bruise/bleed easily.   All other systems reviewed and are negative.      Physical Exam     Initial Vitals [10/01/20 0932]   BP Pulse Resp Temp SpO2   (!) 189/93 84  18 98.2 °F (36.8 °C) 100 %      MAP       --         Physical Exam    Nursing note and vitals reviewed.  Constitutional: She appears well-developed and well-nourished.   HENT:   Head: Normocephalic and atraumatic.   Eyes: Conjunctivae and EOM are normal. Pupils are equal, round, and reactive to light.   Neck: Normal range of motion.   Cardiovascular: Normal rate and regular rhythm.   Pulmonary/Chest: Breath sounds normal. No respiratory distress.   Abdominal: She exhibits no distension.   Musculoskeletal: Normal range of motion.   Neurological: She is alert. No cranial nerve deficit. GCS score is 15. GCS eye subscore is 4. GCS verbal subscore is 5. GCS motor subscore is 6.   Skin: Skin is warm and dry.   Psychiatric: She has a normal mood and affect. Thought content normal.     mild ttp l temple    ED Course   Procedures  Labs Reviewed   POCT CMP - Abnormal; Notable for the following components:       Result Value    POC Glucose 134 (*)     All other components within normal limits   C-REACTIVE PROTEIN   SEDIMENTATION RATE   POCT CBC   POCT URINALYSIS W/O SCOPE   POCT URINALYSIS W/O SCOPE   POCT CMP          Imaging Results          CT Head Without Contrast (Final result)  Result time 10/01/20 10:36:43    Final result by Dickson Noble MD (10/01/20 10:36:43)                 Impression:      No CT evidence of acute intracranial abnormality.      Electronically signed by: Dickson Noble MD  Date:    10/01/2020  Time:    10:36             Narrative:    EXAMINATION:  CT HEAD WITHOUT CONTRAST    CLINICAL HISTORY:  Headache, chronic, with new features;    TECHNIQUE:  Low dose axial images were obtained through the head.  Coronal and sagittal reformations were also performed. Contrast was not administered.    COMPARISON:  None.    FINDINGS:  No evidence of acute territorial infarct, hemorrhage, mass effect, or midline shift.    Ventricles are normal in size and configuration.    No displaced calvarial  fracture.    Visualized paranasal sinuses and mastoid air cells are clear.                                                    clinically pt does not have symptoms concerning for temporal arteritis and she is on the young side for that disease entity. I will give a dose of hydralazine and plan to add that to meds. Will obtain head ct.      10/1/2020    CT Head Without Contrast   Final Result      No CT evidence of acute intracranial abnormality.         Electronically signed by: Dickson Noble MD   Date:    10/01/2020   Time:    10:36          Labs Reviewed   POCT CMP - Abnormal; Notable for the following components:       Result Value    POC Glucose 134 (*)     All other components within normal limits   C-REACTIVE PROTEIN   SEDIMENTATION RATE   POCT CBC   POCT URINALYSIS W/O SCOPE   POCT URINALYSIS W/O SCOPE   POCT CMP         After interventions pts headache is down from an 8/10 to 3/10. I have written for iv toradol. Will plan to discharge her on fioricet and hydralazine.    Esr under 50, exceedingly unlikely to be temporal arteritis.  Clinical Impression:   htn  migraine                              Naren Godfrey MD  10/01/20 1306       Naren Godfrey MD  10/01/20 1325

## 2020-10-06 ENCOUNTER — TELEPHONE (OUTPATIENT)
Dept: GASTROENTEROLOGY | Facility: CLINIC | Age: 55
End: 2020-10-06

## 2020-10-06 RX ORDER — DICYCLOMINE HYDROCHLORIDE 10 MG/1
10 CAPSULE ORAL 3 TIMES DAILY PRN
Qty: 45 CAPSULE | Refills: 5 | Status: SHIPPED | OUTPATIENT
Start: 2020-10-06 | End: 2020-11-05

## 2020-10-06 NOTE — TELEPHONE ENCOUNTER
----- Message from Tushar Lund MD sent at 10/6/2020 12:53 PM CDT -----  Let her know I sent bentyl to her pharmacy

## 2020-10-09 ENCOUNTER — HOSPITAL ENCOUNTER (OUTPATIENT)
Dept: RADIOLOGY | Facility: HOSPITAL | Age: 55
Discharge: HOME | End: 2020-10-09
Attending: OBSTETRICS & GYNECOLOGY
Payer: MEDICARE

## 2020-10-09 DIAGNOSIS — Z12.31 SCREENING MAMMOGRAM FOR HIGH-RISK PATIENT: ICD-10-CM

## 2020-10-09 PROCEDURE — 77067 SCR MAMMO BI INCL CAD: CPT | Mod: 26,,, | Performed by: RADIOLOGY

## 2020-10-09 PROCEDURE — 77063 BREAST TOMOSYNTHESIS BI: CPT | Mod: 26,,, | Performed by: RADIOLOGY

## 2020-10-09 PROCEDURE — 77063 MAMMO DIGITAL SCREENING BILAT WITH TOMOSYNTHESIS_CAD: ICD-10-PCS | Mod: 26,,, | Performed by: RADIOLOGY

## 2020-10-09 PROCEDURE — 77067 MAMMO DIGITAL SCREENING BILAT WITH TOMOSYNTHESIS_CAD: ICD-10-PCS | Mod: 26,,, | Performed by: RADIOLOGY

## 2020-10-09 PROCEDURE — 77067 SCR MAMMO BI INCL CAD: CPT | Mod: TC

## 2020-11-18 ENCOUNTER — OFFICE VISIT (OUTPATIENT)
Dept: OPTOMETRY | Facility: CLINIC | Age: 55
End: 2020-11-18
Payer: MEDICARE

## 2020-11-18 DIAGNOSIS — E11.9 TYPE 2 DIABETES MELLITUS WITHOUT RETINOPATHY: Primary | ICD-10-CM

## 2020-11-18 DIAGNOSIS — H52.7 REFRACTIVE ERROR: ICD-10-CM

## 2020-11-18 PROCEDURE — 99999 PR PBB SHADOW E&M-EST. PATIENT-LVL III: CPT | Mod: PBBFAC,,, | Performed by: OPTOMETRIST

## 2020-11-18 PROCEDURE — 1126F AMNT PAIN NOTED NONE PRSNT: CPT | Mod: S$GLB,,, | Performed by: OPTOMETRIST

## 2020-11-18 PROCEDURE — 92015 DETERMINE REFRACTIVE STATE: CPT | Mod: S$GLB,,, | Performed by: OPTOMETRIST

## 2020-11-18 PROCEDURE — 92014 COMPRE OPH EXAM EST PT 1/>: CPT | Mod: S$GLB,,, | Performed by: OPTOMETRIST

## 2020-11-18 PROCEDURE — 99999 PR PBB SHADOW E&M-EST. PATIENT-LVL III: ICD-10-PCS | Mod: PBBFAC,,, | Performed by: OPTOMETRIST

## 2020-11-18 PROCEDURE — 92014 PR EYE EXAM, EST PATIENT,COMPREHESV: ICD-10-PCS | Mod: S$GLB,,, | Performed by: OPTOMETRIST

## 2020-11-18 PROCEDURE — 92015 PR REFRACTION: ICD-10-PCS | Mod: S$GLB,,, | Performed by: OPTOMETRIST

## 2020-11-18 PROCEDURE — 1126F PR PAIN SEVERITY QUANTIFIED, NO PAIN PRESENT: ICD-10-PCS | Mod: S$GLB,,, | Performed by: OPTOMETRIST

## 2020-11-18 NOTE — PROGRESS NOTES
Subjective:       Patient ID: Antoine Seals is a 55 y.o. female      Chief Complaint   Patient presents with    Concerns About Ocular Health    Diabetic Eye Exam     History of Present Illness  Dls: 4/30/18 Dr. Walters     56 y/o female presents today for diabetic eye exam.   Pt states no changes in vision. Pt wears bifocal glasses.   Pt wants a new rx for glasses.     LBS 97 this am     No tearing  No itching  No burning  No pain  + ha's  No floaters  No flashes    Eye meds    None    Hemoglobin A1C       Date                     Value               Ref Range           Status                02/06/2019               8.0 (H)             4.0 - 5.6 %         Final                 11/07/2018               8.8 (H)             4.0 - 5.6 %         Final                 08/07/2018               9.3 (H)             4.0 - 5.6 %         Final                    Assessment/Plan:     1. Type 2 diabetes mellitus without retinopathy  No diabetic retinopathy. Discussed with pt the effects of diabetes on vision, importance of good blood sugar control, compliance with meds, and follow up care with PCP. Return in 1 year for dilated eye exam, sooner PRN.    2. Refractive error  Educated patient on refractive error and discussed lens options. Dispensed updated spectacle Rx. Educated about adaptation period to new specs.    Eyeglass Final Rx     Eyeglass Final Rx       Sphere Cylinder Axis Add    Right -1.50 +0.50 155 +2.00    Left -2.00 +1.25 035 +2.00    Expiration Date: 11/19/2021                  Follow up in about 1 year (around 11/18/2021) for Diabetic Eye Exam.

## 2020-12-22 RX ORDER — DICYCLOMINE HYDROCHLORIDE 10 MG/1
10 CAPSULE ORAL 3 TIMES DAILY PRN
Qty: 90 CAPSULE | Refills: 0 | Status: SHIPPED | OUTPATIENT
Start: 2020-12-22 | End: 2021-01-21

## 2021-01-16 ENCOUNTER — OFFICE VISIT (OUTPATIENT)
Dept: URGENT CARE | Facility: CLINIC | Age: 56
End: 2021-01-16
Payer: MEDICARE

## 2021-01-16 VITALS
RESPIRATION RATE: 19 BRPM | DIASTOLIC BLOOD PRESSURE: 93 MMHG | OXYGEN SATURATION: 98 % | WEIGHT: 190 LBS | TEMPERATURE: 99 F | HEIGHT: 62 IN | HEART RATE: 87 BPM | BODY MASS INDEX: 34.96 KG/M2 | SYSTOLIC BLOOD PRESSURE: 163 MMHG
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
## 2021-01-16 DIAGNOSIS — M25.532 LEFT WRIST PAIN: ICD-10-CM

## 2021-01-16 DIAGNOSIS — M79.642 LEFT HAND PAIN: ICD-10-CM

## 2021-01-16 PROCEDURE — 73130 X-RAY EXAM OF HAND: CPT | Mod: FY,LT,S$GLB, | Performed by: RADIOLOGY

## 2021-01-16 PROCEDURE — 73130 XR HAND COMPLETE 3 VIEW LEFT: ICD-10-PCS | Mod: FY,LT,S$GLB, | Performed by: RADIOLOGY

## 2021-01-16 PROCEDURE — 3008F PR BODY MASS INDEX (BMI) DOCUMENTED: ICD-10-PCS | Mod: CPTII,S$GLB,, | Performed by: NURSE PRACTITIONER

## 2021-01-16 PROCEDURE — 99214 PR OFFICE/OUTPT VISIT, EST, LEVL IV, 30-39 MIN: ICD-10-PCS | Mod: S$GLB,,, | Performed by: NURSE PRACTITIONER

## 2021-01-16 PROCEDURE — 73110 X-RAY EXAM OF WRIST: CPT | Mod: FY,LT,S$GLB, | Performed by: RADIOLOGY

## 2021-01-16 PROCEDURE — 99214 OFFICE O/P EST MOD 30 MIN: CPT | Mod: S$GLB,,, | Performed by: NURSE PRACTITIONER

## 2021-01-16 PROCEDURE — 3008F BODY MASS INDEX DOCD: CPT | Mod: CPTII,S$GLB,, | Performed by: NURSE PRACTITIONER

## 2021-01-16 PROCEDURE — 73110 XR WRIST COMPLETE 3 VIEWS LEFT: ICD-10-PCS | Mod: FY,LT,S$GLB, | Performed by: RADIOLOGY

## 2021-01-20 ENCOUNTER — OFFICE VISIT (OUTPATIENT)
Dept: ORTHOPEDICS | Facility: CLINIC | Age: 56
End: 2021-01-20
Payer: MEDICARE

## 2021-01-20 VITALS
HEART RATE: 103 BPM | BODY MASS INDEX: 34.96 KG/M2 | SYSTOLIC BLOOD PRESSURE: 145 MMHG | WEIGHT: 190 LBS | DIASTOLIC BLOOD PRESSURE: 93 MMHG | HEIGHT: 62 IN

## 2021-01-20 DIAGNOSIS — M79.641 RIGHT HAND PAIN: Primary | ICD-10-CM

## 2021-01-20 DIAGNOSIS — R52 PAIN: Primary | ICD-10-CM

## 2021-01-20 PROCEDURE — 99204 PR OFFICE/OUTPT VISIT, NEW, LEVL IV, 45-59 MIN: ICD-10-PCS | Mod: S$GLB,,, | Performed by: PHYSICIAN ASSISTANT

## 2021-01-20 PROCEDURE — 3008F BODY MASS INDEX DOCD: CPT | Mod: CPTII,S$GLB,, | Performed by: PHYSICIAN ASSISTANT

## 2021-01-20 PROCEDURE — 1125F PR PAIN SEVERITY QUANTIFIED, PAIN PRESENT: ICD-10-PCS | Mod: S$GLB,,, | Performed by: PHYSICIAN ASSISTANT

## 2021-01-20 PROCEDURE — 99999 PR PBB SHADOW E&M-EST. PATIENT-LVL V: CPT | Mod: PBBFAC,,, | Performed by: PHYSICIAN ASSISTANT

## 2021-01-20 PROCEDURE — 3080F DIAST BP >= 90 MM HG: CPT | Mod: CPTII,S$GLB,, | Performed by: PHYSICIAN ASSISTANT

## 2021-01-20 PROCEDURE — 3077F SYST BP >= 140 MM HG: CPT | Mod: CPTII,S$GLB,, | Performed by: PHYSICIAN ASSISTANT

## 2021-01-20 PROCEDURE — 1125F AMNT PAIN NOTED PAIN PRSNT: CPT | Mod: S$GLB,,, | Performed by: PHYSICIAN ASSISTANT

## 2021-01-20 PROCEDURE — 99999 PR PBB SHADOW E&M-EST. PATIENT-LVL V: ICD-10-PCS | Mod: PBBFAC,,, | Performed by: PHYSICIAN ASSISTANT

## 2021-01-20 PROCEDURE — 99204 OFFICE O/P NEW MOD 45 MIN: CPT | Mod: S$GLB,,, | Performed by: PHYSICIAN ASSISTANT

## 2021-01-20 PROCEDURE — 3080F PR MOST RECENT DIASTOLIC BLOOD PRESSURE >= 90 MM HG: ICD-10-PCS | Mod: CPTII,S$GLB,, | Performed by: PHYSICIAN ASSISTANT

## 2021-01-20 PROCEDURE — 3077F PR MOST RECENT SYSTOLIC BLOOD PRESSURE >= 140 MM HG: ICD-10-PCS | Mod: CPTII,S$GLB,, | Performed by: PHYSICIAN ASSISTANT

## 2021-01-20 PROCEDURE — 3008F PR BODY MASS INDEX (BMI) DOCUMENTED: ICD-10-PCS | Mod: CPTII,S$GLB,, | Performed by: PHYSICIAN ASSISTANT

## 2021-01-20 RX ORDER — GLIMEPIRIDE 4 MG/1
TABLET ORAL
COMMUNITY
Start: 2020-12-21 | End: 2022-08-29 | Stop reason: CLARIF

## 2021-01-20 RX ORDER — LOSARTAN POTASSIUM 50 MG/1
TABLET ORAL
COMMUNITY
Start: 2021-01-01

## 2021-01-20 RX ORDER — SEMAGLUTIDE 1.34 MG/ML
INJECTION, SOLUTION SUBCUTANEOUS
COMMUNITY
Start: 2021-01-14

## 2021-01-20 RX ORDER — HYDROCHLOROTHIAZIDE 12.5 MG/1
TABLET ORAL
COMMUNITY
Start: 2021-01-01

## 2021-01-21 ENCOUNTER — OFFICE VISIT (OUTPATIENT)
Dept: GASTROENTEROLOGY | Facility: CLINIC | Age: 56
End: 2021-01-21
Payer: MEDICARE

## 2021-01-21 ENCOUNTER — TELEPHONE (OUTPATIENT)
Dept: GASTROENTEROLOGY | Facility: CLINIC | Age: 56
End: 2021-01-21

## 2021-01-21 ENCOUNTER — LAB VISIT (OUTPATIENT)
Dept: LAB | Facility: HOSPITAL | Age: 56
End: 2021-01-21
Attending: INTERNAL MEDICINE
Payer: MEDICARE

## 2021-01-21 VITALS
HEART RATE: 80 BPM | SYSTOLIC BLOOD PRESSURE: 133 MMHG | BODY MASS INDEX: 35.57 KG/M2 | HEIGHT: 62 IN | WEIGHT: 193.31 LBS | DIASTOLIC BLOOD PRESSURE: 86 MMHG

## 2021-01-21 DIAGNOSIS — K59.09 CONSTIPATION, CHRONIC: ICD-10-CM

## 2021-01-21 DIAGNOSIS — K59.09 CONSTIPATION, CHRONIC: Primary | ICD-10-CM

## 2021-01-21 DIAGNOSIS — D50.0 IRON DEFICIENCY ANEMIA DUE TO CHRONIC BLOOD LOSS: ICD-10-CM

## 2021-01-21 LAB
ALBUMIN SERPL BCP-MCNC: 3.8 G/DL (ref 3.5–5.2)
ALP SERPL-CCNC: 95 U/L (ref 55–135)
ALT SERPL W/O P-5'-P-CCNC: 20 U/L (ref 10–44)
ANION GAP SERPL CALC-SCNC: 9 MMOL/L (ref 8–16)
AST SERPL-CCNC: 17 U/L (ref 10–40)
BASOPHILS # BLD AUTO: 0.04 K/UL (ref 0–0.2)
BASOPHILS NFR BLD: 0.6 % (ref 0–1.9)
BILIRUB SERPL-MCNC: 0.5 MG/DL (ref 0.1–1)
BUN SERPL-MCNC: 16 MG/DL (ref 6–20)
CALCIUM SERPL-MCNC: 9.4 MG/DL (ref 8.7–10.5)
CHLORIDE SERPL-SCNC: 103 MMOL/L (ref 95–110)
CO2 SERPL-SCNC: 27 MMOL/L (ref 23–29)
CREAT SERPL-MCNC: 0.9 MG/DL (ref 0.5–1.4)
DIFFERENTIAL METHOD: ABNORMAL
EOSINOPHIL # BLD AUTO: 0.1 K/UL (ref 0–0.5)
EOSINOPHIL NFR BLD: 1.5 % (ref 0–8)
ERYTHROCYTE [DISTWIDTH] IN BLOOD BY AUTOMATED COUNT: 15.7 % (ref 11.5–14.5)
EST. GFR  (AFRICAN AMERICAN): >60 ML/MIN/1.73 M^2
EST. GFR  (NON AFRICAN AMERICAN): >60 ML/MIN/1.73 M^2
GLUCOSE SERPL-MCNC: 121 MG/DL (ref 70–110)
HCT VFR BLD AUTO: 41.1 % (ref 37–48.5)
HGB BLD-MCNC: 12.6 G/DL (ref 12–16)
IMM GRANULOCYTES # BLD AUTO: 0.03 K/UL (ref 0–0.04)
IMM GRANULOCYTES NFR BLD AUTO: 0.4 % (ref 0–0.5)
IRON SERPL-MCNC: 92 UG/DL (ref 30–160)
LYMPHOCYTES # BLD AUTO: 2.7 K/UL (ref 1–4.8)
LYMPHOCYTES NFR BLD: 37.7 % (ref 18–48)
MCH RBC QN AUTO: 25.7 PG (ref 27–31)
MCHC RBC AUTO-ENTMCNC: 30.7 G/DL (ref 32–36)
MCV RBC AUTO: 84 FL (ref 82–98)
MONOCYTES # BLD AUTO: 0.5 K/UL (ref 0.3–1)
MONOCYTES NFR BLD: 7.2 % (ref 4–15)
NEUTROPHILS # BLD AUTO: 3.8 K/UL (ref 1.8–7.7)
NEUTROPHILS NFR BLD: 52.6 % (ref 38–73)
NRBC BLD-RTO: 0 /100 WBC
PLATELET # BLD AUTO: 331 K/UL (ref 150–350)
PMV BLD AUTO: 11 FL (ref 9.2–12.9)
POTASSIUM SERPL-SCNC: 4 MMOL/L (ref 3.5–5.1)
PROT SERPL-MCNC: 7.8 G/DL (ref 6–8.4)
RBC # BLD AUTO: 4.9 M/UL (ref 4–5.4)
SATURATED IRON: 22 % (ref 20–50)
SODIUM SERPL-SCNC: 139 MMOL/L (ref 136–145)
TOTAL IRON BINDING CAPACITY: 414 UG/DL (ref 250–450)
TRANSFERRIN SERPL-MCNC: 280 MG/DL (ref 200–375)
WBC # BLD AUTO: 7.22 K/UL (ref 3.9–12.7)

## 2021-01-21 PROCEDURE — 99214 OFFICE O/P EST MOD 30 MIN: CPT | Mod: S$GLB,,, | Performed by: INTERNAL MEDICINE

## 2021-01-21 PROCEDURE — 3008F PR BODY MASS INDEX (BMI) DOCUMENTED: ICD-10-PCS | Mod: CPTII,S$GLB,, | Performed by: INTERNAL MEDICINE

## 2021-01-21 PROCEDURE — 1125F AMNT PAIN NOTED PAIN PRSNT: CPT | Mod: S$GLB,,, | Performed by: INTERNAL MEDICINE

## 2021-01-21 PROCEDURE — 3079F PR MOST RECENT DIASTOLIC BLOOD PRESSURE 80-89 MM HG: ICD-10-PCS | Mod: CPTII,S$GLB,, | Performed by: INTERNAL MEDICINE

## 2021-01-21 PROCEDURE — 3008F BODY MASS INDEX DOCD: CPT | Mod: CPTII,S$GLB,, | Performed by: INTERNAL MEDICINE

## 2021-01-21 PROCEDURE — 3075F PR MOST RECENT SYSTOLIC BLOOD PRESS GE 130-139MM HG: ICD-10-PCS | Mod: CPTII,S$GLB,, | Performed by: INTERNAL MEDICINE

## 2021-01-21 PROCEDURE — 99999 PR PBB SHADOW E&M-EST. PATIENT-LVL IV: ICD-10-PCS | Mod: PBBFAC,,, | Performed by: INTERNAL MEDICINE

## 2021-01-21 PROCEDURE — 3075F SYST BP GE 130 - 139MM HG: CPT | Mod: CPTII,S$GLB,, | Performed by: INTERNAL MEDICINE

## 2021-01-21 PROCEDURE — 85025 COMPLETE CBC W/AUTO DIFF WBC: CPT

## 2021-01-21 PROCEDURE — 3079F DIAST BP 80-89 MM HG: CPT | Mod: CPTII,S$GLB,, | Performed by: INTERNAL MEDICINE

## 2021-01-21 PROCEDURE — 99214 PR OFFICE/OUTPT VISIT, EST, LEVL IV, 30-39 MIN: ICD-10-PCS | Mod: S$GLB,,, | Performed by: INTERNAL MEDICINE

## 2021-01-21 PROCEDURE — 36415 COLL VENOUS BLD VENIPUNCTURE: CPT

## 2021-01-21 PROCEDURE — 83540 ASSAY OF IRON: CPT

## 2021-01-21 PROCEDURE — 80053 COMPREHEN METABOLIC PANEL: CPT

## 2021-01-21 PROCEDURE — 99999 PR PBB SHADOW E&M-EST. PATIENT-LVL IV: CPT | Mod: PBBFAC,,, | Performed by: INTERNAL MEDICINE

## 2021-01-21 PROCEDURE — 1125F PR PAIN SEVERITY QUANTIFIED, PAIN PRESENT: ICD-10-PCS | Mod: S$GLB,,, | Performed by: INTERNAL MEDICINE

## 2021-01-21 RX ORDER — LUBIPROSTONE 8 UG/1
8 CAPSULE ORAL 2 TIMES DAILY
Qty: 60 CAPSULE | Refills: 3 | Status: SHIPPED | OUTPATIENT
Start: 2021-01-21 | End: 2021-04-21

## 2021-02-01 ENCOUNTER — TELEPHONE (OUTPATIENT)
Dept: ORTHOPEDICS | Facility: CLINIC | Age: 56
End: 2021-02-01

## 2021-02-10 ENCOUNTER — CLINICAL SUPPORT (OUTPATIENT)
Dept: REHABILITATION | Facility: HOSPITAL | Age: 56
End: 2021-02-10
Payer: MEDICARE

## 2021-02-10 DIAGNOSIS — R29.898 DECREASED GRIP STRENGTH OF LEFT HAND: ICD-10-CM

## 2021-02-10 DIAGNOSIS — M79.642 HAND PAIN, LEFT: ICD-10-CM

## 2021-02-10 DIAGNOSIS — M79.641 RIGHT HAND PAIN: ICD-10-CM

## 2021-02-10 PROCEDURE — 97165 OT EVAL LOW COMPLEX 30 MIN: CPT | Mod: PN

## 2021-02-10 PROCEDURE — 97110 THERAPEUTIC EXERCISES: CPT | Mod: PN

## 2021-02-13 ENCOUNTER — DOCUMENTATION ONLY (OUTPATIENT)
Dept: GASTROENTEROLOGY | Facility: HOSPITAL | Age: 56
End: 2021-02-13

## 2021-02-14 PROBLEM — M79.642 HAND PAIN, LEFT: Status: ACTIVE | Noted: 2021-02-14

## 2021-02-14 PROBLEM — R29.898 DECREASED GRIP STRENGTH OF LEFT HAND: Status: ACTIVE | Noted: 2021-02-14

## 2021-02-18 ENCOUNTER — TELEPHONE (OUTPATIENT)
Dept: GASTROENTEROLOGY | Facility: CLINIC | Age: 56
End: 2021-02-18

## 2021-02-18 ENCOUNTER — TELEPHONE (OUTPATIENT)
Dept: ORTHOPEDICS | Facility: CLINIC | Age: 56
End: 2021-02-18

## 2021-02-18 DIAGNOSIS — K63.8219 SMALL INTESTINAL BACTERIAL OVERGROWTH: Primary | ICD-10-CM

## 2021-02-18 RX ORDER — NEOMYCIN SULFATE 500 MG/1
500 TABLET ORAL 2 TIMES DAILY
Qty: 28 TABLET | Refills: 0 | Status: SHIPPED | OUTPATIENT
Start: 2021-02-18 | End: 2021-03-04

## 2021-02-19 ENCOUNTER — HOSPITAL ENCOUNTER (OUTPATIENT)
Dept: RADIOLOGY | Facility: OTHER | Age: 56
Discharge: HOME | End: 2021-02-19
Attending: PHYSICIAN ASSISTANT
Payer: MEDICARE

## 2021-02-19 ENCOUNTER — DOCUMENTATION ONLY (OUTPATIENT)
Dept: ORTHOPEDICS | Facility: CLINIC | Age: 56
End: 2021-02-19

## 2021-02-19 ENCOUNTER — OFFICE VISIT (OUTPATIENT)
Dept: ORTHOPEDICS | Facility: CLINIC | Age: 56
End: 2021-02-19
Payer: MEDICARE

## 2021-02-19 VITALS
DIASTOLIC BLOOD PRESSURE: 81 MMHG | BODY MASS INDEX: 35.57 KG/M2 | HEIGHT: 62 IN | SYSTOLIC BLOOD PRESSURE: 125 MMHG | WEIGHT: 193.31 LBS

## 2021-02-19 DIAGNOSIS — R52 PAIN: ICD-10-CM

## 2021-02-19 DIAGNOSIS — M25.532 LEFT WRIST PAIN: Primary | ICD-10-CM

## 2021-02-19 DIAGNOSIS — G56.02 LEFT CARPAL TUNNEL SYNDROME: ICD-10-CM

## 2021-02-19 PROCEDURE — 99999 PR PBB SHADOW E&M-EST. PATIENT-LVL IV: ICD-10-PCS | Mod: PBBFAC,,, | Performed by: PHYSICIAN ASSISTANT

## 2021-02-19 PROCEDURE — 73130 X-RAY EXAM OF HAND: CPT | Mod: 26,LT,, | Performed by: RADIOLOGY

## 2021-02-19 PROCEDURE — 3074F PR MOST RECENT SYSTOLIC BLOOD PRESSURE < 130 MM HG: ICD-10-PCS | Mod: CPTII,S$GLB,, | Performed by: PHYSICIAN ASSISTANT

## 2021-02-19 PROCEDURE — 20526 PR INJECT CARPAL TUNNEL: ICD-10-PCS | Mod: LT,S$GLB,, | Performed by: PHYSICIAN ASSISTANT

## 2021-02-19 PROCEDURE — 3079F DIAST BP 80-89 MM HG: CPT | Mod: CPTII,S$GLB,, | Performed by: PHYSICIAN ASSISTANT

## 2021-02-19 PROCEDURE — 3008F BODY MASS INDEX DOCD: CPT | Mod: CPTII,S$GLB,, | Performed by: PHYSICIAN ASSISTANT

## 2021-02-19 PROCEDURE — 99213 PR OFFICE/OUTPT VISIT, EST, LEVL III, 20-29 MIN: ICD-10-PCS | Mod: 25,S$GLB,, | Performed by: PHYSICIAN ASSISTANT

## 2021-02-19 PROCEDURE — 20526 THER INJECTION CARP TUNNEL: CPT | Mod: LT,S$GLB,, | Performed by: PHYSICIAN ASSISTANT

## 2021-02-19 PROCEDURE — 73130 XR HAND COMPLETE 3 VIEW LEFT: ICD-10-PCS | Mod: 26,LT,, | Performed by: RADIOLOGY

## 2021-02-19 PROCEDURE — 3074F SYST BP LT 130 MM HG: CPT | Mod: CPTII,S$GLB,, | Performed by: PHYSICIAN ASSISTANT

## 2021-02-19 PROCEDURE — 3079F PR MOST RECENT DIASTOLIC BLOOD PRESSURE 80-89 MM HG: ICD-10-PCS | Mod: CPTII,S$GLB,, | Performed by: PHYSICIAN ASSISTANT

## 2021-02-19 PROCEDURE — 3008F PR BODY MASS INDEX (BMI) DOCUMENTED: ICD-10-PCS | Mod: CPTII,S$GLB,, | Performed by: PHYSICIAN ASSISTANT

## 2021-02-19 PROCEDURE — 99213 OFFICE O/P EST LOW 20 MIN: CPT | Mod: 25,S$GLB,, | Performed by: PHYSICIAN ASSISTANT

## 2021-02-19 PROCEDURE — 1125F AMNT PAIN NOTED PAIN PRSNT: CPT | Mod: S$GLB,,, | Performed by: PHYSICIAN ASSISTANT

## 2021-02-19 PROCEDURE — 1125F PR PAIN SEVERITY QUANTIFIED, PAIN PRESENT: ICD-10-PCS | Mod: S$GLB,,, | Performed by: PHYSICIAN ASSISTANT

## 2021-02-19 PROCEDURE — 73130 X-RAY EXAM OF HAND: CPT | Mod: TC,FY,LT

## 2021-02-19 PROCEDURE — 99999 PR PBB SHADOW E&M-EST. PATIENT-LVL IV: CPT | Mod: PBBFAC,,, | Performed by: PHYSICIAN ASSISTANT

## 2021-02-19 RX ORDER — DEXAMETHASONE SODIUM PHOSPHATE 4 MG/ML
4 INJECTION, SOLUTION INTRA-ARTICULAR; INTRALESIONAL; INTRAMUSCULAR; INTRAVENOUS; SOFT TISSUE
Status: COMPLETED | OUTPATIENT
Start: 2021-02-19 | End: 2021-02-19

## 2021-02-19 RX ORDER — TAMSULOSIN HYDROCHLORIDE 0.4 MG/1
CAPSULE ORAL
COMMUNITY
Start: 2021-02-07 | End: 2022-08-29 | Stop reason: CLARIF

## 2021-02-19 RX ORDER — DONEPEZIL HYDROCHLORIDE 10 MG/1
10 TABLET, FILM COATED ORAL NIGHTLY
COMMUNITY
Start: 2021-02-04 | End: 2022-08-29 | Stop reason: CLARIF

## 2021-02-19 RX ORDER — LIDOCAINE HYDROCHLORIDE 10 MG/ML
1 INJECTION, SOLUTION EPIDURAL; INFILTRATION; INTRACAUDAL; PERINEURAL ONCE
Status: COMPLETED | OUTPATIENT
Start: 2021-02-19 | End: 2021-02-19

## 2021-02-19 RX ADMIN — LIDOCAINE HYDROCHLORIDE 10 MG: 10 INJECTION, SOLUTION EPIDURAL; INFILTRATION; INTRACAUDAL; PERINEURAL at 11:02

## 2021-02-19 RX ADMIN — DEXAMETHASONE SODIUM PHOSPHATE 4 MG: 4 INJECTION, SOLUTION INTRA-ARTICULAR; INTRALESIONAL; INTRAMUSCULAR; INTRAVENOUS; SOFT TISSUE at 10:02

## 2021-02-22 ENCOUNTER — CLINICAL SUPPORT (OUTPATIENT)
Dept: REHABILITATION | Facility: HOSPITAL | Age: 56
End: 2021-02-22
Payer: MEDICARE

## 2021-02-22 DIAGNOSIS — R29.898 DECREASED GRIP STRENGTH OF LEFT HAND: Primary | ICD-10-CM

## 2021-02-22 DIAGNOSIS — M79.642 HAND PAIN, LEFT: ICD-10-CM

## 2021-02-22 PROCEDURE — 97110 THERAPEUTIC EXERCISES: CPT | Mod: PN

## 2021-02-22 PROCEDURE — 97022 WHIRLPOOL THERAPY: CPT | Mod: PN

## 2021-02-22 PROCEDURE — 97140 MANUAL THERAPY 1/> REGIONS: CPT | Mod: PN

## 2021-03-08 ENCOUNTER — CLINICAL SUPPORT (OUTPATIENT)
Dept: REHABILITATION | Facility: HOSPITAL | Age: 56
End: 2021-03-08
Payer: MEDICARE

## 2021-03-08 DIAGNOSIS — M79.642 HAND PAIN, LEFT: ICD-10-CM

## 2021-03-08 DIAGNOSIS — R29.898 DECREASED GRIP STRENGTH OF LEFT HAND: Primary | ICD-10-CM

## 2021-03-08 PROCEDURE — 97022 WHIRLPOOL THERAPY: CPT | Mod: PN

## 2021-03-08 PROCEDURE — 97110 THERAPEUTIC EXERCISES: CPT | Mod: PN

## 2021-03-15 ENCOUNTER — CLINICAL SUPPORT (OUTPATIENT)
Dept: REHABILITATION | Facility: HOSPITAL | Age: 56
End: 2021-03-15
Payer: MEDICARE

## 2021-03-15 DIAGNOSIS — R29.898 DECREASED GRIP STRENGTH OF LEFT HAND: Primary | ICD-10-CM

## 2021-03-15 DIAGNOSIS — M79.642 HAND PAIN, LEFT: ICD-10-CM

## 2021-03-15 PROCEDURE — 97022 WHIRLPOOL THERAPY: CPT | Mod: PN

## 2021-03-15 PROCEDURE — 97110 THERAPEUTIC EXERCISES: CPT | Mod: PN

## 2021-03-22 ENCOUNTER — DOCUMENTATION ONLY (OUTPATIENT)
Dept: REHABILITATION | Facility: HOSPITAL | Age: 56
End: 2021-03-22

## 2021-04-21 ENCOUNTER — OFFICE VISIT (OUTPATIENT)
Dept: GASTROENTEROLOGY | Facility: CLINIC | Age: 56
End: 2021-04-21
Payer: MEDICARE

## 2021-04-21 VITALS
WEIGHT: 192 LBS | HEART RATE: 79 BPM | BODY MASS INDEX: 35.33 KG/M2 | SYSTOLIC BLOOD PRESSURE: 129 MMHG | DIASTOLIC BLOOD PRESSURE: 86 MMHG | HEIGHT: 62 IN

## 2021-04-21 DIAGNOSIS — K59.09 CONSTIPATION, CHRONIC: Primary | ICD-10-CM

## 2021-04-21 DIAGNOSIS — K63.8219 SMALL INTESTINAL BACTERIAL OVERGROWTH: ICD-10-CM

## 2021-04-21 PROCEDURE — 99213 PR OFFICE/OUTPT VISIT, EST, LEVL III, 20-29 MIN: ICD-10-PCS | Mod: S$GLB,,, | Performed by: NURSE PRACTITIONER

## 2021-04-21 PROCEDURE — 1126F AMNT PAIN NOTED NONE PRSNT: CPT | Mod: S$GLB,,, | Performed by: NURSE PRACTITIONER

## 2021-04-21 PROCEDURE — 3008F PR BODY MASS INDEX (BMI) DOCUMENTED: ICD-10-PCS | Mod: CPTII,S$GLB,, | Performed by: NURSE PRACTITIONER

## 2021-04-21 PROCEDURE — 99999 PR PBB SHADOW E&M-EST. PATIENT-LVL V: ICD-10-PCS | Mod: PBBFAC,,, | Performed by: NURSE PRACTITIONER

## 2021-04-21 PROCEDURE — 3008F BODY MASS INDEX DOCD: CPT | Mod: CPTII,S$GLB,, | Performed by: NURSE PRACTITIONER

## 2021-04-21 PROCEDURE — 99213 OFFICE O/P EST LOW 20 MIN: CPT | Mod: S$GLB,,, | Performed by: NURSE PRACTITIONER

## 2021-04-21 PROCEDURE — 1126F PR PAIN SEVERITY QUANTIFIED, NO PAIN PRESENT: ICD-10-PCS | Mod: S$GLB,,, | Performed by: NURSE PRACTITIONER

## 2021-04-21 PROCEDURE — 99999 PR PBB SHADOW E&M-EST. PATIENT-LVL V: CPT | Mod: PBBFAC,,, | Performed by: NURSE PRACTITIONER

## 2021-04-21 RX ORDER — LUBIPROSTONE 24 UG/1
24 CAPSULE ORAL 2 TIMES DAILY
Qty: 60 CAPSULE | Refills: 3 | Status: CANCELLED | OUTPATIENT
Start: 2021-04-21

## 2021-04-21 RX ORDER — VITAMIN B COMPLEX
1 CAPSULE ORAL DAILY
COMMUNITY
End: 2023-06-14

## 2021-04-21 RX ORDER — PLECANATIDE 3 MG/1
3 TABLET ORAL DAILY
Qty: 30 TABLET | Refills: 3 | Status: SHIPPED | OUTPATIENT
Start: 2021-04-21 | End: 2022-08-29 | Stop reason: CLARIF

## 2021-04-21 RX ORDER — BACLOFEN 20 MG
TABLET ORAL ONCE
COMMUNITY
End: 2023-06-14

## 2021-04-27 ENCOUNTER — TELEPHONE (OUTPATIENT)
Dept: PHARMACY | Facility: CLINIC | Age: 56
End: 2021-04-27

## 2021-10-07 ENCOUNTER — HOSPITAL ENCOUNTER (OUTPATIENT)
Dept: RADIOLOGY | Facility: HOSPITAL | Age: 56
Discharge: HOME | End: 2021-10-07
Attending: NURSE PRACTITIONER
Payer: MEDICARE

## 2021-10-07 DIAGNOSIS — Z12.31 ENCOUNTER FOR SCREENING MAMMOGRAM FOR MALIGNANT NEOPLASM OF BREAST: ICD-10-CM

## 2021-10-07 PROCEDURE — 77063 MAMMO DIGITAL SCREENING BILAT WITH TOMO: ICD-10-PCS | Mod: 26,,, | Performed by: RADIOLOGY

## 2021-10-07 PROCEDURE — 77067 MAMMO DIGITAL SCREENING BILAT WITH TOMO: ICD-10-PCS | Mod: 26,,, | Performed by: RADIOLOGY

## 2021-10-07 PROCEDURE — 77067 SCR MAMMO BI INCL CAD: CPT | Mod: 26,,, | Performed by: RADIOLOGY

## 2021-10-07 PROCEDURE — 77063 BREAST TOMOSYNTHESIS BI: CPT | Mod: 26,,, | Performed by: RADIOLOGY

## 2021-10-07 PROCEDURE — 77067 SCR MAMMO BI INCL CAD: CPT | Mod: TC,PO

## 2021-10-19 ENCOUNTER — HOSPITAL ENCOUNTER (OUTPATIENT)
Dept: RADIOLOGY | Facility: CLINIC | Age: 56
Discharge: HOME | End: 2021-10-19
Attending: NURSE PRACTITIONER
Payer: MEDICARE

## 2021-10-19 DIAGNOSIS — Z78.0 ASYMPTOMATIC MENOPAUSAL STATE: ICD-10-CM

## 2021-10-19 PROCEDURE — 77080 DEXA BONE DENSITY SPINE HIP: ICD-10-PCS | Mod: 26,,, | Performed by: INTERNAL MEDICINE

## 2021-10-19 PROCEDURE — 77080 DXA BONE DENSITY AXIAL: CPT | Mod: TC

## 2021-10-19 PROCEDURE — 77080 DXA BONE DENSITY AXIAL: CPT | Mod: 26,,, | Performed by: INTERNAL MEDICINE

## 2021-12-15 ENCOUNTER — HOSPITAL ENCOUNTER (EMERGENCY)
Facility: HOSPITAL | Age: 56
Discharge: HOME | End: 2021-12-15
Attending: EMERGENCY MEDICINE
Payer: MEDICARE

## 2021-12-15 VITALS
RESPIRATION RATE: 18 BRPM | HEIGHT: 62 IN | TEMPERATURE: 99 F | BODY MASS INDEX: 32.39 KG/M2 | DIASTOLIC BLOOD PRESSURE: 89 MMHG | HEART RATE: 81 BPM | OXYGEN SATURATION: 94 % | SYSTOLIC BLOOD PRESSURE: 164 MMHG | WEIGHT: 176 LBS

## 2021-12-15 DIAGNOSIS — E04.1 THYROID NODULE: ICD-10-CM

## 2021-12-15 DIAGNOSIS — R04.2 HEMOPTYSIS: ICD-10-CM

## 2021-12-15 DIAGNOSIS — U07.1 COVID-19 VIRUS DETECTED: Primary | ICD-10-CM

## 2021-12-15 LAB
ALBUMIN SERPL-MCNC: 3.8 G/DL (ref 3.3–5.5)
ALP SERPL-CCNC: 85 U/L (ref 42–141)
BILIRUB SERPL-MCNC: 0.5 MG/DL (ref 0.2–1.6)
BUN SERPL-MCNC: 19 MG/DL (ref 7–22)
CALCIUM SERPL-MCNC: 9.9 MG/DL (ref 8–10.3)
CHLORIDE SERPL-SCNC: 104 MMOL/L (ref 98–108)
CREAT SERPL-MCNC: 0.9 MG/DL (ref 0.6–1.2)
CTP QC/QA: YES
GLUCOSE SERPL-MCNC: 104 MG/DL (ref 73–118)
INFLUENZA A ANTIGEN, POC: NEGATIVE
INFLUENZA B ANTIGEN, POC: NEGATIVE
POC ALT (SGPT): 33 U/L (ref 10–47)
POC AST (SGOT): 33 U/L (ref 11–38)
POC B-TYPE NATRIURETIC PEPTIDE: <5 PG/ML (ref 0–100)
POC CARDIAC TROPONIN I: 0 NG/ML
POC D-DI: 133 NG/ML (ref 0–450)
POC PTINR: 1 (ref 0.9–1.2)
POC PTWBT: 11.9 SEC (ref 9.7–14.3)
POC RAPID STREP A: NEGATIVE
POC TCO2: 28 MMOL/L (ref 18–33)
POTASSIUM BLD-SCNC: 4.2 MMOL/L (ref 3.6–5.1)
PROTEIN, POC: 7.7 G/DL (ref 6.4–8.1)
SAMPLE: NORMAL
SAMPLE: NORMAL
SARS-COV-2 RDRP RESP QL NAA+PROBE: POSITIVE
SODIUM BLD-SCNC: 145 MMOL/L (ref 128–145)

## 2021-12-15 PROCEDURE — 85025 COMPLETE CBC W/AUTO DIFF WBC: CPT | Mod: ER

## 2021-12-15 PROCEDURE — 25500020 PHARM REV CODE 255: Mod: ER

## 2021-12-15 PROCEDURE — 84484 ASSAY OF TROPONIN QUANT: CPT | Mod: ER

## 2021-12-15 PROCEDURE — 99900035 HC TECH TIME PER 15 MIN (STAT): Mod: ER

## 2021-12-15 PROCEDURE — 93010 EKG 12-LEAD: ICD-10-PCS | Mod: ,,, | Performed by: INTERNAL MEDICINE

## 2021-12-15 PROCEDURE — 93005 ELECTROCARDIOGRAM TRACING: CPT | Mod: ER

## 2021-12-15 PROCEDURE — 99285 EMERGENCY DEPT VISIT HI MDM: CPT | Mod: 25,ER

## 2021-12-15 PROCEDURE — 85379 FIBRIN DEGRADATION QUANT: CPT | Mod: ER

## 2021-12-15 PROCEDURE — 63600175 PHARM REV CODE 636 W HCPCS: Mod: ER | Performed by: EMERGENCY MEDICINE

## 2021-12-15 PROCEDURE — 87880 STREP A ASSAY W/OPTIC: CPT | Mod: ER

## 2021-12-15 PROCEDURE — 94640 AIRWAY INHALATION TREATMENT: CPT | Mod: ER

## 2021-12-15 PROCEDURE — 83880 ASSAY OF NATRIURETIC PEPTIDE: CPT | Mod: ER

## 2021-12-15 PROCEDURE — 85610 PROTHROMBIN TIME: CPT | Mod: ER

## 2021-12-15 PROCEDURE — 80053 COMPREHEN METABOLIC PANEL: CPT | Mod: ER

## 2021-12-15 PROCEDURE — 25000242 PHARM REV CODE 250 ALT 637 W/ HCPCS: Mod: ER | Performed by: EMERGENCY MEDICINE

## 2021-12-15 PROCEDURE — U0002 COVID-19 LAB TEST NON-CDC: HCPCS | Mod: ER | Performed by: EMERGENCY MEDICINE

## 2021-12-15 PROCEDURE — 93010 ELECTROCARDIOGRAM REPORT: CPT | Mod: ,,, | Performed by: INTERNAL MEDICINE

## 2021-12-15 RX ORDER — GUAIFENESIN/DEXTROMETHORPHAN 100-10MG/5
5 SYRUP ORAL 4 TIMES DAILY PRN
Qty: 120 ML | Refills: 0 | Status: SHIPPED | OUTPATIENT
Start: 2021-12-15 | End: 2021-12-25

## 2021-12-15 RX ORDER — ALBUTEROL SULFATE 90 UG/1
1-2 AEROSOL, METERED RESPIRATORY (INHALATION) EVERY 6 HOURS PRN
Qty: 6.7 G | Status: SHIPPED | OUTPATIENT
Start: 2021-12-15 | End: 2022-08-29 | Stop reason: CLARIF

## 2021-12-15 RX ORDER — DEXAMETHASONE SODIUM PHOSPHATE 4 MG/ML
4 INJECTION, SOLUTION INTRA-ARTICULAR; INTRALESIONAL; INTRAMUSCULAR; INTRAVENOUS; SOFT TISSUE
Status: COMPLETED | OUTPATIENT
Start: 2021-12-15 | End: 2021-12-15

## 2021-12-15 RX ORDER — IPRATROPIUM BROMIDE AND ALBUTEROL SULFATE 2.5; .5 MG/3ML; MG/3ML
3 SOLUTION RESPIRATORY (INHALATION)
Status: COMPLETED | OUTPATIENT
Start: 2021-12-15 | End: 2021-12-15

## 2021-12-15 RX ADMIN — DEXAMETHASONE SODIUM PHOSPHATE 4 MG: 4 INJECTION INTRA-ARTICULAR; INTRALESIONAL; INTRAMUSCULAR; INTRAVENOUS; SOFT TISSUE at 09:12

## 2021-12-15 RX ADMIN — IOHEXOL 50 ML: 350 INJECTION, SOLUTION INTRAVENOUS at 09:12

## 2021-12-15 RX ADMIN — IPRATROPIUM BROMIDE AND ALBUTEROL SULFATE 3 ML: .5; 3 SOLUTION RESPIRATORY (INHALATION) at 09:12

## 2022-01-10 DIAGNOSIS — E04.1 THYROID NODULE: Primary | ICD-10-CM

## 2022-01-12 ENCOUNTER — HOSPITAL ENCOUNTER (OUTPATIENT)
Dept: RADIOLOGY | Facility: HOSPITAL | Age: 57
Discharge: HOME | End: 2022-01-12
Attending: INTERNAL MEDICINE
Payer: MEDICARE

## 2022-01-12 DIAGNOSIS — E04.1 THYROID NODULE: ICD-10-CM

## 2022-01-12 PROCEDURE — 76536 US EXAM OF HEAD AND NECK: CPT | Mod: 26,,, | Performed by: RADIOLOGY

## 2022-01-12 PROCEDURE — 76536 US SOFT TISSUE HEAD NECK THYROID: ICD-10-PCS | Mod: 26,,, | Performed by: RADIOLOGY

## 2022-01-12 PROCEDURE — 76536 US EXAM OF HEAD AND NECK: CPT | Mod: TC

## 2022-01-21 ENCOUNTER — OFFICE VISIT (OUTPATIENT)
Dept: CARDIOLOGY | Facility: CLINIC | Age: 57
End: 2022-01-21
Payer: MEDICARE

## 2022-01-21 VITALS
WEIGHT: 178 LBS | DIASTOLIC BLOOD PRESSURE: 72 MMHG | BODY MASS INDEX: 32.76 KG/M2 | SYSTOLIC BLOOD PRESSURE: 158 MMHG | HEIGHT: 62 IN | OXYGEN SATURATION: 99 % | HEART RATE: 77 BPM | RESPIRATION RATE: 16 BRPM

## 2022-01-21 DIAGNOSIS — E78.2 MIXED HYPERLIPIDEMIA: ICD-10-CM

## 2022-01-21 DIAGNOSIS — I10 ESSENTIAL HYPERTENSION: ICD-10-CM

## 2022-01-21 DIAGNOSIS — Z01.810 PREOP CARDIOVASCULAR EXAM: Primary | ICD-10-CM

## 2022-01-21 DIAGNOSIS — Z98.84 GASTRIC BYPASS STATUS FOR OBESITY: ICD-10-CM

## 2022-01-21 DIAGNOSIS — E66.9 NON MORBID OBESITY, UNSPECIFIED OBESITY TYPE: ICD-10-CM

## 2022-01-21 DIAGNOSIS — E11.9 DIABETES MELLITUS TYPE 2, NONINSULIN DEPENDENT: ICD-10-CM

## 2022-01-21 PROCEDURE — 1160F PR REVIEW ALL MEDS BY PRESCRIBER/CLIN PHARMACIST DOCUMENTED: ICD-10-PCS | Mod: CPTII,S$GLB,, | Performed by: INTERNAL MEDICINE

## 2022-01-21 PROCEDURE — 3008F PR BODY MASS INDEX (BMI) DOCUMENTED: ICD-10-PCS | Mod: CPTII,S$GLB,, | Performed by: INTERNAL MEDICINE

## 2022-01-21 PROCEDURE — 3078F PR MOST RECENT DIASTOLIC BLOOD PRESSURE < 80 MM HG: ICD-10-PCS | Mod: CPTII,S$GLB,, | Performed by: INTERNAL MEDICINE

## 2022-01-21 PROCEDURE — 99999 PR PBB SHADOW E&M-EST. PATIENT-LVL III: ICD-10-PCS | Mod: PBBFAC,,, | Performed by: INTERNAL MEDICINE

## 2022-01-21 PROCEDURE — 99204 PR OFFICE/OUTPT VISIT, NEW, LEVL IV, 45-59 MIN: ICD-10-PCS | Mod: S$GLB,,, | Performed by: INTERNAL MEDICINE

## 2022-01-21 PROCEDURE — 1160F RVW MEDS BY RX/DR IN RCRD: CPT | Mod: CPTII,S$GLB,, | Performed by: INTERNAL MEDICINE

## 2022-01-21 PROCEDURE — 1159F MED LIST DOCD IN RCRD: CPT | Mod: CPTII,S$GLB,, | Performed by: INTERNAL MEDICINE

## 2022-01-21 PROCEDURE — 3008F BODY MASS INDEX DOCD: CPT | Mod: CPTII,S$GLB,, | Performed by: INTERNAL MEDICINE

## 2022-01-21 PROCEDURE — 3078F DIAST BP <80 MM HG: CPT | Mod: CPTII,S$GLB,, | Performed by: INTERNAL MEDICINE

## 2022-01-21 PROCEDURE — 1159F PR MEDICATION LIST DOCUMENTED IN MEDICAL RECORD: ICD-10-PCS | Mod: CPTII,S$GLB,, | Performed by: INTERNAL MEDICINE

## 2022-01-21 PROCEDURE — 4010F PR ACE/ARB THEARPY RXD/TAKEN: ICD-10-PCS | Mod: CPTII,S$GLB,, | Performed by: INTERNAL MEDICINE

## 2022-01-21 PROCEDURE — 99999 PR PBB SHADOW E&M-EST. PATIENT-LVL III: CPT | Mod: PBBFAC,,, | Performed by: INTERNAL MEDICINE

## 2022-01-21 PROCEDURE — 99204 OFFICE O/P NEW MOD 45 MIN: CPT | Mod: S$GLB,,, | Performed by: INTERNAL MEDICINE

## 2022-01-21 PROCEDURE — 3077F SYST BP >= 140 MM HG: CPT | Mod: CPTII,S$GLB,, | Performed by: INTERNAL MEDICINE

## 2022-01-21 PROCEDURE — 3077F PR MOST RECENT SYSTOLIC BLOOD PRESSURE >= 140 MM HG: ICD-10-PCS | Mod: CPTII,S$GLB,, | Performed by: INTERNAL MEDICINE

## 2022-01-21 PROCEDURE — 4010F ACE/ARB THERAPY RXD/TAKEN: CPT | Mod: CPTII,S$GLB,, | Performed by: INTERNAL MEDICINE

## 2022-01-21 NOTE — PROGRESS NOTES
CARDIOVASCULAR CONSULTATION    REASON FOR CONSULT:   Antoine Gonzáles is a 56 y.o. female who presents for preop CV eval.    PCP: Rebecca  HISTORY OF PRESENT ILLNESS:   LAST SEEN 2018.     Patient comes in today for preoperative cardiac risk stratification prior to planned liposuction surgery.  She reports generally asymptomatic status without angina, dyspnea, palpitations, or syncope.  There has been no PND, orthopnea, lower extremity edema.  She denies melena, hematuria, or claudicant symptoms.    Family history her father with myocardial infarction in his mid 50s.    The patient denies tobacco use, or alcohol excess.     I took the liberty of exercise in the patient in the office today.  She was able to jog up a flight of stairs without any symptoms or limitation.    CARDIOVASCULAR HISTORY:   none    PAST MEDICAL HISTORY:     Past Medical History:   Diagnosis Date    Anemia     Angio-edema     Depression     Diabetes mellitus type 2, noninsulin dependent 2016    Hematuria     Hx of essential hypertension     Hyperlipidemia     Hypertension, uncontrolled 2016    Nuclear sclerosis of both eyes 4/10/2017       PAST SURGICAL HISTORY:     Past Surgical History:   Procedure Laterality Date    BTL       SECTION      COLONOSCOPY N/A 2017    Procedure: COLONOSCOPY;  Surgeon: Oleg Enciso MD;  Location: [address]);  Service: Endoscopy;  Laterality: N/A;  CHRONIC CONSTIPATION S/P LRNY    CYSTOSCOPY  2019    Procedure: CYSTOSCOPY;  Surgeon: ELIZABETH Mendoza MD;  Location: [facility];  Service: Urology;;  PRE-OP BY RN 3-    ESOPHAGOGASTRODUODENOSCOPY N/A 2020    Procedure: EGD (ESOPHAGOGASTRODUODENOSCOPY);  Surgeon: Elias Harper MD;  Location: [address]);  Service: Endoscopy;  Laterality: N/A;  prep ins. emailed - ERW  COVID screening on 20 - ERW    GASTRIC BYPASS      sandra en y    HYSTEROSCOPY WITH DILATION AND CURETTAGE OF  UTERUS N/A 9/28/2018    Procedure: HYSTEROSCOPY, WITH DILATION AND CURETTAGE OF UTERUS;  Surgeon: Naveed Alexander MD;  Location: [facility];  Service: OB/GYN;  Laterality: N/A;  RN PREOP 9/26/2018    NECK SURGERY  09/30/2016    TOTAL REDUCTION MAMMOPLASTY  2007       ALLERGIES AND MEDICATION:   Review of patient's allergies indicates:  No Known Allergies  Previous Medications    ALBUTEROL (PROVENTIL/VENTOLIN HFA) 90 MCG/ACTUATION INHALER    Inhale 1-2 puffs into the lungs every 6 (six) hours as needed for Wheezing. Rescue    AMLODIPINE (NORVASC) 10 MG TABLET    TAKE 1 TABLET(10 MG) BY MOUTH EVERY DAY    ARIPIPRAZOLE (ABILIFY) 2 MG TAB        B COMPLEX VITAMINS CAPSULE    Take 1 capsule by mouth once daily.    B-COMPLEX WITH VITAMIN C (Z-BEC OR EQUIV) TABLET    Take 1 tablet by mouth once daily.    BIOTIN ORAL    Take by mouth.    BUPROPION (WELLBUTRIN XL) 150 MG TB24 TABLET    Take 150 mg by mouth once daily.    BUPROPION (WELLBUTRIN XL) 300 MG 24 HR TABLET    Take 300 mg by mouth once daily.     CETIRIZINE (ZYRTEC) 10 MG TABLET    1-2 tablets daily as needed for itching or swelling    CYANOCOBALAMIN, VITAMIN B-12, 1,000 MCG/15 ML LIQD    as directed    DONEPEZIL (ARICEPT) 10 MG TABLET    Take 10 mg by mouth nightly.    DONEPEZIL (ARICEPT) 5 MG TABLET    Take 10 mg by mouth once daily.     DULAGLUTIDE (TRULICITY) 1.5 MG/0.5 ML PEN INJECTOR    Inject 1.5 mg into the skin every 7 days.    DULAGLUTIDE (TRULICITY) 1.5 MG/0.5 ML PNIJ    Inject 1.5 mg into the skin every 7 days.    ENDOCET  MG PER TABLET    Take 1 tablet by mouth 3 (three) times daily.    ERTUGLIFLOZIN (STEGLATRO) 5 MG TAB    Take 5 mg by mouth once daily.    GABAPENTIN (NEURONTIN) 100 MG CAPSULE    Take 100 mg by mouth once daily.    GLIMEPIRIDE (AMARYL) 4 MG TABLET    TAKE 1 TABLET BY MOUTH WITH BREAKFAST OR FIRST MAIN MEAL OF THE DAY    HYDRALAZINE (APRESOLINE) 10 MG TABLET    Take 1 tablet (10 mg total) by mouth 3 (three) times daily.     HYDROCHLOROTHIAZIDE (HYDRODIURIL) 12.5 MG TAB    TAKE 1 TABLET DAILY WITH LOSARTAN    LINAGLIPTIN (TRADJENTA) 5 MG TAB TABLET    Take 5 mg by mouth once daily.     LOSARTAN (COZAAR) 50 MG TABLET    TAKE 1 TABLET BY MOUTH EVERY DAY WITH HCTZ    MAGNESIUM OXIDE 500 MG TAB    Take by mouth once.    METFORMIN (GLUCOPHAGE) 500 MG TABLET    Take 2 tablets with breakfast and 1 tablets with dinner    ONDANSETRON (ZOFRAN-ODT) 4 MG TBDL    Take 1 tablet (4 mg total) by mouth every 6 (six) hours as needed.    OZEMPIC 1 MG/DOSE (2 MG/1.5 ML) PNIJ    USE AS DIRECTED WEEKLY    PHENOL (CHLORASEPTIC) 0.5 % SPRA    1 spray by Mucous Membrane route 4 (four) times daily as needed (sore throat).    PLECANATIDE (TRULANCE) 3 MG TAB    Take 3 mg by mouth once daily.    ROSUVASTATIN (CRESTOR) 40 MG TAB    Take 1 tablet (40 mg total) by mouth every evening.    TAMSULOSIN (FLOMAX) 0.4 MG CAP    TAKE 1 CAPSULE BY MOUTH EVERY DAY AT NIGHT    TURMERIC ROOT EXTRACT 500 MG CAP    as directed    VITAMIN B COMPLEX (SUPER B-50 COMPLEX ORAL)    as directed       SOCIAL HISTORY:     Social History     Socioeconomic History    Marital status: Single   Tobacco Use    Smoking status: Never Smoker    Smokeless tobacco: Never Used   Substance and Sexual Activity    Alcohol use: No     Alcohol/week: 0.0 standard drinks     Comment: Socially    Drug use: No    Sexual activity: Yes     Partners: Male       FAMILY HISTORY:     Family History   Problem Relation Age of Onset    Heart disease Mother     Diabetes type II Mother     Heart attack Father 50        Open heart surgery    Allergies Sister     No Known Problems Brother     No Known Problems Maternal Aunt     No Known Problems Maternal Uncle     No Known Problems Paternal Aunt     No Known Problems Paternal Uncle     No Known Problems Maternal Grandmother     No Known Problems Maternal Grandfather     No Known Problems Paternal Grandmother     No Known Problems Paternal Grandfather      "Amblyopia Neg Hx     Blindness Neg Hx     Cancer Neg Hx     Cataracts Neg Hx     Diabetes Neg Hx     Glaucoma Neg Hx     Hypertension Neg Hx     Macular degeneration Neg Hx     Retinal detachment Neg Hx     Strabismus Neg Hx     Stroke Neg Hx     Thyroid disease Neg Hx        REVIEW OF SYSTEMS:   Review of Systems   Constitutional: Negative for chills, diaphoresis and fever.   HENT: Negative for nosebleeds.    Eyes: Negative for blurred vision, double vision and photophobia.   Respiratory: Negative for hemoptysis, shortness of breath and wheezing.    Cardiovascular: Negative for chest pain, palpitations, orthopnea, claudication, leg swelling and PND.   Gastrointestinal: Negative for abdominal pain, blood in stool, heartburn, melena, nausea and vomiting.   Genitourinary: Negative for flank pain and hematuria.   Musculoskeletal: Negative for falls, joint pain, myalgias and neck pain.   Skin: Negative for rash.   Neurological: Negative for dizziness, seizures, loss of consciousness, weakness and headaches.   Endo/Heme/Allergies: Negative for polydipsia. Does not bruise/bleed easily.   Psychiatric/Behavioral: Negative for depression and memory loss. The patient is not nervous/anxious.        PHYSICAL EXAM:     Vitals:    01/21/22 0823   BP: (!) 158/72   Pulse: 77   Resp: 16    Body mass index is 32.56 kg/m².  Weight: 80.7 kg (178 lb)   Height: 5' 2" (157.5 cm)     Physical Exam  Vitals reviewed.   Constitutional:       General: She is not in acute distress.     Appearance: She is well-developed. She is obese. She is not ill-appearing, toxic-appearing or diaphoretic.   HENT:      Head: Normocephalic and atraumatic.   Eyes:      General: No scleral icterus.     Extraocular Movements: Extraocular movements intact.      Conjunctiva/sclera: Conjunctivae normal.      Pupils: Pupils are equal, round, and reactive to light.   Neck:      Thyroid: No thyromegaly.      Vascular: Normal carotid pulses. No carotid bruit " or JVD.      Trachea: Trachea normal. No tracheal deviation.   Cardiovascular:      Rate and Rhythm: Normal rate and regular rhythm.      Pulses:           Carotid pulses are 2+ on the right side and 2+ on the left side.     Heart sounds: S1 normal and S2 normal. No murmur heard.  No friction rub. No gallop.    Pulmonary:      Effort: Pulmonary effort is normal. No respiratory distress.      Breath sounds: Normal breath sounds. No stridor. No wheezing, rhonchi or rales.   Chest:      Chest wall: No tenderness.   Abdominal:      General: There is no distension.      Palpations: Abdomen is soft.   Musculoskeletal:         General: No swelling or tenderness. Normal range of motion.      Cervical back: Normal range of motion and neck supple. No edema or rigidity.      Right lower leg: No edema.      Left lower leg: No edema.   Feet:      Right foot:      Skin integrity: No ulcer.      Left foot:      Skin integrity: No ulcer.   Skin:     General: Skin is warm and dry.      Coloration: Skin is not jaundiced.      Findings: No erythema or rash.   Neurological:      General: No focal deficit present.      Mental Status: She is alert and oriented to person, place, and time.      Cranial Nerves: No cranial nerve deficit.   Psychiatric:         Mood and Affect: Mood normal.         Speech: Speech normal.         Behavior: Behavior normal. Behavior is cooperative.         DATA:   EKG: (personally reviewed tracing)  12/15/21 SR 80, ASMI-age indet    Laboratory:  CBC:  Recent Labs   Lab 11/23/19  1135 01/30/20  1413 01/21/21  1042   WBC 6.32 5.71 7.22   Hemoglobin 12.5 12.1 12.6   Hematocrit 40.1 37.0 41.1   Platelets 308 306 331       CHEMISTRIES:  Recent Labs   Lab 11/23/19  1135 01/30/20  1413 01/21/21  1042   Glucose 121 H 137 H 121 H   Sodium 141 141 139   Potassium 3.7 4.1 4.0   BUN 11 13 16   Creatinine 0.9 0.9 0.9   eGFR if African American >60.0 >60.0 >60.0   eGFR if non African American >60.0 >60.0 >60.0   Calcium 9.4  9.7 9.4       CARDIAC BIOMARKERS:        COAGS:        LIPIDS/LFTS:  Recent Labs   Lab 02/06/19  0814 02/06/19  0814 11/23/19  1135 01/30/20  1413 01/21/21  1042   Cholesterol 209 H  --   --   --   --    Triglycerides 100  --   --   --   --    HDL 83 H  --   --   --   --    LDL Cholesterol 106.0  --   --   --   --    Non-HDL Cholesterol 126  --   --   --   --    AST 19   < > 24 18 17   ALT 26   < > 37 23 20    < > = values in this interval not displayed.       Cardiovascular Testing:  Ex MPI 6/8/17  The patient exercised for 8.06 minutes on a Osito protocol, corresponding to a functional capacity of 7 estimated METS, achieving a peak heart rate of 148 bpm, which is 91% of the age predicted maximum heart rate. -CP/EKG.  Nuclear Quantitative Functional Analysis:   LVEF: >= 70 %  Impression: NORMAL MYOCARDIAL PERFUSION  1. The perfusion scan is free of evidence for myocardial ischemia or injury.   2. There is mild apical thinning which is a normal variant.   3. Resting wall motion is physiologic.   4. Resting LV function is normal.   5. The ventricular volumes are normal at rest and stress.   6. The extracardiac distribution of radioactivity is normal.     Echo 6/8/17    1 - Normal left ventricular systolic function (EF 55-60%).     2 - Concentric hypertrophy.     3 - Mild left atrial enlargement.     4 - Trivial tricuspid regurgitation.    ASSESSMENT:   # Preop CV eval prior to liposuction.  The pt has good exercise capacity. Echo and MPI 6/2017 normal.  # HTN, uncontrolled, med nonadherence noted (pt did not take her meds this am).  # DM  # HLP, on rosuva 40mg  # BMI 33 s/p bariatric surgery, down 2 unit(s) vs last OV    PLAN:   Cont med rx  Diet/exercise/weight loss  The pt is at low cardiac risk for the planned surgery.  No further CV testing needed.  RTC prn    Joshua Beebe MD, FACC

## 2022-03-07 ENCOUNTER — PATIENT MESSAGE (OUTPATIENT)
Dept: BARIATRICS | Facility: CLINIC | Age: 57
End: 2022-03-07
Payer: MEDICARE

## 2022-04-07 ENCOUNTER — PATIENT MESSAGE (OUTPATIENT)
Dept: BARIATRICS | Facility: CLINIC | Age: 57
End: 2022-04-07
Payer: MEDICARE

## 2022-04-12 ENCOUNTER — PATIENT MESSAGE (OUTPATIENT)
Dept: BARIATRICS | Facility: CLINIC | Age: 57
End: 2022-04-12
Payer: MEDICARE

## 2022-05-05 ENCOUNTER — PATIENT MESSAGE (OUTPATIENT)
Dept: BARIATRICS | Facility: CLINIC | Age: 57
End: 2022-05-05
Payer: MEDICARE

## 2022-05-10 ENCOUNTER — PATIENT MESSAGE (OUTPATIENT)
Dept: BARIATRICS | Facility: CLINIC | Age: 57
End: 2022-05-10
Payer: MEDICARE

## 2022-06-10 NOTE — LETTER
Clinical Summary

 Created on:2019



Patient:Jerica Estrada

Sex:Female

:1940

External Reference #:[id number]





Demographics







 Address  [address] [place] [address]

 

 Mobile Phone  [phone]

 

 Home Phone  [phone]

 

 Email Address  [email or web address]

 

 Preferred Language  English

 

 Marital Status  

 

 Advent Affiliation  Unknown

 

 Race  White

 

 Ethnic Group  Not  or 









Author







 Organization  [facility]

 

 Address  [address] [place] [address]









Support







 Name  Relationship  Address  Phone

 

 Wan Estrada  Spouse  [address]  [phone]



     [place] [address]  









Care Team Providers







 Name  Role  Phone

 

 Samuel Todd MD  Primary Care Provider  [phone]









Allergies







 Active Allergy  Reactions  Severity  Noted Date  Comments

 

 Sulfamethoxazole-Trimethoprim      2018  

 

 Ciprofloxacin      2018  

 

 Valsartan      2018  

 

 Iodine      2018  

 

 No Known Drug Allergies      2016  

 

 Prednisone      2018  

 

 Tetanus Vaccines And Toxoid      2018  







Medications







 Medication  Sig  Dispensed  Refills  Start Date  End Date  Status

 

 doxycycline  Take 100 mg by    0  2016    Active



 (VIBRAMYCIN) 100 MG  mouth 2 (two)          



 capsule  times a day.          

 

 losartan (COZAAR)      0  10/31/2016    Active



 50 MG tablet            

 

 nitrofurantoin,      0  10/26/2016    Active



 macrocrystal-monohy            



 drate, (MACROBID)            



 100 MG capsule            

 

 amLODIPine  TAKE ONE (1)    1  03/15/2018    Active



 (NORVASC) 10 mg  TABLET(S) BY MOUTH          



 tablet  ONCE A DAY.          

 

 atorvastatin  TAKE ONE (1)    0  2017    Active



 (LIPITOR) 80 MG  TABLET(S) BY MOUTH          



 tablet  ONCE A DAY.          

 

 clopidogrel  TAKE ONE (1)    1  2018    Active



 (PLAVIX) 75 mg  TABLET(S) BY MOUTH          



 tablet  ONCE A DAY.          

 

 metoprolol tartrate  TAKE ONE (1)    1  2018    Active



 (LOPRESSOR) 25 mg  TABLET(S) BY MOUTH          



 tablet  TWICE A DAY.          

 

 aspirin 325 MG  Take 325 mg by    0      Active



 tablet  mouth daily.          

 

 amoxicillin-pot  Dental/Surgical: 4 tablets at once 1-2 hr prior to procedure  
28 tablet  1  2018  



 clavulanate  Other: 4 tablets at once immediately          



 (AUGMENTIN) 500-125            



 mg per            



 tabletIndications:            



 History of total            



 right knee            



 replacement,            



 Prophylactic            



 antibiotic            







Active Problems







 Problem  Noted Date

 

 Low back pain radiating to right leg  2019

 

 Varicose veins of leg with edema, bilateral  2019







Encounters







 Date  Type  Specialty  Care Team  Description

 

 2019  Office Visit  Dannie Fitzpatrick MD  Varicose veins 
of both legs with edema (Primary Dx);



         Varicose veins of leg with edema, bilateral;



         Low back pain radiating to right leg



after 06/10/2018



Social History







 Tobacco Use  Types  Packs/Day  Years Used  Date

 

 Never Smoker        









 Smokeless Tobacco: Never Used      









 Alcohol Use  Drinks/Week  oz/Week  Comments

 

 Never      









 Alcohol Habits  Answer  Date Recorded

 

 How often do you have a drink containing alcohol?  Never  2019

 

 How many drinks containing alcohol do you have on a typical  Not asked  



 day when you are drinking?    

 

 How often do you have six or more drinks on one occasion?  Not asked  









 Sex Assigned at Birth  Date Recorded

 

 Not on file  









 Job Start Date  Occupation  Industry

 

 Not on file  Not on file  Not on file









 Travel History  Travel Start  Travel End









 No recent travel history available.







Last Filed Vital Signs







 Vital Sign  Reading  Time Taken

 

 Blood Pressure  -  -

 

 Pulse  -  -

 

 Temperature  -  -

 

 Respiratory Rate  14  2019 12:23 PM CDT

 

 Oxygen Saturation  -  -

 

 Inhaled Oxygen Concentration  -  -

 

 Weight  71.2 kg (157 lb)  2019 12:23 PM CDT

 

 Height  162.6 cm (5' 4")  2019 12:23 PM CDT

 

 Body Mass Index  26.95  2019 12:23 PM CDT







Plan of Treatment







 Date  Type  Specialty  Care Team  Description

 

 2019  Office Visit  Cardiovascular  Dannie Purdy MD



  



       [address] [facility]



  



       [address] [place] [address] [id number] [phone] (Fax)  









 Health Maintenance  Due Date  Last Done  Comments

 

 SHINGLES VACCINES (#1)  1992    

 

 65+ PNEUMOCOCCAL VACCINE (1 of 2 - PCV13)  2007    

 

 INFLUENZA VACCINE  2019    







Results

Not on fileafter 06/10/2018



Insurance







 Payer  Benefit Plan /  Subscriber ID  Effective Dates  Phone  Address  Type



   Group          

 

 MEDICARE  MEDICARE PART A  xxxxxxxxxx  3/1/2007-Present    [place], [place]  
Medicare



   AND B          

 

 AARP  AARP SUPPLEMENT  xxxxxxxxxxx  3/1/2007-Present      Commercial









 Guarantor Name  Account Type  Relation to  Date of  Phone  Billing



     Patient  Birth    Address

 

 Jerica Estrada  Personal/Family  Self  1940  [phone] [address] [facility]







         ([place]  [place] [address]







Advance Directives

Patient has advance care planning documents on file. For more information, 
please contact:Ja [address] [place] [address] January 30, 2020      Jb Fernandez MD  [address] [facility] [address]  [address]  [facility] [address]           [facility] - Allergy/ Immunology  [address]  [facility] [address]  Phone: [phone]  Fax: [phone]          Patient: Antoine Seals   MR Number: [id number]   YOB: 1965   Date of Visit: 1/30/2020       Dear Dr. Jb Fernandez:    Thank you for referring Antoine Seals to me for evaluation. Attached you will find relevant portions of my assessment and plan of care.    If you have questions, please do not hesitate to call me. I look forward to following Antoine Seals along with you.    Sincerely,    Pam He MD    Enclosure  CC:  No Recipients    If you would like to receive this communication electronically, please contact [email or web address] or [phone] to request more information on [facility] Link access.    For providers and/or their staff who would like to refer a patient to [facility], please contact us through our one-stop-shop provider referral line, [facility], at [phone].    If you feel you have received this communication in error or would no longer like to receive these types of communications, please e-mail [email or web address]          Patient returned call.   Call transferred to scheduling

## 2022-06-14 ENCOUNTER — OFFICE VISIT (OUTPATIENT)
Dept: OBSTETRICS AND GYNECOLOGY | Facility: CLINIC | Age: 57
End: 2022-06-14
Payer: MEDICARE

## 2022-06-14 VITALS
WEIGHT: 172.75 LBS | DIASTOLIC BLOOD PRESSURE: 88 MMHG | BODY MASS INDEX: 31.59 KG/M2 | SYSTOLIC BLOOD PRESSURE: 148 MMHG

## 2022-06-14 DIAGNOSIS — Z91.89 GYN EXAM FOR HIGH-RISK MEDICARE PATIENT: Primary | ICD-10-CM

## 2022-06-14 PROCEDURE — 1159F MED LIST DOCD IN RCRD: CPT | Mod: CPTII,S$GLB,, | Performed by: OBSTETRICS & GYNECOLOGY

## 2022-06-14 PROCEDURE — 3008F PR BODY MASS INDEX (BMI) DOCUMENTED: ICD-10-PCS | Mod: CPTII,S$GLB,, | Performed by: OBSTETRICS & GYNECOLOGY

## 2022-06-14 PROCEDURE — 99999 PR PBB SHADOW E&M-EST. PATIENT-LVL IV: CPT | Mod: PBBFAC,,, | Performed by: OBSTETRICS & GYNECOLOGY

## 2022-06-14 PROCEDURE — 3077F SYST BP >= 140 MM HG: CPT | Mod: CPTII,S$GLB,, | Performed by: OBSTETRICS & GYNECOLOGY

## 2022-06-14 PROCEDURE — 3008F BODY MASS INDEX DOCD: CPT | Mod: CPTII,S$GLB,, | Performed by: OBSTETRICS & GYNECOLOGY

## 2022-06-14 PROCEDURE — 1159F PR MEDICATION LIST DOCUMENTED IN MEDICAL RECORD: ICD-10-PCS | Mod: CPTII,S$GLB,, | Performed by: OBSTETRICS & GYNECOLOGY

## 2022-06-14 PROCEDURE — 4010F ACE/ARB THERAPY RXD/TAKEN: CPT | Mod: CPTII,S$GLB,, | Performed by: OBSTETRICS & GYNECOLOGY

## 2022-06-14 PROCEDURE — 3079F PR MOST RECENT DIASTOLIC BLOOD PRESSURE 80-89 MM HG: ICD-10-PCS | Mod: CPTII,S$GLB,, | Performed by: OBSTETRICS & GYNECOLOGY

## 2022-06-14 PROCEDURE — G0101 CA SCREEN;PELVIC/BREAST EXAM: HCPCS | Mod: S$GLB,,, | Performed by: OBSTETRICS & GYNECOLOGY

## 2022-06-14 PROCEDURE — 99999 PR PBB SHADOW E&M-EST. PATIENT-LVL IV: ICD-10-PCS | Mod: PBBFAC,,, | Performed by: OBSTETRICS & GYNECOLOGY

## 2022-06-14 PROCEDURE — G0101 PR CA SCREEN;PELVIC/BREAST EXAM: ICD-10-PCS | Mod: S$GLB,,, | Performed by: OBSTETRICS & GYNECOLOGY

## 2022-06-14 PROCEDURE — 4010F PR ACE/ARB THEARPY RXD/TAKEN: ICD-10-PCS | Mod: CPTII,S$GLB,, | Performed by: OBSTETRICS & GYNECOLOGY

## 2022-06-14 PROCEDURE — 3077F PR MOST RECENT SYSTOLIC BLOOD PRESSURE >= 140 MM HG: ICD-10-PCS | Mod: CPTII,S$GLB,, | Performed by: OBSTETRICS & GYNECOLOGY

## 2022-06-14 PROCEDURE — 3079F DIAST BP 80-89 MM HG: CPT | Mod: CPTII,S$GLB,, | Performed by: OBSTETRICS & GYNECOLOGY

## 2022-06-14 NOTE — PROGRESS NOTES
[facility]  Ambulatory Clinic  Obstetrics & Gynecology    Visit Date:  2022    Chief Complaint:  Annual GYN exam    History of Present Illness:      Antoine Gonzáles is a 57 y.o.  here for a gynecologic exam.    Pt has no major complaints today.    Pt reports an uneventful transition into menopause and is not on hormone replacement therapy.    Pt was seen for postmenopausal bleeding in , pt denies any new PMB.  Last pap ~ benign.  Pt denies active sexually transmitted infections.  Last mammo ~10/2021 benign.  Pt performs monthly self breast examination, non-smoker, uses seat belts, and denies abuse.   Pt denies vaginal bleeding, vaginal discharge, dyspareunia, pelvic pain, bloating, early satiety, unintentional weight loss, breast mass/skin changes, incontinence, GI or urinary complaints.    Otherwise, the pt is in her usual state of health.    Past History:  Gynecologic history as noted above.    Review of Systems:      GENERAL:  No fever, fatigue, excessive weight gain or loss  HEENT:  No headaches, hearing changes, visual disturbance  RESPIRATORY:  No cough, shortness of breath  CARDIOVASCULAR:  No chest pain, heart palpitations, leg swelling  BREAST:  No lump, pain, nipple discharge, skin changes  GASTROINTESTINAL:  No nausea, vomiting, constipation, diarrhea, abd pain, rectal bleeding   GENITOURINARY:  See HPI  ENDOCRINE:  No heat or cold intolerance  HEMATOLOGIC:  No easy bruisability or bleeding   LYMPHATICS:  No enlarged nodes  MUSCULOSKELETAL:  No acute joint pain or swelling  SKIN:  No rash, lesions, jaundice  NEUROLOGIC:  No dizziness, weakness, syncope  PSYCHIATRIC:  No significant mood changes, homicidal/suicidal ideations    Physical Exam:     BP (!) 148/88   Wt 78.4 kg (172 lb 11.7 oz)   LMP 2017   BMI 31.59 kg/m²   Pulse 70's, Resp rate 18     GENERAL:  No acute distress, well-nourished  HEENT:  Atraumatic, anicteric, moist mucus membranes. Neck  supple w/o masses.  BREAST:  Symmetric, nontender, no obvious masses, adenopathy, skin changes or nipple discharge.  LUNGS:  Clear normal respiratory effort  HEART:  Regular rate and rhythm  ABDOMEN:  Soft, non-tender, non-distended, no obvious organomegaly  EXT:  Symmetric w/o cramping, claudication, or edema. +2 distal pulses.  SKIN:  No rashes or bruising  PSYCH:  Mood and affect appropriate  NEURO:  Grossly intact bilaterally    GENITOURINARY:    VULVAR:  Female external genitalia w/o obvious lesions. Normal urethral meatus. No gross lymphadenopathy.   VAGINA:  Mild, age appropriate vulvovaginal atrophy. No significant cystocele or rectocele. No obvious lesion. No discharge.  CERVIX:  No cervical motion tenderness, discharge, or obvious lesions.   UTERUS:  Small, non-tender, normal contour  ADNEXA:  No masses, non-tender    RECTAL:  Deferred. No obvious external lesions    Chaperone present for exam.    Assessment:     57 y.o.  with h/o BTL:    1. GYN exam for high-risk Medicare patient    Plan:    A gynecologic health assessment was performed with age appropriate counseling.  Cervical cancer screening - pap up to date.  Screening mammogram up to date.  Pt advised to f/u if she has any new postmenopausal bleeding.  Pt denies PMB since 2020.  Timely follow up advised.  Encourage healthy lifestyle modifications, monthly self breast exams, Ca/Vit D, rec'd COVID vaccines, and colonoscopy.  F/u with PCP for health maintenance.  Return 1 year for gynecologic exam or sooner as needed.    All questions answered, pt voiced understanding.        Naveed Alexander MD

## 2022-06-30 ENCOUNTER — HOSPITAL ENCOUNTER (EMERGENCY)
Facility: HOSPITAL | Age: 57
Discharge: HOME | End: 2022-06-30
Attending: EMERGENCY MEDICINE
Payer: MEDICARE

## 2022-06-30 VITALS
WEIGHT: 172 LBS | SYSTOLIC BLOOD PRESSURE: 181 MMHG | HEART RATE: 77 BPM | OXYGEN SATURATION: 97 % | RESPIRATION RATE: 18 BRPM | HEIGHT: 62 IN | TEMPERATURE: 98 F | BODY MASS INDEX: 31.65 KG/M2 | DIASTOLIC BLOOD PRESSURE: 87 MMHG

## 2022-06-30 DIAGNOSIS — M79.606 LEG PAIN: ICD-10-CM

## 2022-06-30 DIAGNOSIS — S86.812A STRAIN OF CALF MUSCLE, LEFT, INITIAL ENCOUNTER: Primary | ICD-10-CM

## 2022-06-30 PROCEDURE — 99284 EMERGENCY DEPT VISIT MOD MDM: CPT | Mod: 25,ER

## 2022-06-30 RX ORDER — DICLOFENAC SODIUM 10 MG/G
GEL TOPICAL
Qty: 100 G | Refills: 0 | Status: SHIPPED | OUTPATIENT
Start: 2022-06-30 | End: 2023-06-14

## 2022-06-30 RX ORDER — METHOCARBAMOL 750 MG/1
1500 TABLET, FILM COATED ORAL EVERY 6 HOURS
Qty: 24 TABLET | Refills: 0 | Status: SHIPPED | OUTPATIENT
Start: 2022-06-30 | End: 2022-07-03

## 2022-06-30 RX ORDER — NAPROXEN 500 MG/1
500 TABLET ORAL 2 TIMES DAILY WITH MEALS
Qty: 60 TABLET | Refills: 0 | Status: SHIPPED | OUTPATIENT
Start: 2022-06-30 | End: 2022-07-10

## 2022-07-01 NOTE — FIRST PROVIDER EVALUATION
Emergency Department TeleTriage Encounter Note      CHIEF COMPLAINT    Chief Complaint   Patient presents with    Leg Pain     Pt c/o pain in LLE x 1 week in calf area; strong pedal pulses       VITAL SIGNS   Initial Vitals [06/30/22 1913]   BP Pulse Resp Temp SpO2   (!) 153/86 87 20 97.9 °F (36.6 °C) 100 %      MAP       --            ALLERGIES    Review of patient's allergies indicates:   Allergen Reactions    Lisinopril-hydrochlorothiazide Swelling     Facial swelling/ mouth swelling       PROVIDER TRIAGE NOTE  This is a teletriage evaluation of a 57 y.o. female presenting to the ED complaining of leg pain. Patient reports calf pain and swelling for 1 week. She denies trauma or injury.    Unable to visualize leg on video encounter. She is in no distress.     Initial orders will be placed and care will be transferred to an alternate provider when patient is roomed for a full evaluation. Any additional orders and the final disposition will be determined by that provider.           ORDERS  Labs Reviewed - No data to display    ED Orders (720h ago, onward)    Start Ordered     Status Ordering Provider    06/30/22 1944 06/30/22 1943   Lower Extremity Veins Left  1 time imaging         Ordered AMINA TENA            Virtual Visit Note: The provider triage portion of this emergency department evaluation and documentation was performed via [employer], a HIPAA-compliant telemedicine application, in concert with a tele-presenter in the room. A face to face patient evaluation with one of my colleagues will occur once the patient is placed in an emergency department room.      DISCLAIMER: This note was prepared with [employer] voice recognition transcription software. Garbled syntax, mangled pronouns, and other bizarre constructions may be attributed to that software system.

## 2022-07-01 NOTE — ED PROVIDER NOTES
"Encounter Date: 2022    SCRIBE #1 NOTE: I, Christie Islas, am scribing for, and in the presence of,  Dmitry Amaya MD. I have scribed the following portions of the note - Other sections scribed: HPI; ROS; PE.       History     Chief Complaint   Patient presents with    Leg Pain     Pt c/o pain in LLE x 1 week in calf area; strong pedal pulses     Antoine Gonzáles is a 57 y.o. with DM Type II, HTN, and HLD who presents to the ED for chief complaint of acute constant left calf pain onset 1 week ago. Patient reports that she is able to stand and ambulate. She is unable to bear weight on her left leg. Patient describes the pain as a "feeling like a toothache." She states that she does exercise frequently. Patient denies previous hx of DVT/PE. No attempted treatment. Patient denies chest pain or shortness of breath.    The history is provided by the patient. No  was used.     Review of patient's allergies indicates:   Allergen Reactions    Lisinopril-hydrochlorothiazide Swelling     Facial swelling/ mouth swelling     Past Medical History:   Diagnosis Date    Anemia     Angio-edema     Depression     Diabetes mellitus type 2, noninsulin dependent 2016    Hematuria     Hx of essential hypertension     Hyperlipidemia     Hypertension, uncontrolled 2016    Nuclear sclerosis of both eyes 4/10/2017     Past Surgical History:   Procedure Laterality Date    BTL       SECTION      COLONOSCOPY N/A 2017    Procedure: COLONOSCOPY;  Surgeon: Oleg Enciso MD;  Location: [address]);  Service: Endoscopy;  Laterality: N/A;  CHRONIC CONSTIPATION S/P LRNY    CYSTOSCOPY  2019    Procedure: CYSTOSCOPY;  Surgeon: ELIZABETH Mendoza MD;  Location: [facility] OR;  Service: Urology;;  PRE-OP BY RN 3-    ESOPHAGOGASTRODUODENOSCOPY N/A 2020    Procedure: EGD (ESOPHAGOGASTRODUODENOSCOPY);  Surgeon: Elias Harper MD;  Location: [address]);  Service: " Endoscopy;  Laterality: N/A;  prep ins. emailed - ER[facility]  COVID screening on 6/26/20 - [facility]    GASTRIC BYPASS  1995    sandra en y    HYSTEROSCOPY WITH DILATION AND CURETTAGE OF UTERUS N/A 9/28/2018    Procedure: HYSTEROSCOPY, WITH DILATION AND CURETTAGE OF UTERUS;  Surgeon: Naveed Alexander MD;  Location: [facility];  Service: OB/GYN;  Laterality: N/A;  RN PREOP 9/26/2018    NECK SURGERY  09/30/2016    TOTAL REDUCTION MAMMOPLASTY  2007     Family History   Problem Relation Age of Onset    Heart disease Mother     Diabetes type II Mother     Heart attack Father 50        Open heart surgery    Allergies Sister     No Known Problems Brother     No Known Problems Maternal Aunt     No Known Problems Maternal Uncle     No Known Problems Paternal Aunt     No Known Problems Paternal Uncle     No Known Problems Maternal Grandmother     No Known Problems Maternal Grandfather     No Known Problems Paternal Grandmother     No Known Problems Paternal Grandfather     Amblyopia Neg Hx     Blindness Neg Hx     Cancer Neg Hx     Cataracts Neg Hx     Diabetes Neg Hx     Glaucoma Neg Hx     Hypertension Neg Hx     Macular degeneration Neg Hx     Retinal detachment Neg Hx     Strabismus Neg Hx     Stroke Neg Hx     Thyroid disease Neg Hx      Social History     Tobacco Use    Smoking status: Never Smoker    Smokeless tobacco: Never Used   Substance Use Topics    Alcohol use: No     Alcohol/week: 0.0 standard drinks     Comment: Socially    Drug use: No     Review of Systems   Respiratory: Negative for shortness of breath.    Cardiovascular: Negative for chest pain and leg swelling.   Musculoskeletal: Positive for arthralgias.   All other systems reviewed and are negative.      Physical Exam     Initial Vitals [06/30/22 1913]   BP Pulse Resp Temp SpO2   (!) 153/86 87 20 97.9 °F (36.6 °C) 100 %      MAP       --         Physical Exam    Nursing note and vitals reviewed.  Constitutional: She appears well-developed  and well-nourished.   HENT:   Head: Normocephalic and atraumatic.   Right Ear: External ear normal.   Left Ear: External ear normal.   Eyes: Conjunctivae are normal.   Neck: Phonation normal. Neck supple.   Normal range of motion.  Cardiovascular: Normal rate and intact distal pulses.   Pulses:       Dorsalis pedis pulses are 2+ on the right side and 2+ on the left side.   Pulmonary/Chest: Effort normal. No stridor. No respiratory distress.   Abdominal: Abdomen is soft. There is no abdominal tenderness.   Musculoskeletal:         General: No edema.      Cervical back: Normal range of motion and neck supple.      Right knee: Normal range of motion.      Left lower leg: Tenderness present. Lacerations: along the calf.      Right ankle: Normal range of motion.      Right foot: Normal pulse.      Left foot: Normal pulse.      Comments: Compartments in the LLE are soft. No erythema, warmth, or induration.     Neurological: She is alert and oriented to person, place, and time.   Skin: Skin is warm, dry and intact. No rash noted.   Psychiatric: She has a normal mood and affect. Her behavior is normal.         ED Course   Procedures  Labs Reviewed - No data to display       Imaging Results          US Lower Extremity Veins Left (Final result)  Result time 06/30/22 21:27:39    Final result by Maricruz Grijalva MD (06/30/22 21:27:39)                 Impression:      No evidence of left lower extremity deep venous thrombosis.      Electronically signed by: Maricruz Grijalva MD  Date:    06/30/2022  Time:    21:27             Narrative:    EXAMINATION:  US LOWER EXTREMITY VEINS LEFT    CLINICAL HISTORY:  Pain in leg, unspecified    TECHNIQUE:  Duplex and color flow Doppler evaluation of the left lower extremity veins was performed.    COMPARISON:  None    FINDINGS:  No evidence of clot involving the bilateral common femoral veins or left greater saphenous, femoral, popliteal, peroneal, anterior and posterior tibial veins.  All  venous structures demonstrate normal respiratory phasicity and augment adequately.  No evidence of soft tissue mass or Baker's cyst.                                 Medications - No data to display  Medical Decision Making:   History:   Old Medical Records: I decided to obtain old medical records.  Clinical Tests:   Radiological Study: Reviewed and Ordered    Labs Reviewed         Imaging Reviewed    Imaging Results          US Lower Extremity Veins Left (Final result)  Result time 06/30/22 21:27:39    Final result by Maricruz Grijalva MD (06/30/22 21:27:39)                 Impression:      No evidence of left lower extremity deep venous thrombosis.      Electronically signed by: Maricruz Grijalva MD  Date:    06/30/2022  Time:    21:27             Narrative:    EXAMINATION:  US LOWER EXTREMITY VEINS LEFT    CLINICAL HISTORY:  Pain in leg, unspecified    TECHNIQUE:  Duplex and color flow Doppler evaluation of the left lower extremity veins was performed.    COMPARISON:  None    FINDINGS:  No evidence of clot involving the bilateral common femoral veins or left greater saphenous, femoral, popliteal, peroneal, anterior and posterior tibial veins.  All venous structures demonstrate normal respiratory phasicity and augment adequately.  No evidence of soft tissue mass or Baker's cyst.                                Medications given in ED    Medications - No data to display      Note was created using voice recognition software. Note may have occasional typographical errors that may not have been identified and edited despite good dale initial review prior to signing.    I, Dmitry Amaya MD, personally performed the services described in this documentation. All medical record entries made by the scribe were at my direction and in my presence.  I have reviewed the chart and agree that the record reflects my personal performance and is accurate and complete.            Scribe Attestation:   Scribe #1: I performed the above  scribed service and the documentation accurately describes the services I performed. I attest to the accuracy of the note.                 Clinical Impression:   Final diagnoses:  [M79.606] Leg pain  [S86.812A] Strain of calf muscle, left, initial encounter (Primary)          ED Disposition Condition    Discharge Stable        ED Prescriptions     Medication Sig Dispense Start Date End Date Auth. Provider    methocarbamoL (ROBAXIN) 750 MG Tab () Take 2 tablets (1,500 mg total) by mouth every 6 (six) hours. for 3 days 24 tablet 2022 7/3/2022 Dmitry Amaya MD    diclofenac sodium (VOLTAREN) 1 % Gel Apply 2g  to affected area every 6 hr as needed for pain. 100 g 2022  Dmitry Amaya MD    naproxen (NAPROSYN) 500 MG tablet () Take 1 tablet (500 mg total) by mouth 2 (two) times daily with meals. for 10 days 60 tablet 2022 7/10/2022 Dmitry Amaya MD        Follow-up Information     Follow up With Specialties Details Why Contact Info    Jb Fernandez MD Internal Medicine Call in 1 day to schedule an appointment, for re-evaluation of today's complaint [address]  [address]  [facility] [address] [phone]      The nearest emergency department.  Go to  As needed, If symptoms worsen            Dmitry Amaya MD  22 0014

## 2022-07-01 NOTE — ED NOTES
WALKING BOOT IN PLACE AT THIS TIME. PT VERBALIZED UNDERSTANDING THAT RX WERE SENT TO [facility] ELECTRONICALLY. PT VERBALIZED UNDERSTANDING TO WEAR WALKING BOOT AT ALL TIMES UNTIL SEEN BY PCP AND TO RETURN TO ED IF SYMPTOMS BECOME WORSE. PT DENIES FURTHER NEEDS OR QUESTIONS. AMB OUT TO POV. GAIT STEADY

## 2022-07-21 DIAGNOSIS — M79.605 LEFT LEG PAIN: Primary | ICD-10-CM

## 2022-07-26 ENCOUNTER — HOSPITAL ENCOUNTER (OUTPATIENT)
Dept: RADIOLOGY | Facility: OTHER | Age: 57
Discharge: HOME | End: 2022-07-26
Attending: NURSE PRACTITIONER
Payer: MEDICARE

## 2022-07-26 DIAGNOSIS — M79.605 LEFT LEG PAIN: ICD-10-CM

## 2022-07-26 PROCEDURE — 93971 US LOWER EXTREMITY VEINS LEFT: ICD-10-PCS | Mod: 26,LT,, | Performed by: RADIOLOGY

## 2022-07-26 PROCEDURE — 93971 EXTREMITY STUDY: CPT | Mod: TC,LT

## 2022-07-26 PROCEDURE — 93971 EXTREMITY STUDY: CPT | Mod: 26,LT,, | Performed by: RADIOLOGY

## 2022-08-05 ENCOUNTER — HOSPITAL ENCOUNTER (EMERGENCY)
Facility: HOSPITAL | Age: 57
Discharge: HOME | End: 2022-08-05
Attending: EMERGENCY MEDICINE
Payer: MEDICARE

## 2022-08-05 VITALS
TEMPERATURE: 99 F | SYSTOLIC BLOOD PRESSURE: 147 MMHG | OXYGEN SATURATION: 98 % | HEART RATE: 72 BPM | DIASTOLIC BLOOD PRESSURE: 80 MMHG | RESPIRATION RATE: 20 BRPM

## 2022-08-05 DIAGNOSIS — M25.519 SHOULDER PAIN: Primary | ICD-10-CM

## 2022-08-05 PROBLEM — Z98.84 GASTRIC BYPASS STATUS FOR OBESITY: Chronic | Status: ACTIVE | Noted: 2017-06-15

## 2022-08-05 PROCEDURE — 99284 EMERGENCY DEPT VISIT MOD MDM: CPT | Mod: ,,, | Performed by: EMERGENCY MEDICINE

## 2022-08-05 PROCEDURE — 25000003 PHARM REV CODE 250: Performed by: EMERGENCY MEDICINE

## 2022-08-05 PROCEDURE — 99284 PR EMERGENCY DEPT VISIT,LEVEL IV: ICD-10-PCS | Mod: ,,, | Performed by: EMERGENCY MEDICINE

## 2022-08-05 PROCEDURE — 99284 EMERGENCY DEPT VISIT MOD MDM: CPT

## 2022-08-05 RX ORDER — GABAPENTIN 300 MG/1
300 CAPSULE ORAL
Status: COMPLETED | OUTPATIENT
Start: 2022-08-05 | End: 2022-08-05

## 2022-08-05 RX ORDER — KETOROLAC TROMETHAMINE 10 MG/1
10 TABLET, FILM COATED ORAL
Status: COMPLETED | OUTPATIENT
Start: 2022-08-05 | End: 2022-08-05

## 2022-08-05 RX ORDER — IBUPROFEN 600 MG/1
600 TABLET ORAL EVERY 6 HOURS PRN
Qty: 20 TABLET | Refills: 0 | Status: SHIPPED | OUTPATIENT
Start: 2022-08-05 | End: 2022-08-10

## 2022-08-05 RX ORDER — HYDROCODONE BITARTRATE AND ACETAMINOPHEN 5; 325 MG/1; MG/1
1 TABLET ORAL EVERY 6 HOURS PRN
Qty: 12 TABLET | Refills: 0 | Status: SHIPPED | OUTPATIENT
Start: 2022-08-05 | End: 2022-08-08

## 2022-08-05 RX ORDER — OXYCODONE HYDROCHLORIDE 5 MG/1
5 TABLET ORAL
Status: COMPLETED | OUTPATIENT
Start: 2022-08-05 | End: 2022-08-05

## 2022-08-05 RX ORDER — GABAPENTIN 100 MG/1
100 CAPSULE ORAL 3 TIMES DAILY
Qty: 90 CAPSULE | Refills: 0 | Status: SHIPPED | OUTPATIENT
Start: 2022-08-05 | End: 2022-08-29 | Stop reason: CLARIF

## 2022-08-05 RX ADMIN — OXYCODONE 5 MG: 5 TABLET ORAL at 05:08

## 2022-08-05 RX ADMIN — GABAPENTIN 300 MG: 300 CAPSULE ORAL at 05:08

## 2022-08-05 RX ADMIN — KETOROLAC TROMETHAMINE 10 MG: 10 TABLET, FILM COATED ORAL at 05:08

## 2022-08-05 NOTE — PROVIDER PROGRESS NOTES - EMERGENCY DEPT.
Encounter Date: 8/5/2022    ED Physician Progress Notes        Physician Note:   Assumed care of patient from Dr. Soto at 0600 pending xray results.    6:34 AM  Xray:  Visualized osseous structures demonstrate no evidence of recent or healing fracture or lytic destructive process.  No glenohumeral dislocation.  No abnormal soft tissue calcifications.  Some spurring at the right acromioclavicular joint level is noted.  Incidental note is also made of instrumentation relating to prior anterior cervical fusion procedures at the C5-6 and C6-7 levels.    Feels improved.  Sling for comfort, but encouraged early ROM to prevent adhesive capsulitis.  Ortho f/u.  All questions answered prior to d/c, patient understands and agrees with the plan.  Return precautions given.  Needs PCP f/u for repeat BP check this week.      Lot #: [id number] Lot #: [id number]

## 2022-08-05 NOTE — ED TRIAGE NOTES
"Antoine Gonzáles, an 57 y.o. female presents to the ED with c/o R shoulder pain. Pt reports she had a procedure done 3 yrs ago and followed up with neurologist who suggested her therapy for nerve pain. Pt states the pain was better after therapy but came back up now. Pt also c/o diarrhea, nausea and headache.      Review of patient's allergies indicates:   Allergen Reactions    Lisinopril-hydrochlorothiazide Swelling     Facial swelling/ mouth swelling     Chief Complaint   Patient presents with    right shoulder pain     Pt reports R shoulder pain since last week. Pt reports she had back surgery a few years ago and has had "nerve pain" since then. Denies chest pain/SOB. Denies n/v/d. No unilateral weakness.     Past Medical History:   Diagnosis Date    Anemia     Angio-edema     Depression     Diabetes mellitus type 2, noninsulin dependent 9/7/2016    Hematuria     Hx of essential hypertension     Hyperlipidemia     Hypertension, uncontrolled 7/22/2016    Nuclear sclerosis of both eyes 4/10/2017     "

## 2022-08-05 NOTE — ED PROVIDER NOTES
"Source of History:  Patient  Chart    Chief complaint:  right shoulder pain (Pt reports R shoulder pain since last week. Pt reports she had back surgery a few years ago and has had "nerve pain" since then. Denies chest pain/SOB. Denies n/v/d. No unilateral weakness.)      HPI:  Antoine Gonzáles is a 57 y.o. female with history of type 2 diabetes, hypertension, hyperlipidemia, gastric bypass surgery, previous cervical spine surgery(?) (not on record here) presenting to emergency department with complaint of right shoulder pain.  States pain is atraumatic.  Will worse in the shoulder and trapezius region.  She took a leftover Norco and ibuprofen without relief of her pain.    There is no numbness or weakness of the right upper extremity.  She does note tingling.  She is right-hand dominant.  She does not currently work.    No back pain or neck pain beyond her baseline.  No other complaints at this time.    ROS: As per HPI and below:  Review of Systems   Constitutional: Negative for fever.   HENT: Negative for sore throat.    Eyes: Negative for double vision.   Respiratory: Negative for cough and shortness of breath.    Cardiovascular: Negative for chest pain.   Gastrointestinal: Negative for abdominal pain and vomiting.   Genitourinary: Negative for dysuria.   Musculoskeletal: Positive for joint pain. Negative for falls.   Skin: Negative for rash.   Neurological: Positive for tingling. Negative for sensory change, focal weakness and headaches.       Review of patient's allergies indicates:   Allergen Reactions    Lisinopril-hydrochlorothiazide Swelling     Facial swelling/ mouth swelling       No current facility-administered medications on file prior to encounter.     Current Outpatient Medications on File Prior to Encounter   Medication Sig Dispense Refill    albuterol (PROVENTIL/VENTOLIN HFA) 90 mcg/actuation inhaler Inhale 1-2 puffs into the lungs every 6 (six) hours as needed for Wheezing. Rescue (Patient not " taking: No sig reported) 6.7 g g    amLODIPine (NORVASC) 10 MG tablet TAKE 1 TABLET(10 MG) BY MOUTH EVERY DAY 90 tablet 2    ARIPiprazole (ABILIFY) 2 MG Tab       b complex vitamins capsule Take 1 capsule by mouth once daily.      B-complex with vitamin C (Z-BEC OR EQUIV) tablet Take 1 tablet by mouth once daily.      BIOTIN ORAL Take by mouth.      buPROPion (WELLBUTRIN XL) 150 MG TB24 tablet Take 150 mg by mouth once daily.      buPROPion (WELLBUTRIN XL) 300 MG 24 hr tablet Take 300 mg by mouth once daily.   1    cetirizine (ZYRTEC) 10 MG tablet 1-2 tablets daily as needed for itching or swelling (Patient not taking: No sig reported) 100 tablet 1    cyanocobalamin, vitamin B-12, 1,000 mcg/15 mL Liqd as directed      diclofenac sodium (VOLTAREN) 1 % Gel Apply 2g  to affected area every 6 hr as needed for pain. 100 g 0    donepeziL (ARICEPT) 10 MG tablet Take 10 mg by mouth nightly.      donepezil (ARICEPT) 5 MG tablet Take 10 mg by mouth once daily.       dulaglutide (TRULICITY) 1.5 mg/0.5 mL pen injector Inject 1.5 mg into the skin every 7 days.      dulaglutide (TRULICITY) 1.5 mg/0.5 mL PnIj Inject 1.5 mg into the skin every 7 days. (Patient not taking: No sig reported) 12 Syringe 2    ENDOCET  mg per tablet Take 1 tablet by mouth 3 (three) times daily.  0    ertugliflozin (STEGLATRO) 5 mg Tab Take 5 mg by mouth once daily. (Patient not taking: No sig reported) 90 tablet 2    glimepiride (AMARYL) 4 MG tablet TAKE 1 TABLET BY MOUTH WITH BREAKFAST OR FIRST MAIN MEAL OF THE DAY      hydrALAZINE (APRESOLINE) 10 MG tablet Take 1 tablet (10 mg total) by mouth 3 (three) times daily. 90 tablet 2    hydroCHLOROthiazide (HYDRODIURIL) 12.5 MG Tab TAKE 1 TABLET DAILY WITH LOSARTAN      linaGLIPtin (TRADJENTA) 5 mg Tab tablet Take 5 mg by mouth once daily.       losartan (COZAAR) 50 MG tablet TAKE 1 TABLET BY MOUTH EVERY DAY WITH HCTZ      magnesium oxide 500 mg Tab Take by mouth once.       metFORMIN (GLUCOPHAGE) 500 MG tablet Take 2 tablets with breakfast and 1 tablets with dinner (Patient not taking: No sig reported) 270 tablet 1    ondansetron (ZOFRAN-ODT) 4 MG TbDL Take 1 tablet (4 mg total) by mouth every 6 (six) hours as needed. (Patient not taking: No sig reported) 30 tablet 0    OZEMPIC 1 mg/dose (2 mg/1.5 mL) PnIj USE AS DIRECTED WEEKLY      phenoL (CHLORASEPTIC) 0.5 % SprA 1 spray by Mucous Membrane route 4 (four) times daily as needed (sore throat). (Patient not taking: No sig reported) 177 mL 0    plecanatide (TRULANCE) 3 mg Tab Take 3 mg by mouth once daily. 30 tablet 3    rosuvastatin (CRESTOR) 40 MG Tab Take 1 tablet (40 mg total) by mouth every evening. 90 tablet 2    tamsulosin (FLOMAX) 0.4 mg Cap TAKE 1 CAPSULE BY MOUTH EVERY DAY AT NIGHT      turmeric root extract 500 mg Cap as directed      vitamin B complex (SUPER B-50 COMPLEX ORAL) as directed         PMH:  As per HPI and below:  Past Medical History:   Diagnosis Date    Anemia     Angio-edema     Depression     Diabetes mellitus type 2, noninsulin dependent 2016    Hematuria     Hx of essential hypertension     Hyperlipidemia     Hypertension, uncontrolled 2016    Nuclear sclerosis of both eyes 4/10/2017     Past Surgical History:   Procedure Laterality Date    BTL       SECTION      COLONOSCOPY N/A 2017    Procedure: COLONOSCOPY;  Surgeon: Oleg Enciso MD;  Location: [address]);  Service: Endoscopy;  Laterality: N/A;  CHRONIC CONSTIPATION S/P LRNY    CYSTOSCOPY  2019    Procedure: CYSTOSCOPY;  Surgeon: ELIZABETH Mendoza MD;  Location: [facility] OR;  Service: Urology;;  PRE-OP BY RN 3-    ESOPHAGOGASTRODUODENOSCOPY N/A 2020    Procedure: EGD (ESOPHAGOGASTRODUODENOSCOPY);  Surgeon: Elias Harper MD;  Location: [address]);  Service: Endoscopy;  Laterality: N/A;  prep ins. emailed - ERW  COVID screening on 20 - ERW    GASTRIC BYPASS      sandra  en y    HYSTEROSCOPY WITH DILATION AND CURETTAGE OF UTERUS N/A 9/28/2018    Procedure: HYSTEROSCOPY, WITH DILATION AND CURETTAGE OF UTERUS;  Surgeon: Naveed Alexander MD;  [facility];  Service: OB/GYN;  Laterality: N/A;  RN PREOP 9/26/2018    NECK SURGERY  09/30/2016    TOTAL REDUCTION MAMMOPLASTY  2007       Social History     Socioeconomic History    Marital status: Single   Tobacco Use    Smoking status: Never Smoker    Smokeless tobacco: Never Used   Substance and Sexual Activity    Alcohol use: No     Alcohol/week: 0.0 standard drinks     Comment: Socially    Drug use: No    Sexual activity: Yes     Partners: Male       Family History   Problem Relation Age of Onset    Heart disease Mother     Diabetes type II Mother     Heart attack Father 50        Open heart surgery    Allergies Sister     No Known Problems Brother     No Known Problems Maternal Aunt     No Known Problems Maternal Uncle     No Known Problems Paternal Aunt     No Known Problems Paternal Uncle     No Known Problems Maternal Grandmother     No Known Problems Maternal Grandfather     No Known Problems Paternal Grandmother     No Known Problems Paternal Grandfather     Amblyopia Neg Hx     Blindness Neg Hx     Cancer Neg Hx     Cataracts Neg Hx     Diabetes Neg Hx     Glaucoma Neg Hx     Hypertension Neg Hx     Macular degeneration Neg Hx     Retinal detachment Neg Hx     Strabismus Neg Hx     Stroke Neg Hx     Thyroid disease Neg Hx        Physical Exam:      Vitals:    08/05/22 0645   BP: (!) 147/80   Pulse: 72   Resp: 20   Temp: 98.6 °F (37 °C)     Gen: No acute distress. Nontoxic.  Mental Status:  Alert and oriented x 3.  Appropriate, conversant.  Skin: Warm, dry. No rashes seen.   Pulm: No increased work of breathing.  CV: Normal peripheral perfusion.  MSK:  Right upper extremity is warm and well perfused.  Brisk radial pulse.  Brisk capillary refill.  Compartments are soft throughout. FROM right  shoulder, elbow, wrist.  Sensation intact throughout.   No midline C-spine tenderness.  Tenderness to palpation of the right trapezius.  No tenderness to palpation of the scapula or clavicle.  Neuro: Awake. Speech normal. No focal neuro deficit observed.  Right shoulder: 5/5 strength with shoulder abduction and abduction, elbow flexion and extension, hand , wrist flexion and extension.      Laboratory Studies:  Labs Reviewed - No data to display    Chart reviewed.    Imaging Results          X-Ray Shoulder Trauma Right (Final result)  Result time 08/05/22 06:14:31    Final result by Jb Garcia MD (08/05/22 06:14:31)                 Impression:      As above      Electronically signed by: Jb Garcia MD  Date:    08/05/2022  Time:    06:14             Narrative:    EXAMINATION:  XR SHOULDER TRAUMA 3 VIEW RIGHT    TECHNIQUE:  Three views of the right shoulder were obtained.    COMPARISON:  No relevant comparison examinations are currently available.  Clinical information of pain, with no recent trauma specified.    FINDINGS:  Visualized osseous structures demonstrate no evidence of recent or healing fracture or lytic destructive process.  No glenohumeral dislocation.  No abnormal soft tissue calcifications.  Some spurring at the right acromioclavicular joint level is noted.  Incidental note is also made of instrumentation relating to prior anterior cervical fusion procedures at the C5-6 and C6-7 levels.                                Medications Given:  Medications   ketorolac tablet 10 mg (10 mg Oral Given 8/5/22 0541)   gabapentin capsule 300 mg (300 mg Oral Given 8/5/22 0541)   oxyCODONE immediate release tablet 5 mg (5 mg Oral Given 8/5/22 0541)       MDM:    57 y.o. female with atraumatic right shoulder pain.  She is afebrile, stable, well-appearing.  No deformity.  Right upper extremity is neurovascularly intact.  No evidence of nerve injury, compartment syndrome.  She is able to range the shoulder, do not  feel this is consistent with septic arthritis.    Will obtain x-ray.  Pain meds given here.  Patient does have a ride home.  Signed out to oncoming team pending x-ray for final disposition, anticipate discharge.    Diagnostic Impression:    1. Shoulder pain         ED Disposition Condition    Discharge Stable        ED Prescriptions     Medication Sig Dispense Start Date End Date Auth. Provider    gabapentin (NEURONTIN) 100 MG capsule Take 1 capsule (100 mg total) by mouth 3 (three) times daily. 90 capsule 8/5/2022 9/4/2022 Martita Soto MD    ibuprofen (ADVIL,MOTRIN) 600 MG tablet Take 1 tablet (600 mg total) by mouth every 6 (six) hours as needed for Pain. 20 tablet 8/5/2022 8/10/2022 Martita Soto MD    HYDROcodone-acetaminophen (NORCO) 5-325 mg per tablet Take 1 tablet by mouth every 6 (six) hours as needed for Pain. 12 tablet 8/5/2022 8/8/2022 Martita Soto MD        Follow-up Information     Follow up With Specialties Details Why Contact Info Additional Information    Jb Fernandez MD Internal Medicine Schedule an appointment as soon as possible for a visit   [address]  [address]  [facility] [address]  [phone]       [facility] - [facility]oped[facility] Schedule an appointment as soon as possible for a visit   [address] [facility] [address]  [facility] [address] [phone] [facility] Main Building, 5th Floor Please park in [address] and take [address] elevator          Patient understands the plan and is in agreement, verbalized understanding, questions answered    Martita Soto MD  Emergency Medicine       Martita Soto MD  08/06/22 8822

## 2022-08-05 NOTE — DISCHARGE INSTRUCTIONS
If your pain is not controlled with ibuprofen, you may also take Norco (acetaminophen-hydrocodone).  - Take this medication only when needed for breakthrough pain.   - Do not take more than the prescribed amount to control your pain.  - Do not operate machinery, drive, exercise, perform caregiving, make important decisions or perform important tasks while taking Norco. It can make you drowsy or forgetful.  - Norco is addictive in as little as 3 days, so take only as needed.  - Norco can dangerously slow or stop your breathing if you take too much, or if you combine it with alcohol or sedating medicines (including Xanex, Ativan) or illegal drugs.   - Norco can make you constipated (hard to poop), so take fiber supplements and a stool softener while taking this medicine.   - This medication contains acetaminophen (Tylenol). Each pill has 325 mg of acetaminophen. Do not take more than 4,000 mg of acetaminophen within 24 hours as this may lead to liver damage.

## 2022-08-16 ENCOUNTER — OFFICE VISIT (OUTPATIENT)
Dept: NEUROSURGERY | Facility: CLINIC | Age: 57
End: 2022-08-16
Payer: MEDICARE

## 2022-08-16 ENCOUNTER — TELEPHONE (OUTPATIENT)
Dept: NEUROSURGERY | Facility: CLINIC | Age: 57
End: 2022-08-16

## 2022-08-16 VITALS — SYSTOLIC BLOOD PRESSURE: 127 MMHG | HEART RATE: 78 BPM | DIASTOLIC BLOOD PRESSURE: 77 MMHG

## 2022-08-16 DIAGNOSIS — M54.12 CERVICAL RADICULOPATHY: Primary | ICD-10-CM

## 2022-08-16 PROCEDURE — 3078F DIAST BP <80 MM HG: CPT | Mod: CPTII,S$GLB,, | Performed by: NEUROLOGICAL SURGERY

## 2022-08-16 PROCEDURE — 99999 PR PBB SHADOW E&M-EST. PATIENT-LVL V: ICD-10-PCS | Mod: PBBFAC,,, | Performed by: NEUROLOGICAL SURGERY

## 2022-08-16 PROCEDURE — 4010F ACE/ARB THERAPY RXD/TAKEN: CPT | Mod: CPTII,S$GLB,, | Performed by: NEUROLOGICAL SURGERY

## 2022-08-16 PROCEDURE — 4010F PR ACE/ARB THEARPY RXD/TAKEN: ICD-10-PCS | Mod: CPTII,S$GLB,, | Performed by: NEUROLOGICAL SURGERY

## 2022-08-16 PROCEDURE — 3078F PR MOST RECENT DIASTOLIC BLOOD PRESSURE < 80 MM HG: ICD-10-PCS | Mod: CPTII,S$GLB,, | Performed by: NEUROLOGICAL SURGERY

## 2022-08-16 PROCEDURE — 99999 PR PBB SHADOW E&M-EST. PATIENT-LVL V: CPT | Mod: PBBFAC,,, | Performed by: NEUROLOGICAL SURGERY

## 2022-08-16 PROCEDURE — 1159F MED LIST DOCD IN RCRD: CPT | Mod: CPTII,S$GLB,, | Performed by: NEUROLOGICAL SURGERY

## 2022-08-16 PROCEDURE — 1159F PR MEDICATION LIST DOCUMENTED IN MEDICAL RECORD: ICD-10-PCS | Mod: CPTII,S$GLB,, | Performed by: NEUROLOGICAL SURGERY

## 2022-08-16 PROCEDURE — 99204 OFFICE O/P NEW MOD 45 MIN: CPT | Mod: S$GLB,,, | Performed by: NEUROLOGICAL SURGERY

## 2022-08-16 PROCEDURE — 99204 PR OFFICE/OUTPT VISIT, NEW, LEVL IV, 45-59 MIN: ICD-10-PCS | Mod: S$GLB,,, | Performed by: NEUROLOGICAL SURGERY

## 2022-08-16 PROCEDURE — 3074F PR MOST RECENT SYSTOLIC BLOOD PRESSURE < 130 MM HG: ICD-10-PCS | Mod: CPTII,S$GLB,, | Performed by: NEUROLOGICAL SURGERY

## 2022-08-16 PROCEDURE — 3074F SYST BP LT 130 MM HG: CPT | Mod: CPTII,S$GLB,, | Performed by: NEUROLOGICAL SURGERY

## 2022-08-16 NOTE — TELEPHONE ENCOUNTER
Called pt to schedule imaging = no answer, no voicemail. Provided pt with imaging phone number during appt to schedule at her convenience as the computer was not cooperating.

## 2022-08-16 NOTE — PROGRESS NOTES
"Neurosurgery  History & Physical    SUBJECTIVE:     Chief Complaint: "neck and right shoulder, headaches"     History of Present Illness:  Antoine Gonzáles is a 57 y.o. female who presents as a self-referral after previous ACDF at [place] with persistent neck and shoulder pain. She thinks the surgery was done around 2017 with Dr. Lucas. She states that she had severe depression after the surgery and had loss of memory, including not being able to remember her way home.     She began seeing a neurologist because of that. Then she began having neck pain and saw another neurologist at [facility], who told her that she has two pinched nerves. She participated in PT, which was helpful.     The whole right arm hurts, all the way down to the thumb, which tingles. The tingling in the median distribution has gotten worse since surgery. She was seen in the ED recently and was provided with a sling. She stopped using it because she didn't want her arm to get too stiff.     The pain "thumps like a toothache." It gets better with warm compresses, hot shower, and rubbing ointment on it. PT helped significantly. It gets worse when she sleeps on the ipsilateral side.     She denies myelopathic symptoms, including dropping objects, balance difficulties, and fine motor changes.     She stays with her friend. She has longstanding diabetes, which she works hard to control.     The patient denies any bleeding, infectious, or anesthetic complications with any previous surgery. She takes aspirin regularly as a component of her "back and body" Frederick pills.       Review of patient's allergies indicates:   Allergen Reactions    Lisinopril-hydrochlorothiazide Swelling     Facial swelling/ mouth swelling       Current Outpatient Medications   Medication Sig Dispense Refill    albuterol (PROVENTIL/VENTOLIN HFA) 90 mcg/actuation inhaler Inhale 1-2 puffs into the lungs every 6 (six) hours as needed for Wheezing. Rescue (Patient not " taking: No sig reported) 6.7 g g    amLODIPine (NORVASC) 10 MG tablet TAKE 1 TABLET(10 MG) BY MOUTH EVERY DAY 90 tablet 2    ARIPiprazole (ABILIFY) 2 MG Tab       b complex vitamins capsule Take 1 capsule by mouth once daily.      B-complex with vitamin C (Z-BEC OR EQUIV) tablet Take 1 tablet by mouth once daily.      BIOTIN ORAL Take by mouth.      buPROPion (WELLBUTRIN XL) 150 MG TB24 tablet Take 150 mg by mouth once daily.      buPROPion (WELLBUTRIN XL) 300 MG 24 hr tablet Take 300 mg by mouth once daily.   1    cetirizine (ZYRTEC) 10 MG tablet 1-2 tablets daily as needed for itching or swelling (Patient not taking: No sig reported) 100 tablet 1    cyanocobalamin, vitamin B-12, 1,000 mcg/15 mL Liqd as directed      diclofenac sodium (VOLTAREN) 1 % Gel Apply 2g  to affected area every 6 hr as needed for pain. 100 g 0    donepeziL (ARICEPT) 10 MG tablet Take 10 mg by mouth nightly.      donepezil (ARICEPT) 5 MG tablet Take 10 mg by mouth once daily.       dulaglutide (TRULICITY) 1.5 mg/0.5 mL pen injector Inject 1.5 mg into the skin every 7 days.      dulaglutide (TRULICITY) 1.5 mg/0.5 mL PnIj Inject 1.5 mg into the skin every 7 days. (Patient not taking: No sig reported) 12 Syringe 2    ENDOCET  mg per tablet Take 1 tablet by mouth 3 (three) times daily.  0    ertugliflozin (STEGLATRO) 5 mg Tab Take 5 mg by mouth once daily. (Patient not taking: No sig reported) 90 tablet 2    gabapentin (NEURONTIN) 100 MG capsule Take 1 capsule (100 mg total) by mouth 3 (three) times daily. 90 capsule 0    glimepiride (AMARYL) 4 MG tablet TAKE 1 TABLET BY MOUTH WITH BREAKFAST OR FIRST MAIN MEAL OF THE DAY      hydrALAZINE (APRESOLINE) 10 MG tablet Take 1 tablet (10 mg total) by mouth 3 (three) times daily. 90 tablet 2    hydroCHLOROthiazide (HYDRODIURIL) 12.5 MG Tab TAKE 1 TABLET DAILY WITH LOSARTAN      linaGLIPtin (TRADJENTA) 5 mg Tab tablet Take 5 mg by mouth once daily.       losartan (COZAAR) 50  MG tablet TAKE 1 TABLET BY MOUTH EVERY DAY WITH HCTZ      magnesium oxide 500 mg Tab Take by mouth once.      metFORMIN (GLUCOPHAGE) 500 MG tablet Take 2 tablets with breakfast and 1 tablets with dinner (Patient not taking: No sig reported) 270 tablet 1    ondansetron (ZOFRAN-ODT) 4 MG TbDL Take 1 tablet (4 mg total) by mouth every 6 (six) hours as needed. (Patient not taking: No sig reported) 30 tablet 0    OZEMPIC 1 mg/dose (2 mg/1.5 mL) PnIj USE AS DIRECTED WEEKLY      phenoL (CHLORASEPTIC) 0.5 % SprA 1 spray by Mucous Membrane route 4 (four) times daily as needed (sore throat). (Patient not taking: No sig reported) 177 mL 0    plecanatide (TRULANCE) 3 mg Tab Take 3 mg by mouth once daily. 30 tablet 3    rosuvastatin (CRESTOR) 40 MG Tab Take 1 tablet (40 mg total) by mouth every evening. 90 tablet 2    tamsulosin (FLOMAX) 0.4 mg Cap TAKE 1 CAPSULE BY MOUTH EVERY DAY AT NIGHT      turmeric root extract 500 mg Cap as directed      vitamin B complex (SUPER B-50 COMPLEX ORAL) as directed       No current facility-administered medications for this visit.       Past Medical History:   Diagnosis Date    Anemia     Angio-edema     Depression     Diabetes mellitus type 2, noninsulin dependent 2016    Hematuria     Hx of essential hypertension     Hyperlipidemia     Hypertension, uncontrolled 2016    Nuclear sclerosis of both eyes 4/10/2017     Past Surgical History:   Procedure Laterality Date    BTL       SECTION      COLONOSCOPY N/A 2017    Procedure: COLONOSCOPY;  Surgeon: Oleg Enciso MD;  Location: [address]);  Service: Endoscopy;  Laterality: N/A;  CHRONIC CONSTIPATION S/P LRNY    CYSTOSCOPY  2019    Procedure: CYSTOSCOPY;  Surgeon: ELIZABETH Mendoza MD;  Location: [facility] OR;  Service: Urology;;  PRE-OP BY RN 3-    ESOPHAGOGASTRODUODENOSCOPY N/A 2020    Procedure: EGD (ESOPHAGOGASTRODUODENOSCOPY);  Surgeon: Elias Harper MD;  Location: [facility]  ENDO (4TH FLR);  Service: Endoscopy;  Laterality: N/A;  prep ins. emailed - ERW  COVID screening on 6/26/20 - ERW    GASTRIC BYPASS  1995    sandra en y    HYSTEROSCOPY WITH DILATION AND CURETTAGE OF UTERUS N/A 9/28/2018    Procedure: HYSTEROSCOPY, WITH DILATION AND CURETTAGE OF UTERUS;  Surgeon: Naveed Alexander MD;  Location[facility] OR;  Service: OB/GYN;  Laterality: N/A;  RN PREOP 9/26/2018    NECK SURGERY  09/30/2016    TOTAL REDUCTION MAMMOPLASTY  2007     Family History     Problem Relation (Age of Onset)    Allergies Sister    Diabetes type II Mother    Heart attack Father (50)    Heart disease Mother    No Known Problems Brother, Maternal Aunt, Maternal Uncle, Paternal Aunt, Paternal Uncle, Maternal Grandmother, Maternal Grandfather, Paternal Grandmother, Paternal Grandfather        Social History     Socioeconomic History    Marital status: Single   Tobacco Use    Smoking status: Never Smoker    Smokeless tobacco: Never Used   Substance and Sexual Activity    Alcohol use: No     Alcohol/week: 0.0 standard drinks     Comment: Socially    Drug use: No    Sexual activity: Yes     Partners: Male       Review of Systems   Constitutional: Negative for fever.   HENT: Positive for hearing loss.    Respiratory: Negative for shortness of breath.    Cardiovascular: Negative for chest pain.   Gastrointestinal: Negative for vomiting.   Endocrine: Negative for cold intolerance.   Genitourinary: Negative for difficulty urinating.   Musculoskeletal: Positive for neck pain.   Skin: Negative for color change.   Neurological: Positive for headaches.   Hematological: Does not bruise/bleed easily.   Psychiatric/Behavioral: The patient is not nervous/anxious.        OBJECTIVE:     Vital Signs     There is no height or weight on file to calculate BMI.      Physical Exam:    Constitutional: She appears well-developed and well-nourished. No distress.     Abdominal: Soft.     Skin:   Well healed right low anterior neck incision   "    Psych/Behavior: She is alert. She is oriented to person, place, and time.     Musculoskeletal:      Comments: No weakness on exam      Neurological:   + Webber's, left stronger than right      Pulmonary: nonlabored respirations     Hematologic: no bruising noted     Diagnostic Results:  No cervical imaging available in system   Outside Xray uploaded to [facility] demonstrating sequelae of C5-C6/C6-C7 ACDF     ASSESSMENT/PLAN:   Antoine Gonzáles is a 57 y.o. female who presents with neck and right arm and shoulder pain s/p ACDF with Dr. Lucas. She denies myelopathic symptoms, but she does have Webber's on exam. She states that she was told that she has "pinched nerves" by a [facility] neurologist.     I have requested that she obtain a copy of EMG-NCS from [facility] neurology. I would like to get updated imaging, including MRI of the cervical spine to assess for any neural element compression (particularly in the setting of +Webber's); CT of the cervical spine to assess for arthrodesis, and flexion/extension X-rays to assess for dynamic instability.     I would like her to return to clinic after imaging completed and EMG-NCS is obtained. Pending the results of the imaging, I will consider PT at that time, plus possible pain management referral.     I have encouraged her to contact the clinic in interim with any questions, concerns, or adverse clinical change.     "

## 2022-08-18 ENCOUNTER — HOSPITAL ENCOUNTER (OUTPATIENT)
Dept: RADIOLOGY | Facility: HOSPITAL | Age: 57
Discharge: HOME | End: 2022-08-18
Attending: NEUROLOGICAL SURGERY
Payer: MEDICARE

## 2022-08-18 DIAGNOSIS — M54.12 CERVICAL RADICULOPATHY: ICD-10-CM

## 2022-08-18 PROCEDURE — 72040 X-RAY EXAM NECK SPINE 2-3 VW: CPT | Mod: TC

## 2022-08-18 PROCEDURE — 72141 MRI CERVICAL SPINE WITHOUT CONTRAST: ICD-10-PCS | Mod: 26,,, | Performed by: RADIOLOGY

## 2022-08-18 PROCEDURE — 72125 CT NECK SPINE W/O DYE: CPT | Mod: TC

## 2022-08-18 PROCEDURE — 72141 MRI NECK SPINE W/O DYE: CPT | Mod: 26,,, | Performed by: RADIOLOGY

## 2022-08-18 PROCEDURE — 72125 CT CERVICAL SPINE WITHOUT CONTRAST: ICD-10-PCS | Mod: 26,,, | Performed by: RADIOLOGY

## 2022-08-18 PROCEDURE — 72040 X-RAY EXAM NECK SPINE 2-3 VW: CPT | Mod: 26,,, | Performed by: RADIOLOGY

## 2022-08-18 PROCEDURE — 72125 CT NECK SPINE W/O DYE: CPT | Mod: 26,,, | Performed by: RADIOLOGY

## 2022-08-18 PROCEDURE — 72141 MRI NECK SPINE W/O DYE: CPT | Mod: TC

## 2022-08-18 PROCEDURE — 72040 XR CERVICAL SPINE FLEXION  AND EXTENSION ONLY: ICD-10-PCS | Mod: 26,,, | Performed by: RADIOLOGY

## 2022-08-25 ENCOUNTER — OFFICE VISIT (OUTPATIENT)
Dept: NEUROSURGERY | Facility: CLINIC | Age: 57
End: 2022-08-25
Payer: MEDICARE

## 2022-08-25 VITALS
DIASTOLIC BLOOD PRESSURE: 82 MMHG | SYSTOLIC BLOOD PRESSURE: 137 MMHG | HEART RATE: 81 BPM | TEMPERATURE: 99 F | BODY MASS INDEX: 31.46 KG/M2 | HEIGHT: 62 IN

## 2022-08-25 DIAGNOSIS — M50.00 CERVICAL DISC DISORDER WITH MYELOPATHY: ICD-10-CM

## 2022-08-25 PROCEDURE — 3075F SYST BP GE 130 - 139MM HG: CPT | Mod: CPTII,S$GLB,, | Performed by: NEUROLOGICAL SURGERY

## 2022-08-25 PROCEDURE — 4010F PR ACE/ARB THEARPY RXD/TAKEN: ICD-10-PCS | Mod: CPTII,S$GLB,, | Performed by: NEUROLOGICAL SURGERY

## 2022-08-25 PROCEDURE — 3008F BODY MASS INDEX DOCD: CPT | Mod: CPTII,S$GLB,, | Performed by: NEUROLOGICAL SURGERY

## 2022-08-25 PROCEDURE — 3079F DIAST BP 80-89 MM HG: CPT | Mod: CPTII,S$GLB,, | Performed by: NEUROLOGICAL SURGERY

## 2022-08-25 PROCEDURE — 99214 OFFICE O/P EST MOD 30 MIN: CPT | Mod: 57,S$GLB,, | Performed by: NEUROLOGICAL SURGERY

## 2022-08-25 PROCEDURE — 99999 PR PBB SHADOW E&M-EST. PATIENT-LVL IV: ICD-10-PCS | Mod: PBBFAC,,, | Performed by: NEUROLOGICAL SURGERY

## 2022-08-25 PROCEDURE — 99214 PR OFFICE/OUTPT VISIT, EST, LEVL IV, 30-39 MIN: ICD-10-PCS | Mod: 57,S$GLB,, | Performed by: NEUROLOGICAL SURGERY

## 2022-08-25 PROCEDURE — 1159F PR MEDICATION LIST DOCUMENTED IN MEDICAL RECORD: ICD-10-PCS | Mod: CPTII,S$GLB,, | Performed by: NEUROLOGICAL SURGERY

## 2022-08-25 PROCEDURE — 3075F PR MOST RECENT SYSTOLIC BLOOD PRESS GE 130-139MM HG: ICD-10-PCS | Mod: CPTII,S$GLB,, | Performed by: NEUROLOGICAL SURGERY

## 2022-08-25 PROCEDURE — 4010F ACE/ARB THERAPY RXD/TAKEN: CPT | Mod: CPTII,S$GLB,, | Performed by: NEUROLOGICAL SURGERY

## 2022-08-25 PROCEDURE — 99999 PR PBB SHADOW E&M-EST. PATIENT-LVL IV: CPT | Mod: PBBFAC,,, | Performed by: NEUROLOGICAL SURGERY

## 2022-08-25 PROCEDURE — 3079F PR MOST RECENT DIASTOLIC BLOOD PRESSURE 80-89 MM HG: ICD-10-PCS | Mod: CPTII,S$GLB,, | Performed by: NEUROLOGICAL SURGERY

## 2022-08-25 PROCEDURE — 3008F PR BODY MASS INDEX (BMI) DOCUMENTED: ICD-10-PCS | Mod: CPTII,S$GLB,, | Performed by: NEUROLOGICAL SURGERY

## 2022-08-25 PROCEDURE — 1159F MED LIST DOCD IN RCRD: CPT | Mod: CPTII,S$GLB,, | Performed by: NEUROLOGICAL SURGERY

## 2022-08-25 NOTE — PROGRESS NOTES
"Neurosurgery  Follow up    SUBJECTIVE:     Chief Complaint: "neck and right shoulder, headaches"     History of Present Illness:  Antoine Gonzáles is a 57 y.o. female who presents as a self-referral after previous ACDF at [place] with persistent neck and shoulder pain. She thinks the surgery was done around 2017 with Dr. Lucas. She states that she had severe depression after the surgery and had loss of memory, including not being able to remember her way home.     She began seeing a neurologist because of that. Then she began having neck pain and saw another neurologist at [facility], who told her that she has two pinched nerves. She participated in PT, which was helpful.     The whole right arm hurts, all the way down to the thumb, which tingles. The tingling in the median distribution has gotten worse since surgery. She was seen in the ED recently and was provided with a sling. She stopped using it because she didn't want her arm to get too stiff.     The pain "thumps like a toothache." It gets better with warm compresses, hot shower, and rubbing ointment on it. PT helped significantly. It gets worse when she sleeps on the ipsilateral side.     She denies myelopathic symptoms, including dropping objects, balance difficulties, and fine motor changes.     She stays with her friend. She has longstanding diabetes, which she works hard to control.     The patient denies any bleeding, infectious, or anesthetic complications with any previous surgery. She takes aspirin regularly as a component of her "back and body" Frederick pills.     As of 8/25/22, the patient reports that she has been about the same. She includes her daughter Shweta over the phone for our conversation. She obtained the MRI, CT, and flexion/extension X-rays that had been requested after the previous visit.     Review of patient's allergies indicates:   Allergen Reactions    Lisinopril-hydrochlorothiazide Swelling     Facial swelling/ mouth " swelling       Current Outpatient Medications   Medication Sig Dispense Refill    albuterol (PROVENTIL/VENTOLIN HFA) 90 mcg/actuation inhaler Inhale 1-2 puffs into the lungs every 6 (six) hours as needed for Wheezing. Rescue 6.7 g g    amLODIPine (NORVASC) 10 MG tablet TAKE 1 TABLET(10 MG) BY MOUTH EVERY DAY 90 tablet 2    ARIPiprazole (ABILIFY) 2 MG Tab       b complex vitamins capsule Take 1 capsule by mouth once daily.      B-complex with vitamin C (Z-BEC OR EQUIV) tablet Take 1 tablet by mouth once daily.      BIOTIN ORAL Take by mouth.      buPROPion (WELLBUTRIN XL) 150 MG TB24 tablet Take 150 mg by mouth once daily.      buPROPion (WELLBUTRIN XL) 300 MG 24 hr tablet Take 300 mg by mouth once daily.   1    cetirizine (ZYRTEC) 10 MG tablet 1-2 tablets daily as needed for itching or swelling 100 tablet 1    cyanocobalamin, vitamin B-12, 1,000 mcg/15 mL Liqd as directed      diclofenac sodium (VOLTAREN) 1 % Gel Apply 2g  to affected area every 6 hr as needed for pain. (Patient taking differently: Apply 2g  to affected area every 6 hr as needed for pain.) 100 g 0    donepeziL (ARICEPT) 10 MG tablet Take 10 mg by mouth nightly.      donepezil (ARICEPT) 5 MG tablet Take 10 mg by mouth once daily.       dulaglutide (TRULICITY) 1.5 mg/0.5 mL pen injector Inject 1.5 mg into the skin every 7 days.      dulaglutide (TRULICITY) 1.5 mg/0.5 mL PnIj Inject 1.5 mg into the skin every 7 days. 12 Syringe 2    ENDOCET  mg per tablet Take 1 tablet by mouth 3 (three) times daily.  0    ertugliflozin (STEGLATRO) 5 mg Tab Take 5 mg by mouth once daily. 90 tablet 2    gabapentin (NEURONTIN) 100 MG capsule Take 1 capsule (100 mg total) by mouth 3 (three) times daily. 90 capsule 0    glimepiride (AMARYL) 4 MG tablet TAKE 1 TABLET BY MOUTH WITH BREAKFAST OR FIRST MAIN MEAL OF THE DAY      hydroCHLOROthiazide (HYDRODIURIL) 12.5 MG Tab TAKE 1 TABLET DAILY WITH LOSARTAN      linaGLIPtin (TRADJENTA) 5 mg Tab tablet  Take 5 mg by mouth once daily.       losartan (COZAAR) 50 MG tablet TAKE 1 TABLET BY MOUTH EVERY DAY WITH HCTZ      magnesium oxide 500 mg Tab Take by mouth once.      metFORMIN (GLUCOPHAGE) 500 MG tablet Take 2 tablets with breakfast and 1 tablets with dinner 270 tablet 1    ondansetron (ZOFRAN-ODT) 4 MG TbDL Take 1 tablet (4 mg total) by mouth every 6 (six) hours as needed. 30 tablet 0    OZEMPIC 1 mg/dose (2 mg/1.5 mL) PnIj USE AS DIRECTED WEEKLY      phenoL (CHLORASEPTIC) 0.5 % SprA 1 spray by Mucous Membrane route 4 (four) times daily as needed (sore throat). 177 mL 0    plecanatide (TRULANCE) 3 mg Tab Take 3 mg by mouth once daily. 30 tablet 3    tamsulosin (FLOMAX) 0.4 mg Cap TAKE 1 CAPSULE BY MOUTH EVERY DAY AT NIGHT      turmeric root extract 500 mg Cap as directed      vitamin B complex (SUPER B-50 COMPLEX ORAL) as directed      hydrALAZINE (APRESOLINE) 10 MG tablet Take 1 tablet (10 mg total) by mouth 3 (three) times daily. 90 tablet 2    rosuvastatin (CRESTOR) 40 MG Tab Take 1 tablet (40 mg total) by mouth every evening. 90 tablet 2     No current facility-administered medications for this visit.       Past Medical History:   Diagnosis Date    Anemia     Angio-edema     Depression     Diabetes mellitus type 2, noninsulin dependent 2016    Hematuria     Hx of essential hypertension     Hyperlipidemia     Hypertension, uncontrolled 2016    Nuclear sclerosis of both eyes 4/10/2017     Past Surgical History:   Procedure Laterality Date    BTL       SECTION      COLONOSCOPY N/A 2017    Procedure: COLONOSCOPY;  Surgeon: Oleg Enciso MD;  Location: [facility] ENDO [address]);  Service: Endoscopy;  Laterality: N/A;  CHRONIC CONSTIPATION S/P LRNY    CYSTOSCOPY  2019    Procedure: CYSTOSCOPY;  Surgeon: ELIZABETH Mendoza MD;  Location: [facility] OR;  Service: Urology;;  PRE-OP BY RN 3-    ESOPHAGOGASTRODUODENOSCOPY N/A 2020    Procedure: EGD  "(ESOPHAGOGASTRODUODENOSCOPY);  Surgeon: Elias Harper MD;  Location: [facility] (4TH FLR);  Service: Endoscopy;  Laterality: N/A;  prep ins. emailed - ERW  COVID screening on 6/26/20 - [facility]W    GASTRIC BYPASS  1995    sandra en y    HYSTEROSCOPY WITH DILATION AND CURETTAGE OF UTERUS N/A 9/28/2018    Procedure: HYSTEROSCOPY, WITH DILATION AND CURETTAGE OF UTERUS;  Surgeon: Naveed Alexander MD;  Location: [facility] OR;  Service: OB/GYN;  Laterality: N/A;  RN PREOP 9/26/2018    NECK SURGERY  09/30/2016    TOTAL REDUCTION MAMMOPLASTY  2007     Family History     Problem Relation (Age of Onset)    Allergies Sister    Diabetes type II Mother    Heart attack Father (50)    Heart disease Mother    No Known Problems Brother, Maternal Aunt, Maternal Uncle, Paternal Aunt, Paternal Uncle, Maternal Grandmother, Maternal Grandfather, Paternal Grandmother, Paternal Grandfather        Social History     Socioeconomic History    Marital status: Single   Tobacco Use    Smoking status: Never Smoker    Smokeless tobacco: Never Used   Substance and Sexual Activity    Alcohol use: No     Alcohol/week: 0.0 standard drinks     Comment: Socially    Drug use: No    Sexual activity: Yes     Partners: Male       Review of Systems   Constitutional: Negative for fever.   HENT: Positive for hearing loss.    Respiratory: Negative for shortness of breath.    Cardiovascular: Negative for chest pain.   Gastrointestinal: Negative for vomiting.   Endocrine: Negative for cold intolerance.   Genitourinary: Negative for difficulty urinating.   Musculoskeletal: Positive for neck pain.   Skin: Negative for color change.   Neurological: Positive for headaches.   Hematological: Does not bruise/bleed easily.   Psychiatric/Behavioral: The patient is not nervous/anxious.        OBJECTIVE:     Vital Signs  Temp: 99.1 °F (37.3 °C)  Pulse: 81  BP: 137/82  Pain Score:   5  Height: 5' 2" (157.5 cm)  Body mass index is 31.46 kg/m².      Physical Exam:    Constitutional: " "She appears well-developed and well-nourished. No distress.     Abdominal: Soft.     Skin:   Well healed right low anterior neck incision      Psych/Behavior: She is alert. She is oriented to person, place, and time.     Musculoskeletal:      Comments: No weakness on exam      Neurological:   + Webber's, left stronger than right      Pulmonary: nonlabored respirations     Hematologic: no bruising noted     Diagnostic Results:  MRI, CT, and flex/ex X-rays personally reviewed   MRI demonstrates significant cord compression with myelomalacia at C3-C4   No evidence of OPLL and good previous fusion on CT   No dynamic instability on X-rays nor evidence of significant spondylosis     ASSESSMENT/PLAN:   Antoine Gonzáles is a 57 y.o. female who presents with neck and right arm and shoulder pain s/p ACDF with Dr. Lucas. She denies myelopathic symptoms, but she does have Webber's on exam. She states that she was told that she has "pinched nerves" by a [facility] neurologist.     Based on the results of her imaging studies coupled with her myelopathic complaints, I believe the patient would best benefit from C3-C4 arthroplasty to allow for preservation of C4-C5 disc space since there is currently no disease associated with that level. This would allow for decompression of the C3-C4 cord compression with associated myelomalacia.     I had a pleasant discussion with the patient and her daughter about the risks, benefits, and alternatives to surgery. Risks include, but are not limited to, bleeding, pain, infection, scarring, need for further/repeat procedure, death, paralysis, damage to vocal cords, damage to esophagus, damage to trachea, stroke/damage to major blood vessels, leak of cerebrospinal fluid, and hardware-related complications. We discussed that she may need additional surgery for other levels in the future, but this procedure is designed to help minimize the risk of adjacent segment disease. Informed consent was " obtained.     We discussed that I define success as the operation as preventing her from having progression of myelopathic symptoms. I cannot guarantee improvement in pain. She expressed understanding.     She will need ENT vocal cord scope prior to surgery to assess for appropriateness of left-sided approach. I will have her see Dr. Weldon for medical optimization.     Given her MRSA history, she will receive vancomycin for perioperative prophylaxis. She will need to be off all anticoagulation and antiplatelet agents, including her Frederick back pills, for at least one week prior to surgery.     I have encouraged her to contact the clinic in interim with any questions, concerns, or adverse clinical change.

## 2022-08-26 ENCOUNTER — TELEPHONE (OUTPATIENT)
Dept: NEUROSURGERY | Facility: CLINIC | Age: 57
End: 2022-08-26
Payer: MEDICARE

## 2022-08-26 DIAGNOSIS — M50.00 HERNIATED NUCLEUS PULPOSUS WITH MYELOPATHY, CERVICAL: Primary | ICD-10-CM

## 2022-08-26 NOTE — PATIENT INSTRUCTIONS
Please use the Bactroban ointment twice daily in your nose for 5 days leading up to surgery. (You may use a Q-tip to apply a little bit of ointment inside each nostril.)    Please use the Hibiclens soap every other day in the shower for the 5 days before your surgery. For example, if surgery is on Monday, use the Hibiclens when you shower on Thursday, Saturday, and Monday morning.     We are asking you to do this to help reduce the chance of having an infection associated with surgery. Thank you!     Please do not take your Frederick back pills or any other blood thinners (including aspirin and ibuprofen) for at least one week prior to surgery

## 2022-08-29 DIAGNOSIS — M79.621 PAIN OF RIGHT UPPER ARM: ICD-10-CM

## 2022-08-29 DIAGNOSIS — Z01.818 PREOPERATIVE TESTING: Primary | ICD-10-CM

## 2022-08-29 RX ORDER — POTASSIUM CHLORIDE 750 MG/1
20 TABLET, EXTENDED RELEASE ORAL DAILY
COMMUNITY
End: 2023-06-14

## 2022-08-29 NOTE — PRE-PROCEDURE INSTRUCTIONS
Patient stated has not had any problem with anesthesia in the past. Will need medical optimization by  per Dr. Saini. Will need poc appt, labs, ua, and ekg. Our  will callt o set up these appts.Will need cardiac clearance from Dr. Joshua Beebe.  She will call for an appt.She confirmed that she is a nonsmoker.  Preop instructions given. Hold aspirin, aspirin containing products, nsaids(aleve, advil, motrin, ibuprofen, naprosyn, naproxen, voltaren, diclofenac), vitamins(B complex, B complex with vitamin C , B12 , super B complex)and supplements( Biotin, Magnesium Oxide, Tumeric) one week prior to surgery.  May take Tylenol.       Shower the night before surgery and the morning of surgery with an antibacterial soap( hibiclens or dial antibacterial soap).  Nothing on the skin once shower. Do not apply any deodorant, lotion, powder, perfume,or aftershave.  No makeup or moisturizer.No fingernail polish or jewelry going to surgery.  Call your surgeon for any changes in your medical condition.    Nothing to eat or drink after midnight, the night before surgery. Nothing to drink 2 hours before arrival time for surgery/procedure. If you are told to take medication in the morning of surgery, it may be taken with a sip of water. If your doctor tells you something different pertaining to when to stop eating or drinking, follow your doctor's instructions.   Stated knows where the surgery center is located. Verbalizes understanding. Mailed hold one week prior to surgery medication instructions, preop and med instructions to the patient.

## 2022-08-29 NOTE — ANESTHESIA PAT ROS NOTE
08/29/2022  Antoine Gonzáles is a 57 y.o., female.      Pre-op Assessment          Review of Systems  Anesthesia Hx:  No problems with previous Anesthesia   History of prior surgery of interest to airway management or planning: cervical fusion, gastric bypass. Previous anesthesia: MAC, General 4/8/2019: CYSTOSCOPY with general anesthesia.       6/29/2020: EGD with MAC.  Procedure performed at an [facility]. Airway issues documented on chart review include laryngeal mask airway used     Denies Family Hx of Anesthesia complications.    Denies Personal Hx of Anesthesia complications.                    Social:  Non-Smoker, No Alcohol Use       Hematology/Oncology:    Oncology Normal    -- Anemia:               Hematology Comments: IRON DEF.                     EENT/Dental:     Ears General/Symptom(s)     [place]-DOES NOT WEAR HER HEARING AIDS        Cardiovascular:     Hypertension       Denies Angina.     hyperlipidemia     Functional Capacity good / => 4 METS                         Pulmonary:       Denies Shortness of breath.  Denies Recent URI.  HX CHRIS- RESOLVED WITH WEIGHT LOSS    COVID + 12/2021-RESOLVED               Renal/:   renal calculi               Hepatic/GI:        GASTRIC BYPASS (2005)       Liver Disease, Fatty Liver        Musculoskeletal:          Cervical Spine Disorder, S/P Cervical Fusion ACDF (2016)    HERNIATED NUCLEUS PULPOSUS WITH MYELOPATHY-CERVICAL     Neurological:  Neurology Normal                                      Endocrine:   Diabetes, Type 2 Diabetes   , controlled by diet, non-insulin injectables.                 Metabolic Disorders, Obesity / BMI > 30  Dermatological:  HX MRSA (2018)   Psych:    depression              Physical Exam  General:  Obesity       Airway/Jaw/Neck:  Airway Findings: Mouth Opening: Normal     Tongue: Normal              Jaw/Neck Findings:      Neck ROM: Normal ROM          Dental:  Dental Findings: In tact   EDENTULOS-TOP   Chest/Lungs:  Chest/Lungs Findings:  Clear to auscultation       Heart/Vascular:  Heart Findings: Rate: Normal  Rhythm: Regular Rhythm  Sounds: Normal                       Mental Status:  Mental Status Findings:  Cooperative, Alert and Oriented       Anesthesia Assessment: Preoperative EQUATION    Planned Procedure: Procedure(s) (LRB):  REPLACEMENT, INTERVERTEBRAL DISC, CERVICAL, TOTAL C3/4 Rajesh neuro monitoring (N/A)  Requested Anesthesia Type:General  Surgeon: Mansi Saini MD  Service: Neurosurgery  Known or anticipated Date of Surgery:9/26/2022    Surgeon notes: reviewed    Electronic QUestionnaire Assessment completed via nurse interview with patient.        Triage considerations:       Previous anesthesia records:MAC and No problems  6/29/2020 EGD (ESOPHAGOGASTRODUODENOSCOPY) (Abdomen)  Airway/Jaw/Neck:  Airway Findings: Mouth Opening: Normal Tongue: Normal  General Airway Assessment: Adult  Mallampati: III  Improves to II with phonation.  TM Distance: Normal, at least 6 cm  Jaw/Neck Findings:  Neck ROM: Normal ROM  Neck Findings: Normal      ** H/O CERVICAL FUSION**    Last PCP note: outside [facility]   Subspecialty notes: Cardiology: General, Neurosurgery, Urology, OB/GYN    Other important co-morbidities: DM2, HLD, HTN, and Obesity ,  CERVICAL HERNIATED NUCLEUS PULPOSUS WITH MYELOPATHY       Tests already available:  Available tests,  within 3 months , within [facility] .8/18/2022 MRI CERVICAL SPINE W/O CONTRAST, CT CERVICAL SPINE W/O CONTRAST, XRAY CERVICAL SPINE F&E ONLY             Instructions given. (See in Nurse's note)    Optimization:  Anesthesia Preop Clinic Assessment  Indicated    Medical Opinion Indicated       Sub-specialist consult indicated:   CARDIOLOGY      Plan:    Testing:  BMP, EKG, Hematology Profile, PT/INR, and T&S   Pre-anesthesia  visit       Visit focus: concerns in complex and/or prolonged anesthesia,  COMORBIDITIES     Consultation:[facility] Perioperative Hospitalist PER DR. VASQUEZ     Patient  has previously scheduled Medical Appointment:NONE    Navigation: Tests Scheduled. TBD             Consults scheduled.TBD             Results will be tracked by Preop Clinic.    9/2 Cardiac clearance given by Dr. Joshua Beebe on 9/1: The pt is at low cardiac risk for the planned surgery.  No further CV testing needed.  Connie Rudolph RN BSN        9/20/2022:  Patient is optimized      Risa Weldon MD  Internal Medicine  [facility]   Cell Phone- [phone]

## 2022-08-31 ENCOUNTER — TELEPHONE (OUTPATIENT)
Dept: PREADMISSION TESTING | Facility: HOSPITAL | Age: 57
End: 2022-08-31
Payer: MEDICARE

## 2022-08-31 NOTE — TELEPHONE ENCOUNTER
----- Message from Connie Rudolph RN sent at 8/29/2022  3:01 PM CDT -----  SURGERY 9/26  Please schedule Dr. Weldon per , poc, labs, ua,and ekg.   Thanks!

## 2022-09-01 ENCOUNTER — OFFICE VISIT (OUTPATIENT)
Dept: CARDIOLOGY | Facility: CLINIC | Age: 57
End: 2022-09-01
Payer: MEDICARE

## 2022-09-01 VITALS
DIASTOLIC BLOOD PRESSURE: 70 MMHG | RESPIRATION RATE: 18 BRPM | SYSTOLIC BLOOD PRESSURE: 110 MMHG | OXYGEN SATURATION: 98 % | HEIGHT: 62 IN | WEIGHT: 172.06 LBS | BODY MASS INDEX: 31.66 KG/M2 | HEART RATE: 98 BPM

## 2022-09-01 DIAGNOSIS — Z98.84 GASTRIC BYPASS STATUS FOR OBESITY: ICD-10-CM

## 2022-09-01 DIAGNOSIS — Z01.810 PREOP CARDIOVASCULAR EXAM: Primary | ICD-10-CM

## 2022-09-01 DIAGNOSIS — I10 ESSENTIAL HYPERTENSION: ICD-10-CM

## 2022-09-01 DIAGNOSIS — E11.9 DIABETES MELLITUS TYPE 2, NONINSULIN DEPENDENT: ICD-10-CM

## 2022-09-01 DIAGNOSIS — E78.2 MIXED HYPERLIPIDEMIA: ICD-10-CM

## 2022-09-01 DIAGNOSIS — E66.9 NON MORBID OBESITY, UNSPECIFIED OBESITY TYPE: ICD-10-CM

## 2022-09-01 PROCEDURE — 3008F PR BODY MASS INDEX (BMI) DOCUMENTED: ICD-10-PCS | Mod: CPTII,S$GLB,, | Performed by: INTERNAL MEDICINE

## 2022-09-01 PROCEDURE — 3078F PR MOST RECENT DIASTOLIC BLOOD PRESSURE < 80 MM HG: ICD-10-PCS | Mod: CPTII,S$GLB,, | Performed by: INTERNAL MEDICINE

## 2022-09-01 PROCEDURE — 1160F PR REVIEW ALL MEDS BY PRESCRIBER/CLIN PHARMACIST DOCUMENTED: ICD-10-PCS | Mod: CPTII,S$GLB,, | Performed by: INTERNAL MEDICINE

## 2022-09-01 PROCEDURE — 4010F ACE/ARB THERAPY RXD/TAKEN: CPT | Mod: CPTII,S$GLB,, | Performed by: INTERNAL MEDICINE

## 2022-09-01 PROCEDURE — 99999 PR PBB SHADOW E&M-EST. PATIENT-LVL III: ICD-10-PCS | Mod: PBBFAC,,, | Performed by: INTERNAL MEDICINE

## 2022-09-01 PROCEDURE — 93000 ELECTROCARDIOGRAM COMPLETE: CPT | Mod: S$GLB,,, | Performed by: INTERNAL MEDICINE

## 2022-09-01 PROCEDURE — 1159F MED LIST DOCD IN RCRD: CPT | Mod: CPTII,S$GLB,, | Performed by: INTERNAL MEDICINE

## 2022-09-01 PROCEDURE — 99214 OFFICE O/P EST MOD 30 MIN: CPT | Mod: 25,S$GLB,, | Performed by: INTERNAL MEDICINE

## 2022-09-01 PROCEDURE — 3008F BODY MASS INDEX DOCD: CPT | Mod: CPTII,S$GLB,, | Performed by: INTERNAL MEDICINE

## 2022-09-01 PROCEDURE — 99214 PR OFFICE/OUTPT VISIT, EST, LEVL IV, 30-39 MIN: ICD-10-PCS | Mod: 25,S$GLB,, | Performed by: INTERNAL MEDICINE

## 2022-09-01 PROCEDURE — 3074F SYST BP LT 130 MM HG: CPT | Mod: CPTII,S$GLB,, | Performed by: INTERNAL MEDICINE

## 2022-09-01 PROCEDURE — 1159F PR MEDICATION LIST DOCUMENTED IN MEDICAL RECORD: ICD-10-PCS | Mod: CPTII,S$GLB,, | Performed by: INTERNAL MEDICINE

## 2022-09-01 PROCEDURE — 99999 PR PBB SHADOW E&M-EST. PATIENT-LVL III: CPT | Mod: PBBFAC,,, | Performed by: INTERNAL MEDICINE

## 2022-09-01 PROCEDURE — 1160F RVW MEDS BY RX/DR IN RCRD: CPT | Mod: CPTII,S$GLB,, | Performed by: INTERNAL MEDICINE

## 2022-09-01 PROCEDURE — 4010F PR ACE/ARB THEARPY RXD/TAKEN: ICD-10-PCS | Mod: CPTII,S$GLB,, | Performed by: INTERNAL MEDICINE

## 2022-09-01 PROCEDURE — 93000 EKG 12-LEAD: ICD-10-PCS | Mod: S$GLB,,, | Performed by: INTERNAL MEDICINE

## 2022-09-01 PROCEDURE — 3078F DIAST BP <80 MM HG: CPT | Mod: CPTII,S$GLB,, | Performed by: INTERNAL MEDICINE

## 2022-09-01 PROCEDURE — 3074F PR MOST RECENT SYSTOLIC BLOOD PRESSURE < 130 MM HG: ICD-10-PCS | Mod: CPTII,S$GLB,, | Performed by: INTERNAL MEDICINE

## 2022-09-01 NOTE — PROGRESS NOTES
CARDIOVASCULAR CONSULTATION    REASON FOR CONSULT:   Antoine Gonzáles is a 57 y.o. female who presents for preop CV eval.    PCP: Rebecca Saini  HISTORY OF PRESENT ILLNESS:   Last seen 2022.    The patient comes in today for preoperative cardiac risk stratification prior to planned nurse surgery.  I took the liberty of exercise the patient and she was able to jog up a flight of stairs without any symptoms or limitations.  She denies angina, dyspnea, palpitations, or syncope.  There has been no PND, orthopnea, or lower extremity edema.  There has been no melena, hematuria, or claudicant symptoms.  She is mostly bothered by right-sided neck pain radiating down her right arm.    CARDIOVASCULAR HISTORY:   none    PAST MEDICAL HISTORY:     Past Medical History:   Diagnosis Date    Anemia     Angio-edema     Depression     Diabetes mellitus type 2, noninsulin dependent 2016    Hematuria     Hx of essential hypertension     Hyperlipidemia     Hypertension, uncontrolled 2016    Nuclear sclerosis of both eyes 4/10/2017       PAST SURGICAL HISTORY:     Past Surgical History:   Procedure Laterality Date    BTL       SECTION      COLONOSCOPY N/A 2017    Procedure: COLONOSCOPY;  Surgeon: Oleg Enciso MD;  Location: [facility] ([address] [place] [address]);  Service: Endoscopy;  Laterality: N/A;  CHRONIC CONSTIPATION S/P LRNY    CYSTOSCOPY  2019    Procedure: CYSTOSCOPY;  Surgeon: ELIZABETH Mendoza MD;  Location: [facility] OR;  Service: Urology;;  PRE-OP BY RN 3-    ESOPHAGOGASTRODUODENOSCOPY N/A 2020    Procedure: EGD (ESOPHAGOGASTRODUODENOSCOPY);  Surgeon: Elias Harper MD;  Location: [facility] ([address] [place] [address]);  Service: Endoscopy;  Laterality: N/A;  prep ins. emailed - ERW  COVID screening on 20 - ERW    GASTRIC BYPASS      sandra en y    HYSTEROSCOPY WITH DILATION AND CURETTAGE OF UTERUS N/A 2018    Procedure: HYSTEROSCOPY, WITH DILATION AND CURETTAGE OF UTERUS;  Surgeon: Naveed Alexander,  MD;  Location: [facility] OR;  Service: OB/GYN;  Laterality: N/A;  RN PREOP 9/26/2018    NECK SURGERY  09/30/2016    TOTAL REDUCTION MAMMOPLASTY  2007       ALLERGIES AND MEDICATION:   Review of patient's allergies indicates:  No Known Allergies  Previous Medications    AMLODIPINE (NORVASC) 10 MG TABLET    TAKE 1 TABLET(10 MG) BY MOUTH EVERY DAY    B COMPLEX VITAMINS CAPSULE    Take 1 capsule by mouth once daily.    B-COMPLEX WITH VITAMIN C (Z-BEC OR EQUIV) TABLET    Take 1 tablet by mouth once daily.    BIOTIN ORAL    Take by mouth.    CETIRIZINE (ZYRTEC) 10 MG TABLET    1-2 tablets daily as needed for itching or swelling    CYANOCOBALAMIN, VITAMIN B-12, 1,000 MCG/15 ML LIQD    as directed    DICLOFENAC SODIUM (VOLTAREN) 1 % GEL    Apply 2g  to affected area every 6 hr as needed for pain.    HYDROCHLOROTHIAZIDE (HYDRODIURIL) 12.5 MG TAB    TAKE 1 TABLET DAILY WITH LOSARTAN    LOSARTAN (COZAAR) 50 MG TABLET    TAKE 1 TABLET BY MOUTH EVERY DAY WITH HCTZ    MAGNESIUM OXIDE 500 MG TAB    Take by mouth once.    OZEMPIC 1 MG/DOSE (2 MG/1.5 ML) PNIJ    TAKES ON Monday MORNINGS.    POTASSIUM CHLORIDE SA (K-DUR,KLOR-CON M) 10 MEQ TABLET    Take 20 mEq by mouth once daily.    ROSUVASTATIN (CRESTOR) 40 MG TAB    Take 1 tablet (40 mg total) by mouth every evening.    TURMERIC ROOT EXTRACT 500 MG CAP    as directed    VITAMIN B COMPLEX (SUPER B-50 COMPLEX ORAL)    as directed       SOCIAL HISTORY:     Social History     Socioeconomic History    Marital status: Single   Tobacco Use    Smoking status: Never    Smokeless tobacco: Never   Substance and Sexual Activity    Alcohol use: No     Alcohol/week: 0.0 standard drinks     Comment: Socially    Drug use: No    Sexual activity: Yes     Partners: Male       FAMILY HISTORY:     Family History   Problem Relation Age of Onset    Heart disease Mother     Diabetes type II Mother     Heart attack Father 50        Open heart surgery    Allergies Sister     No Known Problems Brother     No  "Known Problems Maternal Aunt     No Known Problems Maternal Uncle     No Known Problems Paternal Aunt     No Known Problems Paternal Uncle     No Known Problems Maternal Grandmother     No Known Problems Maternal Grandfather     No Known Problems Paternal Grandmother     No Known Problems Paternal Grandfather     Amblyopia Neg Hx     Blindness Neg Hx     Cancer Neg Hx     Cataracts Neg Hx     Diabetes Neg Hx     Glaucoma Neg Hx     Hypertension Neg Hx     Macular degeneration Neg Hx     Retinal detachment Neg Hx     Strabismus Neg Hx     Stroke Neg Hx     Thyroid disease Neg Hx        REVIEW OF SYSTEMS:   Review of Systems   Constitutional:  Negative for chills, diaphoresis and fever.   HENT:  Negative for nosebleeds.    Eyes:  Negative for blurred vision, double vision and photophobia.   Respiratory:  Negative for hemoptysis, shortness of breath and wheezing.    Cardiovascular:  Negative for chest pain, palpitations, orthopnea, claudication, leg swelling and PND.   Gastrointestinal:  Negative for abdominal pain, blood in stool, heartburn, melena, nausea and vomiting.   Genitourinary:  Negative for flank pain and hematuria.   Musculoskeletal:  Positive for neck pain. Negative for falls, joint pain and myalgias.   Skin:  Negative for rash.   Neurological:  Negative for dizziness, seizures, loss of consciousness, weakness and headaches.   Endo/Heme/Allergies:  Negative for polydipsia. Does not bruise/bleed easily.   Psychiatric/Behavioral:  Negative for depression and memory loss. The patient is not nervous/anxious.      PHYSICAL EXAM:     Vitals:    09/01/22 1610   BP: 110/70   Pulse: 98   Resp: 18    Body mass index is 31.47 kg/m².  Weight: 78 kg (172 lb 1.1 oz)   Height: 5' 2" (157.5 cm)     Physical Exam  Vitals reviewed.   Constitutional:       General: She is not in acute distress.     Appearance: She is well-developed. She is obese. She is not ill-appearing, toxic-appearing or diaphoretic.   HENT:      Head: " Normocephalic and atraumatic.   Eyes:      General: No scleral icterus.     Extraocular Movements: Extraocular movements intact.      Conjunctiva/sclera: Conjunctivae normal.      Pupils: Pupils are equal, round, and reactive to light.   Neck:      Thyroid: No thyromegaly.      Vascular: Normal carotid pulses. No carotid bruit or JVD.      Trachea: Trachea normal. No tracheal deviation.   Cardiovascular:      Rate and Rhythm: Normal rate and regular rhythm.      Pulses:           Carotid pulses are 2+ on the right side and 2+ on the left side.     Heart sounds: S1 normal and S2 normal. No murmur heard.    No friction rub. No gallop.   Pulmonary:      Effort: Pulmonary effort is normal. No respiratory distress.      Breath sounds: Normal breath sounds. No stridor. No wheezing, rhonchi or rales.   Chest:      Chest wall: No tenderness.   Abdominal:      General: There is no distension.      Palpations: Abdomen is soft.   Musculoskeletal:         General: No swelling or tenderness. Normal range of motion.      Cervical back: Normal range of motion and neck supple. No edema or rigidity.      Right lower leg: No edema.      Left lower leg: No edema.   Feet:      Right foot:      Skin integrity: No ulcer.      Left foot:      Skin integrity: No ulcer.   Skin:     General: Skin is warm and dry.      Coloration: Skin is not jaundiced.   Neurological:      General: No focal deficit present.      Mental Status: She is alert and oriented to person, place, and time.      Cranial Nerves: No cranial nerve deficit.   Psychiatric:         Mood and Affect: Mood normal.         Speech: Speech normal.         Behavior: Behavior normal. Behavior is cooperative.       DATA:   EKG: (personally reviewed tracing)  9/1/22 SR 81    Laboratory:  CBC:  Recent Labs   Lab 11/23/19  1135 01/30/20  1413 01/21/21  1042   WBC 6.32 5.71 7.22   Hemoglobin 12.5 12.1 12.6   Hematocrit 40.1 37.0 41.1   Platelets 308 306 331         CHEMISTRIES:  Recent  Labs   Lab 11/23/19  1135 01/30/20  1413 01/21/21  1042   Glucose 121 H 137 H 121 H   Sodium 141 141 139   Potassium 3.7 4.1 4.0   BUN 11 13 16   Creatinine 0.9 0.9 0.9   eGFR if African American >60.0 >60.0 >60.0   eGFR if non African American >60.0 >60.0 >60.0   Calcium 9.4 9.7 9.4         CARDIAC BIOMARKERS:        COAGS:        LIPIDS/LFTS:  Recent Labs   Lab 11/23/19  1135 01/30/20  1413 01/21/21  1042   AST 24 18 17   ALT 37 23 20         Cardiovascular Testing:  Ex MPI 6/8/17  The patient exercised for 8.06 minutes on a Osito protocol, corresponding to a functional capacity of 7 estimated METS, achieving a peak heart rate of 148 bpm, which is 91% of the age predicted maximum heart rate. -CP/EKG.  Nuclear Quantitative Functional Analysis:   LVEF: >= 70 %  Impression: NORMAL MYOCARDIAL PERFUSION  1. The perfusion scan is free of evidence for myocardial ischemia or injury.   2. There is mild apical thinning which is a normal variant.   3. Resting wall motion is physiologic.   4. Resting LV function is normal.   5. The ventricular volumes are normal at rest and stress.   6. The extracardiac distribution of radioactivity is normal.     Echo 6/8/17    1 - Normal left ventricular systolic function (EF 55-60%).     2 - Concentric hypertrophy.     3 - Mild left atrial enlargement.     4 - Trivial tricuspid regurgitation.    ASSESSMENT:   # Preop CV eval prior to NUSU/cervical disc surgery.  The pt has good exercise capacity. Echo and MPI 6/2017 normal.  # HTN, controlled  # DM  # HLP, on rosuva 40mg  # BMI 31 s/p bariatric surgery, down 2 unit(s) vs last OV    PLAN:   Cont med rx  Diet/exercise/weight loss  The pt is at low cardiac risk for the planned surgery.  No further CV testing needed.  RTC prn    Joshua Beebe MD, FACC

## 2022-09-02 ENCOUNTER — TELEPHONE (OUTPATIENT)
Dept: NEUROSURGERY | Facility: CLINIC | Age: 57
End: 2022-09-02
Payer: MEDICARE

## 2022-09-02 NOTE — TELEPHONE ENCOUNTER
Returned call, pt wanted to know the name of the procedure = arthroplasty, replacement of cervical disc.   Pt verbally understood    ----- Message from Ophelia House sent at 9/2/2022 12:20 PM CDT -----  Contact: pt  Pt requesting a callback to get info about her upcoming procedure         Confirmed contact below:  Contact Name:Antoine Gonzáles  Phone Number: [phone]

## 2022-09-16 DIAGNOSIS — Z01.818 PREOP TESTING: Primary | ICD-10-CM

## 2022-09-16 RX ORDER — MUPIROCIN 20 MG/G
OINTMENT TOPICAL 2 TIMES DAILY
Qty: 1 EACH | Refills: 0 | Status: ON HOLD | OUTPATIENT
Start: 2022-09-21 | End: 2022-09-27

## 2022-09-20 ENCOUNTER — HOSPITAL ENCOUNTER (OUTPATIENT)
Dept: PREADMISSION TESTING | Facility: HOSPITAL | Age: 57
Discharge: HOME | End: 2022-09-20
Attending: NEUROLOGICAL SURGERY
Payer: MEDICARE

## 2022-09-20 ENCOUNTER — HOSPITAL ENCOUNTER (OUTPATIENT)
Dept: PREADMISSION TESTING | Facility: HOSPITAL | Age: 57
Discharge: HOME | End: 2022-09-20
Payer: MEDICARE

## 2022-09-20 ENCOUNTER — TELEPHONE (OUTPATIENT)
Dept: NEUROSURGERY | Facility: CLINIC | Age: 57
End: 2022-09-20
Payer: MEDICARE

## 2022-09-20 ENCOUNTER — OFFICE VISIT (OUTPATIENT)
Dept: INTERNAL MEDICINE | Facility: CLINIC | Age: 57
End: 2022-09-20
Payer: MEDICARE

## 2022-09-20 VITALS
TEMPERATURE: 98 F | HEART RATE: 74 BPM | BODY MASS INDEX: 31.47 KG/M2 | OXYGEN SATURATION: 99 % | DIASTOLIC BLOOD PRESSURE: 76 MMHG | SYSTOLIC BLOOD PRESSURE: 159 MMHG | RESPIRATION RATE: 18 BRPM | HEIGHT: 62 IN

## 2022-09-20 DIAGNOSIS — Z98.84 GASTRIC BYPASS STATUS FOR OBESITY: Chronic | ICD-10-CM

## 2022-09-20 DIAGNOSIS — H91.91 HEARING LOSS OF RIGHT EAR, UNSPECIFIED HEARING LOSS TYPE: ICD-10-CM

## 2022-09-20 DIAGNOSIS — Z01.818 PREOP EXAMINATION: Primary | ICD-10-CM

## 2022-09-20 DIAGNOSIS — Z86.14 HISTORY OF MRSA INFECTION: ICD-10-CM

## 2022-09-20 DIAGNOSIS — H25.13 NUCLEAR SCLEROSIS OF BOTH EYES: ICD-10-CM

## 2022-09-20 DIAGNOSIS — K76.0 FATTY LIVER: ICD-10-CM

## 2022-09-20 DIAGNOSIS — E11.9 DIABETES MELLITUS TYPE 2, NONINSULIN DEPENDENT: Chronic | ICD-10-CM

## 2022-09-20 DIAGNOSIS — I10 ESSENTIAL HYPERTENSION: Chronic | ICD-10-CM

## 2022-09-20 DIAGNOSIS — Z86.16 HISTORY OF COVID-19: ICD-10-CM

## 2022-09-20 DIAGNOSIS — E78.5 HYPERLIPIDEMIA, UNSPECIFIED HYPERLIPIDEMIA TYPE: Chronic | ICD-10-CM

## 2022-09-20 DIAGNOSIS — N95.0 PMB (POSTMENOPAUSAL BLEEDING): ICD-10-CM

## 2022-09-20 DIAGNOSIS — H52.7 REFRACTIVE ERROR: ICD-10-CM

## 2022-09-20 DIAGNOSIS — R31.0 GROSS HEMATURIA: ICD-10-CM

## 2022-09-20 PROBLEM — R29.898 DECREASED GRIP STRENGTH OF LEFT HAND: Status: RESOLVED | Noted: 2021-02-14 | Resolved: 2022-09-20

## 2022-09-20 PROBLEM — M79.642 HAND PAIN, LEFT: Status: RESOLVED | Noted: 2021-02-14 | Resolved: 2022-09-20

## 2022-09-20 PROBLEM — D64.9 ANEMIA: Status: RESOLVED | Noted: 2017-10-30 | Resolved: 2022-09-20

## 2022-09-20 PROBLEM — M25.669 DECREASED ROM OF LOWER EXTREMITY: Status: RESOLVED | Noted: 2017-12-20 | Resolved: 2022-09-20

## 2022-09-20 PROBLEM — R10.84 GENERALIZED ABDOMINAL PAIN: Status: RESOLVED | Noted: 2018-01-15 | Resolved: 2022-09-20

## 2022-09-20 PROBLEM — R10.9 ABDOMINAL PAIN: Status: RESOLVED | Noted: 2020-06-29 | Resolved: 2022-09-20

## 2022-09-20 PROCEDURE — 1159F PR MEDICATION LIST DOCUMENTED IN MEDICAL RECORD: ICD-10-PCS | Mod: CPTII,S$GLB,, | Performed by: HOSPITALIST

## 2022-09-20 PROCEDURE — 1160F PR REVIEW ALL MEDS BY PRESCRIBER/CLIN PHARMACIST DOCUMENTED: ICD-10-PCS | Mod: CPTII,S$GLB,, | Performed by: HOSPITALIST

## 2022-09-20 PROCEDURE — 99999 PR PBB SHADOW E&M-EST. PATIENT-LVL I: CPT | Mod: PBBFAC,,, | Performed by: HOSPITALIST

## 2022-09-20 PROCEDURE — 4010F ACE/ARB THERAPY RXD/TAKEN: CPT | Mod: CPTII,S$GLB,, | Performed by: HOSPITALIST

## 2022-09-20 PROCEDURE — 99204 PR OFFICE/OUTPT VISIT, NEW, LEVL IV, 45-59 MIN: ICD-10-PCS | Mod: S$GLB,,, | Performed by: HOSPITALIST

## 2022-09-20 PROCEDURE — 4010F PR ACE/ARB THEARPY RXD/TAKEN: ICD-10-PCS | Mod: CPTII,S$GLB,, | Performed by: HOSPITALIST

## 2022-09-20 PROCEDURE — 1160F RVW MEDS BY RX/DR IN RCRD: CPT | Mod: CPTII,S$GLB,, | Performed by: HOSPITALIST

## 2022-09-20 PROCEDURE — 1159F MED LIST DOCD IN RCRD: CPT | Mod: CPTII,S$GLB,, | Performed by: HOSPITALIST

## 2022-09-20 PROCEDURE — 99204 OFFICE O/P NEW MOD 45 MIN: CPT | Mod: S$GLB,,, | Performed by: HOSPITALIST

## 2022-09-20 PROCEDURE — 99999 PR PBB SHADOW E&M-EST. PATIENT-LVL I: ICD-10-PCS | Mod: PBBFAC,,, | Performed by: HOSPITALIST

## 2022-09-20 NOTE — ASSESSMENT & PLAN NOTE
Her blood in the urine was attributes it to a kidney stone   She had urology evaluation done for that about 2 years ago she is no longer having any blood in the urine
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
## 2022-09-20 NOTE — DISCHARGE INSTRUCTIONS
Your surgery has been scheduled for:________9/26/2022__________________________________    You should report to:  ____[facility], located on the [place] side of the first floor of the           [facility] ([phone])  _x___[facility], located on the [facility] side of the            Second floor of the [facility] ([phone])  ____3rd Floor [facility] located on the [facility] side of the [facility] [phone]  ____[facility]: located at [address] [place] [address]. [address].     Please Note   Tell your doctor if you take Aspirin, products containing Aspirin, herbal medications  or blood thinners, such as Coumadin, Ticlid, or Plavix.  (Consult your provider regarding holding or stopping before surgery).  Arrange for someone to drive you home following surgery.  You will not be allowed to leave the surgical facility alone or drive yourself home following sedation and anesthesia.    Before Surgery  Stop taking all herbal medications, vitamins, and supplements 7 days prior to surgery  No Motrin/Advil (Ibuprofen) 7 days before surgery  No Aleve (Naproxen) 7 days before surgery  Stop Taking Asprin, products containing Aspirin _N/A____days before surgery  Stop taking blood thinners__N/A_____days before surgery  No Goody's/BC  Powder 7 days before surgery  Refrain from drinking alcoholic beverages for 24hours before and after surgery  Stop or limit smoking ___7______days before surgery  You may take Tylenol for pain    Night before Surgery  Do not eat or drink after midnight  Take a shower or bath (shower is recommended).  Bathe with Hibiclens soap or an antibacterial soap from the neck down.  If not supplied by your surgeon, hibiclens soap will need to be purchased over the counter in pharmacy.  Rinse soap off thoroughly.  Shampoo your hair with your regular shampoo    The Day  of Surgery  Take another bath or shower with hibiclens or any antibacterial soap, to reduce the chance of infection.  Take heart and blood pressure medications with a small sip of water, as advised by the perioperative team.  Do not take fluid pills  You may brush your teeth and rinse your mouth, but do not swall any additional water.   Do not apply perfumes, powder, body lotions or deodorant on the day of surgery.  Nail polish should be removed.  Do not wear makeup or moisturizer  Wear comfortable clothes, such as a button front shirt and loose fitting pants.  Leave all jewelry, including body piercings, and valuables at home.    Bring any devices you will neeed after surgery such as crutches or canes.  If you have sleep apnea, please bring your CPAP machine  In the event that your physical condition changes including the onset of a cold or respiratory illness, or if you have to delay or cancel your surgery, please notify your surgeon.         Anesthesia: General Anesthesia     You are watched continuously during your procedure by your anesthesia provider.     Youre due to have surgery. During surgery, youll be given medicine called anesthesia or anesthetic. This will keep you comfortable and pain-free. Your anesthesia provider will use general anesthesia.  What is general anesthesia?  General anesthesia puts you into a state like deep sleep. It goes into the bloodstream (IV anesthetics), into the lungs (gas anesthetics), or both. You feel nothing during the procedure. You will not remember it. During the procedure, the anesthesia provider monitors you continuously. He or she checks your heart rate and rhythm, blood pressure, breathing, and blood oxygen.  IV anesthetics. IV anesthetics are given through an IV line in your arm. Theyre often given first. This is so you are asleep before a gas anesthetic is started. Some kinds of IV anesthetics relieve pain. Others relax you. Your doctor will decide which kind is  best in your case.  Gas anesthetics. Gas anesthetics are breathed into the lungs. They are often used to keep you asleep. They can be given through a facemask or a tube placed in your larynx or trachea (breathing tube).  If you have a facemask, your anesthesia provider will most likely place it over your nose and mouth while youre still awake. Youll breathe oxygen through the mask as your IV anesthetic is started. Gas anesthetic may be added through the mask.  If you have a tube in the larynx or trachea, it will be inserted into your throat after youre asleep.  Anesthesia tools and medicines  You will likely have:  IV anesthetics. These are put into an IV line into your bloodstream.  Gas anesthetics. You breathe these anesthetics into your lungs, where they pass into your bloodstream.  Pulse oximeter. This is a small clip that is attached to the end of your finger. This measures your blood oxygen level.  Electrocardiography leads (electrodes). These are small sticky pads that are placed on your chest. They record your heart rate and rhythm.  Blood pressure cuff. This reads your blood pressure.  Risks and possible complications  General anesthesia has some risks. These include:  Breathing problems  Nausea and vomiting  Sore throat or hoarseness (usually temporary)  Allergic reaction to the anesthetic  Irregular heartbeat (rare)  Cardiac arrest (rare)   Anesthesia safety  Follow all instructions you are given for how long not to eat or drink before your procedure.  Be sure your doctor knows what medicines and drugs you take. This includes over-the-counter medicines, herbs, supplements, alcohol or other drugs. You will be asked when those were last taken.  Have an adult family member or friend drive you home after the procedure.  For the first 24 hours after your surgery:  Do not drive or use heavy equipment.  Do not make important decisions or sign legal documents. If important decisions or signing legal documents  is necessary during the first 24 hours after surgery, have a trusted family member or spouse act on your behalf.  Avoid alcohol.  Have a responsible adult stay with you. He or she can watch for problems and help keep you safe.  Date Last Reviewed: 12/1/2016  © 5763-3210 [employer]. [address] [place] [address], [place] [address]. All rights reserved. This information is not intended as a substitute for professional medical care. Always follow your healthcare professional's instructions.

## 2022-09-20 NOTE — ASSESSMENT & PLAN NOTE
Dec 2021  Had sore throat   Had bloody sputum   Had evaluation for Lungs   Not known to have pneumonia  No recurrence of Hemoptysis   Non smoker   Suggested follow up

## 2022-09-20 NOTE — ASSESSMENT & PLAN NOTE
2005   Doing good   She gets occasional constipation for which she drinks milk that relieves the constipation  No acid reflux problems  She has no problems with food and liquids when she swallows    Weight related conditions     Known to have     HTN  Type 2 Diabetes   Hyperlipidemia   Sleep apnea -sleep apnea resolved after losing weight from gastric bypass  Acid reflux - never had a problem  Chart review showed- Fatty liver       Not troubled with / Not known to have       Acid reflux         Encouraged weight loss    I told her to see not to take doses of aspirin a knee higher than 81 mg by mouth once a day given the gastric bypass   She was taking up to 1500 mg of aspirin a day for her pain   She reportedly has had bleeding problem during surgery with her cosmetic surgery for what sounds like a pannus due to her not stopping aspirin for surgery    On this location she has been off of aspirin for 2 weeks by the time of surgery  No reported easy bleeding or bruising   No liver or kidney problems   No family history of bleeding problems  Not known to have hemophilia or von Willebrand's factor problems   No other surgery related bleeding problems   No unusual bleeding from dental extractions or heavy menstrual cycles
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
## 2022-09-20 NOTE — ASSESSMENT & PLAN NOTE
His taking 3 blood pressure medication namely hydrochlorothiazide, losartan and amlodipine    Home BP readings -?  Recent BP readings in the record-  Vitals - 1 value per visit 8/5/2022 8/5/2022 8/5/2022 8/16/2022 8/16/2022   SYSTOLIC 139 177 147  127   DIASTOLIC 80 86 80  77     Vitals - 1 value per visit 8/25/2022 8/25/2022 9/1/2022   SYSTOLIC  137 110   DIASTOLIC  82 70     Hypertension-  Blood pressure is acceptable . I suggested continuation of Amlodipine - during the entire perioperative period. I suggested holding  hydrochlorothiazide, losartan   -- on the morning of the surgery and can continue that  post operatively under blood pressure, electrolyte and renal function monitoring as long as they are acceptable.I suggest addressing pain control as uncontrolled pain can increased blood pressure     She does not add too much salt into her diet

## 2022-09-20 NOTE — ASSESSMENT & PLAN NOTE
2020 June 2020    Hepatic steatosis   No alcohol excess  No history  of cirrhosis of liver or suggestions of Liver  decompensation   No Jaundice , dark urine , pale stool   No easy bleeding , bruising   Dec 2021- LFT- N
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
## 2022-09-20 NOTE — ASSESSMENT & PLAN NOTE
She has yearly gynecology evaluations and currently does not have any postmenopausal bleeding  No gynecological cancer history
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
## 2022-09-20 NOTE — ASSESSMENT & PLAN NOTE
March 2018   In the setting of kidney stone problem   This no history of blood infection  No IVDU   She had no recurrence of the staph infection  Noted plan for Bactroban nasal  decolonization     Based on chart review / Patient has a reported history of Staph infection   In an attempt to prevent Surgical site infection , with the up coming surgery , suggest the following      Previous MRSA infection -     Intra nasal Bactroban for 5 days ( 2 days pre op and 3  days post op )   IV vancomycin ( If no allergy / Intolerance to Vancomycin ) for surgeries that require systemic antibiotic to prevent surgical site infections        Or Single use tube-1 gram dose - half tube in each nostril bid for 5 days .

## 2022-09-20 NOTE — OUTPATIENT SUBJECTIVE & OBJECTIVE
Outpatient Subjective & Objective     Chief complaint-Preoperative evaluation, Perioperative Medical management, complication reduction plan     Active cardiac conditions- none    Revised cardiac risk index predictors- none    Functional capacity -Examples of physical activity  , she was an active person until 1 year ago, she was riding a bicycle in the park.  can take 1 flight of stairs----- She can undertake all the above activities without  chest pain,chest tightness, Shortness of breath ,dizziness,lightheadedness making her exercise tolerance more,  than 4 Mets.       Review of Systems   Constitutional:  Negative for chills and fever.        No unusual weight changes       HENT:          STOPBANG score 2 / 8        Elevated BP  Age over 50        Eyes:         No new visual changes   Respiratory:          No cough , phlegm  No Hemoptysis   Cardiovascular:         As noted   Gastrointestinal:         No overt GI/ blood losses  Bowel movements- Regular   LMP-age 52 Y  Not on contraception   Has 2 daughters  Not pregnant per her   Endocrine:        Prednisone use > 20 mg daily for 3 weeks- None   Genitourinary:  Negative for dysuria.        No urinary hesitancy    Musculoskeletal:         As above   Has long standing lower back pian - down to both legs - increases upon bending down to  things   No incontinence problems or problems with lack of sensation in the perineal area    Skin:  Negative for rash.   Neurological:  Negative for syncope.        No unilateral weakness   Hematological:         Current use of Anticoagulants  none   Psychiatric/Behavioral:          No Depression,Anxiety currently      No vascular stenting             No anesthesia, PONV, cardiac problems with previous surgeries/procedures.  Medications and Allergies reviewed in epic.     FH- No anesthesia,bleeding / venous thrombosis , problems in family      Physical Exam        Physical Exam  Constitutional-General  appearance-Conscious,Coherent  Eyes- No conjunctival icterus,pupils  round  and reactive to light   ENT-Oral cavity- moist    , Hearing grossly normal   Neck- No thyromegaly ,Trachea -central, No jugular venous distension,   No Carotid Bruit   Cardiovascular -Heart Sounds- Normal  and  no murmur   , No gallop rhythm   Respiratory - Normal Respiratory Effort, Normal breath sounds,  Crepitationsrt base ,  no wheeze , and  no forced expiratory wheeze    Peripheral pitting pedal edema-- none , no calf pain   Gastrointestinal -Soft abdomen, No palpable masses, Non Tender,Liver,Spleen not palpable. No-- free fluid and shifting dullness  Musculoskeletal- No finger Clubbing. Strength grossly normal   Lymphatic-No Palpable cervical, axillary,Inguinal lymphadenopathy   Psychiatric - normal effect,Orientation  Rt Dorsalis pedis pulses-palpable    Lt Dorsalis pedis pulses- palpable   Rt Posterior tibial pulses -palpable   Left posterior tibial pulses -palpable   Miscellaneous -  no asterixis,  no dupuytren's contracture,  Surgical scarabdomen , back, Rt breast , axillae ,  no renal bruit, and  Tattoo     Investigations  Lab and Imaging have been reviewed in [facility].    Review of Medicine tests    EKG- I had independently reviewed the EKG from--9/1/2022  It was reported to be showing     Normal sinus rhythm   Cannot rule out Anterior infarct (cited on or before 15-DEC-2021)   Abnormal ECG   When compared with ECG of 15-DEC-2021 20:55,   Questionable change in initial forces of Septal leads    2017     NORMAL MYOCARDIAL PERFUSION    2017      1 - Normal left ventricular systolic function (EF 55-60%).     2 - Concentric hypertrophy.     3 - Mild left atrial enlargement.     4 - Trivial tricuspid regurgitation.     Review of clinical lab tests:  Lab Results   Component Value Date    CREATININE 0.9 01/21/2021    HGB 12.6 01/21/2021     01/21/2021             Review of old records- Was done and information gathered regards to  events leading to surgery and health conditions of significance in the perioperative period.    Outpatient Subjective & Objective

## 2022-09-23 ENCOUNTER — TELEPHONE (OUTPATIENT)
Dept: NEUROSURGERY | Facility: CLINIC | Age: 57
End: 2022-09-23
Payer: MEDICARE

## 2022-09-23 NOTE — TELEPHONE ENCOUNTER
Called to give surgery time/arrival time/night before instructions    Surgery at 7   - check in by 5AM    Nothing to eat and drink past midnight. Medication per anesthesia directions. Wear comfortable clothes. don't wear jewelry, makeup, lotion, perfume, or deodorant. Shower the night before and the morning of with hibiclens or dial soap.

## 2022-09-26 ENCOUNTER — ANESTHESIA EVENT (OUTPATIENT)
Dept: SURGERY | Facility: HOSPITAL | Age: 57
End: 2022-09-26
Payer: MEDICARE

## 2022-09-26 ENCOUNTER — ANESTHESIA (OUTPATIENT)
Dept: SURGERY | Facility: HOSPITAL | Age: 57
End: 2022-09-26
Payer: MEDICARE

## 2022-09-26 ENCOUNTER — HOSPITAL ENCOUNTER (OUTPATIENT)
Facility: HOSPITAL | Age: 57
Discharge: HOME | End: 2022-09-27
Attending: NEUROLOGICAL SURGERY | Admitting: NEUROLOGICAL SURGERY
Payer: MEDICARE

## 2022-09-26 DIAGNOSIS — Z01.818 PREOP TESTING: ICD-10-CM

## 2022-09-26 DIAGNOSIS — M50.00 CERVICAL DISC DISORDER WITH MYELOPATHY: Primary | ICD-10-CM

## 2022-09-26 DIAGNOSIS — M50.20 CERVICAL DISC HERNIATION: ICD-10-CM

## 2022-09-26 LAB
ABO + RH BLD: NORMAL
BLD GP AB SCN CELLS X3 SERPL QL: NORMAL
GLUCOSE SERPL-MCNC: 120 MG/DL (ref 70–110)
POCT GLUCOSE: 137 MG/DL (ref 70–110)
POCT GLUCOSE: 174 MG/DL (ref 70–110)

## 2022-09-26 PROCEDURE — 63600175 PHARM REV CODE 636 W HCPCS: Performed by: STUDENT IN AN ORGANIZED HEALTH CARE EDUCATION/TRAINING PROGRAM

## 2022-09-26 PROCEDURE — 63600175 PHARM REV CODE 636 W HCPCS: Performed by: NURSE ANESTHETIST, CERTIFIED REGISTERED

## 2022-09-26 PROCEDURE — 71000033 HC RECOVERY, INTIAL HOUR: Performed by: NEUROLOGICAL SURGERY

## 2022-09-26 PROCEDURE — 36000710: Performed by: NEUROLOGICAL SURGERY

## 2022-09-26 PROCEDURE — D9220A PRA ANESTHESIA: ICD-10-PCS | Mod: CRNA,,, | Performed by: NURSE ANESTHETIST, CERTIFIED REGISTERED

## 2022-09-26 PROCEDURE — 82962 GLUCOSE BLOOD TEST: CPT | Mod: 91 | Performed by: NEUROLOGICAL SURGERY

## 2022-09-26 PROCEDURE — 25000003 PHARM REV CODE 250: Performed by: STUDENT IN AN ORGANIZED HEALTH CARE EDUCATION/TRAINING PROGRAM

## 2022-09-26 PROCEDURE — C1889 IMPLANT/INSERT DEVICE, NOC: HCPCS | Performed by: NEUROLOGICAL SURGERY

## 2022-09-26 PROCEDURE — 25000003 PHARM REV CODE 250: Performed by: NURSE ANESTHETIST, CERTIFIED REGISTERED

## 2022-09-26 PROCEDURE — 37000008 HC ANESTHESIA 1ST 15 MINUTES: Performed by: NEUROLOGICAL SURGERY

## 2022-09-26 PROCEDURE — 86901 BLOOD TYPING SEROLOGIC RH(D): CPT | Performed by: NEUROLOGICAL SURGERY

## 2022-09-26 PROCEDURE — 37000009 HC ANESTHESIA EA ADD 15 MINS: Performed by: NEUROLOGICAL SURGERY

## 2022-09-26 PROCEDURE — 27201423 OPTIME MED/SURG SUP & DEVICES STERILE SUPPLY: Performed by: NEUROLOGICAL SURGERY

## 2022-09-26 PROCEDURE — 36620 INSERTION CATHETER ARTERY: CPT | Mod: 59,,, | Performed by: ANESTHESIOLOGY

## 2022-09-26 PROCEDURE — 27201037 HC PRESSURE MONITORING SET UP

## 2022-09-26 PROCEDURE — 25000003 PHARM REV CODE 250: Performed by: NEUROLOGICAL SURGERY

## 2022-09-26 PROCEDURE — 22856 TOT DISC ARTHRP 1NTRSPC CRV: CPT | Mod: ,,, | Performed by: NEUROLOGICAL SURGERY

## 2022-09-26 PROCEDURE — D9220A PRA ANESTHESIA: ICD-10-PCS | Mod: ANES,,, | Performed by: ANESTHESIOLOGY

## 2022-09-26 PROCEDURE — D9220A PRA ANESTHESIA: Mod: ANES,,, | Performed by: ANESTHESIOLOGY

## 2022-09-26 PROCEDURE — 36000711: Performed by: NEUROLOGICAL SURGERY

## 2022-09-26 PROCEDURE — 63600175 PHARM REV CODE 636 W HCPCS: Performed by: NEUROLOGICAL SURGERY

## 2022-09-26 PROCEDURE — D9220A PRA ANESTHESIA: Mod: CRNA,,, | Performed by: NURSE ANESTHETIST, CERTIFIED REGISTERED

## 2022-09-26 PROCEDURE — 22856 PR TOTAL DISC ARTHROPLASTY, CERVICAL, SINGLE: ICD-10-PCS | Mod: ,,, | Performed by: NEUROLOGICAL SURGERY

## 2022-09-26 PROCEDURE — 71000016 HC POSTOP RECOV ADDL HR: Performed by: NEUROLOGICAL SURGERY

## 2022-09-26 PROCEDURE — 82962 GLUCOSE BLOOD TEST: CPT | Performed by: NEUROLOGICAL SURGERY

## 2022-09-26 PROCEDURE — 63600175 PHARM REV CODE 636 W HCPCS: Performed by: ANESTHESIOLOGY

## 2022-09-26 PROCEDURE — 71000015 HC POSTOP RECOV 1ST HR: Performed by: NEUROLOGICAL SURGERY

## 2022-09-26 PROCEDURE — 36620 PR INSERT CATH,ART,PERCUT,SHORTTERM: ICD-10-PCS | Mod: 59,,, | Performed by: ANESTHESIOLOGY

## 2022-09-26 DEVICE — DISC CERVICAL MOBI-C 15X15X5MM: Type: IMPLANTABLE DEVICE | Site: NECK | Status: FUNCTIONAL

## 2022-09-26 RX ORDER — HYDROMORPHONE HYDROCHLORIDE 1 MG/ML
0.2 INJECTION, SOLUTION INTRAMUSCULAR; INTRAVENOUS; SUBCUTANEOUS EVERY 5 MIN PRN
Status: COMPLETED | OUTPATIENT
Start: 2022-09-26 | End: 2022-09-26

## 2022-09-26 RX ORDER — SENNOSIDES 8.6 MG/1
8.6 TABLET ORAL DAILY PRN
Status: DISCONTINUED | OUTPATIENT
Start: 2022-09-26 | End: 2022-09-27 | Stop reason: HOSPADM

## 2022-09-26 RX ORDER — FENTANYL CITRATE 50 UG/ML
INJECTION, SOLUTION INTRAMUSCULAR; INTRAVENOUS
Status: DISCONTINUED | OUTPATIENT
Start: 2022-09-26 | End: 2022-09-26

## 2022-09-26 RX ORDER — DEXAMETHASONE SODIUM PHOSPHATE 4 MG/ML
INJECTION, SOLUTION INTRA-ARTICULAR; INTRALESIONAL; INTRAMUSCULAR; INTRAVENOUS; SOFT TISSUE
Status: DISCONTINUED | OUTPATIENT
Start: 2022-09-26 | End: 2022-09-26

## 2022-09-26 RX ORDER — HEPARIN SODIUM 5000 [USP'U]/ML
5000 INJECTION, SOLUTION INTRAVENOUS; SUBCUTANEOUS EVERY 8 HOURS
Status: DISCONTINUED | OUTPATIENT
Start: 2022-09-27 | End: 2022-09-27 | Stop reason: HOSPADM

## 2022-09-26 RX ORDER — SUCCINYLCHOLINE CHLORIDE 20 MG/ML
INJECTION INTRAMUSCULAR; INTRAVENOUS
Status: DISCONTINUED | OUTPATIENT
Start: 2022-09-26 | End: 2022-09-26

## 2022-09-26 RX ORDER — LIDOCAINE HYDROCHLORIDE AND EPINEPHRINE 10; 10 MG/ML; UG/ML
INJECTION, SOLUTION INFILTRATION; PERINEURAL
Status: DISCONTINUED | OUTPATIENT
Start: 2022-09-26 | End: 2022-09-26 | Stop reason: HOSPADM

## 2022-09-26 RX ORDER — OXYCODONE AND ACETAMINOPHEN 5; 325 MG/1; MG/1
1 TABLET ORAL EVERY 4 HOURS PRN
Status: DISCONTINUED | OUTPATIENT
Start: 2022-09-26 | End: 2022-09-27 | Stop reason: HOSPADM

## 2022-09-26 RX ORDER — LIDOCAINE HYDROCHLORIDE 10 MG/ML
INJECTION, SOLUTION INTRAVENOUS
Status: DISCONTINUED | OUTPATIENT
Start: 2022-09-26 | End: 2022-09-26

## 2022-09-26 RX ORDER — POLYETHYLENE GLYCOL 3350 17 G/17G
17 POWDER, FOR SOLUTION ORAL DAILY
Status: DISCONTINUED | OUTPATIENT
Start: 2022-09-27 | End: 2022-09-27 | Stop reason: HOSPADM

## 2022-09-26 RX ORDER — MUPIROCIN 20 MG/G
1 OINTMENT TOPICAL 2 TIMES DAILY
Status: DISCONTINUED | OUTPATIENT
Start: 2022-09-26 | End: 2022-09-26 | Stop reason: HOSPADM

## 2022-09-26 RX ORDER — MIDAZOLAM HYDROCHLORIDE 1 MG/ML
INJECTION, SOLUTION INTRAMUSCULAR; INTRAVENOUS
Status: DISCONTINUED | OUTPATIENT
Start: 2022-09-26 | End: 2022-09-26

## 2022-09-26 RX ORDER — ACETAMINOPHEN 325 MG/1
650 TABLET ORAL EVERY 6 HOURS PRN
Status: DISCONTINUED | OUTPATIENT
Start: 2022-09-26 | End: 2022-09-27 | Stop reason: HOSPADM

## 2022-09-26 RX ORDER — SODIUM CHLORIDE 9 MG/ML
INJECTION, SOLUTION INTRAVENOUS CONTINUOUS
Status: DISCONTINUED | OUTPATIENT
Start: 2022-09-26 | End: 2022-09-27

## 2022-09-26 RX ORDER — LOSARTAN POTASSIUM 50 MG/1
50 TABLET ORAL DAILY
Status: DISCONTINUED | OUTPATIENT
Start: 2022-09-27 | End: 2022-09-27 | Stop reason: HOSPADM

## 2022-09-26 RX ORDER — KETAMINE HCL IN 0.9 % NACL 50 MG/5 ML
SYRINGE (ML) INTRAVENOUS
Status: DISCONTINUED | OUTPATIENT
Start: 2022-09-26 | End: 2022-09-26

## 2022-09-26 RX ORDER — HYDROMORPHONE HYDROCHLORIDE 1 MG/ML
0.5 INJECTION, SOLUTION INTRAMUSCULAR; INTRAVENOUS; SUBCUTANEOUS
Status: DISCONTINUED | OUTPATIENT
Start: 2022-09-26 | End: 2022-09-27 | Stop reason: HOSPADM

## 2022-09-26 RX ORDER — PROPOFOL 10 MG/ML
VIAL (ML) INTRAVENOUS
Status: DISCONTINUED | OUTPATIENT
Start: 2022-09-26 | End: 2022-09-26

## 2022-09-26 RX ORDER — AMLODIPINE BESYLATE 10 MG/1
10 TABLET ORAL DAILY
Status: DISCONTINUED | OUTPATIENT
Start: 2022-09-27 | End: 2022-09-27 | Stop reason: HOSPADM

## 2022-09-26 RX ORDER — CEFTRIAXONE 1 G/1
INJECTION, POWDER, FOR SOLUTION INTRAMUSCULAR; INTRAVENOUS
Status: DISCONTINUED | OUTPATIENT
Start: 2022-09-26 | End: 2022-09-26 | Stop reason: HOSPADM

## 2022-09-26 RX ORDER — POTASSIUM CHLORIDE 20 MEQ/1
20 TABLET, EXTENDED RELEASE ORAL DAILY
Status: DISCONTINUED | OUTPATIENT
Start: 2022-09-27 | End: 2022-09-27 | Stop reason: HOSPADM

## 2022-09-26 RX ORDER — PHENYLEPHRINE HYDROCHLORIDE 10 MG/ML
INJECTION INTRAVENOUS CONTINUOUS PRN
Status: DISCONTINUED | OUTPATIENT
Start: 2022-09-26 | End: 2022-09-26

## 2022-09-26 RX ORDER — ONDANSETRON 2 MG/ML
4 INJECTION INTRAMUSCULAR; INTRAVENOUS ONCE AS NEEDED
Status: COMPLETED | OUTPATIENT
Start: 2022-09-26 | End: 2022-09-26

## 2022-09-26 RX ORDER — FENTANYL CITRATE 50 UG/ML
25 INJECTION, SOLUTION INTRAMUSCULAR; INTRAVENOUS EVERY 5 MIN PRN
Status: DISCONTINUED | OUTPATIENT
Start: 2022-09-26 | End: 2022-09-26 | Stop reason: HOSPADM

## 2022-09-26 RX ORDER — CETIRIZINE HYDROCHLORIDE 5 MG/1
10 TABLET ORAL DAILY PRN
Status: DISCONTINUED | OUTPATIENT
Start: 2022-09-26 | End: 2022-09-27 | Stop reason: HOSPADM

## 2022-09-26 RX ORDER — HYDROCHLOROTHIAZIDE 12.5 MG/1
12.5 TABLET ORAL DAILY
Status: DISCONTINUED | OUTPATIENT
Start: 2022-09-27 | End: 2022-09-27 | Stop reason: HOSPADM

## 2022-09-26 RX ORDER — CEFAZOLIN SODIUM/D5W 2 G/100 ML
2 PLASTIC BAG, INJECTION (ML) INTRAVENOUS
Status: DISCONTINUED | OUTPATIENT
Start: 2022-09-26 | End: 2022-09-27

## 2022-09-26 RX ORDER — ONDANSETRON 8 MG/1
8 TABLET, ORALLY DISINTEGRATING ORAL EVERY 6 HOURS PRN
Status: DISCONTINUED | OUTPATIENT
Start: 2022-09-26 | End: 2022-09-27 | Stop reason: HOSPADM

## 2022-09-26 RX ORDER — METHOCARBAMOL 500 MG/1
500 TABLET, FILM COATED ORAL 4 TIMES DAILY
Status: DISCONTINUED | OUTPATIENT
Start: 2022-09-26 | End: 2022-09-27 | Stop reason: HOSPADM

## 2022-09-26 RX ORDER — METHYLPREDNISOLONE ACETATE 40 MG/ML
INJECTION, SUSPENSION INTRA-ARTICULAR; INTRALESIONAL; INTRAMUSCULAR; SOFT TISSUE
Status: DISCONTINUED | OUTPATIENT
Start: 2022-09-26 | End: 2022-09-26 | Stop reason: HOSPADM

## 2022-09-26 RX ORDER — MUPIROCIN 20 MG/G
OINTMENT TOPICAL
Status: DISCONTINUED | OUTPATIENT
Start: 2022-09-26 | End: 2022-09-26 | Stop reason: HOSPADM

## 2022-09-26 RX ORDER — ACETAMINOPHEN 10 MG/ML
INJECTION, SOLUTION INTRAVENOUS
Status: DISCONTINUED | OUTPATIENT
Start: 2022-09-26 | End: 2022-09-26

## 2022-09-26 RX ORDER — ONDANSETRON 2 MG/ML
INJECTION INTRAMUSCULAR; INTRAVENOUS
Status: DISCONTINUED | OUTPATIENT
Start: 2022-09-26 | End: 2022-09-26

## 2022-09-26 RX ADMIN — FENTANYL CITRATE 25 MCG: 50 INJECTION, SOLUTION INTRAMUSCULAR; INTRAVENOUS at 03:09

## 2022-09-26 RX ADMIN — LIDOCAINE HYDROCHLORIDE 50 MG: 10 INJECTION, SOLUTION INTRAVENOUS at 07:09

## 2022-09-26 RX ADMIN — HYDROMORPHONE HYDROCHLORIDE 0.5 MG: 1 INJECTION, SOLUTION INTRAMUSCULAR; INTRAVENOUS; SUBCUTANEOUS at 08:09

## 2022-09-26 RX ADMIN — HYDROMORPHONE HYDROCHLORIDE 0.2 MG: 1 INJECTION, SOLUTION INTRAMUSCULAR; INTRAVENOUS; SUBCUTANEOUS at 11:09

## 2022-09-26 RX ADMIN — DEXAMETHASONE SODIUM PHOSPHATE 4 MG: 4 INJECTION, SOLUTION INTRAMUSCULAR; INTRAVENOUS at 10:09

## 2022-09-26 RX ADMIN — PROPOFOL 170 MG: 10 INJECTION, EMULSION INTRAVENOUS at 07:09

## 2022-09-26 RX ADMIN — PHENYLEPHRINE HYDROCHLORIDE 0.5 MCG/KG/MIN: 10 INJECTION INTRAVENOUS at 07:09

## 2022-09-26 RX ADMIN — MIDAZOLAM HYDROCHLORIDE 2 MG: 1 INJECTION, SOLUTION INTRAMUSCULAR; INTRAVENOUS at 07:09

## 2022-09-26 RX ADMIN — HYDROMORPHONE HYDROCHLORIDE 0.2 MG: 1 INJECTION, SOLUTION INTRAMUSCULAR; INTRAVENOUS; SUBCUTANEOUS at 12:09

## 2022-09-26 RX ADMIN — REMIFENTANIL HYDROCHLORIDE 0.05 MCG/KG/MIN: 1 INJECTION, POWDER, LYOPHILIZED, FOR SOLUTION INTRAVENOUS at 07:09

## 2022-09-26 RX ADMIN — ONDANSETRON 4 MG: 2 INJECTION INTRAMUSCULAR; INTRAVENOUS at 05:09

## 2022-09-26 RX ADMIN — ACETAMINOPHEN 1000 MG: 10 INJECTION, SOLUTION INTRAVENOUS at 10:09

## 2022-09-26 RX ADMIN — MUPIROCIN: 20 OINTMENT TOPICAL at 06:09

## 2022-09-26 RX ADMIN — FENTANYL CITRATE 100 MCG: 50 INJECTION, SOLUTION INTRAMUSCULAR; INTRAVENOUS at 07:09

## 2022-09-26 RX ADMIN — SUCCINYLCHOLINE CHLORIDE 120 MG: 20 INJECTION, SOLUTION INTRAMUSCULAR; INTRAVENOUS at 07:09

## 2022-09-26 RX ADMIN — VANCOMYCIN HYDROCHLORIDE 1000 MG: 1 INJECTION, POWDER, LYOPHILIZED, FOR SOLUTION INTRAVENOUS at 07:09

## 2022-09-26 RX ADMIN — HYDROMORPHONE HYDROCHLORIDE 0.2 MG: 1 INJECTION, SOLUTION INTRAMUSCULAR; INTRAVENOUS; SUBCUTANEOUS at 10:09

## 2022-09-26 RX ADMIN — PROPOFOL 100 MCG/KG/MIN: 10 INJECTION, EMULSION INTRAVENOUS at 07:09

## 2022-09-26 RX ADMIN — METHOCARBAMOL 500 MG: 500 TABLET ORAL at 09:09

## 2022-09-26 RX ADMIN — SODIUM CHLORIDE: 9 INJECTION, SOLUTION INTRAVENOUS at 07:09

## 2022-09-26 RX ADMIN — Medication 2 G: at 12:09

## 2022-09-26 RX ADMIN — OXYCODONE HYDROCHLORIDE AND ACETAMINOPHEN 1 TABLET: 5; 325 TABLET ORAL at 11:09

## 2022-09-26 RX ADMIN — ONDANSETRON 4 MG: 2 INJECTION INTRAMUSCULAR; INTRAVENOUS at 10:09

## 2022-09-26 RX ADMIN — Medication 50 MG: at 07:09

## 2022-09-26 NOTE — SUBJECTIVE & OBJECTIVE
No medications prior to admission.       Review of patient's allergies indicates:   Allergen Reactions    Lisinopril-hydrochlorothiazide Swelling     Facial swelling/ mouth swelling  She can tolerate hydrochlorothiazide       Past Medical History:   Diagnosis Date    Anemia     Angio-edema     Depression     Diabetes mellitus type 2, noninsulin dependent 2016    Hematuria     Hx of essential hypertension     Hyperlipidemia     Hypertension, uncontrolled 2016    Nuclear sclerosis of both eyes 04/10/2017     Past Surgical History:   Procedure Laterality Date    BTL       SECTION      COLONOSCOPY N/A 2017    Procedure: COLONOSCOPY;  Surgeon: Oleg Enciso MD;  [facility] [place] [address]);  Service: Endoscopy;  Laterality: N/A;  CHRONIC CONSTIPATION S/P LRNY    COSMETIC SURGERY      Liposuction , back surgery  , buttock surgery     CYSTOSCOPY  2019    Procedure: CYSTOSCOPY;  Surgeon: ELIZABETH Mendoza MD;  Location: [facility] OR;  Service: Urology;;  PRE-OP BY RN 3-    ESOPHAGOGASTRODUODENOSCOPY N/A 2020    Procedure: EGD (ESOPHAGOGASTRODUODENOSCOPY);  Surgeon: Elias Harper MD;  Location: [address]);  Service: Endoscopy;  Laterality: N/A;  prep ins. emailed - ERW  COVID screening on 20 - ERW    GASTRIC BYPASS      sandra en y    HYSTEROSCOPY WITH DILATION AND CURETTAGE OF UTERUS N/A 2018    Procedure: HYSTEROSCOPY, WITH DILATION AND CURETTAGE OF UTERUS;  Surgeon: Naveed Alexander MD;  Location: [facility] OR;  Service: OB/GYN;  Laterality: N/A;  RN PREOP 2018    NECK SURGERY  2016    TOTAL REDUCTION MAMMOPLASTY       Family History       Problem Relation (Age of Onset)    Allergies Sister    Diabetes type II Mother    Heart attack Father (50)    Heart disease Mother    No Known Problems Brother, Maternal Aunt, Maternal Uncle, Paternal Aunt, Paternal Uncle, Maternal Grandmother, Maternal Grandfather, Paternal Grandmother, Paternal  Grandfather          Tobacco Use    Smoking status: Never    Smokeless tobacco: Never   Substance and Sexual Activity    Alcohol use: No     Comment: one drink in a few months    Drug use: No    Sexual activity: Yes     Partners: Male     Review of Systems   Neurological:  Positive for numbness.   All other systems reviewed and are negative.  Objective:        There is no height or weight on file to calculate BMI.  Vital Signs (Most Recent):    Vital Signs (24h Range):                             Physical Exam  Constitutional:       Appearance: She is well-developed and well-nourished.   Eyes:      Extraocular Movements: EOM normal.      Conjunctiva/sclera: Conjunctivae normal.      Pupils: Pupils are equal, round, and reactive to light.   Cardiovascular:      Pulses: Normal pulses.   Abdominal:      Palpations: Abdomen is soft.   Neurological:      Mental Status: She is alert and oriented to person, place, and time.      Comments: AAOx4  PERRL  CN intact  BUE 5/5  BLE 5/5  Decreased sensation median nerve distribution  + hoffmans L > R   Psychiatric:         Mood and Affect: Mood and affect normal.       Physical Exam:    Constitutional: She appears well-developed and well-nourished.     Eyes: Pupils are equal, round, and reactive to light. Conjunctivae and EOM are normal.     Cardiovascular: Normal pulses.     Abdominal: Soft.     Psych/Behavior: She is alert. She is oriented to person, place, and time. She has a normal mood and affect.     Neurological:   AAOx4  PERRL  CN intact  BUE 5/5  BLE 5/5  Decreased sensation median nerve distribution  + hoffmans L > R     Significant Labs:  No results for input(s): GLU, NA, K, CL, CO2, BUN, CREATININE, CALCIUM, MG in the last 48 hours.  No results for input(s): WBC, HGB, HCT, PLT in the last 48 hours.  No results for input(s): LABPT, INR, APTT in the last 48 hours.  Microbiology Results (last 7 days)       ** No results found for the last 168 hours. **          All  pertinent labs from the last 24 hours have been reviewed.    Significant Diagnostics:  I have reviewed all pertinent imaging results/findings within the past 24 hours.

## 2022-09-26 NOTE — ANESTHESIA PROCEDURE NOTES
Arterial    Diagnosis: back pain    Patient location during procedure: done in OR    Staffing  Authorizing Provider: Jeff Gonzalez MD  Performing Provider: Jeff Gonzalez MD    Anesthesiologist was present at the time of the procedure.  Arterial  Skin Prep: chlorhexidine gluconate and isopropyl alcohol  Local Infiltration: none  Orientation: left  Location: radial    Catheter Size: 22 G  Catheter placement by Anatomical landmarks. Heme positive aspiration all ports. Insertion Attempts: 1  Assessment  Dressing: secured with tape and tegaderm  Patient: Tolerated well

## 2022-09-26 NOTE — OP NOTE
"[facility] - Surgery ([facility])  Neurosurgery  Operative Note    SUMMARY      Date of Procedure: 9/26/2022     Procedure: Procedure(s) (LRB):  REPLACEMENT, INTERVERTEBRAL DISC, CERVICAL, TOTAL C3/4 Rajesh neuro monitoring (N/A)     Surgeon(s) and Role:     * Mansi Saini MD - Primary     * Mina Salazar MD - Resident - Assisting    Pre-Operative Diagnosis: Herniated nucleus pulposus with myelopathy, cervical [M50.00]    Post-Operative Diagnosis: Post-Op Diagnosis Codes:     * Herniated nucleus pulposus with myelopathy, cervical [M50.00]    Anesthesia: General    Technical Procedures Used:   1. Anterior approach to the cervical spine   2. Disc arthroplasty, C3-C4, with decompression and bilateral foraminotomy   3. Use of operative microscope and microscopic technique   4. Use of intraoperative neuromonitoring      Indications:   Antoine Gonzáles is a 57 y.o. female who presents with neck and right arm and shoulder pain s/p ACDF with Dr. Lucas. She denies myelopathic symptoms, but she does have Webber's on exam. She states that she was told that she has "pinched nerves" by a [facility] neurologist.      Based on the results of her imaging studies coupled with her myelopathic complaints, I believe the patient would best benefit from C3-C4 arthroplasty to allow for preservation of C4-C5 disc space since there is currently no disease associated with that level. This would allow for decompression of the C3-C4 cord compression with associated myelomalacia.      I had a pleasant discussion with the patient and her daughter about the risks, benefits, and alternatives to surgery. Risks include, but are not limited to, bleeding, pain, infection, scarring, need for further/repeat procedure, death, paralysis, damage to vocal cords, damage to esophagus, damage to trachea, stroke/damage to major blood vessels, leak of cerebrospinal fluid, and hardware-related complications. We discussed that she may need additional " surgery for other levels in the future, but this procedure is designed to help minimize the risk of adjacent segment disease. Informed consent was obtained.      We discussed that I define success as the operation as preventing her from having progression of myelopathic symptoms. I cannot guarantee improvement in pain. She expressed understanding.      Description of the Procedure:   The patient was brought back to the operating room, and taking care not to let her MAPs fall below 85, general endotracheal anesthesia was induced by the anesthesia service. She was placed supine on the operating room table. Her head was carefully positioned on the [place] head aguilar and [place] tongs applied. At this point SSEP and MEP baselines were obtained. 15 lbs of traction were added. All pressure points were carefully padded. TEDs and SCDs were applied and 1g of IV Vancomycin was administered, together with 8 of IV dexamethasone.      The C-arm was brought in to localize the C3-C4 disc space. The skin incision was marked in a Brenda line, and the patient was prepped and draped in the usual sterile fashion. 10cc of 1% lidocaine with epinephrine were used to infiltrate the skin. A 15 blade was used to incise the skin, and dissection was carried down to the level of the platysma with Bovie cautery. The platysma was then elevated and divided. We then undermined the platysma in both rostral and caudal direction, using bipolar cautery at 20 as needed.      The medial border of the sternocleidomastoid was identified, and we dissected dorsally along its edge. The carotid was palpated and brought laterally. A Cloward retractor was used to gently retract the esophagus, and the spine was identified. We dissected through the prevertebral fascia and placed a bayonetted spinal needle in the 3-4 disc space. A lateral X-ray confirmed our level. We next elevated the longus colli up bilaterally using bipolar cautery to be able to place the  Trimline retractor. These were successfully inserted into the self-retaining retractor. [place] pins were inserted under fluroscopic guidance in the C3 and C4 bodies, and a self-retaining retractor was used to provide distraction.      We then incised the disc with a fresh 15 blade. The disc was subsequently removed with a combination of straight and upgoing curettes. Using a high-speed drill with an M8 bit, we carefully drilled the dorsal portion of the vertebral bodies. We visualized the uncovertebral joints bilaterally. The PLL was then gently dissected, layer by layer, using a combination of blunt nerve hook and Kerrison #1 rongeur. The dura was clearly visualized and found to be intact. MEPs and SSEPs were evaluated and found to be stable from baseline. The foramina were palpated with a nerve hook bilaterally and found to be open.      We then turned our attention to trialing the Mobi-C disc replacement. The 15mm footprint was found to be appropriate, and a [id number] prosthesis was implanted under fluoroscopic guidance. Care was taken to center the device in a medial-lateral direction and also to be sure that it was perpendicular to the ground. Motor evoked potentials were monitored at each phase and found to be stable.       We then removed the self-retaining retractors and obtained meticulous hemostasis. We irrigated copiously with antibiotic-containing solution. Depo-medrol was applied to the esophagus, which was inspected and did not appear to be damaged. Final AP and lateral X-rays were satisfactory. We performed a two-layer closure with 3-0 Vicryl inverted stitches in the platysma and subcutaneous layers, with a running subcuticular 4-0 Monocryl for the skin. A sterile dressing of Dermabond was applied. All counts were correct x2. Cervical EMG was quiet throughout the case, and MEPs and SSEPs remained stable.      The patient was then removed from [facility], awakened by the anesthesia service,  and returned to the PACU in satisfactory condition.      Complications: No     Estimated Blood Loss (EBL): 35cc           Specimens:   Specimen (24h ago, onward)      None             Implants:   Implant Name Type Inv. Item Serial No.  Lot No. LRB No. Used Action   DISC CERVICAL MOBI-C [id number] - [id number]  DISC CERVICAL MOBI-C [id number]  [employer] [facility] [employer] [id number] N/A 1 Implanted              Condition: Stable    Disposition: PACU - hemodynamically stable.    Attestation: I was present and scrubbed for the entire procedure.

## 2022-09-26 NOTE — ANESTHESIA PROCEDURE NOTES
Intubation    Date/Time: 9/26/2022 7:34 AM  Performed by: Bakari Aguilar CRNA  Authorized by: Jeff Gonzalez MD     Intubation:     Induction:  Intravenous    Intubated:  Postinduction    Mask Ventilation:  Easy mask    Attempts:  1    Attempted By:  CRNA    Method of Intubation:  Video laryngoscopy    Blade:  Addison 3    Laryngeal View Grade: Grade I - full view of cords      Difficult Airway Encountered?: No      Complications:  None    Airway Device:  Oral endotracheal tube    Airway Device Size:  7.0    Style/Cuff Inflation:  Cuffed    Inflation Amount (mL):  6    Tube secured:  22    Secured at:  The teeth    Placement Verified By:  Capnometry    Complicating Factors:  None    Findings Post-Intubation:  BS equal bilateral and atraumatic/condition of teeth unchanged

## 2022-09-26 NOTE — CONSULTS
Otolaryngology - Head and Neck Surgery  Consultation Report      Consultation from: neurosuurgery  Reason for Consultation:  vocal cord evaluation      Subjective:      CC: bi today for cervical spine surgery    HPI   57F with history of prior right sided neck surgery presents to ENT in pre op for evaluation of vocal cord motion prior to cervical spine surgery today with the neurosurgical team. She reports having right sided neck surgery about 5-6 years ago. She is unsure of the procedure or the indication but believes it was related toher back. She denies any changes with her voice, throat pain or difficulty swallowing.       ROS: ENT-focused ROS completed and is negative unless otherwise stated in the HPI.     Past Medical History:   Diagnosis Date    Anemia     Angio-edema     Depression     Diabetes mellitus type 2, noninsulin dependent 2016    Hematuria     Hx of essential hypertension     Hyperlipidemia     Hypertension, uncontrolled 2016    Nuclear sclerosis of both eyes 04/10/2017     Past Surgical History:   Procedure Laterality Date    BTL       SECTION      COLONOSCOPY N/A 2017    Procedure: COLONOSCOPY;  Surgeon: Oleg Enciso MD;  Location: [address]);  Service: Endoscopy;  Laterality: N/A;  CHRONIC CONSTIPATION S/P LRNY    COSMETIC SURGERY      Liposuction , back surgery  , buttock surgery     CYSTOSCOPY  2019    Procedure: CYSTOSCOPY;  Surgeon: ELIZABETH Mendoza MD;  Location: [facility] OR;  Service: Urology;;  PRE-OP BY RN 3-    ESOPHAGOGASTRODUODENOSCOPY N/A 2020    Procedure: EGD (ESOPHAGOGASTRODUODENOSCOPY);  Surgeon: Elias Harper MD;  Location: [address]);  Service: Endoscopy;  Laterality: N/A;  prep ins. emailed - ERW  COVID screening on 20 - ERW    GASTRIC BYPASS      sandra en y    HYSTEROSCOPY WITH DILATION AND CURETTAGE OF UTERUS N/A 2018    Procedure: HYSTEROSCOPY, WITH DILATION AND CURETTAGE OF  UTERUS;  Surgeon: Naveed Alexander MD;  Location: [facility];  Service: OB/GYN;  Laterality: N/A;  RN PREOP 9/26/2018    NECK SURGERY  09/30/2016    TOTAL REDUCTION MAMMOPLASTY  2007     Social History     Tobacco Use    Smoking status: Never    Smokeless tobacco: Never   Substance Use Topics    Alcohol use: No     Comment: one drink in a few months     Family History   Problem Relation Age of Onset    Heart disease Mother     Diabetes type II Mother     Heart attack Father 50        Open heart surgery    Allergies Sister     No Known Problems Brother     No Known Problems Maternal Aunt     No Known Problems Maternal Uncle     No Known Problems Paternal Aunt     No Known Problems Paternal Uncle     No Known Problems Maternal Grandmother     No Known Problems Maternal Grandfather     No Known Problems Paternal Grandmother     No Known Problems Paternal Grandfather     Amblyopia Neg Hx     Blindness Neg Hx     Cancer Neg Hx     Cataracts Neg Hx     Diabetes Neg Hx     Glaucoma Neg Hx     Hypertension Neg Hx     Macular degeneration Neg Hx     Retinal detachment Neg Hx     Strabismus Neg Hx     Stroke Neg Hx     Thyroid disease Neg Hx        Current Facility-Administered Medications:     0.9%  NaCl infusion, , Intravenous, Continuous, Mina Salazar MD    mupirocin 2 % ointment 1 g, 1 g, Nasal, BID, Mina Salazar MD    mupirocin 2 % ointment, , Nasal, On Call Procedure, Mina Salazar MD, Given at 09/26/22 0643  Lisinopril-hydrochlorothiazide      Objective:     Temp:  [98.2 °F (36.8 °C)] 98.2 °F (36.8 °C)  Pulse:  [78] 78  Resp:  [18] 18  SpO2:  [98 %] 98 %  BP: (132)/(74) 132/74    General Appearance:   Awake, Alert and Oriented. NAD. Appropriate affect and appearance     Neuro:   Spontaneous eye opening, appropriate verbal responses, follows commands  Pupils equal, round & brisk. EOMI, no proptosis, no spontaneous nystagmus  Face is symmetric, non-edematous Vision grossly intact, Hearing  grossly intact    Head and Face:   Normocephalic, atraumatic,skin is intact and non-erythematous    Nose:   External nose is symmetric, no skin lesions    Neck:  Neck is symmetric, non-edematous, non-erythematous  Trachea is midline and easily palpable,  Right sided neck scar         Respiratory:  Normal work of breathing, no accessory muscle use, no stridor    Voice:  Normal vocal quality, volume, prosody and articulation          Flexible Fiberoptic Nasopharyngolaryngoscopy  Date: 09/26/2022   Indication: C spine surgery today, prior neck surgery   Consent: Verbal consent obtained.   Surgeon: Gus Guerra  Procedure: Scope inserted into nares, through nasal passage and nasopharynx with adequate visualization of larynx during phonation and repose.  Findings:  -Arytenoids/FVFs: no masses/lesions, no edema, mobile bilaterally  -TVFs: mobile bilaterally, no masses/lesions, non-edematous  -Subglottis: no stenosis, masses or lesions              Assessment/Plan:      Normal vocal fold motion bilaterally  - no further ENT intervention necessary.         Gus Guerra, DO  Otorhinolaryngology-Head & Neck Surgery  [facility]  09/26/2022

## 2022-09-26 NOTE — ANESTHESIA PREPROCEDURE EVALUATION
09/26/2022  Antoine Gonzáles is a 57 y.o., female.      Pre-op Assessment          Review of Systems  Anesthesia Hx:  No problems with previous Anesthesia    Cardiovascular:   Hypertension Denies CAD.     Functional Capacity Can you climb two flights of stairs? ==> Yes    Pulmonary:   Denies Asthma.  Denies Sleep Apnea.    Renal/:   Denies Chronic Renal Disease.     Hepatic/GI:   Denies PUD. Denies GERD. Denies Liver Disease.    Neurological:   Denies CVA. Denies Seizures.    Endocrine:   Diabetes Denies Hypothyroidism.        Physical Exam  General: Alert    Airway:  Mallampati: I   Mouth Opening: Normal  TM Distance: Normal  Tongue: Normal  Neck ROM: Normal ROM    Dental:  Partial Dentures        Anesthesia Plan  Type of Anesthesia, risks & benefits discussed:    Anesthesia Type: Gen ETT  Intra-op Monitoring Plan: Standard ASA Monitors  Post Op Pain Control Plan: multimodal analgesia and IV/PO Opioids PRN  Induction:  IV  Airway Plan: Direct  Informed Consent: Informed consent signed with the Patient and all parties understand the risks and agree with anesthesia plan.  All questions answered.   ASA Score: 2    Ready For Surgery From Anesthesia Perspective.     .

## 2022-09-26 NOTE — NURSING TRANSFER
Nursing Transfer Note      9/26/2022     Reason patient is being transferred: post procedure    Transfer To: 950    Transfer via stretcher    Transfer with n/a    Transported by transporter x1    Medicines sent: n/a    Any special needs or follow-up needed: routine    Chart send with patient: Yes    Notified: spouse    Patient reassessed at: 09/26/2022 at 1800

## 2022-09-26 NOTE — H&P
"[facility] - Surgery ([facility])  Neuorsurgery  History and Physical     Patient Name: Antoine Gonzáles  MRN: [id number]  Admission Date: 9/26/2022  Attending Physician: Mansi Saini MD   Primary Care Physician: Jb Fernandez MD    Patient information was obtained from patient and ER records.     Subjective:     Chief Complaint/Reason for Admission: right sided pain     HPI:  Antoine Gonzáles is a 57 y.o. female who presents as a self-referral after previous ACDF at [place] with persistent neck and shoulder pain. She thinks the surgery was done around 2017 with Dr. Lucas. She states that she had severe depression after the surgery and had loss of memory, including not being able to remember her way home.      She began seeing a neurologist because of that. Then she began having neck pain and saw another neurologist at [facility], who told her that she has two pinched nerves. She participated in PT, which was helpful.      The whole right arm hurts, all the way down to the thumb, which tingles. The tingling in the median distribution has gotten worse since surgery. She was seen in the ED recently and was provided with a sling. She stopped using it because she didn't want her arm to get too stiff.      The pain "thumps like a toothache." It gets better with warm compresses, hot shower, and rubbing ointment on it. PT helped significantly. It gets worse when she sleeps on the ipsilateral side.      She denies myelopathic symptoms, including dropping objects, balance difficulties, and fine motor changes.      She stays with her friend. She has longstanding diabetes, which she works hard to control.      The patient denies any bleeding, infectious, or anesthetic complications with any previous surgery. She takes aspirin regularly as a component of her "back and body" Frederick pills.      As of 8/25/22, the patient reports that she has been about the same. She includes her daughter Shweta over the phone for our " conversation. She obtained the MRI, CT, and flexion/extension X-rays that had been requested after the previous visit.      No medications prior to admission.       Review of patient's allergies indicates:   Allergen Reactions    Lisinopril-hydrochlorothiazide Swelling     Facial swelling/ mouth swelling  She can tolerate hydrochlorothiazide       Past Medical History:   Diagnosis Date    Anemia     Angio-edema     Depression     Diabetes mellitus type 2, noninsulin dependent 2016    Hematuria     Hx of essential hypertension     Hyperlipidemia     Hypertension, uncontrolled 2016    Nuclear sclerosis of both eyes 04/10/2017     Past Surgical History:   Procedure Laterality Date    BTL       SECTION      COLONOSCOPY N/A 2017    Procedure: COLONOSCOPY;  Surgeon: Oleg Enciso MD;  [facility]  Service: Endoscopy;  Laterality: N/A;  CHRONIC CONSTIPATION S/P LRNY    COSMETIC SURGERY      Liposuction , back surgery  , buttock surgery     CYSTOSCOPY  2019    Procedure: CYSTOSCOPY;  Surgeon: ELIZABETH Mendoza MD;  Location: [facility] OR;  Service: Urology;;  PRE-OP BY RN 3-    ESOPHAGOGASTRODUODENOSCOPY N/A 2020    Procedure: EGD (ESOPHAGOGASTRODUODENOSCOPY);  Surgeon: Elias Harper MD;  [facility]  Service: Endoscopy;  Laterality: N/A;  prep ins. emailed - ERW  COVID screening on 20 - ERW    GASTRIC BYPASS      sandra en y    HYSTEROSCOPY WITH DILATION AND CURETTAGE OF UTERUS N/A 2018    Procedure: HYSTEROSCOPY, WITH DILATION AND CURETTAGE OF UTERUS;  Surgeon: Naveed Alexander MD;  Location: [facility] OR;  Service: OB/GYN;  Laterality: N/A;  RN PREOP 2018    NECK SURGERY  2016    TOTAL REDUCTION MAMMOPLASTY       Family History       Problem Relation (Age of Onset)    Allergies Sister    Diabetes type II Mother    Heart attack Father (50)    Heart disease Mother    No Known Problems  Brother, Maternal Aunt, Maternal Uncle, Paternal Aunt, Paternal Uncle, Maternal Grandmother, Maternal Grandfather, Paternal Grandmother, Paternal Grandfather          Tobacco Use    Smoking status: Never    Smokeless tobacco: Never   Substance and Sexual Activity    Alcohol use: No     Comment: one drink in a few months    Drug use: No    Sexual activity: Yes     Partners: Male     Review of Systems   Neurological:  Positive for numbness.   All other systems reviewed and are negative.  Objective:        There is no height or weight on file to calculate BMI.  Vital Signs (Most Recent):    Vital Signs (24h Range):                             Physical Exam  Constitutional:       Appearance: She is well-developed and well-nourished.   Eyes:      Extraocular Movements: EOM normal.      Conjunctiva/sclera: Conjunctivae normal.      Pupils: Pupils are equal, round, and reactive to light.   Cardiovascular:      Pulses: Normal pulses.   Abdominal:      Palpations: Abdomen is soft.   Neurological:      Mental Status: She is alert and oriented to person, place, and time.      Comments: AAOx4  PERRL  CN intact  BUE 5/5  BLE 5/5  Decreased sensation median nerve distribution  + hoffmans L > R   Psychiatric:         Mood and Affect: Mood and affect normal.       Physical Exam:    Constitutional: She appears well-developed and well-nourished.     Eyes: Pupils are equal, round, and reactive to light. Conjunctivae and EOM are normal.     Cardiovascular: Normal pulses.     Abdominal: Soft.     Psych/Behavior: She is alert. She is oriented to person, place, and time. She has a normal mood and affect.     Neurological:   AAOx4  PERRL  CN intact  BUE 5/5  BLE 5/5  Decreased sensation median nerve distribution  + hoffmans L > R     Significant Labs:  No results for input(s): GLU, NA, K, CL, CO2, BUN, CREATININE, CALCIUM, MG in the last 48 hours.  No results for input(s): WBC, HGB, HCT, PLT in the last 48 hours.  No results for  "input(s): LABPT, INR, APTT in the last 48 hours.  Microbiology Results (last 7 days)       ** No results found for the last 168 hours. **          All pertinent labs from the last 24 hours have been reviewed.    Significant Diagnostics:  I have reviewed all pertinent imaging results/findings within the past 24 hours.    Assessment and Plan:     Cervical disc disorder with myelopathy  Antoine Gonzáles is a 57 y.o. female who presents with neck and right arm and shoulder pain s/p ACDF with Dr. Lucas. She denies myelopathic symptoms, but she does have Webber's on exam. She states that she was told that she has "pinched nerves" by a [facility] neurologist.     Patient presents for C3-C4 arthroplasty     Risks, benefits, and alternatives to surgery were discussed. Risks include, but are not limited to, bleeding, pain, infection, scarring, need for further/repeat procedure, death, paralysis, damage to vocal cords, damage to esophagus, damage to trachea, stroke/damage to major blood vessels, leak of cerebrospinal fluid, and hardware-related complications. We discussed that she may need additional surgery for other levels in the future, but this procedure is designed to help minimize the risk of adjacent segment disease. Informed consent was obtained.      We discussed that I define success as the operation as preventing her from having progression of myelopathic symptoms. I cannot guarantee improvement in pain. She expressed understanding.      She will need ENT vocal cord scope prior to surgery to assess for appropriateness of left-sided approach. I will have her see Dr. Weldon for medical optimization.      Given her MRSA history, she will receive vancomycin for perioperative prophylaxis. She has held all anticoagulation and antiplatelet agents, including her Frederick back pills, at least one week prior to surgery.     Proceed to OR as planned             Mina Salazar MD  Neurosurgery  [facility] - Surgery " (2nd Fl)

## 2022-09-26 NOTE — ASSESSMENT & PLAN NOTE
"Antoine Gonzáles is a 57 y.o. female who presents with neck and right arm and shoulder pain s/p ACDF with Dr. Lucas. She denies myelopathic symptoms, but she does have Webber's on exam. She states that she was told that she has "pinched nerves" by a [facility] neurologist.     Patient presents for C3-C4 arthroplasty     Risks, benefits, and alternatives to surgery were discussed. Risks include, but are not limited to, bleeding, pain, infection, scarring, need for further/repeat procedure, death, paralysis, damage to vocal cords, damage to esophagus, damage to trachea, stroke/damage to major blood vessels, leak of cerebrospinal fluid, and hardware-related complications. We discussed that she may need additional surgery for other levels in the future, but this procedure is designed to help minimize the risk of adjacent segment disease. Informed consent was obtained.      We discussed that I define success as the operation as preventing her from having progression of myelopathic symptoms. I cannot guarantee improvement in pain. She expressed understanding.      She will need ENT vocal cord scope prior to surgery to assess for appropriateness of left-sided approach. I will have her see Dr. Weldon for medical optimization.      Given her MRSA history, she will receive vancomycin for perioperative prophylaxis. She has held all anticoagulation and antiplatelet agents, including her Frederick back pills, at least one week prior to surgery.     Proceed to OR as planned       "

## 2022-09-26 NOTE — HPI
"Antoine Gonzáles is a 57 y.o. female who presents as a self-referral after previous ACDF at [place] with persistent neck and shoulder pain. She thinks the surgery was done around 2017 with Dr. Lucas. She states that she had severe depression after the surgery and had loss of memory, including not being able to remember her way home.      She began seeing a neurologist because of that. Then she began having neck pain and saw another neurologist at [facility], who told her that she has two pinched nerves. She participated in PT, which was helpful.      The whole right arm hurts, all the way down to the thumb, which tingles. The tingling in the median distribution has gotten worse since surgery. She was seen in the ED recently and was provided with a sling. She stopped using it because she didn't want her arm to get too stiff.      The pain "thumps like a toothache." It gets better with warm compresses, hot shower, and rubbing ointment on it. PT helped significantly. It gets worse when she sleeps on the ipsilateral side.      She denies myelopathic symptoms, including dropping objects, balance difficulties, and fine motor changes.      She stays with her friend. She has longstanding diabetes, which she works hard to control.      The patient denies any bleeding, infectious, or anesthetic complications with any previous surgery. She takes aspirin regularly as a component of her "back and body" Frederick pills.      As of 8/25/22, the patient reports that she has been about the same. She includes her daughter Shweta over the phone for our conversation. She obtained the MRI, CT, and flexion/extension X-rays that had been requested after the previous visit.  "

## 2022-09-26 NOTE — TRANSFER OF CARE
Anesthesia Transfer of Care Note    Patient: Antoine Gonzáles    Procedure(s) Performed: Procedure(s) (LRB):  REPLACEMENT, INTERVERTEBRAL DISC, CERVICAL, TOTAL C3/4 Rajesh neuro monitoring (N/A)    Patient location: PACU    Anesthesia Type: general    Transport from OR: Transported from OR on 6-10 L/min O2 by face mask with adequate spontaneous ventilation    Post pain: adequate analgesia    Post assessment: no apparent anesthetic complications and tolerated procedure well    Post vital signs: stable    Level of consciousness: sedated    Nausea/Vomiting: no nausea/vomiting    Complications: none    Transfer of care protocol was followed      Last vitals:   Visit Vitals  BP (!) 108/59 (BP Location: Right arm, Patient Position: Lying)   Pulse 86   Temp 36.3 °C (97.3 °F) (Temporal)   Resp (!) 24   LMP 12/20/2017   SpO2 98%   Breastfeeding No

## 2022-09-27 VITALS
TEMPERATURE: 99 F | SYSTOLIC BLOOD PRESSURE: 132 MMHG | HEART RATE: 87 BPM | DIASTOLIC BLOOD PRESSURE: 75 MMHG | OXYGEN SATURATION: 94 % | RESPIRATION RATE: 17 BRPM

## 2022-09-27 LAB
POCT GLUCOSE: 107 MG/DL (ref 70–110)
POCT GLUCOSE: 120 MG/DL (ref 70–110)
POCT GLUCOSE: 163 MG/DL (ref 70–110)

## 2022-09-27 PROCEDURE — 97116 GAIT TRAINING THERAPY: CPT

## 2022-09-27 PROCEDURE — 97161 PT EVAL LOW COMPLEX 20 MIN: CPT

## 2022-09-27 PROCEDURE — 99024 POSTOP FOLLOW-UP VISIT: CPT | Mod: ,,, | Performed by: PHYSICIAN ASSISTANT

## 2022-09-27 PROCEDURE — 63600175 PHARM REV CODE 636 W HCPCS: Performed by: STUDENT IN AN ORGANIZED HEALTH CARE EDUCATION/TRAINING PROGRAM

## 2022-09-27 PROCEDURE — 99024 PR POST-OP FOLLOW-UP VISIT: ICD-10-PCS | Mod: ,,, | Performed by: PHYSICIAN ASSISTANT

## 2022-09-27 PROCEDURE — 25000003 PHARM REV CODE 250: Performed by: STUDENT IN AN ORGANIZED HEALTH CARE EDUCATION/TRAINING PROGRAM

## 2022-09-27 RX ORDER — FAMOTIDINE 20 MG/1
20 TABLET, FILM COATED ORAL 2 TIMES DAILY
Qty: 28 TABLET | Refills: 0 | Status: SHIPPED | OUTPATIENT
Start: 2022-09-27 | End: 2022-09-27 | Stop reason: SDUPTHER

## 2022-09-27 RX ORDER — CEPHALEXIN 500 MG/1
500 CAPSULE ORAL EVERY 6 HOURS
Qty: 20 CAPSULE | Refills: 0 | Status: SHIPPED | OUTPATIENT
Start: 2022-09-27 | End: 2022-10-02

## 2022-09-27 RX ORDER — METHOCARBAMOL 500 MG/1
500 TABLET, FILM COATED ORAL 4 TIMES DAILY
Qty: 56 TABLET | Refills: 0 | Status: SHIPPED | OUTPATIENT
Start: 2022-09-27 | End: 2022-10-11

## 2022-09-27 RX ORDER — FAMOTIDINE 20 MG/1
20 TABLET, FILM COATED ORAL 2 TIMES DAILY
Qty: 28 TABLET | Refills: 0 | Status: SHIPPED | OUTPATIENT
Start: 2022-09-27 | End: 2023-06-14

## 2022-09-27 RX ORDER — NAPROXEN 500 MG/1
500 TABLET ORAL 2 TIMES DAILY
Qty: 28 TABLET | Refills: 0 | Status: SHIPPED | OUTPATIENT
Start: 2022-09-27 | End: 2022-09-27 | Stop reason: SDUPTHER

## 2022-09-27 RX ORDER — HYDROCODONE BITARTRATE AND ACETAMINOPHEN 10; 325 MG/1; MG/1
1 TABLET ORAL EVERY 8 HOURS PRN
Qty: 42 TABLET | Refills: 0 | OUTPATIENT
Start: 2022-09-27 | End: 2022-10-02

## 2022-09-27 RX ORDER — MUPIROCIN 20 MG/G
OINTMENT TOPICAL 2 TIMES DAILY
Qty: 1 EACH | Refills: 0 | Status: SHIPPED | OUTPATIENT
Start: 2022-09-27 | End: 2022-10-07

## 2022-09-27 RX ORDER — NAPROXEN 500 MG/1
500 TABLET ORAL 2 TIMES DAILY
Qty: 28 TABLET | Refills: 0 | Status: SHIPPED | OUTPATIENT
Start: 2022-09-27 | End: 2022-10-11

## 2022-09-27 RX ADMIN — HYDROCHLOROTHIAZIDE 12.5 MG: 12.5 TABLET ORAL at 08:09

## 2022-09-27 RX ADMIN — POTASSIUM CHLORIDE 20 MEQ: 1500 TABLET, EXTENDED RELEASE ORAL at 08:09

## 2022-09-27 RX ADMIN — OXYCODONE HYDROCHLORIDE AND ACETAMINOPHEN 1 TABLET: 5; 325 TABLET ORAL at 08:09

## 2022-09-27 RX ADMIN — Medication 2 G: at 06:09

## 2022-09-27 RX ADMIN — AMLODIPINE BESYLATE 10 MG: 10 TABLET ORAL at 08:09

## 2022-09-27 RX ADMIN — METHOCARBAMOL 500 MG: 500 TABLET ORAL at 08:09

## 2022-09-27 RX ADMIN — OXYCODONE HYDROCHLORIDE AND ACETAMINOPHEN 1 TABLET: 5; 325 TABLET ORAL at 12:09

## 2022-09-27 RX ADMIN — METHOCARBAMOL 500 MG: 500 TABLET ORAL at 12:09

## 2022-09-27 RX ADMIN — HYDROMORPHONE HYDROCHLORIDE 0.5 MG: 1 INJECTION, SOLUTION INTRAMUSCULAR; INTRAVENOUS; SUBCUTANEOUS at 04:09

## 2022-09-27 RX ADMIN — LOSARTAN POTASSIUM 50 MG: 50 TABLET, FILM COATED ORAL at 08:09

## 2022-09-27 RX ADMIN — POLYETHYLENE GLYCOL 3350 17 G: 17 POWDER, FOR SOLUTION ORAL at 08:09

## 2022-09-27 RX ADMIN — HYDROMORPHONE HYDROCHLORIDE 0.5 MG: 1 INJECTION, SOLUTION INTRAMUSCULAR; INTRAVENOUS; SUBCUTANEOUS at 12:09

## 2022-09-27 RX ADMIN — ACETAMINOPHEN 650 MG: 325 TABLET ORAL at 12:09

## 2022-09-27 NOTE — PLAN OF CARE
[facility] - Neurosurgery (Hospital)  Discharge Final Note    Primary Care Provider: Jb Fernandez MD    Expected Discharge Date: 9/27/2022    Final Discharge Note (most recent)       Final Note - 09/27/22 1046          Final Note    Assessment Type Final Discharge Note     Anticipated Discharge Disposition Home or Self Care     What phone number can be called within the next 1-3 days to see how you are doing after discharge? [phone]     Hospital Resources/Appts/Education Provided Provided patient/caregiver with written discharge plan information;Appointments scheduled by Navigator/Coordinator        Post-Acute Status    Discharge Delays None known at this time                     Contact Info       Jb Fernandez MD   Specialty: Internal Medicine   Relationship: PCP - General    [address]  [address]  [facility] [address]   Phone: [phone]       Next Steps: Follow up on 9/30/2022    Instructions: Please go to your hospital follow up appointment on 9/30/22 at 10:00 AM. Please bring your hospital discharge paperwork.          Pt discharging home with family. Hospital follow up appointment are scheduled and in Pt's AVS. Pt has no other post acute needs.     Pt is cleared for discharge from a case management standpoint.     AUSTYN Gaming, KRISTYN  [facility]  [id number]

## 2022-09-27 NOTE — ASSESSMENT & PLAN NOTE
SUBJECTIVE:   CC: Katie Hernandez is an 63 year old woman who presents for preventive health visit.     Overall she is doing well.  She still works but not this summer.  She exercises some.    She did have chest pain about 3 weeks ago off and on for a week.  It is subsequently resolved.  She has had this in the past.  She is not short of breath.  No dizziness or syncope.    She is otherwise doing well.  She has some issues with a cough post school but no asthma issues otherwise.  She does not see gynecology.  She has had a hysterectomy.      Patient has been advised of split billing requirements and indicates understanding: Yes  Healthy Habits:     Getting at least 3 servings of Calcium per day:  Yes    Bi-annual eye exam:  Yes    Dental care twice a year:  Yes    Sleep apnea or symptoms of sleep apnea:  None    Diet:  Regular (no restrictions)    Frequency of exercise:  2-3 days/week    Duration of exercise:  30-45 minutes    Taking medications regularly:  0    Barriers to taking medications:  None    Medication side effects:  None    PHQ-2 Total Score: 0    Additional concerns today:  No  History of Present Illness       Reason for visit:  Annual checkup    She eats 4 or more servings of fruits and vegetables daily.She consumes 0 sweetened beverage(s) daily.   She is taking medications regularly.  She is not taking prescribed medications regularly due to None.              Today's PHQ-2 Score:   PHQ-2 ( 1999 Pf[employer]) 6/17/2022   Q1: Little interest or pleasure in doing things 0   Q2: Feeling down, depressed or hopeless 0   PHQ-2 Score 0   PHQ-2 Total Score (12-17 Years)- Positive if 3 or more points; Administer PHQ-A if positive -   Q1: Little interest or pleasure in doing things Not at all   Q2: Feeling down, depressed or hopeless Not at all   PHQ-2 Score 0       Abuse: Current or Past (Physical, Sexual or Emotional) - No  Do you feel safe in your environment? Yes        Social History     Tobacco Use      "Antoine Gonzáles is a 57 y.o. female who presents with neck and right arm and shoulder pain s/p ACDF with Dr. Lucas. She denies myelopathic symptoms, but she does have Webber's on exam. She states that she was told that she has "pinched nerves" by a [facility] neurologist.     Patient presents for C3-C4 arthroplasty     Risks, benefits, and alternatives to surgery were discussed. Risks include, but are not limited to, bleeding, pain, infection, scarring, need for further/repeat procedure, death, paralysis, damage to vocal cords, damage to esophagus, damage to trachea, stroke/damage to major blood vessels, leak of cerebrospinal fluid, and hardware-related complications. We discussed that she may need additional surgery for other levels in the future, but this procedure is designed to help minimize the risk of adjacent segment disease. Informed consent was obtained.      We discussed that I define success as the operation as preventing her from having progression of myelopathic symptoms. I cannot guarantee improvement in pain. She expressed understanding.      She will need ENT vocal cord scope prior to surgery to assess for appropriateness of left-sided approach. I will have her see Dr. Weldon for medical optimization.      Given her MRSA history, she will receive vancomycin for perioperative prophylaxis. She has held all anticoagulation and antiplatelet agents, including her Frederick back pills, at least one week prior to surgery.     Proceed to OR as planned       " "Smoking status: Never Smoker     Smokeless tobacco: Never Used   Substance Use Topics     Alcohol use: Never     Comment: 1/2 d / mo     If you drink alcohol do you typically have >3 drinks per day or >7 drinks per week? No               Past Medical History:      Past Medical History:   Diagnosis Date     Abdominal pain 03/2017    ct with long segment of colits descending colon, then colonoscopy and bx by Dr. Dailey and nl     Abnormal EKG 07/2020    nl echo     Abnormal stools 5/30/2013     Agatston coronary artery calcium score less than 100 07/2018    score of 0     Anemia     \"Related to heavy periods\"     Arthritis     Controlled by eliminating dairy.     Breast disorder 2014, 2017    Pain, lump     Complex endometrial hyperplasia with atypia 10/17/2013    Path dx w complete hysterectomy.      H/O colonoscopy 03/2017    nl, bx nl     Hypertension     occasional     Kidney cysts     left seen on CT     Macular degeneration 2015    Taking vitamin.     Menarche     cycles q 28 x 5 d     Menorrhagia 5/16/2007    Oct 2009 ->anemia to 7+ gms.  Resolved with OTC po FE 50 mg/d      Postpartum depression     Only for a few days     Precancerous skin lesion     excised -- abdomen. ->yrly Derm cks     S/P total hysterectomy and bilateral salpingo-oophorectomy 10/17/2013    Path: -->Complex endometrial hyperplasia with atypia.      Shingles 03/2020     SOB (shortness of breath) 04/2017    also elevated d dimer - ct no pe, some fibrosis or atelect, breast cysts, then est echo nl 4/17     Swelling of the ankle, feet, or leg     Ankles     Uncomplicated asthma     Childhood. Currently have sensitive airways.     Unspecified hemorrhoids without mention of complication 2007    bleeding to anemia -- hgb 8.4 and lower....     Uterine leiomyoma 5/13/2009    US diagnosis while in [place] 8/10/2011 US in this EMR confirms multiple myomas with a 12 mm endometrial stripe.  (Problem list name updated by automated process. Provider " to review and confirm*             Past Surgical History:      Past Surgical History:   Procedure Laterality Date     APPENDECTOMY OPEN  age 4     BIOPSY      uterine, skin      SECTION   and      COLONOSCOPY      virtual     COLONOSCOPY  2013    Procedure: COLONOSCOPY;  COLONOSCOPY;  Surgeon: Db Reed MD;  Location:  GI     COLONOSCOPY N/A 3/14/2017    Procedure: COMBINED COLONOSCOPY, SINGLE OR MULTIPLE BIOPSY/POLYPECTOMY BY BIOPSY;  Surgeon: Db Reed MD;  Location:  GI     DAVINCI HYSTERECTOMY TOTAL, BILATERAL SALPINGO-OOPHORECTOMY, COMBINED  10/17/2013    Procedure: COMBINED DAVINCI HYSTERECTOMY TOTAL, SALPINGO-OOPHORECTOMY;  DaVinci Assisted Total Laparoscopic Hysterectomy/Bilateral Salpingo Oophorectomy Cystoscopy  ;  Surgeon: Zoya Vance MD;  Location: [facility] OR     DILATION AND CURETTAGE, OPERATIVE HYSTEROSCOPY WITH MORCELLATOR, COMBINED  2013    Procedure: COMBINED DILATION AND CURETTAGE, OPERATIVE HYSTEROSCOPY WITH MORCELLATOR;  Dilation and Curettage, Hysteroscopy, Hysteroscopic  Polypectomy with Myosure ;  Surgeon: Concepción Gamboa MD;  Location: [facility] OR     LAPAROSCOPY DIAGNOSTIC (GYN)      ectopic excision     ZZC LAP,SURG,COLECTOMY, PARTIAL, W/ANAST  age 4    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)             Social History:     Social History     Socioeconomic History     Marital status:      Spouse name: Not on file     Number of children: 2     Years of education: Not on file     Highest education level: Not on file   Occupational History     Occupation: Faculty     Employer: [employer]     Comment: Teaches Translation   Tobacco Use     Smoking status: Never Smoker     Smokeless tobacco: Never Used   Substance and Sexual Activity     Alcohol use: Never     Comment: 1/2 d / mo     Drug use: Never     Sexual activity: Yes     Partners: Male     Birth control/protection: Post-menopausal     Comment:  "Vastectomy   Other Topics Concern      Service Not Asked     Blood Transfusions No     Caffeine Concern No     Comment: Stopped all caffiene 2nd to breast pain     Occupational Exposure No     Hobby Hazards No     Sleep Concern No     Stress Concern No     Weight Concern No     Special Diet No     Back Care No     Exercise No     Comment: 45' aerobic 3xs/wk     Bike Helmet No     Seat Belt No     Self-Exams Not Asked     Parent/sibling w/ CABG, MI or angioplasty before 65F 55M? No   Social History Narrative     Not on file     Social Determinants of Health     Financial Resource Strain: Not on file   Food Insecurity: Not on file   Transportation Needs: Not on file   Physical Activity: Not on file   Stress: Not on file   Social Connections: Not on file   Intimate Partner Violence: Not on file   Housing Stability: Not on file             Family History:   reviewed         Allergies:     Allergies   Allergen Reactions     Hydrocodone Other (See Comments)     Pt fainted     Penicillins Hives     Doxycycline Other (See Comments)     Feeling of bugs crawling on skin and tinnitus     Hibiclens      Itching after preop washing.             Medications:     Current Outpatient Medications   Medication Sig Dispense Refill     calcium carbonate (OS-MINERVA) 1500 (600 Ca) MG tablet Take by mouth 2 times daily (with meals)       co-enzyme Q-10 100 MG CAPS capsule Take by mouth daily       magnesium 100 MG CAPS Take by mouth daily       Multiple Vitamins-Minerals (VITEYES AREDS FORMULA/LUTEIN) CAPS Take 1 tablet by mouth daily                 Review of Systems:   The 10 point Review of Systems is negative other than noted in the HPI           Physical Exam:   Blood pressure 105/67, pulse 70, temperature 97  F (36.1  C), temperature source Tympanic, resp. rate 16, height 1.626 m (5' 4\"), weight 58.5 kg (129 lb), last menstrual period 08/20/2013, SpO2 98 %, not currently breastfeeding.    Exam:  Constitutional: healthy " appearing, alert and in no distress  Heent: Normocephalic. Head without obvious masses or lesions. PERRLDC, EOMI. Mouth exam within normal limits: tongue, mucous membranes, posterior pharynx all normal, no lesions or abnormalities seen.  Tm's and canals within normal limits bilaterally. Neck supple, no nuchal rigidity or masses. No supraclavicular, or cervical adenopathy. Thyroid symmetric, no masses.  Cardiovascular: Regular rate and rhythm, no murmer, rub or gallops.  JVP not elevated, no edema.  Carotids within normal limits bilaterally, no bruits.  Respiratory: Normal respiratory effort.  Lungs clear, normal flow, no wheezing or crackles.  Breasts: Normal bilaterally.  No masses or lesions.  Nipples within normal limits.  No axillary lesions or nodes.  My M.A. Was present during this part of the examination.  Gastrointestinal: Normal active bowel sounds.   Soft, not tender, no masses, guarding or rebound.  No hepatosplenomegaly.   Musculoskeletal: extremities normal, no gross deformities noted.  Skin: no suspicious lesions or rashes   Neurologic: Mental status within normal limits.  Speech fluent.  No gross motor abnormalities and gait intact.  Psychiatric: mentation appears normal and affect normal.         Data:   Labs sent; ekg - nsr, within normal limits.        Assessment:   1. Normal complete physical exam  2. Atypical chest pain, doubt cv, pulmonary embolis, tumor, if more to call  3. hcm         Plan:   Exercise, diet  Letter with labs  Mammogram  Up to date colon  Up to date immunizations, shingrix at pharm  Call if more chest pain       Mahesh Santoyo M.D.

## 2022-09-27 NOTE — PLAN OF CARE
[facility] - Neurosurgery (Hospital)  Discharge Assessment    Primary Care Provider: Jb Fernandez MD     Discharge Assessment (most recent)       BRIEF DISCHARGE ASSESSMENT - 09/27/22 1037          Discharge Planning    Assessment Type Discharge Planning Brief Assessment     Resource/Environmental Concerns none     Support Systems Children     Current Living Arrangements home/apartment/condo     Patient/Family Anticipates Transition to home with family     Patient/Family Anticipated Services at Transition none     DME Needed Upon Discharge  none     Discharge Plan A Home with family     Discharge Plan B Home                   AUSTYN Gaming LMSW  [facility]  [id number]

## 2022-09-27 NOTE — NURSING
MD at bedside removing drain from left side of anterior neck incision.  Tolerated without adverse event.  MD reports will also order prn sore throat spray.

## 2022-09-27 NOTE — DISCHARGE SUMMARY
"[facility] - Neurosurgery ([facility])  Neurosurgery  Discharge Summary      Patient Name: Antoine Gonzáles  MRN: [id number]  Admission Date: 9/26/2022  Hospital Length of Stay: 0 days  Discharge Date and Time: 9/27/2022  Attending Physician: Mansi Saini MD   Discharging Provider: Anusha Zepeda PA-C  Primary Care Provider: Jb Fernandez MD    HPI:   Antoine Gonzáles is a 57 y.o. female who presents as a self-referral after previous ACDF at [place] with persistent neck and shoulder pain. She thinks the surgery was done around 2017 with Dr. Lucas. She states that she had severe depression after the surgery and had loss of memory, including not being able to remember her way home.      She began seeing a neurologist because of that. Then she began having neck pain and saw another neurologist at [facility], who told her that she has two pinched nerves. She participated in PT, which was helpful.      The whole right arm hurts, all the way down to the thumb, which tingles. The tingling in the median distribution has gotten worse since surgery. She was seen in the ED recently and was provided with a sling. She stopped using it because she didn't want her arm to get too stiff.      The pain "thumps like a toothache." It gets better with warm compresses, hot shower, and rubbing ointment on it. PT helped significantly. It gets worse when she sleeps on the ipsilateral side.      She denies myelopathic symptoms, including dropping objects, balance difficulties, and fine motor changes.      She stays with her friend. She has longstanding diabetes, which she works hard to control.      The patient denies any bleeding, infectious, or anesthetic complications with any previous surgery. She takes aspirin regularly as a component of her "back and body" Frederick pills.      As of 8/25/22, the patient reports that she has been about the same. She includes her daughter Shweta over the phone for our conversation. She obtained the MRI, " CT, and flexion/extension X-rays that had been requested after the previous visit.      Procedure(s) (LRB):  REPLACEMENT, INTERVERTEBRAL DISC, CERVICAL, TOTAL C3/4 Raejsh neuro monitoring (N/A)     Hospital Course:   9/27: NAEON. Patient reports slight sore throat. Tolerating regular diet. Drain with 15 cc - removed - patient tolerated well. No drainage present s/p drain removal. Reports drastic improvement in right arm pain s/p surgery. Denies new weakness or numbness. Medically stable for discharge. Incision care and activity recommendations reviewed. Plan of care discussed with patient and she voiced understanding. Her questions were all answered. Follow-up in Neurosurgery clinic arranged.       Goals of Care Treatment Preferences:  Code Status: Full Code      Consults: PT/OT    Physical Exam:  General: well developed, well nourished, no distress.   Head: normocephalic, atraumatic  Neck: No tracheal deviation. No palpable masses. Full ROM.   Neurologic: Alert and oriented. Thought content appropriate.  GCS: E4 V5 M6. GCS Total: 15  Mental Status: Awake, Alert, Oriented x 4  Language: No aphasia  Speech: No dysarthria  Cranial nerves: face symmetric, tongue midline, CN II-XII grossly intact.   Eyes: EOMI.   Pulmonary: normal respirations, no signs of respiratory distress  Abdomen: soft, non-distended, not tender to palpation    Sensory: intact to light touch throughout  Motor Strength: Moves all extremities spontaneously with good tone.  No abnormal movements seen.     Strength  Deltoids Triceps Biceps Wrist Extension Wrist Flexion Hand    Upper: R 5/5 5/5 5/5 5/5 5/5 5/5    L 5/5 5/5 5/5 5/5 5/5 5/5     Strength  Iliopsoas Quadriceps Knee  Flexion Tibialis  anterior Gastro- cnemius EHL   Lower: R 5/5 5/5 5/5 5/5 5/5 5/5    L 5/5 5/5 5/5 5/5 5/5 5/5     Vascular: No LE edema.   Skin: Skin is warm, dry and intact.    Reflexes:   Webber's: Positive on L      Incision: c/d/i with skin edges well approximated  with Dermabond. No surrounding erythema or edema. No drainage from incision. No palpable hematoma or underlying fluid collection.        Significant Diagnostic Studies: Labs: BMP: No results for input(s): GLU, NA, K, CL, CO2, BUN, CREATININE, CALCIUM, MG in the last 48 hours., CMP No results for input(s): NA, K, CL, CO2, GLU, BUN, CREATININE, CALCIUM, PROT, ALBUMIN, BILITOT, ALKPHOS, AST, ALT, ANIONGAP, ESTGFRAFRICA, EGFRNONAA in the last 48 hours. and CBC No results for input(s): WBC, HGB, HCT, PLT in the last 48 hours.  Radiology: cervical xrays    Pending Diagnostic Studies:       Procedure Component Value Units Date/Time    X-Ray Cervical Spine AP Lateral, Upright [[id number]] Resulted: 09/27/22 0314    Order Status: Sent Lab Status: In process Updated: 09/27/22 0314          Final Active Diagnoses:    Diagnosis Date Noted POA    PRINCIPAL PROBLEM:  Cervical disc disorder with myelopathy [M50.00] 08/26/2022 Yes      Problems Resolved During this Admission:      Discharged Condition: good     Disposition: Home or Self Care    Follow Up:      Patient Instructions:      X-Ray Cervical Spine AP Lat with Flexion  Extension   Standing Status: Future Standing Exp. Date: 09/27/23     Order Specific Question Answer Comments   Reason for Exam: post op cervical disc arthroplasty    May the Radiologist modify the order per protocol to meet the clinical needs of the patient? Yes    Release to patient Immediate      Ambulatory referral/consult to Physical/Occupational Therapy   Standing Status: Future   Referral Priority: Routine Referral Type: Physical Medicine   Referral Reason: Specialty Services Required   Requested Specialty: Physical Therapy   Number of Visits Requested: 1     Ambulatory referral/consult to Physical/Occupational Therapy   Standing Status: Future   Referral Priority: Routine Referral Type: Physical Medicine   Referral Reason: Specialty Services Required   Requested Specialty: Occupational Therapy   Number  of Visits Requested: 1     Notify your health care provider if you experience any of the following:  increased confusion or weakness     Notify your health care provider if you experience any of the following:  persistent dizziness, light-headedness, or visual disturbances     Notify your health care provider if you experience any of the following:  worsening rash     Notify your health care provider if you experience any of the following:  severe persistent headache     Notify your health care provider if you experience any of the following:  difficulty breathing or increased cough     Notify your health care provider if you experience any of the following:  redness, tenderness, or signs of infection (pain, swelling, redness, odor or green/yellow discharge around incision site)     Notify your health care provider if you experience any of the following:  severe uncontrolled pain     Notify your health care provider if you experience any of the following:  persistent nausea and vomiting or diarrhea     Notify your health care provider if you experience any of the following:  temperature >100.4     Activity as tolerated     Medications:  Reconciled Home Medications:      Medication List        START taking these medications      cephALEXin 500 MG capsule  Commonly known as: KEFLEX  Take 1 capsule (500 mg total) by mouth every 6 (six) hours. for 5 days     famotidine 20 MG tablet  Commonly known as: PEPCID  Take 1 tablet (20 mg total) by mouth 2 (two) times daily. for 14 days     HYDROcodone-acetaminophen  mg per tablet  Commonly known as: NORCO  Take 1 tablet by mouth every 8 (eight) hours as needed for Pain.     methocarbamoL 500 MG Tab  Commonly known as: ROBAXIN  Take 1 tablet (500 mg total) by mouth 4 (four) times daily. for 14 days     naproxen 500 MG tablet  Commonly known as: NAPROSYN  Take 1 tablet (500 mg total) by mouth 2 (two) times daily. for 14 days            CONTINUE taking these medications       amLODIPine 10 MG tablet  Commonly known as: NORVASC  TAKE 1 TABLET(10 MG) BY MOUTH EVERY DAY     * b complex vitamins capsule  Take 1 capsule by mouth once daily.     * SUPER B-50 COMPLEX ORAL  as directed     B-complex with vitamin C tablet  Commonly known as: Z-Bec or Equiv  Take 1 tablet by mouth once daily.     BIOTIN ORAL  Take by mouth.     cetirizine 10 MG tablet  Commonly known as: ZYRTEC  1-2 tablets daily as needed for itching or swelling     cyanocobalamin (vitamin B-12) 1,000 mcg/15 mL Liqd  Pill     diclofenac sodium 1 % Gel  Commonly known as: VOLTAREN  Apply 2g  to affected area every 6 hr as needed for pain.     hydroCHLOROthiazide 12.5 MG Tab  Commonly known as: HYDRODIURIL  TAKE 1 TABLET DAILY WITH LOSARTAN     losartan 50 MG tablet  Commonly known as: COZAAR  TAKE 1 TABLET BY MOUTH EVERY DAY WITH HCTZ     magnesium oxide 500 mg Tab  Take by mouth once.     mupirocin 2 % ointment  Commonly known as: BACTROBAN  Apply by Nasal route 2 (two) times daily for 5 days     OZEMPIC 1 mg/dose (2 mg/1.5 mL) Pnij  Generic drug: semaglutide  TAKES ON Monday MORNINGS.     potassium chloride SA 10 MEQ tablet  Commonly known as: K-DUR,KLOR-CON M  Take 20 mEq by mouth once daily.     rosuvastatin 40 MG Tab  Commonly known as: CRESTOR  Take 1 tablet (40 mg total) by mouth every evening.     turmeric root extract 500 mg Cap  once daily.     UNABLE TO FIND  medication name: dp rub - Rt arm           * This list has 2 medication(s) that are the same as other medications prescribed for you. Read the directions carefully, and ask your doctor or other care provider to review them with you.                  Anusha Zepeda PA-C  Neurosurgery  [facility] - Neurosurgery ([facility]

## 2022-09-27 NOTE — ANESTHESIA POSTPROCEDURE EVALUATION
Anesthesia Post Evaluation    Patient: Antoine Gonzáles    Procedure(s) Performed: Procedure(s) (LRB):  REPLACEMENT, INTERVERTEBRAL DISC, CERVICAL, TOTAL C3/4 Rajesh neuro monitoring (N/A)    Final Anesthesia Type: general      Patient location during evaluation: PACU  Patient participation: Yes- Able to Participate  Level of consciousness: awake  Post-procedure vital signs: reviewed and stable  Pain management: adequate  Airway patency: patent    PONV status at discharge: No PONV  Anesthetic complications: no      Cardiovascular status: blood pressure returned to baseline  Respiratory status: unassisted  Hydration status: euvolemic  Follow-up not needed.          Vitals Value Taken Time   /81 09/26/22 1944   Temp 37.4 °C (99.3 °F) 09/26/22 1944   Pulse 102 09/26/22 1944   Resp 17 09/27/22 0406   SpO2 93 % 09/26/22 1944         Event Time   Out of Recovery 11:00:00         Pain/Dangelo Score: Pain Rating Prior to Med Admin: 8 (9/27/2022  4:06 AM)  Pain Rating Post Med Admin: 6 (9/26/2022  3:12 PM)  Dangelo Score: 9 (9/26/2022  8:29 PM)

## 2022-09-27 NOTE — NURSING
Patient being discharged home per orders; patient consult to out patient therapy.  Patient to follow up as directed.

## 2022-09-27 NOTE — PT/OT/SLP EVAL
Physical Therapy Evaluation and Discharge Note    Patient Name:  Antoine Gonzáles   MRN:  [id number]    Recommendations:     Discharge Recommendations:  home   Discharge Equipment Recommendations: none   Barriers to discharge: None    Assessment:     Antoine Gonzáles is a 57 y.o. female admitted with a medical diagnosis of Cervical disc disorder with myelopathy. At this time, patient is functioning at their prior level of function and does not require further acute PT services.     Recent Surgery: Procedure(s) (LRB):  REPLACEMENT, INTERVERTEBRAL DISC, CERVICAL, TOTAL C3/4 Rajesh neuro monitoring (N/A) 1 Day Post-Op    Plan:     During this hospitalization, patient does not require further acute PT services.  Please re-consult if situation changes.      Subjective     Chief Complaint: Neck pain  Patient/Family Comments/goals: Return home to recreational exercise and activities  Pain/Comfort:  Pain Rating 1: 9/10  Location - Orientation 1: anterior  Location 1: neck  Pain Addressed 1: Pre-medicate for activity, Distraction    Patients cultural, spiritual, Hoahaoism conflicts given the current situation: yes    Living Environment:  Patient lives in a 2 story home but plans to discharge to live with a friend in a 1 story home with no steps to enter.  Prior to admission, patients level of function was independent in all activities.  Equipment used at home: none.  DME owned (not currently used): none.  Upon discharge, patient will have assistance from her two daughters who live close by.    Objective:     Communicated with RN prior to session.  Patient found sitting edge of bed with blood pressure cuff, telemetry upon PT entry to room.    General Precautions: Standard, fall   Orthopedic Precautions:spinal precautions   Braces: N/A   Respiratory Status: Room air    Exams:  Cognitive Exam:  Patient is oriented to Person, Place, Time, and Situation  Postural Exam:  Patient presented with the following abnormalities: -        No postural abnormalities identified  RLE ROM: WNL  RLE Strength: WNL  LLE ROM: WNL  LLE Strength: WNL    Functional Mobility:  Transfers:  Sit to Stand:  supervision with no AD  Gait: ~200 feet with CGA-SBA  Stairs:  Pt ascended/descended 4 stair(s) with No Assistive Device with no handrails with Contact Guard Assistance.     AM-PAC 6 CLICK MOBILITY  Total Score:24       Therapeutic Activities and Exercises:   - PT discussed plan to discharge pt from acute care PT services due to patient operating at physical baseline. Patient agreeable with plan.     AM-PAC 6 CLICK MOBILITY  Total Score:24     Patient left supine with all lines intact, call button in reach, bed alarm off, and RN notified.    GOALS:   Multidisciplinary Problems       Physical Therapy Goals       Not on file                    History:     Past Medical History:   Diagnosis Date    Anemia     Angio-edema     Depression     Diabetes mellitus type 2, noninsulin dependent 2016    Hematuria     Hx of essential hypertension     Hyperlipidemia     Hypertension, uncontrolled 2016    Nuclear sclerosis of both eyes 04/10/2017       Past Surgical History:   Procedure Laterality Date    BTL       SECTION      COLONOSCOPY N/A 2017    Procedure: COLONOSCOPY;  Surgeon: Oleg Enciso MD;  Location: [address]);  Service: Endoscopy;  Laterality: N/A;  CHRONIC CONSTIPATION S/P LRNY    COSMETIC SURGERY      Liposuction , back surgery  , buttock surgery     CYSTOSCOPY  2019    Procedure: CYSTOSCOPY;  Surgeon: ELIZABETH Mendoza MD;  Location: [facility];  Service: Urology;;  PRE-OP BY RN 3-    ESOPHAGOGASTRODUODENOSCOPY N/A 2020    Procedure: EGD (ESOPHAGOGASTRODUODENOSCOPY);  Surgeon: Elias Harper MD;  Location: [address]);  Service: Endoscopy;  Laterality: N/A;  prep ins. emailed - ERW  COVID screening on 20 - ERW    GASTRIC BYPASS      sandra en y    HYSTEROSCOPY WITH DILATION AND  CURETTAGE OF UTERUS N/A 09/28/2018    Procedure: HYSTEROSCOPY, WITH DILATION AND CURETTAGE OF UTERUS;  Surgeon: Naveed Alexander MD;  Location: [facility] OR;  Service: OB/GYN;  Laterality: N/A;  RN PREOP 9/26/2018    NECK SURGERY  09/30/2016    TOTAL REDUCTION MAMMOPLASTY  2007    TOTAL REPLACEMENT OF CERVICAL INTERVERTEBRAL DISC N/A 9/26/2022    Procedure: REPLACEMENT, INTERVERTEBRAL DISC, CERVICAL, TOTAL C3/4 Phani neuro monitoring;  Surgeon: Mansi Saini MD;  Location: [facility] OR [address] [place] [address];  Service: Neurosurgery;  Laterality: N/A;  TORONTO III, ASA III, BLOOD TYPE AND SCREEN, NEUROMONITORING EMG SEP MEP, BED REGULAR BED, HEADREST CASPAR, POSITION SUPINE, SPECIAL EQUIPMENT PHANI BIOMET, RADIOLOGY C-ARM       Time Tracking:     PT Received On: 09/27/22  PT Start Time: 0854     PT Stop Time: 0917  PT Total Time (min): 23 min     Billable Minutes: Evaluation 10 and Gait Training 13      09/27/2022

## 2022-09-27 NOTE — DISCHARGE INSTRUCTIONS
Post op Spine Patient Instructions    Activity Restrictions:  [x]  Return to work will be determined on an individual basis.  [x]  No lifting greater than 5-10 pounds.  [x]  No driving or operating machinery:  [x]  until cleared by your surgeon.  [x]  while taking narcotic pain medications or muscle relaxants.  [x]  Increase ambulation over the next 2 weeks so that you are walking 2 miles per day at 2 weeks post-operatively.  [x]  Walk on paved surfaces only. It is okay to walk up and down stairs while holding onto a side rail.  [x]  No sexual activity for 6 weeks.    Discharge Medication/Follow-up:  [x]  Please refer to discharge medication reconciliation form.  [x]  Do not take any OTC products containing acetaminophen (tylenol) at the same time as you take your narcotic pain medication. Medications that may contain acetaminophen include but are not limited to: Excedrin and other headache medications, arthritis medications, cold and sinus medications, etc. Please review the list of active ingredients in any OTC medication prior to taking it.  [x]  Do not take ANY Aspirin or Aspirin containing products  [x]  Prescriptions for appropriate medication will be given upon discharge.  [x]  Take docusate (Colace 100 mg): take one capsule a day as needed for constipation. You can get this over the counter.  [x]  Follow-up appointment:  [x]  10-14 days post-op for wound check by physician assistant/nurse  [x]  4-6 weeks with MD:  [x]  with x-rays  [x]  An appointment will be mailed to you.      Wound Care:  [x]  Remove the small dressing near your incision in 2 days.   [x]  No bandage required. Keep your incision open to the air. You have dermabond (skin glue) covering your incision. This will begin to flake off over the next 2 weeks. Do not remove this on your own, allow it to peel off. Do not apply ointments or creams to your incision.  [x]  You may shower on the 2nd day after your surgery. Keep the incision clean and dry  at all times. Do not allow the force of water to hit the incision. If the incision gets damp, pat it dry. Do not rub or scrub the incision.  [x]  You cannot take a bath until 8 weeks after surgery.      Call your doctor or go to the Emergency Room for any signs of infection, including: increased redness, drainage, pain, or fever (temperature ?101). Call your doctor or go to the Emergency Room if there are any localized neurological changes; problems with speech, vision, numbness, tingling, weakness, or severe headache; inability to control urination or bowel movements; inability to urinate; or for other concerns.    Wound Care:  Keep your incision open to air. You may shower on the 2nd day after your surgery. Please shower with baby shampoo, but do not take a bath. Keep the incision clean and dry at all times. Do not allow the force of water to hit the incision. If the incision gets damp, gently pat it dry. Do not rub or scrub the incision. You cannot take a bath/swim/submerge the incision until 8 weeks after surgery.    The incision does not need to be cleaned with any water, soap, alcohol, peroxide, or other substance.      You have skin glue over your incision. Please do not pick at it or try to remove it. It will dissolve on its own.     You now have an implanted device in place. It is imperative that any infection (such as a urinary tract infection) be treated immediately so that it cannot get into your bloodstream. If an infection ends up in your blood, it may seed the device, thus requiring us to remove it. Call the Neurosurgery office or go to the Emergency Room for any signs of infection including: increased redness, drainage, pain or fever (temperature greater than or equal to 101.4).     Miscellaneous:  -You have been discharged home on antibiotics and it is very important that you complete the course of antibiotics as instructed.     Special Instructions:  [x]  No use of tobacco products.  [x]  Diet:  Please eat a regular diet as tolerated.      Physicians need 3 days' notice for pain medicine to be refilled. Pain medicine will only be refilled between 8 AM and 5 PM, Monday through Friday, due to Food and Drug Administration regulation of documentation.    Please contact the Neurosurgery Office at [phone] with any questions or concerns.   Our after hours number is [phone] - ask for Neurosurgery On Call.

## 2022-09-28 ENCOUNTER — TELEPHONE (OUTPATIENT)
Dept: NEUROSURGERY | Facility: CLINIC | Age: 57
End: 2022-09-28
Payer: MEDICARE

## 2022-09-28 DIAGNOSIS — M50.00 CERVICAL DISC DISORDER WITH MYELOPATHY: Primary | ICD-10-CM

## 2022-09-28 RX ORDER — ONDANSETRON 4 MG/1
4 TABLET, ORALLY DISINTEGRATING ORAL EVERY 12 HOURS PRN
Qty: 10 TABLET | Refills: 0 | Status: SHIPPED | OUTPATIENT
Start: 2022-09-28 | End: 2022-10-03

## 2022-09-28 RX ORDER — ONDANSETRON 4 MG/1
4 TABLET, ORALLY DISINTEGRATING ORAL EVERY 12 HOURS PRN
Qty: 10 TABLET | Refills: 0 | Status: SHIPPED | OUTPATIENT
Start: 2022-09-28 | End: 2022-09-28

## 2022-09-28 NOTE — TELEPHONE ENCOUNTER
----- Message from Darius Tremayne Brown sent at 9/28/2022 11:32 AM CDT -----  Regarding: orders  Contact: pt @ [phone]  Pt Antoine Gonzáles is calling in regards to get orders for home based therapy, pt unable to drive to outpatient therapy. Per pt insurance company is requesting that Dr. Saini office to contact them. Please call.

## 2022-09-28 NOTE — TELEPHONE ENCOUNTER
Pain medication is making her nauseated, pt would like something to help with that, is asking if there is anything that we can do.  Someone called yesterday to schedule physical therapy, patient cannot drive so she is asking if they can come to her house instead. I told pt that I would find out and get back to her.        ----- Message from Eduarda Sahu RN sent at 9/28/2022 12:12 PM CDT -----  Regarding: FW: call back  Contact: Pt    ----- Message -----  From: Laurie Tidwell Patient Care Assistant  Sent: 9/28/2022  12:11 PM CDT  To: Eduarda Sahu RN  Subject: call back                                        Pt is requesting a call back in regards to the medication. Pt states she has been nauseated and would like to speak with you regarding this matter. Pt states she doesn't know why this is making her ill but she would like to speak with you to discuss. Pt doesn't know if it is actually the medication and needs clarity on what it could be from.      Pt @ [phone]

## 2022-09-29 ENCOUNTER — TELEPHONE (OUTPATIENT)
Dept: NEUROSURGERY | Facility: CLINIC | Age: 57
End: 2022-09-29
Payer: MEDICARE

## 2022-09-29 NOTE — TELEPHONE ENCOUNTER
Pt wanted to know when to start pt. Home pt orders were put in yesterday. Advised that they should be hearing back soon likely some time next week.

## 2022-10-02 ENCOUNTER — HOSPITAL ENCOUNTER (EMERGENCY)
Facility: HOSPITAL | Age: 57
Discharge: HOME | End: 2022-10-02
Attending: EMERGENCY MEDICINE
Payer: MEDICARE

## 2022-10-02 VITALS
HEART RATE: 89 BPM | HEIGHT: 62 IN | SYSTOLIC BLOOD PRESSURE: 165 MMHG | OXYGEN SATURATION: 95 % | WEIGHT: 170 LBS | BODY MASS INDEX: 31.28 KG/M2 | RESPIRATION RATE: 18 BRPM | DIASTOLIC BLOOD PRESSURE: 79 MMHG | TEMPERATURE: 99 F

## 2022-10-02 DIAGNOSIS — G89.18 POST-OP PAIN: Primary | ICD-10-CM

## 2022-10-02 PROCEDURE — 99284 EMERGENCY DEPT VISIT MOD MDM: CPT | Mod: 25

## 2022-10-02 PROCEDURE — 63600175 PHARM REV CODE 636 W HCPCS: Performed by: EMERGENCY MEDICINE

## 2022-10-02 PROCEDURE — 99285 PR EMERGENCY DEPT VISIT,LEVEL V: ICD-10-PCS | Mod: ,,, | Performed by: EMERGENCY MEDICINE

## 2022-10-02 PROCEDURE — 25000003 PHARM REV CODE 250: Performed by: INTERNAL MEDICINE

## 2022-10-02 PROCEDURE — 99285 EMERGENCY DEPT VISIT HI MDM: CPT | Mod: ,,, | Performed by: EMERGENCY MEDICINE

## 2022-10-02 PROCEDURE — 96374 THER/PROPH/DIAG INJ IV PUSH: CPT

## 2022-10-02 RX ORDER — PREGABALIN 75 MG/1
75 CAPSULE ORAL 2 TIMES DAILY
Qty: 60 CAPSULE | Refills: 0 | Status: SHIPPED | OUTPATIENT
Start: 2022-10-02 | End: 2023-06-14

## 2022-10-02 RX ORDER — OXYCODONE AND ACETAMINOPHEN 5; 325 MG/1; MG/1
1 TABLET ORAL EVERY 6 HOURS PRN
Qty: 18 TABLET | Refills: 0 | Status: SHIPPED | OUTPATIENT
Start: 2022-10-02 | End: 2022-10-05 | Stop reason: SDUPTHER

## 2022-10-02 RX ORDER — HYDROMORPHONE HYDROCHLORIDE 1 MG/ML
1 INJECTION, SOLUTION INTRAMUSCULAR; INTRAVENOUS; SUBCUTANEOUS
Status: COMPLETED | OUTPATIENT
Start: 2022-10-02 | End: 2022-10-02

## 2022-10-02 RX ORDER — HYDROCODONE BITARTRATE AND ACETAMINOPHEN 10; 325 MG/1; MG/1
1 TABLET ORAL
Status: COMPLETED | OUTPATIENT
Start: 2022-10-02 | End: 2022-10-02

## 2022-10-02 RX ORDER — DIAZEPAM 2 MG/1
2 TABLET ORAL EVERY 6 HOURS PRN
Qty: 18 TABLET | Refills: 0 | Status: SHIPPED | OUTPATIENT
Start: 2022-10-02 | End: 2022-10-05 | Stop reason: SDUPTHER

## 2022-10-02 RX ADMIN — HYDROMORPHONE HYDROCHLORIDE 1 MG: 1 INJECTION, SOLUTION INTRAMUSCULAR; INTRAVENOUS; SUBCUTANEOUS at 06:10

## 2022-10-02 RX ADMIN — HYDROCODONE BITARTRATE AND ACETAMINOPHEN 1 TABLET: 10; 325 TABLET ORAL at 07:10

## 2022-10-02 NOTE — SUBJECTIVE & OBJECTIVE
(Not in a hospital admission)      Review of patient's allergies indicates:   Allergen Reactions    Lisinopril-hydrochlorothiazide Swelling     Facial swelling/ mouth swelling  She can tolerate hydrochlorothiazide       Past Medical History:   Diagnosis Date    Anemia     Angio-edema     Depression     Diabetes mellitus type 2, noninsulin dependent 2016    Hematuria     Hx of essential hypertension     Hyperlipidemia     Hypertension, uncontrolled 2016    Nuclear sclerosis of both eyes 04/10/2017     Past Surgical History:   Procedure Laterality Date    BTL       SECTION      COLONOSCOPY N/A 2017    Procedure: COLONOSCOPY;  Surgeon: Oleg Enciso MD;  Location: [facility] (4TH FLR);  Service: Endoscopy;  Laterality: N/A;  CHRONIC CONSTIPATION S/P LRNY    COSMETIC SURGERY      Liposuction , back surgery  , buttock surgery     CYSTOSCOPY  2019    Procedure: CYSTOSCOPY;  Surgeon: ELIZABETH Mendoza MD;  Location: [facility];  Service: Urology;;  PRE-OP BY RN 3-    ESOPHAGOGASTRODUODENOSCOPY N/A 2020    Procedure: EGD (ESOPHAGOGASTRODUODENOSCOPY);  Surgeon: Elias Harper MD;  Location: [facility] (4TH FLR);  Service: Endoscopy;  Laterality: N/A;  prep ins. emailed - ERW  COVID screening on 20 - ERW    GASTRIC BYPASS      sandra en y    HYSTEROSCOPY WITH DILATION AND CURETTAGE OF UTERUS N/A 2018    Procedure: HYSTEROSCOPY, WITH DILATION AND CURETTAGE OF UTERUS;  Surgeon: Naveed Alexander MD;  Location: [facility] OR;  Service: OB/GYN;  Laterality: N/A;  RN PREOP 2018    NECK SURGERY  2016    TOTAL REDUCTION MAMMOPLASTY  2007    TOTAL REPLACEMENT OF CERVICAL INTERVERTEBRAL DISC N/A 2022    Procedure: REPLACEMENT, INTERVERTEBRAL DISC, CERVICAL, TOTAL C3/4 Rajesh neuro monitoring;  Surgeon: Mansi Saini MD;  Location: [facility] 2ND FLR;  Service: Neurosurgery;  Laterality: N/A;  TORONTO III, ASA III, BLOOD TYPE AND SCREEN, NEUROMONITORING EMG SEP  MEP, BED REGULAR BED, HEADREST CASPAR, POSITION SUPINE, SPECIAL EQUIPMENT PHANI BIOMET, RADIOLOGY C-ARM     Family History       Problem Relation (Age of Onset)    Allergies Sister    Diabetes type II Mother    Heart attack Father (50)    Heart disease Mother    No Known Problems Brother, Maternal Aunt, Maternal Uncle, Paternal Aunt, Paternal Uncle, Maternal Grandmother, Maternal Grandfather, Paternal Grandmother, Paternal Grandfather          Tobacco Use    Smoking status: Never    Smokeless tobacco: Never   Substance and Sexual Activity    Alcohol use: No     Comment: one drink in a few months    Drug use: No    Sexual activity: Yes     Partners: Male     Review of Systems   Constitutional:  Negative for chills and fever.   HENT:  Negative for trouble swallowing and voice change.    Eyes:  Negative for photophobia and visual disturbance.   Respiratory:  Negative for chest tightness and shortness of breath.    Gastrointestinal:  Negative for nausea and vomiting.   Genitourinary:  Negative for difficulty urinating and frequency.   Musculoskeletal:  Positive for myalgias and neck pain. Negative for back pain.   Neurological:  Negative for weakness.   Objective:     Weight: 77.1 kg (170 lb)  Body mass index is 31.09 kg/m².  Vital Signs (Most Recent):  Temp: 98.4 °F (36.9 °C) (10/02/22 0631)  Pulse: 78 (10/02/22 0631)  Resp: 18 (10/02/22 0631)  BP: (!) 162/79 (10/02/22 0631)  SpO2: 95 % (10/02/22 0631)   Vital Signs (24h Range):  Temp:  [98.4 °F (36.9 °C)-99.1 °F (37.3 °C)] 98.4 °F (36.9 °C)  Pulse:  [78-81] 78  Resp:  [18] 18  SpO2:  [95 %-99 %] 95 %  BP: (162-185)/(79-95) 162/79                          Physical Exam    Neurosurgery Physical Exam  General: well developed, well nourished, no distress.   Head: normocephalic, atraumatic  Neck: No tracheal deviation. No palpable masses. Full ROM.   Neurologic: Alert and oriented. Thought content appropriate.  GCS: E4 V5 M6. GCS Total: 15  Mental Status: Awake, Alert,  Oriented x 4  Language: No aphasia  Speech: No dysarthria  Cranial nerves: face symmetric, tongue midline, CN II-XII grossly intact.   Eyes: EOMI.   Pulmonary: normal respirations, no signs of respiratory distress  Abdomen: soft, non-distended, not tender to palpation     Sensory: intact to light touch throughout, baseline R thumb numbness  Motor Strength: Moves all extremities spontaneously with good tone.  No abnormal movements seen.      Strength   Deltoids Triceps Biceps Wrist Extension Wrist Flexion Hand    Upper: R 5/5 5/5 5/5 5/5 5/5 5/5     L 5/5 5/5 5/5 5/5 5/5 5/5      Strength   Iliopsoas Quadriceps Knee  Flexion Tibialis  anterior Gastro- cnemius EHL   Lower: R 5/5 5/5 5/5 5/5 5/5 5/5     L 5/5 5/5 5/5 5/5 5/5 5/5      Vascular: No LE edema.   Skin: Skin is warm, dry and intact.     Reflexes:   Webber's: Positive on L        Incision: c/d/i with skin edges well approximated with Dermabond. No surrounding erythema or edema. No drainage from incision. No palpable hematoma or underlying fluid collection.    Significant Labs:  No results for input(s): GLU, NA, K, CL, CO2, BUN, CREATININE, CALCIUM, MG in the last 48 hours.  No results for input(s): WBC, HGB, HCT, PLT in the last 48 hours.  No results for input(s): LABPT, INR, APTT in the last 48 hours.  Microbiology Results (last 7 days)       ** No results found for the last 168 hours. **          All pertinent labs from the last 24 hours have been reviewed.    Significant Diagnostics:  I have reviewed and interpreted all pertinent imaging results/findings within the past 24 hours.    X-Ray Cervical Spine AP And Lateral    Result Date: 10/2/2022  No detrimental change when compared with 09/27/2022. Electronically signed by: Dickson Noble MD Date:    10/02/2022 Time:    06:21

## 2022-10-02 NOTE — ED PROVIDER NOTES
Encounter Date: 10/2/2022       History     Chief Complaint   Patient presents with    Post-op Problem     Pt c/o neck pain after recent neck sx on Monday. Pt states her pain medication is not providing her with relief.     Pt is a 56 yo F with PMH of anemia, hypertension, hyperlipidemia, type 2 diabetes mellitus, recent cervical spine surgery who presents with neck pain that started earlier this evening while at rest. She is taking hydrocodone without much relief. She has tried BC powder.  She denies numbness. She does have tingling in the back of her neck and right thumb but the tingling in her thumb was present before the surgery.  Had surgery 22 - intervertebral disk replacement C3-4  She is nauseated. She denies fever or chills  Lsst dose of hydrocodone was about 4 or 5 hours ago  Took zofran at the same time    The history is provided by the patient and medical records.   Review of patient's allergies indicates:   Allergen Reactions    Lisinopril-hydrochlorothiazide Swelling     Facial swelling/ mouth swelling  She can tolerate hydrochlorothiazide     Past Medical History:   Diagnosis Date    Anemia     Angio-edema     Depression     Diabetes mellitus type 2, noninsulin dependent 2016    Hematuria     Hx of essential hypertension     Hyperlipidemia     Hypertension, uncontrolled 2016    Nuclear sclerosis of both eyes 04/10/2017     Past Surgical History:   Procedure Laterality Date    BTL       SECTION      COLONOSCOPY N/A 2017    Procedure: COLONOSCOPY;  Surgeon: Oleg Enciso MD;  Location: [address]);  Service: Endoscopy;  Laterality: N/A;  CHRONIC CONSTIPATION S/P LRNY    COSMETIC SURGERY      Liposuction , back surgery  , buttock surgery     CYSTOSCOPY  2019    Procedure: CYSTOSCOPY;  Surgeon: ELIZABETH Mendoza MD;  Location: [facility];  Service: Urology;;  PRE-OP BY RN 3-    ESOPHAGOGASTRODUODENOSCOPY N/A 2020    Procedure: EGD  (ESOPHAGOGASTRODUODENOSCOPY);  Surgeon: Elias Harper MD;  Location: [facility] ENDO (4TH FLR);  Service: Endoscopy;  Laterality: N/A;  prep ins. emailed - ERW  COVID screening on 6/26/20 - ERW    GASTRIC BYPASS  1995    sandra en y    HYSTEROSCOPY WITH DILATION AND CURETTAGE OF UTERUS N/A 09/28/2018    Procedure: HYSTEROSCOPY, WITH DILATION AND CURETTAGE OF UTERUS;  Surgeon: Naveed Alexander MD;  Location: [facility] OR;  Service: OB/GYN;  Laterality: N/A;  RN PREOP 9/26/2018    NECK SURGERY  09/30/2016    TOTAL REDUCTION MAMMOPLASTY  2007    TOTAL REPLACEMENT OF CERVICAL INTERVERTEBRAL DISC N/A 9/26/2022    Procedure: REPLACEMENT, INTERVERTEBRAL DISC, CERVICAL, TOTAL C3/4 Phani neuro monitoring;  Surgeon: Mansi Saini MD;  Location: [facility] OR 2ND FLR;  Service: Neurosurgery;  Laterality: N/A;  TORONTO III, ASA III, BLOOD TYPE AND SCREEN, NEUROMONITORING EMG SEP MEP, BED REGULAR BED, HEADREST CASPAR, POSITION SUPINE, SPECIAL EQUIPMENT PHANI BIOMET, RADIOLOGY C-ARM     Family History   Problem Relation Age of Onset    Heart disease Mother     Diabetes type II Mother     Heart attack Father 50        Open heart surgery    Allergies Sister     No Known Problems Brother     No Known Problems Maternal Aunt     No Known Problems Maternal Uncle     No Known Problems Paternal Aunt     No Known Problems Paternal Uncle     No Known Problems Maternal Grandmother     No Known Problems Maternal Grandfather     No Known Problems Paternal Grandmother     No Known Problems Paternal Grandfather     Amblyopia Neg Hx     Blindness Neg Hx     Cancer Neg Hx     Cataracts Neg Hx     Diabetes Neg Hx     Glaucoma Neg Hx     Hypertension Neg Hx     Macular degeneration Neg Hx     Retinal detachment Neg Hx     Strabismus Neg Hx     Stroke Neg Hx     Thyroid disease Neg Hx      Social History     Tobacco Use    Smoking status: Never    Smokeless tobacco: Never   Substance Use Topics    Alcohol use: No     Comment: one drink in a few months    Drug use: No      Review of Systems  General: No fever.  No chills.  Eyes: No visual changes.  Head: No headache.    Integument: No rashes or lesions.  Chest: No shortness of breath.  Cardiovascular: No chest pain.  Abdomen: No abdominal pain.  No nausea or vomiting.  Urinary: No abnormal urination.  Neurologic: No focal weakness.  No numbness.  Hematologic: No easy bruising.  Endocrine: No excessive thirst or urination.    Physical Exam     Initial Vitals [10/02/22 0306]   BP Pulse Resp Temp SpO2   (!) 185/95 81 18 99.1 °F (37.3 °C) 99 %      MAP       --         Physical Exam  Nurses notes reviewed. Vital signs reviewed.  Appearance:  Well-developed.  Well-nourished.  No acute distress.  No diaphoresis  Skin:  Wound to left anterior neck appears to be healing well no local erythema or drainage. No rashes seen.  Good turgor.  No abrasions.  No ecchymoses.  Eyes: No conjunctival injection. EOMI.  ENT: normal nose, right external ear normal. Left external ear normal  Chest: No increased respiratory effort.    Cardiovascular: Regular rate and rhythm.  Distal pulses intact  Abdomen: Soft.  Not distended.  Nontender.  No guarding.  No rebound.  Musculoskeletal: Good range of motion all joints.  No deformities.  Neck supple.  No meningismus.  Neurologic: Motor intact.  Sensation intact.  Cerebellar intact.  Cranial nerves intact.  Mental Status:  Alert and oriented x 3.  Appropriate, conversant.  Psych: normal mood, normal affect, appropriate behavior, normal insight and judgment    ED Course   Procedures  Labs Reviewed - No data to display         Imaging Results              X-Ray Cervical Spine AP And Lateral (Final result)  Result time 10/02/22 06:21:33      Final result by Dickson Noble MD (10/02/22 06:21:33)                   Impression:      No detrimental change when compared with 09/27/2022.      Electronically signed by: Dickson Noble MD  Date:    10/02/2022  Time:    06:21               Narrative:    EXAMINATION:  XR  CERVICAL SPINE AP LATERAL    CLINICAL HISTORY:  s/p spine surgery;    TECHNIQUE:  AP, lateral and open mouth views of the cervical spine were performed.    COMPARISON:  09/27/2022.    FINDINGS:  Mild straightening of the normal cervical lordosis.  Postoperative changes again identified in the cervical spine.  Grossly normal sagittal alignment.  Vertebral body heights are relatively well maintained.  No displaced fracture is identified.  Prevertebral soft tissues are stable, noting interval removal of previously seen surgical drain.                                       Medications   HYDROmorphone injection 1 mg (1 mg Intravenous Given 10/2/22 0600)   HYDROcodone-acetaminophen  mg per tablet 1 tablet (1 tablet Oral Given 10/2/22 0729)     Medical Decision Making:   History:   Old Medical Records: I decided to obtain old medical records.  Old Records Summarized: records from clinic visits and records from previous admission(s).       <> Summary of Records: See HPI  Initial Assessment:   57-year-old lady with postop neck pain is well-appearing and neurologically intact  Differential Diagnosis:   Postop pain, postop infection, under analysis, herniated disc, seroma  Clinical Tests:   Radiological Study: Ordered and Reviewed  Other:   I have discussed this case with another health care provider.       <> Summary of the Discussion: Discussed the spine and they evaluated the patient in the ER and recommend treatment with Norco and Valium and Lyrica  Cervical spine x-ray was done and had no change compared to an x-ray done 09/27/2022           ED Course as of 10/04/22 1622   Sun Oct 02, 2022   0745 BP(!): 185/95 [WS]   0745 Temp: 99.1 °F (37.3 °C) [WS]   0745 Pulse: 81 [WS]   0745 Resp: 18 [WS]   0745 SpO2: 99 % [WS]   0745 X-Ray Cervical Spine AP And Lateral [WS]      ED Course User Index  [WS] Corona Brandon MD                 Clinical Impression:   Final diagnoses:  [G89.18] Post-op pain (Primary)      ED  Disposition Condition    Discharge Stable          ED Prescriptions       Medication Sig Dispense Start Date End Date Auth. Provider    oxyCODONE-acetaminophen (PERCOCET) 5-325 mg per tablet Take 1 tablet by mouth every 6 (six) hours as needed for Pain. 18 tablet 10/2/2022 -- Corona Brandon MD    diazePAM (VALIUM) 2 MG tablet Take 1 tablet (2 mg total) by mouth every 6 (six) hours as needed (Pain). 18 tablet 10/2/2022 10/9/2022 Corona Brandon MD    pregabalin (LYRICA) 75 MG capsule Take 1 capsule (75 mg total) by mouth 2 (two) times daily. 60 capsule 10/2/2022 11/1/2022 Corona Brandon MD          Follow-up Information       Follow up With Specialties Details Why Contact Info    [facility] - Emergency Dept Emergency Medicine  As needed, If symptoms worsen [id number]  [facility] [id number] [phone]             Keely Stephenson MD  10/04/22 7756

## 2022-10-02 NOTE — HPI
57 F known to neurosurgery service, recent C3/4 disc arthroplasty with Dr. Saini on 9/26. Patient had initially presented s/p prior ACDF at [place] with persistent neck and shoulder pain. She thinks the surgery was done around 2017 with Dr. Lucas. Prior to surgery she c/o right arm pain all the way down to the thumb, which tingles; this is stable on exam today.      She describes new R neck/shoulder pain that compares to a toothache, similar to how she described R arm pain preoperatively. Patient states she has been taking her post op pain medication as prescribed but when the Lortab did not aid her pain she increased the frequency to q4h.     Denies new weakness/numbness/tingling/bowel or bladder issues.

## 2022-10-02 NOTE — CONSULTS
[facility] - Emergency Dept  Neurosurgery  Consult Note    Inpatient consult to Neurosurgery  Consult performed by: Shana Anderson MD  Consult ordered by: Keely Stephenson MD  Reason for consult: post op pain        Subjective:     Chief Complaint/Reason for Admission: Post op pain    History of Present Illness: 57 F known to neurosurgery service, recent C3/4 disc arthroplasty with Dr. Saini on . Patient had initially presented s/p prior ACDF at [facility] with persistent neck and shoulder pain. She thinks the surgery was done around  with Dr. Lucas. Prior to surgery she c/o right arm pain all the way down to the thumb, which tingles; this is stable on exam today.      She describes new R neck/shoulder pain that compares to a toothache, similar to how she described R arm pain preoperatively. Patient states she has been taking her post op pain medication as prescribed but when the Lortab did not aid her pain she increased the frequency to q4h.     Denies new weakness/numbness/tingling/bowel or bladder issues.       (Not in a hospital admission)      Review of patient's allergies indicates:   Allergen Reactions    Lisinopril-hydrochlorothiazide Swelling     Facial swelling/ mouth swelling  She can tolerate hydrochlorothiazide       Past Medical History:   Diagnosis Date    Anemia     Angio-edema     Depression     Diabetes mellitus type 2, noninsulin dependent 2016    Hematuria     Hx of essential hypertension     Hyperlipidemia     Hypertension, uncontrolled 2016    Nuclear sclerosis of both eyes 04/10/2017     Past Surgical History:   Procedure Laterality Date    BTL       SECTION      COLONOSCOPY N/A 2017    Procedure: COLONOSCOPY;  Surgeon: Oleg Enciso MD;  Location: [address]);  Service: Endoscopy;  Laterality: N/A;  CHRONIC CONSTIPATION S/P LRNY    COSMETIC SURGERY      Liposuction , back surgery  , buttock surgery     CYSTOSCOPY  2019     Procedure: CYSTOSCOPY;  Surgeon: ELIZABETH Mendoza MD;  Location: [facility] OR;  Service: Urology;;  PRE-OP BY RN 3-    ESOPHAGOGASTRODUODENOSCOPY N/A 06/29/2020    Procedure: EGD (ESOPHAGOGASTRODUODENOSCOPY);  Surgeon: Elias Harper MD;  Location: [facility] (4TH FLR);  Service: Endoscopy;  Laterality: N/A;  prep ins. emailed - ERW  COVID screening on 6/26/20 - ERW    GASTRIC BYPASS  1995    sandra en y    HYSTEROSCOPY WITH DILATION AND CURETTAGE OF UTERUS N/A 09/28/2018    Procedure: HYSTEROSCOPY, WITH DILATION AND CURETTAGE OF UTERUS;  Surgeon: Naveed Alexander MD;  Location: [facility] OR;  Service: OB/GYN;  Laterality: N/A;  RN PREOP 9/26/2018    NECK SURGERY  09/30/2016    TOTAL REDUCTION MAMMOPLASTY  2007    TOTAL REPLACEMENT OF CERVICAL INTERVERTEBRAL DISC N/A 9/26/2022    Procedure: REPLACEMENT, INTERVERTEBRAL DISC, CERVICAL, TOTAL C3/4 Phani neuro monitoring;  Surgeon: Mansi Saini MD;  Location: [facility] OR 2ND FLR;  Service: Neurosurgery;  Laterality: N/A;  TORONTO III, ASA III, BLOOD TYPE AND SCREEN, NEUROMONITORING EMG SEP MEP, BED REGULAR BED, HEADREST CASPAR, POSITION SUPINE, SPECIAL EQUIPMENT PHANI BIOMET, RADIOLOGY C-ARM     Family History       Problem Relation (Age of Onset)    Allergies Sister    Diabetes type II Mother    Heart attack Father (50)    Heart disease Mother    No Known Problems Brother, Maternal Aunt, Maternal Uncle, Paternal Aunt, Paternal Uncle, Maternal Grandmother, Maternal Grandfather, Paternal Grandmother, Paternal Grandfather          Tobacco Use    Smoking status: Never    Smokeless tobacco: Never   Substance and Sexual Activity    Alcohol use: No     Comment: one drink in a few months    Drug use: No    Sexual activity: Yes     Partners: Male     Review of Systems   Constitutional:  Negative for chills and fever.   HENT:  Negative for trouble swallowing and voice change.    Eyes:  Negative for photophobia and visual disturbance.   Respiratory:  Negative for chest tightness and  shortness of breath.    Gastrointestinal:  Negative for nausea and vomiting.   Genitourinary:  Negative for difficulty urinating and frequency.   Musculoskeletal:  Positive for myalgias and neck pain. Negative for back pain.   Neurological:  Negative for weakness.   Objective:     Weight: 77.1 kg (170 lb)  Body mass index is 31.09 kg/m².  Vital Signs (Most Recent):  Temp: 98.4 °F (36.9 °C) (10/02/22 0631)  Pulse: 78 (10/02/22 0631)  Resp: 18 (10/02/22 0631)  BP: (!) 162/79 (10/02/22 0631)  SpO2: 95 % (10/02/22 0631)   Vital Signs (24h Range):  Temp:  [98.4 °F (36.9 °C)-99.1 °F (37.3 °C)] 98.4 °F (36.9 °C)  Pulse:  [78-81] 78  Resp:  [18] 18  SpO2:  [95 %-99 %] 95 %  BP: (162-185)/(79-95) 162/79                          Physical Exam    Neurosurgery Physical Exam  General: well developed, well nourished, no distress.   Head: normocephalic, atraumatic  Neck: No tracheal deviation. No palpable masses. Full ROM.   Neurologic: Alert and oriented. Thought content appropriate.  GCS: E4 V5 M6. GCS Total: 15  Mental Status: Awake, Alert, Oriented x 4  Language: No aphasia  Speech: No dysarthria  Cranial nerves: face symmetric, tongue midline, CN II-XII grossly intact.   Eyes: EOMI.   Pulmonary: normal respirations, no signs of respiratory distress  Abdomen: soft, non-distended, not tender to palpation     Sensory: intact to light touch throughout, baseline R thumb numbness  Motor Strength: Moves all extremities spontaneously with good tone.  No abnormal movements seen.      Strength   Deltoids Triceps Biceps Wrist Extension Wrist Flexion Hand    Upper: R 5/5 5/5 5/5 5/5 5/5 5/5     L 5/5 5/5 5/5 5/5 5/5 5/5      Strength   Iliopsoas Quadriceps Knee  Flexion Tibialis  anterior Gastro- cnemius EHL   Lower: R 5/5 5/5 5/5 5/5 5/5 5/5     L 5/5 5/5 5/5 5/5 5/5 5/5      Vascular: No LE edema.   Skin: Skin is warm, dry and intact.     Reflexes:   Webber's: Positive on L        Incision: c/d/i with skin edges well approximated  with Dermabond. No surrounding erythema or edema. No drainage from incision. No palpable hematoma or underlying fluid collection.    Significant Labs:  No results for input(s): GLU, NA, K, CL, CO2, BUN, CREATININE, CALCIUM, MG in the last 48 hours.  No results for input(s): WBC, HGB, HCT, PLT in the last 48 hours.  No results for input(s): LABPT, INR, APTT in the last 48 hours.  Microbiology Results (last 7 days)       ** No results found for the last 168 hours. **          All pertinent labs from the last 24 hours have been reviewed.    Significant Diagnostics:  I have reviewed and interpreted all pertinent imaging results/findings within the past 24 hours.    X-Ray Cervical Spine AP And Lateral    Result Date: 10/2/2022  No detrimental change when compared with 09/27/2022. Electronically signed by: Dickson Noble MD Date:    10/02/2022 Time:    06:21       Assessment/Plan:     Cervical disc disorder with myelopathy  57 F with h/o HTN, prior ACDF, s/p recent C3/4 disc arthroplasty on 9/26 with Dr. Saini with persistent R neck/shoulder pain not relieved by discharge meds:    --No acute neurosurgical intervention, hardware in stable position and patient is at neurologic baseline  --Continue Keflex as prescribed in discharge summary   cephALEXin 500 MG capsule   Commonly known as: KEFLEX   Take 1 capsule (500 mg total) by mouth every 6 (six) hours. for 5 days  --Continue Naproxen as prescribed in discharge summary   naproxen 500 MG tablet   Commonly known as: NAPROSYN   Take 1 tablet (500 mg total) by mouth 2 (two) times daily. for 14 days  --Continue Pepcid as prescribed in discharge summary    famotidine 20 MG tablet   Commonly known as: PEPCID   Take 1 tablet (20 mg total) by mouth 2 (two) times daily for 14 days    --New discharge medication recs:   Valium 2mg every 6 hours by mouth as needed for muscle spasm   Oxycodone immediate release 5mg every 6 hours by mouth as needed for pain not relieved by  Tylenol   Tylenol 650mg every 6 hours    Lyrica 75mg twice daily    Will follow up as scheduled with Dr. Saini in neurosurgery clinic. Discussed that patient must represent to ED with new numbness/tingling/weakness or bowel/bladder dysfunction. Notify clinic or present to ED if any redness or drainage at incision site.    D/w Dr. Saini          Thank you for your consult. I will follow-up with patient. Please contact us if you have any additional questions.    Shana Rivero MD  Neurosurgery  [facility] - Emergency Dept

## 2022-10-02 NOTE — ED TRIAGE NOTES
Antoine MELO Gonzáles, a 57 y.o. female presents to the ED w/ complaint of pain to upper back and neck since yesterday. States had surgery on cervical spine Monday. Took home Norco with no relief    Triage note:  Chief Complaint   Patient presents with    Post-op Problem     Pt c/o neck pain after recent neck sx on Monday. Pt states her pain medication is not providing her with relief.     Review of patient's allergies indicates:   Allergen Reactions    Lisinopril-hydrochlorothiazide Swelling     Facial swelling/ mouth swelling  She can tolerate hydrochlorothiazide     Past Medical History:   Diagnosis Date    Anemia     Angio-edema     Depression     Diabetes mellitus type 2, noninsulin dependent 09/07/2016    Hematuria     Hx of essential hypertension     Hyperlipidemia     Hypertension, uncontrolled 07/22/2016    Nuclear sclerosis of both eyes 04/10/2017

## 2022-10-02 NOTE — ASSESSMENT & PLAN NOTE
57 F with h/o HTN, prior ACDF, s/p recent C3/4 disc arthroplasty on 9/26 with Dr. Saini with persistent R neck/shoulder pain not relieved by discharge meds:    --No acute neurosurgical intervention, hardware in stable position and patient is at neurologic baseline  --Continue Keflex as prescribed in discharge summary   cephALEXin 500 MG capsule   Commonly known as: KEFLEX   Take 1 capsule (500 mg total) by mouth every 6 (six) hours. for 5 days  --Continue Naproxen as prescribed in discharge summary   naproxen 500 MG tablet   Commonly known as: NAPROSYN   Take 1 tablet (500 mg total) by mouth 2 (two) times daily. for 14 days  --Continue Pepcid as prescribed in discharge summary    famotidine 20 MG tablet   Commonly known as: PEPCID   Take 1 tablet (20 mg total) by mouth 2 (two) times daily for 14 days    --New discharge medications:   Valium 2mg every 6 hours by mouth as needed for muscle spasm   Oxycodone immediate release 5mg every 6 hours by mouth as needed for pain not relieved by Tylenol   Tylenol 650mg every 6 hours    Lyrica 75mg twice daily    Will follow up as scheduled with Dr. Saini in neurosurgery clinic. Discussed that patient must represent to ED with new numbness/tingling/weakness or bowel/bladder dysfunction. Notify clinic or present to ED if any redness or drainage at incision site.    D/w Dr. Saini

## 2022-10-02 NOTE — DISCHARGE INSTRUCTIONS
- Please start taking Percocet, Lyrica, Valium, & Tylenol for pain  - Please return to the ED if you experience any of the following: fever, chills, chest pain, difficulty breathing, abdominal pain, nausea, vomiting, diarrhea, or debilitating pain.  - Please call your PCP for any other concerns. If your PCP is unavailable, please go to the ED.

## 2022-10-02 NOTE — ED NOTES
MD notified that pt has pain rated 9/10 after pain med. Pt states she takes the same med at home w/ no relief.

## 2022-10-05 ENCOUNTER — TELEPHONE (OUTPATIENT)
Dept: NEUROSURGERY | Facility: CLINIC | Age: 57
End: 2022-10-05

## 2022-10-05 ENCOUNTER — CLINICAL SUPPORT (OUTPATIENT)
Dept: NEUROSURGERY | Facility: CLINIC | Age: 57
End: 2022-10-05
Payer: MEDICARE

## 2022-10-05 VITALS — TEMPERATURE: 99 F | DIASTOLIC BLOOD PRESSURE: 89 MMHG | HEART RATE: 86 BPM | SYSTOLIC BLOOD PRESSURE: 151 MMHG

## 2022-10-05 DIAGNOSIS — M50.00 CERVICAL DISC DISORDER WITH MYELOPATHY: ICD-10-CM

## 2022-10-05 DIAGNOSIS — M50.00 CERVICAL DISC DISORDER WITH MYELOPATHY: Primary | ICD-10-CM

## 2022-10-05 PROCEDURE — 99999 PR PBB SHADOW E&M-EST. PATIENT-LVL III: CPT | Mod: PBBFAC,,,

## 2022-10-05 PROCEDURE — 99999 PR PBB SHADOW E&M-EST. PATIENT-LVL III: ICD-10-PCS | Mod: PBBFAC,,,

## 2022-10-05 RX ORDER — DIAZEPAM 2 MG/1
2 TABLET ORAL EVERY 6 HOURS PRN
Qty: 20 TABLET | Refills: 0 | Status: SHIPPED | OUTPATIENT
Start: 2022-10-06 | End: 2023-06-14

## 2022-10-05 RX ORDER — OXYCODONE AND ACETAMINOPHEN 5; 325 MG/1; MG/1
1 TABLET ORAL EVERY 6 HOURS PRN
Qty: 56 TABLET | Refills: 0 | Status: SHIPPED | OUTPATIENT
Start: 2022-10-06 | End: 2022-10-05 | Stop reason: SDUPTHER

## 2022-10-05 RX ORDER — OXYCODONE AND ACETAMINOPHEN 5; 325 MG/1; MG/1
1 TABLET ORAL EVERY 6 HOURS PRN
Qty: 56 TABLET | Refills: 0 | Status: SHIPPED | OUTPATIENT
Start: 2022-10-06 | End: 2022-12-08 | Stop reason: SDUPTHER

## 2022-10-05 NOTE — PROGRESS NOTES
Antoine Gonzáles is a 57 y.o. female here for 2 week post op wound check.     Surgery: Cervical disc replacement    Symptoms: none    DME:none    Brace: none    Pain: yes 8/10 neck    Medication Refills: valium and oxycodone           Incision  LAKESHA, well approximated, no redness, swelling or purulent drainage. Instructed patient to keep incision LAKESHA, no lotions, creams or bandages. OK to shower without water pressure to area. PostOP written instructions given to patient.     Patient verbalizes understanding. Patient to followup with Dr. Saini for 6 week followup with imaging.    Future Appointments   Date Time Provider Department Center   11/8/2022  1:30 PM [facility] OIC-XRAY [facility] XRAY [facility]   11/8/2022  2:00 PM Mansi Saini MD [facility] NEUROS8 [facility]

## 2022-10-06 ENCOUNTER — TELEPHONE (OUTPATIENT)
Dept: NEUROSURGERY | Facility: CLINIC | Age: 57
End: 2022-10-06
Payer: MEDICARE

## 2022-10-06 NOTE — TELEPHONE ENCOUNTER
Spoke with pt and placed new orders for PT in hopes that she can be contacted urgently  ----- Message from Mauro Tidwell sent at 10/6/2022 10:41 AM CDT -----  Contact: [phone]  Pt is calling because she had surgery and trying to see why she hasn't been able to get into physical therapy --- please give her call back [phone]

## 2022-10-10 ENCOUNTER — TELEPHONE (OUTPATIENT)
Dept: NEUROSURGERY | Facility: HOSPITAL | Age: 57
End: 2022-10-10
Payer: MEDICARE

## 2022-10-10 DIAGNOSIS — M50.00 CERVICAL DISC DISORDER WITH MYELOPATHY: Primary | ICD-10-CM

## 2022-10-10 NOTE — TELEPHONE ENCOUNTER
Discussed with patient that her insurance is currently denying home health orders as physical therapy's inpatient note at discharge recommended outpatient therapy.  Patient is now agreeable to go to outpatient therapy. I asked patient if she was able to find a ride to and from therapy - she confimed that she would secure transportation.    Patient in agreement with plan for outpatient PT/OT.      Anusha Zepeda PA-C  Neurosurgery  [facility] - [facility]

## 2022-10-11 ENCOUNTER — TELEPHONE (OUTPATIENT)
Dept: NEUROSURGERY | Facility: CLINIC | Age: 57
End: 2022-10-11
Payer: MEDICARE

## 2022-10-11 NOTE — TELEPHONE ENCOUNTER
I returned the Nurse's call  in regards to the pt and expediting the HH orders. Our call was D/C mid conversation and I did attempt to call back but was unsuccessful. I did leave a message with my desk direct ext.  ----- Message from Amara Fernandez sent at 10/11/2022  4:12 PM CDT -----  Regarding: pt advice  Contact: Andria alegria/[facility] [employer] @[phone]  Caller requesting clarification on home health directions. Pls call

## 2022-10-13 ENCOUNTER — CLINICAL SUPPORT (OUTPATIENT)
Dept: REHABILITATION | Facility: HOSPITAL | Age: 57
End: 2022-10-13
Payer: MEDICARE

## 2022-10-13 DIAGNOSIS — R29.898 DECREASED GRIP STRENGTH OF RIGHT HAND: ICD-10-CM

## 2022-10-13 DIAGNOSIS — R29.898 DECREASED PINCH STRENGTH: ICD-10-CM

## 2022-10-13 DIAGNOSIS — M50.00 CERVICAL DISC DISORDER WITH MYELOPATHY: ICD-10-CM

## 2022-10-13 DIAGNOSIS — M79.601 RIGHT ARM PAIN: ICD-10-CM

## 2022-10-13 PROCEDURE — 97110 THERAPEUTIC EXERCISES: CPT

## 2022-10-13 PROCEDURE — 97165 OT EVAL LOW COMPLEX 30 MIN: CPT

## 2022-10-13 NOTE — PATIENT INSTRUCTIONS
[facility]  OCCUPATIONAL THERAPY  HOME EXERCISE PROGRAM     Complete the following strengthening program 1x/day.     10 reps each, 1x daily              2 pt - thumb and index   3 pt - thumb, index, middle   _   Key pinch - biscuit - thumb and side of index     INES Dick, AMRIK  Certified Hand Therapist  Occupational Therapist

## 2022-10-13 NOTE — PLAN OF CARE
[facility]  Occupational Therapy Initial Evaluation    Date: 10/13/2022  Name: Antoine Gonzáles  Clinic Number: [id number]    Therapy Diagnosis:   Encounter Diagnoses   Name Primary?    Cervical disc disorder with myelopathy     Decreased  strength of right hand     Right arm pain     Decreased pinch strength      Physician: Anusha Zepeda PA-C    Physician Orders: eval and treat   Medical Diagnosis: cervical disc repair w/myelopathy 9/26/22  Surgical Procedure and Date: 9/26/22,   1. Anterior approach to the cervical spine   2. Disc arthroplasty, C3-C4, with decompression and bilateral foraminotomy   3. Use of operative microscope and microscopic technique   4. Use of intraoperative neuromonitoring    Evaluation Date: 10/13/22  Insurance Authorization Period Expiration: 11/10/22  Plan of Care Expiration: 1/13/2023  Date of Return to MD: 11/8/2022  Visit # / Visits authorized: 1 / 12  FOTO: not performed     Precautions:  Standard and cervical precautions     Time In: 7   Time Out: 8  Total Appointment Time (timed & untimed codes): 60 minutes      SUBJECTIVE     Date of Onset: 9/26/22    History of Current Condition/Mechanism of Injury: Antoine reports: RUE pain and weakness before surgery , had OT 2 yrs ago, increased pain since surgery on 9/26/22    Falls: none reported     Involved Side: right   Dominant Side: Right  Imaging:  mri see chart   Prior Therapy: OT therapy 2 yrs ago prior to surgery     Occupation:  not working, disabled   Working presently: disability  Duties: n/a     Functional Limitations/Social History:    Previous functional status includes: Independent with all ADLs.     Current Functional Status   Home/Living environment: lives with an adult       Limitation of Functional Status as follows:   ADLs/IADLs:     - Feeding: limited for cutting w/ righ     - Bathing: IND     - Dressing/Grooming: IND     - Driving: limited for driving, was driving prior to  surgery   - limited  and pinch for opening containers, lifting, cooking, carrying  - writing limited,      Leisure: clubs, organizations     Pain:  Functional Pain Scale Rating 0-10: Current 8/10, worst 10/10, best 8/10   Location: RUE   Description: hammer like pain radiates down arm   Aggravating Factors: sleeping makes it worse   Easing Factors: pain medication , hot shower      Patient's Goals for Therapy: strength back and get back to normal , drive again     Medical History:   Past Medical History:   Diagnosis Date    Anemia     Angio-edema     Depression     Diabetes mellitus type 2, noninsulin dependent 2016    Hematuria     Hx of essential hypertension     Hyperlipidemia     Hypertension, uncontrolled 2016    Nuclear sclerosis of both eyes 04/10/2017       Surgical History:    has a past surgical history that includes Gastric bypass (); BTL ();  section (); Neck surgery (2016); Colonoscopy (N/A, 2017); Hysteroscopy with dilation and curettage of uterus (N/A, 2018); Cystoscopy (2019); Esophagogastroduodenoscopy (N/A, 2020); Total Reduction Mammoplasty (); Cosmetic surgery; and Total replacement of cervical intervertebral disc (N/A, 2022).    Medications:   has a current medication list which includes the following prescription(s): amlodipine, b complex vitamins, b-complex with vitamin c, biotin, cetirizine, cyanocobalamin (vitamin b-12), diazepam, diclofenac sodium, famotidine, hydrochlorothiazide, losartan, magnesium oxide, oxycodone-acetaminophen, ozempic, potassium chloride sa, pregabalin, rosuvastatin, turmeric root extract, UNABLE TO FIND, and vitamin b complex.    Allergies:   Review of patient's allergies indicates:   Allergen Reactions    Lisinopril-hydrochlorothiazide Swelling     Facial swelling/ mouth swelling  She can tolerate hydrochlorothiazide          OBJECTIVE     Observation/Appearance: Patient with anterior cervical  incision on left neck; healing well, slightly elevated shoulders.     Edema - none noted     RUE ROM wfl's, shoulder limited to 75% of normal, full fist, full opposition.      Strength (Dynamometer) and Pinch Strength (Pinch Gauge)  Measured in pounds.   10/13/2022 10/13/2022    Left Right   Rung II 20.1 41.5   Key Pinch 7 12   3pt Pinch 8 11   2pt Pinch 7 7     Sensation: reports tingling in thumb and numbness in right index, otherwise intact     Manual Muscle Test : GRossly 3+-4/5 RUE     Special Tests- none       Treatment   Total Treatment time (time-based codes) separate from Evaluation: 20 minutes    Antoine received the treatments listed below:     Therapeutic exercises to develop strength, endurance, ROM, flexibility, and posture for 20 minutes, including:  - scapular retraction and reverse shoulder shrugs  x 10 reps   - yellow putty for gross , key, 2pt , 3pt x 10 reps each  - desensitization with 3 textures and rice for nerve re-education and fine motion   - provided textures, yellow putty for HEP   - modality use for pain including MH     Patient Education and Home Exercises      Education provided:   - HEP,  yellow putty and postural correction, desensitization    Written Home Exercises Provided: yes.  Exercises were reviewed and Antoine was able to demonstrate them prior to the end of the session.  Antoine demonstrated fair  understanding of the education provided. See EMR under Patient Instructions for exercises provided during therapy sessions.     Pt was advised to perform these exercises free of pain, and to stop performing them if pain occurs.    Patient/Family Education: role of OT, goals for OT, scheduling/cancellations - pt verbalized understanding. Discussed insurance limitations with patient.    ASSESSMENT     Antoine Gonzáles is a 57 y.o. female referred to outpatient occupational therapy and presents with a medical diagnosis of Cervical disc repair w/myelopathy and RUE weakness .   Patient presents with the following therapy deficits: Decreased ROM, Decreased  strength, Decreased pinch strength, Decreased muscle strength, Decreased functional hand use, Increased pain, Diminished/Impaired Sensation, and Diminished/Impaired Coordination and demonstrates limitations as described in the chart below. Following medical record review it is determined that pt will benefit from occupational therapy services in order to maximize pain free and/or functional use of right UE. The following goals were discussed with the patient and patient is in agreement with them as to be addressed in the treatment plan. The patient's rehab potential is Good.     Anticipated barriers to occupational therapy: none   Pt has no cultural, educational or language barriers to learning provided.    Profile and History Assessment of Occupational Performance Level of Clinical Decision Making Complexity Score   Occupational Profile:   Antoine Gonzáles is a 57 y.o. female who lives with an adult  and is on disability Antoine Gonzáles has difficulty with  ADLs and IADLs as listed previously, which  Affecting herdaily functional abilities.      Comorbidities:    has a past medical history of Anemia, Angio-edema, Depression, Diabetes mellitus type 2, noninsulin dependent, Hematuria, essential hypertension, Hyperlipidemia, Hypertension, uncontrolled, and Nuclear sclerosis of both eyes.    Medical and Therapy History Review:   Brief               Performance Deficits    Physical:  Muscle Power/Strength  Muscle Endurance   Strength  Pinch Strength  Gross Motor Coordination  Fine Motor Coordination  Muscle Tone  Postural Control    Cognitive:  No Deficits    Psychosocial:    Social Interaction  Habits  Routines  Group Participation     Clinical Decision Making:  low    Assessment Process:  Problem-Focused Assessments    Modification/Need for Assistance:  Minimal-Moderate Modifications/Assistance    Intervention  Selection:  Limited Treatment Options       low  Based on PMHX, co morbidities , data from assessments and functional level of assistance required with task and clinical presentation directly impacting function.         Goals:   The following goals were discussed with the patient and patient is in agreement with them as to be addressed in the treatment plan.     Short Term Goals (STGs); to be met within 4 weeks   Patient to be independent in HEP and modality use.   Increase  strength 3-8 lbs. For cooking, cleaning and ADL use.   Increase pinch 1-3 psi's for writing, typing, and FM activity   Decrease complaints of pain to 6 or less with ADLs    Long Term Goals (LTGs) to be met by discharge  Increase  9-15 lbs. For ADL use by discharge   Increase pinch 4-6 lbs. For FM activities   Decrease complaints of pain to 5 or less with ADL use on average.       Plan of Care Certification: 10/13/2022 to 1/13/2023.     Outpatient Occupational Therapy 1 times weekly for 12 weeks to include the following interventions: Paraffin, Modalities for pain management, Therapeutic exercises/activities., Strengthening, and postural correction exercises.      Raine Tejada, OT, CHT       I CERTIFY THE NEED FOR THESE SERVICES FURNISHED UNDER THIS PLAN OF TREATMENT AND WHILE UNDER MY CARE  Physician's comments:      Physician's Signature: ___________________________________________________

## 2022-10-14 ENCOUNTER — CLINICAL SUPPORT (OUTPATIENT)
Dept: REHABILITATION | Facility: OTHER | Age: 57
End: 2022-10-14
Attending: PHYSICIAN ASSISTANT
Payer: MEDICARE

## 2022-10-14 DIAGNOSIS — R29.898 DECREASED RANGE OF MOTION OF NECK: ICD-10-CM

## 2022-10-14 DIAGNOSIS — M53.82 NECK MUSCLE WEAKNESS: ICD-10-CM

## 2022-10-14 DIAGNOSIS — M50.00 CERVICAL DISC DISORDER WITH MYELOPATHY: ICD-10-CM

## 2022-10-14 PROCEDURE — 97161 PT EVAL LOW COMPLEX 20 MIN: CPT

## 2022-10-14 PROCEDURE — 97110 THERAPEUTIC EXERCISES: CPT

## 2022-10-17 NOTE — PLAN OF CARE
"[facility]  Physical Therapy Initial Evaluation    Date: 10/14/2022   Name: Antoine Gonzáles  Clinic Number: [id number]    Therapy Diagnosis:   Encounter Diagnoses   Name Primary?    Cervical disc disorder with myelopathy     Neck muscle weakness     Decreased range of motion of neck      Physician: Anusha Zepeda PA-C    Physician orders: PT Eval and Treat   Medical diagnosis from referral: M50.00 (ICD-10-CM) - Cervical disc disorder with myelopathy  Evaluation date: 10/14/2022  Authorization period expiration: 11/11/22  Plan of care expiration: 1/17/2023  Reassessment due: 11/17/2022   Visit # / visits authorized: 1/1    Precautions: Standard, cervical precautions;   9/26/22,   1. Anterior approach to the cervical spine   2. Disc arthroplasty, C3-C4, with decompression and bilateral foraminotomy   3. Use of operative microscope and microscopic technique   4. Use of intraoperative neuromonitoring     Post-op week 3 as of this note     Time In: 910  Time Out: 1000  Total Appointment Time (timed & untimed codes): 50 minutes    Subjective   Date of onset: 9/26/22  History of current condition - Antoine reports that she was having neck and arm pain for about 2 years prior to surgery. She has been to OT in the past for her pain. She tried to put off surgery for as long as possible, and had some success with therapy prior to surgery. However, her pain worsened and she decided to have surgery in September. The surgery went well with no complications, but patient reports that she has had significant pain following surgery. She reports a lot of soreness and feels like she has a "crick" in her neck.     MASSIMO:     Any dizziness or headaches: occasional headache  Pain radiates: just to R shoulder at this point  Pain constant or intermittent: constant   Any injection: no, post-op    Pain:  Current 8/10, worst 10/10, best 4/10   Location:  generalized cervical spine  Description: hammering, aching, " "tight  Aggravating Factors: sleeping  Easing Factors: hot shower      Prior Therapy: is in OT currently, has been to OT in the past  Social History: good family support   Occupation: on disability   Prior Level of Function: independent   Current Level of Function: can't open jars, dropping phone, no overhead activities, lifting object    Pts goals: "To feel better and get her neck feeling better"    Imaging: X-ray 10/2/22  Mild straightening of the normal cervical lordosis.  Postoperative changes again identified in the cervical spine.  Grossly normal sagittal alignment.  Vertebral body heights are relatively well maintained.  No displaced fracture is identified.  Prevertebral soft tissues are stable, noting interval removal of previously seen surgical drain.     Medical History:   Past Medical History:   Diagnosis Date    Anemia     Angio-edema     Depression     Diabetes mellitus type 2, noninsulin dependent 2016    Hematuria     Hx of essential hypertension     Hyperlipidemia     Hypertension, uncontrolled 2016    Nuclear sclerosis of both eyes 04/10/2017       Surgical History:   Antoine Gonzáles  has a past surgical history that includes Gastric bypass (); BTL ();  section (); Neck surgery (2016); Colonoscopy (N/A, 2017); Hysteroscopy with dilation and curettage of uterus (N/A, 2018); Cystoscopy (2019); Esophagogastroduodenoscopy (N/A, 2020); Total Reduction Mammoplasty (); Cosmetic surgery; and Total replacement of cervical intervertebral disc (N/A, 2022).    Medications:   Antoine has a current medication list which includes the following prescription(s): amlodipine, b complex vitamins, b-complex with vitamin c, biotin, cetirizine, cyanocobalamin (vitamin b-12), diazepam, diclofenac sodium, famotidine, hydrochlorothiazide, losartan, magnesium oxide, oxycodone-acetaminophen, ozempic, potassium chloride sa, pregabalin, rosuvastatin, " turmeric root extract, UNABLE TO FIND, and vitamin b complex.    Allergies:   Review of patient's allergies indicates:   Allergen Reactions    Lisinopril-hydrochlorothiazide Swelling     Facial swelling/ mouth swelling  She can tolerate hydrochlorothiazide          Objective     Observation: post op incision scar present on L anterior neck; appears to be healing well    Posture Alignment: slouched posture;forward head;    Sensation: Light touch: Intact    DTR:WFL    GAIT DEVIATIONS: Antoine displays occasional unsteady gait    Cervical Range of Motion:    Norm ROM Loss   Flexion  Chin touching chest moderate loss   Extension 80 deg or 10 deg from horizontal  Not tested today   Side-bending right  major loss   Side-bending left  major loss   Rotation right Passes mid-clavicular line drawn perpendicular to ceiling major loss   Rotation left Passes mid-clavicular line drawn perpendicular to ceiling major loss   Protraction  moderate loss   Retraction   major loss   Thoracic rotation 50 deg R: limited            L: limited      Shoulder Range of Motion:   Shoulder Left Right   Flexion WFL WFL   Abduction WFL WFL   ER WFL WFL   IR WFL WFL     Strength:  Cervical MMT   Flexion Held 2/2 acuity of sx   Extension Held 2/2 acuity of sx   Right Side Bend Held 2/2 acuity of sx   Left Side Bend Held 2/2 acuity of sx     Upper Extremity Strength  (R) UE  (L) UE    Shoulder elevation: 4/5 Shoulder elevation: 4/5   Shoulder flexion: 3+/5 Shoulder flexion: 3+/5   Shoulder Abduction: 3+/5 Shoulder abduction: 3+/5   Shoulder ER 4-/5 Shoulder ER 4-/5   Shoulder IR 4-/5 Shoulder IR 4-/5   Elbow flexion: 4-/5 Elbow flexion: 4-/5   Elbow extension: 4-/5 Elbow extension: 4-/5   Wrist flexion: 4-/5 Wrist flexion: 4-/5   Wrist extension: 4-/5 Wrist extension: 4-/5   Lower Trap 3+/5 Lower Trap 3+/5   Middle Trap 3+/5 Middle Trap 3+/5   Rhomboids 3+/5 Rhomboids 3+/5       Joint Mobility:   PA's NT  Transverse glides NT     Thoracic mobility:  fair    Palpation: tenderness with mild palpation to generalized posterior cervical musculature      PT Evaluation Completed? Yes  Discussed Plan of Care with patient: Yes          TREATMENT     Total Treatment time separate from Evaluation: 10 minutes    Antoine received therapeutic exercises to develop strength, ROM, and posture for 10 minutes including:    Scap retraction  Chin tucks  Pec stretch  Cervical circles  Cervical rotation  Cervical side bend    Home Exercises and Patient Education Provided    Education provided:   - PT role and POC  - Rationale behind treatment  - HEP    Written Home Exercises Provided: yes.  Exercises were reviewed and Antoine was able to demonstrate them prior to the end of the session.  Antoine demonstrated good  understanding of the education provided.     See EMR under Patient Instructions for exercises provided 10/14/2022.    Assessment   Antoine is a 57 y.o. female referred to outpatient Physical Therapy with a medical diagnosis of disc arthroplasty of cervical spine. Pt presents with signs and symptoms including increased cervical pain, decreased cervical ROM, decreased UE Strength, soft tissue dysfunction, postural imbalance,impaired joint mobility, and decreased tolerance to functional activities. Will progress per protocol      Pt prognosis is Good.   Pt will benefit from skilled outpatient Physical Therapy to address the deficits stated above and in the chart below, provide pt/family education, and to maximize pt's level of independence.     Plan of care discussed with patient: Yes  Pt's spiritual, cultural and educational needs considered and patient is agreeable to the plan of care and goals as stated below:     Anticipated Barriers for therapy: none    Medical Necessity is demonstrated by the following  History  Co-morbidities and personal factors that may impact the plan of care Co-morbidities:   none    Personal Factors:   no deficits     low   Examination  Body  Structures and Functions, activity limitations and participation restrictions that may impact the plan of care Body Regions:   Neck  Upper extremities    Body Systems:    gross symmetry  ROM  strength  motor control  motor learning    Participation Restrictions:   none    Activity limitations:   Learning and applying knowledge  no deficits    General Tasks and Commands  no deficits    Communication  no deficits    Mobility  no deficits    Self care  no deficits    Domestic Life  no deficits    Interactions/Relationships  no deficits    Life Areas  no deficits    Community and Social Life  no deficits         Complexity: low   Clinical Presentation stable and uncomplicated low   Decision Making/ Complexity Score: low       GOALS: Short Term Goals: 4 weeks  1.Report decreased in pain at worse less than  <   / =  3  /10  to increase tolerance for functional activities. On going  2. Pt to improve range of motion by 25% to allow for improved functional mobility to allow for improvement in IADLs.  On going  3. Increased  BUE MMT 1/2  grade to increase tolerance for ADL and work activities. On going  5. Pt to tolerate HEP to improve ROM and independence with ADL's. On going  6. Increased MMT  for lower traps/middle traps/rhomboids to > or = 4/5 to increase tolerance for ADL and improve posture. On going    Long Term Goals: 8 weeks  1.Report decreased in pain at worse less than  <   / =  1  /10  to increase tolerance for functional activities. On going  2.Pt to improve range of motion by 75% to allow for improved functional mobility to allow for improvement in IADLs. On going  3.Increased BUE MMT  1 grade  to increase tolerance for ADL and work activities.On going  4.  Pt will report 45% on FOTO neck survey score for neck pain disability to demonstrate decrease in disability and improvement in neck pain.On going  5. Pt to be Independent with HEP to improve ROM and independence with ADL's. On going  6. Increased MMT  for  lower traps/middle traps/rhomboids to > or = 4+/5 to increase tolerance for ADL and improve posture. On going      Plan   Plan of care Certification: 10/14/2022 to 1/17/2023    Outpatient Physical Therapy 2 times weekly for 10 weeks to include the following interventions: Manual Therapy, Neuromuscular Re-ed, Patient Education, and Therapeutic Exercise. Moira Leon, PT      I CERTIFY THE NEED FOR THESE SERVICES FURNISHED UNDER THIS PLAN OF TREATMENT AND WHILE UNDER MY CARE   Physician's comments:     Physician's Signature: ___________________________________________________

## 2022-10-20 ENCOUNTER — CLINICAL SUPPORT (OUTPATIENT)
Dept: REHABILITATION | Facility: OTHER | Age: 57
End: 2022-10-20
Attending: PHYSICIAN ASSISTANT
Payer: MEDICARE

## 2022-10-20 ENCOUNTER — CLINICAL SUPPORT (OUTPATIENT)
Dept: REHABILITATION | Facility: HOSPITAL | Age: 57
End: 2022-10-20
Payer: MEDICARE

## 2022-10-20 DIAGNOSIS — R29.898 DECREASED PINCH STRENGTH: ICD-10-CM

## 2022-10-20 DIAGNOSIS — M53.82 NECK MUSCLE WEAKNESS: Primary | ICD-10-CM

## 2022-10-20 DIAGNOSIS — M79.601 RIGHT ARM PAIN: ICD-10-CM

## 2022-10-20 DIAGNOSIS — R29.898 DECREASED GRIP STRENGTH OF RIGHT HAND: Primary | ICD-10-CM

## 2022-10-20 DIAGNOSIS — R29.898 DECREASED RANGE OF MOTION OF NECK: ICD-10-CM

## 2022-10-20 PROCEDURE — 97140 MANUAL THERAPY 1/> REGIONS: CPT

## 2022-10-20 PROCEDURE — 97110 THERAPEUTIC EXERCISES: CPT

## 2022-10-20 NOTE — PROGRESS NOTES
"  [facility]  Occupational Therapy Treatment Note    Date: 10/20/2022  Name: Antoine Live  Clinic Number: [id number]    Therapy Diagnosis:        Encounter Diagnoses   Name Primary?    Cervical disc disorder with myelopathy      Decreased  strength of right hand      Right arm pain      Decreased pinch strength        Physician: Anusha Zepeda PA-C     Physician Orders: eval and treat   Medical Diagnosis: cervical disc repair w/myelopathy 9/26/22  Surgical Procedure and Date: 9/26/22,   1. Anterior approach to the cervical spine   2. Disc arthroplasty, C3-C4, with decompression and bilateral foraminotomy   3. Use of operative microscope and microscopic technique   4. Use of intraoperative neuromonitoring    Evaluation Date: 10/13/22  Insurance Authorization Period Expiration: 11/10/22  Plan of Care Expiration: 1/13/2023  Date of Return to MD: 11/8/2022  Visit # / Visits authorized: 2/ 12  FOTO: not performed      Precautions:  Standard and cervical precautions      Time In: 810   Time Out: 855  Total Appointment Time (timed & untimed codes): 45 minutes     SUBJECTIVE     Pt reports: Don't sleep well at night. But I'm not taking any pain medicine during the day. I'm gonna start driving next week.   She was compliant with home exercise program given last session.   Response to previous treatment: less radiating pain,   Functional change: no change reported     Pain: 4/10  Location: right RUE pain "thawing out" ; worse getting out of bed       OBJECTIVE     Objective Measures updated at progress report unless specified.    Treatment     Antoine received the treatments listed below:     Therapeutic exercises to develop strength, endurance, ROM, flexibility, and posture for 45 minutes, including:  - scapular retraction and reverse shoulder shrugs  x 10 reps   - yellow putty for gross , key, 2pt , 3pt x 10 reps each  - Putty tools #1, #3 with yellow putty to simulate jar/bottle " opening 10 reps CW/CCW   - 4# clothespins w/pom poms for pinch strengthening x 30 reps   - red digi flex for isolated/composite flexion x 10 reps each  - grooved pegboard for placing x 25 pegs and in hand manipulation   - desensitization with rice with coins for stereognosis task, nerve re-education and fine motor coordination    Patient Education and Home Exercises      Education provided:   - Cont HEP   - Progress towards goals     Written Home Exercises Provided: Patient instructed to cont prior HEP.  Exercises were reviewed and Antoine was able to demonstrate them prior to the end of the session.  Antoine demonstrated good  understanding of the HEP provided. See EMR under Patient Instructions for exercises provided during therapy sessions.       Assessment     Antoine is progressing well towards her goals and there are no updates to goals at this time. Pt prognosis is Good. Decreased overall pain complaints this date and improved hand strength and coordination.  Neck pain primary complaint with sleeping. Patient eager to drive next week.  Will cont to follow and upgrade as able.     Pt will continue to benefit from skilled outpatient occupational therapy to address the deficits listed in the problem list on initial evaluation provide pt/family education and to maximize pt's level of independence in the home and community environment.     Pt's spiritual, cultural and educational needs considered and pt agreeable to plan of care and goals.    Anticipated barriers to occupational therapy: none     Goals:   The following goals were discussed with the patient and patient is in agreement with them as to be addressed in the treatment plan.      Short Term Goals (STGs); to be met within 4 weeks   Patient to be independent in HEP and modality use.   Increase  strength 3-8 lbs. For cooking, cleaning and ADL use. - progressing   Increase pinch 1-3 psi's for writing, typing, and FM activity - progressing   Decrease  complaints of pain to 6 or less with ADLs     Long Term Goals (LTGs) to be met by discharge  Increase  9-15 lbs. For ADL use by discharge   Increase pinch 4-6 lbs. For FM activities   Decrease complaints of pain to 5 or less with ADL use on average.     Plan of Care Certification: 10/13/2022 to 1/13/2023.      Outpatient Occupational Therapy 1 times weekly for 12 weeks to include the following interventions: Paraffin, Modalities for pain management, Therapeutic exercises/activities., Strengthening, and postural correction exercises.      Raine Tejada, OT  , CHT

## 2022-10-20 NOTE — PROGRESS NOTES
[facility]   Physical Therapy Treatment Note     Name: Antoine ALEJO [facility] Number: [id number]    Therapy Diagnosis:   Encounter Diagnoses   Name Primary?    Neck muscle weakness Yes    Decreased range of motion of neck      Physician: Anusha Zepeda PA-C    Visit Date: 10/20/2022  Physician: Anusha Zepeda PA-C     Physician orders: PT Eval and Treat   Medical diagnosis from referral: M50.00 (ICD-10-CM) - Cervical disc disorder with myelopathy  Evaluation date: 10/14/2022  Authorization period expiration: 11/11/22  Plan of care expiration: 1/17/2023  Reassessment due: 11/17/2022   Visit # / visits authorized: 2/12     Precautions: Standard, cervical precautions;   9/26/22,   1. Anterior approach to the cervical spine   2. Disc arthroplasty, C3-C4, with decompression and bilateral foraminotomy   3. Use of operative microscope and microscopic technique   4. Use of intraoperative neuromonitoring      Post-op week 3 as of this note     PTA Visit #: 0/5     Time In: 1:05  Time Out: 2:00  Total Billable Time: 55 minutes    SUBJECTIVE     Pt reports: Pt reports 5/10 pain in neck and bilateral shoulders.  She was compliant with home exercise program.  Response to previous treatment: 1st follow up after eval   Functional change: 1st follow up after eval     Pain: 5/10  Location: bilateral shoulder  and neck     OBJECTIVE     Objective Measures updated at progress report unless specified.     Treatment     Antoine received the treatments listed below:      therapeutic exercises to develop strength, endurance, ROM, and posture for 31 minutes including:  3x10 chin tucks in sitting with verbal and tactile cueing   3x10 scap retractions with verbal and tactile cueing   2x10 prone yoga ball I, Y, T with verbal and tactile cueing   1x20 AROM seated cervical sidebending each side   1x20 AROM seated cervical rotation each side     manual therapy techniques: Soft tissue Mobilization were applied to the  upper traps, posterior cervical spine, suboccipital muscles  for 24 minutes in order to improve circulation, decrease tissue tension and improve fascial mobility.       Patient Education and Home Exercises     Home Exercises Provided and Patient Education Provided     Education provided:   - on healing time frames, cervical precautions, and progression of exercises through plan of care.     Written Home Exercises Provided: Patient instructed to cont prior HEP. Exercises were reviewed and Antoine was able to demonstrate them prior to the end of the session.  Antoine demonstrated good  understanding of the education provided. See EMR under Patient Instructions for exercises provided during therapy sessions    ASSESSMENT     Antoine presents to the clinic today with pain in bilateral shoulders and neck. Pt notes that before surgery she was only experiencing pain in RUE however now she is experiencing pain in LUE as well. Pt repeatedly reports feeling a 'crick' in her neck however explained to pt we are limited in ability to perform manual therapy on cervical spine due to surgical precautions and pt verbalized understanding. Pt displays increased muscle guarding and hypertonicity in upper quarter, upper traps, sub occipital muscles and posterior cervical spine musculature however pt subjectively reports improvements in symptoms post manual therapy treatment. Trialed performing periscapular strengthening in prone today and pt able to tolerate with no adverse reactions. Pt requires mod verbal and tactile cues in order to perform exercises and techniques correctly. Appropriate exercise induced fatigue achieved by end of session.     Antoine Is progressing well towards her goals.   Pt prognosis is Good.     Pt will continue to benefit from skilled outpatient physical therapy to address the deficits listed in the problem list box on initial evaluation, provide pt/family education and to maximize pt's level of independence in  the home and community environment.     Pt's spiritual, cultural and educational needs considered and pt agreeable to plan of care and goals.     Anticipated barriers to physical therapy: none    Goals: GOALS: Short Term Goals: 4 weeks  1.Report decreased in pain at worse less than  <   / =  3  /10  to increase tolerance for functional activities. On going  2. Pt to improve range of motion by 25% to allow for improved functional mobility to allow for improvement in IADLs.  On going  3. Increased  BUE MMT 1/2  grade to increase tolerance for ADL and work activities. On going  5. Pt to tolerate HEP to improve ROM and independence with ADL's. On going  6. Increased MMT  for lower traps/middle traps/rhomboids to > or = 4/5 to increase tolerance for ADL and improve posture. On going     Long Term Goals: 8 weeks  1.Report decreased in pain at worse less than  <   / =  1  /10  to increase tolerance for functional activities. On going  2.Pt to improve range of motion by 75% to allow for improved functional mobility to allow for improvement in IADLs. On going  3.Increased BUE MMT  1 grade  to increase tolerance for ADL and work activities.On going  4.  Pt will report 45% on FOTO neck survey score for neck pain disability to demonstrate decrease in disability and improvement in neck pain.On going  5. Pt to be Independent with HEP to improve ROM and independence with ADL's. On going  6. Increased MMT  for lower traps/middle traps/rhomboids to > or = 4+/5 to increase tolerance for ADL and improve posture. On going        PLAN     Plan of care Certification: 10/14/2022 to 1/17/2023     Outpatient Physical Therapy 2 times weekly for 10 weeks to include the following interventions: Manual Therapy, Neuromuscular Re-ed, Patient Education, and Therapeutic Exercise. Dry needling    Sebastian Quispe, PT

## 2022-10-21 ENCOUNTER — CLINICAL SUPPORT (OUTPATIENT)
Dept: REHABILITATION | Facility: OTHER | Age: 57
End: 2022-10-21
Attending: PHYSICIAN ASSISTANT
Payer: MEDICARE

## 2022-10-21 DIAGNOSIS — M53.82 NECK MUSCLE WEAKNESS: Primary | ICD-10-CM

## 2022-10-21 DIAGNOSIS — R29.898 DECREASED RANGE OF MOTION OF NECK: ICD-10-CM

## 2022-10-21 PROCEDURE — 97110 THERAPEUTIC EXERCISES: CPT

## 2022-10-21 PROCEDURE — 97140 MANUAL THERAPY 1/> REGIONS: CPT

## 2022-10-21 NOTE — PROGRESS NOTES
[facility]   Physical Therapy Treatment Note     Name: Antoine ALEJO [facility] Number: [id number]    Therapy Diagnosis:   Encounter Diagnoses   Name Primary?    Neck muscle weakness Yes    Decreased range of motion of neck      Physician: Anusha Zepeda PA-C    Visit Date: 10/21/2022  Physician: Anusha Zepeda PA-C     Physician orders: PT Eval and Treat   Medical diagnosis from referral: M50.00 (ICD-10-CM) - Cervical disc disorder with myelopathy  Evaluation date: 10/14/2022  Authorization period expiration: 11/11/22  Plan of care expiration: 1/17/2023  Reassessment due: 11/17/2022   Visit # / visits authorized: 3/12     Precautions: Standard, cervical precautions;   9/26/22,   1. Anterior approach to the cervical spine   2. Disc arthroplasty, C3-C4, with decompression and bilateral foraminotomy   3. Use of operative microscope and microscopic technique   4. Use of intraoperative neuromonitoring      Post-op week 3 as of this note     PTA Visit #: 0/5     Time In: 7:05 AM    Time Out: 8:00 AM   Total Billable Time: 55 minutes    SUBJECTIVE     Pt reports: Pt reports feeling good after last session although later in the evening experiencing a headache and increased neck pain and taking pain medication then experiencing nausea although notes she did not vomit. Pt arrives with 8/10 headache pain and pain in neck.      She was compliant with home exercise program.  Response to previous treatment: felt 'good' after last session  Functional change: 1st follow up after eval     Pain: 8/10  Location: headache and neck      OBJECTIVE     Objective Measures updated at progress report unless specified.     Treatment     Antoine received the treatments listed below:      therapeutic exercises to develop strength, endurance, ROM, and posture for 24 minutes including:  3x10 chin tucks in sitting with verbal and tactile cueing   3x10 scap retractions with verbal and tactile cueing   2x10 supine on 1/2  "foam roll with overhead reach with verbal and tactile cueing   1x8 supine on half foam roll with thoracic extension: discontinued due to pt unable to maintain cervical precautions  2x10x3" pelvic tilt with verbal and tactile cueing       Not performed today:   2x10 prone yoga ball I, Y, T with verbal and tactile cueing   1x20 AROM seated cervical sidebending each side   1x20 AROM seated cervical rotation each side     manual therapy techniques: Soft tissue Mobilization were applied to the upper traps, posterior cervical spine, suboccipital muscles  for 31 minutes in order to improve circulation, decrease tissue tension and improve fascial mobility.      Patient Education and Home Exercises     Home Exercises Provided and Patient Education Provided      Written Home Exercises Provided: Patient instructed to cont prior HEP. Exercises were reviewed and Antoine was able to demonstrate them prior to the end of the session.  Antoine demonstrated good  understanding of the education provided. See EMR under Patient Instructions for exercises provided during therapy sessions    ASSESSMENT   Antoine arrives to clinic today with 8/10 pain headache and in the neck. Pt notes later yesterday evening her headache starting, taking pain killers, then experiencing nausea. Pt notes she did not eat before self administering pain killers and suspects this could have contributed to nausea. Focused on soft tissue mobilization, lumbar stabilization, thoracic mobility and deep neck flexor strengthening. Pt still displays increased muscle guarding and hypertonicity in upper quarter, upper traps, sub occipital muscles and posterior cervical spine musculature however pt subjectively reports improvements in symptoms to 4/10 post manual therapy treatment and there-ex. Pt requires mod verbal and tactile cues in order to perform exercises and techniques correctly. Appropriate exercise induced fatigue achieved by end of session.     Antoine Is " progressing well towards her goals.   Pt prognosis is Good.     Pt will continue to benefit from skilled outpatient physical therapy to address the deficits listed in the problem list box on initial evaluation, provide pt/family education and to maximize pt's level of independence in the home and community environment.     Pt's spiritual, cultural and educational needs considered and pt agreeable to plan of care and goals.     Anticipated barriers to physical therapy: none    Goals: GOALS: Short Term Goals: 4 weeks  1.Report decreased in pain at worse less than  <   / =  3  /10  to increase tolerance for functional activities. On going  2. Pt to improve range of motion by 25% to allow for improved functional mobility to allow for improvement in IADLs.  On going  3. Increased  BUE MMT 1/2  grade to increase tolerance for ADL and work activities. On going  5. Pt to tolerate HEP to improve ROM and independence with ADL's. On going  6. Increased MMT  for lower traps/middle traps/rhomboids to > or = 4/5 to increase tolerance for ADL and improve posture. On going     Long Term Goals: 8 weeks  1.Report decreased in pain at worse less than  <   / =  1  /10  to increase tolerance for functional activities. On going  2.Pt to improve range of motion by 75% to allow for improved functional mobility to allow for improvement in IADLs. On going  3.Increased BUE MMT  1 grade  to increase tolerance for ADL and work activities.On going  4.  Pt will report 45% on FOTO neck survey score for neck pain disability to demonstrate decrease in disability and improvement in neck pain.On going  5. Pt to be Independent with HEP to improve ROM and independence with ADL's. On going  6. Increased MMT  for lower traps/middle traps/rhomboids to > or = 4+/5 to increase tolerance for ADL and improve posture. On going        PLAN     Plan of care Certification: 10/14/2022 to 1/17/2023    Continue treatment per established plan of care.     Sebastian  Jose Enrique, PT

## 2022-10-25 ENCOUNTER — CLINICAL SUPPORT (OUTPATIENT)
Dept: REHABILITATION | Facility: OTHER | Age: 57
End: 2022-10-25
Attending: PHYSICIAN ASSISTANT
Payer: MEDICARE

## 2022-10-25 DIAGNOSIS — M53.82 NECK MUSCLE WEAKNESS: Primary | ICD-10-CM

## 2022-10-25 DIAGNOSIS — R29.898 DECREASED RANGE OF MOTION OF NECK: ICD-10-CM

## 2022-10-25 PROCEDURE — 97140 MANUAL THERAPY 1/> REGIONS: CPT

## 2022-10-25 NOTE — PROGRESS NOTES
"[facility]   Physical Therapy Treatment Note     Name: Antoine Gonzáles  Clinic Number: [id number]    Therapy Diagnosis:   Encounter Diagnoses   Name Primary?    Neck muscle weakness Yes    Decreased range of motion of neck      Physician: Anusha Zepeda PA-C    Visit Date: 10/25/2022  Physician: Anusha Zepeda PA-C     Physician orders: PT Eval and Treat   Medical diagnosis from referral: M50.00 (ICD-10-CM) - Cervical disc disorder with myelopathy  Evaluation date: 10/14/2022  Authorization period expiration: 11/11/22  Plan of care expiration: 1/17/2023  Reassessment due: 11/17/2022   Visit # / visits authorized: 4/12     Precautions: Standard, cervical precautions;   9/26/22,   1. Anterior approach to the cervical spine   2. Disc arthroplasty, C3-C4, with decompression and bilateral foraminotomy   3. Use of operative microscope and microscopic technique   4. Use of intraoperative neuromonitoring      Post-op week 4 as of this note     PTA Visit #: 0/5     Time In: 8:05 AM    Time Out: 8:40 AM   Total Billable Time:  35 minutes    SUBJECTIVE     Pt reports: Pt reports increased pain especially over the weekend in the neck and R shoulder and notes "she feels like she is overdoing it". Pt reports having to get back on her pain medication when she had previously weaned herself off. Pt does not remember if it is oxycodone or oxycoton and is administering it in morning and at night. Pt reports she did not perform HEP on Monday due to the pain. Pt describes pain as 'sharp' in shoulder and 'tension' in the upper trap and posterior cervical muscles/region.   She was compliant with home exercise program.  Response to previous treatment: felt 'great' after last session   Functional change: none to note      Pain: 6/10  Location: neck and R shoulder       OBJECTIVE     Objective Measures updated at progress report unless specified.     Treatment     Antoine received the treatments listed below:  " "      manual therapy techniques: Soft tissue Mobilization were applied to the upper traps, posterior cervical spine, suboccipital muscles and PROM of cervical spine for 30 minutes in order to improve circulation, decrease tissue tension, improve fascial mobility and improve cervical ROM/mobility.      Patient Education and Home Exercises     Home Exercises Provided and Patient Education Provided      Written Home Exercises Provided: Pt instructed to decrease bouts of HEP performance at home from 3x a day to only 1x day due to poor response and increased pain and irritation at home. See EMR under Patient Instructions for exercises provided during therapy sessions    ASSESSMENT   Antoine arrives to clinic today with 6/10 pain in R shoulder and in the neck. Pt notes increase in pain over the weekend and yesterday especially after performing HEP. Pt reports "she feels like she is overdoing it" and notes R anterior shoulder pain returning after surgery. Pt did not perform HEP yesterday due to pain in both neck and shoulder. Discussed pt decreasing performance of HEP bouts from 3x/day to only 1x/day and monitor if symptoms change, pt verbalized understanding. Screened R shoulder for musculoskeletal dysfunction. Pt displays WFL ROM, grossly 4/5 muscle strength bilaterally, and did not test positive for special testing screening for impingement, RTC tear, labrum tear, intra-articular dysfunction, or nerve symptoms. Pt notes localized anterior pain and displays increase in irritation with superficial palpation to this area. Pt denies burning, numbness, or tingling down the arm. Today focused on soft tissue mobilization, PROM, and palliative care to the upper quarter and neck. Pt still displays increased muscle guarding and hypertonicity in upper quarter, upper traps, sub occipital muscles and posterior cervical spine musculature however pt subjectively reports improvements in symptoms in neck to 4/10 post manual therapy " treatment however no decrease in symptoms in R shoulder. Will continue to monitor pt symptoms in both neck and shoulder, may need to refer back to MD or to pain management in order to progress through plan of care.      Antoine Is not progressing well towards her goals.   Pt prognosis is Guarded     Pt will continue to benefit from skilled outpatient physical therapy to address the deficits listed in the problem list box on initial evaluation, provide pt/family education and to maximize pt's level of independence in the home and community environment.     Pt's spiritual, cultural and educational needs considered and pt agreeable to plan of care and goals.     Anticipated barriers to physical therapy: none    Goals: GOALS: Short Term Goals: 4 weeks  1.Report decreased in pain at worse less than  <   / =  3  /10  to increase tolerance for functional activities. On going  2. Pt to improve range of motion by 25% to allow for improved functional mobility to allow for improvement in IADLs.  On going  3. Increased  BUE MMT 1/2  grade to increase tolerance for ADL and work activities. On going  5. Pt to tolerate HEP to improve ROM and independence with ADL's. On going  6. Increased MMT  for lower traps/middle traps/rhomboids to > or = 4/5 to increase tolerance for ADL and improve posture. On going     Long Term Goals: 8 weeks  1.Report decreased in pain at worse less than  <   / =  1  /10  to increase tolerance for functional activities. On going  2.Pt to improve range of motion by 75% to allow for improved functional mobility to allow for improvement in IADLs. On going  3.Increased BUE MMT  1 grade  to increase tolerance for ADL and work activities.On going  4.  Pt will report 45% on FOTO neck survey score for neck pain disability to demonstrate decrease in disability and improvement in neck pain.On going  5. Pt to be Independent with HEP to improve ROM and independence with ADL's. On going  6. Increased MMT  for lower  traps/middle traps/rhomboids to > or = 4+/5 to increase tolerance for ADL and improve posture. On going        PLAN     Plan of care Certification: 10/14/2022 to 1/17/2023    Continue treatment per established plan of care.     Sebastian Quispe, PT

## 2022-10-27 ENCOUNTER — CLINICAL SUPPORT (OUTPATIENT)
Dept: REHABILITATION | Facility: HOSPITAL | Age: 57
End: 2022-10-27
Payer: MEDICARE

## 2022-10-27 DIAGNOSIS — M79.641 RIGHT HAND PAIN: ICD-10-CM

## 2022-10-27 DIAGNOSIS — M79.601 RIGHT ARM PAIN: Primary | ICD-10-CM

## 2022-10-27 DIAGNOSIS — R29.898 DECREASED PINCH STRENGTH: ICD-10-CM

## 2022-10-27 PROCEDURE — 97110 THERAPEUTIC EXERCISES: CPT

## 2022-10-27 NOTE — PROGRESS NOTES
[facility] [place] [facility]  Occupational Therapy Treatment Note    Date: 10/27/2022  Name: Antione Gonzáles  Clinic Number: [id number]    Therapy Diagnosis:        Encounter Diagnoses   Name Primary?    Cervical disc disorder with myelopathy      Decreased  strength of right hand      Right arm pain      Decreased pinch strength        Physician: Anusha Zepeda PA-C     Physician Orders: eval and treat   Medical Diagnosis: cervical disc repair w/myelopathy 9/26/22  Surgical Procedure and Date: 9/26/22,   1. Anterior approach to the cervical spine   2. Disc arthroplasty, C3-C4, with decompression and bilateral foraminotomy   3. Use of operative microscope and microscopic technique   4. Use of intraoperative neuromonitoring    Evaluation Date: 10/13/22  Insurance Authorization Period Expiration: 11/10/22  Plan of Care Expiration: 1/13/2023  Date of Return to MD: 11/8/2022  Visit # / Visits authorized: 3/ 12  FOTO: not performed      Precautions:  Standard and cervical precautions      Time In: 710   Time Out: 755  Total Appointment Time (timed & untimed codes): 45 minutes     SUBJECTIVE     Pt reports: Its mostly a sharp pain in my shoulder, and hurts when I move it a certain way.  I've tried heat and ice, and rub it.  The hand is better but I still drop things. I started driving this week.     She was compliant with home exercise program given last session.   Response to previous treatment: less radiating pain,   Functional change: no change reported     Pain: 5/10  Location: right RUE pain ; worse getting out of bed; Mostly R shoulder pain and discomfort (referred to PT who is treating neck to access).     OBJECTIVE     Objective Measures updated at progress report unless specified.     Strength (Dynamometer) and Pinch Strength (Pinch Gauge)  Measured in pounds.    10/13/2022 10/13/2022     Left Right   Rung II 20.1 41.5   Key Pinch 7 12   3pt Pinch 8 11   2pt Pinch 7 7     Treatment     Antoine  received the treatments listed below:     Therapeutic exercises to develop strength, endurance, ROM, flexibility, and posture for 45 minutes, including:  - upgraded to red putty for gross , key, 2pt , 3pt x 10 reps each  - Putty tools #1, #3 with red  putty to simulate jar/bottle opening 20 reps CW/CCW   - upgrade to 6# clothespins w/pom poms for pinch strengthening x 30 reps   - red digi flex for isolated/composite flexion x 10 reps each  - 35 lbs. PHG for  strengthening x 15 reps   - eliza varigrip red 75% for thumb press x 15 reps   - isopheres for in hand manipulation/rotation   - grooved pegboard for placing x 25 pegs and in hand manipulation x 5 pegs each   - desensitization with rice with coins for stereognosis task, nerve re-education and fine motor coordination  - provided active ice samples for home use. ; Encouraged patient to discuss shoulder pain with PT and MD.     Patient Education and Home Exercises      Education provided:   - Cont HEP   - Progress towards goals     Written Home Exercises Provided: Patient instructed to cont prior HEP.  Exercises were reviewed and Antoine was able to demonstrate them prior to the end of the session.  Antoine demonstrated good  understanding of the HEP provided. See EMR under Patient Instructions for exercises provided during therapy sessions.       Assessment     Antoine is progressing well towards her goals and there are no updates to goals at this time. Pt prognosis is Good. Decreased overall pain complaints this date and improved hand strength and coordination.  Shoulder pain  primary complaint with sleeping and positional. Patient began driving.   Will cont to follow and upgrade as able.     Pt will continue to benefit from skilled outpatient occupational therapy to address the deficits listed in the problem list on initial evaluation provide pt/family education and to maximize pt's level of independence in the home and community environment.     Pt's  spiritual, cultural and educational needs considered and pt agreeable to plan of care and goals.    Anticipated barriers to occupational therapy: none     Goals:   The following goals were discussed with the patient and patient is in agreement with them as to be addressed in the treatment plan.      Short Term Goals (STGs); to be met within 4 weeks   Patient to be independent in HEP and modality use.   Increase  strength 3-8 lbs. For cooking, cleaning and ADL use. - progressing   Increase pinch 1-3 psi's for writing, typing, and FM activity - progressing   Decrease complaints of pain to 6 or less with ADLs- met      Long Term Goals (LTGs) to be met by discharge  Increase  9-15 lbs. For ADL use by discharge   Increase pinch 4-6 lbs. For FM activities   Decrease complaints of pain to 5 or less with ADL use on average.     Plan of Care Certification: 10/13/2022 to 1/13/2023.      Outpatient Occupational Therapy 1 times weekly for 12 weeks to include the following interventions: Paraffin, Modalities for pain management, Therapeutic exercises/activities., Strengthening, and postural correction exercises.      Raine Tejada, OT  , CHT

## 2022-11-01 ENCOUNTER — CLINICAL SUPPORT (OUTPATIENT)
Dept: REHABILITATION | Facility: OTHER | Age: 57
End: 2022-11-01
Attending: PHYSICIAN ASSISTANT
Payer: MEDICARE

## 2022-11-01 DIAGNOSIS — M53.82 NECK MUSCLE WEAKNESS: Primary | ICD-10-CM

## 2022-11-01 DIAGNOSIS — R29.898 DECREASED RANGE OF MOTION OF NECK: ICD-10-CM

## 2022-11-01 PROCEDURE — 97110 THERAPEUTIC EXERCISES: CPT

## 2022-11-01 PROCEDURE — 97140 MANUAL THERAPY 1/> REGIONS: CPT

## 2022-11-01 NOTE — PROGRESS NOTES
"[facility]   Physical Therapy Treatment Note     Name: Antoine Gonzáles  Clinic Number: [id number]    Therapy Diagnosis:   No diagnosis found.    Physician: Anusha Zepeda PA-C    Visit Date: 11/1/2022  Physician: Anusha Zepeda PA-C     Physician orders: PT Eval and Treat   Medical diagnosis from referral: M50.00 (ICD-10-CM) - Cervical disc disorder with myelopathy  Evaluation date: 10/14/2022  Authorization period expiration: 11/11/22  Plan of care expiration: 1/17/2023  Reassessment due: 11/17/2022   Visit # / visits authorized: 5/12     Precautions: Standard, cervical precautions;   9/26/22,   1. Anterior approach to the cervical spine   2. Disc arthroplasty, C3-C4, with decompression and bilateral foraminotomy   3. Use of operative microscope and microscopic technique   4. Use of intraoperative neuromonitoring      Post-op week 5 as of this note     PTA Visit #: 0/5     Time In: 8:09 AM    Time Out: 9:03 AM   Total Billable Time:  54 minutes    SUBJECTIVE     Pt reports: Pt reports 8/10 throbbing pain in R shoulder and a headache. Pt reports 4/10 pain in neck and describes it as tension. Pt reports she thinks decreasing HEP to 1x/day has helped. Pt reports still taking pain meds as needed however not taking as often due to not wanting to be sleepy.   She was compliant with home exercise program.  Response to previous treatment: felt 'great' after last session   Functional change: none to note      Pain: 8/10 R shoulder , 4/10 neck   Location: R shoulder and neck       OBJECTIVE     Objective Measures updated at progress report unless specified.     Treatment     Antoine received the treatments listed below:      therapeutic exercises to develop strength, endurance, ROM, and flexibility for 24 minutes including:  -4x30" upper trap stretch bilaterally in supine   -3x30" levator scap stretch bilaterally in supine   -2x10 median nerve glide performed by therapist   -2x10 seated self " "median nerve glide   -2x10 chin tucks in seated   -2x10 chin tucks with YTB in seated - verbal cues     manual therapy techniques: Soft tissue Mobilization were applied to the upper traps, posterior cervical spine, suboccipital muscles and PROM of cervical spine for 30 minutes in order to improve circulation, decrease tissue tension, improve fascial mobility and improve cervical ROM/mobility.      Patient Education and Home Exercises     Home Exercises Provided and Patient Education Provided      Written Home Exercises Provided: Pt instructed and given new median nerve glide exercise to incorporate to current HEP. See EMR under Patient Instructions for exercises provided during therapy sessions.    ASSESSMENT   Antoine arrives to clinic today with increased 8/10 pain in R shoulder and 4/10 pain in the neck. Pt describes pain in R shoulder as "throbbing". Screened ULTT and pt notes increase in symptoms with median nerve tensioning. Demonstrated, performed and instructed pt in median nerve glide in order to perform at home and distributed HEP to pt before leaving clinic.Today focused on nerve glides, soft tissue mobilization, PROM, and progressing therex when pt able to tolerate. Pt still displays increased muscle guarding and hypertonicity in upper quarter, upper traps, sub occipital muscles and posterior cervical spine musculature however pt subjectively reports improvements in symptoms in neck to 2/10 post manual therapy treatment. Pt notes improvement in R shoulder symptoms post median nerve glide performance to 6/10. Will continue to monitor pt symptoms in both neck and shoulder, however pain appears to be limiting pt progressing in therex in order to progress through POC. Pt set to follow up with MD next week.     Antoine Is not progressing well towards her goals.   Pt prognosis is Guarded     Pt will continue to benefit from skilled outpatient physical therapy to address the deficits listed in the problem list " box on initial evaluation, provide pt/family education and to maximize pt's level of independence in the home and community environment.     Pt's spiritual, cultural and educational needs considered and pt agreeable to plan of care and goals.     Anticipated barriers to physical therapy: none    Goals: GOALS: Short Term Goals: 4 weeks  1.Report decreased in pain at worse less than  <   / =  3  /10  to increase tolerance for functional activities. On going  2. Pt to improve range of motion by 25% to allow for improved functional mobility to allow for improvement in IADLs.  On going  3. Increased  BUE MMT 1/2  grade to increase tolerance for ADL and work activities. On going  5. Pt to tolerate HEP to improve ROM and independence with ADL's. On going  6. Increased MMT  for lower traps/middle traps/rhomboids to > or = 4/5 to increase tolerance for ADL and improve posture. On going     Long Term Goals: 8 weeks  1.Report decreased in pain at worse less than  <   / =  1  /10  to increase tolerance for functional activities. On going  2.Pt to improve range of motion by 75% to allow for improved functional mobility to allow for improvement in IADLs. On going  3.Increased BUE MMT  1 grade  to increase tolerance for ADL and work activities.On going  4.  Pt will report 45% on FOTO neck survey score for neck pain disability to demonstrate decrease in disability and improvement in neck pain.On going  5. Pt to be Independent with HEP to improve ROM and independence with ADL's. On going  6. Increased MMT  for lower traps/middle traps/rhomboids to > or = 4+/5 to increase tolerance for ADL and improve posture. On going        PLAN     Plan of care Certification: 10/14/2022 to 1/17/2023    Continue treatment per established plan of care.     Sebastian Quispe, PT

## 2022-11-03 ENCOUNTER — TELEPHONE (OUTPATIENT)
Dept: NEUROSURGERY | Facility: CLINIC | Age: 57
End: 2022-11-03
Payer: MEDICARE

## 2022-11-03 ENCOUNTER — PATIENT MESSAGE (OUTPATIENT)
Dept: NEUROSURGERY | Facility: CLINIC | Age: 57
End: 2022-11-03
Payer: MEDICARE

## 2022-11-03 NOTE — TELEPHONE ENCOUNTER
Called pt back, she is fine to move appt from 2 to 330 pm. Rescheduled xrays as well to a time that worked for pt    ----- Message from Corbin De La Torre sent at 11/3/2022  2:43 PM CDT -----  Regarding: Appt Change  Contact: @[phone]  Pt requesting a call back regarding appt on 11/08/22 at 2pm.  Pt states its ok to change the appt to 3:30pm on 11/08/22. Please call to discuss further..

## 2022-11-07 ENCOUNTER — HOSPITAL ENCOUNTER (OUTPATIENT)
Dept: RADIOLOGY | Facility: HOSPITAL | Age: 57
Discharge: HOME | End: 2022-11-07
Attending: STUDENT IN AN ORGANIZED HEALTH CARE EDUCATION/TRAINING PROGRAM
Payer: MEDICARE

## 2022-11-07 DIAGNOSIS — M50.00 CERVICAL DISC DISORDER WITH MYELOPATHY: ICD-10-CM

## 2022-11-07 PROCEDURE — 72050 XR CERVICAL SPINE AP LAT WITH FLEX EXTEN: ICD-10-PCS | Mod: 26,,, | Performed by: RADIOLOGY

## 2022-11-07 PROCEDURE — 72050 X-RAY EXAM NECK SPINE 4/5VWS: CPT | Mod: 26,,, | Performed by: RADIOLOGY

## 2022-11-07 PROCEDURE — 72050 X-RAY EXAM NECK SPINE 4/5VWS: CPT | Mod: TC

## 2022-11-08 ENCOUNTER — OFFICE VISIT (OUTPATIENT)
Dept: NEUROSURGERY | Facility: CLINIC | Age: 57
End: 2022-11-08
Payer: MEDICARE

## 2022-11-08 ENCOUNTER — CLINICAL SUPPORT (OUTPATIENT)
Dept: REHABILITATION | Facility: OTHER | Age: 57
End: 2022-11-08
Attending: STUDENT IN AN ORGANIZED HEALTH CARE EDUCATION/TRAINING PROGRAM
Payer: MEDICARE

## 2022-11-08 DIAGNOSIS — R29.898 DECREASED RANGE OF MOTION OF NECK: ICD-10-CM

## 2022-11-08 DIAGNOSIS — M53.82 NECK MUSCLE WEAKNESS: Primary | ICD-10-CM

## 2022-11-08 DIAGNOSIS — M50.00 CERVICAL DISC DISORDER WITH MYELOPATHY: Primary | ICD-10-CM

## 2022-11-08 PROCEDURE — 99999 PR PBB SHADOW E&M-EST. PATIENT-LVL I: ICD-10-PCS | Mod: PBBFAC,,, | Performed by: NEUROLOGICAL SURGERY

## 2022-11-08 PROCEDURE — 3044F HG A1C LEVEL LT 7.0%: CPT | Mod: CPTII,S$GLB,, | Performed by: NEUROLOGICAL SURGERY

## 2022-11-08 PROCEDURE — 3044F PR MOST RECENT HEMOGLOBIN A1C LEVEL <7.0%: ICD-10-PCS | Mod: CPTII,S$GLB,, | Performed by: NEUROLOGICAL SURGERY

## 2022-11-08 PROCEDURE — 99024 PR POST-OP FOLLOW-UP VISIT: ICD-10-PCS | Mod: S$GLB,,, | Performed by: NEUROLOGICAL SURGERY

## 2022-11-08 PROCEDURE — 97140 MANUAL THERAPY 1/> REGIONS: CPT

## 2022-11-08 PROCEDURE — 4010F ACE/ARB THERAPY RXD/TAKEN: CPT | Mod: CPTII,S$GLB,, | Performed by: NEUROLOGICAL SURGERY

## 2022-11-08 PROCEDURE — 99024 POSTOP FOLLOW-UP VISIT: CPT | Mod: S$GLB,,, | Performed by: NEUROLOGICAL SURGERY

## 2022-11-08 PROCEDURE — 99999 PR PBB SHADOW E&M-EST. PATIENT-LVL I: CPT | Mod: PBBFAC,,, | Performed by: NEUROLOGICAL SURGERY

## 2022-11-08 PROCEDURE — 97110 THERAPEUTIC EXERCISES: CPT

## 2022-11-08 PROCEDURE — 4010F PR ACE/ARB THEARPY RXD/TAKEN: ICD-10-PCS | Mod: CPTII,S$GLB,, | Performed by: NEUROLOGICAL SURGERY

## 2022-11-08 NOTE — PROGRESS NOTES
"Neurosurgery  Follow up    SUBJECTIVE:     Chief Complaint: "neck and right shoulder, headaches"     History of Present Illness:  Antoine Gonzáles is a 57 y.o. female who presents as a self-referral after previous ACDF at [facility] with persistent neck and shoulder pain. She thinks the surgery was done around 2017 with Dr. Lucas. She states that she had severe depression after the surgery and had loss of memory, including not being able to remember her way home.     She began seeing a neurologist because of that. Then she began having neck pain and saw another neurologist at [facility], who told her that she has two pinched nerves. She participated in PT, which was helpful.     The whole right arm hurts, all the way down to the thumb, which tingles. The tingling in the median distribution has gotten worse since surgery. She was seen in the ED recently and was provided with a sling. She stopped using it because she didn't want her arm to get too stiff.     The pain "thumps like a toothache." It gets better with warm compresses, hot shower, and rubbing ointment on it. PT helped significantly. It gets worse when she sleeps on the ipsilateral side.     She denies myelopathic symptoms, including dropping objects, balance difficulties, and fine motor changes.     She stays with her friend. She has longstanding diabetes, which she works hard to control.     The patient denies any bleeding, infectious, or anesthetic complications with any previous surgery. She takes aspirin regularly as a component of her "back and body" Frederick pills.     As of 8/25/22, the patient reports that she has been about the same. She includes her daughter Shweta over the phone for our conversation. She obtained the MRI, CT, and flexion/extension X-rays that had been requested after the previous visit.     As of 11/8/22, the patient underwent C3-C4 disc arthroplasty on 9/26/22. She reports that she has the same arm pain as before; she has some " new neck pain today that has come along with the healing.     She reports that she still has difficulty swallowing pills, which she has had ever since the first neck surgery.     She denies fever, chills, or drainage from the incision.     Review of patient's allergies indicates:   Allergen Reactions    Lisinopril-hydrochlorothiazide Swelling     Facial swelling/ mouth swelling  She can tolerate hydrochlorothiazide       Current Outpatient Medications   Medication Sig Dispense Refill    amLODIPine (NORVASC) 10 MG tablet TAKE 1 TABLET(10 MG) BY MOUTH EVERY DAY 90 tablet 2    b complex vitamins capsule Take 1 capsule by mouth once daily.      B-complex with vitamin C (Z-BEC OR EQUIV) tablet Take 1 tablet by mouth once daily.      BIOTIN ORAL Take by mouth.      cetirizine (ZYRTEC) 10 MG tablet 1-2 tablets daily as needed for itching or swelling 100 tablet 1    cyanocobalamin, vitamin B-12, 1,000 mcg/15 mL Liqd Pill      diazePAM (VALIUM) 2 MG tablet Take 1 tablet (2 mg total) by mouth every 6 (six) hours as needed (Pain). 20 tablet 0    diclofenac sodium (VOLTAREN) 1 % Gel Apply 2g  to affected area every 6 hr as needed for pain. (Patient not taking: Reported on 10/5/2022) 100 g 0    famotidine (PEPCID) 20 MG tablet Take 1 tablet (20 mg total) by mouth 2 (two) times daily. for 14 days 28 tablet 0    hydroCHLOROthiazide (HYDRODIURIL) 12.5 MG Tab TAKE 1 TABLET DAILY WITH LOSARTAN      losartan (COZAAR) 50 MG tablet TAKE 1 TABLET BY MOUTH EVERY DAY WITH HCTZ      magnesium oxide 500 mg Tab Take by mouth once.      oxyCODONE-acetaminophen (PERCOCET) 5-325 mg per tablet Take 1 tablet by mouth every 6 (six) hours as needed for Pain. 56 tablet 0    OZEMPIC 1 mg/dose (2 mg/1.5 mL) PnIj TAKES ON Monday MORNINGS.      potassium chloride SA (K-DUR,KLOR-CON M) 10 MEQ tablet Take 20 mEq by mouth once daily.      pregabalin (LYRICA) 75 MG capsule Take 1 capsule (75 mg total) by mouth 2 (two) times daily. 60 capsule 0     rosuvastatin (CRESTOR) 40 MG Tab Take 1 tablet (40 mg total) by mouth every evening. 90 tablet 2    turmeric root extract 500 mg Cap once daily.      UNABLE TO FIND medication name: dp rub - Rt arm      vitamin B complex (SUPER B-50 COMPLEX ORAL) as directed       No current facility-administered medications for this visit.       Past Medical History:   Diagnosis Date    Anemia     Angio-edema     Depression     Diabetes mellitus type 2, noninsulin dependent 2016    Hematuria     Hx of essential hypertension     Hyperlipidemia     Hypertension, uncontrolled 2016    Nuclear sclerosis of both eyes 04/10/2017     Past Surgical History:   Procedure Laterality Date    BTL       SECTION      COLONOSCOPY N/A 2017    Procedure: COLONOSCOPY;  Surgeon: Oleg Enciso MD;  Location: [facility] (4TH FLR);  Service: Endoscopy;  Laterality: N/A;  CHRONIC CONSTIPATION S/P LRNY    COSMETIC SURGERY      Liposuction , back surgery  , buttock surgery     CYSTOSCOPY  2019    Procedure: CYSTOSCOPY;  Surgeon: ELIZABETH Mendoza MD;  Location: [facility];  Service: Urology;;  PRE-OP BY RN 3-    ESOPHAGOGASTRODUODENOSCOPY N/A 2020    Procedure: EGD (ESOPHAGOGASTRODUODENOSCOPY);  Surgeon: Elias Harper MD;  [facility]  Service: Endoscopy;  Laterality: N/A;  prep ins. emailed - ERW  COVID screening on 20 - ERW    GASTRIC BYPASS      sandra en y    HYSTEROSCOPY WITH DILATION AND CURETTAGE OF UTERUS N/A 2018    Procedure: HYSTEROSCOPY, WITH DILATION AND CURETTAGE OF UTERUS;  Surgeon: Naveed Alexander MD;  Location: [facility] OR;  Service: OB/GYN;  Laterality: N/A;  RN PREOP 2018    NECK SURGERY  2016    TOTAL REDUCTION MAMMOPLASTY  2007    TOTAL REPLACEMENT OF CERVICAL INTERVERTEBRAL DISC N/A 2022    Procedure: REPLACEMENT, INTERVERTEBRAL DISC, CERVICAL, TOTAL C3/4 Rajesh neuro monitoring;  Surgeon: Mansi Saini MD;  Location: [address] F[address];   Service: Neurosurgery;  Laterality: N/A;  TORONTO III, ASA III, BLOOD TYPE AND SCREEN, NEUROMONITORING EMG SEP MEP, BED REGULAR BED, HEADREST CASPAR, POSITION SUPINE, SPECIAL EQUIPMENT PHANI BIOMET, RADIOLOGY C-ARM     Family History       Problem Relation (Age of Onset)    Allergies Sister    Diabetes type II Mother    Heart attack Father (50)    Heart disease Mother    No Known Problems Brother, Maternal Aunt, Maternal Uncle, Paternal Aunt, Paternal Uncle, Maternal Grandmother, Maternal Grandfather, Paternal Grandmother, Paternal Grandfather          Social History     Socioeconomic History    Marital status: Single   Tobacco Use    Smoking status: Never    Smokeless tobacco: Never   Substance and Sexual Activity    Alcohol use: No     Comment: one drink in a few months    Drug use: No    Sexual activity: Yes     Partners: Male       Review of Systems   Constitutional:  Negative for fever.   HENT:  Positive for hearing loss.    Respiratory:  Negative for shortness of breath.    Cardiovascular:  Negative for chest pain.   Gastrointestinal:  Negative for vomiting.   Endocrine: Negative for cold intolerance.   Genitourinary:  Negative for difficulty urinating.   Musculoskeletal:  Positive for neck pain.   Skin:  Negative for color change.   Neurological:  Positive for headaches.   Hematological:  Does not bruise/bleed easily.   Psychiatric/Behavioral:  The patient is not nervous/anxious.      OBJECTIVE:     Vital Signs     There is no height or weight on file to calculate BMI.      Physical Exam:    Constitutional: She appears well-developed and well-nourished. No distress.     Abdominal: Soft.     Skin:   Well healed right low anterior neck incision      Psych/Behavior: She is alert. She is oriented to person, place, and time.     Musculoskeletal:      Comments: No weakness on exam      Neurological:   + Webber's, left stronger than right    Pulmonary: nonlabored respirations     Hematologic: no bruising noted  "    Diagnostic Results:  MRI, CT, and flex/ex X-rays personally reviewed   MRI demonstrates significant cord compression with myelomalacia at C3-C4   No evidence of OPLL and good previous fusion on CT   No dynamic instability on X-rays nor evidence of significant spondylosis     ASSESSMENT/PLAN:   Antoine Gonzáles is a 57 y.o. female who presents with neck and right arm and shoulder pain s/p ACDF with Dr. Lucas. She denies myelopathic symptoms, but she does have Webber's on exam. She states that she was told that she has "pinched nerves" by a [facility] neurologist.     Based on the results of her imaging studies coupled with her myelopathic complaints, I believe the patient would best benefit from C3-C4 arthroplasty to allow for preservation of C4-C5 disc space since there is currently no disease associated with that level. This would allow for decompression of the C3-C4 cord compression with associated myelomalacia. She underwent this procedure on 9/26/22.     We again discussed that I define success as the operation as preventing her from having progression of myelopathic symptoms. I am sorry that she has not had improvement in pain, which we knew preoperatively was the likely outcome. She expressed understanding.     I have recommended that she discuss possible orthopedic consultation for her shoulder pain with her PCP. She expressed agreement and understanding.     I would like to see her back in about 6 weeks with repeat flex/ex X-rays. I would like her to continue PT. I will offer her a referral to pain management as well.     I have encouraged her to contact the clinic in interim with any questions, concerns, or adverse clinical change.         "

## 2022-11-08 NOTE — PROGRESS NOTES
"[facility]   Physical Therapy Treatment Note     Name: Antoine Gonzáles  Clinic Number: [id number]    Therapy Diagnosis:   Encounter Diagnoses   Name Primary?    Neck muscle weakness Yes    Decreased range of motion of neck        Physician: Mina Salazar*    Visit Date: 11/8/2022  Physician: Anusha Zepeda PA-C     Physician orders: PT Eval and Treat   Medical diagnosis from referral: M50.00 (ICD-10-CM) - Cervical disc disorder with myelopathy  Evaluation date: 10/14/2022  Authorization period expiration: 11/11/22  Plan of care expiration: 1/17/2023  Reassessment due: 11/17/2022   Visit # / visits authorized: 6/12     Precautions: Standard, cervical precautions;   9/26/22,   1. Anterior approach to the cervical spine   2. Disc arthroplasty, C3-C4, with decompression and bilateral foraminotomy   3. Use of operative microscope and microscopic technique   4. Use of intraoperative neuromonitoring      Post-op week 6 as of this note     PTA Visit #: 0/5     Time In: 8:10 AM    Time Out: 9:05 AM   Total Billable Time:  55 minutes    SUBJECTIVE     Pt reports: Pt reports 8/10 throbbing pain in R shoulder that will extend down the arm and a 5/10 headache. Pt describes headache as tension. Pt reports she will be having follow up with surgeon this afternoon and had x rays taken yesterday.   She was compliant with home exercise program.  Response to previous treatment: felt 'great' after last session   Functional change: none to note      Pain: 8/10 R shoulder , 5/10 headache  Location: R shoulder and headache     OBJECTIVE     Objective Measures updated at progress report unless specified.     Treatment     Antoine received the treatments listed below:      therapeutic exercises to develop strength, endurance, ROM, and flexibility for 25 minutes including:  -4x30" upper trap stretch bilaterally in supine by therapist    -3x30" levator scap stretch bilaterally in supine   -2x10 median " "nerve glide performed by therapist   -2x12 chin tucks with YTB in seated - verbal cues   -1x12 repeated thoracic extension over half foam roll   -1x10 each side 1/2 knee thoracic rotation       NP :  -2x12 chin tucks in seated   -2x10 seated self median nerve glide     manual therapy techniques: Soft tissue Mobilization were applied to the upper traps, posterior cervical spine, suboccipital muscles and PROM of cervical spine for 25 minutes in order to improve circulation, decrease tissue tension, improve fascial mobility and improve cervical ROM/mobility.      Patient Education and Home Exercises     Home Exercises Provided and Patient Education Provided      Written Home Exercises Provided: Pt instructed and given new median nerve glide exercise to incorporate to current HEP. See EMR under Patient Instructions for exercises provided during therapy sessions.    ASSESSMENT   Antoine arrives to clinic today with 8/10 pain in R shoulder that will radiate down the arm  and 5/10 headache tension. Pt describes pain in R shoulder as "throbbing". Pt voiced concerns with shoulder pain and is set to return to surgeon follow up this afternoon. Discussed with pt the signs and symptoms of nerve pain and that the origin could be originating from the neck and pt verbalized understanding Pt still displays increased muscle guarding and hypertonicity in upper quarter, upper traps, sub occipital muscles and posterior cervical spine musculature however pt subjectively reports improvements in symptoms in neck to 0/10 post manual therapy treatment. Pt notes improvement in R shoulder symptoms post median nerve glide performance and there ex to 5/10. Will continue to monitor pt symptoms in both neck and shoulder, however pain appears to be limiting pt progressing in therex .  Antoine Is not progressing well towards her goals.   Pt prognosis is Guarded     Pt will continue to benefit from skilled outpatient physical therapy to address the " deficits listed in the problem list box on initial evaluation, provide pt/family education and to maximize pt's level of independence in the home and community environment.     Pt's spiritual, cultural and educational needs considered and pt agreeable to plan of care and goals.     Anticipated barriers to physical therapy: none    Goals: GOALS: Short Term Goals: 4 weeks  1.Report decreased in pain at worse less than  <   / =  3  /10  to increase tolerance for functional activities. On going  2. Pt to improve range of motion by 25% to allow for improved functional mobility to allow for improvement in IADLs.  On going  3. Increased  BUE MMT 1/2  grade to increase tolerance for ADL and work activities. On going  5. Pt to tolerate HEP to improve ROM and independence with ADL's. On going  6. Increased MMT  for lower traps/middle traps/rhomboids to > or = 4/5 to increase tolerance for ADL and improve posture. On going     Long Term Goals: 8 weeks  1.Report decreased in pain at worse less than  <   / =  1  /10  to increase tolerance for functional activities. On going  2.Pt to improve range of motion by 75% to allow for improved functional mobility to allow for improvement in IADLs. On going  3.Increased BUE MMT  1 grade  to increase tolerance for ADL and work activities.On going  4.  Pt will report 45% on FOTO neck survey score for neck pain disability to demonstrate decrease in disability and improvement in neck pain.On going  5. Pt to be Independent with HEP to improve ROM and independence with ADL's. On going  6. Increased MMT  for lower traps/middle traps/rhomboids to > or = 4+/5 to increase tolerance for ADL and improve posture. On going        PLAN     Plan of care Certification: 10/14/2022 to 1/17/2023    Continue treatment per established plan of care.     Sebastian Quispe, PT

## 2022-11-10 ENCOUNTER — CLINICAL SUPPORT (OUTPATIENT)
Dept: REHABILITATION | Facility: OTHER | Age: 57
End: 2022-11-10
Attending: PHYSICIAN ASSISTANT
Payer: MEDICARE

## 2022-11-10 ENCOUNTER — CLINICAL SUPPORT (OUTPATIENT)
Dept: REHABILITATION | Facility: HOSPITAL | Age: 57
End: 2022-11-10
Payer: MEDICARE

## 2022-11-10 DIAGNOSIS — R29.898 DECREASED RANGE OF MOTION OF NECK: ICD-10-CM

## 2022-11-10 DIAGNOSIS — R29.898 DECREASED PINCH STRENGTH: ICD-10-CM

## 2022-11-10 DIAGNOSIS — R29.898 DECREASED GRIP STRENGTH OF RIGHT HAND: ICD-10-CM

## 2022-11-10 DIAGNOSIS — M79.641 RIGHT HAND PAIN: Primary | ICD-10-CM

## 2022-11-10 DIAGNOSIS — M53.82 NECK MUSCLE WEAKNESS: Primary | ICD-10-CM

## 2022-11-10 PROCEDURE — 97110 THERAPEUTIC EXERCISES: CPT

## 2022-11-10 PROCEDURE — 97140 MANUAL THERAPY 1/> REGIONS: CPT

## 2022-11-10 NOTE — PROGRESS NOTES
"[facility]   Physical Therapy Treatment Note     Name: Antoine Gonzáles  Clinic Number: [id number]    Therapy Diagnosis:   Encounter Diagnoses   Name Primary?    Neck muscle weakness Yes    Decreased range of motion of neck          Physician: Anusha Zepeda PA-C    Visit Date: 11/10/2022  Physician: Anusha Zepeda PA-C     Physician orders: PT Eval and Treat   Medical diagnosis from referral: M50.00 (ICD-10-CM) - Cervical disc disorder with myelopathy  Evaluation date: 10/14/2022  Authorization period expiration: 11/11/22  Plan of care expiration: 1/17/2023  Reassessment due: 11/17/2022   Visit # / visits authorized: 7/12     Precautions: Standard, cervical precautions;   9/26/22,   1. Anterior approach to the cervical spine   2. Disc arthroplasty, C3-C4, with decompression and bilateral foraminotomy   3. Use of operative microscope and microscopic technique   4. Use of intraoperative neuromonitoring      Post-op week 6 as of this note     PTA Visit #: 0/5     Time In: 8:05 AM    Time Out: 9:00 AM   Total Billable Time:  55 minutes    SUBJECTIVE     Pt reports: Pt reports 8/10 throbbing tooth ache pain in R shoulder that will extend down the arm however no headache upon arrival this AM.    She was compliant with home exercise program.  Response to previous treatment: felt 'better' after last session   Functional change: none to note      Pain: 8/10 R shoulder   Location: R shoulder     OBJECTIVE     Objective Measures updated at progress report unless specified.     Treatment     Antoine received the treatments listed below:      therapeutic exercises to develop strength, endurance, ROM, and flexibility for 25 minutes including:  -2x10 seated self median nerve glide   -2x12x2" chin tucks with YTB in seated   -1x5 standing IYTW against the wall   -2x5x5" neck ISOMETRICS (flex, ext, SBR, SBL)       NP :  -2x12 chin tucks in seated   -2x10 median nerve glide performed by therapist   -1x12 " "repeated thoracic extension over half foam roll   -1x10 each side 1/2 knee thoracic rotation   -4x30" upper trap stretch bilaterally in supine by therapist    -3x30" levator scap stretch bilaterally in supine     manual therapy techniques: Soft tissue Mobilization were applied to the upper traps, posterior cervical spine, suboccipital muscles and PROM of cervical spine for 15 minutes in order to improve circulation, decrease tissue tension, improve fascial mobility and improve cervical ROM/mobility.      Patient Education and Home Exercises     Home Exercises Provided and Patient Education Provided      Written Home Exercises Provided: Pt instructed and given new median nerve glide exercise to incorporate to current HEP. See EMR under Patient Instructions for exercises provided during therapy sessions.    ASSESSMENT   Antoine arrives to clinic today with 8/10 pain in R shoulder that will radiate down the arm. Pt describes pain in R shoulder as "throbbing like a tooth ache". Have reached out to surgeon to inquire when cervical precautions will be lifted in order to incorporate cervical spine joint mobilizations into plan of care. Pt still displays increased muscle guarding and hypertonicity in upper quarter, upper traps, sub occipital muscles and posterior cervical spine musculature. Trialed cervical isometrics and standing periscapular exercises with no adverse reactions.  Pt notes improvement in R shoulder symptoms post median nerve glide performance and there ex to 5/10. Will continue to monitor pt symptoms in both neck and shoulder, however pain appears to be limiting pt progressing in therex.   Antoine Is not progressing well towards her goals.   Pt prognosis is Guarded     Pt will continue to benefit from skilled outpatient physical therapy to address the deficits listed in the problem list box on initial evaluation, provide pt/family education and to maximize pt's level of independence in the home and " community environment.     Pt's spiritual, cultural and educational needs considered and pt agreeable to plan of care and goals.     Anticipated barriers to physical therapy: none    Goals: GOALS: Short Term Goals: 4 weeks  1.Report decreased in pain at worse less than  <   / =  3  /10  to increase tolerance for functional activities. On going  2. Pt to improve range of motion by 25% to allow for improved functional mobility to allow for improvement in IADLs.  On going  3. Increased  BUE MMT 1/2  grade to increase tolerance for ADL and work activities. On going  5. Pt to tolerate HEP to improve ROM and independence with ADL's. On going  6. Increased MMT  for lower traps/middle traps/rhomboids to > or = 4/5 to increase tolerance for ADL and improve posture. On going     Long Term Goals: 8 weeks  1.Report decreased in pain at worse less than  <   / =  1  /10  to increase tolerance for functional activities. On going  2.Pt to improve range of motion by 75% to allow for improved functional mobility to allow for improvement in IADLs. On going  3.Increased BUE MMT  1 grade  to increase tolerance for ADL and work activities.On going  4.  Pt will report 45% on FOTO neck survey score for neck pain disability to demonstrate decrease in disability and improvement in neck pain.On going  5. Pt to be Independent with HEP to improve ROM and independence with ADL's. On going  6. Increased MMT  for lower traps/middle traps/rhomboids to > or = 4+/5 to increase tolerance for ADL and improve posture. On going        PLAN     Plan of care Certification: 10/14/2022 to 1/17/2023    Continue treatment per established plan of care.     Sebastian Quispe, PT

## 2022-11-10 NOTE — PROGRESS NOTES
[facility]  Occupational Therapy Treatment Note    Date: 11/10/2022  Name: Antoine Gonzáles  Clinic Number: [id number]    Therapy Diagnosis:        Encounter Diagnoses   Name Primary?    Cervical disc disorder with myelopathy      Decreased  strength of right hand      Right arm pain      Decreased pinch strength        Physician: Anusha Zepeda PA-C     Physician Orders: eval and treat   Medical Diagnosis: cervical disc repair w/myelopathy 9/26/22  Surgical Procedure and Date: 9/26/22,   1. Anterior approach to the cervical spine   2. Disc arthroplasty, C3-C4, with decompression and bilateral foraminotomy   3. Use of operative microscope and microscopic technique   4. Use of intraoperative neuromonitoring    Evaluation Date: 10/13/22  Insurance Authorization Period Expiration: 11/10/22  Plan of Care Expiration: 1/13/2023  Date of Return to MD: 11/8/2022  Visit # / Visits authorized: 3/ 12  FOTO: not performed      Precautions:  Standard and cervical precautions      Time In: 705  Time Out: 750  Total Appointment Time (timed & untimed codes): 45 minutes     SUBJECTIVE     Pt reports:  I saw neurologist but they said surgery didn't help the shoulder.  I'm being referred to orthopedic surgeon for shoulder pain.  Its mostly a sharp pain in my shoulder, and hurts when I move it a certain way.  I've tried heat and ice, and rub it.  I'm not dropping things with my hand as much.     She was compliant with home exercise program given last session.   Response to previous treatment: less radiating pain,   Functional change: no change reported     Pain: 5/10  Location: right RUE pain ; worse getting out of bed; Mostly R shoulder pain and discomfort (referred to PT who is treating neck and sports medicine MD ).     OBJECTIVE   RE-assess  and pinch next session     Objective Measures updated at progress report unless specified.     Strength (Dynamometer) and Pinch Strength (Pinch  Gauge)  Measured in pounds.    10/13/2022 10/13/2022     Left Right   Rung II 20.1 41.5   Key Pinch 7 12   3pt Pinch 8 11   2pt Pinch 7 7     Treatment     Antoine received the treatments listed below:     Therapeutic exercises to develop strength, endurance, ROM, flexibility, and posture for 45 minutes, including:  - upgraded to red putty for gross , key, 2pt , 3pt x 10 reps each  - Putty tools #1, #3 with red  putty to simulate jar/bottle opening 20 reps CW/CCW   - upgrade to 6# clothespins w/pom poms for pinch strengthening x 30 reps   - red digi flex for isolated/composite flexion x 10 reps each  - 35 lbs. PHG for  strengthening x 15 reps   - eliza varigrip red 75% for thumb press x 15 reps   - isopheres for in hand manipulation/rotation   - grooved pegboard for placing x 25 pegs and in hand manipulation x 5 pegs each   - desensitization with rice with coins for stereognosis task, nerve re-education and fine motor coordination  - provided active ice samples for home use. ; Encouraged patient to discuss shoulder pain with PT and MD.     Patient Education and Home Exercises      Education provided:   - Cont HEP   - Progress towards goals     Written Home Exercises Provided: Patient instructed to cont prior HEP.  Exercises were reviewed and Antoine was able to demonstrate them prior to the end of the session.  Antoine demonstrated good  understanding of the HEP provided. See EMR under Patient Instructions for exercises provided during therapy sessions.       Assessment     Antoine is progressing well towards her goals and there are no updates to goals at this time. Pt prognosis is Good. Decreased overall pain complaints this date and improved hand strength and coordination.  Shoulder pain  primary complaint with sleeping and positional. Patient began driving.   Patient referred to Orthopedic sports medicine for shoulder pain. Will cont to follow and upgrade as able.     Pt will continue to benefit from  skilled outpatient occupational therapy to address the deficits listed in the problem list on initial evaluation provide pt/family education and to maximize pt's level of independence in the home and community environment.     Pt's spiritual, cultural and educational needs considered and pt agreeable to plan of care and goals.    Anticipated barriers to occupational therapy: none     Goals:   The following goals were discussed with the patient and patient is in agreement with them as to be addressed in the treatment plan.      Short Term Goals (STGs); to be met within 4 weeks   Patient to be independent in HEP and modality use.   Increase  strength 3-8 lbs. For cooking, cleaning and ADL use. - progressing   Increase pinch 1-3 psi's for writing, typing, and FM activity - progressing   Decrease complaints of pain to 6 or less with ADLs- met      Long Term Goals (LTGs) to be met by discharge  Increase  9-15 lbs. For ADL use by discharge   Increase pinch 4-6 lbs. For FM activities   Decrease complaints of pain to 5 or less with ADL use on average.     Plan of Care Certification: 10/13/2022 to 1/13/2023.      Outpatient Occupational Therapy 1 times weekly for 12 weeks to include the following interventions: Paraffin, Modalities for pain management, Therapeutic exercises/activities., Strengthening, and postural correction exercises.      Raine Tejada, OT  , CHT

## 2022-11-15 ENCOUNTER — CLINICAL SUPPORT (OUTPATIENT)
Dept: REHABILITATION | Facility: OTHER | Age: 57
End: 2022-11-15
Attending: PHYSICIAN ASSISTANT
Payer: MEDICARE

## 2022-11-15 DIAGNOSIS — M53.82 NECK MUSCLE WEAKNESS: Primary | ICD-10-CM

## 2022-11-15 DIAGNOSIS — R29.898 DECREASED RANGE OF MOTION OF NECK: ICD-10-CM

## 2022-11-15 PROCEDURE — 97110 THERAPEUTIC EXERCISES: CPT

## 2022-11-15 NOTE — PROGRESS NOTES
"[facility]   Physical Therapy Treatment Note     Name: Antoine Gonzáles  Clinic Number: [id number]    Therapy Diagnosis:   Encounter Diagnoses   Name Primary?    Neck muscle weakness Yes    Decreased range of motion of neck            Physician: Anusha Zepeda PA-C    Visit Date: 11/15/2022  Physician: Anusha Zepeda PA-C     Physician orders: PT Eval and Treat   Medical diagnosis from referral: M50.00 (ICD-10-CM) - Cervical disc disorder with myelopathy  Evaluation date: 10/14/2022  Authorization period expiration: 11/11/22  Plan of care expiration: 1/17/2023  Reassessment due: 11/17/2022   Visit # / visits authorized: 8/12     Precautions: Standard, cervical precautions;   9/26/22,   1. Anterior approach to the cervical spine   2. Disc arthroplasty, C3-C4, with decompression and bilateral foraminotomy   3. Use of operative microscope and microscopic technique   4. Use of intraoperative neuromonitoring      Post-op week 6 as of this note     PTA Visit #: 0/5     Time In: 8:10 AM    Time Out: 9:05 AM   Total Billable Time:  55 minutes    SUBJECTIVE     Pt reports: Pt reports 8/10 throbbing tooth ache pain in R shoulder that will extend down the arm however no headache upon arrival this AM and no pain in neck this AM.    She was compliant with home exercise program.  Response to previous treatment: felt 'better' after last session   Functional change: none to note      Pain: 8/10 R shoulder   Location: R shoulder     OBJECTIVE     Objective Measures updated at progress report unless specified.     Treatment     Antoine received the treatments listed below:      therapeutic exercises to develop strength, endurance, ROM, and flexibility for 55 minutes including:  -UBE 6 min backwards   -2x10 seated self median nerve glide   -2x12x2" chin tucks with YTB in seated   -2x10 standing IYTW against the wall   -1x10x5" neck ISOMETRICS (flex, ext, SBR, SBL)   -1x10x5" R shoulder ISOMETRICS (flex, ext, " "abduction)     NP :  -2x12 chin tucks in seated   -2x10 median nerve glide performed by therapist   -1x12 repeated thoracic extension over half foam roll   -1x10 each side 1/2 knee thoracic rotation   -4x30" upper trap stretch bilaterally in supine by therapist    -3x30" levator scap stretch bilaterally in supine     manual therapy techniques: Soft tissue Mobilization were applied to the upper traps, posterior cervical spine, suboccipital muscles and PROM of cervical spine for 00 minutes in order to improve circulation, decrease tissue tension, improve fascial mobility and improve cervical ROM/mobility.      Patient Education and Home Exercises     Home Exercises Provided and Patient Education Provided      Written Home Exercises Provided: Pt instructed and given new median nerve glide exercise to incorporate to current HEP. See EMR under Patient Instructions for exercises provided during therapy sessions.    ASSESSMENT   Antoine arrives to clinic today with 8/10 pain in R shoulder that will radiate down the arm however no head ahce and no pain in cervical spine. Pt describes pain in R shoulder as "throbbing like a tooth ache". Received confirmation from surgeon after session today that pt can now receive cervical spine  joint mobilizations and will incorporate this next session. Pt reports she will be seeing orthopedic MD for shoulder in order to have it assessed and potentially receive imaging in early December. Trialed shoulder isometrics with no adverse reactions however no decrease in symptoms in R shoulder today after there-ex. Will continue to monitor pt symptoms in shoulder and adjust plan of care accordingly.     Antoine Is not progressing well towards her goals.   Pt prognosis is Guarded     Pt will continue to benefit from skilled outpatient physical therapy to address the deficits listed in the problem list box on initial evaluation, provide pt/family education and to maximize pt's level of independence " in the home and community environment.     Pt's spiritual, cultural and educational needs considered and pt agreeable to plan of care and goals.     Anticipated barriers to physical therapy: none    Goals: GOALS: Short Term Goals: 4 weeks  1.Report decreased in pain at worse less than  <   / =  3  /10  to increase tolerance for functional activities. On going  2. Pt to improve range of motion by 25% to allow for improved functional mobility to allow for improvement in IADLs.  On going  3. Increased  BUE MMT 1/2  grade to increase tolerance for ADL and work activities. On going  5. Pt to tolerate HEP to improve ROM and independence with ADL's. On going  6. Increased MMT  for lower traps/middle traps/rhomboids to > or = 4/5 to increase tolerance for ADL and improve posture. On going     Long Term Goals: 8 weeks  1.Report decreased in pain at worse less than  <   / =  1  /10  to increase tolerance for functional activities. On going  2.Pt to improve range of motion by 75% to allow for improved functional mobility to allow for improvement in IADLs. On going  3.Increased BUE MMT  1 grade  to increase tolerance for ADL and work activities.On going  4.  Pt will report 45% on FOTO neck survey score for neck pain disability to demonstrate decrease in disability and improvement in neck pain.On going  5. Pt to be Independent with HEP to improve ROM and independence with ADL's. On going  6. Increased MMT  for lower traps/middle traps/rhomboids to > or = 4+/5 to increase tolerance for ADL and improve posture. On going        PLAN     Plan of care Certification: 10/14/2022 to 1/17/2023    Continue treatment per established plan of care.     Sebastian Quispe, PT

## 2022-11-16 ENCOUNTER — PATIENT MESSAGE (OUTPATIENT)
Dept: SPORTS MEDICINE | Facility: CLINIC | Age: 57
End: 2022-11-16
Payer: MEDICARE

## 2022-11-16 ENCOUNTER — TELEPHONE (OUTPATIENT)
Dept: SPORTS MEDICINE | Facility: CLINIC | Age: 57
End: 2022-11-16
Payer: MEDICARE

## 2022-11-16 NOTE — TELEPHONE ENCOUNTER
Tried to call patient multiple times. Unable to leave a voicemail will message through the portal.

## 2022-11-18 ENCOUNTER — HOSPITAL ENCOUNTER (OUTPATIENT)
Dept: RADIOLOGY | Facility: HOSPITAL | Age: 57
Discharge: HOME | End: 2022-11-18
Attending: ORTHOPAEDIC SURGERY
Payer: MEDICARE

## 2022-11-18 ENCOUNTER — OFFICE VISIT (OUTPATIENT)
Dept: SPORTS MEDICINE | Facility: CLINIC | Age: 57
End: 2022-11-18
Payer: MEDICARE

## 2022-11-18 VITALS
DIASTOLIC BLOOD PRESSURE: 84 MMHG | SYSTOLIC BLOOD PRESSURE: 162 MMHG | BODY MASS INDEX: 32.2 KG/M2 | HEART RATE: 75 BPM | HEIGHT: 62 IN | WEIGHT: 175 LBS

## 2022-11-18 DIAGNOSIS — M25.511 RIGHT SHOULDER PAIN, UNSPECIFIED CHRONICITY: Primary | ICD-10-CM

## 2022-11-18 DIAGNOSIS — M25.511 RIGHT SHOULDER PAIN, UNSPECIFIED CHRONICITY: ICD-10-CM

## 2022-11-18 PROCEDURE — 73030 X-RAY EXAM OF SHOULDER: CPT | Mod: 26,RT,, | Performed by: RADIOLOGY

## 2022-11-18 PROCEDURE — 1159F PR MEDICATION LIST DOCUMENTED IN MEDICAL RECORD: ICD-10-PCS | Mod: CPTII,S$GLB,, | Performed by: ORTHOPAEDIC SURGERY

## 2022-11-18 PROCEDURE — 3044F PR MOST RECENT HEMOGLOBIN A1C LEVEL <7.0%: ICD-10-PCS | Mod: CPTII,S$GLB,, | Performed by: ORTHOPAEDIC SURGERY

## 2022-11-18 PROCEDURE — 3077F SYST BP >= 140 MM HG: CPT | Mod: CPTII,S$GLB,, | Performed by: ORTHOPAEDIC SURGERY

## 2022-11-18 PROCEDURE — 20610 DRAIN/INJ JOINT/BURSA W/O US: CPT | Mod: RT,S$GLB,, | Performed by: ORTHOPAEDIC SURGERY

## 2022-11-18 PROCEDURE — 99999 PR PBB SHADOW E&M-EST. PATIENT-LVL III: CPT | Mod: PBBFAC,,, | Performed by: ORTHOPAEDIC SURGERY

## 2022-11-18 PROCEDURE — 4010F ACE/ARB THERAPY RXD/TAKEN: CPT | Mod: CPTII,S$GLB,, | Performed by: ORTHOPAEDIC SURGERY

## 2022-11-18 PROCEDURE — 3079F DIAST BP 80-89 MM HG: CPT | Mod: CPTII,S$GLB,, | Performed by: ORTHOPAEDIC SURGERY

## 2022-11-18 PROCEDURE — 3044F HG A1C LEVEL LT 7.0%: CPT | Mod: CPTII,S$GLB,, | Performed by: ORTHOPAEDIC SURGERY

## 2022-11-18 PROCEDURE — 20610 LARGE JOINT ASPIRATION/INJECTION: R SUBACROMIAL BURSA: ICD-10-PCS | Mod: RT,S$GLB,, | Performed by: ORTHOPAEDIC SURGERY

## 2022-11-18 PROCEDURE — 99999 PR PBB SHADOW E&M-EST. PATIENT-LVL III: ICD-10-PCS | Mod: PBBFAC,,, | Performed by: ORTHOPAEDIC SURGERY

## 2022-11-18 PROCEDURE — 73030 X-RAY EXAM OF SHOULDER: CPT | Mod: TC,RT

## 2022-11-18 PROCEDURE — 1159F MED LIST DOCD IN RCRD: CPT | Mod: CPTII,S$GLB,, | Performed by: ORTHOPAEDIC SURGERY

## 2022-11-18 PROCEDURE — 3077F PR MOST RECENT SYSTOLIC BLOOD PRESSURE >= 140 MM HG: ICD-10-PCS | Mod: CPTII,S$GLB,, | Performed by: ORTHOPAEDIC SURGERY

## 2022-11-18 PROCEDURE — 73030 XR SHOULDER COMPLETE 2 OR MORE VIEWS RIGHT: ICD-10-PCS | Mod: 26,RT,, | Performed by: RADIOLOGY

## 2022-11-18 PROCEDURE — 99204 OFFICE O/P NEW MOD 45 MIN: CPT | Mod: 25,S$GLB,, | Performed by: ORTHOPAEDIC SURGERY

## 2022-11-18 PROCEDURE — 4010F PR ACE/ARB THEARPY RXD/TAKEN: ICD-10-PCS | Mod: CPTII,S$GLB,, | Performed by: ORTHOPAEDIC SURGERY

## 2022-11-18 PROCEDURE — 3008F BODY MASS INDEX DOCD: CPT | Mod: CPTII,S$GLB,, | Performed by: ORTHOPAEDIC SURGERY

## 2022-11-18 PROCEDURE — 99204 PR OFFICE/OUTPT VISIT, NEW, LEVL IV, 45-59 MIN: ICD-10-PCS | Mod: 25,S$GLB,, | Performed by: ORTHOPAEDIC SURGERY

## 2022-11-18 PROCEDURE — 3008F PR BODY MASS INDEX (BMI) DOCUMENTED: ICD-10-PCS | Mod: CPTII,S$GLB,, | Performed by: ORTHOPAEDIC SURGERY

## 2022-11-18 PROCEDURE — 3079F PR MOST RECENT DIASTOLIC BLOOD PRESSURE 80-89 MM HG: ICD-10-PCS | Mod: CPTII,S$GLB,, | Performed by: ORTHOPAEDIC SURGERY

## 2022-11-18 RX ORDER — TRIAMCINOLONE ACETONIDE 40 MG/ML
60 INJECTION, SUSPENSION INTRA-ARTICULAR; INTRAMUSCULAR
Status: DISCONTINUED | OUTPATIENT
Start: 2022-11-18 | End: 2022-11-18 | Stop reason: HOSPADM

## 2022-11-18 RX ADMIN — TRIAMCINOLONE ACETONIDE 60 MG: 40 INJECTION, SUSPENSION INTRA-ARTICULAR; INTRAMUSCULAR at 09:11

## 2022-11-18 NOTE — PROCEDURES
Large Joint Aspiration/Injection: R subacromial bursa    Date/Time: 11/18/2022 9:45 AM  Performed by: Heriberto Kelly MD  Authorized by: Heriberto Kelly MD     Consent Done?:  Yes (Verbal)  Indications:  Pain  Site marked: the procedure site was marked    Timeout: prior to procedure the correct patient, procedure, and site was verified    Prep: patient was prepped and draped in usual sterile fashion      Details:  Needle Size:  22 G  Ultrasonic Guidance for needle placement?: No    Approach:  Posterior  Location:  Shoulder  Site:  R subacromial bursa  Medications:  60 mg triamcinolone acetonide 40 mg/mL  Patient tolerance:  Patient tolerated the procedure well with no immediate complications
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
## 2022-11-28 DIAGNOSIS — Z12.31 OTHER SCREENING MAMMOGRAM: Primary | ICD-10-CM

## 2022-12-05 DIAGNOSIS — M25.511 RIGHT SHOULDER PAIN, UNSPECIFIED CHRONICITY: Primary | ICD-10-CM

## 2022-12-08 DIAGNOSIS — M50.00 CERVICAL DISC DISORDER WITH MYELOPATHY: ICD-10-CM

## 2022-12-08 RX ORDER — OXYCODONE AND ACETAMINOPHEN 5; 325 MG/1; MG/1
1 TABLET ORAL EVERY 6 HOURS PRN
Qty: 28 TABLET | Refills: 0 | Status: SHIPPED | OUTPATIENT
Start: 2022-12-08 | End: 2022-12-15

## 2022-12-12 ENCOUNTER — OFFICE VISIT (OUTPATIENT)
Dept: OBSTETRICS AND GYNECOLOGY | Facility: CLINIC | Age: 57
End: 2022-12-12
Payer: MEDICARE

## 2022-12-12 DIAGNOSIS — N81.4 UTERINE PROLAPSE: Primary | ICD-10-CM

## 2022-12-12 PROCEDURE — 3078F PR MOST RECENT DIASTOLIC BLOOD PRESSURE < 80 MM HG: ICD-10-PCS | Mod: CPTII,S$GLB,, | Performed by: OBSTETRICS & GYNECOLOGY

## 2022-12-12 PROCEDURE — 99999 PR PBB SHADOW E&M-EST. PATIENT-LVL II: CPT | Mod: PBBFAC,,, | Performed by: OBSTETRICS & GYNECOLOGY

## 2022-12-12 PROCEDURE — 3008F PR BODY MASS INDEX (BMI) DOCUMENTED: ICD-10-PCS | Mod: CPTII,S$GLB,, | Performed by: OBSTETRICS & GYNECOLOGY

## 2022-12-12 PROCEDURE — 3044F PR MOST RECENT HEMOGLOBIN A1C LEVEL <7.0%: ICD-10-PCS | Mod: CPTII,S$GLB,, | Performed by: OBSTETRICS & GYNECOLOGY

## 2022-12-12 PROCEDURE — 99213 PR OFFICE/OUTPT VISIT, EST, LEVL III, 20-29 MIN: ICD-10-PCS | Mod: S$GLB,,, | Performed by: OBSTETRICS & GYNECOLOGY

## 2022-12-12 PROCEDURE — 4010F ACE/ARB THERAPY RXD/TAKEN: CPT | Mod: CPTII,S$GLB,, | Performed by: OBSTETRICS & GYNECOLOGY

## 2022-12-12 PROCEDURE — 3078F DIAST BP <80 MM HG: CPT | Mod: CPTII,S$GLB,, | Performed by: OBSTETRICS & GYNECOLOGY

## 2022-12-12 PROCEDURE — 99999 PR PBB SHADOW E&M-EST. PATIENT-LVL II: ICD-10-PCS | Mod: PBBFAC,,, | Performed by: OBSTETRICS & GYNECOLOGY

## 2022-12-12 PROCEDURE — 3044F HG A1C LEVEL LT 7.0%: CPT | Mod: CPTII,S$GLB,, | Performed by: OBSTETRICS & GYNECOLOGY

## 2022-12-12 PROCEDURE — 3074F PR MOST RECENT SYSTOLIC BLOOD PRESSURE < 130 MM HG: ICD-10-PCS | Mod: CPTII,S$GLB,, | Performed by: OBSTETRICS & GYNECOLOGY

## 2022-12-12 PROCEDURE — 99213 OFFICE O/P EST LOW 20 MIN: CPT | Mod: S$GLB,,, | Performed by: OBSTETRICS & GYNECOLOGY

## 2022-12-12 PROCEDURE — 3008F BODY MASS INDEX DOCD: CPT | Mod: CPTII,S$GLB,, | Performed by: OBSTETRICS & GYNECOLOGY

## 2022-12-12 PROCEDURE — 4010F PR ACE/ARB THEARPY RXD/TAKEN: ICD-10-PCS | Mod: CPTII,S$GLB,, | Performed by: OBSTETRICS & GYNECOLOGY

## 2022-12-12 PROCEDURE — 3074F SYST BP LT 130 MM HG: CPT | Mod: CPTII,S$GLB,, | Performed by: OBSTETRICS & GYNECOLOGY

## 2022-12-12 NOTE — PROGRESS NOTES
[facility]  Ambulatory Clinic  Obstetrics & Gynecology    Visit Date:  2022    Chief Complaint:  Bulge in vagina    History of Present Illness:      Atnoine Gonzáles is a 57 y.o.  here with c/o bulge in vagina.    On exam, mild uterine prolapse seen.  Pt denies incontinence.  Pt denies vaginal bleeding, vaginal discharge, dyspareunia, pelvic pain, bloating, early satiety, unintentional weight loss, breast mass/skin changes, incontinence, GI or urinary complaints.      Review of Systems:      GENERAL:  No fever, fatigue, excessive weight gain or loss  HEENT:  No headaches, hearing changes, visual disturbance  RESPIRATORY:  No cough, shortness of breath  CARDIOVASCULAR:  No chest pain, heart palpitations, leg swelling  GASTROINTESTINAL:  No nausea, vomiting, constipation, diarrhea, abd pain, rectal bleeding   GENITOURINARY:  See HPI    Physical Exam:     /78   Pulse 70   Resp 16   Wt 78.9 kg (174 lb 0.9 oz)   LMP 2017   BMI 31.83 kg/m²      GENERAL:  No acute distress, well-nourished  HEENT:  Atraumatic, anicteric, moist mucus membranes  LUNGS:  Clear normal respiratory effort  HEART:  Regular rate and rhythm  ABDOMEN:  Soft, non-tender, non-distended, normoactive bowel sounds, no obvious organomegaly    GENITOURINARY:    VULVAR:  Female external genitalia w/o obvious lesions. Normal urethral meatus. No gross lymphadenopathy.   VAGINA:  Mild, age appropriate vulvovaginal atrophy. Grade 1 uterine prolapse. No significant cystocele or rectocele. No obvious lesion. No discharge.  CERVIX:  No cervical motion tenderness, discharge, or obvious lesions.   UTERUS:  Small, non-tender, normal contour  ADNEXA:  No masses, non-tender    RECTAL:  Deferred. No obvious external lesions    Chaperone present for exam.    Assessment:     57 y.o. :    Uterine prolapse, grade 1    Plan:    Discussed natural course of uterine prolapse including various mgt options.  After a  lengthy discussion, pt desires expectant management at this time as prolapse is fairly asymptomatic.  Encourage Kegel exercises.  Pelvic precautions.  Pt advised to call office if her prolapse becomes symptomatic.    Encourage healthy lifestyle modifications.    F/u with PCP for health maintenance.    Return 6/2023 for gynecologic exam, or sooner as needed.  All questions answered, pt voiced understanding.        Naveed Alexander MD

## 2022-12-13 ENCOUNTER — TELEPHONE (OUTPATIENT)
Dept: PAIN MEDICINE | Facility: CLINIC | Age: 57
End: 2022-12-13

## 2022-12-13 ENCOUNTER — OFFICE VISIT (OUTPATIENT)
Dept: PAIN MEDICINE | Facility: CLINIC | Age: 57
End: 2022-12-13
Payer: MEDICARE

## 2022-12-13 VITALS
HEIGHT: 62 IN | RESPIRATION RATE: 19 BRPM | SYSTOLIC BLOOD PRESSURE: 179 MMHG | HEART RATE: 73 BPM | DIASTOLIC BLOOD PRESSURE: 91 MMHG | TEMPERATURE: 98 F | WEIGHT: 177.25 LBS | BODY MASS INDEX: 32.62 KG/M2

## 2022-12-13 DIAGNOSIS — G89.4 CHRONIC PAIN DISORDER: ICD-10-CM

## 2022-12-13 DIAGNOSIS — M75.21 BICEPS TENDONITIS ON RIGHT: Primary | ICD-10-CM

## 2022-12-13 DIAGNOSIS — M25.511 RIGHT SHOULDER PAIN, UNSPECIFIED CHRONICITY: ICD-10-CM

## 2022-12-13 PROCEDURE — 99999 PR PBB SHADOW E&M-EST. PATIENT-LVL IV: CPT | Mod: PBBFAC,,, | Performed by: ANESTHESIOLOGY

## 2022-12-13 PROCEDURE — 99204 PR OFFICE/OUTPT VISIT, NEW, LEVL IV, 45-59 MIN: ICD-10-PCS | Mod: GC,S$GLB,, | Performed by: ANESTHESIOLOGY

## 2022-12-13 PROCEDURE — 4010F PR ACE/ARB THEARPY RXD/TAKEN: ICD-10-PCS | Mod: CPTII,S$GLB,, | Performed by: ANESTHESIOLOGY

## 2022-12-13 PROCEDURE — 3077F SYST BP >= 140 MM HG: CPT | Mod: CPTII,S$GLB,, | Performed by: ANESTHESIOLOGY

## 2022-12-13 PROCEDURE — 1160F RVW MEDS BY RX/DR IN RCRD: CPT | Mod: CPTII,S$GLB,, | Performed by: ANESTHESIOLOGY

## 2022-12-13 PROCEDURE — 3080F PR MOST RECENT DIASTOLIC BLOOD PRESSURE >= 90 MM HG: ICD-10-PCS | Mod: CPTII,S$GLB,, | Performed by: ANESTHESIOLOGY

## 2022-12-13 PROCEDURE — 3080F DIAST BP >= 90 MM HG: CPT | Mod: CPTII,S$GLB,, | Performed by: ANESTHESIOLOGY

## 2022-12-13 PROCEDURE — 1160F PR REVIEW ALL MEDS BY PRESCRIBER/CLIN PHARMACIST DOCUMENTED: ICD-10-PCS | Mod: CPTII,S$GLB,, | Performed by: ANESTHESIOLOGY

## 2022-12-13 PROCEDURE — 3044F HG A1C LEVEL LT 7.0%: CPT | Mod: CPTII,S$GLB,, | Performed by: ANESTHESIOLOGY

## 2022-12-13 PROCEDURE — 3008F PR BODY MASS INDEX (BMI) DOCUMENTED: ICD-10-PCS | Mod: CPTII,S$GLB,, | Performed by: ANESTHESIOLOGY

## 2022-12-13 PROCEDURE — 3008F BODY MASS INDEX DOCD: CPT | Mod: CPTII,S$GLB,, | Performed by: ANESTHESIOLOGY

## 2022-12-13 PROCEDURE — 4010F ACE/ARB THERAPY RXD/TAKEN: CPT | Mod: CPTII,S$GLB,, | Performed by: ANESTHESIOLOGY

## 2022-12-13 PROCEDURE — 99999 PR PBB SHADOW E&M-EST. PATIENT-LVL IV: ICD-10-PCS | Mod: PBBFAC,,, | Performed by: ANESTHESIOLOGY

## 2022-12-13 PROCEDURE — 1159F PR MEDICATION LIST DOCUMENTED IN MEDICAL RECORD: ICD-10-PCS | Mod: CPTII,S$GLB,, | Performed by: ANESTHESIOLOGY

## 2022-12-13 PROCEDURE — 3044F PR MOST RECENT HEMOGLOBIN A1C LEVEL <7.0%: ICD-10-PCS | Mod: CPTII,S$GLB,, | Performed by: ANESTHESIOLOGY

## 2022-12-13 PROCEDURE — 99204 OFFICE O/P NEW MOD 45 MIN: CPT | Mod: GC,S$GLB,, | Performed by: ANESTHESIOLOGY

## 2022-12-13 PROCEDURE — 1159F MED LIST DOCD IN RCRD: CPT | Mod: CPTII,S$GLB,, | Performed by: ANESTHESIOLOGY

## 2022-12-13 PROCEDURE — 3077F PR MOST RECENT SYSTOLIC BLOOD PRESSURE >= 140 MM HG: ICD-10-PCS | Mod: CPTII,S$GLB,, | Performed by: ANESTHESIOLOGY

## 2022-12-13 RX ORDER — DICLOFENAC SODIUM 10 MG/G
2 GEL TOPICAL 4 TIMES DAILY
Qty: 50 G | Refills: 2 | Status: SHIPPED | OUTPATIENT
Start: 2022-12-13 | End: 2023-01-12

## 2022-12-13 NOTE — PROGRESS NOTES
Chronic Pain - New Consult    Referring Physician: Dominick Castillo    Chief Complaint:   Chief Complaint   Patient presents with    Neck Pain    Shoulder Pain        SUBJECTIVE:    Antoine Gonzáles presents to the clinic for the evaluation of neck and shoulder pain. She would like to discuss her right shoulder pain today.  The pain started 1 to 2 years ago following insidious onset and symptoms have been worsening.The pain is located in the anterior aspect of her right shoulder area and radiates to the neck.  The pain is described as throbbing and is rated as 8/10. The pain is rated with a score of  6/10 on the BEST day and a score of 10/10 on the WORST day.  Symptoms interfere with daily activity, sleeping, and work. The pain is exacerbated by cooking, cleaning, and lifting.  The pain is mitigated by heat and medications. History of C3/4 ACDF and C5/6, C6/7 ACDF.      Patient denies night fever/night sweats, urinary incontinence, bowel incontinence, significant weight loss, significant motor weakness, and loss of sensations.    Physical Therapy/Home Exercise: yes-currently in Physical Therapy    Pain Disability Index Review:  Last 3 PDI Scores 12/13/2022   Pain Disability Index (PDI) 28       Pain Medications:    - Opioids: Roxicodone (Oxycodone/Acetaminophen)     report:  Not applicable    Pain Procedures: N/A    Imaging:     EXAMINATION:  MRI CERVICAL SPINE WITHOUT CONTRAST     CLINICAL HISTORY:  Neck pain, acute, prior cervical surgery;.  Radiculopathy, cervical region     TECHNIQUE:  Multiplanar, multisequence MR images of the cervical spine were acquired without the administration of contrast.     COMPARISON:  Same date CT.     FINDINGS:  Remote postoperative change of C5-C6 and C6-C7 anterior cervical discectomy and fusion.  There is reversal of the normal cervical lordosis.  No spondylolisthesis.  Occipital condyles, C1 lateral masses and odontoid process are intact.  Vertebral body heights are well  maintained without evidence for fracture.  No marrow signal abnormality to suggest an infiltrative process.     There is degenerative disc desiccation throughout the visualized cervicothoracic spine with mild-to-moderate narrowing at C3-C4.  No endplate edema.     Small focal areas of increased T2/STIR cord signal at the C3-C4 level.  Remaining visualized cervicothoracic spinal cord demonstrates normal contour and signal intensity.  Cerebellar tonsils are in their expected location.  Visualized brainstem is normal.     Vertebral artery flow voids are present.  Visualized parotid and submandibular glands demonstrate no significant abnormalities.  There is a multinodular thyroid gland.  Prevertebral soft tissues are normal.  No discrete pharyngeal or laryngeal masses.  No cervical lymphadenopathy.  Paraspinal musculature demonstrates normal bulk and signal intensity.     C2-C3: Left facet arthropathy.  No spinal canal stenosis or neural foraminal narrowing.     C3-C4: Broad-based posterior disc osteophyte complex effaces the ventral thecal sac and causes mass effect on the lateral recesses, left greater than right.  Bilateral uncovertebral joint spurring.  Findings contribute to moderate to severe spinal canal stenosis and moderate bilateral neural foraminal narrowing.     C4-C5: Bilateral uncovertebral joint spurring.  Findings contribute to mild left neural foraminal narrowing.  No spinal canal stenosis.     C5-C6: Broad-based posterior disc osteophyte complex.  Bilateral uncovertebral joint spurring.  Findings contribute to mild bilateral neural foraminal narrowing.  No spinal canal stenosis.     C6-C7: Broad-based posterior disc osteophyte complex effaces the ventral thecal sac.  Bilateral uncovertebral joint spurring and bilateral ligamentum flavum buckling.  Findings contribute to mild spinal canal stenosis.  No neural foraminal narrowing.     C7-T1: Left subarticular disc osteophyte protrusion effaces the left  lateral recess and compresses the left exiting C8 nerve roots.  Findings contribute to severe left lateral recess stenosis.  No neural foraminal narrowing.     Impression:     1. Broad-based posterior disc osteophyte complex at C3-C4 that contributes to moderate to severe spinal canal stenosis with associated compressive myelomalacia.  2. Left subarticular disc osteophyte protrusion at C7-T1 that effaces the left lateral recess and compresses the left exiting C8 nerve roots.  3. Additional cervical degenerative changes as detailed in the body of the report.  4. Multinodular thyroid gland, better evaluated on previous ultrasound.  This report was flagged in Epic as abnormal.        Electronically signed by: Bienvenido Suarez MD  Date:                                            08/19/2022  Time:                                           08:25    EXAMINATION:  XR CERVICAL SPINE AP LATERAL     CLINICAL HISTORY:  s/p spine surgery;     TECHNIQUE:  AP, lateral and open mouth views of the cervical spine were performed.     COMPARISON:  09/27/2022.     FINDINGS:  Mild straightening of the normal cervical lordosis.  Postoperative changes again identified in the cervical spine.  Grossly normal sagittal alignment.  Vertebral body heights are relatively well maintained.  No displaced fracture is identified.  Prevertebral soft tissues are stable, noting interval removal of previously seen surgical drain.     Impression:     No detrimental change when compared with 09/27/2022.        Electronically signed by: Dickson Noble MD  Date:                                            10/02/2022  Time:                                           06:21      EXAMINATION:  XR SHOULDER COMPLETE 2 OR MORE VIEWS RIGHT     CLINICAL HISTORY:  Pain in right shoulder     TECHNIQUE:  Two or three views of the right shoulder were performed.     COMPARISON:  08/05/2022     FINDINGS:  Skeletal structures are intact with good alignment.  Mild degenerative  spurring is present at AC joint.  Glenohumeral joint space is satisfactory.  Slight spurring is present at humeral head.  No erosion or abnormal soft tissue calcification is seen.     Partially demonstrated cervical spine again shows disc devices at several levels.     Impression:     No acute abnormality.  DJD right shoulder.        Electronically signed by: Bakari Sylvester MD  Date:                                            2022  Time:                                           09:52    Past Medical History:   Diagnosis Date    Anemia     Angio-edema     Depression     Diabetes mellitus type 2, noninsulin dependent 2016    Hematuria     Hx of essential hypertension     Hyperlipidemia     Hypertension, uncontrolled 2016    Nuclear sclerosis of both eyes 04/10/2017     Past Surgical History:   Procedure Laterality Date    BTL       SECTION      COLONOSCOPY N/A 2017    Procedure: COLONOSCOPY;  Surgeon: Oleg Enciso MD;  Location: [facility] ([address] [place] [address]);  Service: Endoscopy;  Laterality: N/A;  CHRONIC CONSTIPATION S/P LRNY    COSMETIC SURGERY      Liposuction , back surgery  , buttock surgery     CYSTOSCOPY  2019    Procedure: CYSTOSCOPY;  Surgeon: ELIZABETH Mendoza MD;  Location: [facility] OR;  Service: Urology;;  PRE-OP BY RN 3-    ESOPHAGOGASTRODUODENOSCOPY N/A 2020    Procedure: EGD (ESOPHAGOGASTRODUODENOSCOPY);  Surgeon: Elias Harper MD;  Location: [facility] ([address] [place] [address]);  Service: Endoscopy;  Laterality: N/A;  prep ins. emailed - ERW  COVID screening on 20 - ERW    GASTRIC BYPASS      sandra en y    HYSTEROSCOPY WITH DILATION AND CURETTAGE OF UTERUS N/A 2018    Procedure: HYSTEROSCOPY, WITH DILATION AND CURETTAGE OF UTERUS;  Surgeon: Naveed Alexander MD;  Location: [facility] OR;  Service: OB/GYN;  Laterality: N/A;  RN PREOP 2018    NECK SURGERY  2016    TOTAL REDUCTION MAMMOPLASTY      TOTAL REPLACEMENT OF CERVICAL INTERVERTEBRAL  DISC N/A 9/26/2022    Procedure: REPLACEMENT, INTERVERTEBRAL DISC, CERVICAL, TOTAL C3/4 Phani neuro monitoring;  Surgeon: Mansi Saini MD;  Location: [facility] OR [address] [place] [address];  Service: Neurosurgery;  Laterality: N/A;  TORONTO III, ASA III, BLOOD TYPE AND SCREEN, NEUROMONITORING EMG SEP MEP, BED REGULAR BED, HEADREST CASPAR, POSITION SUPINE, SPECIAL EQUIPMENT PHANI BIOMET, RADIOLOGY C-ARM     Social History     Socioeconomic History    Marital status: Single   Tobacco Use    Smoking status: Never    Smokeless tobacco: Never   Substance and Sexual Activity    Alcohol use: No     Comment: one drink in a few months    Drug use: No    Sexual activity: Yes     Partners: Male     Family History   Problem Relation Age of Onset    Heart disease Mother     Diabetes type II Mother     Heart attack Father 50        Open heart surgery    Allergies Sister     No Known Problems Brother     No Known Problems Maternal Aunt     No Known Problems Maternal Uncle     No Known Problems Paternal Aunt     No Known Problems Paternal Uncle     No Known Problems Maternal Grandmother     No Known Problems Maternal Grandfather     No Known Problems Paternal Grandmother     No Known Problems Paternal Grandfather     Amblyopia Neg Hx     Blindness Neg Hx     Cancer Neg Hx     Cataracts Neg Hx     Diabetes Neg Hx     Glaucoma Neg Hx     Hypertension Neg Hx     Macular degeneration Neg Hx     Retinal detachment Neg Hx     Strabismus Neg Hx     Stroke Neg Hx     Thyroid disease Neg Hx        Review of patient's allergies indicates:   Allergen Reactions    Lisinopril-hydrochlorothiazide Swelling     Facial swelling/ mouth swelling  She can tolerate hydrochlorothiazide       Current Outpatient Medications   Medication Sig    amLODIPine (NORVASC) 10 MG tablet TAKE 1 TABLET(10 MG) BY MOUTH EVERY DAY    b complex vitamins capsule Take 1 capsule by mouth once daily.    B-complex with vitamin C (Z-BEC OR EQUIV) tablet Take 1 tablet by mouth once daily.     BIOTIN ORAL Take by mouth.    cetirizine (ZYRTEC) 10 MG tablet 1-2 tablets daily as needed for itching or swelling    cyanocobalamin, vitamin B-12, 1,000 mcg/15 mL Liqd Pill    diclofenac sodium (VOLTAREN) 1 % Gel Apply 2g  to affected area every 6 hr as needed for pain.    hydroCHLOROthiazide (HYDRODIURIL) 12.5 MG Tab TAKE 1 TABLET DAILY WITH LOSARTAN    losartan (COZAAR) 50 MG tablet TAKE 1 TABLET BY MOUTH EVERY DAY WITH HCTZ    magnesium oxide 500 mg Tab Take by mouth once.    oxyCODONE-acetaminophen (PERCOCET) 5-325 mg per tablet Take 1 tablet by mouth every 6 (six) hours as needed for Pain.    OZEMPIC 1 mg/dose (2 mg/1.5 mL) PnIj TAKES ON Monday MORNINGS.    potassium chloride SA (K-DUR,KLOR-CON M) 10 MEQ tablet Take 20 mEq by mouth once daily.    turmeric root extract 500 mg Cap once daily.    UNABLE TO FIND medication name: dp rub - Rt arm    vitamin B complex (SUPER B-50 COMPLEX ORAL) as directed    diazePAM (VALIUM) 2 MG tablet Take 1 tablet (2 mg total) by mouth every 6 (six) hours as needed (Pain).    diclofenac sodium (VOLTAREN) 1 % Gel Apply 2 g topically 4 (four) times daily.    famotidine (PEPCID) 20 MG tablet Take 1 tablet (20 mg total) by mouth 2 (two) times daily. for 14 days    pregabalin (LYRICA) 75 MG capsule Take 1 capsule (75 mg total) by mouth 2 (two) times daily.    rosuvastatin (CRESTOR) 40 MG Tab Take 1 tablet (40 mg total) by mouth every evening.     No current facility-administered medications for this visit.       REVIEW OF SYSTEMS:    GENERAL:  No weight loss, malaise or fevers.  HEENT:  Negative for frequent or significant headaches.  NECK:  Negative for lumps, goiter, pain and significant neck swelling.  RESPIRATORY:  Negative for cough, wheezing or shortness of breath.  CARDIOVASCULAR:  Negative for chest pain, leg swelling or palpitations.  GI:  Negative for abdominal discomfort, blood in stools or black stools or change in bowel habits.  MUSCULOSKELETAL:  See HPI.  SKIN:  Negative  "for lesions, rash, and itching.  PSYCH:  Negative for sleep disturbance, mood disorder and recent psychosocial stressors.  HEMATOLOGY/LYMPHOLOGY:  Negative for prolonged bleeding, bruising easily or swollen nodes.  NEURO:   No history of headaches, syncope, paralysis, seizures or tremors.  All other reviewed and negative other than HPI.    OBJECTIVE:    BP (!) 179/91 (BP Location: Right arm, Patient Position: Sitting)   Pulse 73   Temp 97.6 °F (36.4 °C) (Oral)   Resp 19   Ht 5' 2" (1.575 m)   Wt 80.4 kg (177 lb 4 oz)   LMP 12/20/2017   BMI 32.42 kg/m²     PHYSICAL EXAMINATION:    General appearance: Well appearing, in no acute distress, alert and oriented x3.  Psych:  Mood and affect appropriate.  Skin: Skin color, texture, turgor normal, no rashes or lesions, in both upper and lower body.  Head/face:  Normocephalic, atraumatic. No palpable lymph nodes.  Neck: No pain to palpation over the cervical paraspinous muscles. Spurling Negative. No pain with neck flexion, extension, or lateral flexion.   Cor: RRR  Pulm: CTA  GI:  Soft and non-tender.  Back: Straight leg raising in the sitting and supine positions is negative to radicular pain. No pain to palpation over the spine or costovertebral angles. Normal range of motion without pain reproduction.  Extremities: Pain with resisted right shoulder flexion.  Tenderness over right biceps long head and short head.  Negative Neers test.  Negative empty can test. Negative Hawkin's test.  No deformities, edema, or skin discoloration. Good capillary refill.  Musculoskeletal: Bilateral upper extremity strength is normal and symmetric.  No atrophy or tone abnormalities are noted.  Neuro: Bilateral upper extremity coordination and muscle stretch reflexes are physiologic and symmetric.  No loss of sensation is noted.  Gait: normal.    ASSESSMENT: 57 y.o. year old female with right shoulder pain, consistent with the following     1. Biceps tendonitis on right        2. " Chronic pain disorder        3. Right shoulder pain, unspecified chronicity              PLAN:     - Will schedule for right biceps tendon steroid injection.  Concern about 2nd dose of steroids given recent ACDF.  Will check with Dr. Saini regarding recent C3/4 ACDF and prior intra-articular steroid injection.  - Voltaren gel 1% for pain control as needed.  - Continue Physical Therapy  - RTC in 1 week for right Biceps tendon steroid injection  - Counseled patient regarding the importance of activity modification, constant sleeping habits, and physical therapy.    The above plan and management options were discussed at length with patient. Patient is in agreement with the above and verbalized understanding. It will be communicated with the referring physician via electronic record, fax, or mail.    Fidel Juarez  12/13/2022    I have reviewed and concur with the resident's history, physical, assessment, and plan.  I have personally interviewed and examined the patient at bedside.  See below addendum for my evaluation and additional findings.    Oh Shin MD

## 2022-12-14 ENCOUNTER — TELEPHONE (OUTPATIENT)
Dept: PAIN MEDICINE | Facility: CLINIC | Age: 57
End: 2022-12-14
Payer: MEDICARE

## 2022-12-14 NOTE — TELEPHONE ENCOUNTER
----- Message from Ayanna Rowley sent at 12/14/2022  2:44 PM CST -----  Regarding: reschedule appointment/ medication concerns  Name of Who is Calling: Antoine            What is the request in detail: Patient is requesting a call back to reschedule her 12/19 appointment and that she already has the diclofenac sodium (VOLTAREN) 1 % Gel and would like something else.           Can the clinic reply by [facility]: No           What Number to Call Back if not in [facility]: [phone]

## 2022-12-15 ENCOUNTER — TELEPHONE (OUTPATIENT)
Dept: NEUROSURGERY | Facility: CLINIC | Age: 57
End: 2022-12-15
Payer: MEDICARE

## 2022-12-15 DIAGNOSIS — M50.00 CERVICAL DISC DISORDER WITH MYELOPATHY: Primary | ICD-10-CM

## 2022-12-16 ENCOUNTER — TELEPHONE (OUTPATIENT)
Dept: PAIN MEDICINE | Facility: CLINIC | Age: 57
End: 2022-12-16
Payer: MEDICARE

## 2022-12-16 NOTE — TELEPHONE ENCOUNTER
"----- Message from Jenni Ashton sent at 12/16/2022  7:55 AM CST -----  Regarding: Patient Advice              Name of Who is Calling:  Antoine Gonzáles    Who Left The Message:  Antoine Gonzáles      What is the request in detail:     Patient called requesting to cancel and reschedule her procedure on Monday 12/19/2022. Also patient states,   "she already have the medication diclofenac sodium (VOLTAREN) 1 % Gel."   Please give a call back and further advise.  Thank you      Reply by [facility]: YES      Preferred Call Back :  [phone] ([phone])                                          "

## 2022-12-18 VITALS
HEART RATE: 70 BPM | BODY MASS INDEX: 31.83 KG/M2 | RESPIRATION RATE: 16 BRPM | SYSTOLIC BLOOD PRESSURE: 126 MMHG | DIASTOLIC BLOOD PRESSURE: 78 MMHG | WEIGHT: 174.06 LBS

## 2022-12-20 ENCOUNTER — TELEPHONE (OUTPATIENT)
Dept: PAIN MEDICINE | Facility: CLINIC | Age: 57
End: 2022-12-20
Payer: MEDICARE

## 2022-12-20 RX ORDER — DICLOFENAC SODIUM 10 MG/G
2 GEL TOPICAL 4 TIMES DAILY
Qty: 1 G | Refills: 1 | Status: SHIPPED | OUTPATIENT
Start: 2022-12-20 | End: 2023-06-14

## 2022-12-20 NOTE — TELEPHONE ENCOUNTER
----- Message from Melody Colón sent at 12/20/2022 12:05 PM CST -----  Name of Who is Calling:TICO READ [[id number]]              What is the request in detail:Requesting a call back. No further info given              Can the clinic reply by [facility]:              What Number to Call Back if not in [facility]:[phone]

## 2022-12-20 NOTE — TELEPHONE ENCOUNTER
Patient was contacted she informed staff that she was told by the physician to contact the office if she already had the Voltaren gel. She reports she does and would like for the provider to prescribe her something else

## 2022-12-22 ENCOUNTER — HOSPITAL ENCOUNTER (OUTPATIENT)
Dept: RADIOLOGY | Facility: HOSPITAL | Age: 57
Discharge: HOME | End: 2022-12-22
Attending: NEUROLOGICAL SURGERY
Payer: MEDICARE

## 2022-12-22 ENCOUNTER — OFFICE VISIT (OUTPATIENT)
Dept: NEUROSURGERY | Facility: CLINIC | Age: 57
End: 2022-12-22
Payer: MEDICARE

## 2022-12-22 ENCOUNTER — TELEPHONE (OUTPATIENT)
Dept: PAIN MEDICINE | Facility: CLINIC | Age: 57
End: 2022-12-22
Payer: MEDICARE

## 2022-12-22 VITALS — SYSTOLIC BLOOD PRESSURE: 145 MMHG | TEMPERATURE: 99 F | DIASTOLIC BLOOD PRESSURE: 81 MMHG | HEART RATE: 96 BPM

## 2022-12-22 DIAGNOSIS — M54.41 ACUTE BILATERAL LOW BACK PAIN WITH BILATERAL SCIATICA: ICD-10-CM

## 2022-12-22 DIAGNOSIS — M50.00 CERVICAL DISC DISORDER WITH MYELOPATHY: ICD-10-CM

## 2022-12-22 DIAGNOSIS — M54.42 ACUTE BILATERAL LOW BACK PAIN WITH BILATERAL SCIATICA: Primary | ICD-10-CM

## 2022-12-22 DIAGNOSIS — M54.42 ACUTE BILATERAL LOW BACK PAIN WITH BILATERAL SCIATICA: ICD-10-CM

## 2022-12-22 DIAGNOSIS — M54.41 ACUTE BILATERAL LOW BACK PAIN WITH BILATERAL SCIATICA: Primary | ICD-10-CM

## 2022-12-22 PROCEDURE — 72110 X-RAY EXAM L-2 SPINE 4/>VWS: CPT | Mod: TC

## 2022-12-22 PROCEDURE — 3044F PR MOST RECENT HEMOGLOBIN A1C LEVEL <7.0%: ICD-10-PCS | Mod: CPTII,S$GLB,, | Performed by: NEUROLOGICAL SURGERY

## 2022-12-22 PROCEDURE — 3079F DIAST BP 80-89 MM HG: CPT | Mod: CPTII,S$GLB,, | Performed by: NEUROLOGICAL SURGERY

## 2022-12-22 PROCEDURE — 3077F SYST BP >= 140 MM HG: CPT | Mod: CPTII,S$GLB,, | Performed by: NEUROLOGICAL SURGERY

## 2022-12-22 PROCEDURE — 72050 XR CERVICAL SPINE AP LAT WITH FLEX EXTEN: ICD-10-PCS | Mod: 26,,, | Performed by: RADIOLOGY

## 2022-12-22 PROCEDURE — 99999 PR PBB SHADOW E&M-EST. PATIENT-LVL IV: ICD-10-PCS | Mod: PBBFAC,,, | Performed by: NEUROLOGICAL SURGERY

## 2022-12-22 PROCEDURE — 99024 PR POST-OP FOLLOW-UP VISIT: ICD-10-PCS | Mod: S$GLB,,, | Performed by: NEUROLOGICAL SURGERY

## 2022-12-22 PROCEDURE — 99024 POSTOP FOLLOW-UP VISIT: CPT | Mod: S$GLB,,, | Performed by: NEUROLOGICAL SURGERY

## 2022-12-22 PROCEDURE — 72050 X-RAY EXAM NECK SPINE 4/5VWS: CPT | Mod: 26,,, | Performed by: RADIOLOGY

## 2022-12-22 PROCEDURE — 72110 XR LUMBAR SPINE AP AND LAT WITH FLEX/EXT: ICD-10-PCS | Mod: 26,,, | Performed by: RADIOLOGY

## 2022-12-22 PROCEDURE — 72110 X-RAY EXAM L-2 SPINE 4/>VWS: CPT | Mod: 26,,, | Performed by: RADIOLOGY

## 2022-12-22 PROCEDURE — 72050 X-RAY EXAM NECK SPINE 4/5VWS: CPT | Mod: TC

## 2022-12-22 PROCEDURE — 99999 PR PBB SHADOW E&M-EST. PATIENT-LVL IV: CPT | Mod: PBBFAC,,, | Performed by: NEUROLOGICAL SURGERY

## 2022-12-22 PROCEDURE — 3077F PR MOST RECENT SYSTOLIC BLOOD PRESSURE >= 140 MM HG: ICD-10-PCS | Mod: CPTII,S$GLB,, | Performed by: NEUROLOGICAL SURGERY

## 2022-12-22 PROCEDURE — 1159F MED LIST DOCD IN RCRD: CPT | Mod: CPTII,S$GLB,, | Performed by: NEUROLOGICAL SURGERY

## 2022-12-22 PROCEDURE — 4010F PR ACE/ARB THEARPY RXD/TAKEN: ICD-10-PCS | Mod: CPTII,S$GLB,, | Performed by: NEUROLOGICAL SURGERY

## 2022-12-22 PROCEDURE — 1159F PR MEDICATION LIST DOCUMENTED IN MEDICAL RECORD: ICD-10-PCS | Mod: CPTII,S$GLB,, | Performed by: NEUROLOGICAL SURGERY

## 2022-12-22 PROCEDURE — 3079F PR MOST RECENT DIASTOLIC BLOOD PRESSURE 80-89 MM HG: ICD-10-PCS | Mod: CPTII,S$GLB,, | Performed by: NEUROLOGICAL SURGERY

## 2022-12-22 PROCEDURE — 4010F ACE/ARB THERAPY RXD/TAKEN: CPT | Mod: CPTII,S$GLB,, | Performed by: NEUROLOGICAL SURGERY

## 2022-12-22 PROCEDURE — 3044F HG A1C LEVEL LT 7.0%: CPT | Mod: CPTII,S$GLB,, | Performed by: NEUROLOGICAL SURGERY

## 2022-12-22 NOTE — TELEPHONE ENCOUNTER
Staff spoke with patient and she states at her appt she told  she may have the Voltaren gel already from another doctor and it didn't help but he told her call to let us know and he would maybe offer a different cream or a patch. Staff informed pt we will send to him to review.

## 2022-12-22 NOTE — PROGRESS NOTES
"Neurosurgery  Follow up    SUBJECTIVE:     Chief Complaint: "neck and right shoulder, headaches"     History of Present Illness:  Antoine Gonzáles is a 57 y.o. female who presents as a self-referral after previous ACDF at [facility] with persistent neck and shoulder pain. She thinks the surgery was done around 2017 with Dr. Lucas. She states that she had severe depression after the surgery and had loss of memory, including not being able to remember her way home.     She began seeing a neurologist because of that. Then she began having neck pain and saw another neurologist at [facility], who told her that she has two pinched nerves. She participated in PT, which was helpful.     The whole right arm hurts, all the way down to the thumb, which tingles. The tingling in the median distribution has gotten worse since surgery. She was seen in the ED recently and was provided with a sling. She stopped using it because she didn't want her arm to get too stiff.     The pain "thumps like a toothache." It gets better with warm compresses, hot shower, and rubbing ointment on it. PT helped significantly. It gets worse when she sleeps on the ipsilateral side.     She denies myelopathic symptoms, including dropping objects, balance difficulties, and fine motor changes.     She stays with her friend. She has longstanding diabetes, which she works hard to control.     The patient denies any bleeding, infectious, or anesthetic complications with any previous surgery. She takes aspirin regularly as a component of her "back and body" Frederick pills.     As of 8/25/22, the patient reports that she has been about the same. She includes her daughter Shweta over the phone for our conversation. She obtained the MRI, CT, and flexion/extension X-rays that had been requested after the previous visit.     As of 11/8/22, the patient underwent C3-C4 disc arthroplasty on 9/26/22. She reports that she has the same arm pain as before; she has some " new neck pain today that has come along with the healing.     She reports that she still has difficulty swallowing pills, which she has had ever since the first neck surgery.     She denies fever, chills, or drainage from the incision.     As of 12/22/22, the patient returns in scheduled postoperative follow-up. She continues to have right shoulder pain and now has low back pain as well. She has extreme pain in her low back and vagina, and it felt like her whole vagina was coming out. The pain is worse with stooping over to pick things up. She saw both the urologist and the gynecologist.     She has radicular symptoms in the legs as well.     She saw a shoulder doctor, who gave her a cortisone shot, which did not help. He also wrote for her to start PT.     Review of patient's allergies indicates:   Allergen Reactions    Lisinopril-hydrochlorothiazide Swelling     Facial swelling/ mouth swelling  She can tolerate hydrochlorothiazide       Current Outpatient Medications   Medication Sig Dispense Refill    amLODIPine (NORVASC) 10 MG tablet TAKE 1 TABLET(10 MG) BY MOUTH EVERY DAY 90 tablet 2    b complex vitamins capsule Take 1 capsule by mouth once daily.      B-complex with vitamin C (Z-BEC OR EQUIV) tablet Take 1 tablet by mouth once daily.      BIOTIN ORAL Take by mouth.      cetirizine (ZYRTEC) 10 MG tablet 1-2 tablets daily as needed for itching or swelling 100 tablet 1    cyanocobalamin, vitamin B-12, 1,000 mcg/15 mL Liqd Pill      diclofenac sodium (VOLTAREN) 1 % Gel Apply 2g  to affected area every 6 hr as needed for pain. 100 g 0    diclofenac sodium (VOLTAREN) 1 % Gel Apply 2 g topically 4 (four) times daily. 50 g 2    diclofenac sodium (VOLTAREN) 1 % Gel Apply 2 g topically 4 (four) times daily. 1 g 1    hydroCHLOROthiazide (HYDRODIURIL) 12.5 MG Tab TAKE 1 TABLET DAILY WITH LOSARTAN      losartan (COZAAR) 50 MG tablet TAKE 1 TABLET BY MOUTH EVERY DAY WITH HCTZ      magnesium oxide 500 mg Tab Take by mouth  once.      OZEMPIC 1 mg/dose (2 mg/1.5 mL) PnIj TAKES ON Monday MORNINGS.      potassium chloride SA (K-DUR,KLOR-CON M) 10 MEQ tablet Take 20 mEq by mouth once daily.      turmeric root extract 500 mg Cap once daily.      UNABLE TO FIND medication name: dp rub - Rt arm      vitamin B complex (SUPER B-50 COMPLEX ORAL) as directed      diazePAM (VALIUM) 2 MG tablet Take 1 tablet (2 mg total) by mouth every 6 (six) hours as needed (Pain). 20 tablet 0    famotidine (PEPCID) 20 MG tablet Take 1 tablet (20 mg total) by mouth 2 (two) times daily. for 14 days 28 tablet 0    pregabalin (LYRICA) 75 MG capsule Take 1 capsule (75 mg total) by mouth 2 (two) times daily. 60 capsule 0    rosuvastatin (CRESTOR) 40 MG Tab Take 1 tablet (40 mg total) by mouth every evening. 90 tablet 2     No current facility-administered medications for this visit.       Past Medical History:   Diagnosis Date    Anemia     Angio-edema     Depression     Diabetes mellitus type 2, noninsulin dependent 2016    Hematuria     Hx of essential hypertension     Hyperlipidemia     Hypertension, uncontrolled 2016    Nuclear sclerosis of both eyes 04/10/2017     Past Surgical History:   Procedure Laterality Date    BTL       SECTION      COLONOSCOPY N/A 2017    Procedure: COLONOSCOPY;  Surgeon: Oleg Enciso MD;  Location: [address]);  Service: Endoscopy;  Laterality: N/A;  CHRONIC CONSTIPATION S/P LRNY    COSMETIC SURGERY      Liposuction , back surgery  , buttock surgery     CYSTOSCOPY  2019    Procedure: CYSTOSCOPY;  Surgeon: ELIZABETH Mendoza MD;  Location: [facility] OR;  Service: Urology;;  PRE-OP BY RN 3-    ESOPHAGOGASTRODUODENOSCOPY N/A 2020    Procedure: EGD (ESOPHAGOGASTRODUODENOSCOPY);  Surgeon: Elias Harper MD;  Location: [address]);  Service: Endoscopy;  Laterality: N/A;  prep ins. emailed - ERW  COVID screening on 20 - ERW    GASTRIC BYPASS      sandra en y     HYSTEROSCOPY WITH DILATION AND CURETTAGE OF UTERUS N/A 09/28/2018    Procedure: HYSTEROSCOPY, WITH DILATION AND CURETTAGE OF UTERUS;  Surgeon: Naveed Alexander MD;  Location: [facility] OR;  Service: OB/GYN;  Laterality: N/A;  RN PREOP 9/26/2018    NECK SURGERY  09/30/2016    TOTAL REDUCTION MAMMOPLASTY  2007    TOTAL REPLACEMENT OF CERVICAL INTERVERTEBRAL DISC N/A 9/26/2022    Procedure: REPLACEMENT, INTERVERTEBRAL DISC, CERVICAL, TOTAL C3/4 Phani neuro monitoring;  Surgeon: Mansi Saini MD;  Location: [facility] OR [address] [place] [address];  Service: Neurosurgery;  Laterality: N/A;  TORONTO III, ASA III, BLOOD TYPE AND SCREEN, NEUROMONITORING EMG SEP MEP, BED REGULAR BED, HEADREST CASPAR, POSITION SUPINE, SPECIAL EQUIPMENT PHANI BIOMET, RADIOLOGY C-ARM     Family History       Problem Relation (Age of Onset)    Allergies Sister    Diabetes type II Mother    Heart attack Father (50)    Heart disease Mother    No Known Problems Brother, Maternal Aunt, Maternal Uncle, Paternal Aunt, Paternal Uncle, Maternal Grandmother, Maternal Grandfather, Paternal Grandmother, Paternal Grandfather          Social History     Socioeconomic History    Marital status: Single   Tobacco Use    Smoking status: Never    Smokeless tobacco: Never   Substance and Sexual Activity    Alcohol use: No     Comment: one drink in a few months    Drug use: No    Sexual activity: Yes     Partners: Male       Review of Systems   Constitutional:  Negative for fever.   HENT:  Positive for hearing loss.    Respiratory:  Negative for shortness of breath.    Cardiovascular:  Negative for chest pain.   Gastrointestinal:  Negative for vomiting.   Endocrine: Negative for cold intolerance.   Genitourinary:  Negative for difficulty urinating.   Musculoskeletal:  Positive for neck pain.   Skin:  Negative for color change.   Neurological:  Positive for headaches.   Hematological:  Does not bruise/bleed easily.   Psychiatric/Behavioral:  The patient is not nervous/anxious.      OBJECTIVE:  "    Vital Signs  Temp: 98.5 °F (36.9 °C)  Pulse: 96  BP: (!) 145/81  Pain Score:   9  There is no height or weight on file to calculate BMI.      Physical Exam:    Constitutional: She appears well-developed and well-nourished. No distress.     Abdominal: Soft.     Skin:   Well healed right low anterior neck incision      Psych/Behavior: She is alert. She is oriented to person, place, and time.     Musculoskeletal:      Comments: No weakness on exam      Neurological:   + Webber's, left stronger than right    Pulmonary: nonlabored respirations     Hematologic: no bruising noted     Diagnostic Results:  MRI, CT, and flex/ex X-rays personally reviewed   MRI demonstrates significant cord compression with myelomalacia at C3-C4   No evidence of OPLL and good previous fusion on CT   No dynamic instability on X-rays nor evidence of significant spondylosis     ASSESSMENT/PLAN:   Antoine Gonzáles is a 57 y.o. female who presents with neck and right arm and shoulder pain s/p ACDF with Dr. Lucas. She denies myelopathic symptoms, but she does have Webber's on exam. She states that she was told that she has "pinched nerves" by a [facility] neurologist.     Based on the results of her imaging studies coupled with her myelopathic complaints, I believe the patient would best benefit from C3-C4 arthroplasty to allow for preservation of C4-C5 disc space since there is currently no disease associated with that level. This would allow for decompression of the C3-C4 cord compression with associated myelomalacia. She underwent this procedure on 9/26/22.     We again discussed that I define success as the operation as preventing her from having progression of myelopathic symptoms. I am sorry that she has not had improvement in pain, which we knew preoperatively was the likely outcome. She expressed understanding.     I have recommended that she discuss possible orthopedic consultation for her shoulder pain with her PCP. She expressed " agreement and understanding.     I would like to see her back about 6 months post-op with repeat flex/ex X-rays. I would like her to continue PT.    I would like to get lumbar flex/ex X-rays for her today given the new complaints of low back pain. If these are positive, we will get MRI of the lumbar spine for evaluation of the neural elements.     She is frustrated that her pain management doctor has not offered her more assistance. They offered the same cream she had already received from her primary care. She was not able to receive an injection because she was out of town.     I have encouraged her to contact the clinic in interim with any questions, concerns, or adverse clinical change.

## 2022-12-22 NOTE — TELEPHONE ENCOUNTER
Staff spoke with pt and discussed  message below from .     she can try OTC Aspercream with Lidocaine 4% PATCHES

## 2022-12-22 NOTE — TELEPHONE ENCOUNTER
----- Message from Leticia Yanely sent at 12/22/2022  2:28 PM CST -----  Contact: TICO READ [[id number]]  Type: Call Back      Who called: TICO READ [[id number]]      What is the request in detail: Patient is requesting a call back in regard to the diclofenac sodium (VOLTAREN) 1 % Gel. She states that she requested something for the pain, but that's the same medication that is not helping her for the pain.   Please advise.     Can the clinic reply by [facility]? No      Would the patient rather a call back or a response via [facility]? Call back       Best call back number: [phone] (home)       Additional Information:

## 2022-12-28 ENCOUNTER — CLINICAL SUPPORT (OUTPATIENT)
Dept: REHABILITATION | Facility: HOSPITAL | Age: 57
End: 2022-12-28
Attending: NEUROLOGICAL SURGERY
Payer: MEDICARE

## 2022-12-28 DIAGNOSIS — R29.898 DECREASED RANGE OF MOTION OF NECK: Primary | ICD-10-CM

## 2022-12-28 DIAGNOSIS — M50.00 CERVICAL DISC DISORDER WITH MYELOPATHY: ICD-10-CM

## 2022-12-28 DIAGNOSIS — R29.3 POSTURE ABNORMALITY: ICD-10-CM

## 2022-12-28 PROCEDURE — 97110 THERAPEUTIC EXERCISES: CPT

## 2022-12-28 PROCEDURE — 97162 PT EVAL MOD COMPLEX 30 MIN: CPT

## 2023-01-01 PROBLEM — R29.3 POSTURE ABNORMALITY: Status: ACTIVE | Noted: 2023-01-01

## 2023-01-01 NOTE — PLAN OF CARE
"[facility]  Physical Therapy Initial Evaluation    Name: Antoine Gonzáles  Clinic Number: [id number]    Therapy Diagnosis:   Encounter Diagnoses   Name Primary?    Cervical disc disorder with myelopathy     Posture abnormality     Decreased range of motion of neck Yes     Physician: Mansi Saini MD     Physician Orders: PT Eval and Treat   Medical Diagnosis from Referral: M50.00 (ICD-10-CM) - Cervical disc disorder with myelopathy  Evaluation Date: 12/28/2022  Authorization Period Expiration: 12/31/2022  Plan of Care Expiration: 3/28/2023  Progress Note Due: 1/28/2023  Visit # / Visits authorized: 1/1   FOTO: 0/3     Date of Surgery: 9/26/2022  Return to MD date:     Precautions: Standard, Diabetes, and S/P Cervical disc arthroplasty C3-4, previous fusion C5-6, C6-7.     Time In: 4:15 pm  Time Out: 5:00 pm   Total Appointment Time (timed & untimed codes): 45 minutes      SUBJECTIVE   Date of onset: S/P Cervical arthroplasty C3-4 on 9/26/2022    History of current condition - Antoine reports: She underwent surgery on her neck on 9/26/2022. She has had pain in her right shoulder that goes down into her elbow at times. She thought after having this surgery on her neck the pain in her shoulder would go away but it did not. She describes the pain as "a tooth ache" that will not go away. She participated in PT previously but did not completely relieve the pain. She can't sleep due to the pain and notices increased pain with reaching behind her back as well. The only things that help some is heat like a hot shower and she has used a massage gun on it but then her whole arm tingles after awhile. She also has increased pain with laying on that shoulder.   She reports a past medical history of a previous neck surgery prior to the most recent, gastric bypass surgery for her health with diabetes, and HTN but she only associates it when she is in a lot of pain. She has started having some low back " "pain as well but that does not bother her to the extent of her shoulder but does get some symptoms down her leg at times.     Imaging:  MRI Cervical Spine (2022):  Impression:  1. Broad-based posterior disc osteophyte complex at C3-C4 that contributes to moderate to severe spinal canal stenosis with associated compressive myelomalacia.  2. Left subarticular disc osteophyte protrusion at C7-T1 that effaces the left lateral recess and compresses the left exiting C8 nerve roots.  3. Additional cervical degenerative changes as detailed in the body of the report.  4. Multinodular thyroid gland, better evaluated on previous ultrasound.  This report was flagged in Epic as abnormal.    Prior Therapy: Yes  Social History: Lives with her friend    Occupation: Disabled   Prior Level of Function: She was independent in all ADLs, active, and able to participate in running activities and go to the gym.    Current Level of Function: Increased pain, difficulty with ADLs, difficulty sleeping.     Pain:  Current 5/10, worst 10/10, best 5/10   Location: right neck  and shoulder    Description: "like a tooth ache"  Aggravating Factors: Lifting and Laying and Reaching   Easing Factors: heating pad    Patients goals: Relieve her shoulder pain especially and her neck.      Medical History:   Past Medical History:   Diagnosis Date    Anemia     Angio-edema     Depression     Diabetes mellitus type 2, noninsulin dependent 2016    Hematuria     Hx of essential hypertension     Hyperlipidemia     Hypertension, uncontrolled 2016    Nuclear sclerosis of both eyes 04/10/2017       Surgical History:   Antoine Gonzáles  has a past surgical history that includes Gastric bypass (); BTL ();  section (); Neck surgery (2016); Colonoscopy (N/A, 2017); Hysteroscopy with dilation and curettage of uterus (N/A, 2018); Cystoscopy (2019); Esophagogastroduodenoscopy (N/A, 2020); Total " Reduction Mammoplasty (2007); Cosmetic surgery; and Total replacement of cervical intervertebral disc (N/A, 9/26/2022).    Medications:   Antoine has a current medication list which includes the following prescription(s): amlodipine, b complex vitamins, b-complex with vitamin c, biotin, cetirizine, cyanocobalamin (vitamin b-12), diazepam, diclofenac sodium, diclofenac sodium, diclofenac sodium, famotidine, hydrochlorothiazide, losartan, magnesium oxide, ozempic, potassium chloride sa, pregabalin, rosuvastatin, turmeric root extract, UNABLE TO FIND, and vitamin b complex.    Allergies:   Review of patient's allergies indicates:   Allergen Reactions    Lisinopril-hydrochlorothiazide Swelling     Facial swelling/ mouth swelling  She can tolerate hydrochlorothiazide        OBJECTIVE     Observation: Patient presents to physical therapy independently with an overall pleasant general affect but noticeably upset still in pain in her right shoulder.     Posture: Patient's seated posture with slightly increased thoracic kyphosis, right shoulder scapula depressed and downwardly rotated.     Dermatomes:   Right  Left    C4 Intact Intact   C5 Intact Intact   C6 Intact Intact   C7 Intact Intact   C8 Intact Intact   T1 Intact Intact     Myotomes:   Right Left   C4 5/5 5/5   C5 4/5 5/5   C6 5/5 5/5   C7 4/5 5/5   C8 4/5 4/5   T1 5/5 5/5     Reflexes    Right  Left    Biceps 3+ 2+   Brachioradialis 2+ 2+   Triceps 2+ 2+   Patellar 3+ 3+   Achilles  2+ 2+     Range of Motion:  Cervical   Degrees  Pain    Flexion 15 degrees  Pain   Extension 30 degrees  Discomfort    Right Sidebend  20 degrees  Discomfort    Left Sidebend  20 degrees  Discomfort    Right Rotation 35 degrees  Pain    Left Rotation 50 degrees  Pain in L shoulder      Shoulder   Right  Left    Flexion 170 degrees  180 degrees    Abduction 140 degrees* 170 degrees    ER at 0 70 degrees  70 degrees    ER at 45 NT NT    ER at 90 60 degrees* 70 degrees    IR at 90  20  degrees* 40 degrees      Upper Extremity Strength (MMT):   Right  Left    Shoulder Flexion 4-/5 4/5   Shoulder Abduction 3/5 4-/5   ER at 0 3+/5 4-/5   IR at 0 3+/5 3+/5   Elbow Flexion 4/5 4/5   Elbow Extension 4/5 4/5   Serratus Anterior  3-/5 3/5     Joint Mobility: Decreased anterior to posterior right shoulder joint mobility and decreased thoracic mobility with posterior to anterior mobility.     Palpation: Tenderness to palpation anterior shoulder     Special Tests:  - Babinski: (+) on R, (-) on L    - Inverted supinator: (-) bilaterally   - Webber's: (-) bilaterally   - PT assist for scapular elevation: relieved cervical rotation discomfort and right shoulder discomfort   - PT assist for scapular upward rotation with slight posterior tilt: Improved right shoulder pain and range of motion      Limitation/Restriction for FOTO Cervical Survey    Therapist reviewed FOTO scores for Antoine Gonzáles on 12/28/2022.   FOTO documents entered into [employer] - see Media section.    Limitation Score: TBD%         TREATMENT     Total Treatment time (time-based codes) separate from Evaluation: 12 minutes      Antoine received the treatments listed below:      therapeutic exercises to develop strength, endurance, ROM, flexibility, posture, and core stabilization for 12 minutes including:    Pt education on PT POC, Prognosis, and HEP   Upper Trap Level I 1x10 reps with 5 sec holds   Seated thoracic extensions 1x10 reps with 3 sec holds   Scapular retractions 1x10 reps with 3 sec holds     manual therapy techniques: Joint mobilizations and Soft tissue Mobilization were applied to the: cervical, right shoulder for 00 minutes, including:      neuromuscular re-education activities to improve: Coordination, Kinesthetic, Sense, Proprioception, and Posture for 00 minutes. The following activities were included:      therapeutic activities to improve functional performance for 00 minutes, including:      PATIENT EDUCATION AND HOME  EXERCISES     Education provided:   - PT POC, Prognosis, and HEP     Written Home Exercises Provided: yes. Exercises were reviewed and Antoine was able to demonstrate them prior to the end of the session.  Antoine demonstrated good  understanding of the education provided. See EMR under Patient Instructions for exercises provided during therapy sessions.    ASSESSMENT     Antoine is a 57 y.o. female referred to outpatient Physical Therapy with a medical diagnosis of M50.00 (ICD-10-CM) - Cervical disc disorder with myelopathy. Patient presents with decreased range of motion, decreased strength, postural abnormality, decreased joint mobility, pain and functional limitations with ADLs and reaching/lifting. Patient would benefit from skilled PT intervention to address above stated deficits and improve overall mobility.     Patient prognosis is Fair.   Patientt will benefit from skilled outpatient Physical Therapy to address the deficits stated above and in the chart below, provide patient /family education, and to maximize patientt's level of independence.     Plan of care discussed with patient: Yes  Patient's spiritual, cultural and educational needs considered and patient is agreeable to the plan of care and goals as stated below:     Anticipated Barriers for therapy: Chronicity of symptoms, prior surgeries     Medical Necessity is demonstrated by the following  History  Co-morbidities and personal factors that may impact the plan of care Co-morbidities:   diabetes, high BMI, HTN, and Prior neck surgeries, and anemia    Personal Factors:   lifestyle     moderate   Examination  Body Structures and Functions, activity limitations and participation restrictions that may impact the plan of care Body Regions:   neck  upper extremities    Body Systems:    ROM  strength  motor control  motor learning    Participation Restrictions:   Difficulty with reaching/lifting     Activity limitations:   Learning and applying  knowledge  no deficits    General Tasks and Commands  no deficits    Communication  no deficits    Mobility  lifting and carrying objects  driving (bike, car, motorcycle)    Self care  no deficits    Domestic Life  shopping  cooking  doing house work (cleaning house, washing dishes, laundry)    Interactions/Relationships  no deficits    Life Areas  employment    Community and Social Life  community life  recreation and leisure         moderate   Clinical Presentation evolving clinical presentation with changing clinical characteristics moderate   Decision Making/ Complexity Score: moderate     Goals:  Short Term Goals: 4-6 weeks   Patient will be independent in initial HEP to help supplement PT.   Patient will improve right shoulder range of motion to symmetrical to left to help with ADLs.   Patient will report no pain with cervical range of motion assessment to show improvement in condition.   Patient will improve pain at its worst to </= 6/10 to help improve quality of life.     Long Term Goals: 10-12 weeks   Patient will be independent in updated HEP to help supplement PT and maintain gains made in PT.   Patient will improve FOTO limitation score to </= predicted % to show improvement in condition.   Patient will improve serratus anterior strength by 1/2 MMT to help with reaching up overhead.   Patient will improve pain at its worst to </= 3/10 to help improve quality of life.     PLAN   Plan of care Certification: 12/28/2022 to 3/28/2023.    Please provide FOTO at first follow-up session.     Outpatient Physical Therapy 1-2 times weekly for 12 weeks to include the following interventions: Manual Therapy, Moist Heat/ Ice, Neuromuscular Re-ed, Patient Education, Self Care, Therapeutic Activities, and Therapeutic Exercise.     Gianna Murray, PT      I CERTIFY THE NEED FOR THESE SERVICES FURNISHED UNDER THIS PLAN OF TREATMENT AND WHILE UNDER MY CARE   Physician's comments:     Physician's Signature:  ___________________________________________________

## 2023-01-04 ENCOUNTER — HOSPITAL ENCOUNTER (OUTPATIENT)
Dept: RADIOLOGY | Facility: OTHER | Age: 58
Discharge: HOME | End: 2023-01-04
Attending: STUDENT IN AN ORGANIZED HEALTH CARE EDUCATION/TRAINING PROGRAM
Payer: MEDICARE

## 2023-01-04 DIAGNOSIS — M25.511 PAIN IN RIGHT SHOULDER: ICD-10-CM

## 2023-01-04 DIAGNOSIS — M54.2 CERVICALGIA: ICD-10-CM

## 2023-01-04 PROCEDURE — 72141 MRI NECK SPINE W/O DYE: CPT | Mod: TC

## 2023-01-04 PROCEDURE — 72141 MRI CERVICAL SPINE WITHOUT CONTRAST: ICD-10-PCS | Mod: 26,,, | Performed by: INTERNAL MEDICINE

## 2023-01-04 PROCEDURE — 73221 MRI SHOULDER WITHOUT CONTRAST RIGHT: ICD-10-PCS | Mod: 26,RT,, | Performed by: INTERNAL MEDICINE

## 2023-01-04 PROCEDURE — 73221 MRI JOINT UPR EXTREM W/O DYE: CPT | Mod: TC,RT

## 2023-01-04 PROCEDURE — 73221 MRI JOINT UPR EXTREM W/O DYE: CPT | Mod: 26,RT,, | Performed by: INTERNAL MEDICINE

## 2023-01-04 PROCEDURE — 72141 MRI NECK SPINE W/O DYE: CPT | Mod: 26,,, | Performed by: INTERNAL MEDICINE

## 2023-01-08 NOTE — PROGRESS NOTES
[facility]   Physical Therapy Treatment Note     Name: Antoine Gonzáles  [employer] Number: [id number]    Therapy Diagnosis: No diagnosis found.  Physician: Mansi Saini MD    Visit Date: 1/9/2023  Physician Orders: PT Eval and Treat   Medical Diagnosis from Referral: M50.00 (ICD-10-CM) - Cervical disc disorder with myelopathy  Evaluation Date: 12/28/2022  Authorization Period Expiration: 1/13/2023  Plan of Care Expiration: 3/28/2023  Progress Note Due: 1/28/2023  Visit # / Visits authorized: 1/11  FOTO: 1/3     PTA Visit #: 0/5     FOTO first follow up:   FOTO second follow up:     Date of Surgery: 9/26/2022  Return to MD date:      Precautions: Standard, Diabetes, and S/P Cervical disc arthroplasty C3-4, previous fusion C5-6, C6-7.     Time In: 8:08 am   Time Out: 9:00 am   Total Billable Time: 52 minutes (billable = 30)    SUBJECTIVE     Pt reports: She is doing ok, her neck has been doing fine. She is still having pain in her right shoulder feels like deep in the joint but her motion has improved. She has been doing the exercises which have been fine. She had an MRI done and is scheduled to see her doctor on Wednesday and to receive an injection in her shoulder.  She was compliant with home exercise program.  Response to previous treatment: Positive, independent in HEP   Functional change: Ongoing    Pain: 5/10  Location: right neck  and shoulder      OBJECTIVE     Objective Measures updated at progress report unless specified.     Shoulder    Right  Left    Flexion 170 degrees  180 degrees    Abduction 160 degrees 170 degrees    ER at 0 70 degrees  70 degrees    ER at 45 60 degrees  NT    ER at 90 70 degrees 70 degrees    IR at 90  20 degrees    *No Pain with shoulder range of motion assessment patient reports just deep ache in shoulder joint     FOTO Limitation Score = 42%    Treatment     *Per Medicare guidelines only one-on-one time with PT billed*  **PT was one-on-one with patient  for 30 minutes with PT extender utilized for remainder of session**    Antoine received the treatments listed below:      therapeutic exercises to develop strength, endurance, ROM, flexibility, and posture for 38 minutes including:    Assessment as above   Seated shoulder abduction LLLD inferior glide with moist heat x8 min   Supine ER with wand 1x15 reps with 5 sec holds   Supine AAROM shoulder flexion with 3# dowel 2x10 reps 3 sec holds   Serratus landmines with dowel 2x10 reps     Not Today:  Upper Trap Level I 1x10 reps with 5 sec holds   Seated thoracic extensions 1x10 reps with 3 sec holds   Scapular retractions 1x10 reps with 3 sec holds     manual therapy techniques: Joint mobilizations and Soft tissue Mobilization were applied to the: Right Shoulder for 10 minutes, including:    PROM all planes  Glenohumeral joint anterior to posterior mobilizations grade II-III     neuromuscular re-education activities to improve: Kinesthetic, Sense, Proprioception, and Posture for 4 minutes. The following activities were included:    Shoulder ER isometric walkouts with RedTB 2x10 reps     therapeutic activities to improve functional performance for 00 minutes, including:      Patient Education and Home Exercises     Home Exercises Provided and Patient Education Provided     Education provided:   - Continue with HEP     Written Home Exercises Provided: Patient instructed to cont prior HEP. Exercises were reviewed and Antoine was able to demonstrate them prior to the end of the session.  Antoine demonstrated good  understanding of the education provided. See EMR under Patient Instructions for exercises provided during therapy sessions    ASSESSMENT     Antoine presented to today's therapy session with reports of continued right shoulder pain and improved range of motion following initial evaluation. She responded well to low load long duration exercises and progressed with mobility therex with no adverse effects. RTC  isometrics added as well and patient educated to continue with HEP to continue to progress with emphasis on shoulder mobility. Will continue to monitor and progress as able.     Antoine Is progressing well towards her goals.   Pt prognosis is Fair.     Pt will continue to benefit from skilled outpatient physical therapy to address the deficits listed in the problem list box on initial evaluation, provide pt/family education and to maximize pt's level of independence in the home and community environment.     Pt's spiritual, cultural and educational needs considered and pt agreeable to plan of care and goals.     Anticipated barriers to physical therapy: Chronicity of symptoms, prior surgeries     Goals:   Short Term Goals: 4-6 weeks (Progressing, not met)  Patient will be independent in initial HEP to help supplement PT.   Patient will improve right shoulder range of motion to symmetrical to left to help with ADLs.   Patient will report no pain with cervical range of motion assessment to show improvement in condition.   Patient will improve pain at its worst to </= 6/10 to help improve quality of life.      Long Term Goals: 10-12 weeks (Progressing, not met)  Patient will be independent in updated HEP to help supplement PT and maintain gains made in PT.   Patient will improve FOTO limitation score to </= predicted % (37%) to show improvement in condition.   Patient will improve serratus anterior strength by 1/2 MMT to help with reaching up overhead.   Patient will improve pain at its worst to </= 3/10 to help improve quality of life.    PLAN   Plan of care Certification: 12/28/2022 to 3/28/2023.    Continue with current plan of care with emphasis on shoulder range of motion, postural retraining, and thoracic mobility.     Gianna Murray, PT

## 2023-01-09 ENCOUNTER — CLINICAL SUPPORT (OUTPATIENT)
Dept: REHABILITATION | Facility: HOSPITAL | Age: 58
End: 2023-01-09
Payer: MEDICARE

## 2023-01-09 DIAGNOSIS — R29.3 POSTURE ABNORMALITY: Primary | ICD-10-CM

## 2023-01-09 PROCEDURE — 97140 MANUAL THERAPY 1/> REGIONS: CPT

## 2023-01-09 PROCEDURE — 97110 THERAPEUTIC EXERCISES: CPT

## 2023-01-12 ENCOUNTER — CLINICAL SUPPORT (OUTPATIENT)
Dept: REHABILITATION | Facility: HOSPITAL | Age: 58
End: 2023-01-12
Payer: MEDICARE

## 2023-01-12 DIAGNOSIS — R29.3 POSTURE ABNORMALITY: Primary | ICD-10-CM

## 2023-01-12 PROCEDURE — 97140 MANUAL THERAPY 1/> REGIONS: CPT

## 2023-01-12 PROCEDURE — 97110 THERAPEUTIC EXERCISES: CPT

## 2023-01-12 NOTE — PROGRESS NOTES
[facility]   Physical Therapy Treatment Note     Name: Antoine Gonzáles  [facility] Number: [id number]    Therapy Diagnosis:   Encounter Diagnosis   Name Primary?    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     Time In: 16:00  Time Out: 16:57  Total Billable Time: 57 minutes (billable = 30)    SUBJECTIVE     Pt reports: doing a little better from last session, but still getting symptoms into her left arm/shoulder.   She was compliant with home exercise program.  Response to previous treatment: Positive, independent in HEP   Functional change: Ongoing    Pain: 5/10  Location: right neck  and shoulder      OBJECTIVE     Objective Measures updated at progress report unless specified.     Shoulder    Right  Left    Flexion 170 degrees  180 degrees    Abduction 160 degrees 170 degrees    ER at 0 70 degrees  70 degrees    ER at 45 60 degrees  NT    ER at 90 70 degrees 70 degrees    IR at 90  20 degrees    *No Pain with shoulder range of motion assessment patient reports just deep ache in shoulder joint     FOTO Limitation Score = 42%    Treatment     *Per Medicare guidelines only one-on-one time with PT billed*  **PT was one-on-one with patient for 30 minutes with PT extender utilized for remainder of session**    Antoine received the treatments listed below:      therapeutic exercises to develop strength, endurance,  "ROM, flexibility, and posture for 26 minutes including:     Seated thoracic extensions 1x10 reps with 3 sec holds   Supine AAROM shoulder flexion with 3# dowel 2x10 reps 3 sec holds   Serratus landmines with dowel 2x10 reps   Sidelying shoulder ER 3x10     Not Today:  Seated shoulder abduction LLLD inferior glide with moist heat x8 min   Supine ER with wand 1x15 reps with 5 sec holds   Upper Trap Level I 1x10 reps with 5 sec holds     Scapular retractions 1x10 reps with 3 sec holds     manual therapy techniques: Joint mobilizations and Soft tissue Mobilization were applied to the: Right Shoulder for 15 minutes, including:  Assessment  PROM all planes  Glenohumeral joint anterior to posterior mobilizations grade II-III       neuromuscular re-education activities to improve: Kinesthetic, Sense, Proprioception, and Posture for 17 minutes. The following activities were included:  ER/IR isometrics at 30 degrees abd 4x30"  Shoulder ER isometric walkouts with RedTB 2x10 reps   Shoulder IR walkouts red TB 2x10    therapeutic activities to improve functional performance for 00 minutes, including:      Patient Education and Home Exercises     Home Exercises Provided and Patient Education Provided     Education provided:   - Continue with HEP     Written Home Exercises Provided: Patient instructed to cont prior HEP. Exercises were reviewed and Antoine was able to demonstrate them prior to the end of the session.  Antoine demonstrated good  understanding of the education provided. See EMR under Patient Instructions for exercises provided during therapy sessions    ASSESSMENT     Antoine presented with signs and symptoms consistent with rotator cuff involvement. Focused on rotator cuff stability exercises and offloading shoulder joint. Did well overall with complaints during exercises. Continue to progress as tolerated.     Antoine Is progressing well towards her goals.   Pt prognosis is Fair.     Pt will continue to benefit " from skilled outpatient physical therapy to address the deficits listed in the problem list box on initial evaluation, provide pt/family education and to maximize pt's level of independence in the home and community environment.     Pt's spiritual, cultural and educational needs considered and pt agreeable to plan of care and goals.     Anticipated barriers to physical therapy: Chronicity of symptoms, prior surgeries     Goals:   Short Term Goals: 4-6 weeks (Progressing, not met)  Patient will be independent in initial HEP to help supplement PT.   Patient will improve right shoulder range of motion to symmetrical to left to help with ADLs.   Patient will report no pain with cervical range of motion assessment to show improvement in condition.   Patient will improve pain at its worst to </= 6/10 to help improve quality of life.      Long Term Goals: 10-12 weeks (Progressing, not met)  Patient will be independent in updated HEP to help supplement PT and maintain gains made in PT.   Patient will improve FOTO limitation score to </= predicted % (37%) to show improvement in condition.   Patient will improve serratus anterior strength by 1/2 MMT to help with reaching up overhead.   Patient will improve pain at its worst to </= 3/10 to help improve quality of life.    PLAN   Plan of care Certification: 12/28/2022 to 3/28/2023.    Continue with current plan of care with emphasis on shoulder range of motion, postural retraining, and thoracic mobility.     Jairon Caraballo, PT

## 2023-01-18 ENCOUNTER — CLINICAL SUPPORT (OUTPATIENT)
Dept: REHABILITATION | Facility: HOSPITAL | Age: 58
End: 2023-01-18
Payer: MEDICARE

## 2023-01-18 DIAGNOSIS — R29.3 POSTURE ABNORMALITY: Primary | ICD-10-CM

## 2023-01-18 PROCEDURE — 97140 MANUAL THERAPY 1/> REGIONS: CPT

## 2023-01-18 PROCEDURE — 97110 THERAPEUTIC EXERCISES: CPT

## 2023-01-18 NOTE — PROGRESS NOTES
[facility]   Physical Therapy Treatment Note     Name: Antoine Gonzáles  [facility] Number: [id number]    Therapy Diagnosis:   Encounter Diagnosis   Name Primary?    Posture abnormality Yes     Physician: Mansi Saini MD    Visit Date: 1/18/2023  Physician Orders: PT Eval and Treat   Medical Diagnosis from Referral: M50.00 (ICD-10-CM) - Cervical disc disorder with myelopathy  Evaluation Date: 12/28/2022  Authorization Period Expiration: 2/15/2023  Plan of Care Expiration: 3/28/2023  Progress Note Due: 1/28/2023  Visit # / Visits authorized: 1/14  Total Visits: 4   FOTO: 1/3     PTA Visit #: 0/5     FOTO first follow up:   FOTO second follow up:     Date of Surgery: 9/26/2022  Return to MD date:      Precautions: Standard, Diabetes, and S/P Cervical disc arthroplasty C3-4, previous fusion C5-6, C6-7.     Time In: 3:08 pm  Time Out: 4:00 pm   Total Billable Time: 53 minutes (billable = 25)    SUBJECTIVE     Pt reports: She is doing ok. Her shoulder has been feeling much better since she received the injection and that was her main complaint coming in. Now since her shoulder is feeling better she is noticing more of the neck discomfort since surgery. Today the pain is more so on the left side around the occipital area down to her shoulder especially when turning her head.   She was compliant with home exercise program.  Response to previous treatment: Positive, independent in HEP   Functional change: Ongoing    Pain: 5/10  Location: right neck  and shoulder      OBJECTIVE     Objective Measures updated at progress report unless specified.     Observation 1/17/2023:  Cervical    Degrees  Pain    Flexion 20 degrees  Discomfort    Extension 25 degrees  Discomfort    Right Sidebend  15 degrees  Discomfort    Left Sidebend  15 degrees  Discomfort    Right Rotation 35 degrees  Pain in L neck/shoulder   Left Rotation 40 degrees  Pain in L neck/shoulder    *Range of motion improved following manual  and no pain with PT assist scapular elevation on the left.     Shoulder    Right  Left    Flexion 170 degrees  180 degrees    Abduction 160 degrees 170 degrees    ER at 0 70 degrees  70 degrees    ER at 45 60 degrees  NT    ER at 90 70 degrees 70 degrees    IR at 90  20 degrees    *No Pain with shoulder range of motion assessment     PT assist for scapular elevation relieved neck symptoms with cervical range of motion rotation assessment.     Treatment     *Per Medicare guidelines only one-on-one time with PT billed*  **PT was one-on-one with patient for 25 minutes with PT extender utilized for remainder of session**    Antoine received the treatments listed below:      therapeutic exercises to develop strength, endurance, ROM, flexibility, and posture for 24 minutes including:    Assessment as above   Seated thoracic extensions 1x15 reps with 3 sec holds   Standing thoracic rotations 1x10 reps each   Wall slides to tolerance and maintaining neutral cervical spine 2x10 reps   Scapular retractions with RedTB to neutral 2x10 reps with 3 sec holds     Not Today:  Supine ER with wand 1x15 reps with 5 sec holds   Supine AAROM shoulder flexion with 3# dowel 2x10 reps 3 sec holds   Serratus landmines with dowel 2x10 reps     manual therapy techniques: Joint mobilizations and Soft tissue Mobilization were applied to the: Right Shoulder and cervicothoracic for 12 minutes, including:    Sub-occipital release   Gentle upper cervical flexion distraction mobilizations grade I-II  Seated thoracic unilateral mobilizations grade II-III    Not Today:  PROM all planes  Glenohumeral joint anterior to posterior mobilizations grade II-III     neuromuscular re-education activities to improve: Kinesthetic, Sense, Proprioception, and Posture for 17 minutes. The following activities were included:    Upper Trap Level I 2x10 reps with 5 sec holds   Chin tuck supine 1x10 reps 5 sec holds   - PT tactile cues for proper movement and performance  "  Chin tuck with B ER "no moneys" RedTB 2x10 reps     Not Today:  Shoulder ER isometric walkouts with RedTB 2x10 reps     therapeutic activities to improve functional performance for 00 minutes, including:    Moist heat end of session for pain relief and muscle relaxation to cervical spine seated 10 min.     Patient Education and Home Exercises     Home Exercises Provided and Patient Education Provided     Education provided:   - Continue with HEP     Written Home Exercises Provided: Patient instructed to cont prior HEP. Exercises were reviewed and Antoine was able to demonstrate them prior to the end of the session.  Antoine demonstrated good  understanding of the education provided. See EMR under Patient Instructions for exercises provided during therapy sessions    ASSESSMENT     Antoine presented to today's session with subjective reports of improvements in shoulder pain/discomfort and increased cervical spine pain/discomfort. Increased time spent working on cervical spine to help relax and thoracic mobility to help offload cervical spine with good response. She presents with pain with cervical rotation range of motion which ceases with scapular elevation assistance to left shoulder. Upper trap strengthening added to help offload cervical spine as well. Continued RTC strengthening and periscapular strengthening with no adverse effects. Will continue to monitor and progress as able.      Antoine Is progressing well towards her goals.   Pt prognosis is Fair.     Pt will continue to benefit from skilled outpatient physical therapy to address the deficits listed in the problem list box on initial evaluation, provide pt/family education and to maximize pt's level of independence in the home and community environment.     Pt's spiritual, cultural and educational needs considered and pt agreeable to plan of care and goals.     Anticipated barriers to physical therapy: Chronicity of symptoms, prior surgeries     Goals: "   Short Term Goals: 4-6 weeks (Progressing, not met)  Patient will be independent in initial HEP to help supplement PT.   Patient will improve right shoulder range of motion to symmetrical to left to help with ADLs.   Patient will report no pain with cervical range of motion assessment to show improvement in condition.   Patient will improve pain at its worst to </= 6/10 to help improve quality of life.      Long Term Goals: 10-12 weeks (Progressing, not met)  Patient will be independent in updated HEP to help supplement PT and maintain gains made in PT.   Patient will improve FOTO limitation score to </= predicted % (37%) to show improvement in condition.   Patient will improve serratus anterior strength by 1/2 MMT to help with reaching up overhead.   Patient will improve pain at its worst to </= 3/10 to help improve quality of life.    PLAN   Plan of care Certification: 12/28/2022 to 3/28/2023.    Continue with current plan of care with emphasis on shoulder range of motion, postural retraining, and thoracic mobility.     Gianna Murray, PT

## 2023-01-20 ENCOUNTER — CLINICAL SUPPORT (OUTPATIENT)
Dept: REHABILITATION | Facility: HOSPITAL | Age: 58
End: 2023-01-20
Payer: MEDICARE

## 2023-01-20 DIAGNOSIS — R29.3 POSTURE ABNORMALITY: Primary | ICD-10-CM

## 2023-01-20 PROCEDURE — 97110 THERAPEUTIC EXERCISES: CPT

## 2023-01-20 PROCEDURE — 97112 NEUROMUSCULAR REEDUCATION: CPT

## 2023-01-20 PROCEDURE — 97140 MANUAL THERAPY 1/> REGIONS: CPT

## 2023-01-20 NOTE — PROGRESS NOTES
[facility]   Physical Therapy Treatment Note     Name: Antoine Gonzáles  [facility] Number: [id number]    Therapy Diagnosis:   Encounter Diagnosis   Name Primary?    Posture abnormality Yes     Physician: Mansi Saini MD    Visit Date: 1/20/2023  Physician Orders: PT Eval and Treat   Medical Diagnosis from Referral: M50.00 (ICD-10-CM) - Cervical disc disorder with myelopathy  Evaluation Date: 12/28/2022  Authorization Period Expiration: 2/15/2023  Plan of Care Expiration: 3/28/2023  Progress Note Due: 1/28/2023  Visit # / Visits authorized: 2/14  Total Visits: 5   FOTO: 1/3     PTA Visit #: 0/5     FOTO first follow up:   FOTO second follow up:     Date of Surgery: 9/26/2022  Return to MD date:      Precautions: Standard, Diabetes, and S/P Cervical disc arthroplasty C3-4, previous fusion C5-6, C6-7.     Time In: 8:05 am  Time Out: 8:58 pm   Total Billable Time: 53 minutes (billable = 53)    SUBJECTIVE     Pt reports: She continues with discomfort in her neck on the left side. She did not have any pain in her neck prior to surgery but now it bothers her and she is having discomfort trying to sleep. She was a little sore after last session and had to take a pain pill. Her shoulder continues to feel better since the injection with only bothering her if she bumps it hard.   She was compliant with home exercise program.  Response to previous treatment: Sore, independent in HEP   Functional change: Ongoing    Pain: 4/10 (neck) 0/10 shoulder   Location: left neck  and right shoulder      OBJECTIVE     Objective Measures updated at progress report unless specified.     Observation 1/20/2023:  Cervical    Degrees  Pain    Flexion 20 degrees  Discomfort    Extension 25 degrees  Discomfort    Right Sidebend  15 degrees  Discomfort    Left Sidebend  15 degrees  Discomfort    Right Rotation 35 degrees  Discomfort   Left Rotation 30 degrees  Pain in L neck/shoulder    *Range of motion improved following  "manual and no pain with PT assist scapular elevation on the left.     Cervical rotation assessment passively able to rotate ~45 degrees and pain free to the left.     PT assist for scapular elevation relieved neck symptoms with cervical range of motion rotation assessment.     Treatment     *Per Medicare guidelines only one-on-one time with PT billed*  **PT was one-on-one with patient for 53 minutes with PT extender utilized for remainder of session**    Antoine received the treatments listed below:      therapeutic exercises to develop strength, endurance, ROM, flexibility, and posture for 12 minutes including:    Assessment as above   Chin tuck with B ER "no moneys" RedTB 2x10 reps   Middle traps standing with RedTB 2x10 reps with 3 sec holds     Not Today:  Supine ER with wand 1x15 reps with 5 sec holds   Supine AAROM shoulder flexion with 3# dowel 2x10 reps 3 sec holds   Serratus landmines with dowel 2x10 reps   Seated thoracic extensions 1x15 reps with 3 sec holds   Standing thoracic rotations 1x10 reps each   Wall slides to tolerance and maintaining neutral cervical spine 2x10 reps   Scapular retractions with RedTB to neutral 2x10 reps with 3 sec holds     manual therapy techniques: Joint mobilizations and Soft tissue Mobilization were applied to the: Right Shoulder and cervicothoracic for 24 minutes, including:    STM to cervical paraspinals and upper trap on L   Sub-occipital release   Gentle upper cervical flexion distraction mobilizations grade I-II  Thoracic flicks  METs for L cervical rotation 3x6 sec (2 rounds)     Not Today:  PROM all planes  Glenohumeral joint anterior to posterior mobilizations grade II-III   Seated thoracic unilateral mobilizations grade II-III    neuromuscular re-education activities to improve: Kinesthetic, Sense, Proprioception, and Posture for 17 minutes. The following activities were included:    Pt education on monitoring symptoms working on correcting cervical position with " exercises and not performing through increased pain  Chin tuck supine 2x10 reps 5 sec holds with towel under head to avoid cervical extension   - PT tactile cues for proper movement and performance and chin tilt correction   Upper Trap Level I 1x10 reps with 5 sec holds   Upper Traps level I 1x10 reps with cervical rotation to the left   Hitch hikers with wedge 2x10 reps with 3 sec holds     Not Today:  Shoulder ER isometric walkouts with RedTB 2x10 reps     therapeutic activities to improve functional performance for 00 minutes, including:    Moist heat during STM manual for pain relief and muscle relaxation to cervical spine seated 10 min.     Patient Education and Home Exercises     Home Exercises Provided and Patient Education Provided     Education provided:   - Continue with HEP, updated 1/20/2023    Written Home Exercises Provided: Patient instructed to cont prior HEP. Exercises were reviewed and Antoine was able to demonstrate them prior to the end of the session.  Antoine demonstrated good  understanding of the education provided. See EMR under Patient Instructions for exercises provided during therapy sessions    ASSESSMENT     Antoine presented to today's therapy session with continued reports of left cervical pain most notably with left rotation. She continues with improvements in discomfort with PT assist for scapular elevation on left with rotation. During manual PROM assessment of rotation with correction for cervical extension improved motion and pain free. Increased time spent with manual interventions to help improve mobility and pain relief during today's session with good tolerance. She presents with slight chin tilt and increased discomfort with chin tucks on the left side which improves with correction for neutral spine. Continued upper trap and periscapular strengthening with no adverse effects. Provided with updated HEP at today's session and will continue to monitor and progress as able.      Antoine Is progressing well towards her goals.   Pt prognosis is Fair.     Pt will continue to benefit from skilled outpatient physical therapy to address the deficits listed in the problem list box on initial evaluation, provide pt/family education and to maximize pt's level of independence in the home and community environment.     Pt's spiritual, cultural and educational needs considered and pt agreeable to plan of care and goals.     Anticipated barriers to physical therapy: Chronicity of symptoms, prior surgeries     Goals:   Short Term Goals: 4-6 weeks (Progressing, not met)  Patient will be independent in initial HEP to help supplement PT. - MET  Patient will improve right shoulder range of motion to symmetrical to left to help with ADLs.   Patient will report no pain with cervical range of motion assessment to show improvement in condition.   Patient will improve pain at its worst to </= 6/10 to help improve quality of life.      Long Term Goals: 10-12 weeks (Progressing, not met)  Patient will be independent in updated HEP to help supplement PT and maintain gains made in PT.   Patient will improve FOTO limitation score to </= predicted % (37%) to show improvement in condition.   Patient will improve serratus anterior strength by 1/2 MMT to help with reaching up overhead.   Patient will improve pain at its worst to </= 3/10 to help improve quality of life.    PLAN   Plan of care Certification: 12/28/2022 to 3/28/2023.    Continue with current plan of care with emphasis on shoulder range of motion, postural retraining, and thoracic mobility.     Gianna Murray, PT

## 2023-01-26 ENCOUNTER — CLINICAL SUPPORT (OUTPATIENT)
Dept: REHABILITATION | Facility: HOSPITAL | Age: 58
End: 2023-01-26
Payer: MEDICARE

## 2023-01-26 DIAGNOSIS — R29.3 POSTURE ABNORMALITY: Primary | ICD-10-CM

## 2023-01-26 PROCEDURE — 97110 THERAPEUTIC EXERCISES: CPT

## 2023-01-26 PROCEDURE — 97140 MANUAL THERAPY 1/> REGIONS: CPT

## 2023-01-26 PROCEDURE — 97112 NEUROMUSCULAR REEDUCATION: CPT

## 2023-01-26 NOTE — PROGRESS NOTES
[facility]   Physical Therapy Treatment Note     Name: Antoine Gonzáles  [facility] Number: [id number]    Therapy Diagnosis:   Encounter Diagnosis   Name Primary?    Posture abnormality Yes       Physician: Mansi Saini MD    Visit Date: 1/26/2023  Physician Orders: PT Eval and Treat   Medical Diagnosis from Referral: M50.00 (ICD-10-CM) - Cervical disc disorder with myelopathy  Evaluation Date: 12/28/2022  Authorization Period Expiration: 2/15/2023  Plan of Care Expiration: 3/28/2023  Progress Note Due: 1/28/2023  Visit # / Visits authorized: 3/14  Total Visits: 5   FOTO: 1/3     PTA Visit #: 0/5     FOTO first follow up:   FOTO second follow up:     Date of Surgery: 9/26/2022  Return to MD date:      Precautions: Standard, Diabetes, and S/P Cervical disc arthroplasty C3-4, previous fusion C5-6, C6-7.     Time In: 16:00  Time Out: 16:55   Total Billable Time: 55 minutes (billable = 55)    SUBJECTIVE     Pt reports: still doing about the same.     She was compliant with home exercise program.  Response to previous treatment: Sore, independent in HEP   Functional change: Ongoing    Pain: 4/10 (neck) 0/10 shoulder   Location: left neck  and right shoulder      OBJECTIVE     Objective Measures updated at progress report unless specified.     Observation 1/20/2023:  Cervical    Degrees  Pain    Flexion 20 degrees  Discomfort    Extension 25 degrees  Discomfort    Right Sidebend  15 degrees  Discomfort    Left Sidebend  15 degrees  Discomfort    Right Rotation 35 degrees  Discomfort   Left Rotation 30 degrees  Pain in L neck/shoulder    *Range of motion improved following manual and no pain with PT assist scapular elevation on the left.     Cervical rotation assessment passively able to rotate ~45 degrees and pain free to the left.     PT assist for scapular elevation relieved neck symptoms with cervical range of motion rotation assessment.     Treatment     *Per Medicare guidelines only  "one-on-one time with PT billed*  **PT was one-on-one with patient for 55 minutes with PT extender utilized for remainder of session**    Antoine received the treatments listed below:      therapeutic exercises to develop strength, endurance, ROM, flexibility, and posture for 12 minutes including:    Assessment as above   Chin tuck with B ER "no moneys" RedTB 2x10 reps   Supine cervical rotations 20x   Thoracic ext in chair 20x5"    Not Today:  Middle traps standing with RedTB 2x10 reps with 3 sec holds   Supine ER with wand 1x15 reps with 5 sec holds   Supine AAROM shoulder flexion with 3# dowel 2x10 reps 3 sec holds   Serratus landmines with dowel 2x10 reps   Seated thoracic extensions 1x15 reps with 3 sec holds   Standing thoracic rotations 1x10 reps each   Wall slides to tolerance and maintaining neutral cervical spine 2x10 reps   Scapular retractions with RedTB to neutral 2x10 reps with 3 sec holds     manual therapy techniques: Joint mobilizations and Soft tissue Mobilization were applied to the: Right Shoulder and cervicothoracic for 25 minutes, including:    STM to cervical paraspinals and upper trap on L   Sub-occipital release   Gentle upper cervical flexion distraction mobilizations grade I-II  Thoracic flicks  METs for L cervical rotation 3x6 sec (2 rounds) - NP    Not Today:  PROM all planes  Glenohumeral joint anterior to posterior mobilizations grade II-III   Seated thoracic unilateral mobilizations grade II-III    neuromuscular re-education activities to improve: Kinesthetic, Sense, Proprioception, and Posture for 18 minutes. The following activities were included:    Pt education on monitoring symptoms working on correcting cervical position with exercises and not performing through increased pain  Chin tuck supine 3x10 reps 5 sec holds with towel under head to avoid cervical extension   - PT tactile cues for proper movement and performance and chin tilt correction   Upper Trap Level I 3x10 reps with " 5 sec holds     Not Performed  Upper Traps level I 1x10 reps with cervical rotation to the left   Hitch hikers with wedge 2x10 reps with 3 sec holds   Shoulder ER isometric walkouts with RedTB 2x10 reps     therapeutic activities to improve functional performance for 00 minutes, including:    Moist heat during STM manual for pain relief and muscle relaxation to cervical spine seated 10 min.     Patient Education and Home Exercises     Home Exercises Provided and Patient Education Provided     Education provided:   - Continue with HEP, updated 1/20/2023    Written Home Exercises Provided: Patient instructed to cont prior HEP. Exercises were reviewed and Antoine was able to demonstrate them prior to the end of the session.  Antoine demonstrated good  understanding of the education provided. See EMR under Patient Instructions for exercises provided during therapy sessions    ASSESSMENT     Antoine still presenting with cervical pain today and improvement with scapular assistance and elevation. Added cervical rotations and UT activation again today. Educated again on importance of deep neck flexor strengthening and scapular strengthening. Continue to progress as tolerated.     Antoine Is progressing well towards her goals.   Pt prognosis is Fair.     Pt will continue to benefit from skilled outpatient physical therapy to address the deficits listed in the problem list box on initial evaluation, provide pt/family education and to maximize pt's level of independence in the home and community environment.     Pt's spiritual, cultural and educational needs considered and pt agreeable to plan of care and goals.     Anticipated barriers to physical therapy: Chronicity of symptoms, prior surgeries     Goals:   Short Term Goals: 4-6 weeks (Progressing, not met)  Patient will be independent in initial HEP to help supplement PT. - MET  Patient will improve right shoulder range of motion to symmetrical to left to help with ADLs.    Patient will report no pain with cervical range of motion assessment to show improvement in condition.   Patient will improve pain at its worst to </= 6/10 to help improve quality of life.      Long Term Goals: 10-12 weeks (Progressing, not met)  Patient will be independent in updated HEP to help supplement PT and maintain gains made in PT.   Patient will improve FOTO limitation score to </= predicted % (37%) to show improvement in condition.   Patient will improve serratus anterior strength by 1/2 MMT to help with reaching up overhead.   Patient will improve pain at its worst to </= 3/10 to help improve quality of life.    PLAN   Plan of care Certification: 12/28/2022 to 3/28/2023.    Continue with current plan of care with emphasis on shoulder range of motion, postural retraining, and thoracic mobility.     Jairon Caraballo, PT

## 2023-01-30 ENCOUNTER — CLINICAL SUPPORT (OUTPATIENT)
Dept: REHABILITATION | Facility: HOSPITAL | Age: 58
End: 2023-01-30
Payer: MEDICARE

## 2023-01-30 DIAGNOSIS — R29.3 POSTURE ABNORMALITY: Primary | ICD-10-CM

## 2023-01-30 PROCEDURE — 97110 THERAPEUTIC EXERCISES: CPT

## 2023-01-30 PROCEDURE — 97140 MANUAL THERAPY 1/> REGIONS: CPT

## 2023-01-30 PROCEDURE — 97112 NEUROMUSCULAR REEDUCATION: CPT

## 2023-01-30 NOTE — PROGRESS NOTES
[facility]   Physical Therapy Treatment Note     Name: Antoine Gonzáles  [facility] Number: [id number]    Therapy Diagnosis:   Encounter Diagnosis   Name Primary?    Posture abnormality Yes     Physician: Mansi Saini MD    Visit Date: 1/30/2023  Physician Orders: PT Eval and Treat   Medical Diagnosis from Referral: M50.00 (ICD-10-CM) - Cervical disc disorder with myelopathy  Evaluation Date: 12/28/2022  Authorization Period Expiration: 2/15/2023  Plan of Care Expiration: 3/28/2023  Progress Note Due: 1/28/2023  Visit # / Visits authorized: 4/14  Total Visits: 7   FOTO: 1/3     PTA Visit #: 0/5     FOTO first follow up:   FOTO second follow up:     Date of Surgery: 9/26/2022  Return to MD date:      Precautions: Standard, Diabetes, and S/P Cervical disc arthroplasty C3-4, previous fusion C5-6, C6-7.     Time In: 8:11 am (11 min late)  Time Out: 9:05 am    Total Billable Time: 54 minutes (billable = 54)    SUBJECTIVE     Pt reports: Her shoulder has been resolved since receiving the injection in her shoulder. She reports that the doctor offered to do an injection in her neck but she wanted to hold and continue with therapy. She spoke to her neck doctor as well about her neck pain who told her it was due to the position she was placed in for surgery. She reports the neck pain is worst first thing in th morning and she went to a part on Saturday and started having increased pain and completed chin tucks which helped relieve some of the pain. She just wants to improve her neck discomfort.  She was compliant with home exercise program.  Response to previous treatment: Sore, independent in HEP   Functional change: Ongoing    Pain: 4/10 (neck) 0/10 shoulder   Location: left neck  and right shoulder      OBJECTIVE     Objective Measures updated at progress report unless specified.     Observation 1/30/2023:  Cervical    Degrees  Pain    Flexion 20 degrees  Discomfort L side of neck   Extension 25  degrees  Minimal discomfort   Right Sidebend  20 degrees No Pain   Left Sidebend  15 degrees  Discomfort L side of neck   Right Rotation 35 degrees  Discomfort L side of neck    Left Rotation 55 degrees   No Pain   *Range of motion improved following manual and no pain with PT assist scapular elevation on the left scapula with right rotation.     End of session improved right rotation to 50 degrees     Treatment     *Per Medicare guidelines only one-on-one time with PT billed*  **PT was one-on-one with patient for 54 minutes with PT extender utilized for remainder of session**    Antoine received the treatments listed below:      therapeutic exercises to develop strength, endurance, ROM, flexibility, and posture for 23 minutes including:    Assessment as above   Pt education on updated HEP and sleeping positions   Seated extensions with ball 1x10 reps with 3 sec holds   Wall slides with YellowTB around wrists 2x10 reps   Chin tuck B ER (no moneys) with GreenTB 2x12 reps     Not Today:  Serratus landmines with dowel 2x10 reps   Scapular retractions with RedTB to neutral 2x10 reps with 3 sec holds   Middle traps standing with RedTB 2x10 reps with 3 sec holds     manual therapy techniques: Joint mobilizations and Soft tissue Mobilization were applied to the: Right Shoulder and cervicothoracic for 17 minutes, including:    Sub-occipital release   Gentle upper cervical flexion distraction mobilizations grade I-II  Gentle cervical side glides for opening on L with rotation  Seated thoracic unilateral mobilizations grade II-II  Seated thoracic extensions with PT overpressure    Not Today:  PROM all planes  Glenohumeral joint anterior to posterior mobilizations grade II-III   Thoracic flicks  METs for L cervical rotation 3x6 sec (2 rounds)   STM to cervical paraspinals and upper trap on L     neuromuscular re-education activities to improve: Kinesthetic, Sense, Proprioception, and Posture for 14 minutes. The following  activities were included:    Chin tuck with BP cuff 24-26 mmHG 10x10 sec   Chin tuck with BP cuff 24-26 mmHg with arm raise 10x  Upper Trap Level I 2x10 reps with 5 sec holds     Not Today:  Chin tuck supine 2x10 reps 5 sec holds with towel under head to avoid cervical extension   - PT tactile cues for proper movement and performance and chin tilt correction   Upper Traps level I 1x10 reps with cervical rotation to the left   Hitch hikers with wedge 2x10 reps with 3 sec holds  Shoulder ER isometric walkouts with RedTB 2x10 reps     therapeutic activities to improve functional performance for 00 minutes, including:    Moist heat during STM manual for pain relief and muscle relaxation to cervical spine seated 00 min.     Patient Education and Home Exercises     Home Exercises Provided and Patient Education Provided     Education provided:   - Continue with HEP, updated 1/30/2023    Written Home Exercises Provided: Patient instructed to cont prior HEP. Exercises were reviewed and Antoine was able to demonstrate them prior to the end of the session.  Antoine demonstrated good  understanding of the education provided. See EMR under Patient Instructions for exercises provided during therapy sessions    ASSESSMENT     Antoine has been seen for a total of 7 visits for a medical diagnosis of M50.00 (ICD-10-CM) - Cervical disc disorder with myelopathy. She has shown significant improvement in her right shoulder symptoms to pain free, cervical range of motion, and function since beginning physical therapy. She continues with cervical discomfort/pain and limited range of motion most notably rotation limiting her full return to prior level of function. She responded well to manual therapy intervention and resolution of pain in neck with right cervical rotation following PT assist for left scapular elevation. She was provided with updated home exercise program to continue to progress. Emphasis on today's session addressing  mobility and postural retraining with good tolerance. Will continue to monitor and progress as able.     Antoine Is progressing well towards her goals.   Pt prognosis is Fair.     Pt will continue to benefit from skilled outpatient physical therapy to address the deficits listed in the problem list box on initial evaluation, provide pt/family education and to maximize pt's level of independence in the home and community environment.     Pt's spiritual, cultural and educational needs considered and pt agreeable to plan of care and goals.     Anticipated barriers to physical therapy: Chronicity of symptoms, prior surgeries     Goals:   Short Term Goals: 4-6 weeks (Progressing, not met)  Patient will be independent in initial HEP to help supplement PT. - MET  Patient will improve right shoulder range of motion to symmetrical to left to help with ADLs. - MET  Patient will report no pain with cervical range of motion assessment to show improvement in condition.   Patient will improve pain at its worst to </= 6/10 to help improve quality of life.      Long Term Goals: 10-12 weeks (Progressing, not met)  Patient will be independent in updated HEP to help supplement PT and maintain gains made in PT.   Patient will improve FOTO limitation score to </= predicted % (37%) to show improvement in condition.   Patient will improve serratus anterior strength by 1/2 MMT to help with reaching up overhead.   Patient will improve pain at its worst to </= 3/10 to help improve quality of life.    PLAN   Plan of care Certification: 12/28/2022 to 3/28/2023.    Continue with current plan of care with emphasis on cervical range of motion, postural retraining, and thoracic mobility.     Gianna Murray, PT

## 2023-02-15 NOTE — TELEPHONE ENCOUNTER
Spoke to pt she states she got a phone call stating both ct an procedure for Monday would be covered unfortunately pt didn't get a name but states will still come in on Monday for procedure.-[place]  
50F PMH pre-DM, PE not currently on blood thinners presents c/o dehydration and intermittent cramping to b/l LE x unknown. states her ankles are always swollen and she is usually MUÑOZ but nothing that is new. "im cramping because im dry im so dry." denies CP, n/v/d, fevers/chills, dizziness/weakness. pt speaking in clear, complete sentences. RR easy, even, un-labored on room air

## 2023-02-16 ENCOUNTER — CLINICAL SUPPORT (OUTPATIENT)
Dept: REHABILITATION | Facility: HOSPITAL | Age: 58
End: 2023-02-16
Payer: MEDICARE

## 2023-02-16 DIAGNOSIS — R29.3 POSTURE ABNORMALITY: Primary | ICD-10-CM

## 2023-02-16 PROCEDURE — 97112 NEUROMUSCULAR REEDUCATION: CPT

## 2023-02-16 NOTE — PROGRESS NOTES
[facility]   Physical Therapy Treatment Note     Name: Antoine Gonzáles  [facility] Number: [id number]    Therapy Diagnosis:   Encounter Diagnosis   Name Primary?    Posture abnormality Yes     Physician: Mansi Saini MD    Visit Date: 2/16/2023  Physician Orders: PT Eval and Treat   Medical Diagnosis from Referral: M50.00 (ICD-10-CM) - Cervical disc disorder with myelopathy  Evaluation Date: 12/28/2022  Authorization Period Expiration: 2/15/2023  Plan of Care Expiration: 3/28/2023  Progress Note Due: 1/28/2023  Visit # / Visits authorized: 5/14  Total Visits: 8  FOTO: 1/3     PTA Visit #: 0/5     FOTO first follow up:   FOTO second follow up:     Date of Surgery: 9/26/2022  Return to MD date:      Precautions: Standard, Diabetes, and S/P Cervical disc arthroplasty C3-4, previous fusion C5-6, C6-7.     Time In: 9:10 (10 min late)  Time Out: 10:00   Total Billable Time: 50 minutes (billable = 26)    SUBJECTIVE     Pt reports: her shoulder/neck pain has not changed and now is feeling it in teresita neck and into shoulders. Feels like it gets worse first thing in the morning and feels like it gets better during the day.     She was compliant with home exercise program.  Response to previous treatment: Sore, independent in HEP   Functional change: Ongoing    Pain: 4/10 (neck) 0/10 shoulder   Location: left neck  and right shoulder      OBJECTIVE     Objective Measures updated at progress report unless specified.     Observation 1/30/2023:  Cervical    Degrees  Pain    Flexion 20 degrees  Discomfort L side of neck   Extension 25 degrees  Minimal discomfort   Right Sidebend  20 degrees No Pain   Left Sidebend  15 degrees  Discomfort L side of neck   Right Rotation 35 degrees  Discomfort L side of neck    Left Rotation 55 degrees   No Pain   *Range of motion improved following manual and no pain with PT assist scapular elevation on the left scapula with right rotation.     End of session improved  "right rotation to 50 degrees     Treatment     *Per Medicare guidelines only one-on-one time with PT billed*  **PT was one-on-one with patient for 26 minutes with PT extender utilized for remainder of session**    Antoine received the treatments listed below:      therapeutic exercises to develop strength, endurance, ROM, flexibility, and posture for 0 minutes including:    Assessment as above   Pt education on updated HEP and sleeping positions   Seated extensions with ball 1x10 reps with 3 sec holds   Wall slides with YellowTB around wrists 2x10 reps   Chin tuck B ER (no moneys) with GreenTB 2x12 reps     Not Today:  Serratus landmines with dowel 2x10 reps   Scapular retractions with RedTB to neutral 2x10 reps with 3 sec holds   Middle traps standing with RedTB 2x10 reps with 3 sec holds     manual therapy techniques: Joint mobilizations and Soft tissue Mobilization were applied to the: Right Shoulder and cervicothoracic for 15 minutes, including:  Assessment   Sub-occipital release   STM ronald cervical paraspinals  Supine thoracic/CT junction PA flicks     Not Performed  Gentle upper cervical flexion distraction mobilizations grade I-II  Gentle cervical side glides for opening on L with rotation  Seated thoracic unilateral mobilizations grade II-II  Seated thoracic extensions with PT overpressure  PROM all planes  Glenohumeral joint anterior to posterior mobilizations grade II-III   Thoracic flicks  METs for L cervical rotation 3x6 sec (2 rounds)   STM to cervical paraspinals and upper trap on L     neuromuscular re-education activities to improve: Kinesthetic, Sense, Proprioception, and Posture for 35 minutes. The following activities were included:  Chin tucks 20x10"  Prone mid trap level 1 2x10x3"  Wall slides with elevation 3x10  No money red TB 3x10x3"  Ronald shoulder ext green TB 2x10  Ronald horizontal abd red TB 2x10    Not Today   Upper Trap Level I 2x10 reps with 5 sec holds   Chin tuck with BP cuff 24-26 mmHG " 10x10 sec   Chin tuck with BP cuff 24-26 mmHg with arm raise 10x  Chin tuck supine 2x10 reps 5 sec holds with towel under head to avoid cervical extension   - PT tactile cues for proper movement and performance and chin tilt correction   Upper Traps level I 1x10 reps with cervical rotation to the left   Hitch hikers with wedge 2x10 reps with 3 sec holds  Shoulder ER isometric walkouts with RedTB 2x10 reps     therapeutic activities to improve functional performance for 00 minutes, including:    Moist heat during STM manual for pain relief and muscle relaxation to cervical spine seated 00 min.     Patient Education and Home Exercises     Home Exercises Provided and Patient Education Provided     Education provided:   - Continue with HEP, updated 1/30/2023    Written Home Exercises Provided: Patient instructed to cont prior HEP. Exercises were reviewed and Antoine was able to demonstrate them prior to the end of the session.  Antoine demonstrated good  understanding of the education provided. See EMR under Patient Instructions for exercises provided during therapy sessions    ASSESSMENT     Antoine presented with worsening of symptoms into teresita UE. She had improved symptoms and decreased pain with manual techniques. Continued UE and scapular strengthening. Did have some stiffness following exercises but still improved cervical range of motion. Educated about importance of CT junction mobility and scapular strengthening. Continue to progress as tolerated.     Antoine Is progressing well towards her goals.   Pt prognosis is Fair.     Pt will continue to benefit from skilled outpatient physical therapy to address the deficits listed in the problem list box on initial evaluation, provide pt/family education and to maximize pt's level of independence in the home and community environment.     Pt's spiritual, cultural and educational needs considered and pt agreeable to plan of care and goals.     Anticipated barriers to  physical therapy: Chronicity of symptoms, prior surgeries     Goals:   Short Term Goals: 4-6 weeks (Progressing, not met)  Patient will be independent in initial HEP to help supplement PT. - MET  Patient will improve right shoulder range of motion to symmetrical to left to help with ADLs. - MET  Patient will report no pain with cervical range of motion assessment to show improvement in condition.   Patient will improve pain at its worst to </= 6/10 to help improve quality of life.      Long Term Goals: 10-12 weeks (Progressing, not met)  Patient will be independent in updated HEP to help supplement PT and maintain gains made in PT.   Patient will improve FOTO limitation score to </= predicted % (37%) to show improvement in condition.   Patient will improve serratus anterior strength by 1/2 MMT to help with reaching up overhead.   Patient will improve pain at its worst to </= 3/10 to help improve quality of life.    PLAN   Plan of care Certification: 12/28/2022 to 3/28/2023.    Continue with current plan of care with emphasis on cervical range of motion, postural retraining, and thoracic mobility.     Jairon Caraballo, PT

## 2023-02-20 ENCOUNTER — CLINICAL SUPPORT (OUTPATIENT)
Dept: REHABILITATION | Facility: HOSPITAL | Age: 58
End: 2023-02-20
Payer: MEDICARE

## 2023-02-20 DIAGNOSIS — R29.3 POSTURE ABNORMALITY: Primary | ICD-10-CM

## 2023-02-20 PROCEDURE — 97140 MANUAL THERAPY 1/> REGIONS: CPT

## 2023-02-20 PROCEDURE — 97112 NEUROMUSCULAR REEDUCATION: CPT

## 2023-02-20 NOTE — PROGRESS NOTES
[facility]   Physical Therapy Treatment Note     Name: Antoine Gonzáles  [facility] Number: [id number]    Therapy Diagnosis:   Encounter Diagnosis   Name Primary?    Posture abnormality Yes     Physician: Mansi Saini MD    Visit Date: 2/20/2023  Physician Orders: PT Eval and Treat   Medical Diagnosis from Referral: M50.00 (ICD-10-CM) - Cervical disc disorder with myelopathy  Evaluation Date: 12/28/2022  Authorization Period Expiration: 2/15/2023  Plan of Care Expiration: 3/28/2023  Progress Note Due: 1/28/2023  Visit # / Visits authorized: 6/14  Total Visits: 9  FOTO: 1/3     PTA Visit #: 0/5     FOTO first follow up:   FOTO second follow up:     Date of Surgery: 9/26/2022  Return to MD date:      Precautions: Standard, Diabetes, and S/P Cervical disc arthroplasty C3-4, previous fusion C5-6, C6-7.     Time In: 9:07 am  Time Out: 10:05 am   Total Billable Time: 58 minutes (billable = 25)    SUBJECTIVE     Pt reports: The shoulder is feeling ok, the neck has been bothering her more. She would like to start with some heat.   She was compliant with home exercise program.  Response to previous treatment: Sore, independent in HEP   Functional change: Ongoing    Pain: 4/10 (neck) 0/10 shoulder   Location: left neck  and right shoulder      OBJECTIVE     Objective Measures updated at progress report unless specified.     Observation 1/30/2023:  Cervical    Degrees  Pain    Flexion 20 degrees  Discomfort L side of neck   Extension 25 degrees  Minimal discomfort   Right Sidebend  20 degrees No Pain   Left Sidebend  15 degrees  Discomfort L side of neck   Right Rotation 35 degrees  Discomfort L side of neck    Left Rotation 55 degrees   No Pain   *Range of motion improved following manual and no pain with PT assist scapular elevation on the left scapula with right rotation.     End of session improved right rotation to 50 degrees     Treatment     *Per Medicare guidelines only one-on-one time with  PT billed*  **PT was one-on-one with patient for 25 minutes with PT extender utilized for remainder of session**    Antoine received the treatments listed below:      therapeutic exercises to develop strength, endurance, ROM, flexibility, and posture for 13 minutes including:    Assessment as above   Pt education on updated HEP and sleeping positions   Sidelying open books 1x10 reps each   Seated extensions with towel 1x10 reps with 3 sec holds   Wall slides 2x10 reps     Not Today:  Serratus landmines with dowel 2x10 reps   Scapular retractions with RedTB to neutral 2x10 reps with 3 sec holds   Middle traps standing with RedTB 2x10 reps with 3 sec holds     manual therapy techniques: Joint mobilizations and Soft tissue Mobilization were applied to the: Right Shoulder and cervicothoracic for 14 minutes, including:    Assessment   Sub-occipital release   STM teresita cervical paraspinals  Supine thoracic/CT junction PA flicks   Gentle upper cervical flexion distraction mobilizations grade I-II    Not Performed  Gentle cervical side glides for opening on L with rotation  Seated thoracic unilateral mobilizations grade II-II  Seated thoracic extensions with PT overpressure  PROM all planes  Glenohumeral joint anterior to posterior mobilizations grade II-III   Thoracic flicks  METs for L cervical rotation 3x6 sec (2 rounds)   STM to cervical paraspinals and upper trap on L     neuromuscular re-education activities to improve: Kinesthetic, Sense, Proprioception, and Posture for 23 minutes. The following activities were included:    Supine chin tucks with BP cuff (22-28 mmHg) 2x10 reps with 5 sec holds   Chin tuck with BP cuff 24-26 mmHg with arm raise 10x  Upper trap level I 2x10 reps with 5 sec holds   Prone middle traps level II 2x10 reps with 3 sec holds   Hitch hikers with wedge 2x10 reps with 3 sec holds  B ER (no moneys) with GreenTB and chin tuck 3x8 reps     Not Today:   Wall slides with elevation 3x10  No money red TB  "3x10x3"  Ronald shoulder ext green TB 2x10  Ronald horizontal abd red TB 2x10    therapeutic activities to improve functional performance for 00 minutes, including:    Moist heat during for pain relief and muscle relaxation to cervical spine seated 08 min.     Patient Education and Home Exercises     Home Exercises Provided and Patient Education Provided     Education provided:   - Continue with HEP, updated 1/30/2023    Written Home Exercises Provided: Patient instructed to cont prior HEP. Exercises were reviewed and Antoine was able to demonstrate them prior to the end of the session.  Antoine demonstrated good  understanding of the education provided. See EMR under Patient Instructions for exercises provided during therapy sessions    ASSESSMENT     Antoine reports improvement in shoulder symptoms but continued complaints of neck pain worst in the morning. She responds well to manual and movement to improve symptoms. Blood pressure cuff utilized to help improve deep neck flexor control with good carryover and response. She continues to respond well to upper trap strengthening to help offload her cervical spine. Continued work on scapular strengthening and postural control. Will continue to progress as able.     Antoine Is progressing well towards her goals.   Pt prognosis is Fair.     Pt will continue to benefit from skilled outpatient physical therapy to address the deficits listed in the problem list box on initial evaluation, provide pt/family education and to maximize pt's level of independence in the home and community environment.     Pt's spiritual, cultural and educational needs considered and pt agreeable to plan of care and goals.     Anticipated barriers to physical therapy: Chronicity of symptoms, prior surgeries     Goals:   Short Term Goals: 4-6 weeks (Progressing, not met)  Patient will be independent in initial HEP to help supplement PT. - MET  Patient will improve right shoulder range of motion to " symmetrical to left to help with ADLs. - MET  Patient will report no pain with cervical range of motion assessment to show improvement in condition.   Patient will improve pain at its worst to </= 6/10 to help improve quality of life.      Long Term Goals: 10-12 weeks (Progressing, not met)  Patient will be independent in updated HEP to help supplement PT and maintain gains made in PT.   Patient will improve FOTO limitation score to </= predicted % (37%) to show improvement in condition.   Patient will improve serratus anterior strength by 1/2 MMT to help with reaching up overhead.   Patient will improve pain at its worst to </= 3/10 to help improve quality of life.    PLAN   Plan of care Certification: 12/28/2022 to 3/28/2023.    Continue with current plan of care with emphasis on cervical range of motion, postural retraining, and thoracic mobility.     Gianna Murray, PT

## 2023-02-23 ENCOUNTER — CLINICAL SUPPORT (OUTPATIENT)
Dept: REHABILITATION | Facility: HOSPITAL | Age: 58
End: 2023-02-23
Payer: MEDICARE

## 2023-02-23 DIAGNOSIS — R29.3 POSTURE ABNORMALITY: Primary | ICD-10-CM

## 2023-02-23 PROCEDURE — 97112 NEUROMUSCULAR REEDUCATION: CPT

## 2023-02-23 NOTE — PROGRESS NOTES
[facility]   Physical Therapy Treatment Note     Name: Antoine Gonzáles  [facility] Number: [id number]    Therapy Diagnosis:   Encounter Diagnosis   Name Primary?    Posture abnormality Yes       Physician: Mansi Saini MD    Visit Date: 2/23/2023  Physician Orders: PT Eval and Treat   Medical Diagnosis from Referral: M50.00 (ICD-10-CM) - Cervical disc disorder with myelopathy  Evaluation Date: 12/28/2022  Authorization Period Expiration: 2/15/2023  Plan of Care Expiration: 3/28/2023  Progress Note Due: 1/28/2023  Visit # / Visits authorized: 7/14  Total Visits: 9  FOTO: 1/3     PTA Visit #: 0/5     FOTO first follow up:   FOTO second follow up:     Date of Surgery: 9/26/2022  Return to MD date:      Precautions: Standard, Diabetes, and S/P Cervical disc arthroplasty C3-4, previous fusion C5-6, C6-7.     Time In: 9:10 (10 minutes late)  Time Out: 9:55  Total Billable Time: 45 minutes (billable = 27)    SUBJECTIVE     Pt reports: doing okay today, still having soreness in her neck. Wanted to start with heat again.     She was compliant with home exercise program.  Response to previous treatment: Sore, independent in HEP   Functional change: Ongoing    Pain: 4/10 (neck) 0/10 shoulder   Location: left neck  and right shoulder      OBJECTIVE     Objective Measures updated at progress report unless specified.     Observation 1/30/2023:  Cervical    Degrees  Pain    Flexion 20 degrees  Discomfort L side of neck   Extension 25 degrees  Minimal discomfort   Right Sidebend  20 degrees No Pain   Left Sidebend  15 degrees  Discomfort L side of neck   Right Rotation 35 degrees  Discomfort L side of neck    Left Rotation 55 degrees   No Pain   *Range of motion improved following manual and no pain with PT assist scapular elevation on the left scapula with right rotation.     End of session improved right rotation to 50 degrees     Treatment     *Per Medicare guidelines only one-on-one time with PT  luigi*  **PT was one-on-one with patient for 25 minutes with PT extender utilized for remainder of session**    Antoine received the treatments listed below:      therapeutic exercises to develop strength, endurance, ROM, flexibility, and posture for 13 minutes including:    Assessment as above   Pt education on updated HEP and sleeping positions   Sidelying open books 1x15 reps each   Seated extensions with towel 1x10 reps with 3 sec holds   Wall slides 2x10 reps     Not Today:  Serratus landmines with dowel 2x10 reps   Scapular retractions with RedTB to neutral 2x10 reps with 3 sec holds   Middle traps standing with RedTB 2x10 reps with 3 sec holds     manual therapy techniques: Joint mobilizations and Soft tissue Mobilization were applied to the: Right Shoulder and cervicothoracic for 14 minutes, including:    Assessment   Sub-occipital release   STM ronald cervical paraspinals  Supine thoracic/CT junction PA flicks   Gentle upper cervical flexion distraction mobilizations grade I-II - NP    Not Performed  Gentle cervical side glides for opening on L with rotation  Seated thoracic unilateral mobilizations grade II-II  Seated thoracic extensions with PT overpressure  PROM all planes  Glenohumeral joint anterior to posterior mobilizations grade II-III   Thoracic flicks  METs for L cervical rotation 3x6 sec (2 rounds)   STM to cervical paraspinals and upper trap on L     neuromuscular re-education activities to improve: Kinesthetic, Sense, Proprioception, and Posture for 26 minutes. The following activities were included:    Supine chin tucks with BP cuff (22-28 mmHg) 2x10 reps with 10 sec holds   Chin tuck with BP cuff 24-26 mmHg with arm raise 30x each  Upper trap level I 2x10 reps with 5 sec holds   Ronald shoulder ext red TB 2x10  Prone middle traps level II 2x10 reps with 3 sec holds   Hitch hikers with wedge 2x10 reps with 3 sec holds  B ER (no moneys) with GreenTB and chin tuck 3x8 reps     Not Today:   Wall  "slides with elevation 3x10  No money red TB 3x10x3"  Ronald shoulder ext green TB 2x10  Ronald horizontal abd red TB 2x10    therapeutic activities to improve functional performance for 00 minutes, including:    Moist heat during for pain relief and muscle relaxation to cervical spine seated 08 min.     Patient Education and Home Exercises     Home Exercises Provided and Patient Education Provided     Education provided:   - Continue with HEP, updated 1/30/2023    Written Home Exercises Provided: Patient instructed to cont prior HEP. Exercises were reviewed and Antoine was able to demonstrate them prior to the end of the session.  Antoine demonstrated good  understanding of the education provided. See EMR under Patient Instructions for exercises provided during therapy sessions    ASSESSMENT     Antoine felt a little better than last week but still getting ronald shoulder pain. She did well with BP cuff and progressed with UE use today. She had some difficulty and required cueing. Continue to progress as tolerated.     Antoine Is progressing well towards her goals.   Pt prognosis is Fair.     Pt will continue to benefit from skilled outpatient physical therapy to address the deficits listed in the problem list box on initial evaluation, provide pt/family education and to maximize pt's level of independence in the home and community environment.     Pt's spiritual, cultural and educational needs considered and pt agreeable to plan of care and goals.     Anticipated barriers to physical therapy: Chronicity of symptoms, prior surgeries     Goals:   Short Term Goals: 4-6 weeks (Progressing, not met)  Patient will be independent in initial HEP to help supplement PT. - MET  Patient will improve right shoulder range of motion to symmetrical to left to help with ADLs. - MET  Patient will report no pain with cervical range of motion assessment to show improvement in condition.   Patient will improve pain at its worst to </= 6/10 to " help improve quality of life.      Long Term Goals: 10-12 weeks (Progressing, not met)  Patient will be independent in updated HEP to help supplement PT and maintain gains made in PT.   Patient will improve FOTO limitation score to </= predicted % (37%) to show improvement in condition.   Patient will improve serratus anterior strength by 1/2 MMT to help with reaching up overhead.   Patient will improve pain at its worst to </= 3/10 to help improve quality of life.    PLAN   Plan of care Certification: 12/28/2022 to 3/28/2023.    Continue with current plan of care with emphasis on cervical range of motion, postural retraining, and thoracic mobility.     Jairon Caraballo, PT

## 2023-03-02 ENCOUNTER — CLINICAL SUPPORT (OUTPATIENT)
Dept: REHABILITATION | Facility: HOSPITAL | Age: 58
End: 2023-03-02
Payer: MEDICARE

## 2023-03-02 DIAGNOSIS — R29.3 POSTURE ABNORMALITY: Primary | ICD-10-CM

## 2023-03-02 PROCEDURE — 97112 NEUROMUSCULAR REEDUCATION: CPT

## 2023-03-02 NOTE — PROGRESS NOTES
[facility]   Physical Therapy Treatment Note     Name: Antoine Gonzáles  [facility] Number: [id number]    Therapy Diagnosis:   Encounter Diagnosis   Name Primary?    Posture abnormality Yes       Physician: Mansi Saini MD    Visit Date: 3/2/2023  Physician Orders: PT Eval and Treat   Medical Diagnosis from Referral: M50.00 (ICD-10-CM) - Cervical disc disorder with myelopathy  Evaluation Date: 12/28/2022  Authorization Period Expiration: 2/15/2023  Plan of Care Expiration: 3/28/2023  Progress Note Due: 1/28/2023  Visit # / Visits authorized: 8/14  Total Visits: 9  FOTO: 1/3     PTA Visit #: 0/5     FOTO first follow up:   FOTO second follow up:     Date of Surgery: 9/26/2022  Return to MD date:      Precautions: Standard, Diabetes, and S/P Cervical disc arthroplasty C3-4, previous fusion C5-6, C6-7.     Time In: 9:00  Time Out: 9:55  Total Billable Time: 55 minutes (billable = 30)    SUBJECTIVE     Pt reports: doing okay today, still having soreness in her neck. Wanted to start with heat again.     She was compliant with home exercise program.  Response to previous treatment: Sore, independent in HEP   Functional change: Ongoing    Pain: 4/10 (neck) 0/10 shoulder   Location: left neck  and right shoulder      OBJECTIVE     Objective Measures updated at progress report unless specified.     Observation 1/30/2023:  Cervical    Degrees  Pain    Flexion 20 degrees  Discomfort L side of neck   Extension 25 degrees  Minimal discomfort   Right Sidebend  20 degrees No Pain   Left Sidebend  15 degrees  Discomfort L side of neck   Right Rotation 35 degrees  Discomfort L side of neck    Left Rotation 55 degrees   No Pain   *Range of motion improved following manual and no pain with PT assist scapular elevation on the left scapula with right rotation.     End of session improved right rotation to 50 degrees     Treatment     *Per Medicare guidelines only one-on-one time with PT billed*  **PT was  one-on-one with patient for 25 minutes with PT extender utilized for remainder of session**    Antoine received the treatments listed below:      therapeutic exercises to develop strength, endurance, ROM, flexibility, and posture for 13 minutes including:    Assessment as above   Pt education on updated HEP and sleeping positions   Sidelying open books 1x15 reps each   Seated extensions with towel 1x10 reps with 3 sec holds   Wall slides 2x10 reps     Not Today:  Serratus landmines with dowel 2x10 reps   Scapular retractions with RedTB to neutral 2x10 reps with 3 sec holds   Middle traps standing with RedTB 2x10 reps with 3 sec holds     manual therapy techniques: Joint mobilizations and Soft tissue Mobilization were applied to the: Right Shoulder and cervicothoracic for 16 minutes, including:    Assessment   Sub-occipital release   STM ronald cervical paraspinals  Supine thoracic/CT junction PA flicks   Gentle upper cervical flexion distraction mobilizations grade I-II - NP    Not Performed  Gentle cervical side glides for opening on L with rotation  Seated thoracic unilateral mobilizations grade II-II  Seated thoracic extensions with PT overpressure  PROM all planes  Glenohumeral joint anterior to posterior mobilizations grade II-III   Thoracic flicks  METs for L cervical rotation 3x6 sec (2 rounds)   STM to cervical paraspinals and upper trap on L     neuromuscular re-education activities to improve: Kinesthetic, Sense, Proprioception, and Posture for 26 minutes. The following activities were included:    Supine chin tucks with BP cuff (22-28 mmHg) 2x10 reps with 10 sec holds   Chin tuck with BP cuff 24-26 mmHg with arm raise 30x each  Upper trap level I 2x10 reps with 5 sec holds   Ronald shoulder ext red TB 2x10      Not today  Prone middle traps level II 2x10 reps with 3 sec holds   Hitch hikers with wedge 2x10 reps with 3 sec holds  B ER (no moneys) with GreenTB and chin tuck 3x8 reps   Wall slides with elevation  "3x10  No money red TB 3x10x3"  Ronald shoulder ext green TB 2x10  Ronald horizontal abd red TB 2x10    therapeutic activities to improve functional performance for 00 minutes, including:    Moist heat during for pain relief and muscle relaxation to cervical spine supine 10 min.     Patient Education and Home Exercises     Home Exercises Provided and Patient Education Provided     Education provided:   - Continue with HEP, updated 1/30/2023    Written Home Exercises Provided: Patient instructed to cont prior HEP. Exercises were reviewed and Antoine was able to demonstrate them prior to the end of the session.  Antoine demonstrated good  understanding of the education provided. See EMR under Patient Instructions for exercises provided during therapy sessions    ASSESSMENT     Antoine felt a little better today but still getting pain across her neck and into ronald UT. She does well with BP training for chin tucks and continued with CT junction mobility. Was TTP and light touch throughout cervical and lumbar spine. Will continue to monitor symptoms and progress as tolerated.     Antoine Is progressing well towards her goals.   Pt prognosis is Fair.     Pt will continue to benefit from skilled outpatient physical therapy to address the deficits listed in the problem list box on initial evaluation, provide pt/family education and to maximize pt's level of independence in the home and community environment.     Pt's spiritual, cultural and educational needs considered and pt agreeable to plan of care and goals.     Anticipated barriers to physical therapy: Chronicity of symptoms, prior surgeries     Goals:   Short Term Goals: 4-6 weeks (Progressing, not met)  Patient will be independent in initial HEP to help supplement PT. - MET  Patient will improve right shoulder range of motion to symmetrical to left to help with ADLs. - MET  Patient will report no pain with cervical range of motion assessment to show improvement in condition. "   Patient will improve pain at its worst to </= 6/10 to help improve quality of life.      Long Term Goals: 10-12 weeks (Progressing, not met)  Patient will be independent in updated HEP to help supplement PT and maintain gains made in PT.   Patient will improve FOTO limitation score to </= predicted % (37%) to show improvement in condition.   Patient will improve serratus anterior strength by 1/2 MMT to help with reaching up overhead.   Patient will improve pain at its worst to </= 3/10 to help improve quality of life.    PLAN   Plan of care Certification: 12/28/2022 to 3/28/2023.    Continue with current plan of care with emphasis on cervical range of motion, postural retraining, and thoracic mobility.     Jairon Caraballo, PT

## 2023-03-07 ENCOUNTER — CLINICAL SUPPORT (OUTPATIENT)
Dept: REHABILITATION | Facility: HOSPITAL | Age: 58
End: 2023-03-07
Payer: MEDICARE

## 2023-03-07 DIAGNOSIS — R29.3 POSTURE ABNORMALITY: Primary | ICD-10-CM

## 2023-03-07 PROCEDURE — 97110 THERAPEUTIC EXERCISES: CPT

## 2023-03-07 PROCEDURE — 97112 NEUROMUSCULAR REEDUCATION: CPT

## 2023-03-07 PROCEDURE — 97140 MANUAL THERAPY 1/> REGIONS: CPT

## 2023-03-07 NOTE — PROGRESS NOTES
[facility]   Physical Therapy Treatment Note     Name: Antoine Gonzáles  [facility] Number: [id number]    Therapy Diagnosis:   Encounter Diagnosis   Name Primary?    Posture abnormality Yes       Physician: Mansi Saini MD    Visit Date: 3/7/2023  Physician Orders: PT Eval and Treat   Medical Diagnosis from Referral: M50.00 (ICD-10-CM) - Cervical disc disorder with myelopathy  Evaluation Date: 12/28/2022  Authorization Period Expiration: 2/15/2023  Plan of Care Expiration: 3/28/2023  Progress Note Due: 1/28/2023  Visit # / Visits authorized: 10/14  Total Visits: 9  FOTO: 1/3     PTA Visit #: 0/5     FOTO first follow up:   FOTO second follow up:     Date of Surgery: 9/26/2022  Return to MD date:      Precautions: Standard, Diabetes, and S/P Cervical disc arthroplasty C3-4, previous fusion C5-6, C6-7.     Time In: 4:00 pm  Time Out: 4:56 pm  Total Billable Time: 56 minutes (billable = 56)    SUBJECTIVE     Pt reports: similar complaints of symptoms. Pt reports anterior shoulder pain.    She was compliant with home exercise program.  Response to previous treatment: Sore, independent in HEP   Functional change: Ongoing    Pain: 4/10 (neck) 0/10 shoulder   Location: left neck  and right shoulder      OBJECTIVE     Objective Measures updated at progress report unless specified.     Observation 1/30/2023:  Cervical    Degrees  Pain    Flexion 20 degrees  Discomfort L side of neck   Extension 25 degrees  Minimal discomfort   Right Sidebend  20 degrees No Pain   Left Sidebend  15 degrees  Discomfort L side of neck   Right Rotation 35 degrees  Discomfort L side of neck    Left Rotation 55 degrees   No Pain   *Range of motion improved following manual and no pain with PT assist scapular elevation on the left scapula with right rotation.     End of session improved right rotation to 50 degrees     Treatment     *Per Medicare guidelines only one-on-one time with PT billed*  **PT was one-on-one with  patient for 56 minutes with PT extender utilized for remainder of session**    Antoine received the treatments listed below:      therapeutic exercises to develop strength, endurance, ROM, flexibility, and posture for 10 minutes including:    Assessment as above   Pt education on updated HEP and sleeping positions   Seated extensions with towel 20 reps with 5 sec holds   Wall slides 2x10 reps     Not Today:  Serratus landmines with dowel 2x10 reps   Scapular retractions with RedTB to neutral 2x10 reps with 3 sec holds   Middle traps standing with RedTB 2x10 reps with 3 sec holds   Sidelying open books 1x15 reps each     manual therapy techniques: Joint mobilizations and Soft tissue Mobilization were applied to the: Right Shoulder and cervicothoracic for 15 minutes, including:    Assessment   Sub-occipital release   STM ronald cervical paraspinals  Supine thoracic/CT junction PA  Gentle upper cervical flexion distraction mobilizations grade I-II - NP    Not Performed  Gentle cervical side glides for opening on L with rotation  Seated thoracic unilateral mobilizations grade II-II  Seated thoracic extensions with PT overpressure  PROM all planes  Glenohumeral joint anterior to posterior mobilizations grade II-III   Thoracic flicks  METs for L cervical rotation 3x6 sec (2 rounds)   STM to cervical paraspinals and upper trap on L     neuromuscular re-education activities to improve: Kinesthetic, Sense, Proprioception, and Posture for  25 minutes. The following activities were included:    Supine chin tucks with BP cuff (22-28 mmHg) 2x10 reps with 10 sec holds   Chin tuck with BP cuff 24-26 mmHg with arm raise 30x each  Ronald shoulder ext red TB with chin tuck 3x10 with 5 sec hold  Chin Tuck no money Rd TB 3x10 5 sec hold - cues for shoulder position    Not today  Prone middle traps level II 2x10 reps with 3 sec holds   Hitch hikers with wedge 2x10 reps with 3 sec holds  B ER (no moneys) with GreenTB and chin tuck 3x8 reps  "  Wall slides with elevation 3x10  No money red TB 3x10x3"  Ronald shoulder ext green TB 2x10  Ronald horizontal abd red TB 2x10  Upper trap level I 2x10 reps with 5 sec holds     therapeutic activities to improve functional performance for 00 minutes, including:    Moist heat during for pain relief and muscle relaxation to cervical spine supine 6 min.     Patient Education and Home Exercises     Home Exercises Provided and Patient Education Provided     Education provided:   - Continue with HEP, updated 1/30/2023    Written Home Exercises Provided: Patient instructed to cont prior HEP. Exercises were reviewed and Antoine was able to demonstrate them prior to the end of the session.  Antoine demonstrated good  understanding of the education provided. See EMR under Patient Instructions for exercises provided during therapy sessions    ASSESSMENT   Antoine continues to report pain in her left UT. Pt benefits from BP training and reports improvements in symptoms following CT junction mobilization. Pt TTP of biceps tendon of CATHRYN. Pt reported improvement in shoulder symptoms following RTC activation.    Plan to continue to address cervical spine muscle endurance, RTC activation, and CTJ mobility.    Antoine Is progressing well towards her goals.   Pt prognosis is Fair.     Pt will continue to benefit from skilled outpatient physical therapy to address the deficits listed in the problem list box on initial evaluation, provide pt/family education and to maximize pt's level of independence in the home and community environment.     Pt's spiritual, cultural and educational needs considered and pt agreeable to plan of care and goals.     Anticipated barriers to physical therapy: Chronicity of symptoms, prior surgeries     Goals:   Short Term Goals: 4-6 weeks (Progressing, not met)  Patient will be independent in initial HEP to help supplement PT. - MET  Patient will improve right shoulder range of motion to symmetrical to left to " help with ADLs. - MET  Patient will report no pain with cervical range of motion assessment to show improvement in condition.   Patient will improve pain at its worst to </= 6/10 to help improve quality of life.      Long Term Goals: 10-12 weeks (Progressing, not met)  Patient will be independent in updated HEP to help supplement PT and maintain gains made in PT.   Patient will improve FOTO limitation score to </= predicted % (37%) to show improvement in condition.   Patient will improve serratus anterior strength by 1/2 MMT to help with reaching up overhead.   Patient will improve pain at its worst to </= 3/10 to help improve quality of life.    PLAN   Plan of care Certification: 12/28/2022 to 3/28/2023.    Continue with current plan of care with emphasis on cervical range of motion, postural retraining, and thoracic mobility.     Jairon Carbaallo, PT    Co-Treatment Ministerio RODRIGUEZ    I certify that I was present in the room directing the student in service delivery and guiding them using my skilled judgment. As the co-signing therapist I have reviewed the students documentation and am responsible for the treatment, assessment, and plan.      Jairon Caraballo PT, DPT

## 2023-03-20 ENCOUNTER — CLINICAL SUPPORT (OUTPATIENT)
Dept: REHABILITATION | Facility: HOSPITAL | Age: 58
End: 2023-03-20
Payer: MEDICARE

## 2023-03-20 DIAGNOSIS — R29.3 POSTURE ABNORMALITY: Primary | ICD-10-CM

## 2023-03-20 PROCEDURE — 97140 MANUAL THERAPY 1/> REGIONS: CPT

## 2023-03-20 PROCEDURE — 97112 NEUROMUSCULAR REEDUCATION: CPT

## 2023-03-20 NOTE — PROGRESS NOTES
[facility]   Physical Therapy Treatment Note     [id number]    Therapy Diagnosis:   Encounter Diagnosis   Name Primary?    Posture abnormality Yes     Physician: Mansi Saini MD    Visit Date: 3/20/2023  Physician Orders: PT Eval and Treat   Medical Diagnosis from Referral: M50.00 (ICD-10-CM) - Cervical disc disorder with myelopathy  Evaluation Date: 12/28/2022  Authorization Period Expiration: 2/15/2023  Plan of Care Expiration: 3/28/2023  Progress Note Due: 1/28/2023  Visit # / Visits authorized: 10/14  Total Visits: 9  FOTO: 1/3     PTA Visit #: 0/5     FOTO first follow up:   FOTO second follow up:     Date of Surgery: 9/26/2022  Return to MD date:      Precautions: Standard, Diabetes, and S/P Cervical disc arthroplasty C3-4, previous fusion C5-6, C6-7.     Time In: 9:09 am  Time Out: 9:58 am  Total Billable Time: 49 minutes (billable = 25)    SUBJECTIVE     Pt reports: She is not feeling good at all today. She was doing better and then on Saturday she fell down the last two steps and caught herself with her right shoulder which has been hurting her ever since. She is scheduled to follow-up with her surgeon next week and was just going to talk to them at the appointment.   PT discussed importance of contacting doctor and following up with primary if need be with new fall and incident of shoulder pain different than previous.     She was compliant with home exercise program.  Response to previous treatment: Sore, independent in HEP   Functional change: Ongoing    Pain: 4/10 (neck) 0/10 shoulder   Location: left neck  and right shoulder      OBJECTIVE     Objective Measures updated at progress report unless specified.     Treatment     *Per Medicare guidelines only one-on-one time with PT billed*  **PT was one-on-one with patient for 25 minutes with PT extender utilized for remainder of session**    Antoine received the treatments listed below:       therapeutic exercises to develop strength, endurance, ROM, flexibility, and posture for 00 minutes including:    Not Today:  Serratus landmines with dowel 2x10 reps   Scapular retractions with RedTB to neutral 2x10 reps with 3 sec holds   Middle traps standing with RedTB 2x10 reps with 3 sec holds   Sidelying open books 1x15 reps each   Seated extensions with towel 20 reps with 5 sec holds   Wall slides 2x10 reps     manual therapy techniques: Joint mobilizations and Soft tissue Mobilization were applied to the: Right Shoulder and cervicothoracic for 15 minutes, including:    Assessment   Sub-occipital release   STM ronald cervical paraspinals  Supine thoracic/CT junction PA  Gentle upper cervical flexion distraction mobilizations grade I-II   Shoulder assessment   Gentle scapular plane rhythmic stabilization     Not Performed  Gentle cervical side glides for opening on L with rotation  Seated thoracic unilateral mobilizations grade II-II  Seated thoracic extensions with PT overpressure  PROM all planes  Glenohumeral joint anterior to posterior mobilizations grade II-III   Thoracic flicks  METs for L cervical rotation 3x6 sec (2 rounds)   STM to cervical paraspinals and upper trap on L     neuromuscular re-education activities to improve: Kinesthetic, Sense, Proprioception, and Posture for  26 minutes. The following activities were included:    Assessment of cervical/shoulder   Pt education on speaking with her doctor with new fall   Supine chin tucks with BP cuff (22-28 mmHg) 2x10 reps with 10 sec holds   Chin tuck with BP cuff 24-26 mmHg with arm raise 30x each  Shoulder ER isometrics submaximal 2x10 reps with 5 sec holds   Upper trap level I 2x10 reps with 5 sec holds     Not today  Ronald shoulder ext red TB with chin tuck 3x10 with 5 sec hold  Chin Tuck no money Rd TB 3x10 5 sec hold - cues for shoulder position  Prone middle traps level II 2x10 reps with 3 sec holds   Hitch hikers with wedge 2x10 reps with 3 sec  "holds  B ER (no moneys) with GreenTB and chin tuck 3x8 reps   Wall slides with elevation 3x10  No money red TB 3x10x3"  Ronald shoulder ext green TB 2x10  Ronald horizontal abd red TB 2x10      therapeutic activities to improve functional performance for 00 minutes, including:    Moist heat during for pain relief and muscle relaxation to cervical spine supine 8 min.     Patient Education and Home Exercises     Home Exercises Provided and Patient Education Provided     Education provided:   - Continue with HEP, updated 1/30/2023    Written Home Exercises Provided: Patient instructed to cont prior HEP. Exercises were reviewed and Antoine was able to demonstrate them prior to the end of the session.  Antoine demonstrated good  understanding of the education provided. See EMR under Patient Instructions for exercises provided during therapy sessions    ASSESSMENT     Antoine presented with new right shoulder discomfort following a recent fall down her stairs catching herself with her right shoulder. Attempted to assess shoulder and patient guarded with assessment making it difficult for full assessment. Attempted isometrics with no increased discomfort but no relief. Continued work on deep neck flexor endurance with good tolerance. She was educated to contact her doctor and follow-up about her shoulder pain with new onset. Will continue to monitor and progress as able.     Antoine Is progressing well towards her goals.   Pt prognosis is Fair.     Pt will continue to benefit from skilled outpatient physical therapy to address the deficits listed in the problem list box on initial evaluation, provide pt/family education and to maximize pt's level of independence in the home and community environment.     Pt's spiritual, cultural and educational needs considered and pt agreeable to plan of care and goals.     Anticipated barriers to physical therapy: Chronicity of symptoms, prior surgeries     Goals:   Short Term Goals: 4-6 weeks " (Progressing, not met)  Patient will be independent in initial HEP to help supplement PT. - MET  Patient will improve right shoulder range of motion to symmetrical to left to help with ADLs. - MET  Patient will report no pain with cervical range of motion assessment to show improvement in condition.   Patient will improve pain at its worst to </= 6/10 to help improve quality of life.      Long Term Goals: 10-12 weeks (Progressing, not met)  Patient will be independent in updated HEP to help supplement PT and maintain gains made in PT.   Patient will improve FOTO limitation score to </= predicted % (37%) to show improvement in condition.   Patient will improve serratus anterior strength by 1/2 MMT to help with reaching up overhead.   Patient will improve pain at its worst to </= 3/10 to help improve quality of life.    PLAN   Plan of care Certification: 12/28/2022 to 3/28/2023.    Continue with current plan of care with emphasis on cervical range of motion, postural retraining, and thoracic mobility.     Gianna Murray, PT    Co-Treatment Ministerio RODRIGUEZ    I certify that I was present in the room directing the student in service delivery and guiding them using my skilled judgment. As the co-signing therapist I have reviewed the students documentation and am responsible for the treatment, assessment, and plan.      Jairon Caraballo PT, DPT

## 2023-03-28 ENCOUNTER — TELEPHONE (OUTPATIENT)
Dept: NEUROSURGERY | Facility: CLINIC | Age: 58
End: 2023-03-28
Payer: MEDICARE

## 2023-03-28 DIAGNOSIS — M54.41 ACUTE BILATERAL LOW BACK PAIN WITH BILATERAL SCIATICA: Primary | ICD-10-CM

## 2023-03-28 DIAGNOSIS — M54.42 ACUTE BILATERAL LOW BACK PAIN WITH BILATERAL SCIATICA: Primary | ICD-10-CM

## 2023-03-28 NOTE — TELEPHONE ENCOUNTER
Returned call, spoke w pt, rescheduled clinic appt date and pt will now see Jenn.      Pt fell approx 1 wk ago and is experiencing lower back pain that goes down both legs.       ----- Message from Megan Morrell MA sent at 3/28/2023 10:27 AM CDT -----  Regarding: FW: Appt  Contact: [phone]    ----- Message -----  From: Berta Mccormack  Sent: 3/28/2023   9:57 AM CDT  To: [facility] Staff  Subject: Appt                                             Pt calling to reschedule appt scheduled on 03/30 @ 1pm, pt stated she would need something later in the day or early morning. Pt would also like to reschedule XRAY. Please call and adv @ [phone]

## 2023-04-04 ENCOUNTER — CLINICAL SUPPORT (OUTPATIENT)
Dept: REHABILITATION | Facility: HOSPITAL | Age: 58
End: 2023-04-04
Payer: MEDICARE

## 2023-04-04 DIAGNOSIS — R29.3 POSTURE ABNORMALITY: Primary | ICD-10-CM

## 2023-04-04 PROCEDURE — 97112 NEUROMUSCULAR REEDUCATION: CPT

## 2023-04-04 NOTE — PROGRESS NOTES
[facility]   Physical Therapy Treatment Note     Name: Antoine Gonzáles  Clinic Number: [id number]    Therapy Diagnosis:   Encounter Diagnosis   Name Primary?    Posture abnormality Yes       Physician: Mansi Saini MD    Visit Date: 4/4/2023  Physician Orders: PT Eval and Treat   Medical Diagnosis from Referral: M50.00 (ICD-10-CM) - Cervical disc disorder with myelopathy  Evaluation Date: 12/28/2022  Authorization Period Expiration: 2/15/2023  Plan of Care Expiration: 3/28/2023  Progress Note Due: 1/28/2023  Visit # / Visits authorized: 11/14  Total Visits: 14  FOTO: 1/3     PTA Visit #: 0/5     FOTO first follow up:   FOTO second follow up:     Date of Surgery: 9/26/2022  Return to MD date:      Precautions: Standard, Diabetes, and S/P Cervical disc arthroplasty C3-4, previous fusion C5-6, C6-7.     Time In: 8:55  Time Out: 9:15  Total Billable Time: 20 minutes (billable = 10)    SUBJECTIVE     Pt reports: Still having a lot of right shoulder pain and down into her right arm. She was scheduled to see the doctor but had to cancel. Has not been doing much with exercises and wants to see the doctor first.     She was compliant with home exercise program.  Response to previous treatment: Sore, independent in HEP   Functional change: Ongoing    Pain: 4/10 (neck) 0/10 shoulder   Location: left neck  and right shoulder      OBJECTIVE     Objective Measures updated at progress report unless specified.     Treatment     *Per Medicare guidelines only one-on-one time with PT billed*  **PT was one-on-one with patient for 25 minutes with PT extender utilized for remainder of session**    Antoine received the treatments listed below:      therapeutic exercises to develop strength, endurance, ROM, flexibility, and posture for 00 minutes including:    Not Today:  Serratus landmines with dowel 2x10 reps   Scapular retractions with RedTB to neutral 2x10 reps with 3 sec holds   Middle traps standing  "with RedTB 2x10 reps with 3 sec holds   Sidelying open books 1x15 reps each   Seated extensions with towel 20 reps with 5 sec holds   Wall slides 2x10 reps     manual therapy techniques: Joint mobilizations and Soft tissue Mobilization were applied to the: Right Shoulder and cervicothoracic for 0 minutes, including:    Assessment   Sub-occipital release   STM ronald cervical paraspinals  Supine thoracic/CT junction PA  Gentle upper cervical flexion distraction mobilizations grade I-II   Shoulder assessment   Gentle scapular plane rhythmic stabilization     Not Performed  Gentle cervical side glides for opening on L with rotation  Seated thoracic unilateral mobilizations grade II-II  Seated thoracic extensions with PT overpressure  PROM all planes  Glenohumeral joint anterior to posterior mobilizations grade II-III   Thoracic flicks  METs for L cervical rotation 3x6 sec (2 rounds)   STM to cervical paraspinals and upper trap on L     neuromuscular re-education activities to improve: Kinesthetic, Sense, Proprioception, and Posture for  20 minutes. The following activities were included:    Assessment of cervical/shoulder   Pt education  Supine chin tucks 20x10"    Not Performed  Supine chin tucks with BP cuff (22-28 mmHg) 2x10 reps with 10 sec holds   Chin tuck with BP cuff 24-26 mmHg with arm raise 30x each  Shoulder ER isometrics submaximal 2x10 reps with 5 sec holds   Upper trap level I 2x10 reps with 5 sec holds     Not today  Ronald shoulder ext red TB with chin tuck 3x10 with 5 sec hold  Chin Tuck no money Rd TB 3x10 5 sec hold - cues for shoulder position  Prone middle traps level II 2x10 reps with 3 sec holds   Hitch hikers with wedge 2x10 reps with 3 sec holds  B ER (no moneys) with GreenTB and chin tuck 3x8 reps   Wall slides with elevation 3x10  No money red TB 3x10x3"  Ronald shoulder ext green TB 2x10  Ronald horizontal abd red TB 2x10      therapeutic activities to improve functional performance for 00 minutes, " including:    Moist heat during for pain relief and muscle relaxation to cervical spine supine 10 min.     Patient Education and Home Exercises     Home Exercises Provided and Patient Education Provided     Education provided:   - Continue with HEP, updated 1/30/2023    Written Home Exercises Provided: Patient instructed to cont prior HEP. Exercises were reviewed and Antoine was able to demonstrate them prior to the end of the session.  Antoine demonstrated good  understanding of the education provided. See EMR under Patient Instructions for exercises provided during therapy sessions    ASSESSMENT     Antoine still presenting with symptoms following a fall. We discussed how she did not want to perform exercises due to new injury on right shoulder. We agreed to hold on PT until assessment of right shoulder/neck. Educated on reaching out if symptoms get worse and will hold until clearance from doctor.     Antoine Is progressing well towards her goals.   Pt prognosis is Fair.     Pt will continue to benefit from skilled outpatient physical therapy to address the deficits listed in the problem list box on initial evaluation, provide pt/family education and to maximize pt's level of independence in the home and community environment.     Pt's spiritual, cultural and educational needs considered and pt agreeable to plan of care and goals.     Anticipated barriers to physical therapy: Chronicity of symptoms, prior surgeries     Goals:   Short Term Goals: 4-6 weeks (Progressing, not met)  Patient will be independent in initial HEP to help supplement PT. - MET  Patient will improve right shoulder range of motion to symmetrical to left to help with ADLs. - MET  Patient will report no pain with cervical range of motion assessment to show improvement in condition.   Patient will improve pain at its worst to </= 6/10 to help improve quality of life.      Long Term Goals: 10-12 weeks (Progressing, not met)  Patient will be  independent in updated HEP to help supplement PT and maintain gains made in PT.   Patient will improve FOTO limitation score to </= predicted % (37%) to show improvement in condition.   Patient will improve serratus anterior strength by 1/2 MMT to help with reaching up overhead.   Patient will improve pain at its worst to </= 3/10 to help improve quality of life.    PLAN   Plan of care Certification: 12/28/2022 to 3/28/2023.    Continue with current plan of care with emphasis on cervical range of motion, postural retraining, and thoracic mobility.     Jairon Caraballo, PT

## 2023-04-13 ENCOUNTER — HOSPITAL ENCOUNTER (OUTPATIENT)
Dept: RADIOLOGY | Facility: HOSPITAL | Age: 58
Discharge: HOME | End: 2023-04-13
Attending: NEUROLOGICAL SURGERY
Payer: MEDICARE

## 2023-04-13 ENCOUNTER — PATIENT MESSAGE (OUTPATIENT)
Dept: NEUROSURGERY | Facility: CLINIC | Age: 58
End: 2023-04-13
Payer: MEDICARE

## 2023-04-13 ENCOUNTER — TELEPHONE (OUTPATIENT)
Dept: NEUROSURGERY | Facility: CLINIC | Age: 58
End: 2023-04-13
Payer: MEDICARE

## 2023-04-13 DIAGNOSIS — M50.00 CERVICAL DISC DISORDER WITH MYELOPATHY: ICD-10-CM

## 2023-04-13 DIAGNOSIS — M54.41 ACUTE BILATERAL LOW BACK PAIN WITH BILATERAL SCIATICA: ICD-10-CM

## 2023-04-13 DIAGNOSIS — M54.42 ACUTE BILATERAL LOW BACK PAIN WITH BILATERAL SCIATICA: ICD-10-CM

## 2023-04-13 PROCEDURE — 72050 X-RAY EXAM NECK SPINE 4/5VWS: CPT | Mod: 26,,, | Performed by: RADIOLOGY

## 2023-04-13 PROCEDURE — 72050 XR CERVICAL SPINE AP LAT WITH FLEX EXTEN: ICD-10-PCS | Mod: 26,,, | Performed by: RADIOLOGY

## 2023-04-13 PROCEDURE — 72100 X-RAY EXAM L-S SPINE 2/3 VWS: CPT | Mod: 26,,, | Performed by: RADIOLOGY

## 2023-04-13 PROCEDURE — 72100 X-RAY EXAM L-S SPINE 2/3 VWS: CPT | Mod: TC

## 2023-04-13 PROCEDURE — 72050 X-RAY EXAM NECK SPINE 4/5VWS: CPT | Mod: TC

## 2023-04-13 PROCEDURE — 72100 XR LUMBAR SPINE AP AND LATERAL: ICD-10-PCS | Mod: 26,,, | Performed by: RADIOLOGY

## 2023-04-13 NOTE — TELEPHONE ENCOUNTER
Cancelled todays appt and rescheduled appt to may 23 airam saini. PVU      ----- Message from Karsten Alexander MA sent at 4/13/2023  9:12 AM CDT -----  Regarding: FW: Rescheduling Appt  Contact: @[phone]  This patient cancelling appt w Jenn today but would like to be rescheduled w Dr Saini.   ----- Message -----  From: Patti Benavides  Sent: 4/13/2023   8:47 AM CDT  To: Evy Barajas Staff, Yeny Nazario Staff  Subject: Rescheduling Appt                                Patient is not able to make 2:30pm appt 4/13/2023 hoping for next earliest am appt. Won't be able to  phone -heading to another appt. Was hoping Dr. Saini would be provider. Please call and advise @[phone]

## 2023-04-19 DIAGNOSIS — Z12.31 BREAST CANCER SCREENING BY MAMMOGRAM: Primary | ICD-10-CM

## 2023-04-26 ENCOUNTER — TELEPHONE (OUTPATIENT)
Dept: NEUROSURGERY | Facility: CLINIC | Age: 58
End: 2023-04-26
Payer: MEDICARE

## 2023-04-26 NOTE — TELEPHONE ENCOUNTER
Called pt to reschedule appt as dr. Saini will no longer be in on May 23rd.  Spoke w pt - reschedule. PVU

## 2023-05-16 ENCOUNTER — TELEPHONE (OUTPATIENT)
Dept: NEUROSURGERY | Facility: CLINIC | Age: 58
End: 2023-05-16
Payer: MEDICARE

## 2023-05-25 ENCOUNTER — OFFICE VISIT (OUTPATIENT)
Dept: NEUROSURGERY | Facility: CLINIC | Age: 58
End: 2023-05-25
Payer: MEDICARE

## 2023-05-25 VITALS — SYSTOLIC BLOOD PRESSURE: 144 MMHG | DIASTOLIC BLOOD PRESSURE: 93 MMHG

## 2023-05-25 DIAGNOSIS — M47.816 LUMBAR SPONDYLOSIS: Primary | ICD-10-CM

## 2023-05-25 PROCEDURE — 4010F ACE/ARB THERAPY RXD/TAKEN: CPT | Mod: CPTII,S$GLB,, | Performed by: NEUROLOGICAL SURGERY

## 2023-05-25 PROCEDURE — 1159F PR MEDICATION LIST DOCUMENTED IN MEDICAL RECORD: ICD-10-PCS | Mod: CPTII,S$GLB,, | Performed by: NEUROLOGICAL SURGERY

## 2023-05-25 PROCEDURE — 99214 OFFICE O/P EST MOD 30 MIN: CPT | Mod: S$GLB,,, | Performed by: NEUROLOGICAL SURGERY

## 2023-05-25 PROCEDURE — 99214 PR OFFICE/OUTPT VISIT, EST, LEVL IV, 30-39 MIN: ICD-10-PCS | Mod: S$GLB,,, | Performed by: NEUROLOGICAL SURGERY

## 2023-05-25 PROCEDURE — 3080F PR MOST RECENT DIASTOLIC BLOOD PRESSURE >= 90 MM HG: ICD-10-PCS | Mod: CPTII,S$GLB,, | Performed by: NEUROLOGICAL SURGERY

## 2023-05-25 PROCEDURE — 3080F DIAST BP >= 90 MM HG: CPT | Mod: CPTII,S$GLB,, | Performed by: NEUROLOGICAL SURGERY

## 2023-05-25 PROCEDURE — 99999 PR PBB SHADOW E&M-EST. PATIENT-LVL III: ICD-10-PCS | Mod: PBBFAC,,, | Performed by: NEUROLOGICAL SURGERY

## 2023-05-25 PROCEDURE — 1159F MED LIST DOCD IN RCRD: CPT | Mod: CPTII,S$GLB,, | Performed by: NEUROLOGICAL SURGERY

## 2023-05-25 PROCEDURE — 3077F PR MOST RECENT SYSTOLIC BLOOD PRESSURE >= 140 MM HG: ICD-10-PCS | Mod: CPTII,S$GLB,, | Performed by: NEUROLOGICAL SURGERY

## 2023-05-25 PROCEDURE — 99999 PR PBB SHADOW E&M-EST. PATIENT-LVL III: CPT | Mod: PBBFAC,,, | Performed by: NEUROLOGICAL SURGERY

## 2023-05-25 PROCEDURE — 3077F SYST BP >= 140 MM HG: CPT | Mod: CPTII,S$GLB,, | Performed by: NEUROLOGICAL SURGERY

## 2023-05-25 PROCEDURE — 4010F PR ACE/ARB THEARPY RXD/TAKEN: ICD-10-PCS | Mod: CPTII,S$GLB,, | Performed by: NEUROLOGICAL SURGERY

## 2023-05-25 NOTE — PROGRESS NOTES
"Neurosurgery  Follow up    SUBJECTIVE:     Chief Complaint: "neck and right shoulder, headaches"     History of Present Illness:  Antoine Gonzáles is a 58 y.o. female who presents as a self-referral after previous ACDF at Issue with persistent neck and shoulder pain. She thinks the surgery was done around 2017 with Dr. Lucas. She states that she had severe depression after the surgery and had loss of memory, including not being able to remember her way home.     She began seeing a neurologist because of that. Then she began having neck pain and saw another neurologist at [facility], who told her that she has two pinched nerves. She participated in PT, which was helpful.     The whole right arm hurts, all the way down to the thumb, which tingles. The tingling in the median distribution has gotten worse since surgery. She was seen in the ED recently and was provided with a sling. She stopped using it because she didn't want her arm to get too stiff.     The pain "thumps like a toothache." It gets better with warm compresses, hot shower, and rubbing ointment on it. PT helped significantly. It gets worse when she sleeps on the ipsilateral side.     She denies myelopathic symptoms, including dropping objects, balance difficulties, and fine motor changes.     She stays with her friend. She has longstanding diabetes, which she works hard to control.     The patient denies any bleeding, infectious, or anesthetic complications with any previous surgery. She takes aspirin regularly as a component of her "back and body" Frederick pills.     As of 8/25/22, the patient reports that she has been about the same. She includes her daughter Shweta over the phone for our conversation. She obtained the MRI, CT, and flexion/extension X-rays that had been requested after the previous visit.     As of 11/8/22, the patient underwent C3-C4 disc arthroplasty on 9/26/22. She reports that she has the same arm pain as before; she has some " new neck pain today that has come along with the healing.     She reports that she still has difficulty swallowing pills, which she has had ever since the first neck surgery.     She denies fever, chills, or drainage from the incision.     As of 12/22/22, the patient returns in scheduled postoperative follow-up. She continues to have right shoulder pain and now has low back pain as well. She has extreme pain in her low back and vagina, and it felt like her whole vagina was coming out. The pain is worse with stooping over to pick things up. She saw both the urologist and the gynecologist.     She has radicular symptoms in the legs as well.     She saw a shoulder doctor, who gave her a cortisone shot, which did not help. He also wrote for her to start PT.     As of 5/25/23, the patient returns in follow-up. She reports that she had a fall in March--she missed a step in her house. She is having a new pain in the right neck into shoulder where she landed.     The pain is exacerbated by sleeping at night. Lidocaine patches have been helping. She is a back sleeper. She has an adjustable mattress.     She still has some tingles in the right hand, but it's better overall.     Review of patient's allergies indicates:   Allergen Reactions    Lisinopril-hydrochlorothiazide Swelling     Facial swelling/ mouth swelling  She can tolerate hydrochlorothiazide       Current Outpatient Medications   Medication Sig Dispense Refill    amLODIPine (NORVASC) 10 MG tablet TAKE 1 TABLET(10 MG) BY MOUTH EVERY DAY 90 tablet 2    b complex vitamins capsule Take 1 capsule by mouth once daily.      B-complex with vitamin C (Z-BEC OR EQUIV) tablet Take 1 tablet by mouth once daily.      BIOTIN ORAL Take by mouth.      cetirizine (ZYRTEC) 10 MG tablet 1-2 tablets daily as needed for itching or swelling 100 tablet 1    cyanocobalamin, vitamin B-12, 1,000 mcg/15 mL Liqd Pill      diazePAM (VALIUM) 2 MG tablet Take 1 tablet (2 mg total) by mouth  every 6 (six) hours as needed (Pain). 20 tablet 0    diclofenac sodium (VOLTAREN) 1 % Gel Apply 2g  to affected area every 6 hr as needed for pain. 100 g 0    diclofenac sodium (VOLTAREN) 1 % Gel Apply 2 g topically 4 (four) times daily. 1 g 1    famotidine (PEPCID) 20 MG tablet Take 1 tablet (20 mg total) by mouth 2 (two) times daily. for 14 days 28 tablet 0    hydroCHLOROthiazide (HYDRODIURIL) 12.5 MG Tab TAKE 1 TABLET DAILY WITH LOSARTAN      losartan (COZAAR) 50 MG tablet TAKE 1 TABLET BY MOUTH EVERY DAY WITH HCTZ      magnesium oxide 500 mg Tab Take by mouth once.      OZEMPIC 1 mg/dose (2 mg/1.5 mL) PnIj TAKES ON Monday MORNINGS.      potassium chloride SA (K-DUR,KLOR-CON M) 10 MEQ tablet Take 20 mEq by mouth once daily.      pregabalin (LYRICA) 75 MG capsule Take 1 capsule (75 mg total) by mouth 2 (two) times daily. 60 capsule 0    rosuvastatin (CRESTOR) 40 MG Tab Take 1 tablet (40 mg total) by mouth every evening. 90 tablet 2    turmeric root extract 500 mg Cap once daily.      UNABLE TO FIND medication name: dp rub - Rt arm      vitamin B complex (SUPER B-50 COMPLEX ORAL) as directed       No current facility-administered medications for this visit.       Past Medical History:   Diagnosis Date    Anemia     Angio-edema     Depression     Diabetes mellitus type 2, noninsulin dependent 2016    Hematuria     Hx of essential hypertension     Hyperlipidemia     Hypertension, uncontrolled 2016    Nuclear sclerosis of both eyes 04/10/2017     Past Surgical History:   Procedure Laterality Date    BTL       SECTION      COLONOSCOPY N/A 2017    Procedure: COLONOSCOPY;  Surgeon: Oleg Enciso MD;  Location: [facility] ([address] [place] [address]);  Service: Endoscopy;  Laterality: N/A;  CHRONIC CONSTIPATION S/P LRNY    COSMETIC SURGERY      Liposuction , back surgery  , buttock surgery     CYSTOSCOPY  2019    Procedure: CYSTOSCOPY;  Surgeon: ELIZABETH Mendoza MD;  Location: [facility] OR;   Service: Urology;;  PRE-OP BY RN 3-    ESOPHAGOGASTRODUODENOSCOPY N/A 06/29/2020    Procedure: EGD (ESOPHAGOGASTRODUODENOSCOPY);  Surgeon: Elias Harper MD;  Location: [facility] (4TH FLR);  Service: Endoscopy;  Laterality: N/A;  prep ins. emailed - ERW  COVID screening on 6/26/20 - ERW    GASTRIC BYPASS  1995    sandra en y    HYSTEROSCOPY WITH DILATION AND CURETTAGE OF UTERUS N/A 09/28/2018    Procedure: HYSTEROSCOPY, WITH DILATION AND CURETTAGE OF UTERUS;  Surgeon: Naveed Alexander MD;  Location: [facility] OR;  Service: OB/GYN;  Laterality: N/A;  RN PREOP 9/26/2018    NECK SURGERY  09/30/2016    TOTAL REDUCTION MAMMOPLASTY  2007    TOTAL REPLACEMENT OF CERVICAL INTERVERTEBRAL DISC N/A 9/26/2022    Procedure: REPLACEMENT, INTERVERTEBRAL DISC, CERVICAL, TOTAL C3/4 Phani neuro monitoring;  Surgeon: Mansi Saini MD;  Location: [facility] OR 2ND FLR;  Service: Neurosurgery;  Laterality: N/A;  TORONTO III, ASA III, BLOOD TYPE AND SCREEN, NEUROMONITORING EMG SEP MEP, BED REGULAR BED, HEADREST CASPAR, POSITION SUPINE, SPECIAL EQUIPMENT PHANI BIOMET, RADIOLOGY C-ARM     Family History       Problem Relation (Age of Onset)    Allergies Sister    Diabetes type II Mother    Heart attack Father (50)    Heart disease Mother    No Known Problems Brother, Maternal Aunt, Maternal Uncle, Paternal Aunt, Paternal Uncle, Maternal Grandmother, Maternal Grandfather, Paternal Grandmother, Paternal Grandfather          Social History     Socioeconomic History    Marital status: Single   Tobacco Use    Smoking status: Never    Smokeless tobacco: Never   Substance and Sexual Activity    Alcohol use: No     Comment: one drink in a few months    Drug use: No    Sexual activity: Yes     Partners: Male       Review of Systems   Constitutional:  Negative for fever.   HENT:  Positive for hearing loss.    Respiratory:  Negative for shortness of breath.    Cardiovascular:  Negative for chest pain.   Gastrointestinal:  Negative for vomiting.   Endocrine:  "Negative for cold intolerance.   Genitourinary:  Negative for difficulty urinating.   Musculoskeletal:  Positive for neck pain.   Skin:  Negative for color change.   Neurological:  Positive for headaches.   Hematological:  Does not bruise/bleed easily.   Psychiatric/Behavioral:  The patient is not nervous/anxious.      OBJECTIVE:     Vital Signs     There is no height or weight on file to calculate BMI.      Physical Exam:    Constitutional: She appears well-developed and well-nourished. No distress.     Abdominal: Soft.     Skin:   Well healed right low anterior neck incision      Psych/Behavior: She is alert. She is oriented to person, place, and time.     Musculoskeletal:      Comments: No weakness on exam      Neurological:   Slight right Webber's    Pulmonary: nonlabored respirations     Hematologic: no bruising noted     Diagnostic Results:  MRI, CT, and flex/ex X-rays personally reviewed   MRI demonstrates significant cord compression with myelomalacia at C3-C4   No evidence of OPLL and good previous fusion on CT   No dynamic instability on X-rays nor evidence of significant spondylosis   Flex/ex cervical shows good mobility at C3-C4  Lumbar xrays show L4-L5 spondy     ASSESSMENT/PLAN:   Antoine Gonzáles is a 58 y.o. female who presents with neck and right arm and shoulder pain s/p ACDF with Dr. Lucas. She denies myelopathic symptoms, but she does have Webber's on exam. She states that she was told that she has "pinched nerves" by a [facility] neurologist.     Based on the results of her imaging studies coupled with her myelopathic complaints, I believe the patient would best benefit from C3-C4 arthroplasty to allow for preservation of C4-C5 disc space since there is currently no disease associated with that level. This would allow for decompression of the C3-C4 cord compression with associated myelomalacia. She underwent this procedure on 9/26/22.     We again discussed that I define success as the " operation as preventing her from having progression of myelopathic symptoms. I am sorry that she has not had improvement in pain, which we knew preoperatively was the likely outcome. She expressed understanding.     I have recommended that she discuss possible orthopedic consultation for her shoulder pain with her PCP. She expressed agreement and understanding.     I would like her to participate in PT for her low back and continued shoulder pain. I will also offer her referral to pain management.     I have recommended that she follow up in [facility] if she would like to pursue treatment of the low back symptoms in the future (she is not currently ready to pursue this).     I have encouraged her to contact the clinic in interim with any questions, concerns, or adverse clinical change.

## 2023-06-14 ENCOUNTER — OFFICE VISIT (OUTPATIENT)
Dept: OBSTETRICS AND GYNECOLOGY | Facility: CLINIC | Age: 58
End: 2023-06-14
Payer: MEDICARE

## 2023-06-14 DIAGNOSIS — Z12.31 VISIT FOR SCREENING MAMMOGRAM: ICD-10-CM

## 2023-06-14 DIAGNOSIS — Z12.4 CERVICAL CANCER SCREENING: ICD-10-CM

## 2023-06-14 DIAGNOSIS — Z91.89 GYN EXAM FOR HIGH-RISK MEDICARE PATIENT: Primary | ICD-10-CM

## 2023-06-14 PROCEDURE — 1160F PR REVIEW ALL MEDS BY PRESCRIBER/CLIN PHARMACIST DOCUMENTED: ICD-10-PCS | Mod: CPTII,S$GLB,, | Performed by: OBSTETRICS & GYNECOLOGY

## 2023-06-14 PROCEDURE — 3078F PR MOST RECENT DIASTOLIC BLOOD PRESSURE < 80 MM HG: ICD-10-PCS | Mod: CPTII,S$GLB,, | Performed by: OBSTETRICS & GYNECOLOGY

## 2023-06-14 PROCEDURE — 3008F BODY MASS INDEX DOCD: CPT | Mod: CPTII,S$GLB,, | Performed by: OBSTETRICS & GYNECOLOGY

## 2023-06-14 PROCEDURE — 3075F PR MOST RECENT SYSTOLIC BLOOD PRESS GE 130-139MM HG: ICD-10-PCS | Mod: CPTII,S$GLB,, | Performed by: OBSTETRICS & GYNECOLOGY

## 2023-06-14 PROCEDURE — 1159F MED LIST DOCD IN RCRD: CPT | Mod: CPTII,S$GLB,, | Performed by: OBSTETRICS & GYNECOLOGY

## 2023-06-14 PROCEDURE — G0101 CA SCREEN;PELVIC/BREAST EXAM: HCPCS | Mod: S$GLB,,, | Performed by: OBSTETRICS & GYNECOLOGY

## 2023-06-14 PROCEDURE — 99999 PR PBB SHADOW E&M-EST. PATIENT-LVL III: ICD-10-PCS | Mod: PBBFAC,,, | Performed by: OBSTETRICS & GYNECOLOGY

## 2023-06-14 PROCEDURE — 1160F RVW MEDS BY RX/DR IN RCRD: CPT | Mod: CPTII,S$GLB,, | Performed by: OBSTETRICS & GYNECOLOGY

## 2023-06-14 PROCEDURE — 88175 CYTOPATH C/V AUTO FLUID REDO: CPT | Performed by: OBSTETRICS & GYNECOLOGY

## 2023-06-14 PROCEDURE — 3075F SYST BP GE 130 - 139MM HG: CPT | Mod: CPTII,S$GLB,, | Performed by: OBSTETRICS & GYNECOLOGY

## 2023-06-14 PROCEDURE — 87624 HPV HI-RISK TYP POOLED RSLT: CPT | Performed by: OBSTETRICS & GYNECOLOGY

## 2023-06-14 PROCEDURE — 3008F PR BODY MASS INDEX (BMI) DOCUMENTED: ICD-10-PCS | Mod: CPTII,S$GLB,, | Performed by: OBSTETRICS & GYNECOLOGY

## 2023-06-14 PROCEDURE — 4010F PR ACE/ARB THEARPY RXD/TAKEN: ICD-10-PCS | Mod: CPTII,S$GLB,, | Performed by: OBSTETRICS & GYNECOLOGY

## 2023-06-14 PROCEDURE — 1159F PR MEDICATION LIST DOCUMENTED IN MEDICAL RECORD: ICD-10-PCS | Mod: CPTII,S$GLB,, | Performed by: OBSTETRICS & GYNECOLOGY

## 2023-06-14 PROCEDURE — G0101 PR CA SCREEN;PELVIC/BREAST EXAM: ICD-10-PCS | Mod: S$GLB,,, | Performed by: OBSTETRICS & GYNECOLOGY

## 2023-06-14 PROCEDURE — 3078F DIAST BP <80 MM HG: CPT | Mod: CPTII,S$GLB,, | Performed by: OBSTETRICS & GYNECOLOGY

## 2023-06-14 PROCEDURE — 99999 PR PBB SHADOW E&M-EST. PATIENT-LVL III: CPT | Mod: PBBFAC,,, | Performed by: OBSTETRICS & GYNECOLOGY

## 2023-06-14 PROCEDURE — 4010F ACE/ARB THERAPY RXD/TAKEN: CPT | Mod: CPTII,S$GLB,, | Performed by: OBSTETRICS & GYNECOLOGY

## 2023-06-14 RX ORDER — PLECANATIDE 3 MG/1
1 TABLET ORAL
COMMUNITY
Start: 2023-05-25

## 2023-06-14 RX ORDER — LIDOCAINE 50 MG/G
PATCH TOPICAL
COMMUNITY
Start: 2023-02-24

## 2023-06-14 RX ORDER — MAGNESIUM 250 MG
TABLET ORAL
COMMUNITY

## 2023-06-14 RX ORDER — HYDRALAZINE HYDROCHLORIDE 10 MG/1
TABLET, FILM COATED ORAL
COMMUNITY

## 2023-06-14 NOTE — PROGRESS NOTES
[facility]  Ambulatory Clinic  Obstetrics & Gynecology    Visit Date:  2023    Chief Complaint:  Annual GYN exam, uterine prolapse    History of Present Illness:      Antoine Gonzáles is a 58 y.o.  here for a gynecologic exam with c/o uterine prolapse.      Pt reports an uneventful transition into menopause and is not on hormone replacement therapy.      Last pap ~ was benign.    Last mammo ~ was benign.    Pt is a non-smoker.    Pt denies vaginal bleeding, vaginal discharge, dyspareunia, pelvic pain, bloating, early satiety, unintentional weight loss, breast mass/skin changes, incontinence, GI or urinary complaints.      Otherwise, the pt is in her usual state of health.    Past History:  Gynecologic history as noted above.    Review of Systems:      GENERAL:  No fever, fatigue, excessive weight gain or loss  HEENT:  No headaches, hearing changes, visual disturbance  RESPIRATORY:  No cough, shortness of breath  CARDIOVASCULAR:  No chest pain, heart palpitations, leg swelling  BREAST:  No lump, pain, nipple discharge, skin changes  GASTROINTESTINAL:  No nausea, vomiting, constipation, diarrhea, abd pain, rectal bleeding   GENITOURINARY:  See HPI  ENDOCRINE:  No heat or cold intolerance  HEMATOLOGIC:  No easy bruisability or bleeding   LYMPHATICS:  No enlarged nodes  MUSCULOSKELETAL:  No acute joint pain or swelling  SKIN:  No rash, lesions, jaundice  NEUROLOGIC:  No dizziness, weakness, syncope  PSYCHIATRIC:  No significant mood changes, homicidal/suicidal ideations, abuse    Physical Exam:     /76   Pulse 62   Resp 14   Wt 79.8 kg (176 lb)   LMP 2017   BMI 32.19 kg/m²      GENERAL:  No acute distress, well-nourished  HEENT:  Atraumatic, anicteric, moist mucus membranes. Neck supple w/o masses.  BREAST:  Symmetric, nontender, no obvious masses, adenopathy, skin changes or nipple discharge.  LUNGS:  Clear normal respiratory effort  HEART:  Regular rate and  rhythm  ABDOMEN:  Soft, non-tender, non-distended, normoactive bowel sounds, no obvious organomegaly  EXT:  Symmetric w/o cramping, claudication, or edema. +2 distal pulses.  SKIN:  No rashes or bruising  PSYCH:  Mood and affect appropriate  NEURO:  Grossly intact bilaterally    GENITOURINARY:    VULVAR:  Female external genitalia w/o obvious lesions. Normal urethral meatus. No gross lymphadenopathy.   VAGINA:  Mild, age appropriate vulvovaginal atrophy. No significant cystocele or rectocele. No obvious lesion. No discharge.  CERVIX:  Grade 1 uterine prolapse.  No cervical motion tenderness, discharge, or obvious lesions.   UTERUS:  Small, non-tender, normal contour  ADNEXA:  No masses, non-tender    RECTAL:  Deferred. No obvious external lesions    Chaperone present for exam.    Assessment:     58 y.o. :    GYN exam for high-risk Medicare patient  Grade 1 uterine prolapse    Plan:    A gynecologic health assessment was performed with age appropriate counseling.    Cervical cancer screening - pap obtained.    Screening mammogram ordered, pt advised to call to schedule.    Discussed mgt options for uterine prolapse.  Pt desires expectant management at this time.  Nalini.  Pt advised to notify MD if prolapse becomes symptomatic.    Encourage healthy lifestyle modifications, monthly self breast exams, and colonoscopy.    F/u with PCP for health maintenance.    Return 1 year for gynecologic exam, or sooner as needed.  All questions answered, pt voiced understanding.        Naveed Alexander MD

## 2023-06-15 ENCOUNTER — CLINICAL SUPPORT (OUTPATIENT)
Dept: REHABILITATION | Facility: HOSPITAL | Age: 58
End: 2023-06-15
Attending: NEUROLOGICAL SURGERY
Payer: MEDICARE

## 2023-06-15 DIAGNOSIS — Z74.09 DECREASED MOBILITY AND ENDURANCE: ICD-10-CM

## 2023-06-15 DIAGNOSIS — M47.816 LUMBAR SPONDYLOSIS: ICD-10-CM

## 2023-06-15 DIAGNOSIS — R29.3 POSTURE ABNORMALITY: Primary | ICD-10-CM

## 2023-06-15 PROCEDURE — 97110 THERAPEUTIC EXERCISES: CPT

## 2023-06-15 PROCEDURE — 97162 PT EVAL MOD COMPLEX 30 MIN: CPT

## 2023-06-15 NOTE — PROGRESS NOTES
See evaluation in POC for goals and assessment     Eval Date: 06/18/2023    Dot Slater, PT, DPT, CLT
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
## 2023-06-22 ENCOUNTER — CLINICAL SUPPORT (OUTPATIENT)
Dept: REHABILITATION | Facility: HOSPITAL | Age: 58
End: 2023-06-22
Payer: MEDICARE

## 2023-06-22 DIAGNOSIS — R29.3 POSTURE ABNORMALITY: Primary | ICD-10-CM

## 2023-06-22 DIAGNOSIS — Z74.09 DECREASED MOBILITY AND ENDURANCE: ICD-10-CM

## 2023-06-22 PROCEDURE — 97110 THERAPEUTIC EXERCISES: CPT

## 2023-06-22 PROCEDURE — 97112 NEUROMUSCULAR REEDUCATION: CPT

## 2023-06-22 NOTE — PROGRESS NOTES
[facility]   Physical Therapy Treatment Note      Name: Antoine Gonzáles  Clinic Number: [id number]    Therapy Diagnosis:   Encounter Diagnoses   Name Primary?    Posture abnormality Yes    Decreased mobility and endurance      Physician: Mansi Saini MD    Visit Date: 6/22/2023    Physician Orders: PT Eval and Treat   Medical Diagnosis from Referral:   M47.816 (ICD-10-CM) - Lumbar spondylosis      Evaluation Date: 6/15/2023  Authorization Period Expiration: 8/10/2023  Plan of Care Expiration: 7/30/2023     Visit # / Visits authorized: 1/20   FOTO: 1/3     Precautions: Standard      Time In: 9:00  Time Out: 9:58  Total Appointment Time (timed & untimed codes): 58 minutes    PTA Visit #: 0/5     Subjective     Pt reports: has been dealing with back pain since the fall. She is getting pain down the legs at times. Neck has been feeling better and more manageable. Pain is across her back/low back.     She was compliant with home exercise program.  Response to previous treatment: n/a  Functional change: ongoing    Pain: 4/10  Location: bilateral back      Objective      Objective Measures updated at progress report unless specified.     Lumbar Range of Motion:    Limitations Pain   Flexion 15%   Pulling in back/legs     Extension 70%   Pain across low back     Left Side Bending 40% none     Right Side Bending 40% none              Myotomes Right Left Comments   L2 4/5 4/5     L3 5/5 5/5     L4 5/5 5/5     L5 5/5 5/5     S1 4/5 4/5     S2 4/5 4/5           Lower Extremity Strength  Right LE  Left LE    Quadriceps: 4/5 Quadriceps: 4/5   Hamstrings: nt Hamstrings: nt   Iliospoas: nt Iliospoas: nt   Hip extension:  nt Hip extension: nt   Hip abd 3-/5 Hip abd: 3-/5   Hip ER:  nt Hip ER: nt   Hip IR: nt Hip IR: nt   Ankle dorsiflexion: nt Ankle dorsiflexion: nt   Ankle plantarflexion: nt Ankle plantarflexion: nt     Sensation: intact to light touch teresita LE    Special Tests:  -SLR Test: 80 on right  "pulling in low back, 85 on left no symptoms  -Slump Test: negative  -TIN: positive on right     Joint Mobility: not fully assessed      Palpation: TTP CPA lumbar spine, sacrum        Treatment     Jose received the treatments listed below:      therapeutic exercises to develop strength and endurance for 10 minutes including:  Lower trunk rotation 30x  Single knee to chest 30x      manual therapy techniques: Joint mobilizations were applied to the: hip for 5 minutes, including:  Lateral hip distraction  Inferior hip mobilizations    neuromuscular re-education activities to improve: Proprioception, Posture, and motor control for 43 minutes. The following activities were included:  Assessment as above  TA activation SB 20x5"  BP cuff 40-46   - PPVT - 3x10x5"  Sidelying clams red TB 3x10 each  Sidelying hip ER 2x12 each  Paloff press green TB 20x      therapeutic activities to improve functional performance for 0  minutes, including:      gait training to improve functional mobility and safety for 0  minutes, including:      Patient Education and Home Exercises       Education provided:   - HEP  - importance of stability/strengthening  -     Written Home Exercises Provided: YES. Exercises were reviewed and Jose was able to demonstrate them prior to the end of the session.  Jose demonstrated good understanding of the education provided. See EMR under Patient Instructions for exercises provided during therapy sessions    Assessment     Assessment from jose shows good myotome strength, but decreased core and hip stability. She had some difficulty with posterior pelvic tilting and required cueing for activation and correct mechanics. Some core fatigue following BP cuff. Did well today and had some reduction in symptoms. Continue focus on core stability.     Jose Is progressing well towards her goals.   Pt prognosis is Good.     Pt will continue to benefit from skilled outpatient physical therapy to address " the deficits listed in the problem list box on initial evaluation, provide pt/family education and to maximize pt's level of independence in the home and community environment.     Pt's spiritual, cultural and educational needs considered and pt agreeable to plan of care and goals.     Anticipated barriers to physical therapy: chronicity of symptoms    Goals:  Short Term Goals: 3 weeks - progressing  Pt will be independent with HEP   Pt will report 4/10 pain at rest   Pt will tolerate joint mobilizations in clinic for assessment and treatment      Long Term Goals: 6 weeks - progressing   Pt will report being able to use techniques taught in clinic to reduce pain at home   Pt will have 4+/5 LE MMTs  Pt will report 30% or greater improvement in overall function     Plan     Continue with POC    Jairon Caraballo, PT, DPT

## 2023-06-25 VITALS
RESPIRATION RATE: 14 BRPM | DIASTOLIC BLOOD PRESSURE: 76 MMHG | WEIGHT: 176 LBS | SYSTOLIC BLOOD PRESSURE: 138 MMHG | HEART RATE: 62 BPM | BODY MASS INDEX: 32.19 KG/M2

## 2023-07-03 ENCOUNTER — HOSPITAL ENCOUNTER (EMERGENCY)
Facility: HOSPITAL | Age: 58
Discharge: HOME | End: 2023-07-03
Attending: EMERGENCY MEDICINE
Payer: MEDICARE

## 2023-07-03 ENCOUNTER — HOSPITAL ENCOUNTER (OUTPATIENT)
Dept: RADIOLOGY | Facility: OTHER | Age: 58
Discharge: HOME | End: 2023-07-03
Attending: OBSTETRICS & GYNECOLOGY
Payer: MEDICARE

## 2023-07-03 VITALS
DIASTOLIC BLOOD PRESSURE: 82 MMHG | OXYGEN SATURATION: 97 % | TEMPERATURE: 99 F | RESPIRATION RATE: 16 BRPM | SYSTOLIC BLOOD PRESSURE: 137 MMHG | HEART RATE: 72 BPM

## 2023-07-03 DIAGNOSIS — K04.7 TOOTH INFECTION: ICD-10-CM

## 2023-07-03 DIAGNOSIS — Z12.31 VISIT FOR SCREENING MAMMOGRAM: ICD-10-CM

## 2023-07-03 DIAGNOSIS — T78.40XA ALLERGIC REACTION, INITIAL ENCOUNTER: Primary | ICD-10-CM

## 2023-07-03 DIAGNOSIS — L29.9 ITCHING: ICD-10-CM

## 2023-07-03 PROCEDURE — 77067 MAMMO DIGITAL SCREENING BILAT WITH TOMO: ICD-10-PCS | Mod: 26,,, | Performed by: RADIOLOGY

## 2023-07-03 PROCEDURE — 77063 BREAST TOMOSYNTHESIS BI: CPT | Mod: 26,,, | Performed by: RADIOLOGY

## 2023-07-03 PROCEDURE — 25000003 PHARM REV CODE 250: Performed by: EMERGENCY MEDICINE

## 2023-07-03 PROCEDURE — 77067 SCR MAMMO BI INCL CAD: CPT | Mod: TC

## 2023-07-03 PROCEDURE — 99284 EMERGENCY DEPT VISIT MOD MDM: CPT

## 2023-07-03 PROCEDURE — 77063 PR MAMMO SCREEN DIGITAL BREAST TOMOSYNTHESIS BIL: ICD-10-PCS | Mod: 26,,, | Performed by: RADIOLOGY

## 2023-07-03 PROCEDURE — 77067 SCR MAMMO BI INCL CAD: CPT | Mod: 26,,, | Performed by: RADIOLOGY

## 2023-07-03 RX ORDER — HYDROXYZINE PAMOATE 25 MG/1
25 CAPSULE ORAL EVERY 6 HOURS PRN
Qty: 10 CAPSULE | Refills: 0 | Status: SHIPPED | OUTPATIENT
Start: 2023-07-03 | End: 2023-07-03 | Stop reason: SDUPTHER

## 2023-07-03 RX ORDER — HYDROXYZINE PAMOATE 25 MG/1
25 CAPSULE ORAL EVERY 6 HOURS PRN
Qty: 10 CAPSULE | Refills: 0 | Status: SHIPPED | OUTPATIENT
Start: 2023-07-03

## 2023-07-03 RX ORDER — HYDROXYZINE HYDROCHLORIDE 25 MG/1
25 TABLET, FILM COATED ORAL
Status: COMPLETED | OUTPATIENT
Start: 2023-07-03 | End: 2023-07-03

## 2023-07-03 RX ORDER — HYDROXYZINE HYDROCHLORIDE 25 MG/1
25 TABLET, FILM COATED ORAL ONCE
Status: DISCONTINUED | OUTPATIENT
Start: 2023-07-03 | End: 2023-07-03

## 2023-07-03 RX ORDER — CLINDAMYCIN HYDROCHLORIDE 300 MG/1
300 CAPSULE ORAL EVERY 8 HOURS
Qty: 21 CAPSULE | Refills: 0 | Status: SHIPPED | OUTPATIENT
Start: 2023-07-03 | End: 2023-07-03 | Stop reason: SDUPTHER

## 2023-07-03 RX ORDER — CLINDAMYCIN HYDROCHLORIDE 300 MG/1
300 CAPSULE ORAL EVERY 8 HOURS
Qty: 21 CAPSULE | Refills: 0 | Status: SHIPPED | OUTPATIENT
Start: 2023-07-03 | End: 2023-07-10

## 2023-07-03 RX ADMIN — HYDROXYZINE HYDROCHLORIDE 25 MG: 25 TABLET, FILM COATED ORAL at 03:07

## 2023-07-03 NOTE — ED PROVIDER NOTES
History:  Antoine Gonzáles is a 58 y.o. female who presents to the ED with Allergic Reaction (Itching from penicillin. Benadryl pta)    Described as 58-year-old female presenting to the emergency department with concern for an allergic reaction.  She reports she took Augmentin that was prescribed yesterday at 9:30 p.m. and began to have all-over body itching.  She took Benadryl without improvement home.  She was prescribed the Augmentin for a dental infection.  She denies any nausea vomiting, oral swelling, shortness of breath, or rashes.    Review of Systems: All systems reviewed and are negative except as per history of present illness.    Medications:   Previous Medications    AMLODIPINE (NORVASC) 10 MG TABLET    TAKE 1 TABLET(10 MG) BY MOUTH EVERY DAY    HYDRALAZINE (APRESOLINE) 10 MG TABLET    1 tablet with food    HYDROCHLOROTHIAZIDE (HYDRODIURIL) 12.5 MG TAB    TAKE 1 TABLET DAILY WITH LOSARTAN    LIDOCAINE (LIDODERM) 5 %    APPLY 1 PATCH TOPICALLY TO THE SKIN EVERY DAY. MAY WEAR UP TO 12 HOURS    LOSARTAN (COZAAR) 50 MG TABLET    TAKE 1 TABLET BY MOUTH EVERY DAY WITH HCTZ    MAGNESIUM 250 MG TAB    1 tablet with a meal    OZEMPIC 1 MG/DOSE (2 MG/1.5 ML) PNIJ    TAKES ON Monday MORNINGS.    TRULANCE 3 MG TAB    Take 1 tablet by mouth.       PMH:   Past Medical History:   Diagnosis Date    Anemia     Angio-edema     Depression     Diabetes mellitus type 2, noninsulin dependent 2016    Hematuria     Hx of essential hypertension     Hyperlipidemia     Hypertension, uncontrolled 2016    Nuclear sclerosis of both eyes 04/10/2017     PSH:   Past Surgical History:   Procedure Laterality Date    BTL       SECTION      COLONOSCOPY N/A 2017    Procedure: COLONOSCOPY;  Surgeon: Oleg Enciso MD;  Location: [facility] ([address] [place] [address]);  Service: Endoscopy;  Laterality: N/A;  CHRONIC CONSTIPATION S/P LRNY    COSMETIC SURGERY      Liposuction , back surgery  , buttock surgery      CYSTOSCOPY  04/08/2019    Procedure: CYSTOSCOPY;  Surgeon: ELIZABETH Mendoza MD;  Location: [facility] OR;  Service: Urology;;  PRE-OP BY RN 3-    ESOPHAGOGASTRODUODENOSCOPY N/A 06/29/2020    Procedure: EGD (ESOPHAGOGASTRODUODENOSCOPY);  Surgeon: Elias Harper MD;  Location: [facility] (4TH FLR);  Service: Endoscopy;  Laterality: N/A;  prep ins. emailed - ERW  COVID screening on 6/26/20 - ERW    GASTRIC BYPASS  1995    sandra en y    HYSTEROSCOPY WITH DILATION AND CURETTAGE OF UTERUS N/A 09/28/2018    Procedure: HYSTEROSCOPY, WITH DILATION AND CURETTAGE OF UTERUS;  Surgeon: Naveed Alexander MD;  Location: [facility] OR;  Service: OB/GYN;  Laterality: N/A;  RN PREOP 9/26/2018    NECK SURGERY  09/30/2016    TOTAL REDUCTION MAMMOPLASTY  2007    TOTAL REPLACEMENT OF CERVICAL INTERVERTEBRAL DISC N/A 9/26/2022    Procedure: REPLACEMENT, INTERVERTEBRAL DISC, CERVICAL, TOTAL C3/4 Phani neuro monitoring;  Surgeon: Mansi Saini MD;  Location: [facility] OR 2ND FLR;  Service: Neurosurgery;  Laterality: N/A;  TORONTO III, ASA III, BLOOD TYPE AND SCREEN, NEUROMONITORING EMG SEP MEP, BED REGULAR BED, HEADREST CASPAR, POSITION SUPINE, SPECIAL EQUIPMENT PHANI BIOMET, RADIOLOGY C-ARM     Allergies: She is allergic to lisinopril-hydrochlorothiazide and lisinopril.  Social History: Marital Status: single. She  reports that she has never smoked. She has never used smokeless tobacco.. She  reports no history of alcohol use..       Exam:  VITAL SIGNS:   Vitals:    07/03/23 0258   BP: (!) 160/80   BP Location: Right arm   Patient Position: Sitting   Pulse: 84   Resp: 16   Temp: 97.9 °F (36.6 °C)   TempSrc: Oral   SpO2: 97%     Const: Awake and alert, NAD   Head: Atraumatic  Eyes: Normal Conjunctiva  ENT: Normal External Ears, Nose and Mouth.  Mild erythema surrounding broken tooth right maxillary molar.  Neck: Full range of motion. No meningismus.  Resp: Normal respiratory effort, No distress  Cardio: Equal and intact distal pulses  Abd: Soft, non  tender, non distended.   Skin: No petechiae or rashes  Ext: No cyanosis, or edema  Neur: Awake and alert  Psych: Normal Mood and Affect    Medical Decision Makin-year-old female presenting to the emergency department with all over body itching following new Augmentin administration for a dental infection.  She was no hives or rashes on examination, though is actively itching her skin, feeling very itchy.  She was given hydroxyzine as she has already taken Benadryl at home with improvement in her symptoms.  Antibiotic will be changed to clindamycin with concern for possible penicillin allergy.  She was encouraged to follow up with her dentist and primary physician and to return to the ER with any new or worsening symptoms.    Clinical Impression:  1. Allergic reaction, initial encounter    2. Itching    3. Tooth infection             Medication List        START taking these medications      clindamycin 300 MG capsule  Commonly known as: CLEOCIN  Take 1 capsule (300 mg total) by mouth every 8 (eight) hours. for 7 days     hydrOXYzine pamoate 25 MG Cap  Commonly known as: VISTARIL  Take 1 capsule (25 mg total) by mouth every 6 (six) hours as needed (itching).            ASK your doctor about these medications      amLODIPine 10 MG tablet  Commonly known as: NORVASC  TAKE 1 TABLET(10 MG) BY MOUTH EVERY DAY     hydrALAZINE 10 MG tablet  Commonly known as: APRESOLINE     hydroCHLOROthiazide 12.5 MG Tab  Commonly known as: HYDRODIURIL     LIDOcaine 5 %  Commonly known as: LIDODERM     losartan 50 MG tablet  Commonly known as: COZAAR     magnesium 250 mg Tab     OZEMPIC 1 mg/dose (2 mg/1.5 mL) Pnij  Generic drug: semaglutide     TRULANCE 3 mg Tab  Generic drug: plecanatide               Where to Get Your Medications        These medications were sent to [facility] - [address]   [address] , [place] [address]      Phone: [phone]   clindamycin 300 MG  capsule  hydrOXYzine pamoate 25 MG Cap         Follow-up Information       Blayne Borden PT.    Specialty: Physical Therapy  Contact information:  [address] [facility] [id number] [phone]                               Katharine Ayala MD  07/03/23 1021

## 2023-07-07 ENCOUNTER — CLINICAL SUPPORT (OUTPATIENT)
Dept: REHABILITATION | Facility: HOSPITAL | Age: 58
End: 2023-07-07
Payer: MEDICARE

## 2023-07-07 DIAGNOSIS — R29.3 POSTURE ABNORMALITY: Primary | ICD-10-CM

## 2023-07-07 DIAGNOSIS — Z74.09 DECREASED MOBILITY AND ENDURANCE: ICD-10-CM

## 2023-07-07 PROCEDURE — 97140 MANUAL THERAPY 1/> REGIONS: CPT

## 2023-07-07 PROCEDURE — 97112 NEUROMUSCULAR REEDUCATION: CPT

## 2023-07-07 NOTE — PROGRESS NOTES
[facility]   Physical Therapy Treatment Note      Name: Antoine Gonzáles  Clinic Number: [id number]    Therapy Diagnosis:   Encounter Diagnoses   Name Primary?    Posture abnormality Yes    Decreased mobility and endurance        Physician: Mansi Saini MD    Visit Date: 7/7/2023    Physician Orders: PT Eval and Treat   Medical Diagnosis from Referral:   M47.816 (ICD-10-CM) - Lumbar spondylosis      Evaluation Date: 6/15/2023  Authorization Period Expiration: 8/10/2023  Plan of Care Expiration: 7/30/2023     Visit # / Visits authorized: 2/20   FOTO: 1/3     Precautions: Standard      Time In: 9:00  Time Out: 9:58  Total Appointment Time (timed & untimed codes): 58 minutes    PTA Visit #: 0/5     Subjective     Pt reports: back is feeling a little better, still getting a lot of pain in her shoulder.     She was compliant with home exercise program.  Response to previous treatment: n/a  Functional change: ongoing    Pain: 4/10  Location: bilateral back      Objective      Objective Measures updated at progress report unless specified.     Active Range of Motion:   Shoulder Right Left   Flexion 140* 160   Abduction 70* 130   ER at 0 40* 50   ER at 90 nt nt   IR nt nt       Special Tests:  Impingement Cluster:   Right Left   Hawkin's Kenndy positive negative   Painful Arc positive negative   Resisted ER positive negative       Rotator Cuff Tear   Right Left   Hawkin's [place]     Drop Arm test negative negative   Resisted ER       Instability:   Right Left   Anterior Apprehension Test Posterior shoulder pain negative   Relocation test Positive for reduced symptoms negative   Posterior Apprehension Test nt nt   Sulcus Sign nt nt     SLAP:   Right Left   Biceps Load II positive negative   O'Rangel's Test positive negative     Other:   Right Left   Subscapularis Lift Off Test negative negative   Empty Can positive negative     Joint Mobility: decreased posterior glide      Treatment     Antoine  "received the treatments listed below:      therapeutic exercises to develop strength and endurance for 10 minutes including:  Lower trunk rotation 30x  Single knee to chest 30x      manual therapy techniques: Joint mobilizations were applied to the: hip for 10 minutes, including:  Later hip mobilizations  Ronald long axis hip distraction    neuromuscular re-education activities to improve: Proprioception, Posture, and motor control for 46 minutes. The following activities were included:  Assessment as above  BP cuff 40-50  - PPVT - [id number]"  - bent knee fall out 2x10 each  Sidelying clams red TB 3x10 each  Sidelying hip ER 2x12 each  Paloff press green TB 20x  Pt education      therapeutic activities to improve functional performance for 0  minutes, including:      gait training to improve functional mobility and safety for 0  minutes, including:      Patient Education and Home Exercises       Education provided:   - HEP  - importance of stability/strengthening  - shoulder referral     Written Home Exercises Provided: YES. Exercises were reviewed and Antoine was able to demonstrate them prior to the end of the session.  Antoine demonstrated good understanding of the education provided. See EMR under Patient Instructions for exercises provided during therapy sessions    Assessment     Antoine still having shoulder/neck symptoms. Testing showed positive impingement and potential rotator cuff and labrum involvement. We discussed getting a follow up with the doctor to review her shoulder. Low back is progressing. Tolerated BP cuff with difficulty controlling with bent knee fall outs. Will continue to progress as tolerated.     Antoine Is progressing well towards her goals.   Pt prognosis is Good.     Pt will continue to benefit from skilled outpatient physical therapy to address the deficits listed in the problem list box on initial evaluation, provide pt/family education and to maximize pt's level of independence in the " home and community environment.     Pt's spiritual, cultural and educational needs considered and pt agreeable to plan of care and goals.     Anticipated barriers to physical therapy: chronicity of symptoms    Goals:  Short Term Goals: 3 weeks - progressing  Pt will be independent with HEP   Pt will report 4/10 pain at rest   Pt will tolerate joint mobilizations in clinic for assessment and treatment      Long Term Goals: 6 weeks - progressing   Pt will report being able to use techniques taught in clinic to reduce pain at home   Pt will have 4+/5 LE MMTs  Pt will report 30% or greater improvement in overall function     Plan     Continue with POC    Jairon Caraballo, PT, DPT

## 2023-07-11 ENCOUNTER — CLINICAL SUPPORT (OUTPATIENT)
Dept: REHABILITATION | Facility: HOSPITAL | Age: 58
End: 2023-07-11
Payer: MEDICARE

## 2023-07-11 DIAGNOSIS — Z74.09 DECREASED MOBILITY AND ENDURANCE: ICD-10-CM

## 2023-07-11 DIAGNOSIS — R29.3 POSTURE ABNORMALITY: Primary | ICD-10-CM

## 2023-07-11 PROCEDURE — 97112 NEUROMUSCULAR REEDUCATION: CPT

## 2023-07-11 PROCEDURE — 97140 MANUAL THERAPY 1/> REGIONS: CPT

## 2023-07-11 NOTE — PROGRESS NOTES
[facility]   Physical Therapy Treatment Note      Name: Antoine Gonzáles  Clinic Number: [id number]    Therapy Diagnosis:   Encounter Diagnoses   Name Primary?    Posture abnormality Yes    Decreased mobility and endurance          Physician: Mansi Saini MD    Visit Date: 7/11/2023    Physician Orders: PT Eval and Treat   Medical Diagnosis from Referral:   M47.816 (ICD-10-CM) - Lumbar spondylosis      Evaluation Date: 6/15/2023  Authorization Period Expiration: 8/10/2023  Plan of Care Expiration: 7/30/2023     Visit # / Visits authorized: 3/20   FOTO: 1/3     Precautions: Standard      Time In: 9:10  Time Out: 9:58  Total Appointment Time (timed & untimed codes): 48 minutes    PTA Visit #: 0/5     Subjective     Pt reports: back is starting to feel better, still having pain with her shoulder that is getting worse. Scheduled an appointment with doctor in August.     She was compliant with home exercise program.  Response to previous treatment: n/a  Functional change: ongoing    Pain: 4/10  Location: bilateral back      Objective      Objective Measures updated at progress report unless specified.     Active Range of Motion:   Shoulder Right Left   Flexion 140* 160   Abduction 70* 130   ER at 0 40* 50   ER at 90 nt nt   IR nt nt       Special Tests:  Impingement Cluster:   Right Left   Hawkin's Kenndy positive negative   Painful Arc positive negative   Resisted ER positive negative       Rotator Cuff Tear   Right Left   Hawkin's [place]     Drop Arm test negative negative   Resisted ER       Instability:   Right Left   Anterior Apprehension Test Posterior shoulder pain negative   Relocation test Positive for reduced symptoms negative   Posterior Apprehension Test nt nt   Sulcus Sign nt nt     SLAP:   Right Left   Biceps Load II positive negative   O'Rangel's Test positive negative     Other:   Right Left   Subscapularis Lift Off Test negative negative   Empty Can positive negative  "    Joint Mobility: decreased posterior glide      Treatment     Antoine received the treatments listed below:      therapeutic exercises to develop strength and endurance for 0 minutes including:  Lower trunk rotation 30x  Single knee to chest 30x      manual therapy techniques: Joint mobilizations were applied to the: hip for 13 minutes, including:  AC/SC mobilizations  Later hip mobilizations  Ronald long axis hip distraction    neuromuscular re-education activities to improve: Proprioception, Posture, and motor control for 30 minutes. The following activities were included:  Assessment as above  TA press SB 30x5  Supine clams green TB 30x5"  Sidelying clams green TB 3x10x5" each  Paloff press green TB 20x each  Pt education    Not Performed  BP cuff 40-50  - PPVT - [id number]"  - bent knee fall out 2x10 each      therapeutic activities to improve functional performance for 0  minutes, including:      gait training to improve functional mobility and safety for 0  minutes, including:      Patient Education and Home Exercises       Education provided:   - HEP  - importance of stability/strengthening  - shoulder referral     Written Home Exercises Provided: YES. Exercises were reviewed and Antoine was able to demonstrate them prior to the end of the session.  Antoine demonstrated good understanding of the education provided. See EMR under Patient Instructions for exercises provided during therapy sessions    Assessment     Antoine is progressing with low back symptoms but still complaining of shoulder pain bothering her. Had some relief of symptoms with mobilizations, discussed getting further referral for her shoulder symptoms. Progressed with exercises and will continue to progress as tolerated.     Antoine Is progressing well towards her goals.   Pt prognosis is Good.     Pt will continue to benefit from skilled outpatient physical therapy to address the deficits listed in the problem list box on initial evaluation, " provide pt/family education and to maximize pt's level of independence in the home and community environment.     Pt's spiritual, cultural and educational needs considered and pt agreeable to plan of care and goals.     Anticipated barriers to physical therapy: chronicity of symptoms    Goals:  Short Term Goals: 3 weeks - progressing  Pt will be independent with HEP   Pt will report 4/10 pain at rest   Pt will tolerate joint mobilizations in clinic for assessment and treatment      Long Term Goals: 6 weeks - progressing   Pt will report being able to use techniques taught in clinic to reduce pain at home   Pt will have 4+/5 LE MMTs  Pt will report 30% or greater improvement in overall function     Plan     Continue with POC    Jairon Caraballo, PT, DPT, OCS

## 2023-07-14 ENCOUNTER — CLINICAL SUPPORT (OUTPATIENT)
Dept: REHABILITATION | Facility: HOSPITAL | Age: 58
End: 2023-07-14
Payer: MEDICARE

## 2023-07-14 DIAGNOSIS — R29.3 POSTURE ABNORMALITY: Primary | ICD-10-CM

## 2023-07-14 DIAGNOSIS — Z74.09 DECREASED MOBILITY AND ENDURANCE: ICD-10-CM

## 2023-07-14 PROCEDURE — 97112 NEUROMUSCULAR REEDUCATION: CPT

## 2023-07-14 NOTE — PROGRESS NOTES
[facility]   Physical Therapy Treatment Note      Name: Antoine Gonzáles  Clinic Number: [id number]    Therapy Diagnosis:   Encounter Diagnoses   Name Primary?    Posture abnormality Yes    Decreased mobility and endurance          Physician: Mansi Saini MD    Visit Date: 7/14/2023    Physician Orders: PT Eval and Treat   Medical Diagnosis from Referral:   M47.816 (ICD-10-CM) - Lumbar spondylosis      Evaluation Date: 6/15/2023  Authorization Period Expiration: 8/10/2023  Plan of Care Expiration: 7/30/2023     Visit # / Visits authorized: 4/20   FOTO: 1/3     Precautions: Standard      Time In: 9:00  Time Out: 9:45  Total Appointment Time (timed & untimed codes): 45 minutes    PTA Visit #: 0/5     Subjective     Pt reports: went to the kidney doctor who believes her back pain could be from kidney stones. She is scheduled for a further scan and urine analysis. She feels like her back is doing okay but still complaining of significant right shoulder pain.     She was compliant with home exercise program.  Response to previous treatment: n/a  Functional change: ongoing    Pain: 4/10  Location: bilateral back      Objective      Objective Measures updated at progress report unless specified.     Active Range of Motion:   Shoulder Right Left   Flexion 140* 160   Abduction 70* 130   ER at 0 40* 50   ER at 90 nt nt   IR nt nt       Special Tests:  Impingement Cluster:   Right Left   Hawkin's Kenndy positive negative   Painful Arc positive negative   Resisted ER positive negative       Rotator Cuff Tear   Right Left   Hawkin's [place]     Drop Arm test negative negative   Resisted ER       Instability:   Right Left   Anterior Apprehension Test Posterior shoulder pain negative   Relocation test Positive for reduced symptoms negative   Posterior Apprehension Test nt nt   Sulcus Sign nt nt     SLAP:   Right Left   Biceps Load II positive negative   O'Rangel's Test positive negative     Other:    "Right Left   Subscapularis Lift Off Test negative negative   Empty Can positive negative     Joint Mobility: decreased posterior glide      Treatment     Antoine received the treatments listed below:      therapeutic exercises to develop strength and endurance for 0 minutes including:  Lower trunk rotation 30x  Single knee to chest 30x      manual therapy techniques: Joint mobilizations were applied to the: hip for 13 minutes, including:  AC/SC mobilizations  Later hip mobilizations  Ronald long axis hip distraction    neuromuscular re-education activities to improve: Proprioception, Posture, and motor control for 45 minutes. The following activities were included:  assessment  TA press SB 30x5  Supine clams green TB 30x5"  Wall slides pillow with TA 3x10   Standing shoulder ext green TB with TA 3x10x5"  Ronald horizontal abd yellow TB with TA 2x10x5"  Pt education  Updated HEP    Not Performed  Sidelying clams green TB 3x10x5" each  Paloff press green TB 20x each  BP cuff 40-50  - PPVT - [id number]"  - bent knee fall out 2x10 each      therapeutic activities to improve functional performance for 0  minutes, including:      gait training to improve functional mobility and safety for 0  minutes, including:      Patient Education and Home Exercises       Education provided:   - HEP  - importance of stability/strengthening  - shoulder referral     Written Home Exercises Provided: YES. Exercises were reviewed and Antoine was able to demonstrate them prior to the end of the session.  Antoine demonstrated good understanding of the education provided. See EMR under Patient Instructions for exercises provided during therapy sessions    Assessment     Antoine is still having low back symptoms but have improved. Is being managed for kidney with potential connection to low back symptoms. She feels like her low back is progressing though but would like to continue with PT. We discussed at length symptoms for back and neck/shoulder " today. Will reach out to doctor about shoulder symptoms and get in for a visit with potential referral again for PT. Added exercises for core, thoracic, and scapular strengthening for posterior chain stability.     Antoine Is progressing well towards her goals.   Pt prognosis is Good.     Pt will continue to benefit from skilled outpatient physical therapy to address the deficits listed in the problem list box on initial evaluation, provide pt/family education and to maximize pt's level of independence in the home and community environment.     Pt's spiritual, cultural and educational needs considered and pt agreeable to plan of care and goals.     Anticipated barriers to physical therapy: chronicity of symptoms    Goals:  Short Term Goals: 3 weeks - progressing  Pt will be independent with HEP   Pt will report 4/10 pain at rest   Pt will tolerate joint mobilizations in clinic for assessment and treatment      Long Term Goals: 6 weeks - progressing   Pt will report being able to use techniques taught in clinic to reduce pain at home   Pt will have 4+/5 LE MMTs  Pt will report 30% or greater improvement in overall function     Plan     Continue with POC    Jairon Caraballo, PT, DPT, OCS

## 2023-11-30 ENCOUNTER — LAB VISIT (OUTPATIENT)
Dept: LAB | Facility: HOSPITAL | Age: 58
End: 2023-11-30
Attending: OTOLARYNGOLOGY
Payer: MEDICARE

## 2023-11-30 ENCOUNTER — OFFICE VISIT (OUTPATIENT)
Dept: OTOLARYNGOLOGY | Facility: CLINIC | Age: 58
End: 2023-11-30
Payer: MEDICARE

## 2023-11-30 VITALS
HEIGHT: 62 IN | DIASTOLIC BLOOD PRESSURE: 84 MMHG | SYSTOLIC BLOOD PRESSURE: 134 MMHG | BODY MASS INDEX: 30.76 KG/M2 | WEIGHT: 167.13 LBS

## 2023-11-30 DIAGNOSIS — E04.1 THYROID NODULE: ICD-10-CM

## 2023-11-30 DIAGNOSIS — R07.0 THROAT PAIN IN ADULT: ICD-10-CM

## 2023-11-30 DIAGNOSIS — R59.9 LYMPH NODE ENLARGEMENT: Primary | ICD-10-CM

## 2023-11-30 DIAGNOSIS — R59.9 LYMPH NODE ENLARGEMENT: ICD-10-CM

## 2023-11-30 LAB
CREAT SERPL-MCNC: 0.8 MG/DL (ref 0.5–1.4)
EST. GFR  (NO RACE VARIABLE): >60 ML/MIN/1.73 M^2

## 2023-11-30 PROCEDURE — 4010F PR ACE/ARB THEARPY RXD/TAKEN: ICD-10-PCS | Mod: CPTII,S$GLB,, | Performed by: OTOLARYNGOLOGY

## 2023-11-30 PROCEDURE — 36415 COLL VENOUS BLD VENIPUNCTURE: CPT | Performed by: OTOLARYNGOLOGY

## 2023-11-30 PROCEDURE — 4010F ACE/ARB THERAPY RXD/TAKEN: CPT | Mod: CPTII,S$GLB,, | Performed by: OTOLARYNGOLOGY

## 2023-11-30 PROCEDURE — 99204 OFFICE O/P NEW MOD 45 MIN: CPT | Mod: 25,S$GLB,, | Performed by: OTOLARYNGOLOGY

## 2023-11-30 PROCEDURE — 3075F SYST BP GE 130 - 139MM HG: CPT | Mod: CPTII,S$GLB,, | Performed by: OTOLARYNGOLOGY

## 2023-11-30 PROCEDURE — 31575 DIAGNOSTIC LARYNGOSCOPY: CPT | Mod: S$GLB,,, | Performed by: OTOLARYNGOLOGY

## 2023-11-30 PROCEDURE — 82565 ASSAY OF CREATININE: CPT | Performed by: OTOLARYNGOLOGY

## 2023-11-30 PROCEDURE — 99204 PR OFFICE/OUTPT VISIT, NEW, LEVL IV, 45-59 MIN: ICD-10-PCS | Mod: 25,S$GLB,, | Performed by: OTOLARYNGOLOGY

## 2023-11-30 PROCEDURE — 3008F BODY MASS INDEX DOCD: CPT | Mod: CPTII,S$GLB,, | Performed by: OTOLARYNGOLOGY

## 2023-11-30 PROCEDURE — 3075F PR MOST RECENT SYSTOLIC BLOOD PRESS GE 130-139MM HG: ICD-10-PCS | Mod: CPTII,S$GLB,, | Performed by: OTOLARYNGOLOGY

## 2023-11-30 PROCEDURE — 3079F PR MOST RECENT DIASTOLIC BLOOD PRESSURE 80-89 MM HG: ICD-10-PCS | Mod: CPTII,S$GLB,, | Performed by: OTOLARYNGOLOGY

## 2023-11-30 PROCEDURE — 3008F PR BODY MASS INDEX (BMI) DOCUMENTED: ICD-10-PCS | Mod: CPTII,S$GLB,, | Performed by: OTOLARYNGOLOGY

## 2023-11-30 PROCEDURE — 3079F DIAST BP 80-89 MM HG: CPT | Mod: CPTII,S$GLB,, | Performed by: OTOLARYNGOLOGY

## 2023-11-30 PROCEDURE — 31575 PR LARYNGOSCOPY, FLEXIBLE; DIAGNOSTIC: ICD-10-PCS | Mod: S$GLB,,, | Performed by: OTOLARYNGOLOGY

## 2023-11-30 NOTE — PROGRESS NOTES
OTOLARYNGOLOGY CLINIC NOTE  Date:  2023     Chief complaint:  Chief Complaint   Patient presents with    Other     Mutinodular non-toxic goiter, worsening obstructive symptoms       History of Present Illness  Antoine Gonzáles is a 58 y.o. female  presenting today for a new evaluation and treatment of thyroid enlargement.   Has been having some right sided neck pain. Almost feels like has a crook in the neck .   Down back of neck down shoulder and towarsds the rfront. No numbness or tingling. Pain started about a month or so ago   Feels like crook in her neck when she wakes up. Does not sleep on right shoulder  Missing opsterior teeth . Does not chew on one side more than other. No jaw pain  No ear pain ; has hearing aids  No issues with breathing when laying flat.   She does snore when lays on back. Does not feel tired after night of sleeop     Other day ate some spaghetti and it got caught ; has trouble swallowing pils but al of her life but has gotten worse      Some of it feels similar to the what she had related to the spine      Voice has gotten deeper since all this has come up ; has not noticed raspiness really mostly just deeper   Had ultrasound with suspicious nodule. She also notes discomfort of right neck laterally and in smg area.   No history of radiation   No history of thyroid cancer that aware of.   Per patient report IR would not do needle sample because they said she had a cyst     Past Medical History  Past Medical History:   Diagnosis Date    Anemia     Angio-edema     Depression     Diabetes mellitus type 2, noninsulin dependent 2016    Hematuria     Hx of essential hypertension     Hyperlipidemia     Hypertension, uncontrolled 2016    Nuclear sclerosis of both eyes 04/10/2017        Past Surgical History  Past Surgical History:   Procedure Laterality Date    BTL       SECTION  1985    COLONOSCOPY N/A 2017    Procedure: COLONOSCOPY;  Surgeon: Oleg Enciso  MD;  Location: [facility] ENDO (4TH FLR);  Service: Endoscopy;  Laterality: N/A;  CHRONIC CONSTIPATION S/P LRNY    COSMETIC SURGERY      Liposuction 2021, back surgery 2021 , buttock surgery 2021    CYSTOSCOPY  04/08/2019    Procedure: CYSTOSCOPY;  Surgeon: ELIZABETH Mendoza MD;  Location: [facility] OR;  Service: Urology;;  PRE-OP BY RN 3-    ESOPHAGOGASTRODUODENOSCOPY N/A 06/29/2020    Procedure: EGD (ESOPHAGOGASTRODUODENOSCOPY);  Surgeon: Elias Harper MD;  Location: [facility] ENDO (4TH FLR);  Service: Endoscopy;  Laterality: N/A;  prep ins. emailed - ERW  COVID screening on 6/26/20 - ERW    GASTRIC BYPASS  1995    sandra en y    HYSTEROSCOPY WITH DILATION AND CURETTAGE OF UTERUS N/A 09/28/2018    Procedure: HYSTEROSCOPY, WITH DILATION AND CURETTAGE OF UTERUS;  Surgeon: Naveed Alexander MD;  Location: [facility] OR;  Service: OB/GYN;  Laterality: N/A;  RN PREOP 9/26/2018    NECK SURGERY  09/30/2016    TOTAL REDUCTION MAMMOPLASTY  2007    TOTAL REPLACEMENT OF CERVICAL INTERVERTEBRAL DISC N/A 9/26/2022    Procedure: REPLACEMENT, INTERVERTEBRAL DISC, CERVICAL, TOTAL C3/4 Phani neuro monitoring;  Surgeon: Mansi Saini MD;  Location: [facility] 2ND FLR;  Service: Neurosurgery;  Laterality: N/A;  TORONTO III, ASA III, BLOOD TYPE AND SCREEN, NEUROMONITORING EMG SEP MEP, BED REGULAR BED, HEADREST CASPAR, POSITION SUPINE, SPECIAL EQUIPMENT PHANI BIOMET, RADIOLOGY C-ARM        Medications  Current Outpatient Medications on File Prior to Visit   Medication Sig Dispense Refill    amLODIPine (NORVASC) 10 MG tablet TAKE 1 TABLET(10 MG) BY MOUTH EVERY DAY 90 tablet 2    hydrALAZINE (APRESOLINE) 10 MG tablet 1 tablet with food      hydroCHLOROthiazide (HYDRODIURIL) 12.5 MG Tab TAKE 1 TABLET DAILY WITH LOSARTAN      hydrOXYzine pamoate (VISTARIL) 25 MG Cap Take 1 capsule (25 mg total) by mouth every 6 (six) hours as needed (itching). 10 capsule 0    LIDOcaine (LIDODERM) 5 % APPLY 1 PATCH TOPICALLY TO THE SKIN EVERY DAY. MAY WEAR UP TO 12 HOURS       losartan (COZAAR) 50 MG tablet TAKE 1 TABLET BY MOUTH EVERY DAY WITH HCTZ      magnesium 250 mg Tab 1 tablet with a meal      OZEMPIC 1 mg/dose (2 mg/1.5 mL) PnIj TAKES ON Monday MORNINGS.      TRULANCE 3 mg Tab Take 1 tablet by mouth.       No current facility-administered medications on file prior to visit.       Review of Systems  Review of Systems   Constitutional: Negative.    HENT:  Positive for ear pain and hearing loss.    Eyes: Negative.    Cardiovascular: Negative.    Gastrointestinal: Negative.    Genitourinary: Negative.    Musculoskeletal:  Positive for neck pain.   Skin: Negative.    Neurological: Negative.    Psychiatric/Behavioral: Negative.          Social History   reports that she has never smoked. She has never used smokeless tobacco. She reports that she does not drink alcohol and does not use drugs.     Family History  Family History   Problem Relation Age of Onset    Heart disease Mother     Diabetes type II Mother     Heart attack Father 50        Open heart surgery    Allergies Sister     No Known Problems Brother     No Known Problems Maternal Aunt     No Known Problems Maternal Uncle     No Known Problems Paternal Aunt     No Known Problems Paternal Uncle     No Known Problems Maternal Grandmother     No Known Problems Maternal Grandfather     No Known Problems Paternal Grandmother     No Known Problems Paternal Grandfather     Amblyopia Neg Hx     Blindness Neg Hx     Cancer Neg Hx     Cataracts Neg Hx     Diabetes Neg Hx     Glaucoma Neg Hx     Hypertension Neg Hx     Macular degeneration Neg Hx     Retinal detachment Neg Hx     Strabismus Neg Hx     Stroke Neg Hx     Thyroid disease Neg Hx         Physical Exam   There were no vitals filed for this visit. There is no height or weight on file to calculate BMI.            GENERAL: no acute distress.  HEAD: normocephalic.   EYES: lids and lashes normal. No scleral icterus  EARS: external ear without lesion, normal pinna shape and position.     NOSE: external nose without significant bony abnormality  ORAL CAVITY/OROPHARYNX: tongue mobile. No stone palpated in floor of mouth   NECK: trachea midline.   LYMPH NODES:questionable node in level 3; pt notes discomfort here as well No cervical lymphadenopathy otherwise.  RESPIRATORY: no stridor, no stertor. Voice normal. Respirations nonlabored.  NEURO: alert, responds to questions appropriately.   PSYCH:mood appropriate    PROCEDURE NOTE  NAME OF PROCEDURE: Flexible Laryngoscopy, diagnostic  INDICATIONS: gag reflex precludes mirror exam, throat pain in adult , thyroid nodule  FINDINGS: no malignancy , normal vocal fold motion     Consent: After procedure was explained in detail and all questions answered, verbal consent was obtained for performing flexible laryngoscopy.  Anesthesia: topical 4% lidocaine and neosynephrine  Procedure: With patient in seated position, the scope was inserted into the bilateral nasal passageway and advanced atraumatically into the nasopharynx to examine the following structures:  Nasal cavity: Turbinates with out significant hypertrophy. no middle meatal edema. No purulent drainage.   Nasopharynx: no mass or lesion noted in nasopharynx.   Oropharynx: base of tongue without  mass or ulceration. Lingual tonsils normal in appearance  Hypopharynx: posterior pharyngeal wall without mass or lesion. No pooling of secretions. Pyriform sinuses visible without mass or lesion  Larynx: epiglottis normal without lesion. False vocal folds without edema/erythema/lesion. True vocal folds mobile and without lesion. No interarytenoid edema no erythema . Postcricoid region without edema no lesion   Subglottis: visualized portion of subglottis normal in appearance    After examination performed, the scope was removed atraumatically . The patient tolerated the procedure well. Photodocumentation obtained with representative images below, all images and/or videos uploaded in media section of  epic.        Imaging:  The patient does have any pertinent and/or recent imaging of the head and neck.   Right cystic and solid thyroid nodule with some calcifications , wider than tall 1.3 by 1.2 by -0.8 cm; disagree with radiologist read as I see calcifications and given enlarging nodule and  other concerning features  FNA recommended    Labs:  CBC  Recent Labs   Lab 01/21/21  1042 09/20/22  1112 07/12/23  1512   WBC 7.22 6.24 3-5   Hemoglobin 12.6 12.1  --    Hematocrit 41.1 38.3  --    MCV 84 83  --    Platelets 331 324  --      BMP  Recent Labs   Lab 01/21/21  1042 09/20/22  1112 04/26/23  0906 05/08/23  0026   Glucose 121 H 109  --   --    Sodium 139 139 142 137   Potassium 4.0 4.1 3.6 4.4   Chloride 103 104  --   --    CO2 27 28  --   --    Carbon Dioxide  --   --  29 26   BUN 16 13 13.3 15.3   Creatinine 0.9 0.7 0.81 0.96   Calcium 9.4 9.5 8.8 8.9     COAGS  Recent Labs   Lab 12/15/21  2159 09/20/22  1112   INR 1.0 0.9       Assessment  1. Lymph node enlargement  - CT Soft Tissue Neck With Contrast; Future  - CREATININE, SERUM; Future    2. Thyroid nodule    3. Throat pain in adult       Plan:  Discussed plan of care with patient in detail and all questions answered. Patient reported understanding of plan of care. I gave the patient the opportunity to ask questions and patient confirmed all questions answered to satisfaction.     disagree with radiologist read as I see calcifications and given enlarging nodule and  other concerning features . FNA recommended will obtain ct neck for further eval of pain and possible node as well as to see if any visible salivary gland stones. Will have her come back next week for FNA of thyroid and if any suspicious neck nodes and/or mass will do at that time as well.     F/u next week    Please be aware that this note has been generated with the assistance of [employer] voice-to-text.  Please excuse any spelling or grammatical errors.

## 2023-12-05 ENCOUNTER — HOSPITAL ENCOUNTER (OUTPATIENT)
Dept: RADIOLOGY | Facility: HOSPITAL | Age: 58
Discharge: HOME | End: 2023-12-05
Attending: OTOLARYNGOLOGY
Payer: MEDICARE

## 2023-12-05 DIAGNOSIS — R59.9 LYMPH NODE ENLARGEMENT: ICD-10-CM

## 2023-12-05 PROCEDURE — 70491 CT SOFT TISSUE NECK W/DYE: CPT | Mod: TC

## 2023-12-05 PROCEDURE — 70491 CT SOFT TISSUE NECK W/DYE: CPT | Mod: 26,,, | Performed by: RADIOLOGY

## 2023-12-05 PROCEDURE — 70491 CT SOFT TISSUE NECK WITH CONTRAST: ICD-10-PCS | Mod: 26,,, | Performed by: RADIOLOGY

## 2023-12-05 PROCEDURE — 25500020 PHARM REV CODE 255: Performed by: OTOLARYNGOLOGY

## 2023-12-05 RX ADMIN — IOHEXOL 75 ML: 350 INJECTION, SOLUTION INTRAVENOUS at 03:12

## 2023-12-06 ENCOUNTER — OFFICE VISIT (OUTPATIENT)
Dept: OTOLARYNGOLOGY | Facility: CLINIC | Age: 58
End: 2023-12-06
Payer: MEDICARE

## 2023-12-06 VITALS — WEIGHT: 167 LBS | BODY MASS INDEX: 30.73 KG/M2 | HEIGHT: 62 IN

## 2023-12-06 DIAGNOSIS — K11.5 SIALOLITHIASIS OF SUBMANDIBULAR GLAND: ICD-10-CM

## 2023-12-06 DIAGNOSIS — E04.1 THYROID NODULE: Primary | ICD-10-CM

## 2023-12-06 PROCEDURE — 99214 PR OFFICE/OUTPT VISIT, EST, LEVL IV, 30-39 MIN: ICD-10-PCS | Mod: 25,S$GLB,, | Performed by: OTOLARYNGOLOGY

## 2023-12-06 PROCEDURE — 10005 FNA BX W/US GDN 1ST LES: CPT | Mod: S$GLB,,, | Performed by: OTOLARYNGOLOGY

## 2023-12-06 PROCEDURE — 3008F PR BODY MASS INDEX (BMI) DOCUMENTED: ICD-10-PCS | Mod: CPTII,S$GLB,, | Performed by: OTOLARYNGOLOGY

## 2023-12-06 PROCEDURE — 88173 CYTOPATH EVAL FNA REPORT: CPT | Mod: 26,,, | Performed by: PATHOLOGY

## 2023-12-06 PROCEDURE — 99214 OFFICE O/P EST MOD 30 MIN: CPT | Mod: 25,S$GLB,, | Performed by: OTOLARYNGOLOGY

## 2023-12-06 PROCEDURE — 3008F BODY MASS INDEX DOCD: CPT | Mod: CPTII,S$GLB,, | Performed by: OTOLARYNGOLOGY

## 2023-12-06 PROCEDURE — 4010F PR ACE/ARB THEARPY RXD/TAKEN: ICD-10-PCS | Mod: CPTII,S$GLB,, | Performed by: OTOLARYNGOLOGY

## 2023-12-06 PROCEDURE — 10005 PR FINE NEEDLE ASP BIOPSY, W/US GUIDANCE, 1ST LESION: ICD-10-PCS | Mod: S$GLB,,, | Performed by: OTOLARYNGOLOGY

## 2023-12-06 PROCEDURE — 88173 CYTOPATH EVAL FNA REPORT: CPT | Performed by: PATHOLOGY

## 2023-12-06 PROCEDURE — 4010F ACE/ARB THERAPY RXD/TAKEN: CPT | Mod: CPTII,S$GLB,, | Performed by: OTOLARYNGOLOGY

## 2023-12-06 PROCEDURE — 88173 PR  INTERPRETATION OF FNA SMEAR: ICD-10-PCS | Mod: 26,,, | Performed by: PATHOLOGY

## 2023-12-06 RX ORDER — HYDROCODONE BITARTRATE AND ACETAMINOPHEN 5; 325 MG/1; MG/1
1 TABLET ORAL EVERY 6 HOURS PRN
Qty: 5 TABLET | Refills: 0 | Status: SHIPPED | OUTPATIENT
Start: 2023-12-06

## 2023-12-06 NOTE — PATIENT INSTRUCTIONS
The best pain control comes from a combination of ACETAMINOPHEN (Tylenol) and IBUPROFEN (Motrin/advil).  You can use any over-the-counter versions of acetaminophen and ibuprofen.  You should take 400 milligrams of ibuprofen every 6 hours and 650 milligrams of tylenol every 6 hours. These can be alternated so that you are taking something about every 3 hours while awake. If you need the narcotic pain medication, substitute this out for the acetaminophen. You should try to only use the narcotic medication if you are having trouble sleeping. This medication has a high addiction potential and we do not have a way to know who is at risk for getting addicted to narcotic pain medication. If you have been advised by another physician to not take either of these medications , do not take.

## 2023-12-06 NOTE — PROGRESS NOTES
OTOLARYNGOLOGY CLINIC NOTE  Date:  2023     Chief complaint:  Chief Complaint   Patient presents with    Other     FNA       History of Present Illness  Antoine Gonzáles is a 58 y.o. female  presenting today for a followup.    Has had ct scan obtained for possible lymph node. Patient also notes some discomfort in the smg area. Has history of kidney stones . Not sure if smg area bothersome with eating.     Past Medical History  Past Medical History:   Diagnosis Date    Anemia     Angio-edema     Depression     Diabetes mellitus type 2, noninsulin dependent 2016    Hematuria     Hx of essential hypertension     Hyperlipidemia     Hypertension, uncontrolled 2016    Nuclear sclerosis of both eyes 04/10/2017        Past Surgical History  Past Surgical History:   Procedure Laterality Date    BTL       SECTION      COLONOSCOPY N/A 2017    Procedure: COLONOSCOPY;  Surgeon: Oleg Enciso MD;  Location: [facility] ([address] [place] [address]);  Service: Endoscopy;  Laterality: N/A;  CHRONIC CONSTIPATION S/P LRNY    COSMETIC SURGERY      Liposuction , back surgery  , buttock surgery     CYSTOSCOPY  2019    Procedure: CYSTOSCOPY;  Surgeon: ELIZABETH Mendoza MD;  Location: [facility] OR;  Service: Urology;;  PRE-OP BY RN 3-    ESOPHAGOGASTRODUODENOSCOPY N/A 2020    Procedure: EGD (ESOPHAGOGASTRODUODENOSCOPY);  Surgeon: Elias Harper MD;  Location: [address]);  Service: Endoscopy;  Laterality: N/A;  prep ins. emailed - ERW  COVID screening on 20 - ERW    GASTRIC BYPASS      sandra en y    HYSTEROSCOPY WITH DILATION AND CURETTAGE OF UTERUS N/A 2018    Procedure: HYSTEROSCOPY, WITH DILATION AND CURETTAGE OF UTERUS;  Surgeon: Naveed Alexander MD;  Location: [facility] OR;  Service: OB/GYN;  Laterality: N/A;  RN PREOP 2018    NECK SURGERY  2016    TOTAL REDUCTION MAMMOPLASTY  2007    TOTAL REPLACEMENT OF CERVICAL INTERVERTEBRAL DISC N/A 2022    Procedure:  REPLACEMENT, INTERVERTEBRAL DISC, CERVICAL, TOTAL C3/4 Phani neuro monitoring;  Surgeon: Mansi Saini MD;  Location: [facility] OR [address] [place] [address];  Service: Neurosurgery;  Laterality: N/A;  TORONTO III, ASA III, BLOOD TYPE AND SCREEN, NEUROMONITORING EMG SEP MEP, BED REGULAR BED, HEADREST CASPAR, POSITION SUPINE, SPECIAL EQUIPMENT PHANI BIOMET, RADIOLOGY C-ARM        Medications  Current Outpatient Medications on File Prior to Visit   Medication Sig Dispense Refill    amLODIPine (NORVASC) 10 MG tablet TAKE 1 TABLET(10 MG) BY MOUTH EVERY DAY 90 tablet 2    hydrALAZINE (APRESOLINE) 10 MG tablet 1 tablet with food      hydroCHLOROthiazide (HYDRODIURIL) 12.5 MG Tab TAKE 1 TABLET DAILY WITH LOSARTAN      hydrOXYzine pamoate (VISTARIL) 25 MG Cap Take 1 capsule (25 mg total) by mouth every 6 (six) hours as needed (itching). 10 capsule 0    LIDOcaine (LIDODERM) 5 % APPLY 1 PATCH TOPICALLY TO THE SKIN EVERY DAY. MAY WEAR UP TO 12 HOURS      losartan (COZAAR) 50 MG tablet TAKE 1 TABLET BY MOUTH EVERY DAY WITH HCTZ      magnesium 250 mg Tab 1 tablet with a meal      OZEMPIC 1 mg/dose (2 mg/1.5 mL) PnIj TAKES ON Monday MORNINGS.      TRULANCE 3 mg Tab Take 1 tablet by mouth.       No current facility-administered medications on file prior to visit.       Review of Systems  Review of Systems   Constitutional: Negative.    HENT:  Positive for ear pain and hearing loss.    Eyes: Negative.    Cardiovascular: Negative.    Gastrointestinal: Negative.    Genitourinary: Negative.    Musculoskeletal:  Positive for neck pain.   Skin: Negative.    Neurological: Negative.    Psychiatric/Behavioral: Negative.          Social History   reports that she has never smoked. She has never used smokeless tobacco. She reports that she does not drink alcohol and does not use drugs.     Family History  Family History   Problem Relation Age of Onset    Heart disease Mother     Diabetes type II Mother     Heart attack Father 50        Open heart surgery     "Allergies Sister     No Known Problems Brother     No Known Problems Maternal Aunt     No Known Problems Maternal Uncle     No Known Problems Paternal Aunt     No Known Problems Paternal Uncle     No Known Problems Maternal Grandmother     No Known Problems Maternal Grandfather     No Known Problems Paternal Grandmother     No Known Problems Paternal Grandfather     Amblyopia Neg Hx     Blindness Neg Hx     Cancer Neg Hx     Cataracts Neg Hx     Diabetes Neg Hx     Glaucoma Neg Hx     Hypertension Neg Hx     Macular degeneration Neg Hx     Retinal detachment Neg Hx     Strabismus Neg Hx     Stroke Neg Hx     Thyroid disease Neg Hx         Physical Exam   There were no vitals filed for this visit. Body mass index is 30.54 kg/m².  Weight: 75.8 kg (167 lb)   Height: 5' 2" (157.5 cm)     GENERAL: no acute distress.  HEAD: normocephalic.   EYES: No scleral icterus  EARS: external ear without lesion, normal pinna shape and position.  NOSE: external nose without significant bony abnormality  ORAL CAVITY/OROPHARYNX: tongue mobile.   NECK: trachea midline.   RESPIRATORY: no stridor, no stertor. Voice normal. Respirations nonlabored.  NEURO: alert, responds to questions appropriately.    PSYCH:mood appropriate    PROCEDURE NOTE  Procedure performed: fine needle aspiration (FNA) of thyroid nodule  with ultrasound guidance   Indications for Procedure: suspicious thyroid nodule, rule out malignancy  Pre Procedure Diagnosis: thyroid nodule  Post Procedure Diagnosis: thyroid nodule    Consent: The procedure was explained in detail and the risks,benefits, indications and alternatives were discussed . This included but was not limited to non-diagnostic sample, need for further procedure or repeat FNA, bleeding, infection, hematoma, pain . All questions answered and patient elected to proceed. Written consent was obtained.     Procedure in detail:   The ultrasound was used to identify the mass and find an area that was most ideal for " biopsy ( in area of tissue and not necrotic and/or cystic material). The skin was marked. The skin was cleaned with alcohol swab and then the skin and subcutaneous tissue was then injected with 1% lidocaine with 1:100,000 epinephrine. The skin was then cleansed with betadine. After adequate time for vasoconstriction, the ultrasound probe was again used to identify the neck mass , and using ultrasound guidance, needle aspiration was performed. This was done using both an 18 gauge needle ( 1 pass) and a 25 gauge needle ( 2 passes) . A portion of the material was placed on labeled slides that were then placed in fixative, and a portion of the material was placed in cytolyte solution. After adequate number of passes , the procedure was concluded. The patient tolerated the procedure well without complications.     Imaging:  The patient does have any new imaging of the head and neck since last visit. Ct scan images and report reviewed. No LAD. No submandibular gland mass or enlargement . No stones noted    Labs:  CBC  Recent Labs   Lab 01/21/21  1042 09/20/22  1112 07/12/23  1512   WBC 7.22 6.24 3-5   Hemoglobin 12.6 12.1  --    Hematocrit 41.1 38.3  --    MCV 84 83  --    Platelets 331 324  --      BMP  Recent Labs   Lab 01/21/21  1042 09/20/22  1112 04/26/23  0906 05/08/23  0026 11/30/23  1547   Glucose 121 H 109  --   --   --    Sodium 139 139 142 137  --    Potassium 4.0 4.1 3.6 4.4  --    Chloride 103 104  --   --   --    CO2 27 28  --   --   --    Carbon Dioxide  --   --  29 26  --    BUN 16 13 13.3 15.3  --    Creatinine 0.9 0.7 0.81 0.96 0.8   Calcium 9.4 9.5 8.8 8.9  --      COAGS  Recent Labs   Lab 12/15/21  2159 09/20/22  1112   INR 1.0 0.9       Assessment  1. Thyroid nodule  - Cytology-FNA Non-Radiology Clinician Performed w/o on site    2. Sialolithiasis of submandibular gland       Plan:  Discussed plan of care with patient in detail and all questions answered. Patient reported understanding of plan of  care. I gave the patient the opportunity to ask questions and patient confirmed all questions answered to satisfaction.     Ct reviewed  Fna of nodule performed   Conservative measures discussed for possible sialolithiasis- pay attn to if worse with eating  Small dose of pain meds as patient had significant discomfort with procedure  Pt aware I am leaving country for 3 weeks  Will follow up with me upon return for results of fna  and next steps      Please be aware that this note has been generated with the assistance of [employer] voice-to-text.  Please excuse any spelling or grammatical errors.

## 2023-12-12 LAB
ADEQUACY: NORMAL
FINAL PATHOLOGIC DIAGNOSIS: NORMAL
Lab: NORMAL

## 2024-01-22 ENCOUNTER — OFFICE VISIT (OUTPATIENT)
Dept: OTOLARYNGOLOGY | Facility: CLINIC | Age: 59
End: 2024-01-22
Payer: MEDICARE

## 2024-01-22 VITALS — WEIGHT: 167 LBS | HEIGHT: 62 IN | BODY MASS INDEX: 30.73 KG/M2

## 2024-01-22 DIAGNOSIS — M26.623 BILATERAL TEMPOROMANDIBULAR JOINT PAIN: ICD-10-CM

## 2024-01-22 DIAGNOSIS — E04.1 THYROID NODULE: Primary | ICD-10-CM

## 2024-01-22 DIAGNOSIS — M54.2 NECK PAIN: ICD-10-CM

## 2024-01-22 PROCEDURE — 99213 OFFICE O/P EST LOW 20 MIN: CPT | Mod: S$GLB,,, | Performed by: OTOLARYNGOLOGY

## 2024-01-22 NOTE — PROGRESS NOTES
OTOLARYNGOLOGY CLINIC NOTE  Date:  2024     Chief complaint:  Chief Complaint   Patient presents with    Follow-up     For FNA done on 2023, thyroid nodule       History of Present Illness  Antoine Gonzáles is a 58 y.o. female  presenting today for a followup.    Still has pain along the neck   Has problems with shoulder too - has gotten cortisone shots   Saw neurologist and a surgery was done     She thinks he may grind teeth;  going to dentist lilliam        Past Medical History  Past Medical History:   Diagnosis Date    Anemia     Angio-edema     Depression     Diabetes mellitus type 2, noninsulin dependent 2016    Hematuria     Hx of essential hypertension     Hyperlipidemia     Hypertension, uncontrolled 2016    Nuclear sclerosis of both eyes 04/10/2017        Past Surgical History  Past Surgical History:   Procedure Laterality Date    BTL       SECTION      COLONOSCOPY N/A 2017    Procedure: COLONOSCOPY;  Surgeon: Oleg Enciso MD;  Location: [address]);  Service: Endoscopy;  Laterality: N/A;  CHRONIC CONSTIPATION S/P LRNY    COSMETIC SURGERY      Liposuction , back surgery  , buttock surgery     CYSTOSCOPY  2019    Procedure: CYSTOSCOPY;  Surgeon: ELIZABETH Mendoza MD;  Location: [facility] OR;  Service: Urology;;  PRE-OP BY RN 3-    ESOPHAGOGASTRODUODENOSCOPY N/A 2020    Procedure: EGD (ESOPHAGOGASTRODUODENOSCOPY);  Surgeon: Elias Harper MD;  Location: [address]);  Service: Endoscopy;  Laterality: N/A;  prep ins. emailed - ERW  COVID screening on 20 - ERW    GASTRIC BYPASS      sandra en y    HYSTEROSCOPY WITH DILATION AND CURETTAGE OF UTERUS N/A 2018    Procedure: HYSTEROSCOPY, WITH DILATION AND CURETTAGE OF UTERUS;  Surgeon: Naveed Alexander MD;  Location: [facility] OR;  Service: OB/GYN;  Laterality: N/A;  RN PREOP 2018    NECK SURGERY  2016    TOTAL REDUCTION MAMMOPLASTY  2007    TOTAL REPLACEMENT OF  CERVICAL INTERVERTEBRAL DISC N/A 9/26/2022    Procedure: REPLACEMENT, INTERVERTEBRAL DISC, CERVICAL, TOTAL C3/4 Phani neuro monitoring;  Surgeon: Mansi Saini MD;  Location: [facility] OR [address] [place] [address];  Service: Neurosurgery;  Laterality: N/A;  TORON[place] III, ASA III, BLOOD TYPE AND SCREEN, NEUROMONITORING EMG SEP MEP, BED REGULAR BED, HEADREST CASPAR, POSITION SUPINE, SPECIAL EQUIPMENT PHANI BIOMET, RADIOLOGY C-ARM        Medications  Current Outpatient Medications on File Prior to Visit   Medication Sig Dispense Refill    amLODIPine (NORVASC) 10 MG tablet TAKE 1 TABLET(10 MG) BY MOUTH EVERY DAY 90 tablet 2    hydrALAZINE (APRESOLINE) 10 MG tablet 1 tablet with food      hydroCHLOROthiazide (HYDRODIURIL) 12.5 MG Tab TAKE 1 TABLET DAILY WITH LOSARTAN      HYDROcodone-acetaminophen (NORCO) 5-325 mg per tablet Take 1 tablet by mouth every 6 (six) hours as needed for Pain. 5 tablet 0    hydrOXYzine pamoate (VISTARIL) 25 MG Cap Take 1 capsule (25 mg total) by mouth every 6 (six) hours as needed (itching). 10 capsule 0    LIDOcaine (LIDODERM) 5 % APPLY 1 PATCH TOPICALLY TO THE SKIN EVERY DAY. MAY WEAR UP TO 12 HOURS      losartan (COZAAR) 50 MG tablet TAKE 1 TABLET BY MOUTH EVERY DAY WITH HCTZ      magnesium 250 mg Tab 1 tablet with a meal      OZEMPIC 1 mg/dose (2 mg/1.5 mL) PnIj TAKES ON Monday MORNINGS.      TRULANCE 3 mg Tab Take 1 tablet by mouth.       No current facility-administered medications on file prior to visit.       Review of Systems  Review of Systems   Constitutional: Negative.    HENT: Negative.     Eyes: Negative.    Respiratory: Negative.     Cardiovascular: Negative.    Gastrointestinal:  Positive for abdominal pain.   Genitourinary: Negative.    Skin: Negative.    Neurological: Negative.    Psychiatric/Behavioral: Negative.        Answers submitted by the patient for this visit:  Review of Symptoms Questionnaire  (Submitted on 1/22/2024)  Muscle aches / pain?: Yes  Social History   reports that she has never  "smoked. She has never used smokeless tobacco. She reports that she does not drink alcohol and does not use drugs.     Family History  Family History   Problem Relation Age of Onset    Heart disease Mother     Diabetes type II Mother     Heart attack Father 50        Open heart surgery    Allergies Sister     No Known Problems Brother     No Known Problems Maternal Aunt     No Known Problems Maternal Uncle     No Known Problems Paternal Aunt     No Known Problems Paternal Uncle     No Known Problems Maternal Grandmother     No Known Problems Maternal Grandfather     No Known Problems Paternal Grandmother     No Known Problems Paternal Grandfather     Amblyopia Neg Hx     Blindness Neg Hx     Cancer Neg Hx     Cataracts Neg Hx     Diabetes Neg Hx     Glaucoma Neg Hx     Hypertension Neg Hx     Macular degeneration Neg Hx     Retinal detachment Neg Hx     Strabismus Neg Hx     Stroke Neg Hx     Thyroid disease Neg Hx         Physical Exam   There were no vitals filed for this visit. Body mass index is 30.54 kg/m².  Weight: 75.8 kg (167 lb)   Height: 5' 2" (157.5 cm)     GENERAL: no acute distress.  HEAD: normocephalic.   EYES: No scleral icterus  EARS: external ear without lesion, normal pinna shape and position.  External auditory canal with normal cerumen, tympanic membrane fully visible, no perforation , no retraction. No middle ear effusion. Ossicles intact.   NOSE: external nose without significant bony abnormality  ORAL CAVITY/OROPHARYNX: tongue mobile.   NECK: trachea midline. No mass  LYMPH NODES:No cervical lymphadenopathy.  RESPIRATORY: no stridor, no stertor. Voice normal. Respirations nonlabored.  NEURO: alert, responds to questions appropriately.    PSYCH:mood appropriate      Path   Right thyroid nodule; [place] II /benign     Imaging:  The patient does not have any new imaging of the head and neck since last visit.     Labs:  CBC  Recent Labs   Lab 09/20/22  1112 07/12/23  1512   WBC 6.24 3-5 "   Hemoglobin 12.1  --    Hematocrit 38.3  --    MCV 83  --    Platelets 324  --      BMP  Recent Labs   Lab 09/20/22  1112 04/26/23  0906 05/08/23  0026 11/30/23  1547   Glucose 109  --   --   --    Sodium 139 142 137  --    Potassium 4.1 3.6 4.4  --    Chloride 104  --   --   --    CO2 28  --   --   --    Carbon Dioxide  --  29 26  --    BUN 13 13.3 15.3  --    Creatinine 0.7 0.81 0.96 0.8   Calcium 9.5 8.8 8.9  --      COAGS  Recent Labs   Lab 12/15/21  2159 09/20/22  1112   INR 1.0 0.9       Assessment  1. Thyroid nodule    2. Neck pain    3. Bilateral temporomandibular joint pain       Plan:  Discussed plan of care with patient in detail and all questions answered. Patient reported understanding of plan of care. I gave the patient the opportunity to ask questions and patient confirmed all questions answered to satisfaction.     Talk to dentist about if thinks TMJ or grinding and about custom mouth guard. Discussed options for tmj treatment and exercises given. Notify me after sees dentist and can do acupuncture referral ; offered to do now she prefers to let me know     Reviewed path results    Will reach out to dr thomas about results of fna. Discussed for f/u ultrasound in 1 year or per endocrine     F/u 4 months to recheck regarding neck pain per patient preference.     I spent a total of 25 minutes on the day of the visit.  This includes face to face time and non-face to face time preparing to see the patient (eg, review of tests), obtaining and/or reviewing separately obtained history, documenting clinical information in the electronic or other health record, independently interpreting results and communicating results to the patient/family/caregiver, or care coordinator.   Please be aware that this note has been generated with the assistance of [employer] voice-to-text.  Please excuse any spelling or grammatical errors.

## 2024-03-18 ENCOUNTER — OFFICE VISIT (OUTPATIENT)
Dept: URGENT CARE | Facility: CLINIC | Age: 59
End: 2024-03-18
Payer: MEDICARE

## 2024-03-18 VITALS
TEMPERATURE: 98 F | RESPIRATION RATE: 17 BRPM | BODY MASS INDEX: 30.73 KG/M2 | DIASTOLIC BLOOD PRESSURE: 86 MMHG | HEART RATE: 87 BPM | WEIGHT: 167 LBS | HEIGHT: 62 IN | SYSTOLIC BLOOD PRESSURE: 136 MMHG | OXYGEN SATURATION: 99 %

## 2024-03-18 DIAGNOSIS — M54.41 ACUTE RIGHT-SIDED LOW BACK PAIN WITH RIGHT-SIDED SCIATICA: Primary | ICD-10-CM

## 2024-03-18 PROCEDURE — 99203 OFFICE O/P NEW LOW 30 MIN: CPT | Mod: 25,S$GLB,, | Performed by: NURSE PRACTITIONER

## 2024-03-18 RX ORDER — DEXAMETHASONE SODIUM PHOSPHATE 100 MG/10ML
6 INJECTION INTRAMUSCULAR; INTRAVENOUS
Status: COMPLETED | OUTPATIENT
Start: 2024-03-18 | End: 2024-03-18

## 2024-03-18 RX ORDER — CYCLOBENZAPRINE HCL 10 MG
10 TABLET ORAL NIGHTLY
COMMUNITY
Start: 2024-03-12 | End: 2024-04-01

## 2024-03-18 RX ADMIN — DEXAMETHASONE SODIUM PHOSPHATE 6 MG: 100 INJECTION INTRAMUSCULAR; INTRAVENOUS at 05:03

## 2024-03-18 NOTE — PATIENT INSTRUCTIONS
Advised to continue Flexeril 10 mg at bedtime.    Neurology referral in progress.    Keep appointment with Nephrology secondary to history of renal calculi.  Maintain hydration.    Alternate heat and ice for first 48 hours then  apply heat. You may do gently stretching if tolerable.    Moist warm compresss to area several times daily.  May use a heating pad on LOW to provide heat over a towel which was dampended with warm water. DO NOT FALL ASLEEP WITH HEATING PAD ON.  Do not stay in one position to long.  When sleeping on your back place a pillow under knees to reduce tension on back.  If sleeping on your side, place pillow between knees to keep spine in better alinement.  Wear supportive shoes such as tennis shoes for support of the lower back.  Take any medication as directed.    If you were not prescribed an anti-inflammatory medication, and if you do not have any history of stomach/intestinal ulcers, or kidney disease, or are not taking a blood thinner such as Coumadin, Plavix, Pradaxa, Eloquis, or Xaralta for example, it is OK to take over the counter Ibuprofen or Advil or Motrin or Aleve as directed.  Do not take these medications on an empty stomach.    If you were prescribed a narcotic medication, do not drive or operate heavy equipment or machinery while taking these medications.    If you lose control of your bowel and/or bladder, please go to the nearest Emergency Department immediately.  If you lose sensation in between your legs by your genitalia and/or rectum, please go to the nearest Emergency Department immediately.  If you lose control or sensation of any extremity, please go to the nearest Emergency Department immediately.       You must understand that you've received an Urgent Care treatment only and that you may be released before all your medical problems are known or treated. You, the patient, will arrange for follow up care as instructed.  Follow up with your PCP or specialty clinic as  directed in the next 1-2 weeks if not improved or as needed.  You can call [phone] to schedule an appointment with the appropriate provider.  If your condition worsens we recommend that you receive another evaluation at the emergency room immediately or contact your primary medical clinics after hours call service to discuss your concerns.  Please return here or go to the Emergency Department for any concerns or worsening of condition.    If you were prescribed a narcotic or controlled medication, do not drive or operate heavy equipment or machinery while taking these medications.    Thank you for choosing [facility]!    Our goal in the Urgent Care is to always provide outstanding medical care. You may receive a survey by mail or e-mail in the next week regarding your experience today. We would greatly appreciate you completing and returning the survey. Your feedback provides us with a way to recognize our staff who provide very good care, and it helps us learn how to improve when your experience was below our aspiration of excellence.      We appreciate you trusting us with your medical care. We hope you feel better soon. We will be happy to take care of you for all of your future medical needs.   This note was prepared using voice-recognition software.  Although efforts are made to proofread the note, some errors may persist in the final document.     Sincerely,    Antoine Cortez DNP, KRISTINAP-C

## 2024-03-18 NOTE — PROGRESS NOTES
"Subjective:      Patient ID: Antoine Gonzáles is a 59 y.o. female.    Vitals:  height is 5' 2" (1.575 m) and weight is 75.8 kg (167 lb). Her oral temperature is 98 °F (36.7 °C). Her blood pressure is 136/86 and her pulse is 87. Her respiration is 17 and oxygen saturation is 99%.     Chief Complaint: Back Pain    60 y/o female presents with a complaint of low back pain, right-sided.  Onset, approximately one month ago.  States right-sided  low back pain radiates to right lower leg with numbness and tingling.  History of renal calculi.  Recent CT of abdomen and pelvis  (3/4/24):   IMPRESSION:   1. Several punctate 1 to 2 mm nonoccluding calculus in the left kidney collecting system. Previously seen 7 mm and 2 to 3 mm calculi in left kidney are no longer present. No hyperdense ureterolithiasis or hydroureteronephrosis is present.  2. Unchanged 14 mm low-density cystic focus for medial cortex of left kidney.  3. The findings can be consistent with the clinical diagnosis of constipation.  4. Postsurgical changes, as above.  5. Unchanged peripherally calcified 15 mm focus in the right adnexa questionably in the right uterus versus ovary.    Urology visit 3/12/2024; - her CT scan was reviewd. No obstructing stones seen, believe source of pain to be musculoskeletal. She was advised to RTC PRN.  Lumbar spine x-ray: 12/22/2022 FINDINGS: No acute fractures.  Preserved vertebral body heights and pedicles.  Scattered lower thoracic more so than lumbar vertebral body anterolateral end plate osteophytes.  No spondylolysis.  Reconfirmed grade 1 anterolisthesis L4 on L5.  Moderate disc narrowing L4-L5 level.  Other disc space levels without obvious narrowing.  Multilevel facet arthropathic changes most prevalent about the lower 3 disc spaces.  Lumbar spine findings do not appear significantly changed to above mention exams.    Reports she was told several years ago regarding lumbar spine issues.  States she refused care secondary " to unsure of treatment plan.  Strongly advised on Neurology referral; she is amenable.        Back Pain  This is a new problem. The current episode started more than 1 month ago. The problem has been rapidly worsening since onset. The quality of the pain is described as aching. The pain radiates to the right thigh and right foot (right side middle part of back). The pain is at a severity of 9/10. The pain is severe. The symptoms are aggravated by bending. Associated symptoms include numbness. Pertinent negatives include no abdominal pain, bladder incontinence, bowel incontinence, chest pain, dysuria, fever, headaches, leg pain, paresis, paresthesias, pelvic pain, perianal numbness, tingling, weakness or weight loss. She has tried heat and home exercises for the symptoms. The treatment provided no relief.       Constitution: Negative for chills, fever and generalized weakness.   Cardiovascular:  Negative for chest pain.   Gastrointestinal:  Negative for abdominal pain, nausea, vomiting and bowel incontinence.   Genitourinary:  Negative for dysuria, bladder incontinence and pelvic pain.   Musculoskeletal:  Positive for pain, abnormal ROM of joint, arthritis, back pain and history of spine disorder. Negative for trauma, joint pain, joint swelling, gout, muscle cramps and muscle ache.   Neurological:  Positive for numbness. Negative for headaches, tingling and tremors.      Objective:     Physical Exam   Constitutional: She is oriented to person, place, and time. She appears well-developed.  Non-toxic appearance. She does not appear ill. No distress.   HENT:   Head: Normocephalic and atraumatic.   Ears:   Right Ear: External ear normal.   Left Ear: External ear normal.   Nose: Nose normal. No nasal deformity. No epistaxis.   Mouth/Throat: Oropharynx is clear and moist and mucous membranes are normal.   Eyes: Lids are normal.   Neck: Trachea normal and phonation normal. Neck supple.   Cardiovascular: Normal pulses.    Pulmonary/Chest: Effort normal.   Abdominal: Normal appearance and bowel sounds are normal. She exhibits no distension and no mass. Soft. There is no abdominal tenderness. There is no rebound, no guarding, no left CVA tenderness and no right CVA tenderness. No hernia.   Musculoskeletal:      Thoracic back: Normal.      Lumbar back: She exhibits decreased range of motion, tenderness and spasm. She exhibits no bony tenderness, no swelling, no edema, no deformity and no laceration.        Legs:       Comments: Positive straight-leg test, radiating pain to right foot   Neurological: She is alert and oriented to person, place, and time.   Skin: Skin is warm, dry, intact and not diaphoretic.   Psychiatric: Her speech is normal and behavior is normal.   Nursing note and vitals reviewed.    Assessment:     1. Acute right-sided low back pain with right-sided sciatica      Plan:   Acute right-sided low back pain with right-sided sciatica  -     Ambulatory referral/consult to Neurology  -     dexAMETHasone injection 6 mg    Advised to continue Flexeril 10 mg at bedtime.    Neurology referral in progress.    Keep appointment with Nephrology secondary to history of renal calculi.  Maintain hydration.    Alternate heat and ice for first 48 hours then  apply heat. You may do gently stretching if tolerable.    Moist warm compresss to area several times daily.  May use a heating pad on LOW to provide heat over a towel which was dampended with warm water. DO NOT FALL ASLEEP WITH HEATING PAD ON.  Do not stay in one position to long.  When sleeping on your back place a pillow under knees to reduce tension on back.  If sleeping on your side, place pillow between knees to keep spine in better alinement.  Wear supportive shoes such as tennis shoes for support of the lower back.  Take any medication as directed.    If you were not prescribed an anti-inflammatory medication, and if you do not have any history of stomach/intestinal ulcers,  or kidney disease, or are not taking a blood thinner such as Coumadin, Plavix, Pradaxa, Eloquis, or Xaralta for example, it is OK to take over the counter Ibuprofen or Advil or Motrin or Aleve as directed.  Do not take these medications on an empty stomach.    If you were prescribed a narcotic medication, do not drive or operate heavy equipment or machinery while taking these medications.    If you lose control of your bowel and/or bladder, please go to the nearest Emergency Department immediately.  If you lose sensation in between your legs by your genitalia and/or rectum, please go to the nearest Emergency Department immediately.  If you lose control or sensation of any extremity, please go to the nearest Emergency Department immediately.       You must understand that you've received an Urgent Care treatment only and that you may be released before all your medical problems are known or treated. You, the patient, will arrange for follow up care as instructed.  Follow up with your PCP or specialty clinic as directed in the next 1-2 weeks if not improved or as needed.  You can call [phone] to schedule an appointment with the appropriate provider.  If your condition worsens we recommend that you receive another evaluation at the emergency room immediately or contact your primary medical clinics after hours call service to discuss your concerns.  Please return here or go to the Emergency Department for any concerns or worsening of condition.    If you were prescribed a narcotic or controlled medication, do not drive or operate heavy equipment or machinery while taking these medications.    Thank you for choosing [facility] [place]!    Our goal in the Urgent Care is to always provide outstanding medical care. You may receive a survey by mail or e-mail in the next week regarding your experience today. We would greatly appreciate you completing and returning the survey. Your feedback provides us with a way to  recognize our staff who provide very good care, and it helps us learn how to improve when your experience was below our aspiration of excellence.      We appreciate you trusting us with your medical care. We hope you feel better soon. We will be happy to take care of you for all of your future medical needs.   This note was prepared using voice-recognition software.  Although efforts are made to proofread the note, some errors may persist in the final document.     Sincerely,    Antoine Cortez DNP, FNP-C

## 2024-03-20 ENCOUNTER — OFFICE VISIT (OUTPATIENT)
Dept: SPINE | Facility: CLINIC | Age: 59
End: 2024-03-20
Payer: MEDICARE

## 2024-03-20 ENCOUNTER — TELEPHONE (OUTPATIENT)
Dept: SPINE | Facility: CLINIC | Age: 59
End: 2024-03-20

## 2024-03-20 ENCOUNTER — TELEPHONE (OUTPATIENT)
Dept: ORTHOPEDICS | Facility: CLINIC | Age: 59
End: 2024-03-20
Payer: MEDICARE

## 2024-03-20 VITALS
HEIGHT: 62 IN | WEIGHT: 167.13 LBS | HEART RATE: 91 BPM | BODY MASS INDEX: 30.76 KG/M2 | DIASTOLIC BLOOD PRESSURE: 56 MMHG | OXYGEN SATURATION: 100 % | SYSTOLIC BLOOD PRESSURE: 117 MMHG | RESPIRATION RATE: 12 BRPM

## 2024-03-20 DIAGNOSIS — M47.816 SPONDYLOSIS OF LUMBAR REGION WITHOUT MYELOPATHY OR RADICULOPATHY: ICD-10-CM

## 2024-03-20 DIAGNOSIS — M48.07 SPINAL STENOSIS, LUMBOSACRAL REGION: ICD-10-CM

## 2024-03-20 DIAGNOSIS — M54.9 DORSALGIA, UNSPECIFIED: Primary | ICD-10-CM

## 2024-03-20 DIAGNOSIS — M51.36 DDD (DEGENERATIVE DISC DISEASE), LUMBAR: ICD-10-CM

## 2024-03-20 PROCEDURE — 99204 OFFICE O/P NEW MOD 45 MIN: CPT | Mod: S$GLB,,, | Performed by: NURSE PRACTITIONER

## 2024-03-20 PROCEDURE — 99999 PR PBB SHADOW E&M-EST. PATIENT-LVL IV: CPT | Mod: PBBFAC,,, | Performed by: NURSE PRACTITIONER

## 2024-03-20 NOTE — TELEPHONE ENCOUNTER
Staff spoke with the patient regarding scheduling MRI, patient informed staff that she would like to have her MRI scheduled on 04/02/24 rescheduled to an earlier time. Staff scheduled MRI on 03/30/24 for 2 PM.

## 2024-03-20 NOTE — PROGRESS NOTES
Subjective:     Patient ID: Antoine Gonzáles is a 59 y.o. female.    Chief Complaint: Low-back Pain    HPI Ms. Gonzáles is a 59 year old female here for evaluation of low back pain.     Complaints of chronic low back pain, feels her pain is worsening  Pain radiates down the right posterior leg stopping above the knee, describes as electric shock sensation  Went to ED and urgent Care, prescribed Flexeril and Norco with no benefit  Did PT in the past with no benefit    Lumbar xray     FINDINGS:  Bones are fairly well mineralized.  11 mm calcific density left paraspinal region may be in the kidney.  Calcification in the pelvis somewhat amorphous though similar.  Grade 2 anterolisthesis L4 on L5.  Significant facet arthropathy.  Disc space narrowing at the L4-5 level similar.  Disc space narrowing T11-T12.     Impression:     Degenerative change particularly at L4-5 with grade 2 anterolisthesis and significant facet arthropathy though similar.    Past Medical History:   Diagnosis Date    Anemia     Angio-edema     Depression     Diabetes mellitus type 2, noninsulin dependent 2016    Hematuria     Hx of essential hypertension     Hyperlipidemia     Hypertension, uncontrolled 2016    Nuclear sclerosis of both eyes 04/10/2017       Past Surgical History:   Procedure Laterality Date    BTL       SECTION      COLONOSCOPY N/A 2017    Procedure: COLONOSCOPY;  Surgeon: Oleg Enciso MD;  Location: [facility] ([address] [place] [address]);  Service: Endoscopy;  Laterality: N/A;  CHRONIC CONSTIPATION S/P LRNY    COSMETIC SURGERY      Liposuction , back surgery  , buttock surgery     CYSTOSCOPY  2019    Procedure: CYSTOSCOPY;  Surgeon: ELIZABETH Mendoza MD;  Location: [facility] OR;  Service: Urology;;  PRE-OP BY RN 3-    ESOPHAGOGASTRODUODENOSCOPY N/A 2020    Procedure: EGD (ESOPHAGOGASTRODUODENOSCOPY);  Surgeon: Elias Harper MD;  Location: [facility] ([address] [place] [address]);  Service: Endoscopy;   Laterality: N/A;  prep ins. emailed - ERW  COVID screening on 6/26/20 - ERW    GASTRIC BYPASS  1995    sandra en y    HYSTEROSCOPY WITH DILATION AND CURETTAGE OF UTERUS N/A 09/28/2018    Procedure: HYSTEROSCOPY, WITH DILATION AND CURETTAGE OF UTERUS;  Surgeon: Naveed Alexander MD;  Location: [facility] OR;  Service: OB/GYN;  Laterality: N/A;  RN PREOP 9/26/2018    NECK SURGERY  09/30/2016    TOTAL REDUCTION MAMMOPLASTY  2007    TOTAL REPLACEMENT OF CERVICAL INTERVERTEBRAL DISC N/A 9/26/2022    Procedure: REPLACEMENT, INTERVERTEBRAL DISC, CERVICAL, TOTAL C3/4 Phani neuro monitoring;  Surgeon: Mansi Saini MD;  Location: [facility] OR [address] [place] [address];  Service: Neurosurgery;  Laterality: N/A;  TORONTO III, ASA III, BLOOD TYPE AND SCREEN, NEUROMONITORING EMG SEP MEP, BED REGULAR BED, HEADREST CASPAR, POSITION SUPINE, SPECIAL EQUIPMENT PHANI BIOMET, RADIOLOGY C-ARM       Family History   Problem Relation Age of Onset    Heart disease Mother     Diabetes type II Mother     Heart attack Father 50        Open heart surgery    Allergies Sister     No Known Problems Brother     No Known Problems Maternal Aunt     No Known Problems Maternal Uncle     No Known Problems Paternal Aunt     No Known Problems Paternal Uncle     No Known Problems Maternal Grandmother     No Known Problems Maternal Grandfather     No Known Problems Paternal Grandmother     No Known Problems Paternal Grandfather     Amblyopia Neg Hx     Blindness Neg Hx     Cancer Neg Hx     Cataracts Neg Hx     Diabetes Neg Hx     Glaucoma Neg Hx     Hypertension Neg Hx     Macular degeneration Neg Hx     Retinal detachment Neg Hx     Strabismus Neg Hx     Stroke Neg Hx     Thyroid disease Neg Hx        Social History     Socioeconomic History    Marital status: Single   Tobacco Use    Smoking status: Never     Passive exposure: Never    Smokeless tobacco: Never   Substance and Sexual Activity    Alcohol use: No     Comment: one drink in a few months    Drug use: No    Sexual activity:  Yes     Partners: Male     Social Determinants of Health     Financial Resource Strain: Low Risk  (11/28/2023)    Overall Financial Resource Strain (CARDIA)     Difficulty of Paying Living Expenses: Not hard at all   Food Insecurity: No Food Insecurity (11/28/2023)    Hunger Vital Sign     Worried About Running Out of Food in the Last Year: Never true     Ran Out of Food in the Last Year: Never true   Transportation Needs: No Transportation Needs (11/28/2023)    PRAPARE - Transportation     Lack of Transportation (Medical): No     Lack of Transportation (Non-Medical): No   Physical Activity: Unknown (11/28/2023)    Exercise Vital Sign     Days of Exercise per Week: 3 days   Stress: No Stress Concern Present (11/28/2023)    [employer] [place] [employer] - Occupational Stress Questionnaire     Feeling of Stress : Not at all   Social Connections: Unknown (11/28/2023)    Social Connection and Isolation Panel [NHANES]     Frequency of Communication with Friends and Family: More than three times a week     Frequency of Social Gatherings with Friends and Family: More than three times a week     Active Member of Clubs or Organizations: Yes     Attends Club or Organization Meetings: More than 4 times per year     Marital Status:    Housing Stability: Unknown (11/28/2023)    Housing Stability Vital Sign     Unable to Pay for Housing in the Last Year: No     Unstable Housing in the Last Year: No       Current Outpatient Medications   Medication Sig Dispense Refill    amLODIPine (NORVASC) 10 MG tablet TAKE 1 TABLET(10 MG) BY MOUTH EVERY DAY 90 tablet 2    cyclobenzaprine (FLEXERIL) 10 MG tablet Take 10 mg by mouth every evening.      hydrALAZINE (APRESOLINE) 10 MG tablet 1 tablet with food      hydroCHLOROthiazide (HYDRODIURIL) 12.5 MG Tab TAKE 1 TABLET DAILY WITH LOSARTAN      HYDROcodone-acetaminophen (NORCO) 5-325 mg per tablet Take 1 tablet by mouth every 6 (six) hours as needed for Pain. 5 tablet 0     hydrOXYzine pamoate (VISTARIL) 25 MG Cap Take 1 capsule (25 mg total) by mouth every 6 (six) hours as needed (itching). 10 capsule 0    LIDOcaine (LIDODERM) 5 % APPLY 1 PATCH TOPICALLY TO THE SKIN EVERY DAY. MAY WEAR UP TO 12 HOURS      losartan (COZAAR) 50 MG tablet TAKE 1 TABLET BY MOUTH EVERY DAY WITH HCTZ      magnesium 250 mg Tab 1 tablet with a meal      OZEMPIC 1 mg/dose (2 mg/1.5 mL) PnIj TAKES ON Monday MORNINGS.      TRULANCE 3 mg Tab Take 1 tablet by mouth.       No current facility-administered medications for this visit.       Review of patient's allergies indicates:   Allergen Reactions    Lisinopril-hydrochlorothiazide Swelling     Facial swelling/ mouth swelling  She can tolerate hydrochlorothiazide    Augmentin [amoxicillin-pot clavulanate] Itching    Lisinopril Other (See Comments)         Review of Systems   Constitutional: Negative for fever.   Cardiovascular:  Negative for chest pain.   Respiratory:  Negative for shortness of breath.    Musculoskeletal:  Positive for back pain.   Gastrointestinal:  Negative for bowel incontinence.   Genitourinary:  Negative for bladder incontinence.   Neurological:  Negative for numbness and paresthesias.        Objective:     General: Anotine is well-developed, well-nourished, appears stated age, in no acute distress, alert and oriented to time, place and person.     General    Vitals reviewed.  Constitutional: She is oriented to person, place, and time. She appears well-developed and well-nourished.   HENT:   Head: Atraumatic.   Nose: Nose normal.   Eyes: Conjunctivae are normal.   Cardiovascular:  Normal rate.            Pulmonary/Chest: Effort normal.   Neurological: She is alert and oriented to person, place, and time.   Psychiatric: She has a normal mood and affect. Her behavior is normal. Judgment and thought content normal.     General Musculoskeletal Exam   Gait: normal     Back (L-Spine & T-Spine) / Neck (C-Spine) Exam     Back (L-Spine & T-Spine)  Range of Motion   Extension:  10   Flexion:  90     Spinal Sensation   Right Side Sensation  L-Spine Level: normal  S-Spine Level: normal  Left Side Sensation  L-Spine Level: normal  S-Spine Level: normal    Other   She has no scoliosis .  Spinal Kyphosis:  Absent      Muscle Strength   Right Upper Extremity   Biceps: 5/5   Deltoid:  5/5  Triceps:  5/5  Left Upper Extremity  Biceps: 5/5   Deltoid:  5/5  Triceps:  5/5  Right Lower Extremity   Hip Flexion: 5/5   Quadriceps:  5/5   Anterior tibial:  5/5   EHL:  5/5  Left Lower Extremity   Hip Flexion: 5/5   Quadriceps:  5/5   Anterior tibial:  5/5   EHL:  5/5    Reflexes     Left Side  Biceps:  2+  Brachioradialis:  2+  Achilles:  2+    Right Side   Biceps:  2+  Brachioradialis:  2+  Achilles:  2+    Vascular Exam     Right Pulses        Carotid:                  2+    Left Pulses        Carotid:                  2+          Assessment:     1. Dorsalgia, unspecified    2. Spinal stenosis, lumbosacral region    3. DDD (degenerative disc disease), lumbar    4. Spondylosis of lumbar region without myelopathy or radiculopathy         Plan:        Prior records reviewed today  We discussed back pain and the nature of back pain.  We discussed that it is not one thing that causes the pain but an accumulation of multiple things that we do.    Order lumbar MRI to evaluate worsening back and leg pain  Referral to pain clinic to discuss injections.   We discussed posture sitting and the importance of trying to sit better.    We discussed the benefits of therapy and exercise and continuing to move.     More than 50% of the total time  of 45 minutes was spent face to face in counseling on diagnosis and treatment options. I also counseled patient  on common and most usual side effect of prescribed medications.  I reviewed Primary care , and other specialty's notes to better coordinate patient's care. All questions were answered, and patient voiced understanding.      Follow-up: No  follow-ups on file. If there are any questions prior to this, the patient was instructed to contact the office.

## 2024-03-20 NOTE — TELEPHONE ENCOUNTER
----- Message from Jennifer Garza MA sent at 3/20/2024 12:26 PM CDT -----  Name of Who is Calling:TICO READ [[id number]]                What is the request in detail: Pt is requesting a order for a MRI for lower back pain and would like to be called to get scheduled when done. Please assist.                Can the clinic reply by [facility]: No                What Number to Call Back [facility]: [phone]

## 2024-05-24 ENCOUNTER — TELEPHONE (OUTPATIENT)
Dept: OTOLARYNGOLOGY | Facility: CLINIC | Age: 59
End: 2024-05-24
Payer: MEDICARE

## 2024-05-24 NOTE — TELEPHONE ENCOUNTER
----- Message from Miguel Aparicio sent at 5/23/2024  1:28 PM CDT -----  Type: Patient Call Back         Who called: TICO READ [[id number]]         What is the request in detail: Pt called to get reschedule and would like to know if she can get in sooner than Oct 3rd. Please advise          Can the clinic reply by [facility]? No         Would the patient rather a call back or a response via [facility]? Call back         Best call back number: Telephone Information:  Mobile          [phone]             Additional Information:         Thank you.

## 2024-05-24 NOTE — TELEPHONE ENCOUNTER
Pt was looking to get an earlier appt but  is booking out until October pt is placed on waiting list

## 2024-06-18 ENCOUNTER — TELEPHONE (OUTPATIENT)
Dept: OTOLARYNGOLOGY | Facility: CLINIC | Age: 59
End: 2024-06-18
Payer: MEDICARE

## 2024-06-18 NOTE — TELEPHONE ENCOUNTER
Pt called to see about getting in sooner because she is having some burning right under skin by chin, dont happen all the time but does happen everyday, comes then goes away, informed no sooner appts at this time, is already on waiting list, pt cancelled May appt, can go see her pcp to see if she would need to see another specialist for this issue until her appt here. Pt verb understanding

## 2024-06-18 NOTE — TELEPHONE ENCOUNTER
----- Message from Joshua Mccormack sent at 6/18/2024 12:25 PM CDT -----  Type:  Sooner Appointment Request    Patient is requesting a sooner appointment.  Patient declined first available appointment listed as well as another facility and provider .  Patient will not accept being placed on the waitlist and is requesting a message be sent to doctor.    Name of Caller: Self    When is the first available appointment? Oct    Symptoms: Neck    Would the patient rather a call back or a response via [facility]? Call    Best Call Back Number: [phone] (home)       Additional Information:

## 2024-06-30 ENCOUNTER — HOSPITAL ENCOUNTER (EMERGENCY)
Facility: HOSPITAL | Age: 59
Discharge: HOME | End: 2024-06-30
Attending: EMERGENCY MEDICINE
Payer: MEDICARE

## 2024-06-30 VITALS
BODY MASS INDEX: 30.91 KG/M2 | HEART RATE: 90 BPM | HEIGHT: 62 IN | TEMPERATURE: 98 F | SYSTOLIC BLOOD PRESSURE: 135 MMHG | RESPIRATION RATE: 18 BRPM | OXYGEN SATURATION: 100 % | WEIGHT: 168 LBS | DIASTOLIC BLOOD PRESSURE: 71 MMHG

## 2024-06-30 DIAGNOSIS — Z11.52 ENCOUNTER FOR SCREENING FOR COVID-19: ICD-10-CM

## 2024-06-30 DIAGNOSIS — J06.9 VIRAL URI WITH COUGH: Primary | ICD-10-CM

## 2024-06-30 DIAGNOSIS — U07.1 COVID-19 VIRUS DETECTED: ICD-10-CM

## 2024-06-30 LAB — SARS-COV-2 RDRP RESP QL NAA+PROBE: POSITIVE

## 2024-06-30 PROCEDURE — 25000003 PHARM REV CODE 250: Performed by: PHYSICIAN ASSISTANT

## 2024-06-30 PROCEDURE — 99283 EMERGENCY DEPT VISIT LOW MDM: CPT

## 2024-06-30 PROCEDURE — U0002 COVID-19 LAB TEST NON-CDC: HCPCS | Performed by: PHYSICIAN ASSISTANT

## 2024-06-30 RX ORDER — GUAIFENESIN AND DEXTROMETHORPHAN HYDROBROMIDE 10; 100 MG/5ML; MG/5ML
10 SYRUP ORAL
Status: COMPLETED | OUTPATIENT
Start: 2024-06-30 | End: 2024-06-30

## 2024-06-30 RX ORDER — ACETAMINOPHEN 325 MG/1
650 TABLET ORAL
Status: COMPLETED | OUTPATIENT
Start: 2024-06-30 | End: 2024-06-30

## 2024-06-30 RX ORDER — BENZONATATE 100 MG/1
100 CAPSULE ORAL 3 TIMES DAILY PRN
Qty: 20 CAPSULE | Refills: 0 | Status: SHIPPED | OUTPATIENT
Start: 2024-06-30 | End: 2024-07-10

## 2024-06-30 RX ADMIN — ACETAMINOPHEN 650 MG: 325 TABLET ORAL at 02:06

## 2024-06-30 RX ADMIN — GUAIFENESIN AND DEXTROMETHORPHAN 10 ML: 100; 10 SYRUP ORAL at 02:06

## 2024-06-30 NOTE — ED PROVIDER NOTES
Encounter Date: 2024       History     Chief Complaint   Patient presents with    Cough     Cough and body aches since yesterday      59-year-old female with past medical history of DM T2, hypertension, hyperlipidemia, gastric bypass who presents emergency department for URI symptoms.  Reports cough, body aches, sore throat since yesterday.  States her co-worker recently tested positive for COVID-19.        Review of patient's allergies indicates:   Allergen Reactions    Lisinopril-hydrochlorothiazide Swelling     Facial swelling/ mouth swelling  She can tolerate hydrochlorothiazide    Augmentin [amoxicillin-pot clavulanate] Itching    Lisinopril Other (See Comments)     Past Medical History:   Diagnosis Date    Anemia     Angio-edema     Depression     Diabetes mellitus type 2, noninsulin dependent 2016    Hematuria     Hx of essential hypertension     Hyperlipidemia     Hypertension, uncontrolled 2016    Nuclear sclerosis of both eyes 04/10/2017     Past Surgical History:   Procedure Laterality Date    BTL       SECTION      COLONOSCOPY N/A 2017    Procedure: COLONOSCOPY;  Surgeon: Oleg Enciso MD;  Location: [address]);  Service: Endoscopy;  Laterality: N/A;  CHRONIC CONSTIPATION S/P LRNY    COSMETIC SURGERY      Liposuction , back surgery  , buttock surgery     CYSTOSCOPY  2019    Procedure: CYSTOSCOPY;  Surgeon: ELIZABETH Mendoza MD;  Location: [facility] OR;  Service: Urology;;  PRE-OP BY RN 3-    ESOPHAGOGASTRODUODENOSCOPY N/A 2020    Procedure: EGD (ESOPHAGOGASTRODUODENOSCOPY);  Surgeon: Elias Harper MD;  Location: [address]);  Service: Endoscopy;  Laterality: N/A;  prep ins. emailed - ERW  COVID screening on 20 - ERW    GASTRIC BYPASS      sandra en y    HYSTEROSCOPY WITH DILATION AND CURETTAGE OF UTERUS N/A 2018    Procedure: HYSTEROSCOPY, WITH DILATION AND CURETTAGE OF UTERUS;  Surgeon: Naveed Alexander MD;   Location: [facility] OR;  Service: OB/GYN;  Laterality: N/A;  RN PREOP 9/26/2018    NECK SURGERY  09/30/2016    TOTAL REDUCTION MAMMOPLASTY  2007    TOTAL REPLACEMENT OF CERVICAL INTERVERTEBRAL DISC N/A 9/26/2022    Procedure: REPLACEMENT, INTERVERTEBRAL DISC, CERVICAL, TOTAL C3/4 Phani neuro monitoring;  Surgeon: Mansi Saini MD;  [facility];  Service: Neurosurgery;  Laterality: N/A;  TORONTO III, ASA III, BLOOD TYPE AND SCREEN, NEUROMONITORING EMG SEP MEP, BED REGULAR BED, HEADREST CASPAR, POSITION SUPINE, SPECIAL EQUIPMENT PHANI BIOMET, RADIOLOGY C-ARM     Family History   Problem Relation Name Age of Onset    Heart disease Mother      Diabetes type II Mother      Heart attack Father  50        Open heart surgery    Allergies Sister      No Known Problems Brother      No Known Problems Maternal Aunt      No Known Problems Maternal Uncle      No Known Problems Paternal Aunt      No Known Problems Paternal Uncle      No Known Problems Maternal Grandmother      No Known Problems Maternal Grandfather      No Known Problems Paternal Grandmother      No Known Problems Paternal Grandfather      Amblyopia Neg Hx      Blindness Neg Hx      Cancer Neg Hx      Cataracts Neg Hx      Diabetes Neg Hx      Glaucoma Neg Hx      Hypertension Neg Hx      Macular degeneration Neg Hx      Retinal detachment Neg Hx      Strabismus Neg Hx      Stroke Neg Hx      Thyroid disease Neg Hx       Social History     Tobacco Use    Smoking status: Never     Passive exposure: Never    Smokeless tobacco: Never   Substance Use Topics    Alcohol use: No     Comment: one drink in a few months    Drug use: No     Review of Systems   Constitutional:  Positive for chills. Negative for fever.   HENT:  Negative for sore throat.    Respiratory:  Positive for cough. Negative for shortness of breath.    Cardiovascular:  Negative for chest pain.   Gastrointestinal:  Negative for abdominal pain.   Genitourinary:  Negative for difficulty  urinating and dysuria.   Musculoskeletal:  Positive for myalgias.   Skin: Negative.    Neurological:  Negative for weakness.       Physical Exam     Initial Vitals [06/30/24 1250]   BP Pulse Resp Temp SpO2   135/71 90 18 98.2 °F (36.8 °C) 100 %      MAP       --         Physical Exam    Nursing note and vitals reviewed.  Constitutional: She appears well-developed and well-nourished.   HENT:   Head: Normocephalic and atraumatic.   Eyes: Conjunctivae are normal.   Neck: Neck supple.   Normal range of motion.  Cardiovascular:  Normal rate.           Pulmonary/Chest: Breath sounds normal.   Abdominal: Abdomen is soft. There is no abdominal tenderness.   Musculoskeletal:         General: Normal range of motion.      Cervical back: Normal range of motion and neck supple.     Neurological: She is alert and oriented to person, place, and time. GCS score is 15. GCS eye subscore is 4. GCS verbal subscore is 5. GCS motor subscore is 6.   Skin: Skin is warm and dry. Capillary refill takes less than 2 seconds.         ED Course   Procedures  Labs Reviewed   SARS-COV-2 RNA AMPLIFICATION, QUAL          Imaging Results    None          Medications   acetaminophen tablet 650 mg (has no administration in time range)   dextromethorphan-guaiFENesin  mg/5 ml liquid 10 mL (has no administration in time range)     Medical Decision Making  59-year-old female presents ED for URI symptoms x1 day.      Differential includes but not limited to COVID-19, pneumonia, viral syndrome    On exam patient is well-appearing.  Lungs clear to auscultation bilaterally.  Vital signs reassuring with no tachycardic, tachypnea or hypoxia.  Low suspicion for pneumonia.  Given the recent COVID-19 exposure high likelihood for COVID-19 will obtain a swab.  Will discharge home with quarantine precautions and Tessalon Perles for cough.  Patient states she would rather follow-up with her results with the patient portal.  Discussed return ED  precautions.    Risk  OTC drugs.                                      Clinical Impression:  Final diagnoses:  [J06.9] Viral URI with cough (Primary)  [Z11.52] Encounter for screening for COVID-19          ED Disposition Condition    Discharge Stable          ED Prescriptions       Medication Sig Dispense Start Date End Date Auth. Provider    benzonatate (TESSALON) 100 MG capsule Take 1 capsule (100 mg total) by mouth 3 (three) times daily as needed for Cough. 20 capsule 6/30/2024 7/10/2024 Jhon Donnelly PA-C          Follow-up Information       Follow up With Specialties Details Why Contact Info    Blayne Borden, PT Physical Therapy Schedule an appointment as soon as possible for a visit   [address] [facility] [address]  [phone]               Jhon Donnelly PA-C  06/30/24 [id number]

## 2024-06-30 NOTE — ED TRIAGE NOTES
Pt complains of cough and body aches yesterday  Pt had a co-worker came back to work  two days ago after being out with covid

## 2024-06-30 NOTE — ED NOTES
Pt identifiers Antoine Gonzáles were checked and are correct  LOC: The patient is awake, alert, aware of environment with an appropriate affect. Oriented x4, speaking appropriately  APPEARANCE: Pt rates all over body aches an 8/10 , in no acute distress, pt is clean and well groomed, clothing properly fastened  SKIN: Skin warm, dry and intact, normal skin turgor, moist mucus membranes  RESPIRATORY: Airway is open and patent, respirations are spontaneous, even and unlabored, normal effort and rate Crackles auscultated to lower lung fields   CARDIAC: Normal rate and rhythm, no peripheral edema noted, capillary refill < 3 seconds, bilateral radial pulses 2+  ABDOMEN: Soft, nontender, nondistended. Bowel sounds present   NEUROLOGIC: PERRL, facial expression is symmetrical, patient moving all extremities spontaneously, normal sensation in all extremities when touched with a finger.  Follows all commands appropriately  MUSCULOSKELETAL: No obvious deformities.

## 2024-10-03 ENCOUNTER — OFFICE VISIT (OUTPATIENT)
Dept: OTOLARYNGOLOGY | Facility: CLINIC | Age: 59
End: 2024-10-03
Payer: MEDICARE

## 2024-10-03 VITALS
DIASTOLIC BLOOD PRESSURE: 78 MMHG | HEIGHT: 62 IN | SYSTOLIC BLOOD PRESSURE: 128 MMHG | WEIGHT: 168 LBS | BODY MASS INDEX: 30.91 KG/M2

## 2024-10-03 DIAGNOSIS — J34.3 HYPERTROPHY OF INFERIOR NASAL TURBINATE: ICD-10-CM

## 2024-10-03 DIAGNOSIS — J30.89 CHRONIC NONSEASONAL ALLERGIC RHINITIS DUE TO POLLEN: ICD-10-CM

## 2024-10-03 DIAGNOSIS — E04.1 THYROID NODULE: Primary | ICD-10-CM

## 2024-10-03 DIAGNOSIS — R49.0 DYSPHONIA: ICD-10-CM

## 2024-10-03 DIAGNOSIS — R13.10 DYSPHAGIA, UNSPECIFIED TYPE: ICD-10-CM

## 2024-10-03 PROCEDURE — 3008F BODY MASS INDEX DOCD: CPT | Mod: CPTII,S$GLB,, | Performed by: OTOLARYNGOLOGY

## 2024-10-03 PROCEDURE — 3074F SYST BP LT 130 MM HG: CPT | Mod: CPTII,S$GLB,, | Performed by: OTOLARYNGOLOGY

## 2024-10-03 PROCEDURE — 99214 OFFICE O/P EST MOD 30 MIN: CPT | Mod: 25,S$GLB,, | Performed by: OTOLARYNGOLOGY

## 2024-10-03 PROCEDURE — 1159F MED LIST DOCD IN RCRD: CPT | Mod: CPTII,S$GLB,, | Performed by: OTOLARYNGOLOGY

## 2024-10-03 PROCEDURE — 3078F DIAST BP <80 MM HG: CPT | Mod: CPTII,S$GLB,, | Performed by: OTOLARYNGOLOGY

## 2024-10-03 PROCEDURE — 4010F ACE/ARB THERAPY RXD/TAKEN: CPT | Mod: CPTII,S$GLB,, | Performed by: OTOLARYNGOLOGY

## 2024-10-03 PROCEDURE — 31575 DIAGNOSTIC LARYNGOSCOPY: CPT | Mod: S$GLB,,, | Performed by: OTOLARYNGOLOGY

## 2024-10-03 RX ORDER — AZELASTINE 1 MG/ML
1 SPRAY, METERED NASAL 2 TIMES DAILY
Qty: 30 ML | Refills: 3 | Status: SHIPPED | OUTPATIENT
Start: 2024-10-03

## 2024-10-03 RX ORDER — FLUTICASONE PROPIONATE 50 MCG
2 SPRAY, SUSPENSION (ML) NASAL 2 TIMES DAILY
Qty: 18.2 ML | Refills: 3 | Status: SHIPPED | OUTPATIENT
Start: 2024-10-03

## 2024-10-03 NOTE — PROGRESS NOTES
OTOLARYNGOLOGY CLINIC NOTE  Date:  10/03/2024     Chief complaint:  Chief Complaint   Patient presents with    Thyroid Nodule     4 month follow up for neck pain        History of Present Illness  Antoine Gonzáles is a 59 y.o. female  presenting today for a followup.  She is concerned about nodule - I performed fna of right thyroid nodule in  and path was benign.   Has some issues with swallwoing spaghetti and pulse has been for years does not think worse ;Spaghetti feels like stuck in throat but pill feels like it gets stuck in chest area   No history of smoking     Thinks she has seen a gi doctor  She does not think swallowing worse since seen in 2023 with flex scope     Voice deeper and rough sounding told she sounds like a man that is new     I last saw the patient on . Below text is copied from  note on that date describing history of present illness at that time :  Still has pain along the neck   Has problems with shoulder too - has gotten cortisone shots   Saw neurologist and a surgery was done      She thinks he may grind teeth;  going to dentist lilliam    Past Medical History  Past Medical History:   Diagnosis Date    Anemia     Angio-edema     Depression     Diabetes mellitus type 2, noninsulin dependent 2016    Hematuria     Hx of essential hypertension     Hyperlipidemia     Hypertension, uncontrolled 2016    Nuclear sclerosis of both eyes 04/10/2017        Past Surgical History  Past Surgical History:   Procedure Laterality Date    BTL       SECTION      COLONOSCOPY N/A 2017    Procedure: COLONOSCOPY;  Surgeon: Oleg Enciso MD;  Location: [address];  Service: Endoscopy;  Laterality: N/A;  CHRONIC CONSTIPATION S/P LRNY    COSMETIC SURGERY      Liposuction , back surgery  , buttock surgery     CYSTOSCOPY  2019    Procedure: CYSTOSCOPY;  Surgeon: ELIZABETH Mendoza MD;  Location: [facility];  Service: Urology;;  PRE-OP BY RN  3-    ESOPHAGOGASTRODUODENOSCOPY N/A 06/29/2020    Procedure: EGD (ESOPHAGOGASTRODUODENOSCOPY);  Surgeon: Elias Harper MD;  Location: [facility] ENDO (4TH FLR);  Service: Endoscopy;  Laterality: N/A;  prep ins. emailed - ERW  COVID screening on 6/26/20 - ERW    GASTRIC BYPASS  1995    sandra en y    HYSTEROSCOPY WITH DILATION AND CURETTAGE OF UTERUS N/A 09/28/2018    Procedure: HYSTEROSCOPY, WITH DILATION AND CURETTAGE OF UTERUS;  Surgeon: Naveed Alexander MD;  Location: [facility] OR;  Service: OB/GYN;  Laterality: N/A;  RN PREOP 9/26/2018    NECK SURGERY  09/30/2016    TOTAL REDUCTION MAMMOPLASTY  2007    TOTAL REPLACEMENT OF CERVICAL INTERVERTEBRAL DISC N/A 9/26/2022    Procedure: REPLACEMENT, INTERVERTEBRAL DISC, CERVICAL, TOTAL C3/4 Phani neuro monitoring;  Surgeon: Mansi Saini MD;  Location: [facility] OR 2ND FLR;  Service: Neurosurgery;  Laterality: N/A;  TORONTO III, ASA III, BLOOD TYPE AND SCREEN, NEUROMONITORING EMG SEP MEP, BED REGULAR BED, HEADREST CASPAR, POSITION SUPINE, SPECIAL EQUIPMENT PHANI BIOMET, RADIOLOGY C-ARM        Medications  Current Outpatient Medications on File Prior to Visit   Medication Sig Dispense Refill    amLODIPine (NORVASC) 10 MG tablet TAKE 1 TABLET(10 MG) BY MOUTH EVERY DAY 90 tablet 2    hydrALAZINE (APRESOLINE) 10 MG tablet 3 (three) times daily.      hydroCHLOROthiazide (HYDRODIURIL) 12.5 MG Tab TAKE 1 TABLET DAILY WITH LOSARTAN      HYDROcodone-acetaminophen (NORCO) 5-325 mg per tablet Take 1 tablet by mouth every 6 (six) hours as needed for Pain. 5 tablet 0    hydrOXYzine pamoate (VISTARIL) 25 MG Cap Take 1 capsule (25 mg total) by mouth every 6 (six) hours as needed (itching). 10 capsule 0    LIDOcaine (LIDODERM) 5 % APPLY 1 PATCH TOPICALLY TO THE SKIN EVERY DAY. MAY WEAR UP TO 12 HOURS      losartan (COZAAR) 50 MG tablet TAKE 1 TABLET BY MOUTH EVERY DAY WITH HCTZ      magnesium 250 mg Tab 1 tablet with a meal      OZEMPIC 1 mg/dose (2 mg/1.5 mL) PnIj TAKES ON Monday MORNINGS.       "TRULANCE 3 mg Tab Take 1 tablet by mouth.       No current facility-administered medications on file prior to visit.       Review of Systems  Review of Systems   Constitutional:  Negative for fever.   HENT:  Negative for sore throat.    Respiratory:  Negative for hemoptysis.    Gastrointestinal:  Negative for heartburn.   Musculoskeletal:  Positive for neck pain.        Social History   reports that she has never smoked. She has never been exposed to tobacco smoke. She has never used smokeless tobacco. She reports that she does not drink alcohol and does not use drugs.     Family History  Family History   Problem Relation Name Age of Onset    Heart disease Mother      Diabetes type II Mother      Heart attack Father  50        Open heart surgery    Allergies Sister      No Known Problems Brother      No Known Problems Maternal Aunt      No Known Problems Maternal Uncle      No Known Problems Paternal Aunt      No Known Problems Paternal Uncle      No Known Problems Maternal Grandmother      No Known Problems Maternal Grandfather      No Known Problems Paternal Grandmother      No Known Problems Paternal Grandfather      Amblyopia Neg Hx      Blindness Neg Hx      Cancer Neg Hx      Cataracts Neg Hx      Diabetes Neg Hx      Glaucoma Neg Hx      Hypertension Neg Hx      Macular degeneration Neg Hx      Retinal detachment Neg Hx      Strabismus Neg Hx      Stroke Neg Hx      Thyroid disease Neg Hx          Physical Exam   Vitals:    10/03/24 0905   BP: 128/78    Body mass index is 30.73 kg/m².  Weight: 76.2 kg (167 lb 15.9 oz)   Height: 5' 2" (157.5 cm)     GENERAL: no acute distress.  HEAD: normocephalic.   EYES: No scleral icterus  EARS: external ear without lesion, normal pinna shape and position.    NOSE: external nose without significant bony abnormality  ORAL CAVITY/OROPHARYNX: tongue mobile.   NECK: trachea midline. No thyromegaly  LYMPH NODES:No cervical lymphadenopathy.  RESPIRATORY: no stridor, no stertor. " Voice normal. Respirations nonlabored.  NEURO: alert, responds to questions appropriately.    PSYCH:mood appropriate    PROCEDURE NOTE  NAME OF PROCEDURE: Flexible Laryngoscopy, diagnostic  INDICATIONS: gag reflex precludes mirror exam, dysphonia , dysphagia   FINDINGS: turbinate hypertrophy; no vocal fold nodules; symmetric lingual tonsil hypertrophy    Consent: After procedure was explained in detail and all questions answered, verbal consent was obtained for performing flexible laryngoscopy.  Anesthesia: topical 4% lidocaine and neosynephrine  Procedure: With patient in seated position, the scope was inserted into the bilateral nasal passageway and advanced atraumatically into the nasopharynx to examine the following structures:  Nasal cavity: Turbinates with  hypertrophy. no middle meatal edema. No purulent drainage.   Nasopharynx: no mass or lesion noted in nasopharynx.   Oropharynx: base of tongue without  mass or ulceration. Lingual tonsils symmetric hypertrophy  Hypopharynx: posterior pharyngeal wall without mass or lesion. No pooling of secretions. Pyriform sinuses visible without mass or lesion  Larynx: epiglottis normal without lesion. False vocal folds without edema/erythema/lesion. True vocal folds mobile and without lesion. Mild interarytenoid edema no erythema . Postcricoid region with mild edema no lesion   Subglottis: visualized portion of subglottis normal in appearance    After examination performed, the scope was removed atraumatically . The patient tolerated the procedure well. Photodocumentation obtained with representative images below, all images and/or videos uploaded in media section of epic.            Imaging:  The patient does not have any new imaging of the head and neck since last visit.     Labs:  CBC  Recent Labs   Lab 09/20/22  1112 07/12/23  1512   WBC 6.24 3-5   Hemoglobin 12.1  --    Hematocrit 38.3  --    MCV 83  --    Platelets 324  --      BMP  Recent Labs   Lab 09/20/22  1112  04/26/23  0906 05/08/23  0026 11/30/23  1547   Glucose 109  --   --   --    Sodium 139 142 137  --    Potassium 4.1 3.6 4.4  --    Chloride 104  --   --   --    CO2 28  --   --   --    Carbon Dioxide  --  29 26  --    BUN 13 13.3 15.3  --    Creatinine 0.7 0.81 0.96 0.8   Calcium 9.5 8.8 8.9  --      COAGS  Recent Labs   Lab 12/15/21  2159 09/20/22  1112   INR 1.0 0.9       Assessment  1. Thyroid nodule  - US Thyroid; Future    2. Hypertrophy of inferior nasal turbinate    3. Chronic nonseasonal allergic rhinitis due to pollen    4. Dysphonia    5. Dysphagia, unspecified type       Plan:  Discussed plan of care with patient in detail and all questions answered. Patient reported understanding of plan of care. I gave the patient the opportunity to ask questions and patient confirmed all questions answered to satisfaction.     Saline, flonase and astelin bid  Can try Gaviscon ot   Last ultrasound September 2023- will order new ultrasound   Consider swallow study in the future if problem persists   F/u 3-4 months sooner if issue      Please be aware that this note has been generated with the assistance of [employer] voice-to-text.  Please excuse any spelling or grammatical errors.

## 2024-10-03 NOTE — PATIENT INSTRUCTIONS
Information and instructions from your visit with me today:  Always use saline every time before a medication spray. You can also use saline on its own. If you are using saline and/or the medication sprays on an as needed basis and you have symptoms use the regimen daily for at least 2 weeks. You can use the flonase and astelin together, or if you prefer to start with just one medication spray, the flonase works better by itself compared to astelin by itself. You can try doing the saline and flonase and if still congested, add on the astelin again doing this regimen daily for up to two weeks when congestion. There may be times of the year that you only need saline and there may be times of the year that you need saline, flonase and astelin to control symptoms.     Start using the following medication nasal sprays:   Fluticasone spray:    This medication is a steroid spray. It stays within the nose and does not have absorption into the body that leads to side effects that one has with oral steroid medication. Fluticasone nasal spray is the same as the Flonase brand nasal spray. Discuss with your pharmacist if the price is lower over the counter or with a prescription ( this varies depending on insurance). The medication that is over the counter is the same as the prescription medication. Use this medication as instructed on the prescription, 1-2 sprays on each side of your nose twice daily.     Azelastine  spray:  This medication is an antihistamine used to treat nasal symptoms of allergy, which works specifically in the nose unlike antihistamine pills which have more of an effect on the whole body. Use this medication as instructed on the prescription, 1 spray on each side of your nose twice daily.     Additional instructions for medication sprays  Place the tip of the medication bottle in your nose and aim slightly up and out on each side to get medication high and deep into your nose and sinuses, and not have it  "all deposit in the very front of your nose. Aim the tip of the nozzle towards the outer corner of your eye . You can imagine aiming towards the back of your eyeball on each side for this, as opposed to straight back to the center of your nose and head.     You need to use this medication every day regardless of symptoms, as it takes time ( a few weeks) to work and get the benefits. It does not work on an "as needed" basis like taking a decongestant. If your symptoms only occur in a particular season, then the medication can be used seasonally instead of year long. For seasonal symptoms, you should start using the spray twice daily a month before when you normally have symptoms ( for example, if symptoms start in August, should start at the end of June).     Start nasal irrigations with saline solution- you can either use a rinse or a mist spray:    NASAL SALINE SPRAY ( simply saline and arm and hammer are examples) There are several different brands found in the cold and flu aisle of the pharmacy. You can use any brand of saline spray - this will deliver the saline by a gentle mist ( if you have difficulty or discomfort with nasal rinse/ a lot of fluid in the nose, this will be more comfortable).       Always rinse your nose with saline prior to using medication sprays and wait a couple of hours before using again. You can use the saline throughout the day to help with stuffy nose or dry nose.    Do not use nasal decongestant sprays such as Afrin or similar products long term ( over 3 days) .  This can cause long term physical nasal addiction. Afrin should only be used if having nose bleeds, severe nasal congestion , or severe ear pain/fullness and should not be used for more than 2-3 days in a row . It is a not a medication that should be used for a long period of time.     It was nice meeting you today, and I look forward to helping you feel better soon. Please don't hesitate to call if you have any other " questions or concerns, or if I can be of any assistance in the meantime.      Laurie Spaulidng MD    [facility]     Phone  [phone]    Fax      [phone]        Laurie Spaulding MD  Otorhinolaryngology

## 2025-01-06 ENCOUNTER — HOSPITAL ENCOUNTER (OUTPATIENT)
Dept: RADIOLOGY | Facility: HOSPITAL | Age: 60
Discharge: HOME | End: 2025-01-06
Attending: OTOLARYNGOLOGY
Payer: MEDICARE

## 2025-01-06 DIAGNOSIS — E04.1 THYROID NODULE: ICD-10-CM

## 2025-01-06 PROCEDURE — 76536 US EXAM OF HEAD AND NECK: CPT | Mod: TC

## 2025-01-06 PROCEDURE — 76536 US EXAM OF HEAD AND NECK: CPT | Mod: 26,,, | Performed by: INTERNAL MEDICINE

## 2025-01-29 ENCOUNTER — TELEPHONE (OUTPATIENT)
Dept: OTOLARYNGOLOGY | Facility: CLINIC | Age: 60
End: 2025-01-29
Payer: MEDICARE

## 2025-01-29 NOTE — TELEPHONE ENCOUNTER
Spoke to pt, informed no sooner appt for tomorrow, pt requested to reschedule appt, got it rescheduled to 02/27/2025, pt verb understanding

## 2025-01-29 NOTE — TELEPHONE ENCOUNTER
----- Message from Epi sent at 1/29/2025  1:04 PM CST -----  .Type:  Sooner Apoointment Request    Caller is requesting a sooner appointment.  Caller declined first available appointment listed below.  Caller will not accept being placed on the waitlist and is requesting a message be sent to doctor.  Name of Caller:pt  When is the first available appointment?01/30/2025  Symptoms:  3-4 mo f/u- neck pain, thyroid U/S  Would the patient rather a call back or a response via [facility]?  Call back  Best Call Back Number: [phone]  Additional Information: Pt. Is calling to see if she can come in earlier for appt.  Because she has another appt. Close to that time on the other side of [facility]. 01/30/2025 @1:45 p.m.

## 2025-01-31 NOTE — TELEPHONE ENCOUNTER
Completed cards clearance. And wants to let us know to update chart.      ----- Message from Peggy Sanchez sent at 11/10/2017  9:27 AM CST -----  Contact: self   Antoine States that she needs a call returned by a nurse in reference to her completing her cardio clearance, Please call Antoine @ [phone] . Thanks :)    
31.3

## (undated) DEVICE — Device

## (undated) DEVICE — KIT SURGIFLO HEMOSTATIC MATRIX

## (undated) DEVICE — GLOVE SURG BIOGEL LATEX SZ 7.5

## (undated) DEVICE — ELECTRODE REM PLYHSV RETURN 9

## (undated) DEVICE — BLANKET UPPER BODY 78.7X29.9IN

## (undated) DEVICE — SOL NS 1000CC

## (undated) DEVICE — GAUZE SPONGE PEANUT STRL

## (undated) DEVICE — SOL IRR NACL .9% 3000ML

## (undated) DEVICE — SEE MEDLINE ITEM 156946

## (undated) DEVICE — BANDAGE ADHESIVE

## (undated) DEVICE — SUT 0 VICRYL / UR6 (J603)

## (undated) DEVICE — SYR 10CC LUER LOCK

## (undated) DEVICE — UNDERGLOVES BIOGEL PI SZ 7 LF

## (undated) DEVICE — SUT MONOCRYL 4-0 PS-1 UND

## (undated) DEVICE — TROCAR ENDOPATH XCEL 11MM 10CM

## (undated) DEVICE — SEE MEDLINE ITEM 146313

## (undated) DEVICE — DIFFUSER

## (undated) DEVICE — DURAPREP SURG SCRUB 26ML

## (undated) DEVICE — SET CYSTO IRRIGATION UNIV SPIK

## (undated) DEVICE — ENDOSTITCH INSTRUMENT

## (undated) DEVICE — DRESSING TELFA N ADH 3X8

## (undated) DEVICE — DRAPE STERI-DRAPE 1000 17X11IN

## (undated) DEVICE — ADHESIVE MASTISOL VIAL 48/BX

## (undated) DEVICE — GLOVE BIOGEL PI MICRO SZ 7

## (undated) DEVICE — PACK SET UP CONVERTORS

## (undated) DEVICE — NDL SPINAL 18GX3.5 SPINOCAN

## (undated) DEVICE — SEE L#152161

## (undated) DEVICE — SUT GUT PL. 4-0 27 FS-2

## (undated) DEVICE — TAPE SILK 3IN

## (undated) DEVICE — SUPPORT ULNA NERVE PROTECTOR

## (undated) DEVICE — BLADE SURG CARBON STEEL SZ11

## (undated) DEVICE — STRIP STERI 1/8 X 3

## (undated) DEVICE — MAT QUICK 40X30 FLOOR FLUID LF

## (undated) DEVICE — RESTRAINT LIMB HOLDER ADJ FOAM

## (undated) DEVICE — TRAY MINOR GEN SURG

## (undated) DEVICE — CATH SELF-CATH FEMALE 14FR 6IN

## (undated) DEVICE — TUBING HF INSUFFLATION W/ FLTR

## (undated) DEVICE — TROCAR ENDOPATH XCEL 5MM 7.5CM

## (undated) DEVICE — DRAPE OPMI STERILE

## (undated) DEVICE — TROCAR ENDOPATH XCEL 15MM 10CM

## (undated) DEVICE — SEE L#120831

## (undated) DEVICE — MARKER SKIN STND TIP BLUE BARR

## (undated) DEVICE — CAP SELF SEAL GYNEOCLOGY F4

## (undated) DEVICE — GOWN B1 X-LG X-LONG

## (undated) DEVICE — CORD BIPOLAR 12 FOOT

## (undated) DEVICE — SEE MEDLINE ITEM 157181

## (undated) DEVICE — SCISSOR 5MMX35CM DIRECT DRIVE

## (undated) DEVICE — SEE MEDLINE ITEM 157117

## (undated) DEVICE — LINER GLOVE POWDERFREE SZ 7

## (undated) DEVICE — CARTRIDGE OIL

## (undated) DEVICE — SUT CTD VICRYL 3-0 CR/SH

## (undated) DEVICE — DRESSING TRANS 4X4 TEGADERM

## (undated) DEVICE — COVER SNAP KAP 26IN

## (undated) DEVICE — CANISTER SUCTION 2 LTR

## (undated) DEVICE — PAD SANITARY OB STERILE

## (undated) DEVICE — SPONGE GAUZE 16PLY 4X4

## (undated) DEVICE — CONTAINER SPECIMEN STRL 4OZ

## (undated) DEVICE — DRAPE C-ARM ELAS CLIP 42X120IN

## (undated) DEVICE — BUR BONE CUT MICRO TPS 3X3.8MM

## (undated) DEVICE — TROCAR ENDOPATH XCEL 12X100MM

## (undated) DEVICE — KIT CATH URTRL CONE TIP 5F

## (undated) DEVICE — ADHESIVE DERMABOND ADVANCED

## (undated) DEVICE — GAUZE SPONGE 4X4 12PLY

## (undated) DEVICE — DRAPE T THYROID STERILE

## (undated) DEVICE — SEE MEDLINE ITEM 156905

## (undated) DEVICE — APPLICATOR CHLORAPREP ORN 26ML

## (undated) DEVICE — DRAPE TOP 53X102IN

## (undated) DEVICE — PAD PREP 50/CA

## (undated) DEVICE — TUBE FRAZIER 5MM 2FT SOFT TIP

## (undated) DEVICE — GOWN SURGICAL X-LARGE

## (undated) DEVICE — SEE MEDLINE ITEM 154981

## (undated) DEVICE — SYR ONLY LUER LOCK 20CC

## (undated) DEVICE — SOL IRR WATER STRL 3000 ML

## (undated) DEVICE — SEE MEDLINE ITEM 152531